# Patient Record
Sex: FEMALE | Race: WHITE | Employment: UNEMPLOYED | URBAN - METROPOLITAN AREA
[De-identification: names, ages, dates, MRNs, and addresses within clinical notes are randomized per-mention and may not be internally consistent; named-entity substitution may affect disease eponyms.]

---

## 2017-01-04 ENCOUNTER — HOSPITAL ENCOUNTER (EMERGENCY)
Age: 52
Discharge: HOME | End: 2017-01-04
Attending: EMERGENCY MEDICINE
Payer: MEDICAID

## 2017-01-04 VITALS
BODY MASS INDEX: 34.15 KG/M2 | OXYGEN SATURATION: 99 % | HEART RATE: 81 BPM | WEIGHT: 200 LBS | DIASTOLIC BLOOD PRESSURE: 63 MMHG | HEIGHT: 64 IN | RESPIRATION RATE: 20 BRPM | TEMPERATURE: 97.4 F | SYSTOLIC BLOOD PRESSURE: 116 MMHG

## 2017-01-04 DIAGNOSIS — F32.A DEPRESSION, UNSPECIFIED DEPRESSION TYPE: Primary | ICD-10-CM

## 2017-01-04 PROCEDURE — 90791 PSYCH DIAGNOSTIC EVALUATION: CPT

## 2017-01-04 PROCEDURE — 99283 EMERGENCY DEPT VISIT LOW MDM: CPT

## 2017-01-04 RX ORDER — GLIPIZIDE 2.5 MG/1
2.5 TABLET, EXTENDED RELEASE ORAL DAILY
Qty: 30 TAB | Refills: 0 | Status: SHIPPED | OUTPATIENT
Start: 2017-01-04 | End: 2017-06-06

## 2017-01-04 NOTE — DISCHARGE INSTRUCTIONS
Depression and Chronic Disease: Care Instructions  Your Care Instructions  A chronic disease is one that you have for a long time. Some chronic diseases can be controlled, but they usually cannot be cured. Depression is common in people with chronic diseases, but it often goes unnoticed. Many people have concerns about seeking treatment for a mental health problem. You may think it's a sign of weakness, or you don't want people to know about it. It's important to overcome these reasons for not seeking treatment. Treating depression or anxiety is good for your health. Follow-up care is a key part of your treatment and safety. Be sure to make and go to all appointments, and call your doctor if you are having problems. It's also a good idea to know your test results and keep a list of the medicines you take. How can you care for yourself at home? Watch for symptoms of depression  The symptoms of depression are often subtle at first. You may think they are caused by your disease rather than depression. Or you may think it is normal to be depressed when you have a chronic disease. If you are depressed you may:  · Feel sad or hopeless. · Feel guilty or worthless. · Not enjoy the things you used to enjoy. · Feel hopeless, as though life is not worth living. · Have trouble thinking or remembering. · Have low energy, and you may not eat or sleep well. · Pull away from others. · Think often about death or killing yourself. (Keep the numbers for these national suicide hotlines: 1-947-159-TALK [1-161.152.6980] and 0-287-QEPCNMP [1-200.115.3797]. )  Get treatment  By treating your depression, you can feel more hopeful and have more energy. If you feel better, you may take better care of yourself, so your health may improve. · Talk to your doctor if you have any changes in mood during treatment for your disease. · Ask your doctor for help.  Counseling, antidepressant medicine, or a combination of the two can help most people with depression. Often a combination works best. Counseling can also help you cope with having a chronic disease. When should you call for help? Call 911 anytime you think you may need emergency care. For example, call if:  · You feel like hurting yourself or someone else. · Someone you know has depression and is about to attempt or is attempting suicide. Call your doctor now or seek immediate medical care if:  · You hear voices. · Someone you know has depression and:  ¨ Starts to give away his or her possessions. ¨ Uses illegal drugs or drinks alcohol heavily. ¨ Talks or writes about death, including writing suicide notes or talking about guns, knives, or pills. ¨ Starts to spend a lot of time alone. ¨ Acts very aggressively or suddenly appears calm. Watch closely for changes in your health, and be sure to contact your doctor if:  · You do not get better as expected. Where can you learn more? Go to http://babatunde-dejah.info/. Enter G560 in the search box to learn more about \"Depression and Chronic Disease: Care Instructions. \"  Current as of: July 26, 2016  Content Version: 11.1  © 9754-4362 [employer]. Care instructions adapted under license by [employer] (which disclaims liability or warranty for this information). If you have questions about a medical condition or this instruction, always ask your healthcare professional. [address] any warranty or liability for your use of this information.

## 2017-01-04 NOTE — PROGRESS NOTES
Care manager interviewed patient at bedside following BSMART eval. RRAT: 12.    The patient is a 46year old female seen in ED for suicidal ideations. Accompanied by / from [facility] (present during assessment). The patient is currently homeless and states that she slept outside last night. She receives 200$ per month in [employer] benefits and has [employer]. PCP is Dr. Gabe Tony, no upcoming appointments. F/U scheduled for 1/18 (next available) with patient's permission. CM discussed housing resources with patient including [facility] (open this evening and tomorrow evening), [facility], and subsidized housing options. Provided patient with detailed instructions for registration for each referral. Also discussed the [facility] as an option for psychiatry, case management follow up as patient states that she is seeking new psychiatry provider. Referred to [facility] for free phone ( states that she will assist with online application after discharge). No other questions or concerns noted by patient at this time. She will register at [facility] this evening (7 PM) for emergency shelter. Provided patient and  with CM contact information in case of future questions. Care Management Interventions  PCP Verified by CM: Yes (Bryanna)  Mode of Transport at Discharge: Self  Transition of Care Consult (CM Consult): Discharge Planning  Current Support Network:  Other, Shelter  Confirm Follow Up Transport: Other (see comment) ()  Plan discussed with Pt/Family/Caregiver: Yes (PCP, housing, psychiatry)  Discharge Location  Discharge Placement: Home with outpatient services    Coreen Duverney, MSW  [phone]
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
## 2017-01-04 NOTE — ED NOTES
Patient (s) was given copy of dc instructions and no paper script(s) and 1 electronic scripts. Patient (s)  verbalized understanding of instructions and script (s). Patient given a current medication reconciliation form and verbalized understanding of their medications. Patient (s) verbalized understanding of the importance of discussing medications with  his or her physician or clinic they will be following up with. Patient alert and oriented and in no acute distress. Patient discharged home ambulatory with .
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
## 2017-01-04 NOTE — ED NOTES
.See Nursing Assessment      Emergency Department Nursing Plan of Care       The Nursing Plan of Care is developed from the Nursing assessment and Emergency Department Attending provider initial evaluation. The plan of care may be reviewed in the ED Provider note.     The Plan of Care was developed with the following considerations:   Patient / Family readiness to learn indicated by:verbalized understanding  Persons(s) to be included in education: patient  Barriers to Learning/Limitations:No    Signed     Lou Browning RN    1/4/2017   1:26 PM

## 2017-03-01 ENCOUNTER — HOSPITAL ENCOUNTER (EMERGENCY)
Age: 52
Discharge: HOME | End: 2017-03-02
Attending: EMERGENCY MEDICINE
Payer: MEDICAID

## 2017-03-01 DIAGNOSIS — F14.90 CRACK COCAINE USE: ICD-10-CM

## 2017-03-01 DIAGNOSIS — F10.20 ALCOHOLISM (HCC): ICD-10-CM

## 2017-03-01 DIAGNOSIS — F25.9 SCHIZOAFFECTIVE DISORDER, UNSPECIFIED TYPE (HCC): Primary | ICD-10-CM

## 2017-03-01 DIAGNOSIS — F19.10 SUBSTANCE ABUSE (HCC): ICD-10-CM

## 2017-03-01 PROCEDURE — 80307 DRUG TEST PRSMV CHEM ANLYZR: CPT | Performed by: EMERGENCY MEDICINE

## 2017-03-01 PROCEDURE — 36415 COLL VENOUS BLD VENIPUNCTURE: CPT | Performed by: EMERGENCY MEDICINE

## 2017-03-01 PROCEDURE — 90791 PSYCH DIAGNOSTIC EVALUATION: CPT

## 2017-03-01 PROCEDURE — 99284 EMERGENCY DEPT VISIT MOD MDM: CPT

## 2017-03-01 PROCEDURE — 80053 COMPREHEN METABOLIC PANEL: CPT | Performed by: EMERGENCY MEDICINE

## 2017-03-01 PROCEDURE — 85025 COMPLETE CBC W/AUTO DIFF WBC: CPT | Performed by: EMERGENCY MEDICINE

## 2017-03-01 RX ORDER — OXYCODONE HYDROCHLORIDE 5 MG/1
5 CAPSULE ORAL
Qty: 5 CAP | Refills: 0 | Status: SHIPPED | OUTPATIENT
Start: 2017-03-01 | End: 2017-06-06

## 2017-03-02 VITALS
WEIGHT: 200 LBS | TEMPERATURE: 98.1 F | HEART RATE: 79 BPM | SYSTOLIC BLOOD PRESSURE: 133 MMHG | BODY MASS INDEX: 34.15 KG/M2 | RESPIRATION RATE: 18 BRPM | HEIGHT: 64 IN | OXYGEN SATURATION: 98 % | DIASTOLIC BLOOD PRESSURE: 74 MMHG

## 2017-03-02 LAB
ALBUMIN SERPL BCP-MCNC: 3.2 G/DL (ref 3.5–5)
ALBUMIN/GLOB SERPL: 0.8 {RATIO} (ref 1.1–2.2)
ALP SERPL-CCNC: 146 U/L (ref 45–117)
ALT SERPL-CCNC: 24 U/L (ref 12–78)
AMPHET UR QL SCN: POSITIVE
ANION GAP BLD CALC-SCNC: 10 MMOL/L (ref 5–15)
AST SERPL W P-5'-P-CCNC: 9 U/L (ref 15–37)
BARBITURATES UR QL SCN: NEGATIVE
BASOPHILS # BLD AUTO: 0 K/UL (ref 0–0.1)
BASOPHILS # BLD: 0 % (ref 0–1)
BENZODIAZ UR QL: NEGATIVE
BILIRUB SERPL-MCNC: 0.2 MG/DL (ref 0.2–1)
BUN SERPL-MCNC: 15 MG/DL (ref 6–20)
BUN/CREAT SERPL: 19 (ref 12–20)
CALCIUM SERPL-MCNC: 8.7 MG/DL (ref 8.5–10.1)
CANNABINOIDS UR QL SCN: POSITIVE
CHLORIDE SERPL-SCNC: 101 MMOL/L (ref 97–108)
CO2 SERPL-SCNC: 28 MMOL/L (ref 21–32)
COCAINE UR QL SCN: POSITIVE
CREAT SERPL-MCNC: 0.81 MG/DL (ref 0.55–1.02)
DRUG SCRN COMMENT,DRGCM: ABNORMAL
EOSINOPHIL # BLD: 0.2 K/UL (ref 0–0.4)
EOSINOPHIL NFR BLD: 2 % (ref 0–7)
ERYTHROCYTE [DISTWIDTH] IN BLOOD BY AUTOMATED COUNT: 14.8 % (ref 11.5–14.5)
ETHANOL SERPL-MCNC: <10 MG/DL
GLOBULIN SER CALC-MCNC: 3.8 G/DL (ref 2–4)
GLUCOSE SERPL-MCNC: 193 MG/DL (ref 65–100)
HCT VFR BLD AUTO: 40.1 % (ref 35–47)
HGB BLD-MCNC: 12.8 G/DL (ref 11.5–16)
LYMPHOCYTES # BLD AUTO: 30 % (ref 12–49)
LYMPHOCYTES # BLD: 2.8 K/UL (ref 0.8–3.5)
MCH RBC QN AUTO: 28 PG (ref 26–34)
MCHC RBC AUTO-ENTMCNC: 31.9 G/DL (ref 30–36.5)
MCV RBC AUTO: 87.7 FL (ref 80–99)
METHADONE UR QL: NEGATIVE
MONOCYTES # BLD: 0.5 K/UL (ref 0–1)
MONOCYTES NFR BLD AUTO: 5 % (ref 5–13)
NEUTS SEG # BLD: 5.6 K/UL (ref 1.8–8)
NEUTS SEG NFR BLD AUTO: 63 % (ref 32–75)
OPIATES UR QL: NEGATIVE
PCP UR QL: NEGATIVE
PLATELET # BLD AUTO: 372 K/UL (ref 150–400)
POTASSIUM SERPL-SCNC: 3.8 MMOL/L (ref 3.5–5.1)
PROT SERPL-MCNC: 7 G/DL (ref 6.4–8.2)
RBC # BLD AUTO: 4.57 M/UL (ref 3.8–5.2)
SODIUM SERPL-SCNC: 139 MMOL/L (ref 136–145)
WBC # BLD AUTO: 9 K/UL (ref 3.6–11)

## 2017-03-02 NOTE — DISCHARGE INSTRUCTIONS
Schizophrenia: Care Instructions  Your Care Instructions  Schizophrenia is a disease that makes it hard to think clearly, manage emotions, and interact with other people. It can cause:  · Delusions. These are beliefs that are not real.  · Hallucinations. These are things that you may see or hear that are not really there. · Paranoia. This is the belief that others are lying, cheating, using you, or trying to harm you. The disease may change your ability to enjoy life, express emotions, or function. At times, you may hear voices, behave strangely, have trouble speaking or understanding speech, or keep to yourself. The goal of treatment is to lower your stress and help your brain function normally. You may need lifelong treatment with medicines and counseling to keep your schizophrenia under control. When schizophrenia is not treated, the risks are higher for suicide, a hospital stay, and other problems. Early treatment called coordinated specialty care [facility]) may help a person who is having his or her first episode of psychotic thoughts. Ask your doctor about [address]. Follow-up care is a key part of your treatment and safety. Be sure to make and go to all appointments, and call your doctor if you are having problems. It's also a good idea to know your test results and keep a list of the medicines you take. How can you care for yourself at home? · Be safe with medicines. Take your medicines exactly as prescribed. Call your doctor if you think you are having a problem with your medicine. When you feel good, you may think that you do not need your medicines. But it is important to keep taking them as scheduled. · Go to your counseling sessions. Call and talk with your counselor if you can't attend or if you don't think the sessions are helping. Do not just stop going. · Eat a healthy diet. Talk with a dietitian about what type of diet may be best for you. · Do not use alcohol or illegal drugs.   What should you do if someone in your family has schizophrenia? · Learn about the disease and how it may get worse over time. · Remind your family member that you love him or her. · Make a plan with all family members about how to take care of your loved one when his or her symptoms are bad. · Talk about your fears and concerns and those of other family members. Seek counseling if needed. · Do not focus attention only on the person who is in treatment. · Remind yourself that it will take time for changes to occur. · Do not blame yourself for the disease. · Know your legal rights and the legal rights of your family member. · Take care of yourself. Stay involved with your own interests, such as your career, hobbies, and friends. Use exercise, positive self-talk, relaxation, and deep breathing to help lower your stress. · Give yourself time to grieve. You may need to deal with emotions such as anger, fear, and frustration. After you work through your feelings, you will be better able to care for yourself and your family. · If you are having a hard time with your feelings and your interactions with your family member, talk with a counselor. When should you call for help? Call 911 anytime you think you may need emergency care. For example, call if:  · You are thinking about suicide or are threatening suicide. · You feel like hurting yourself or someone else. Call your doctor now or seek immediate medical care if:  · You hear voices. · You think someone is trying to harm you. · You cannot concentrate or are easily confused. Watch closely for changes in your health, and be sure to contact your doctor if:  · You are having trouble taking care of yourself. · You cannot attend your counseling sessions. Where can you learn more? Go to http://babatunde-dejah.info/. Enter W417 in the search box to learn more about \"Schizophrenia: Care Instructions. \"  Current as of: July 26, 2016  Content Version: 11.1  © 0174-2730 [employer]. Care instructions adapted under license by [employer] (which disclaims liability or warranty for this information). If you have questions about a medical condition or this instruction, always ask your healthcare professional. [address] any warranty or liability for your use of this information. Alcohol and Drug Problems: Care Instructions  Your Care Instructions  You can improve your life and health by stopping your use of alcohol or drugs. Ending dependency on alcohol or drugs may help you feel and sleep better. You may get along better with your family, friends, and coworkers. There are medicines and programs that can help. Follow-up care is a key part of your treatment and safety. Be sure to make and go to all appointments, and call your doctor if you are having problems. It's also a good idea to know your test results and keep a list of the medicines you take. How can you care for yourself at home? · If you have been given medicine to help keep you sober or reduce your cravings, be sure to take it as prescribed. · Talk to your doctor about programs that can help you stop using drugs or drinking alcohol. · If your doctor prescribes disulfiram (Antabuse), do not drink any alcohol while you are taking this medicine. You may have severe, even life-threatening, side effects from even small amounts of alcohol. · Do not tempt yourself by keeping alcohol or drugs in your home. · Learn how to say no when other people drink or use drugs. Or don't spend time with people who drink or use drugs. · Use the time and money spent on drinking or drugs to do something fun with your family or friends. Preventing a relapse  · Do not drink alcohol or use drugs at all. Using any amount of alcohol or drugs greatly increases your risk for relapse.   · Seek help from organizations such as Alcoholics Anonymous, Narcotics Anonymous, or treatment facilities if you feel the need to drink alcohol or use drugs again. · Remember that recovery is a lifelong process. · Stay away from situations, friends, or places that may lead you to drink or use drugs. · Have a plan to spot and deal with relapse. Learn to recognize changes in your thinking that lead you to drink or use drugs. These are warning signs. Get help before you start to drink or use drugs again. · Get help as soon as you can if you relapse. Some people make a plan with another person that outlines what they want that person to do for them if they relapse. The plan usually includes how to handle the relapse and who to notify in case of relapse. · Don't give up. Remember that a relapse does not mean that you have failed. Use the experience to learn the triggers that lead you to drink or use drugs. Then quit again. Many people have several relapses before they are able to quit for good. When should you call for help? Call 911 anytime you think you may need emergency care. For example, call if:  · You feel you cannot stop from hurting yourself or someone else. Call your doctor now or seek immediate medical care if:  · You have serious withdrawal symptoms such as confusion, hallucinations, or severe trembling. Watch closely for changes in your health, and be sure to contact your doctor if:  · You have a relapse. · You need more help or support to stop. Where can you learn more? Go to http://babatunde-dejah.info/. Enter [id number] in the search box to learn more about \"Alcohol and Drug Problems: Care Instructions. \"  Current as of: February 24, 2016  Content Version: 11.1  © 4452-9493 [employer]. Care instructions adapted under license by [employer] (which disclaims liability or warranty for this information).  If you have questions about a medical condition or this instruction, always ask your healthcare professional. [address] any warranty or liability for your use of this information.

## 2017-03-02 NOTE — ED NOTES
Bedside shift change report given to Sofy Marrufo RN (oncoming nurse) by Curly Mejia RN (offgoing nurse). Report included the following information SBAR, ED Summary, Intake/Output, MAR and Recent Results.

## 2017-03-02 NOTE — ED TRIAGE NOTES
Triage Note: Pt coming to ED via EMS with complaints of SI and ETOH; Drank approx 0.5 gallon of gin today which is baseline for pt; Agrees to safety contact while in ED;  Pt also states she smoked some crack tonight
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
## 2017-03-02 NOTE — ED NOTES
Patient escorted to room, changed into hospital gown, all belongings collected and placed at the nursing station, patient contracts for safety while in the ED.

## 2017-03-02 NOTE — ED NOTES
Discharge instructions given to pt. All questions answered and pt verbalized understanding. V/S stable @ time of discharge. Pt ambulatory out of unit. Juliane contacted for transport home spoke with [facility] confirmation number [id number].
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
## 2017-04-03 ENCOUNTER — HOSPITAL ENCOUNTER (EMERGENCY)
Age: 52
Discharge: HOME | End: 2017-04-03
Attending: EMERGENCY MEDICINE
Payer: MEDICAID

## 2017-04-03 VITALS
DIASTOLIC BLOOD PRESSURE: 81 MMHG | OXYGEN SATURATION: 95 % | SYSTOLIC BLOOD PRESSURE: 160 MMHG | TEMPERATURE: 97.6 F | HEART RATE: 79 BPM | RESPIRATION RATE: 16 BRPM

## 2017-04-03 DIAGNOSIS — F20.9 SCHIZOPHRENIA, UNSPECIFIED TYPE (HCC): Primary | ICD-10-CM

## 2017-04-03 LAB
ALBUMIN SERPL BCP-MCNC: 2.9 G/DL (ref 3.5–5)
ALBUMIN/GLOB SERPL: 0.7 {RATIO} (ref 1.1–2.2)
ALP SERPL-CCNC: 145 U/L (ref 45–117)
ALT SERPL-CCNC: 24 U/L (ref 12–78)
AMPHET UR QL SCN: NEGATIVE
ANION GAP BLD CALC-SCNC: 9 MMOL/L (ref 5–15)
APAP SERPL-MCNC: <2 UG/ML (ref 10–30)
AST SERPL W P-5'-P-CCNC: 12 U/L (ref 15–37)
BARBITURATES UR QL SCN: NEGATIVE
BASOPHILS # BLD AUTO: 0 K/UL (ref 0–0.1)
BASOPHILS # BLD: 0 % (ref 0–1)
BENZODIAZ UR QL: NEGATIVE
BILIRUB SERPL-MCNC: 0.2 MG/DL (ref 0.2–1)
BUN SERPL-MCNC: 13 MG/DL (ref 6–20)
BUN/CREAT SERPL: 17 (ref 12–20)
CALCIUM SERPL-MCNC: 8.8 MG/DL (ref 8.5–10.1)
CANNABINOIDS UR QL SCN: POSITIVE
CHLORIDE SERPL-SCNC: 104 MMOL/L (ref 97–108)
CO2 SERPL-SCNC: 27 MMOL/L (ref 21–32)
COCAINE UR QL SCN: NEGATIVE
CREAT SERPL-MCNC: 0.77 MG/DL (ref 0.55–1.02)
DRUG SCRN COMMENT,DRGCM: ABNORMAL
EOSINOPHIL # BLD: 0.2 K/UL (ref 0–0.4)
EOSINOPHIL NFR BLD: 3 % (ref 0–7)
ERYTHROCYTE [DISTWIDTH] IN BLOOD BY AUTOMATED COUNT: 14.4 % (ref 11.5–14.5)
ETHANOL SERPL-MCNC: <10 MG/DL
GLOBULIN SER CALC-MCNC: 4.2 G/DL (ref 2–4)
GLUCOSE SERPL-MCNC: 225 MG/DL (ref 65–100)
HCT VFR BLD AUTO: 36.6 % (ref 35–47)
HGB BLD-MCNC: 11.6 G/DL (ref 11.5–16)
LYMPHOCYTES # BLD AUTO: 34 % (ref 12–49)
LYMPHOCYTES # BLD: 2.5 K/UL (ref 0.8–3.5)
MCH RBC QN AUTO: 27.8 PG (ref 26–34)
MCHC RBC AUTO-ENTMCNC: 31.7 G/DL (ref 30–36.5)
MCV RBC AUTO: 87.8 FL (ref 80–99)
METHADONE UR QL: NEGATIVE
MONOCYTES # BLD: 0.5 K/UL (ref 0–1)
MONOCYTES NFR BLD AUTO: 7 % (ref 5–13)
NEUTS SEG # BLD: 4.2 K/UL (ref 1.8–8)
NEUTS SEG NFR BLD AUTO: 56 % (ref 32–75)
OPIATES UR QL: NEGATIVE
PCP UR QL: NEGATIVE
PLATELET # BLD AUTO: 336 K/UL (ref 150–400)
POTASSIUM SERPL-SCNC: 3.8 MMOL/L (ref 3.5–5.1)
PROT SERPL-MCNC: 7.1 G/DL (ref 6.4–8.2)
RBC # BLD AUTO: 4.17 M/UL (ref 3.8–5.2)
SODIUM SERPL-SCNC: 140 MMOL/L (ref 136–145)
WBC # BLD AUTO: 7.5 K/UL (ref 3.6–11)

## 2017-04-03 PROCEDURE — 99285 EMERGENCY DEPT VISIT HI MDM: CPT

## 2017-04-03 PROCEDURE — 80307 DRUG TEST PRSMV CHEM ANLYZR: CPT | Performed by: EMERGENCY MEDICINE

## 2017-04-03 PROCEDURE — 36415 COLL VENOUS BLD VENIPUNCTURE: CPT | Performed by: EMERGENCY MEDICINE

## 2017-04-03 PROCEDURE — 80053 COMPREHEN METABOLIC PANEL: CPT | Performed by: EMERGENCY MEDICINE

## 2017-04-03 PROCEDURE — 85025 COMPLETE CBC W/AUTO DIFF WBC: CPT | Performed by: EMERGENCY MEDICINE
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
## 2017-04-03 NOTE — ED NOTES
Pt wheeled to restroom; lethargic but able to follow commands. Upon return to bed pt immediately began snoring again. Remains arousable to voice and stimulation but continues to be lethargic.

## 2017-04-03 NOTE — DISCHARGE INSTRUCTIONS
Schizophrenia: Care Instructions  Your Care Instructions  Schizophrenia is a disease that makes it hard to think clearly, manage emotions, and interact with other people. It can cause:  · Delusions. These are beliefs that are not real.  · Hallucinations. These are things that you may see or hear that are not really there. · Paranoia. This is the belief that others are lying, cheating, using you, or trying to harm you. The disease may change your ability to enjoy life, express emotions, or function. At times, you may hear voices, behave strangely, have trouble speaking or understanding speech, or keep to yourself. The goal of treatment is to lower your stress and help your brain function normally. You may need lifelong treatment with medicines and counseling to keep your schizophrenia under control. When schizophrenia is not treated, the risks are higher for suicide, a hospital stay, and other problems. Early treatment called coordinated specialty care [facility]) may help a person who is having his or her first episode of psychotic thoughts. Ask your doctor about [address]. Follow-up care is a key part of your treatment and safety. Be sure to make and go to all appointments, and call your doctor if you are having problems. It's also a good idea to know your test results and keep a list of the medicines you take. How can you care for yourself at home? · Be safe with medicines. Take your medicines exactly as prescribed. Call your doctor if you think you are having a problem with your medicine. When you feel good, you may think that you do not need your medicines. But it is important to keep taking them as scheduled. · Go to your counseling sessions. Call and talk with your counselor if you can't attend or if you don't think the sessions are helping. Do not just stop going. · Eat a healthy diet. Talk with a dietitian about what type of diet may be best for you. · Do not use alcohol or illegal drugs.   What should you do if someone in your family has schizophrenia? · Learn about the disease and how it may get worse over time. · Remind your family member that you love him or her. · Make a plan with all family members about how to take care of your loved one when his or her symptoms are bad. · Talk about your fears and concerns and those of other family members. Seek counseling if needed. · Do not focus attention only on the person who is in treatment. · Remind yourself that it will take time for changes to occur. · Do not blame yourself for the disease. · Know your legal rights and the legal rights of your family member. · Take care of yourself. Stay involved with your own interests, such as your career, hobbies, and friends. Use exercise, positive self-talk, relaxation, and deep breathing to help lower your stress. · Give yourself time to grieve. You may need to deal with emotions such as anger, fear, and frustration. After you work through your feelings, you will be better able to care for yourself and your family. · If you are having a hard time with your feelings and your interactions with your family member, talk with a counselor. When should you call for help? Call 911 anytime you think you may need emergency care. For example, call if:  · You are thinking about suicide or are threatening suicide. · You feel like hurting yourself or someone else. Call your doctor now or seek immediate medical care if:  · You hear voices. · You think someone is trying to harm you. · You cannot concentrate or are easily confused. Watch closely for changes in your health, and be sure to contact your doctor if:  · You are having trouble taking care of yourself. · You cannot attend your counseling sessions. Where can you learn more? Go to http://babatunde-dejah.info/. Enter O421 in the search box to learn more about \"Schizophrenia: Care Instructions. \"  Current as of: July 26, 2016  Content Version: 11.2  © 6242-1033 [employer]. Care instructions adapted under license by [employer] (which disclaims liability or warranty for this information). If you have questions about a medical condition or this instruction, always ask your healthcare professional. [place] [address] any warranty or liability for your use of this information.

## 2017-04-03 NOTE — ED TRIAGE NOTES
Pt called EMS herself from home stating that she has felt suicidal for 2 weeks and has a plan to cut her wrists to bleed out.

## 2017-04-03 NOTE — ED NOTES
MD reviewed discharge instructions and options with patient; patient verbalized understanding. RN reviewed discharge instructions using teachback method. Pt. Ambulated to exit without difficulty and in no signs of acute distress. Called CHELITA Zhong to  patient to bring her home.

## 2017-06-06 ENCOUNTER — HOSPITAL ENCOUNTER (INPATIENT)
Age: 52
LOS: 2 days | Discharge: HOME | DRG: 750 | End: 2017-06-08
Attending: EMERGENCY MEDICINE | Admitting: PSYCHIATRY & NEUROLOGY
Payer: MEDICAID

## 2017-06-06 DIAGNOSIS — F32.89 OTHER DEPRESSION: Primary | ICD-10-CM

## 2017-06-06 DIAGNOSIS — R45.851 SUICIDAL IDEATION: ICD-10-CM

## 2017-06-06 LAB
ALBUMIN SERPL BCP-MCNC: 3.3 G/DL (ref 3.5–5)
ALBUMIN/GLOB SERPL: 0.8 {RATIO} (ref 1.1–2.2)
ALP SERPL-CCNC: 111 U/L (ref 45–117)
ALT SERPL-CCNC: 23 U/L (ref 12–78)
AMPHET UR QL SCN: POSITIVE
ANION GAP BLD CALC-SCNC: 6 MMOL/L (ref 5–15)
APAP SERPL-MCNC: <2 UG/ML (ref 10–30)
APPEARANCE UR: ABNORMAL
AST SERPL W P-5'-P-CCNC: 10 U/L (ref 15–37)
BACTERIA URNS QL MICRO: ABNORMAL /HPF
BARBITURATES UR QL SCN: NEGATIVE
BASOPHILS # BLD AUTO: 0 K/UL (ref 0–0.1)
BASOPHILS # BLD: 0 % (ref 0–1)
BENZODIAZ UR QL: NEGATIVE
BILIRUB SERPL-MCNC: 0.2 MG/DL (ref 0.2–1)
BILIRUB UR QL: NEGATIVE
BUN SERPL-MCNC: 14 MG/DL (ref 6–20)
BUN/CREAT SERPL: 18 (ref 12–20)
CALCIUM SERPL-MCNC: 8.8 MG/DL (ref 8.5–10.1)
CANNABINOIDS UR QL SCN: NEGATIVE
CHLORIDE SERPL-SCNC: 104 MMOL/L (ref 97–108)
CO2 SERPL-SCNC: 27 MMOL/L (ref 21–32)
COCAINE UR QL SCN: NEGATIVE
COLOR UR: ABNORMAL
CREAT SERPL-MCNC: 0.76 MG/DL (ref 0.55–1.02)
DRUG SCRN COMMENT,DRGCM: ABNORMAL
EOSINOPHIL # BLD: 0.1 K/UL (ref 0–0.4)
EOSINOPHIL NFR BLD: 3 % (ref 0–7)
EPITH CASTS URNS QL MICRO: ABNORMAL /LPF
ERYTHROCYTE [DISTWIDTH] IN BLOOD BY AUTOMATED COUNT: 14.4 % (ref 11.5–14.5)
ETHANOL SERPL-MCNC: <10 MG/DL
GLOBULIN SER CALC-MCNC: 4.2 G/DL (ref 2–4)
GLUCOSE BLD STRIP.AUTO-MCNC: 151 MG/DL (ref 65–100)
GLUCOSE SERPL-MCNC: 137 MG/DL (ref 65–100)
GLUCOSE UR STRIP.AUTO-MCNC: NEGATIVE MG/DL
HCT VFR BLD AUTO: 39.7 % (ref 35–47)
HGB BLD-MCNC: 12.5 G/DL (ref 11.5–16)
HGB UR QL STRIP: NEGATIVE
HYALINE CASTS URNS QL MICRO: ABNORMAL /LPF (ref 0–5)
KETONES UR QL STRIP.AUTO: NEGATIVE MG/DL
LEUKOCYTE ESTERASE UR QL STRIP.AUTO: NEGATIVE
LYMPHOCYTES # BLD AUTO: 30 % (ref 12–49)
LYMPHOCYTES # BLD: 1.6 K/UL (ref 0.8–3.5)
MCH RBC QN AUTO: 27.3 PG (ref 26–34)
MCHC RBC AUTO-ENTMCNC: 31.5 G/DL (ref 30–36.5)
MCV RBC AUTO: 86.7 FL (ref 80–99)
METHADONE UR QL: NEGATIVE
MONOCYTES # BLD: 0.2 K/UL (ref 0–1)
MONOCYTES NFR BLD AUTO: 4 % (ref 5–13)
NEUTS SEG # BLD: 3.4 K/UL (ref 1.8–8)
NEUTS SEG NFR BLD AUTO: 63 % (ref 32–75)
NITRITE UR QL STRIP.AUTO: POSITIVE
OPIATES UR QL: NEGATIVE
PCP UR QL: NEGATIVE
PH UR STRIP: 5.5 [PH] (ref 5–8)
PLATELET # BLD AUTO: 315 K/UL (ref 150–400)
POTASSIUM SERPL-SCNC: 3.7 MMOL/L (ref 3.5–5.1)
PROT SERPL-MCNC: 7.5 G/DL (ref 6.4–8.2)
PROT UR STRIP-MCNC: NEGATIVE MG/DL
RBC # BLD AUTO: 4.58 M/UL (ref 3.8–5.2)
RBC #/AREA URNS HPF: ABNORMAL /HPF (ref 0–5)
SALICYLATES SERPL-MCNC: 3.2 MG/DL (ref 2.8–20)
SERVICE CMNT-IMP: ABNORMAL
SODIUM SERPL-SCNC: 137 MMOL/L (ref 136–145)
SP GR UR REFRACTOMETRY: 1.02 (ref 1–1.03)
UROBILINOGEN UR QL STRIP.AUTO: 0.2 EU/DL (ref 0.2–1)
WBC # BLD AUTO: 5.4 K/UL (ref 3.6–11)
WBC URNS QL MICRO: ABNORMAL /HPF (ref 0–4)

## 2017-06-06 PROCEDURE — 80053 COMPREHEN METABOLIC PANEL: CPT | Performed by: EMERGENCY MEDICINE

## 2017-06-06 PROCEDURE — 80307 DRUG TEST PRSMV CHEM ANLYZR: CPT | Performed by: EMERGENCY MEDICINE

## 2017-06-06 PROCEDURE — 82962 GLUCOSE BLOOD TEST: CPT

## 2017-06-06 PROCEDURE — 87077 CULTURE AEROBIC IDENTIFY: CPT | Performed by: EMERGENCY MEDICINE

## 2017-06-06 PROCEDURE — 81001 URINALYSIS AUTO W/SCOPE: CPT | Performed by: EMERGENCY MEDICINE

## 2017-06-06 PROCEDURE — 90791 PSYCH DIAGNOSTIC EVALUATION: CPT

## 2017-06-06 PROCEDURE — 87086 URINE CULTURE/COLONY COUNT: CPT | Performed by: EMERGENCY MEDICINE

## 2017-06-06 PROCEDURE — 85025 COMPLETE CBC W/AUTO DIFF WBC: CPT | Performed by: EMERGENCY MEDICINE

## 2017-06-06 PROCEDURE — 99284 EMERGENCY DEPT VISIT MOD MDM: CPT

## 2017-06-06 PROCEDURE — 87186 SC STD MICRODIL/AGAR DIL: CPT | Performed by: EMERGENCY MEDICINE

## 2017-06-06 PROCEDURE — 65220000003 HC RM SEMIPRIVATE PSYCH

## 2017-06-06 PROCEDURE — 36415 COLL VENOUS BLD VENIPUNCTURE: CPT | Performed by: EMERGENCY MEDICINE

## 2017-06-06 RX ORDER — LORAZEPAM 2 MG/ML
2 INJECTION INTRAMUSCULAR
Status: DISCONTINUED | OUTPATIENT
Start: 2017-06-06 | End: 2017-06-08 | Stop reason: HOSPADM

## 2017-06-06 RX ORDER — BENZTROPINE MESYLATE 2 MG/1
2 TABLET ORAL
Status: DISCONTINUED | OUTPATIENT
Start: 2017-06-06 | End: 2017-06-08 | Stop reason: HOSPADM

## 2017-06-06 RX ORDER — ACETAMINOPHEN 325 MG/1
650 TABLET ORAL
Status: DISCONTINUED | OUTPATIENT
Start: 2017-06-06 | End: 2017-06-08 | Stop reason: HOSPADM

## 2017-06-06 RX ORDER — GLIPIZIDE 5 MG/1
10 TABLET ORAL
Status: DISCONTINUED | OUTPATIENT
Start: 2017-06-07 | End: 2017-06-08 | Stop reason: HOSPADM

## 2017-06-06 RX ORDER — ZOLPIDEM TARTRATE 10 MG/1
10 TABLET ORAL
Status: DISCONTINUED | OUTPATIENT
Start: 2017-06-06 | End: 2017-06-06

## 2017-06-06 RX ORDER — GLIPIZIDE 10 MG/1
10 TABLET ORAL 2 TIMES DAILY
Status: ON HOLD | COMMUNITY
End: 2021-09-22

## 2017-06-06 RX ORDER — BUPROPION HYDROCHLORIDE 200 MG/1
200 TABLET, EXTENDED RELEASE ORAL 2 TIMES DAILY
COMMUNITY
End: 2017-06-14

## 2017-06-06 RX ORDER — IBUPROFEN 200 MG
1 TABLET ORAL
Status: DISCONTINUED | OUTPATIENT
Start: 2017-06-06 | End: 2017-06-08 | Stop reason: HOSPADM

## 2017-06-06 RX ORDER — BENZTROPINE MESYLATE 1 MG/ML
2 INJECTION INTRAMUSCULAR; INTRAVENOUS
Status: DISCONTINUED | OUTPATIENT
Start: 2017-06-06 | End: 2017-06-08 | Stop reason: HOSPADM

## 2017-06-06 RX ORDER — ADHESIVE BANDAGE
30 BANDAGE TOPICAL DAILY PRN
Status: DISCONTINUED | OUTPATIENT
Start: 2017-06-06 | End: 2017-06-08 | Stop reason: HOSPADM

## 2017-06-06 RX ORDER — ZOLPIDEM TARTRATE 5 MG/1
5 TABLET ORAL
Status: DISCONTINUED | OUTPATIENT
Start: 2017-06-06 | End: 2017-06-08 | Stop reason: HOSPADM

## 2017-06-06 RX ORDER — LORAZEPAM 1 MG/1
1 TABLET ORAL
Status: DISCONTINUED | OUTPATIENT
Start: 2017-06-06 | End: 2017-06-08 | Stop reason: HOSPADM

## 2017-06-06 RX ORDER — OLANZAPINE 5 MG/1
5 TABLET ORAL
Status: DISCONTINUED | OUTPATIENT
Start: 2017-06-06 | End: 2017-06-08 | Stop reason: HOSPADM

## 2017-06-06 RX ORDER — IBUPROFEN 400 MG/1
400 TABLET ORAL
Status: DISCONTINUED | OUTPATIENT
Start: 2017-06-06 | End: 2017-06-08 | Stop reason: HOSPADM

## 2017-06-06 NOTE — PROGRESS NOTES
Admission Medication Reconciliation:    Information obtained from: patient, Rx Query    Significant PMH/Disease States:   Past Medical History:   Diagnosis Date    Arthritis     legs    [facility]     COPD     Depression 1/13/2012    Diabetes ([facility]     Drug abuse     cocaine, stimulants, etoh, mj, tob    H/O suicide attempt     Polydrug dependence excluding opioid type drug, abuse ([facility]     Schizophrenia ([facility]     dx: in late 20's. vs substance induced. h/o visual and aud eunice.        Chief Complaint for this Admission:    Chief Complaint   Patient presents with    Back Pain    Leg Pain    Suicidal       Allergies:  Mushroom flavor and Tomato    Prior to Admission Medications:   Prior to Admission Medications   Prescriptions Last Dose Informant Patient Reported? Taking? QUEtiapine (SEROQUEL) 200 mg tablet 6/3/2017  Yes Yes   Sig: Take 200 mg by mouth nightly. Indications: DEPRESSION TREATMENT ADJUNCT   buPROPion SR (WELLBUTRIN, ZYBAN) 200 mg SR tablet 6/6/2017 at am  Yes Yes   Sig: Take 200 mg by mouth two (2) times a day. glipiZIDE (GLUCOTROL) 10 mg tablet 5/30/2017  Yes Yes   Sig: Take 10 mg by mouth daily. sertraline (ZOLOFT) 50 mg tablet 6/6/2017 at am  Yes Yes   Sig: Take 50 mg by mouth daily. traZODone (DESYREL) 100 mg tablet 6/5/2017 at hs  Yes Yes   Sig: Take 200 mg by mouth nightly. Indications: INSOMNIA      Facility-Administered Medications: None         Comments/Recommendations: Reviewed PTA meds/confirmed allergies. Updated list with following changes:  1) Removed: ferrous sulfate (patient states she needs new Rx), oxycodone  2) Updated dose of bupropion from 100mg BID to 200mg BID  3) Updated dose of glipizide from 2.5 mg CR to 10 mg daily  4) She reports that she ran out of the Seroquel a couple days ago and that it has been about a week since she had her glipizide.

## 2017-06-06 NOTE — BH NOTES
TRANSFER - IN REPORT:    Verbal report received from Rocío Foxjones  on [employer]  being received from [address] for routine progression of care      Report consisted of patients Situation, Background, Assessment and   Recommendations(SBAR). Information from the following report(s) SBAR and ED Summary was reviewed with the receiving nurse. Opportunity for questions and clarification was provided. Assessment completed upon patients arrival to unit and care assumed. Primary Nurse Keyana Cody RN and Javier Collins RN performed a dual skin assessment on this patient Olga Krueger. Pt with healed R leg scar, several small areas of light bruising on left buttocks, excoriated skin under both breasts and bilaterally in groins. Admission note---Pt admitted from the ED w/the diagnosis of schizoaffective d/o. Pt went to the ED c/o bilateral leg aching, back pain, and an UTI, mentioning that she felt suicidal (no plan) and was having auditory hallucinations saying negative things to her and telling her to hurt herself. The pt has an extensive psychiatric hx with approximately 20 inpatient hospitalizations, the last being at [facility] in 2016. The pt admits to previous suicide attempts by overdosing and cutting her wrists. Pt reports SI but is able to verbalize a safety plan. Denies HI. States she had been drinking 3 quarts of beer daily until last month and has been clean for the past 2 months. The pt uses a cane, gait is slightly unsteady and very slow. 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 She is very vague about her med compliance. Will continue to monitor.

## 2017-06-06 NOTE — IP AVS SNAPSHOT
[address] [facility] [address] [place] [address] 
[phone] Patient: Brittnee Ordaz MRN: [id number] :1965 A check vanessa indicates which time of day the medication should be taken. My Medications CONTINUE taking these medications Instructions Each Dose to Equal  
 Morning Noon Evening Bedtime  
 glipiZIDE 10 mg tablet Commonly known as:  Florin Vermilion Take 10 mg by mouth daily. Indications: type 2 diabetes mellitus 10 mg  
    
   
   
   
  
 QUEtiapine 200 mg tablet Commonly known as:  SEROquel Your next dose is: Tonight at 10pm  
   
 Take 200 mg by mouth nightly. Indications: DEPRESSION TREATMENT ADJUNCT  
 200 mg  
    
   
   
   
  
 sertraline 50 mg tablet Commonly known as:  ZOLOFT Take 50 mg by mouth daily. Indications: major depressive disorder 50 mg  
    
   
   
   
  
 traZODone 100 mg tablet Commonly known as:  Jannet Moh Your next dose is: Tonight at 10pm  
   
 Take 200 mg by mouth nightly. Indications: INSOMNIA  
 200 mg  
    
   
   
   
  
  
STOP taking these medications buPROPion  mg SR tablet Commonly known as:  Randi Dozier

## 2017-06-06 NOTE — BSMART NOTE
Comprehensive Assessment Form Part 1        Section I - Disposition    Axis I - Schizoaffective Disorder, hx of Substance Use Disorder   Axis II - Deferred  Axis III -   Past Medical History:   Diagnosis Date    Arthritis     legs    B[facility]     COPD     Depression 2012    Diabetes ([facility]     Drug abuse     cocaine, stimulants, etoh, mj, tob    H/O suicide attempt     Polydrug dependence excluding opioid type drug, abuse ([facility]     [facility]     dx: in late 20's. vs substance induced. h/o visual and aud eunice.        Axis IV - Marital issues  [place] V - 35      The Medical Doctor to Psychiatrist conference was not completed. The Medical Doctor is in agreement with Psychiatrist disposition because of (reason) Patient requesting voluntary admission. The plan is admit to general psychiatry at [facility]. The on-call Psychiatrist consulted was Dr. Vernon Faria. The admitting Psychiatrist will be Dr. Daryle Legions. The admitting Diagnosis is Schizoaffective. The Payor source is  WShahla RShahla Hackberry    Section II - Integrated Summary  Summary:  Patient came in for leg and spine pain as well as headaches. Patient also reported hallucinations and SI. Patient reported she has been feeling this way for a couple of days. Patient reported a history of depression and schizophrenia. Patient sees Elissa Liz, for case management but this clinician was unable to reach her. Patient also reported a mental health , Guerda Merritt, at [phone]. Patient reported she is under stress because she lost her sister last week and her marriage is \"bad\" and he has cancer. Patient reported she has had decreased appetite and sleep. Patient reported hearing and seeing her  grandmother. Patient does not appear to be actively hallucinating currently. Patient reported SI to \"let a car hit me. \"  Patient denied HI. Patient has been admitted here 2x in 2016 and both admissions were only a day long. Patient has also been seen several times in the ER and discharged. She appears to have SI at baseline and history of cocaine and alcohol use as well base on record review. The patienthas demonstrated mental capacity to provide informed consent. The information is given by the patient, past medical records and Mental Health . The Chief Complaint is SI and hallucinations. The Precipitant Factors are marital issues. Previous Hospitalizations: Yes  The patient has not previously been in restraints. Current Psychiatrist and/or  is Eric Mercado and Amy Miranda. Lethality Assessment:    The potential for suicide noted by the following: previous history of attempts which occured on (date)last year in the form(s) of cutting and overdosing  vague plan and ideation . The potential for homicide is not noted. The patient has not been a perpetrator of sexual or physical abuse. There are not pending charges. The patient is felt to be at risk for self harm or harm to others. The attending nurse was advised that security has not been notified. Section III - Psychosocial  The patient's overall mood and attitude is depressed. Feelings of helplessness and hopelessness are not observed. Generalized anxiety is not observed. Panic is not observed. Phobias are not observed. Obsessive compulsive tendencies are not observed. Section IV - Mental Status Exam  The patient's appearance shows no evidence of impairment. The patient's behavior shows no evidence of impairment. The patient is oriented to time, place, person and situation. The patient's speech shows no evidence of impairment. The patient's mood is depressed. The range of affect is flat. The patient's thought content demonstrates no evidence of impairment. The thought process shows no evidence of impairment. The patient's perception shows no evidence of impairment. The patient's memory shows no evidence of impairment. The patient's appetite is decreased and shows signs of weight loss. The patient's sleep has evidence of insomnia. The patient shows no insight. The patient's judgement is psychologically impaired. Section V - Substance Abuse  The patient is not using substances. Section VI - Living Arrangements  The patient is . .  The patient lives with a spouse. The patient has one child . The patient does plan to return home upon discharge. The patient does not have legal issues pending. The patient's source of income comes from disability. Restorationism and cultural practices have not been voiced at this time. The patient's greatest support comes from [facility] and [address] and this person will be involved with the treatment. The patient has not been in an event described as horrible or outside the realm of ordinary life experience either currently or in the past.  The patient has not been a victim of sexual/physical abuse. Section VII - Other Areas of Clinical Concern  The highest grade achieved is 12th with the overall quality of school experience being described as \"Special Ed\". The patient is currently disabled and speaks [place] as a primary language. The patient has no communication impairments affecting communication. The patient's preference for learning can be described as: can read and write adequately.   The patient's hearing is normal.  The patient's vision is normal.      Nam Dutta, KERI

## 2017-06-06 NOTE — ED TRIAGE NOTES
Pt also states she is having hallucinations and would like to check herself in to the psych singer. +SI. Denies HI.

## 2017-06-06 NOTE — IP AVS SNAPSHOT
[address] [facility] [address]ira [address] 
[phone] Patient: Elvis Kenney MRN: [id number] :1965 About your hospitalization You were admitted on:  2017 You last received care in the:  [address] You were discharged on:  2017 Why you were hospitalized Your primary diagnosis was:  Schizoaffective Disorder (Hcc) Follow-up Information Follow up With Details Comments Contact Info Philip Clemons On 2017 Mental Health  to pick Patient up for D/C at 1:00pm and transport Patient home. [address] 
[phone] [address] [place] [address]   - Imelda Busch [address] [place] [address] Dee Lvoe MD On 2017 please call and make an appointment within 14 days from discharge for diabetes management and medication monitoring [facility] 
[phone] Discharge Orders None A check vanessa indicates which time of day the medication should be taken. My Medications CONTINUE taking these medications Instructions Each Dose to Equal  
 Morning Noon Evening Bedtime  
 glipiZIDE 10 mg tablet Commonly known as:  Freeodm Schlein Take 10 mg by mouth daily. Indications: type 2 diabetes mellitus 10 mg  
    
   
   
   
  
 QUEtiapine 200 mg tablet Commonly known as:  SEROquel Your next dose is: Tonight at 10pm  
   
 Take 200 mg by mouth nightly. Indications: DEPRESSION TREATMENT ADJUNCT  
 200 mg  
    
   
   
   
  
 sertraline 50 mg tablet Commonly known as:  ZOLOFT Take 50 mg by mouth daily. Indications: major depressive disorder 50 mg  
    
   
   
   
  
 traZODone 100 mg tablet Commonly known as:  Hermon Valdovinos Your next dose is: Tonight at 10pm  
   
 Take 200 mg by mouth nightly.  Indications: INSOMNIA  
 200 mg  
    
   
 STOP taking these medications buPROPion  mg SR tablet Commonly known as:  Miguelito Long Discharge Instructions DISCHARGE SUMMARY 
 
NAME:Pilar Sandra : 1965 MRN: [id number] The patient Maryjo Escalante exhibits the ability to control behavior in a less restrictive environment. Patient's level of functioning is improving. No assaultive/destructive behavior has been observed for the past 24 hours. No suicidal/homicidal threat or behavior has been observed for the past 24 hours. There is no evidence of serious medication side effects. Patient has not been in physical or protective restraints for at least the past 24 hours. If weapons involved, how are they secured? No weapons involved. Is patient aware of and in agreement with discharge plan? Yes Arrangements for medication:  Prescriptions given to patient. Patient is a smoker and needs referral for smoking cessation? Patient declined. 1.  Patient referred to the following for smoking cessation with an appointment: Patient declined. 2.  Patient was offered medication to assist with smoking cessation at discharge: Patient declined. 3.  Education for smoking cessation added to discharge instructions: Patient declined. Patient has a history of substance/alcohol abuse and requires a referral for treatment? Patient refused recommendations for substance abuse treatment follow-up. 1.  Patient referred to the following for substance/alcohol abuse treatment with an appointment: Patient will [facility] up with [facility] 2. Patient was offered medication to assist with alcohol cessation at discharge: Patient declined. 3.  Education for substance/alcohol abuse added to discharge instructions: Attached to discharge paperwork. Copy of discharge instructions to provider?: Willie Cid ([phone]) Arrangements for transportation home: Mental health  to  at 1pm 
 Keep all follow up appointments as scheduled, continue to take prescribed medications per physician instructions. Mental health crisis number:  653 or your local mental health crisis line number at [phone]. DISCHARGE SUMMARY from Nurse The following personal items are in your possession at time of discharge: 
 
Dental Appliances: None Visual Aid: None Home Medications: Locked (given to nurse) Jewelry: Earrings (in purse in closet) Clothing: Dress, Footwear, With patient (dress and sandals) Other Valuables: Cigarettes, Lighter/matches, Money (comment), Personal toiletries, Purse, Other (comment) (light/cig,$4,5perfumes, lotion,small blk purse,makeup,comb) Personal Items Sent to Safe: None PATIENT INSTRUCTIONS: 
 
After general anesthesia or intravenous sedation, for 24 hours or while taking prescription Narcotics: · Limit your activities · Do not drive and operate hazardous machinery · Do not make important personal or business decisions · Do  not drink alcoholic beverages · If you have not urinated within 8 hours after discharge, please contact your surgeon on call. Report the following to your surgeon: 
· Excessive pain, swelling, redness or odor of or around the surgical area · Temperature over 100.5 · Nausea and vomiting lasting longer than 4 hours or if unable to take medications · Any signs of decreased circulation or nerve impairment to extremity: change in color, persistent  numbness, tingling, coldness or increase pain · Any questions What to do at Home: 
Recommended activity: Activity as tolerated, If you experience any of the following symptoms thoughts of harming self, feeling overwhelmed with anxiety, sadness or hopelessness, please follow up with your  and assigned providers at [facility]. If you can not reach them and symptoms continue immediately call your local crisis number at [phone] *  Please give a list of your current medications to your Primary Care Provider. *  Please update this list whenever your medications are discontinued, doses are 
    changed, or new medications (including over-the-counter products) are added. *  Please carry medication information at all times in case of emergency situations. These are general instructions for a healthy lifestyle: No smoking/ No tobacco products/ Avoid exposure to second hand smoke Surgeon General's Warning:  Quitting smoking now greatly reduces serious risk to your health. Obesity, smoking, and sedentary lifestyle greatly increases your risk for illness A healthy diet, regular physical exercise & weight monitoring are important for maintaining a healthy lifestyle You may be retaining fluid if you have a history of heart failure or if you experience any of the following symptoms:  Weight gain of 3 pounds or more overnight or 5 pounds in a week, increased swelling in our hands or feet or shortness of breath while lying flat in bed. Please call your doctor as soon as you notice any of these symptoms; do not wait until your next office visit. Recognize signs and symptoms of STROKE: 
 
F-face looks uneven A-arms unable to move or move unevenly S-speech slurred or non-existent T-time-call 911 as soon as signs and symptoms begin-DO NOT go Back to bed or wait to see if you get better-TIME IS BRAIN. Warning Signs of HEART ATTACK Call 911 if you have these symptoms:  Chest discomfort. Most heart attacks involve discomfort in the center of the chest that lasts more than a few minutes, or that goes away and comes back. It can feel like uncomfortable pressure, squeezing, fullness, or pain.  Discomfort in other areas of the upper body. Symptoms can include pain or discomfort in one or both arms, the back, neck, jaw, or stomach.  Shortness of breath with or without chest discomfort.  
 Other signs may include breaking out in a cold sweat, nausea, or lightheadedness. Don't wait more than five minutes to call [address]! Fast action can save your life. Calling 911 is almost always the fastest way to get lifesaving treatment. Emergency Medical Services staff can begin treatment when they arrive  up to an hour sooner than if someone gets to the hospital by car. The discharge information has been reviewed with the patient. The patient verbalized understanding. Discharge medications reviewed with the patient and appropriate educational materials and side effects teaching were provided. [employer] Announcement We are excited to announce that we are making your provider's discharge notes available to you in [employer]. You will see these notes when they are completed and signed by the physician that discharged you from your recent hospital stay. If you have any questions or concerns about any information you see in [employer], please call the Health Information Department where you were seen or reach out to your Primary Care Provider for more information about your plan of care. Introducing [facility] SERVICES! Peg Hemp introduces [employer] patient portal. Now you can access parts of your medical record, email your doctor's office, and request medication refills online. 1. In your internet browser, go to [email or web address] 2. Click on the First Time User? Click Here link in the Sign In box. You will see the New Member Sign Up page. 3. Enter your [employer] Access Code exactly as it appears below. You will not need to use this code after youve completed the sign-up process. If you do not sign up before the expiration date, you must request a new code. · [employer] Access Code: [email or web address] Expires: 6/24/2018  8:52 PM 
 
4. Enter the last four digits of your Social Security Number (xxxx) and Date of Birth (mm/dd/yyyy) as indicated and click Submit.  You will be taken to the next sign-up page. 5. Create a Wonderloopt ID. This will be your [employer] login ID and cannot be changed, so think of one that is secure and easy to remember. 6. Create a [employer] password. You can change your password at any time. 7. Enter your Password Reset Question and Answer. This can be used at a later time if you forget your password. 8. Enter your e-mail address. You will receive e-mail notification when new information is available in [address] 9. Click Sign Up. You can now view and download portions of your medical record. 10. Click the Download Summary menu link to download a portable copy of your medical information. If you have questions, please visit the Frequently Asked Questions section of the [employer] website. Remember, [employer] is NOT to be used for urgent needs. For medical emergencies, dial 911. Now available from your iPhone and Android! Introducing Inocencio Cruz As a [facility] patient, I wanted to make you aware of our electronic visit tool called Inocencio [employer] [facility] allows you to connect within minutes with a medical provider 24 hours a day, seven days a week via a mobile device or tablet or logging into a secure website from your computer. You can access Inocencio Cruz from anywhere in the [place]. A virtual visit might be right for you when you have a simple condition and feel like you just dont want to get out of bed, or cant get away from work for an appointment, when your regular [facility] provider is not available (evenings, weekends or holidays), or when youre out of town and need minor care. Electronic visits cost only $49 and if the [facility] 24/7 provider determines a prescription is needed to treat your condition, one can be electronically transmitted to a nearby pharmacy*. Please take a moment to enroll today if you have not already done so. The enrollment process is free and takes just a few minutes.   To enroll, please download the [employer] shawn to your tablet or phone, or visit [email or web address] to enroll on your computer. And, as an [address] [place] [address] patient with a [employer] account, the results of your visits will be scanned into your electronic medical record and your primary care provider will be able to view the scanned results. We urge you to continue to see your regular Jessy Mitali provider for your ongoing medical care. And while your primary care provider may not be the one available when you seek a [employer] virtual visit, the peace of mind you get from getting a real diagnosis real time can be priceless. For more information on [employer], view our Frequently Asked Questions (FAQs) at [email or web address]. Sincerely, 
 
Amrita Kumar MD 
Chief Medical Officer [address] *:  certain medications cannot be prescribed via [employer] Providers Seen During Your Hospitalization Provider Specialty Primary office phone Virginie Jeronimo MD Emergency Medicine [phone] Cruz Heimlich, MD Psychiatry [phone] Your Primary Care Physician (PCP) Primary Care Physician Office Phone Office Fax Nell Shaina [phone] You are allergic to the following Allergen Reactions Amoxicillin Hives Mushroom Flavor Hives Tomato Hives Recent Documentation Height Weight Breastfeeding? BMI OB Status Smoking Status 1.549 m 104 kg No 43.31 kg/m2 Menopause Current Every Day Smoker Emergency Contacts Name Discharge Info Relation Home Work Mobile Rexanne Enter DECLINED CAREGIVER [4] Other Relative [6] [phone] [facility] DISCHARGE CAREGIVER [3] Healthcare Decision Maker [20] [phone] [address] CAREGIVER [3] Agent [29] [phone] Patient Belongings The following personal items are in your possession at time of discharge: Dental Appliances: None  Visual Aid: None      Home Medications: Locked (given to nurse)   Jewelry: Earrings (in purse in closet)  Clothing: Dress, Footwear, With patient (dress and sandals)    Other Valuables: Cigarettes, Lighter/matches, Money (comment), Personal toiletries, Purse, Other (comment) (light/cig,$4,5perfumes, lotion,small blk purse,makeup,comb)  Personal Items Sent to Safe: None Please provide this summary of care documentation to your next provider. Signatures-by signing, you are acknowledging that this After Visit Summary has been reviewed with you and you have received a copy. Patient Signature:  ____________________________________________________________ Date:  ____________________________________________________________  
  
\[facility]\"" Provider Signature:  ____________________________________________________________ Date:  ____________________________________________________________
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
## 2017-06-06 NOTE — ED TRIAGE NOTES
TRANSFER - OUT REPORT:    Verbal report given to Ludmila (name) on Bren العلي  being transferred to 729(unit) for routine progression of care       Report consisted of patients Situation, Background, Assessment and   Recommendations(SBAR). Information from the following report(s) SBAR, ED Summary, [facility] and Recent Results was reviewed with the receiving nurse. Lines:       Opportunity for questions and clarification was provided.       Patient transported with:   Registered Nurse

## 2017-06-07 LAB
GLUCOSE BLD STRIP.AUTO-MCNC: 117 MG/DL (ref 65–100)
GLUCOSE BLD STRIP.AUTO-MCNC: 166 MG/DL (ref 65–100)
SERVICE CMNT-IMP: ABNORMAL
SERVICE CMNT-IMP: ABNORMAL

## 2017-06-07 PROCEDURE — 74011250637 HC RX REV CODE- 250/637: Performed by: PSYCHIATRY & NEUROLOGY

## 2017-06-07 PROCEDURE — 82962 GLUCOSE BLOOD TEST: CPT

## 2017-06-07 PROCEDURE — 65220000003 HC RM SEMIPRIVATE PSYCH

## 2017-06-07 RX ADMIN — LORAZEPAM 1 MG: 1 TABLET ORAL at 17:43

## 2017-06-07 RX ADMIN — GLIPIZIDE 10 MG: 5 TABLET ORAL at 09:36

## 2017-06-07 RX ADMIN — ZOLPIDEM TARTRATE 5 MG: 5 TABLET ORAL at 21:45

## 2017-06-07 NOTE — PROGRESS NOTES
GROUP THERAPY PROGRESS NOTE      Neal Carlos was present for medication education group. GROUP TIME: 30 minutes, Wednesday 13:00-13:30    PERSONAL GOAL FOR PARTICIPATION: To be present for group, participate in discussion and answer patient-directed questions. THERAPEUTIC INTERVENTIONS REVIEWED AND DISCUSSED: The following topics were presented: medication adherence, how to remember to refill medications, importance of follow-up after discharge and keeping up with appointments, relapse prevention, importance of using one pharmacy, differences between brand and generic medications, drug interactions, effects of alcohol on psychiatric medications, keeping a record of all medication including prescription and non-prescription drugs, storage of medications and who to contact with medication related questions. IMPRESSION OF PARTICIPATION:  Ms. Tori Bustamante was present in the medication education group. She actively participated in the discussion.     Nathanael Callejas, PharmD  PGY1 Pharmacy Resident
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
## 2017-06-07 NOTE — BH NOTES
Behavioral Health Interdisciplinary Rounds     Patient Name: Jenni Mckeon  Age: 46 y.o. Room/Bed:  729/  Primary Diagnosis: <principal problem not specified>   Admission Status: Voluntary     Readmission within 30 days: no  Power of  in place:   Patient requires a blocked bed: no          Reason for blocked bed: na    VTE Prophylaxis:       (hx of CVA & HTN)  Mobility needs/Fall risk: yes    Nutritional Plan: yes  Consults: should have a Diabetes Team & P.T. Consult ordered         Labs/Testing due today?: no    Sleep hours:  6.75+ (not counting 2.75 hr nap from 6215-1386)     Participation in Care/Groups:  No--new admit  Medication Compliant?: Yes  PRNS (last 24 hours): None    Restraints (last 24 hours):  no  Substance Abuse:  yes  CIWA (range last 24 hours): 0 COWS (range last 24 hours):   Alcohol screening (AUDIT) completed -  AUDIT Score: 14  If applicable, date SBIRT discussed in treatment team AND documented: 6/7/17  Tobacco - patient is a smoker: yes   Date tobacco education completed by RN:   24 hour chart check complete: yes   AUDIT Screen Score: AUDIT Score: 14      Document Brief Intervention (corresponds directly with the 5 A's, Ask, Advise, Assess, Assist, and Arrange):  Patient does not identify as having a substance abuse problem. At- Risk Patients (Score 7-15 for women; 8-15 for men)  Discuss concern patient is drinking at unhealthy levels known to increase risk of alcohol-related health problems. Is Patient ready to commit to change? No    If No:   Encourage reflection: Encouraged Patient to reflect how ETOH and illicit drug use impacts mental and physical health   Discuss short term and long term health risks of consuming alcohol: Discussed how ETOH abuse has impacted health   Barriers to change: Patient does not identify as substance abuser.  Reaffirm willingness to help / Educational materials provided: To be attached to discharge paperwork.     Patient goal(s) for today: attend all groups  Treatment team focus/goals: psychosocial  LOS:  1  Expected LOS: TBD  Psychiatric Advanced Care Directives - No   Name of Decision maker if patient has Psychiatric Care Directive: None   Patient was offered information, patient declined.    Financial concerns[facility]pres[facility]  Date of last family contact: None     Family requesting physician contact today: No  Discharge plan: Return home      Outpatient provider(s): Nathaly Sinclair    Participating treatment team members: Luis Eduardo Thompson MSW; Dr. Matteo MD; Kailyn Phelan RN; Darvin Elias, SuhaD

## 2017-06-07 NOTE — PROGRESS NOTES
Physical Therapy Screening:  Services are not indicated at this time. An InBasket screening referral was triggered for physical therapy based on results obtained during the nursing admission assessment. The patients chart was reviewed and the patient is not appropriate for a skilled therapy evaluation at this time. Please consult physical therapy if any therapy needs arise. Thank you.     Kip Foster, PT

## 2017-06-07 NOTE — PROGRESS NOTES
Problem: Depressed Mood (Adult/Pediatric)  Goal: *STG: Participates in treatment plan  Outcome: Progressing Towards Goal  Pt is euthymic with blunted affect. States \"much better\", denies SI and hallucinations, and contracts for safety. Pt states she would like help managing 's current illness. Med and meal compliant. Pt attended group today. Pt stated goal to participate in groups and do some art/crafts to \"keep focused\". Q 15 min checks.

## 2017-06-07 NOTE — BH NOTES
1540:  Patient received as she is sitting in the Day Room watching TV - she asks about working with the remote and greets oncoming staff cordially. Later, she approaches staff in the hallway requesting a shower this evening, and her name is added to the list.  She assures staff that she is able to shower herself. Will maintain q 15 minute safety checks.

## 2017-06-07 NOTE — BH NOTES
PRN Medication Documentation    Specific patient behavior that led to need for PRN medication: anxiety c/o  Staff interventions attempted prior to PRN being given: coping skills  PRN medication given:ativanPatient response/effectiveness of PRN medication:good
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
## 2017-06-08 VITALS
BODY MASS INDEX: 43.27 KG/M2 | HEIGHT: 61 IN | OXYGEN SATURATION: 98 % | HEART RATE: 71 BPM | TEMPERATURE: 97.4 F | WEIGHT: 229.2 LBS | RESPIRATION RATE: 18 BRPM | DIASTOLIC BLOOD PRESSURE: 91 MMHG | SYSTOLIC BLOOD PRESSURE: 163 MMHG

## 2017-06-08 LAB
BACTERIA SPEC CULT: ABNORMAL
CC UR VC: ABNORMAL
CHOLEST SERPL-MCNC: 237 MG/DL
EST. AVERAGE GLUCOSE BLD GHB EST-MCNC: 226 MG/DL
HBA1C MFR BLD: 9.5 % (ref 4.2–6.3)
HDLC SERPL-MCNC: 52 MG/DL
HDLC SERPL: 4.6 {RATIO} (ref 0–5)
LDLC SERPL CALC-MCNC: 115 MG/DL (ref 0–100)
LIPID PROFILE,FLP: ABNORMAL
SERVICE CMNT-IMP: ABNORMAL
TRIGL SERPL-MCNC: 350 MG/DL (ref ?–150)
VLDLC SERPL CALC-MCNC: 70 MG/DL

## 2017-06-08 PROCEDURE — 74011250637 HC RX REV CODE- 250/637: Performed by: PSYCHIATRY & NEUROLOGY

## 2017-06-08 PROCEDURE — 83036 HEMOGLOBIN GLYCOSYLATED A1C: CPT | Performed by: PSYCHIATRY & NEUROLOGY

## 2017-06-08 PROCEDURE — 80061 LIPID PANEL: CPT | Performed by: PSYCHIATRY & NEUROLOGY

## 2017-06-08 PROCEDURE — 36415 COLL VENOUS BLD VENIPUNCTURE: CPT | Performed by: PSYCHIATRY & NEUROLOGY

## 2017-06-08 RX ORDER — NYSTATIN 100000 [USP'U]/G
POWDER TOPICAL 2 TIMES DAILY
Status: DISCONTINUED | OUTPATIENT
Start: 2017-06-08 | End: 2017-06-08 | Stop reason: HOSPADM

## 2017-06-08 RX ADMIN — GLIPIZIDE 10 MG: 5 TABLET ORAL at 08:24

## 2017-06-08 NOTE — DISCHARGE INSTRUCTIONS
DISCHARGE SUMMARY    NAME:Pilar Sandra  : 1965  MRN: [id number]    The patient Zacarias Eduardo exhibits the ability to control behavior in a less restrictive environment. Patient's level of functioning is improving. No assaultive/destructive behavior has been observed for the past 24 hours. No suicidal/homicidal threat or behavior has been observed for the past 24 hours. There is no evidence of serious medication side effects. Patient has not been in physical or protective restraints for at least the past 24 hours. If weapons involved, how are they secured? No weapons involved. Is patient aware of and in agreement with discharge plan? Yes    Arrangements for medication:  Prescriptions given to patient. Patient is a smoker and needs referral for smoking cessation? Patient declined. 1.  Patient referred to the following for smoking cessation with an appointment: Patient declined. 2.  Patient was offered medication to assist with smoking cessation at discharge: Patient declined. 3.  Education for smoking cessation added to discharge instructions: Patient declined. Patient has a history of substance/alcohol abuse and requires a referral for treatment? Patient refused recommendations for substance abuse treatment follow-up. 1.  Patient referred to the following for substance/alcohol abuse treatment with an appointment: Patient will follow up with [facility]  2. Patient was offered medication to assist with alcohol cessation at discharge: Patient declined. 3.  Education for substance/alcohol abuse added to discharge instructions: Attached to discharge paperwork. Copy of discharge instructions to provider?: Luis A Leo ([phone])    Arrangements for transportation home: Mental health  to  at [address] [place] [address] Drive all follow up appointments as scheduled, continue to take prescribed medications per physician instructions.   Mental health crisis number:  054 or your local mental health crisis line number at [phone]. DISCHARGE SUMMARY from Nurse    The following personal items are in your possession at time of discharge:    Dental Appliances: None  Visual Aid: None     Home Medications: Locked (given to nurse)  Jewelry: Earrings (in purse in closet)  Clothing: Dress, Footwear, With patient (dress and sandals)  Other Valuables: Cigarettes, Lighter/matches, Money (comment), Personal toiletries, Purse, Other (comment) (light/cig,$4,5perfumes, lotion,small blk purse,makeup,comb)  Personal Items Sent to Safe: None          PATIENT INSTRUCTIONS:    After general anesthesia or intravenous sedation, for 24 hours or while taking prescription Narcotics:  · Limit your activities  · Do not drive and operate hazardous machinery  · Do not make important personal or business decisions  · Do  not drink alcoholic beverages  · If you have not urinated within 8 hours after discharge, please contact your surgeon on call. Report the following to your surgeon:  · Excessive pain, swelling, redness or odor of or around the surgical area  · Temperature over 100.5  · Nausea and vomiting lasting longer than 4 hours or if unable to take medications  · Any signs of decreased circulation or nerve impairment to extremity: change in color, persistent  numbness, tingling, coldness or increase pain  · Any questions        What to do at Home:  Recommended activity: Activity as tolerated,     If you experience any of the following symptoms thoughts of harming self, feeling overwhelmed with anxiety, sadness or hopelessness, please follow up with your  and assigned providers at [facility]. If you can not reach them and symptoms continue immediately call your local crisis number at [phone]    *  Please give a list of your current medications to your Primary Care Provider.     *  Please update this list whenever your medications are discontinued, doses are      changed, or new medications (including over-the-counter products) are added. *  Please carry medication information at all times in case of emergency situations. These are general instructions for a healthy lifestyle:    No smoking/ No tobacco products/ Avoid exposure to second hand smoke    Surgeon General's Warning:  Quitting smoking now greatly reduces serious risk to your health. Obesity, smoking, and sedentary lifestyle greatly increases your risk for illness    A healthy diet, regular physical exercise & weight monitoring are important for maintaining a healthy lifestyle    You may be retaining fluid if you have a history of heart failure or if you experience any of the following symptoms:  Weight gain of 3 pounds or more overnight or 5 pounds in a week, increased swelling in our hands or feet or shortness of breath while lying flat in bed. Please call your doctor as soon as you notice any of these symptoms; do not wait until your next office visit. Recognize signs and symptoms of STROKE:    F-face looks uneven    A-arms unable to move or move unevenly    S-speech slurred or non-existent    T-time-call 911 as soon as signs and symptoms begin-DO NOT go       Back to bed or wait to see if you get better-TIME IS BRAIN. Warning Signs of HEART ATTACK     Call 911 if you have these symptoms:   Chest discomfort. Most heart attacks involve discomfort in the center of the chest that lasts more than a few minutes, or that goes away and comes back. It can feel like uncomfortable pressure, squeezing, fullness, or pain.  Discomfort in other areas of the upper body. Symptoms can include pain or discomfort in one or both arms, the back, neck, jaw, or stomach.  Shortness of breath with or without chest discomfort.  Other signs may include breaking out in a cold sweat, nausea, or lightheadedness. Don't wait more than five minutes to call 911 - MINUTES MATTER! Fast action can save your life.  Calling 911 is almost always the fastest way to get lifesaving treatment. Emergency Medical Services staff can begin treatment when they arrive -- up to an hour sooner than if someone gets to the hospital by car. The discharge information has been reviewed with the patient. The patient verbalized understanding. Discharge medications reviewed with the patient and appropriate educational materials and side effects teaching were provided.
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
## 2017-06-08 NOTE — BH NOTES
GROUP THERAPY PROGRESS NOTE    Neal Breanna is participating in Reflections. .     Group time: 30 minutes    Personal goal for participation: attendance    Goal orientation: personal    Group therapy participation: active    Therapeutic interventions reviewed and discussed: Goals for the day and unit guidelines.      Impression of participation: minimal, and was able to reach her goal today, to not \"fuss at my \"

## 2017-06-08 NOTE — PROGRESS NOTES
Problem: Falls - Risk of  Goal: *Knowledge of fall prevention  Outcome: Progressing Towards Goal  Using cane appropriately    Problem: Diabetes Self-Management  Goal: *Monitoring blood glucose, interpreting and using results  Identify recommended blood glucose targets and personal targets. Outcome: Progressing Towards Goal  compliant    Problem: Depressed Mood (Adult/Pediatric)  Goal: *STG: Participates in treatment plan  Outcome: Progressing Towards Goal  Review meds, out on unit social w peers and staff. Affect brighter and mood hopeful.  Reports goal is d/c today  Goal: *STG: Verbalizes anger, guilt, and other feelings in a constructive manor  Outcome: Progressing Towards Goal  Easily shares she is hopeful and happy  Goal: *STG: Attends activities and groups  Outcome: Progressing Towards Goal  engaged  Goal: *STG: Demonstrates reduction in symptoms and increase in insight into coping skills/future focused  Outcome: Progressing Towards Goal  Denies SI, no self harming behaviors   Goal: Interventions  Outcome: Progressing Towards Goal  Staff focus is on d/c planning

## 2017-06-08 NOTE — PROGRESS NOTES
Problem: Falls - Risk of  Goal: *Absence of falls  Outcome: Progressing Towards Goal  Patient is ambulating steadily on the unit - she has remained free from falls/injury throughout this shift.      Problem: Depressed Mood (Adult/Pediatric)  Goal: *STG: Attends activities and groups  Outcome: Progressing Towards Goal  Patient attended and participated in Reflections Group tonight - she has been med/meal compliant

## 2017-06-08 NOTE — BH NOTES
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 will follow up with [facility]  2. Patient was offered medication to assist with alcohol cessation at discharge: Patient declined. 3.  Education for substance/alcohol abuse added to discharge instructions: Attached to discharge paperwork. Copy of discharge instructions to provider?: Dajuan Kramer ([phone])    Arrangements for transportation home: Mental health  to  at [address] [place] [address] Drive all follow up appointments as scheduled, continue to take prescribed medications per physician instructions. Mental health crisis number:  290 or your local mental health crisis line number at [phone]. Discharge Medication List and Instructions:   Discharge Medication List as of 6/8/2017  1:28 PM      CONTINUE these medications which have NOT CHANGED    Details   buPROPion SR (WELLBUTRIN, ZYBAN) 200 mg SR tablet Take 200 mg by mouth two (2) times a day., Historical Med      glipiZIDE (GLUCOTROL) 10 mg tablet Take 10 mg by mouth daily. , Historical Med      sertraline (ZOLOFT) 50 mg tablet Take 50 mg by mouth daily., Historical Med      QUEtiapine (SEROQUEL) 200 mg tablet Take 200 mg by mouth nightly. Indications: DEPRESSION TREATMENT ADJUNCT, Historical Med      traZODone (DESYREL) 100 mg tablet Take 200 mg by mouth nightly. Indications: INSOMNIA, Historical Med             Unresulted Labs     None        To obtain results of studies pending at discharge, please contact N/A    Follow-up Information     Follow up With Details Comments Zoila Jonesmateorula 17 On 6/8/2017 Mental Health  to pick Patient up for D/C at 1:00pm and transport Patient home. [address] [place] [address], [address] [place] [address]   - Corin Sharif [address] [place] [address]    Osiris Sung MD On 6/8/2017 please call and make an appointment within 14 days from discharge for diabetes management and medication monitoring [address] [place] [address] [phone]            Advanced Directive:   Does the patient have an appointed surrogate decision maker? No  Does the patient have a Medical Advance Directive? No  Does the patient have a Psychiatric Advance Directive? No  If the patient does not have a surrogate or Medical Advance Directive AND Psychiatric Advance Directive, the patient was offered information on these advance directives Yes and Patient declined to complete    Patient Instructions: Please continue all medications until otherwise directed by physician. What to do at Home:  Recommended activity: Activity as tolerated,     If you experience any of the following symptoms thoughts of harming self, feeling overwhelmed with anxiety, sadness or hopelessness, please follow up with your  and assigned providers at [facility]. If you can not reach them and symptoms continue immediately call your local crisis number at [phone]    *  Please give a list of your current medications to your Primary Care Provider.     *  Please update this list whenever your medications are discontinued, doses are      changed, or new medications (including over-the-counter products) are added. *  Please carry medication information at all times in case of emergency situations. Patient discharged to Home; discussed with patient/caregiver, provided to the patient/caregiver either in hard copy or electronically. and patient refused hard copy.

## 2017-06-08 NOTE — BH NOTES
GROUP THERAPY PROGRESS NOTE    David Hampton participated in a Process Group on the General Unit with a focus identifying feelings,   planning for the day, and learning more about DBT concepts of \"Interpersonal Effectiveness and using   the [address] as a long-term personal treatment plan. .   Group time: 70 minutes. Personal goal for participation: To increase the capacity to improve ones mood, set personal goals,   and understand more about basic activities to successfully state and get ones needs met. Goal orientation: The patients will be able to identify their feelings and develop a goal for   themselves for their day. The didactic portion of the session covered two primary topics  one on interpersonal   effectiveness and the second on the [address] as a long-term personal treatment plan. First, they were also asked to review a handout sheet on interpersonal effectiveness and asking   for something necessary. Three main areas of focus were explored in the interactive didactic   session: 1) defining what it is one wants (describe the current situation, express your feelings   and opinions, assert  yourself by asking and being willing to say No. 2) keeping a good   relationship with the person you are asking (be gentle and courteous in ones approach  no   attacks, threats, or judging; listen and be interested in the other person; validate the other   persons feelings and needs; and use an easy manner  be diplomatic and use humor   when you can); and 3) maintaining ones self-respect (be fair to yourself and to the other   person; no apologies for being alive or making a request at all, stick to your values, and be   truthful  dont lie, act helpless, exaggerate, or make excuses).     Second they were asked to consider filling in on their own after group the following elements   of a DBT house drawing with multiple levels:  1) foundation - values that govern your life;   2) first floor with a door  behaviors would like to manage and feel more control over or areas   you want to change; the door represents an opportunity to list or draw things that you keep   hidden from others; 3) second floor  list or draw emotions you want to experience more often,  more fully, or in a more healthy fashion; 4) third floor  a list of things that make you happy or   want to feel happy about; 5) attic  list or draw what  a Life Abhilash Barth would look like. There  is also a roof, where people and things that protect you can be listed. The chimney provides an   opportunity to list ways in which you blow off steam. The billboard allows one to post those things   in one's life they are proud of and the walls of the house provide an opportunity to list those people   and things that provide support. The patients were also provided copies of the DBT summary on  interpersonal effectiveness and the [address] that they could complete on their own. Group therapy participation: With prompting, this patient participated in the group. Therapeutic interventions reviewed and discussed: The group members were asked to   identify an emotion they are having and/or let the group know what they want to focus on for the   day as they continue to make discharge plans. The group members took turns reading and   reviewing the summary sheet on Interpersonal Effectiveness and the elements of the [address] [place] [address] Road. Impression of participation: The patient said she thought she was feeling a lot better than  yesterday and that she was thinking about getting back home with her . The patient   expressed no current SI/HI nor overt psychotic symptoms in this group. She listened and  responded to her peers and occassionally shared about how much she likes using nature  as a way to calm herself down or when she wants to cope with her feelings.  She also shared  How she anticipating speaking up more with her , rather than keeping her feelings  So much to herself - recognizing and challenging her tendency to emotionally isolate. She was  fully alert and her affect was still slightly depressed. She is working on finalizing her aftercare  plans.

## 2017-06-08 NOTE — PROGRESS NOTES
Pharmacist Discharge Medication Reconciliation    Discharging Provider: Dr. Mary Sanchez    Significant PMH:   Past Medical History:   Diagnosis Date    Arthritis     legs    Bipolar 1 disorder ([facility]     COPD     Depression 1/13/2012    [facility]     Drug abuse     cocaine, stimulants, etoh, mj, tob    H/O suicide attempt     Polydrug dependence excluding opioid type drug, abuse ([facility]     Schizophrenia ([facility]     dx: in late 20's. vs substance induced. h/o visual and aud eunice.      Chief Complaint for this Admission:   Chief Complaint   Patient presents with    Back Pain    Leg Pain    Suicidal     Allergies: Mushroom flavor and Tomato    Discharge Medications:   Current Discharge Medication List        CONTINUE these medications which have NOT CHANGED    Details   buPROPion SR (WELLBUTRIN, ZYBAN) 200 mg SR tablet Take 200 mg by mouth two (2) times a day. glipiZIDE (GLUCOTROL) 10 mg tablet Take 10 mg by mouth daily. sertraline (ZOLOFT) 50 mg tablet Take 50 mg by mouth daily. QUEtiapine (SEROQUEL) 200 mg tablet Take 200 mg by mouth nightly. Indications: DEPRESSION TREATMENT ADJUNCT      traZODone (DESYREL) 100 mg tablet Take 200 mg by mouth nightly.  Indications: INSOMNIA             The patient's chart, MAR and AVS were reviewed by STARR RuizD.

## 2017-06-08 NOTE — INTERDISCIPLINARY ROUNDS
Behavioral Health Interdisciplinary Rounds     Patient Name: Marmelita Trivedi  Age: 46 y.o. Room/Bed:  9/  Primary Diagnosis: Schizoaffective disorder (HCC)   Admission Status: Voluntary     Readmission within 30 days: no  Power of  in place: no  Patient requires a blocked bed: no          Reason for blocked bed: n/a    VTE Prophylaxis: No  Mobility needs/Fall risk: yes    Nutritional Plan: yes  Consults: no         Labs/Testing due today?: yes    Sleep hours: 6       Participation in Care/Groups:  yes  Medication Compliant?: Yes  PRNS (last 24 hours):  Antianxiety and Sleep Aid    Restraints (last 24 hours):  no  Substance Abuse:  yes  CIWA (range last 24 hours):  COWS (range last 24 hours):   Alcohol screening (AUDIT) completed -  AUDIT Score: 14  If applicable, date SBIRT discussed in treatment team AND documented: 6/7/17  Tobacco - patient is a smoker: yes   Date tobacco education completed by RN: to be completed  24 hour chart check complete: yes     Patient goal(s) for today:   Treatment team focus/goals:   LOS:  2  Expected LOS:   [address] -     Name of Decision maker if patient has Psychiatric Care Directive   Patient was offered information   Financial concerns/prescription coverage:    Date of last family contact:       Family requesting physician contact today:    Discharge plan:        Outpatient provider(s):     Participating treatment team members: Jannie Trivedi, * (assigned SW),

## 2017-06-13 ENCOUNTER — HOSPITAL ENCOUNTER (INPATIENT)
Age: 52
LOS: 1 days | Discharge: HOME | DRG: 751 | End: 2017-06-14
Attending: INTERNAL MEDICINE
Payer: MEDICAID

## 2017-06-13 DIAGNOSIS — R45.851 SUICIDAL IDEATION: Primary | ICD-10-CM

## 2017-06-13 LAB
AMPHET UR QL SCN: NEGATIVE
ANION GAP BLD CALC-SCNC: 6 MMOL/L (ref 5–15)
APPEARANCE UR: CLEAR
BACTERIA URNS QL MICRO: NEGATIVE /HPF
BARBITURATES UR QL SCN: NEGATIVE
BASOPHILS # BLD AUTO: 0 K/UL (ref 0–0.1)
BASOPHILS # BLD: 0 % (ref 0–1)
BENZODIAZ UR QL: NEGATIVE
BILIRUB UR QL: NEGATIVE
BUN SERPL-MCNC: 13 MG/DL (ref 6–20)
BUN/CREAT SERPL: 16 (ref 12–20)
CALCIUM SERPL-MCNC: 8.1 MG/DL (ref 8.5–10.1)
CANNABINOIDS UR QL SCN: POSITIVE
CHLORIDE SERPL-SCNC: 102 MMOL/L (ref 97–108)
CO2 SERPL-SCNC: 29 MMOL/L (ref 21–32)
COCAINE UR QL SCN: NEGATIVE
COLOR UR: ABNORMAL
CREAT SERPL-MCNC: 0.83 MG/DL (ref 0.55–1.02)
DRUG SCRN COMMENT,DRGCM: ABNORMAL
EOSINOPHIL # BLD: 0.1 K/UL (ref 0–0.4)
EOSINOPHIL NFR BLD: 2 % (ref 0–7)
EPITH CASTS URNS QL MICRO: ABNORMAL /LPF
ERYTHROCYTE [DISTWIDTH] IN BLOOD BY AUTOMATED COUNT: 14.1 % (ref 11.5–14.5)
ETHANOL SERPL-MCNC: <10 MG/DL
GLUCOSE SERPL-MCNC: 221 MG/DL (ref 65–100)
GLUCOSE UR STRIP.AUTO-MCNC: NEGATIVE MG/DL
HCG UR QL: NEGATIVE
HCT VFR BLD AUTO: 36.3 % (ref 35–47)
HGB BLD-MCNC: 11.7 G/DL (ref 11.5–16)
HGB UR QL STRIP: NEGATIVE
KETONES UR QL STRIP.AUTO: NEGATIVE MG/DL
LEUKOCYTE ESTERASE UR QL STRIP.AUTO: NEGATIVE
LYMPHOCYTES # BLD AUTO: 36 % (ref 12–49)
LYMPHOCYTES # BLD: 2.5 K/UL (ref 0.8–3.5)
MCH RBC QN AUTO: 27.5 PG (ref 26–34)
MCHC RBC AUTO-ENTMCNC: 32.2 G/DL (ref 30–36.5)
MCV RBC AUTO: 85.4 FL (ref 80–99)
METHADONE UR QL: NEGATIVE
MONOCYTES # BLD: 0.4 K/UL (ref 0–1)
MONOCYTES NFR BLD AUTO: 6 % (ref 5–13)
MUCOUS THREADS URNS QL MICRO: ABNORMAL /LPF
NEUTS SEG # BLD: 3.8 K/UL (ref 1.8–8)
NEUTS SEG NFR BLD AUTO: 56 % (ref 32–75)
NITRITE UR QL STRIP.AUTO: NEGATIVE
OPIATES UR QL: NEGATIVE
PCP UR QL: NEGATIVE
PH UR STRIP: 5.5 [PH] (ref 5–8)
PLATELET # BLD AUTO: 371 K/UL (ref 150–400)
POTASSIUM SERPL-SCNC: 3.6 MMOL/L (ref 3.5–5.1)
PROT UR STRIP-MCNC: NEGATIVE MG/DL
RBC # BLD AUTO: 4.25 M/UL (ref 3.8–5.2)
RBC #/AREA URNS HPF: ABNORMAL /HPF (ref 0–5)
SODIUM SERPL-SCNC: 137 MMOL/L (ref 136–145)
SP GR UR REFRACTOMETRY: <1.005 (ref 1–1.03)
UA: UC IF INDICATED,UAUC: ABNORMAL
UROBILINOGEN UR QL STRIP.AUTO: 0.2 EU/DL (ref 0.2–1)
WBC # BLD AUTO: 6.8 K/UL (ref 3.6–11)
WBC URNS QL MICRO: ABNORMAL /HPF (ref 0–4)

## 2017-06-13 PROCEDURE — 85025 COMPLETE CBC W/AUTO DIFF WBC: CPT | Performed by: INTERNAL MEDICINE

## 2017-06-13 PROCEDURE — 80307 DRUG TEST PRSMV CHEM ANLYZR: CPT | Performed by: INTERNAL MEDICINE

## 2017-06-13 PROCEDURE — 90791 PSYCH DIAGNOSTIC EVALUATION: CPT

## 2017-06-13 PROCEDURE — 81025 URINE PREGNANCY TEST: CPT

## 2017-06-13 PROCEDURE — 99285 EMERGENCY DEPT VISIT HI MDM: CPT

## 2017-06-13 PROCEDURE — 81001 URINALYSIS AUTO W/SCOPE: CPT | Performed by: INTERNAL MEDICINE

## 2017-06-13 PROCEDURE — 36415 COLL VENOUS BLD VENIPUNCTURE: CPT | Performed by: INTERNAL MEDICINE

## 2017-06-13 PROCEDURE — 80048 BASIC METABOLIC PNL TOTAL CA: CPT | Performed by: INTERNAL MEDICINE

## 2017-06-13 NOTE — IP AVS SNAPSHOT
Current Discharge Medication List  
  
CONTINUE these medications which have NOT CHANGED Dose & Instructions Dispensing Information Comments Morning Noon Evening Bedtime  
 glipiZIDE 10 mg tablet Commonly known as:  Dimlonnie Coral Your last dose was: Your next dose is:    
   
   
 Dose:  10 mg Take 10 mg by mouth daily. Indications: type 2 diabetes mellitus Refills:  0 QUEtiapine 200 mg tablet Commonly known as:  SEROquel Your last dose was: Your next dose is:    
   
   
 Dose:  200 mg Take 200 mg by mouth nightly. Indications: DEPRESSION TREATMENT ADJUNCT Refills:  0  
     
   
   
   
  
 sertraline 50 mg tablet Commonly known as:  ZOLOFT Your last dose was: Your next dose is:    
   
   
 Dose:  50 mg Take 50 mg by mouth daily. Indications: major depressive disorder Refills:  0  
     
   
   
   
  
 traZODone 100 mg tablet Commonly known as:  Domi Morale Your last dose was: Your next dose is:    
   
   
 Dose:  200 mg Take 200 mg by mouth nightly. Indications: INSOMNIA Refills:  0 STOP taking these medications buPROPion  mg SR tablet Commonly known as:  Amee Balderas

## 2017-06-13 NOTE — IP AVS SNAPSHOT
Amina Helton 
 
 
 [address] Patient: Elvis Kenney MRN: [id number] :1965 You are allergic to the following Allergen Reactions Amoxicillin Hives Mushroom Flavor Hives Tomato Hives Recent Documentation Height Weight Breastfeeding? BMI OB Status Smoking Status 1.549 m 104.1 kg No 43.38 kg/m2 Menopause Current Every Day Smoker Emergency Contacts Name Discharge Info Relation Home Work Mobile [address] NO [2] Other Relative [6] [phone] [facility] N/A  AT THIS TIME [6] Healthcare Decision Maker [20] [phone] About your hospitalization You were admitted on:  2017 You last received care in the:  [address] You were discharged on:  2017 Unit phone number:  [phone] Why you were hospitalized Your primary diagnosis was: Adjustment Disorder With Depressed Mood Your diagnoses also included:  Borderline Personality Disorder, Malingering, Diabetes (Hcc), Obesity (Bmi 30.0-34.9), Polypharmacy, Polysubstance Dependence (Hcc) Providers Seen During Your Hospitalizations Provider Role Specialty Primary office phone Roshni Koch MD Attending Provider Emergency Medicine [phone] Angelica Kennedy MD Attending Provider Psychiatry [phone] Your Primary Care Physician (PCP) Primary Care Physician Office Phone Office Fax Edward Cheney [phone] Follow-up Information Follow up With Details Comments Contact Info Dee Love MD  Appointment 17 @ 1:45PM  [facility] 
[phone] [address] [place] [address]  Appointment with Dr. Kim Batch 17@ 9:20AM  [address] [facility] 
[phone] Current Discharge Medication List  
  
CONTINUE these medications which have NOT CHANGED Dose & Instructions Dispensing Information Comments Morning Noon Evening Bedtime  
 glipiZIDE 10 mg tablet Commonly known as:  Lily Koch Your last dose was: Your next dose is:    
   
   
 Dose:  10 mg Take 10 mg by mouth daily. Indications: type 2 diabetes mellitus Refills:  0 QUEtiapine 200 mg tablet Commonly known as:  SEROquel Your last dose was: Your next dose is:    
   
   
 Dose:  200 mg Take 200 mg by mouth nightly. Indications: DEPRESSION TREATMENT ADJUNCT Refills:  0  
     
   
   
   
  
 sertraline 50 mg tablet Commonly known as:  ZOLOFT Your last dose was: Your next dose is:    
   
   
 Dose:  50 mg Take 50 mg by mouth daily. Indications: major depressive disorder Refills:  0  
     
   
   
   
  
 traZODone 100 mg tablet Commonly known as:  Randluba Emir Your last dose was: Your next dose is:    
   
   
 Dose:  200 mg Take 200 mg by mouth nightly. Indications: INSOMNIA Refills:  0 STOP taking these medications buPROPion  mg SR tablet Commonly known as:  Francis Cotto Discharge Instructions DISCHARGE SUMMARY from Nurse The following personal items are in your possession at time of discharge: 
 
Dental Appliances: None Visual Aid: None Home Medications: None Jewelry: None Clothing: Footwear, Pants, Shirt Other Valuables: Kain Fung Personal Items Sent to Safe: none PATIENT INSTRUCTIONS: 
 
What to do at Home: 
 
Recommended activity: Activity as tolerated, *  Please give a list of your current medications to your Primary Care Provider. *  Please update this list whenever your medications are discontinued, doses are 
    changed, or new medications (including over-the-counter products) are added. *  Please carry medication information at all times in case of emergency situations. These are general instructions for a healthy lifestyle: No smoking/ No tobacco products/ Avoid exposure to second hand smoke Surgeon General's Warning:  Quitting smoking now greatly reduces serious risk to your health. Obesity, smoking, and sedentary lifestyle greatly increases your risk for illness A healthy diet, regular physical exercise & weight monitoring are important for maintaining a healthy lifestyle You may be retaining fluid if you have a history of heart failure or if you experience any of the following symptoms:  Weight gain of 3 pounds or more overnight or 5 pounds in a week, increased swelling in our hands or feet or shortness of breath while lying flat in bed. Please call your doctor as soon as you notice any of these symptoms; do not wait until your next office visit. Recognize signs and symptoms of STROKE: 
 
F-face looks uneven A-arms unable to move or move unevenly S-speech slurred or non-existent T-time-call 911 as soon as signs and symptoms begin-DO NOT go Back to bed or wait to see if you get better-TIME IS BRAIN. Warning Signs of HEART ATTACK Call 911 if you have these symptoms:  Chest discomfort. Most heart attacks involve discomfort in the center of the chest that lasts more than a few minutes, or that goes away and comes back. It can feel like uncomfortable pressure, squeezing, fullness, or pain.  Discomfort in other areas of the upper body. Symptoms can include pain or discomfort in one or both arms, the back, neck, jaw, or stomach.  Shortness of breath with or without chest discomfort.  Other signs may include breaking out in a cold sweat, nausea, or lightheadedness. Don't wait more than five minutes to call [address]! Fast action can save your life. Calling 911 is almost always the fastest way to get lifesaving treatment.  Emergency Medical Services staff can begin treatment when they arrive  up to an hour sooner than if someone gets to the hospital by car. The discharge information has been reviewed with the patient. The patient verbalized understanding. Discharge medications reviewed with the patient and appropriate educational materials and side effects teaching were provided. If I feel that I can not keep these promises and I am at risk of hurting myself or others,I will call the crisis office and speak with a crisis worker who will assist me during my crisis. [address] [place] [address] [phone] [address] [place] [address] [phone] [facility] John Ng [facility] [phone] [address] Luis Fernando  [phone] Discharge Orders None [employer] Announcement We are excited to announce that we are making your provider's discharge notes available to you in [employer]. You will see these notes when they are completed and signed by the physician that discharged you from your recent hospital stay. If you have any questions or concerns about any information you see in [employer], please call the Health Information Department where you were seen or reach out to your Primary Care Provider for more information about your plan of care. Introducing [facility]! Nick Chavez introduces [employer] patient portal. Now you can access parts of your medical record, email your doctor's office, and request medication refills online. 1. In your internet browser, go to [email or web address]. [employer] 2. Click on the First Time User? Click Here link in the Sign In box. You will see the New Member Sign Up page. 3. Enter your [employer] Access Code exactly as it appears below. You will not need to use this code after youve completed the sign-up process. If you do not sign up before the expiration date, you must request a new code. · [employer] Access Code: [email or web address] Expires: 9/6/2017  1:27 PM 
 
4.  Enter the last four digits of your Social Security Number (xxxx) and Date of Birth (mm/dd/yyyy) as indicated and click Submit. You will be taken to the next sign-up page. 5. Create a MynewMD ID. This will be your MynewMD login ID and cannot be changed, so think of one that is secure and easy to remember. 6. Create a MynewMD password. You can change your password at any time. 7. Enter your Password Reset Question and Answer. This can be used at a later time if you forget your password. 8. Enter your e-mail address. You will receive e-mail notification when new information is available in [address] 9. Click Sign Up. You can now view and download portions of your medical record. 10. Click the Download Summary menu link to download a portable copy of your medical information. If you have questions, please visit the Frequently Asked Questions section of the MynewMD website. Remember, MynewMD is NOT to be used for urgent needs. For medical emergencies, dial 911. Now available from your iPhone and Android! General Information Please provide this summary of care documentation to your next provider. Patient Signature:  ____________________________________________________________ Date:  ____________________________________________________________  
  
Rain Cons Provider Signature:  ____________________________________________________________ Date:  ____________________________________________________________

## 2017-06-14 VITALS
DIASTOLIC BLOOD PRESSURE: 66 MMHG | TEMPERATURE: 96.8 F | SYSTOLIC BLOOD PRESSURE: 107 MMHG | WEIGHT: 229.6 LBS | BODY MASS INDEX: 43.35 KG/M2 | RESPIRATION RATE: 20 BRPM | HEIGHT: 61 IN | HEART RATE: 83 BPM | OXYGEN SATURATION: 90 %

## 2017-06-14 PROBLEM — F19.20 POLYSUBSTANCE DEPENDENCE (HCC): Status: ACTIVE | Noted: 2017-06-14

## 2017-06-14 PROBLEM — Z79.899 POLYPHARMACY: Status: ACTIVE | Noted: 2017-06-14

## 2017-06-14 PROCEDURE — 65220000003 HC RM SEMIPRIVATE PSYCH

## 2017-06-14 RX ORDER — BENZTROPINE MESYLATE 1 MG/ML
2 INJECTION INTRAMUSCULAR; INTRAVENOUS
Status: DISCONTINUED | OUTPATIENT
Start: 2017-06-14 | End: 2017-06-14 | Stop reason: HOSPADM

## 2017-06-14 RX ORDER — LORAZEPAM 2 MG/ML
2 INJECTION INTRAMUSCULAR
Status: DISCONTINUED | OUTPATIENT
Start: 2017-06-14 | End: 2017-06-14 | Stop reason: HOSPADM

## 2017-06-14 RX ORDER — OLANZAPINE 5 MG/1
5 TABLET ORAL
Status: DISCONTINUED | OUTPATIENT
Start: 2017-06-14 | End: 2017-06-14 | Stop reason: HOSPADM

## 2017-06-14 RX ORDER — IBUPROFEN 200 MG
1 TABLET ORAL
Status: DISCONTINUED | OUTPATIENT
Start: 2017-06-14 | End: 2017-06-14 | Stop reason: HOSPADM

## 2017-06-14 RX ORDER — LORAZEPAM 1 MG/1
1 TABLET ORAL
Status: DISCONTINUED | OUTPATIENT
Start: 2017-06-14 | End: 2017-06-14 | Stop reason: HOSPADM

## 2017-06-14 RX ORDER — IBUPROFEN 400 MG/1
400 TABLET ORAL
Status: DISCONTINUED | OUTPATIENT
Start: 2017-06-14 | End: 2017-06-14 | Stop reason: HOSPADM

## 2017-06-14 RX ORDER — ACETAMINOPHEN 325 MG/1
650 TABLET ORAL
Status: DISCONTINUED | OUTPATIENT
Start: 2017-06-14 | End: 2017-06-14 | Stop reason: HOSPADM

## 2017-06-14 RX ORDER — ZOLPIDEM TARTRATE 10 MG/1
10 TABLET ORAL
Status: DISCONTINUED | OUTPATIENT
Start: 2017-06-14 | End: 2017-06-14 | Stop reason: HOSPADM

## 2017-06-14 RX ORDER — BENZTROPINE MESYLATE 2 MG/1
2 TABLET ORAL
Status: DISCONTINUED | OUTPATIENT
Start: 2017-06-14 | End: 2017-06-14 | Stop reason: HOSPADM

## 2017-06-14 RX ORDER — ADHESIVE BANDAGE
30 BANDAGE TOPICAL DAILY PRN
Status: DISCONTINUED | OUTPATIENT
Start: 2017-06-14 | End: 2017-06-14 | Stop reason: HOSPADM

## 2017-06-14 NOTE — BH NOTES
Pt is alert and oriented x person, place and time. Pt denies any SI/HI or AV hallucinations. Pt denies any depression. Pt plans to return home to continue recommended plan of care . Discharge information reviewed with patient. Pt verbalizes understanding. Pt's belongings/valuables returned. Pt to be transported home by .
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
## 2017-06-14 NOTE — DISCHARGE INSTRUCTIONS
DISCHARGE SUMMARY from Nurse    The following personal items are in your possession at time of discharge:    Dental Appliances: None  Visual Aid: None     Home Medications: None  Jewelry: None  Clothing: Footwear, Pants, Shirt  Other Valuables: Cane  Personal Items Sent to Safe: none          PATIENT INSTRUCTIONS:    What to do at Home:    Recommended activity: Activity as tolerated,       *  Please give a list of your current medications to your Primary Care Provider. *  Please update this list whenever your medications are discontinued, doses are      changed, or new medications (including over-the-counter products) are added. *  Please carry medication information at all times in case of emergency situations. These are general instructions for a healthy lifestyle:    No smoking/ No tobacco products/ Avoid exposure to second hand smoke    Surgeon General's Warning:  Quitting smoking now greatly reduces serious risk to your health. Obesity, smoking, and sedentary lifestyle greatly increases your risk for illness    A healthy diet, regular physical exercise & weight monitoring are important for maintaining a healthy lifestyle    You may be retaining fluid if you have a history of heart failure or if you experience any of the following symptoms:  Weight gain of 3 pounds or more overnight or 5 pounds in a week, increased swelling in our hands or feet or shortness of breath while lying flat in bed. Please call your doctor as soon as you notice any of these symptoms; do not wait until your next office visit. Recognize signs and symptoms of STROKE:    F-face looks uneven    A-arms unable to move or move unevenly    S-speech slurred or non-existent    T-time-call 911 as soon as signs and symptoms begin-DO NOT go       Back to bed or wait to see if you get better-TIME IS BRAIN. Warning Signs of HEART ATTACK     Call 911 if you have these symptoms:   Chest discomfort.  Most heart attacks involve discomfort in the center of the chest that lasts more than a few minutes, or that goes away and comes back. It can feel like uncomfortable pressure, squeezing, fullness, or pain.  Discomfort in other areas of the upper body. Symptoms can include pain or discomfort in one or both arms, the back, neck, jaw, or stomach.  Shortness of breath with or without chest discomfort.  Other signs may include breaking out in a cold sweat, nausea, or lightheadedness. Don't wait more than five minutes to call 911 - MINUTES MATTER! Fast action can save your life. Calling 911 is almost always the fastest way to get lifesaving treatment. Emergency Medical Services staff can begin treatment when they arrive -- up to an hour sooner than if someone gets to the hospital by car. The discharge information has been reviewed with the patient. The patient verbalized understanding. Discharge medications reviewed with the patient and appropriate educational materials and side effects teaching were provided. If I feel that I can not keep these promises and I am at risk of hurting myself or others,I will call the crisis office and speak with a crisis worker who will assist me during my crisis.     [facility] [address] [facility] [phone]

## 2017-06-14 NOTE — ED NOTES
Pt has hx of COPD. Pt placed on continuous pulse ox with NC on standby for safety. PT given blanket for comfort. Pt sleeping at this time.

## 2017-06-14 NOTE — BH NOTES
GROUP THERAPY PROGRESS NOTE    Huan Ribeiro is participating in Process Group. Group time: 30 minutes    Personal goal for participation: Utilize reading to relate personal experiences    Goal orientation: personal    Group therapy participation: active    Therapeutic interventions reviewed and discussed:   Reading: Weakness or Strength? -   Pt reported feeing better today because she spoke with her  and he is willing to Ercikson Moros their marriage work\". In contrast to her report during [facility] interview, pt reported that her  is \"a good Rastafari man\" and that \"he prays for me everyday to get my act together\". Pt reported that her  was codey worried about her when she didn't return home last night that he went to [facility] this morning to search for her. She stated that he struggles with chronic pain and \"gets a check every month\", but does his best to take care of her. Impression of participation:   Pt presented with a full affect and euthymic mood. She was an active participant and contributed to discussion. Pts behavior was appropriate. Her thoughts, when compared to previous report, demonstrated inconsistent and potentially untruthful statements regarding her living situation and relationship with her .     FAMILIA Preciado, Supervisee in Social Work
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
## 2017-06-14 NOTE — BH NOTES
GROUP THERAPY PROGRESS NOTE    The patient Kristel Cooper is participating in Community Group. Group time: 30 minutes    Personal goal for participation: to orient the patient to the unit.     Goal orientation: successful adoption of unit rules    Group therapy participation: minimal    Therapeutic interventions reviewed and discussed: Yes    Impression of participation: minimal    Selvin Higuera  6/14/2017 11:11 AM

## 2017-06-14 NOTE — ED NOTES
TRANSFER - OUT REPORT:    Verbal report given to GERALD De Guzman(name) on [employer]  being transferred to [facility] for routine progression of care       Report consisted of patients Situation, Background, Assessment and   Recommendations(SBAR). Information from the following report(s) SBAR and ED Summary was reviewed with the receiving nurse. Lines:       Opportunity for questions and clarification was provided.       Patient transported with:  Susanne Mauro
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
## 2017-06-14 NOTE — BH NOTES
Pt visible on the unit, attended and participated in schedule groups and activities with no problem. Pt interacted with peers and staff, pleasant upon approach, no redirections or prompting needed at this time. Pt affect bright, no signs of distress or anxiety noted. Pt offered no self disclosures, will continue to monitor and offer feedback and support daily.
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
## 2017-06-14 NOTE — DISCHARGE SUMMARY
Psychiatric evaluation and discharge summary completed \"all in one\" due to the patient being discharged the same day as seen by writer for the initial evaluation. Please see this report for full details. Patient stable for discharge and considered to be at low risk of harm to self or others. Pt denies s/i and h/i. Pt fully contracts for safety. Pt reports many positive predictive factors in terms of not attempting suicide or homicide. Informed consent given to the use of discharge medications. Treatment rounds held in full. All those in treatment rounds concur with plans for discharge today.

## 2017-06-14 NOTE — PROGRESS NOTES
[facility] Admission Pharmacy Medication Reconciliation     Recommendations/Findings:   1) Medication reconciliation completed at [facility] on 6/6/17 states that the patient was out of her quetiapine and glipizide. Per RxQuery, no fills at her outpatient pharmacy between her discharge at [facility] 6/8/17 and 6/14/17. No medication changes made on discharge from [facility] last week. Total Time Spent: 10 minutes    Information obtained from: Discharge medication reconciliation completed 6/8/17 at [facility], [address]    Patient allergies: Allergies as of 06/13/2017 - Review Complete 06/13/2017   Allergen Reaction Noted    Amoxicillin Hives 06/13/2017    Mushroom flavor Hives 01/12/2012    Tomato Hives 01/12/2012       Prior to Admission Medications   Prescriptions Last Dose Informant Patient Reported? Taking? QUEtiapine (SEROQUEL) 200 mg tablet   Yes Yes   Sig: Take 200 mg by mouth nightly. Indications: DEPRESSION TREATMENT ADJUNCT   buPROPion SR (WELLBUTRIN, ZYBAN) 200 mg SR tablet   Yes Yes   Sig: Take 200 mg by mouth two (2) times a day. Indications: major depressive disorder   glipiZIDE (GLUCOTROL) 10 mg tablet   Yes Yes   Sig: Take 10 mg by mouth daily. Indications: type 2 diabetes mellitus   sertraline (ZOLOFT) 50 mg tablet   Yes Yes   Sig: Take 50 mg by mouth daily. Indications: major depressive disorder   traZODone (DESYREL) 100 mg tablet   Yes Yes   Sig: Take 200 mg by mouth nightly.  Indications: INSOMNIA      Facility-Administered Medications: None     Thank you,  Angela Sandoval, PHARMD, BCPS
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
## 2017-06-26 ENCOUNTER — APPOINTMENT (OUTPATIENT)
Dept: CT IMAGING | Age: 52
End: 2017-06-26
Attending: EMERGENCY MEDICINE
Payer: MEDICAID

## 2017-06-26 ENCOUNTER — HOSPITAL ENCOUNTER (EMERGENCY)
Age: 52
Discharge: HOME | End: 2017-06-26
Attending: EMERGENCY MEDICINE
Payer: MEDICAID

## 2017-06-26 ENCOUNTER — APPOINTMENT (OUTPATIENT)
Dept: ULTRASOUND IMAGING | Age: 52
End: 2017-06-26
Attending: EMERGENCY MEDICINE
Payer: MEDICAID

## 2017-06-26 ENCOUNTER — APPOINTMENT (OUTPATIENT)
Dept: GENERAL RADIOLOGY | Age: 52
End: 2017-06-26
Attending: EMERGENCY MEDICINE
Payer: MEDICAID

## 2017-06-26 VITALS
BODY MASS INDEX: 43.43 KG/M2 | HEART RATE: 68 BPM | DIASTOLIC BLOOD PRESSURE: 73 MMHG | OXYGEN SATURATION: 95 % | WEIGHT: 230 LBS | RESPIRATION RATE: 16 BRPM | TEMPERATURE: 97.8 F | SYSTOLIC BLOOD PRESSURE: 138 MMHG | HEIGHT: 61 IN

## 2017-06-26 VITALS
BODY MASS INDEX: 43.43 KG/M2 | SYSTOLIC BLOOD PRESSURE: 138 MMHG | TEMPERATURE: 98.1 F | HEIGHT: 61 IN | WEIGHT: 230 LBS | DIASTOLIC BLOOD PRESSURE: 84 MMHG | OXYGEN SATURATION: 99 % | HEART RATE: 91 BPM | RESPIRATION RATE: 16 BRPM

## 2017-06-26 DIAGNOSIS — R10.84 ABDOMINAL PAIN, GENERALIZED: Primary | ICD-10-CM

## 2017-06-26 DIAGNOSIS — F99 MENTAL HEALTH DISORDER: Primary | ICD-10-CM

## 2017-06-26 LAB
ALBUMIN SERPL BCP-MCNC: 3.3 G/DL (ref 3.5–5)
ALBUMIN/GLOB SERPL: 0.8 {RATIO} (ref 1.1–2.2)
ALP SERPL-CCNC: 117 U/L (ref 45–117)
ALT SERPL-CCNC: 25 U/L (ref 12–78)
AMPHET UR QL SCN: NEGATIVE
ANION GAP BLD CALC-SCNC: 7 MMOL/L (ref 5–15)
APPEARANCE UR: CLEAR
AST SERPL W P-5'-P-CCNC: 15 U/L (ref 15–37)
BACTERIA URNS QL MICRO: NEGATIVE /HPF
BARBITURATES UR QL SCN: NEGATIVE
BASOPHILS # BLD AUTO: 0 K/UL (ref 0–0.1)
BASOPHILS # BLD: 0 % (ref 0–1)
BENZODIAZ UR QL: NEGATIVE
BILIRUB SERPL-MCNC: 0.3 MG/DL (ref 0.2–1)
BILIRUB UR QL: NEGATIVE
BUN SERPL-MCNC: 14 MG/DL (ref 6–20)
BUN/CREAT SERPL: 17 (ref 12–20)
CALCIUM SERPL-MCNC: 8.6 MG/DL (ref 8.5–10.1)
CANNABINOIDS UR QL SCN: NEGATIVE
CHLORIDE SERPL-SCNC: 101 MMOL/L (ref 97–108)
CO2 SERPL-SCNC: 28 MMOL/L (ref 21–32)
COCAINE UR QL SCN: NEGATIVE
COLOR UR: NORMAL
CREAT SERPL-MCNC: 0.82 MG/DL (ref 0.55–1.02)
DRUG SCRN COMMENT,DRGCM: NORMAL
EOSINOPHIL # BLD: 0.1 K/UL (ref 0–0.4)
EOSINOPHIL NFR BLD: 2 % (ref 0–7)
EPITH CASTS URNS QL MICRO: NORMAL /LPF
ERYTHROCYTE [DISTWIDTH] IN BLOOD BY AUTOMATED COUNT: 14.2 % (ref 11.5–14.5)
ETHANOL SERPL-MCNC: <10 MG/DL
GLOBULIN SER CALC-MCNC: 4.1 G/DL (ref 2–4)
GLUCOSE BLD STRIP.AUTO-MCNC: 141 MG/DL (ref 65–100)
GLUCOSE SERPL-MCNC: 138 MG/DL (ref 65–100)
GLUCOSE UR STRIP.AUTO-MCNC: NEGATIVE MG/DL
HCT VFR BLD AUTO: 40.1 % (ref 35–47)
HGB BLD-MCNC: 12.9 G/DL (ref 11.5–16)
HGB UR QL STRIP: NEGATIVE
KETONES UR QL STRIP.AUTO: NEGATIVE MG/DL
LEUKOCYTE ESTERASE UR QL STRIP.AUTO: NEGATIVE
LIPASE SERPL-CCNC: 179 U/L (ref 73–393)
LYMPHOCYTES # BLD AUTO: 29 % (ref 12–49)
LYMPHOCYTES # BLD: 2 K/UL (ref 0.8–3.5)
MCH RBC QN AUTO: 27.3 PG (ref 26–34)
MCHC RBC AUTO-ENTMCNC: 32.2 G/DL (ref 30–36.5)
MCV RBC AUTO: 85 FL (ref 80–99)
METHADONE UR QL: NEGATIVE
MONOCYTES # BLD: 0.3 K/UL (ref 0–1)
MONOCYTES NFR BLD AUTO: 5 % (ref 5–13)
NEUTS SEG # BLD: 4.5 K/UL (ref 1.8–8)
NEUTS SEG NFR BLD AUTO: 64 % (ref 32–75)
NITRITE UR QL STRIP.AUTO: NEGATIVE
OPIATES UR QL: NEGATIVE
PCP UR QL: NEGATIVE
PH UR STRIP: 6 [PH] (ref 5–8)
PLATELET # BLD AUTO: 317 K/UL (ref 150–400)
POTASSIUM SERPL-SCNC: 3.7 MMOL/L (ref 3.5–5.1)
PROT SERPL-MCNC: 7.4 G/DL (ref 6.4–8.2)
PROT UR STRIP-MCNC: NEGATIVE MG/DL
RBC # BLD AUTO: 4.72 M/UL (ref 3.8–5.2)
RBC #/AREA URNS HPF: NORMAL /HPF (ref 0–5)
SERVICE CMNT-IMP: ABNORMAL
SODIUM SERPL-SCNC: 136 MMOL/L (ref 136–145)
SP GR UR REFRACTOMETRY: 1.01 (ref 1–1.03)
UROBILINOGEN UR QL STRIP.AUTO: 0.2 EU/DL (ref 0.2–1)
WBC # BLD AUTO: 7 K/UL (ref 3.6–11)
WBC URNS QL MICRO: NORMAL /HPF (ref 0–4)

## 2017-06-26 PROCEDURE — 85025 COMPLETE CBC W/AUTO DIFF WBC: CPT | Performed by: EMERGENCY MEDICINE

## 2017-06-26 PROCEDURE — 80307 DRUG TEST PRSMV CHEM ANLYZR: CPT | Performed by: EMERGENCY MEDICINE

## 2017-06-26 PROCEDURE — 99285 EMERGENCY DEPT VISIT HI MDM: CPT

## 2017-06-26 PROCEDURE — 73502 X-RAY EXAM HIP UNI 2-3 VIEWS: CPT

## 2017-06-26 PROCEDURE — 81001 URINALYSIS AUTO W/SCOPE: CPT | Performed by: EMERGENCY MEDICINE

## 2017-06-26 PROCEDURE — 90791 PSYCH DIAGNOSTIC EVALUATION: CPT

## 2017-06-26 PROCEDURE — 71020 XR CHEST PA LAT: CPT

## 2017-06-26 PROCEDURE — 82962 GLUCOSE BLOOD TEST: CPT

## 2017-06-26 PROCEDURE — 70450 CT HEAD/BRAIN W/O DYE: CPT

## 2017-06-26 PROCEDURE — 74177 CT ABD & PELVIS W/CONTRAST: CPT

## 2017-06-26 PROCEDURE — 80053 COMPREHEN METABOLIC PANEL: CPT | Performed by: EMERGENCY MEDICINE

## 2017-06-26 PROCEDURE — 73562 X-RAY EXAM OF KNEE 3: CPT

## 2017-06-26 PROCEDURE — 74011636320 HC RX REV CODE- 636/320: Performed by: EMERGENCY MEDICINE

## 2017-06-26 PROCEDURE — 83690 ASSAY OF LIPASE: CPT | Performed by: EMERGENCY MEDICINE

## 2017-06-26 PROCEDURE — 74011000258 HC RX REV CODE- 258: Performed by: EMERGENCY MEDICINE

## 2017-06-26 PROCEDURE — 76705 ECHO EXAM OF ABDOMEN: CPT

## 2017-06-26 PROCEDURE — 36415 COLL VENOUS BLD VENIPUNCTURE: CPT | Performed by: EMERGENCY MEDICINE

## 2017-06-26 RX ORDER — SODIUM CHLORIDE 0.9 % (FLUSH) 0.9 %
10 SYRINGE (ML) INJECTION
Status: COMPLETED | OUTPATIENT
Start: 2017-06-26 | End: 2017-06-26

## 2017-06-26 RX ADMIN — IOPAMIDOL 100 ML: 755 INJECTION, SOLUTION INTRAVENOUS at 08:35

## 2017-06-26 RX ADMIN — Medication 10 ML: at 08:35

## 2017-06-26 RX ADMIN — SODIUM CHLORIDE 100 ML: 900 INJECTION, SOLUTION INTRAVENOUS at 08:35
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
## 2017-06-26 NOTE — ED NOTES
Oma Holliday arranged to transport pt to [facility] per pt and  Mendoza Pulido request.  Pt ambulatory to the waiting room.

## 2017-06-26 NOTE — ED NOTES
Pt resting on stretcher with eyes closed. Call bell within reach. No s/s of acute distress. Will continue to monitor.

## 2017-06-26 NOTE — ED NOTES
Patient (s)   given copy of dc instructions and   script(s). Patient(s)   verbalized understanding of instructions and script (s). Patient given a current medication reconciliation form and verbalized understanding of their medications. Patient (s)  verbalized understanding of the importance of discussing medications with  his or her physician or clinic when they follow up. Patient alert and oriented and in no acute distress. Pt verbalizes pain scale of 0 out of 10. Patient discharged home ambulatory with self.
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
## 2017-06-26 NOTE — ED NOTES
Discharge instructions reviewed with the patient who verbalized understanding. Contacted pt's  Edwina Rodrigues at [phone] per pt's request and left a message with the ED phone number.
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
## 2017-06-26 NOTE — ED NOTES
Verbal shift change report given to Real Dasilva RN (oncoming nurse) by Heather Sharpe RN and Yan Mike RN (offgoing nurse). Report included the following information SBAR, ED Summary, MAR and Recent Results.
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
## 2017-06-26 NOTE — DISCHARGE INSTRUCTIONS
Depression and Chronic Disease: Care Instructions  Your Care Instructions  A chronic disease is one that you have for a long time. Some chronic diseases can be controlled, but they usually cannot be cured. Depression is common in people with chronic diseases, but it often goes unnoticed. Many people have concerns about seeking treatment for a mental health problem. You may think it's a sign of weakness, or you don't want people to know about it. It's important to overcome these reasons for not seeking treatment. Treating depression or anxiety is good for your health. Follow-up care is a key part of your treatment and safety. Be sure to make and go to all appointments, and call your doctor if you are having problems. It's also a good idea to know your test results and keep a list of the medicines you take. How can you care for yourself at home? Watch for symptoms of depression  The symptoms of depression are often subtle at first. You may think they are caused by your disease rather than depression. Or you may think it is normal to be depressed when you have a chronic disease. If you are depressed you may:  · Feel sad or hopeless. · Feel guilty or worthless. · Not enjoy the things you used to enjoy. · Feel hopeless, as though life is not worth living. · Have trouble thinking or remembering. · Have low energy, and you may not eat or sleep well. · Pull away from others. · Think often about death or killing yourself. (Keep the numbers for these national suicide hotlines: 8-989-902-TALK [1-952.779.6941] and 3-899-IHITANI [1-535.724.2582]. )  Get treatment  By treating your depression, you can feel more hopeful and have more energy. If you feel better, you may take better care of yourself, so your health may improve. · Talk to your doctor if you have any changes in mood during treatment for your disease. · Ask your doctor for help.  Counseling, antidepressant medicine, or a combination of the two can help most people with depression. Often a combination works best. Counseling can also help you cope with having a chronic disease. When should you call for help? Call 911 anytime you think you may need emergency care. For example, call if:  · You feel like hurting yourself or someone else. · Someone you know has depression and is about to attempt or is attempting suicide. Call your doctor now or seek immediate medical care if:  · You hear voices. · Someone you know has depression and:  ¨ Starts to give away his or her possessions. ¨ Uses illegal drugs or drinks alcohol heavily. ¨ Talks or writes about death, including writing suicide notes or talking about guns, knives, or pills. ¨ Starts to spend a lot of time alone. ¨ Acts very aggressively or suddenly appears calm. Watch closely for changes in your health, and be sure to contact your doctor if:  · You do not get better as expected. Where can you learn more? Go to http://babatunde-dejah.info/. Enter C266 in the search box to learn more about \"Depression and Chronic Disease: Care Instructions. \"  Current as of: July 26, 2016  Content Version: 11.3  © 3418-0913 [employer]. Care instructions adapted under license by [employer] (which disclaims liability or warranty for this information). If you have questions about a medical condition or this instruction, always ask your healthcare professional. [address] any warranty or liability for your use of this information.

## 2017-06-26 NOTE — ED NOTES
Pt presents to ED with EMS c/o SI with plan to walk into traffic. Pt denies HI. Pt states \"I'm homeless and I'm just sick and tired of it, I want to pull my hair out\". Pt placed in gown and in no acute distress. Belongs secured. Emergency Department Nursing Plan of Care       The Nursing Plan of Care is developed from the Nursing assessment and Emergency Department Attending provider initial evaluation. The plan of care may be reviewed in the ED Provider note.     The Plan of Care was developed with the following considerations:   Patient / Family readiness to learn indicated by:verbalized understanding  Persons(s) to be included in education: patient  Barriers to Learning/Limitations:No    Signed     Karis Beckford RN    6/26/2017   4:47 PM

## 2017-06-26 NOTE — ED NOTES
No apparent change in pt condition. Report given to [facility] RN and [email or web address].

## 2017-06-26 NOTE — ED TRIAGE NOTES
Pt brought by EMS. State pt was found on the street lying down. C/o chronic leg pain worse x2 days, nausea x2 days. Pt arrives alert. Also c/o urinary problems, pt states her name, that it is 2002 and Humza Shafer is president and that she is at [facility]. Pt states she is recently on the streets.

## 2017-06-26 NOTE — ED NOTES
Bedside shift change report given to Vaughn Scott RN and [employer], RN (oncoming nurse) by Lele Rick RN (offgoing nurse). Report included the following information SBAR, ED Summary, MAR and Recent Results. Pt currently resting on stretcher and is drowsy.

## 2017-08-02 ENCOUNTER — APPOINTMENT (OUTPATIENT)
Dept: CT IMAGING | Age: 52
End: 2017-08-02
Attending: EMERGENCY MEDICINE
Payer: MEDICAID

## 2017-08-02 ENCOUNTER — HOSPITAL ENCOUNTER (EMERGENCY)
Age: 52
Discharge: HOME | End: 2017-08-02
Attending: EMERGENCY MEDICINE
Payer: MEDICAID

## 2017-08-02 VITALS
TEMPERATURE: 98 F | HEIGHT: 61 IN | DIASTOLIC BLOOD PRESSURE: 86 MMHG | HEART RATE: 91 BPM | WEIGHT: 230 LBS | OXYGEN SATURATION: 97 % | SYSTOLIC BLOOD PRESSURE: 142 MMHG | BODY MASS INDEX: 43.43 KG/M2 | RESPIRATION RATE: 18 BRPM

## 2017-08-02 DIAGNOSIS — B37.31 VULVOVAGINAL CANDIDIASIS: ICD-10-CM

## 2017-08-02 DIAGNOSIS — N30.00 ACUTE CYSTITIS WITHOUT HEMATURIA: Primary | ICD-10-CM

## 2017-08-02 LAB
ALBUMIN SERPL BCP-MCNC: 3.3 G/DL (ref 3.5–5)
ALBUMIN/GLOB SERPL: 0.7 {RATIO} (ref 1.1–2.2)
ALP SERPL-CCNC: 133 U/L (ref 45–117)
ALT SERPL-CCNC: 27 U/L (ref 12–78)
AMPHET UR QL SCN: NEGATIVE
ANION GAP BLD CALC-SCNC: 6 MMOL/L (ref 5–15)
APPEARANCE UR: ABNORMAL
AST SERPL W P-5'-P-CCNC: 21 U/L (ref 15–37)
BACTERIA URNS QL MICRO: ABNORMAL /HPF
BARBITURATES UR QL SCN: NEGATIVE
BASOPHILS # BLD AUTO: 0 K/UL (ref 0–0.1)
BASOPHILS # BLD: 0 % (ref 0–1)
BENZODIAZ UR QL: NEGATIVE
BILIRUB SERPL-MCNC: 0.2 MG/DL (ref 0.2–1)
BILIRUB UR QL: NEGATIVE
BUN SERPL-MCNC: 11 MG/DL (ref 6–20)
BUN/CREAT SERPL: 15 (ref 12–20)
CALCIUM SERPL-MCNC: 8.9 MG/DL (ref 8.5–10.1)
CANNABINOIDS UR QL SCN: NEGATIVE
CHLORIDE SERPL-SCNC: 98 MMOL/L (ref 97–108)
CO2 SERPL-SCNC: 31 MMOL/L (ref 21–32)
COCAINE UR QL SCN: NEGATIVE
COLOR UR: ABNORMAL
CREAT SERPL-MCNC: 0.75 MG/DL (ref 0.55–1.02)
DRUG SCRN COMMENT,DRGCM: NORMAL
EOSINOPHIL # BLD: 0.2 K/UL (ref 0–0.4)
EOSINOPHIL NFR BLD: 2 % (ref 0–7)
EPITH CASTS URNS QL MICRO: ABNORMAL /LPF
ERYTHROCYTE [DISTWIDTH] IN BLOOD BY AUTOMATED COUNT: 14.6 % (ref 11.5–14.5)
GLOBULIN SER CALC-MCNC: 4.6 G/DL (ref 2–4)
GLUCOSE SERPL-MCNC: 156 MG/DL (ref 65–100)
GLUCOSE UR STRIP.AUTO-MCNC: >1000 MG/DL
HCG UR QL: NEGATIVE
HCT VFR BLD AUTO: 39.2 % (ref 35–47)
HGB BLD-MCNC: 12.5 G/DL (ref 11.5–16)
HGB UR QL STRIP: ABNORMAL
KETONES UR QL STRIP.AUTO: NEGATIVE MG/DL
LEUKOCYTE ESTERASE UR QL STRIP.AUTO: ABNORMAL
LIPASE SERPL-CCNC: 208 U/L (ref 73–393)
LYMPHOCYTES # BLD AUTO: 27 % (ref 12–49)
LYMPHOCYTES # BLD: 2.1 K/UL (ref 0.8–3.5)
MCH RBC QN AUTO: 27.4 PG (ref 26–34)
MCHC RBC AUTO-ENTMCNC: 31.9 G/DL (ref 30–36.5)
MCV RBC AUTO: 85.8 FL (ref 80–99)
METHADONE UR QL: NEGATIVE
MONOCYTES # BLD: 0.4 K/UL (ref 0–1)
MONOCYTES NFR BLD AUTO: 5 % (ref 5–13)
NEUTS SEG # BLD: 5.3 K/UL (ref 1.8–8)
NEUTS SEG NFR BLD AUTO: 66 % (ref 32–75)
NITRITE UR QL STRIP.AUTO: POSITIVE
OPIATES UR QL: NEGATIVE
PCP UR QL: NEGATIVE
PH UR STRIP: 5.5 [PH] (ref 5–8)
PLATELET # BLD AUTO: 420 K/UL (ref 150–400)
POTASSIUM SERPL-SCNC: 3.8 MMOL/L (ref 3.5–5.1)
PROT SERPL-MCNC: 7.9 G/DL (ref 6.4–8.2)
PROT UR STRIP-MCNC: NEGATIVE MG/DL
RBC # BLD AUTO: 4.57 M/UL (ref 3.8–5.2)
RBC #/AREA URNS HPF: ABNORMAL /HPF (ref 0–5)
SODIUM SERPL-SCNC: 135 MMOL/L (ref 136–145)
SP GR UR REFRACTOMETRY: 1.02 (ref 1–1.03)
UROBILINOGEN UR QL STRIP.AUTO: 1 EU/DL (ref 0.2–1)
WBC # BLD AUTO: 8 K/UL (ref 3.6–11)
WBC URNS QL MICRO: ABNORMAL /HPF (ref 0–4)
YEAST BUDDING URNS QL: PRESENT

## 2017-08-02 PROCEDURE — 80307 DRUG TEST PRSMV CHEM ANLYZR: CPT | Performed by: EMERGENCY MEDICINE

## 2017-08-02 PROCEDURE — 87086 URINE CULTURE/COLONY COUNT: CPT | Performed by: EMERGENCY MEDICINE

## 2017-08-02 PROCEDURE — 74176 CT ABD & PELVIS W/O CONTRAST: CPT

## 2017-08-02 PROCEDURE — 87491 CHLMYD TRACH DNA AMP PROBE: CPT | Performed by: EMERGENCY MEDICINE

## 2017-08-02 PROCEDURE — 81025 URINE PREGNANCY TEST: CPT

## 2017-08-02 PROCEDURE — 99285 EMERGENCY DEPT VISIT HI MDM: CPT

## 2017-08-02 PROCEDURE — 85025 COMPLETE CBC W/AUTO DIFF WBC: CPT | Performed by: EMERGENCY MEDICINE

## 2017-08-02 PROCEDURE — 87186 SC STD MICRODIL/AGAR DIL: CPT | Performed by: EMERGENCY MEDICINE

## 2017-08-02 PROCEDURE — 80053 COMPREHEN METABOLIC PANEL: CPT | Performed by: EMERGENCY MEDICINE

## 2017-08-02 PROCEDURE — 81001 URINALYSIS AUTO W/SCOPE: CPT | Performed by: EMERGENCY MEDICINE

## 2017-08-02 PROCEDURE — 83690 ASSAY OF LIPASE: CPT | Performed by: EMERGENCY MEDICINE

## 2017-08-02 PROCEDURE — 87077 CULTURE AEROBIC IDENTIFY: CPT | Performed by: EMERGENCY MEDICINE

## 2017-08-02 PROCEDURE — 36415 COLL VENOUS BLD VENIPUNCTURE: CPT | Performed by: EMERGENCY MEDICINE

## 2017-08-02 PROCEDURE — 74011250637 HC RX REV CODE- 250/637: Performed by: EMERGENCY MEDICINE

## 2017-08-02 RX ORDER — FLUCONAZOLE 100 MG/1
150 TABLET ORAL
Status: COMPLETED | OUTPATIENT
Start: 2017-08-02 | End: 2017-08-02

## 2017-08-02 RX ORDER — CEFDINIR 300 MG/1
300 CAPSULE ORAL
Status: COMPLETED | OUTPATIENT
Start: 2017-08-02 | End: 2017-08-02

## 2017-08-02 RX ORDER — CEPHALEXIN 500 MG/1
500 CAPSULE ORAL 4 TIMES DAILY
Qty: 28 CAP | Refills: 0 | Status: SHIPPED | OUTPATIENT
Start: 2017-08-02 | End: 2017-08-09

## 2017-08-02 RX ORDER — FLUCONAZOLE 150 MG/1
150 TABLET ORAL
Qty: 1 TAB | Refills: 0 | Status: SHIPPED | OUTPATIENT
Start: 2017-08-02 | End: 2017-08-02

## 2017-08-02 RX ADMIN — FLUCONAZOLE 150 MG: 100 TABLET ORAL at 19:42

## 2017-08-02 RX ADMIN — CEFDINIR 300 MG: 300 CAPSULE ORAL at 19:42
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
## 2017-08-02 NOTE — ED NOTES
Bedside shift change report given to [facility] RN (oncoming nurse) by [facility] RN (offgoing nurse). Report included the following information SBAR, ED Summary, MAR and Recent Results.

## 2017-08-02 NOTE — DISCHARGE INSTRUCTIONS
We hope that we have addressed all of your medical concerns. The examination and treatment you received in the Emergency Department were for an emergent problem and were not intended as complete care. It is important that you follow up with your healthcare provider(s) for ongoing care. If your symptoms worsen or do not improve as expected, and you are unable to reach your usual health care provider(s), you should return to the Emergency Department. Today's healthcare is undergoing tremendous change, and patient satisfaction surveys are one of the many tools to assess the quality of medical care. You may receive a survey from the [employer] regarding your experience in the Emergency Department. I hope that your experience has been completely positive, particularly the medical care that I provided. As such, please participate in the survey; anything less than excellent does not meet my expectations or intentions. [facility] and [address] [place] [address] participate in nationally recognized quality of care measures. If your blood pressure is greater than 120/80, as reported below, we urge that you seek medical care to address the potential of high blood pressure, commonly known as hypertension. Hypertension can be hereditary or can be caused by certain medical conditions, pain, stress, or \"white coat syndrome. \"       Please make an appointment with your health care provider(s) for follow up of your Emergency Department visit. VITALS:   Patient Vitals for the past 8 hrs:   Temp Pulse Resp BP SpO2   08/02/17 1734 98.8 °F (37.1 °C) 95 18 143/88 95 %          Thank you for allowing us to provide you with medical care today. We realize that you have many choices for your emergency care needs. Please choose us in the future for any continued health care needs.       Regards,           Dhruv Longoria, DO    [employer]   Office: [phone]            Recent Results (from the past 24 hour(s))   HCG URINE, QL. - POC    Collection Time: 08/02/17  6:09 PM   Result Value Ref Range    Pregnancy test,urine (POC) NEGATIVE  NEG     URINALYSIS W/ RFLX MICROSCOPIC    Collection Time: 08/02/17  6:27 PM   Result Value Ref Range    Color YELLOW/STRAW      Appearance CLOUDY (A) CLEAR      Specific gravity 1.020 1.003 - 1.030      pH (UA) 5.5 5.0 - 8.0      Protein NEGATIVE  NEG mg/dL    Glucose >1000 (A) NEG mg/dL    Ketone NEGATIVE  NEG mg/dL    Bilirubin NEGATIVE  NEG      Blood TRACE (A) NEG      Urobilinogen 1.0 0.2 - 1.0 EU/dL    Nitrites POSITIVE (A) NEG      Leukocyte Esterase MODERATE (A) NEG      WBC 20-50 0 - 4 /hpf    RBC 0-5 0 - 5 /hpf    Epithelial cells MODERATE (A) FEW /lpf    Bacteria 4+ (A) NEG /hpf    Budding Yeast PRESENT (A) NEG     CBC WITH AUTOMATED DIFF    Collection Time: 08/02/17  6:27 PM   Result Value Ref Range    WBC 8.0 3.6 - 11.0 K/uL    RBC 4.57 3.80 - 5.20 M/uL    HGB 12.5 11.5 - 16.0 g/dL    HCT 39.2 35.0 - 47.0 %    MCV 85.8 80.0 - 99.0 FL    MCH 27.4 26.0 - 34.0 PG    MCHC 31.9 30.0 - 36.5 g/dL    RDW 14.6 (H) 11.5 - 14.5 %    PLATELET 089 (H) 049 - 400 K/uL    NEUTROPHILS 66 32 - 75 %    LYMPHOCYTES 27 12 - 49 %    MONOCYTES 5 5 - 13 %    EOSINOPHILS 2 0 - 7 %    BASOPHILS 0 0 - 1 %    ABS. NEUTROPHILS 5.3 1.8 - 8.0 K/UL    ABS. LYMPHOCYTES 2.1 0.8 - 3.5 K/UL    ABS. MONOCYTES 0.4 0.0 - 1.0 K/UL    ABS. EOSINOPHILS 0.2 0.0 - 0.4 K/UL    ABS.  BASOPHILS 0.0 0.0 - 0.1 K/UL   METABOLIC PANEL, COMPREHENSIVE    Collection Time: 08/02/17  6:27 PM   Result Value Ref Range    Sodium 135 (L) 136 - 145 mmol/L    Potassium 3.8 3.5 - 5.1 mmol/L    Chloride 98 97 - 108 mmol/L    CO2 31 21 - 32 mmol/L    Anion gap 6 5 - 15 mmol/L    Glucose 156 (H) 65 - 100 mg/dL    BUN 11 6 - 20 MG/DL    Creatinine 0.75 0.55 - 1.02 MG/DL    BUN/Creatinine ratio 15 12 - 20      GFR est AA >60 >60 ml/min/1.73m2    GFR est non-AA >60 >60 ml/min/1.73m2 Calcium 8.9 8.5 - 10.1 MG/DL    Bilirubin, total 0.2 0.2 - 1.0 MG/DL    ALT (SGPT) 27 12 - 78 U/L    AST (SGOT) 21 15 - 37 U/L    Alk. phosphatase 133 (H) 45 - 117 U/L    Protein, total 7.9 6.4 - 8.2 g/dL    Albumin 3.3 (L) 3.5 - 5.0 g/dL    Globulin 4.6 (H) 2.0 - 4.0 g/dL    A-G Ratio 0.7 (L) 1.1 - 2.2     LIPASE    Collection Time: 08/02/17  6:27 PM   Result Value Ref Range    Lipase 208 73 - 393 U/L   DRUG SCREEN, URINE    Collection Time: 08/02/17  6:27 PM   Result Value Ref Range    AMPHETAMINES NEGATIVE  NEG      BARBITURATES NEGATIVE  NEG      BENZODIAZEPINE NEGATIVE  NEG      COCAINE NEGATIVE  NEG      METHADONE NEGATIVE  NEG      OPIATES NEGATIVE  NEG      PCP(PHENCYCLIDINE) NEGATIVE  NEG      THC (TH-CANNABINOL) NEGATIVE  NEG      Drug screen comment (NOTE)        Ct Abd Pelv Wo Cont    Result Date: 8/2/2017  EXAM:  CT ABD PELV WO CONT Clinical history: Abdominal pain, per electronic medical record 45 y.o. female with past medical history significant for diabetes, arthritis, COPD, depression, schizophrenia, suicide attempt, bipolar 1 disorder, drug abuse, and polydrug dependence who presents from home with chief complaint of abdominal pain. INDICATION: abd pain COMPARISON: 6/26/2017 CONTRAST:  None. TECHNIQUE: Thin axial images were obtained through the abdomen and pelvis. Coronal and sagittal reconstructions were generated. Oral contrast was not administered. CT dose reduction was achieved through use of a standardized protocol tailored for this examination and automatic exposure control for dose modulation. The absence of intravenous contrast material reduces the sensitivity for evaluation of the solid parenchymal organs of the abdomen. FINDINGS: LUNG BASES: Clear. INCIDENTALLY IMAGED HEART AND MEDIASTINUM: Unremarkable. LIVER: Tiny calcific densities most consistent with a granuloma. No intrahepatic duct dilatation GALLBLADDER: Unremarkable. SPLEEN: No mass. PANCREAS: No mass or ductal dilatation. ADRENALS: Unremarkable. KIDNEYS/URETERS: No mass, calculus, or hydronephrosis. STOMACH: Unremarkable. SMALL BOWEL: No dilatation or wall thickening. COLON: No dilatation or wall thickening. APPENDIX: Unremarkable. PERITONEUM: No ascites or pneumoperitoneum. RETROPERITONEUM: No lymphadenopathy or aortic aneurysm. REPRODUCTIVE ORGANS: URINARY BLADDER: No mass or calculus. BONES: No destructive bone lesion. ADDITIONAL COMMENTS: N/A IMPRESSION: No acute intraperitoneal process. Urinary Tract Infection in Women: Care Instructions  Your Care Instructions    A urinary tract infection, or UTI, is a general term for an infection anywhere between the kidneys and the urethra (where urine comes out). Most UTIs are bladder infections. They often cause pain or burning when you urinate. UTIs are caused by bacteria and can be cured with antibiotics. Be sure to complete your treatment so that the infection goes away. Follow-up care is a key part of your treatment and safety. Be sure to make and go to all appointments, and call your doctor if you are having problems. It's also a good idea to know your test results and keep a list of the medicines you take. How can you care for yourself at home? · Take your antibiotics as directed. Do not stop taking them just because you feel better. You need to take the full course of antibiotics. · Drink extra water and other fluids for the next day or two. This may help wash out the bacteria that are causing the infection. (If you have kidney, heart, or liver disease and have to limit fluids, talk with your doctor before you increase your fluid intake.)  · Avoid drinks that are carbonated or have caffeine. They can irritate the bladder. · Urinate often. Try to empty your bladder each time. · To relieve pain, take a hot bath or lay a heating pad set on low over your lower belly or genital area. Never go to sleep with a heating pad in place.   To prevent UTIs  · Drink plenty of water each day. This helps you urinate often, which clears bacteria from your system. (If you have kidney, heart, or liver disease and have to limit fluids, talk with your doctor before you increase your fluid intake.)  · Urinate when you need to. · Urinate right after you have sex. · Change sanitary pads often. · Avoid douches, bubble baths, feminine hygiene sprays, and other feminine hygiene products that have deodorants. · After going to the bathroom, wipe from front to back. When should you call for help? Call your doctor now or seek immediate medical care if:  · Symptoms such as fever, chills, nausea, or vomiting get worse or appear for the first time. · You have new pain in your back just below your rib cage. This is called flank pain. · There is new blood or pus in your urine. · You have any problems with your antibiotic medicine. Watch closely for changes in your health, and be sure to contact your doctor if:  · You are not getting better after taking an antibiotic for 2 days. · Your symptoms go away but then come back. Where can you learn more? Go to http://babatunde-dejah.info/. Enter F739 in the search box to learn more about \"Urinary Tract Infection in Women: Care Instructions. \"  Current as of: November 28, 2016  Content Version: 11.3  © 5974-6497 [employer]. Care instructions adapted under license by [employer] (which disclaims liability or warranty for this information). If you have questions about a medical condition or this instruction, always ask your healthcare professional. [place] [address] any warranty or liability for your use of this information. Vaginal Yeast Infection: Care Instructions  Your Care Instructions  A vaginal yeast infection is caused by too many yeast cells in the vagina. This is common in women of all ages. Itching, vaginal discharge and irritation, and other symptoms can bother you.  But yeast infections don't often cause other health problems. Some medicines can increase your risk of getting a yeast infection. These include antibiotics, birth control pills, hormones, and steroids. You may also be more likely to get a yeast infection if you are pregnant, have diabetes, douche, or wear tight clothes. With treatment, most yeast infections get better in 2 to 3 days. Follow-up care is a key part of your treatment and safety. Be sure to make and go to all appointments, and call your doctor if you are having problems. It's also a good idea to know your test results and keep a list of the medicines you take. How can you care for yourself at home? · Take your medicines exactly as prescribed. Call your doctor if you think you are having a problem with your medicine. · Ask your doctor about over-the-counter (OTC) medicines for yeast infections. They may cost less than prescription medicines. If you use an OTC treatment, read and follow all instructions on the label. · Do not use tampons while using a vaginal cream or suppository. The tampons can absorb the medicine. Use pads instead. · Wear loose cotton clothing. Do not wear nylon or other fabric that holds body heat and moisture close to the skin. · Try sleeping without underwear. · Do not scratch. Relieve itching with a cold pack or a cool bath. · Do not wash your vaginal area more than once a day. Use plain water or a mild, unscented soap. Air-dry the vaginal area. · Change out of wet swimsuits after swimming. · Do not have sex until you have finished your treatment. · Do not douche. When should you call for help? Call your doctor now or seek immediate medical care if:  · You have unexpected vaginal bleeding. · You have new or increased pain in your vagina or pelvis. Watch closely for changes in your health, and be sure to contact your doctor if:  · You have a fever. · You are not getting better after 2 days.   · Your symptoms come back after you finish your medicines. Where can you learn more? Go to http://babatunde-dejah.info/. Enter Z805 in the search box to learn more about \"Vaginal Yeast Infection: Care Instructions. \"  Current as of: October 13, 2016  Content Version: 11.3  © 5716-6368 [employer]. Care instructions adapted under license by [employer] (which disclaims liability or warranty for this information). If you have questions about a medical condition or this instruction, always ask your healthcare professional. [address] any warranty or liability for your use of this information.

## 2017-08-03 NOTE — ED NOTES
MD reviewed discharge instructions and options with patient; patient verbalized understanding. RN reviewed discharge instructions using teachback method. Pt. Ambulated to exit without difficulty and in no signs of acute distress. Pt waiting in waiting room for [facility].

## 2017-08-04 LAB
C TRACH DNA SPEC QL NAA+PROBE: NEGATIVE
N GONORRHOEA DNA SPEC QL NAA+PROBE: NEGATIVE
SAMPLE TYPE: NORMAL
SERVICE CMNT-IMP: NORMAL
SPECIMEN SOURCE: NORMAL

## 2017-08-05 LAB
BACTERIA SPEC CULT: ABNORMAL
BACTERIA SPEC CULT: ABNORMAL
CC UR VC: ABNORMAL
SERVICE CMNT-IMP: ABNORMAL

## 2017-11-16 ENCOUNTER — HOSPITAL ENCOUNTER (EMERGENCY)
Age: 52
Discharge: HOME | End: 2017-11-17
Attending: EMERGENCY MEDICINE
Payer: MEDICAID

## 2017-11-16 DIAGNOSIS — F25.0 SCHIZOAFFECTIVE DISORDER, BIPOLAR TYPE (HCC): Primary | ICD-10-CM

## 2017-11-16 DIAGNOSIS — F19.20 POLYSUBSTANCE (EXCLUDING OPIOIDS) DEPENDENCE (HCC): ICD-10-CM

## 2017-11-16 PROCEDURE — 90791 PSYCH DIAGNOSTIC EVALUATION: CPT

## 2017-11-16 PROCEDURE — 99285 EMERGENCY DEPT VISIT HI MDM: CPT

## 2017-11-17 VITALS
BODY MASS INDEX: 40.58 KG/M2 | HEIGHT: 61 IN | RESPIRATION RATE: 16 BRPM | SYSTOLIC BLOOD PRESSURE: 146 MMHG | TEMPERATURE: 98.5 F | WEIGHT: 214.95 LBS | OXYGEN SATURATION: 97 % | HEART RATE: 89 BPM | DIASTOLIC BLOOD PRESSURE: 82 MMHG

## 2017-11-17 PROCEDURE — 90791 PSYCH DIAGNOSTIC EVALUATION: CPT

## 2017-11-17 NOTE — DISCHARGE INSTRUCTIONS
Bipolar Disorder: Care Instructions  Your Care Instructions    Bipolar disorder is an illness that causes extreme mood changes, from times of very high energy (manic episodes) to times of depression. But many people with bipolar disorder show only the symptoms of depression. These moods may cause problems with your work, school, family life, friendships, and how well you function. This disease is also called manic-depression. There is no cure for bipolar disorder, but it can be helped with medicines. Counseling may also help. It is important to take your medicines exactly as prescribed, even when you feel well. You may need lifelong treatment. Follow-up care is a key part of your treatment and safety. Be sure to make and go to all appointments, and call your doctor if you are having problems. It's also a good idea to know your test results and keep a list of the medicines you take. How can you care for yourself at home? · Be safe with medicines. Take your medicines exactly as prescribed. Do not stop or change a medicine without talking to your doctor first. Johnnie Tipton and your doctor may need to try different combinations of medicines to find what works for you. · Take your medicines on schedule to keep your moods even. When you feel good, you may think that you do not need your medicines. But it is important to keep taking them. · Go to your counseling sessions. Call and talk with your counselor if you can't go to a session or if you don't think the sessions are helping. Do not just stop going. · Get at least 30 minutes of activity on most days of the week. Walking is a good choice. You also may want to do other things, such as running, swimming, or cycling. · Get enough sleep. Keep your room dark and quiet. Try to go to bed at the same time every night. · Eat a healthy diet. This includes whole grains, dairy, fruits, vegetables, and protein. Eat foods from each of these groups. · Try to lower your stress. Manage your time, build a strong support system, and lead a healthy lifestyle. To lower your stress, try physical activity, slow deep breathing, or getting a massage. · Do not use alcohol or illegal drugs. · Learn the early signs of your mood changes. You can then take steps to help yourself feel better. · Ask for help from friends and family when you need it. You may need help with daily chores when you are depressed. When you are manic, you may need support to control your high energy levels. What should you do if someone in your family has bipolar disorder? · Learn about the disease and the signs that it is getting worse. · Remind your family member that you love him or her. · Make a plan with all family members about how to take care of your loved one when his or her symptoms are bad. · Talk about your fears and concerns and those of other family members. Seek counseling if needed. · Do not focus attention only on the person who is in treatment. · Remind yourself that it will take time for changes to occur. · Do not blame yourself for the disease. · Know your legal rights and the legal rights of your family member. Support groups or counselors can help you with this information. · Take care of yourself. Keep up with your own interests, such as your career, hobbies, and friends. Use exercise, positive self-talk, deep breathing, and other relaxing exercises to help lower your stress. · Give yourself time to grieve. You may need to deal with emotions such as anger, fear, and frustration. After you work through your feelings, you will be better able to care for yourself and your family. · If you are having a hard time with your feelings or with your relationship with your family member, talk with a counselor. When should you call for help? Call 911 anytime you think you may need emergency care. For example, call if:  ? · You feel like hurting yourself or someone else.    ? · Someone who has bipolar disorder displays dangerous behavior, and you think the person might hurt himself or herself or someone else. ?Call your doctor now or seek immediate medical care if:  ? · You hear voices. ? · Someone you know has bipolar disorder and talks about suicide. Keep the numbers for these national suicide hotlines: 1-751-347-TALK (9-778-999-325.506.5805) and 6-086-FXLLICO (9-249.845.2169). If a suicide threat seems real, with a specific plan and a way to carry it out, stay with the person, or ask someone you trust to stay with the person, until you can get help. ? · Someone you know has bipolar disorder and:  ¨ Starts to give away possessions. ¨ Is using illegal drugs or drinking alcohol heavily. ¨ Talks or writes about death, including writing suicide notes or talking about guns, knives, or pills. ¨ Talks or writes about hurting someone else. ¨ Starts to spend a lot of time alone. ¨ Acts very aggressively or suddenly appears calm. ¨ Talks about beliefs that are not based in reality (delusions). ? Watch closely for changes in your health, and be sure to contact your doctor if:  ? · You cannot go to your counseling sessions. Where can you learn more? Go to http://babatunde-dejah.info/. Enter K052 in the search box to learn more about \"Bipolar Disorder: Care Instructions. \"  Current as of: May 12, 2017  Content Version: 11.4  © 9681-2859 [employer]. Care instructions adapted under license by [employer] (which disclaims liability or warranty for this information). If you have questions about a medical condition or this instruction, always ask your healthcare professional. [address] any warranty or liability for your use of this information.

## 2017-11-17 NOTE — BSMART NOTE
Comprehensive Assessment Form Part 1      Section I - Disposition    Axis I - Malingering                Substance Induced Mood Disorder                Polysubstance Abuse   Axis II - Borderline Personality Disorder  Axis III -   Past Medical History Date Comments      [facility] [E11.9]       Arthritis [M19.90]  legs     COPD       Depression [F32.9] 1/13/2012      Drug abuse [F19.10]  cocaine, stimulants, etoh, mj, tob     H/O suicide attempt [Z91.5]       Bipolar 1 disorder (HCC) [F31.9]       Polydrug dependence excluding opioid type drug, abuse ([facility] [F19.20]       Obesity [E66.9]       Schizophrenia ([facility] [F20.9]         Axis IV - Problems with housing  [place] V -       The Medical Doctor to Psychiatrist conference was not completed. The Medical Doctor is in agreement with Psychiatrist disposition because of (reason) patient will be discharged. The plan is follow through with appointment at [facility] Friday. The on-call Psychiatrist consulted was Dr. Allen Chau. The admitting Psychiatrist will be Dr. Elaine Caraballo. The admitting Diagnosis is none. The Payor source is [place] [employer] [employer]. The name of the representative was  . This was approved for  days. The authorization number is . Section II - Integrated Summary  Summary:  Patient is 45year old female Pt arrives via EMS from home for SI for a few days. Pt's recently stated her plan would be \"to cut myself up with scissors\". Pt states she is in a \"house full of drugs\". Pt reports ETOH and Marijuana use tonight. Pt is rambling in triage. Pt contracts for safety while in ED, belongings secured at nurses station. At, bedside, patient reported having suicidal plans earlier and denied homicidal thoughts and hallucinations. Patient was discussing her housing stating there are always doing bad things, \"drugs\". Patient stated she did drugs today because they had them in the house and they were going to be in her system anyway's.  Patient discussed housing problems. Patient reported she lives with her boyfriend. Patient  Was cooperative. The patient has demonstrated mental capacity to provide informed consent. The information is given by the patient. The Chief Complaint is suicidal.  The Precipitant Factors are housing problems/ relationships. Previous Hospitalizations: yes  The patient has not previously been in restraints. Current Psychiatrist and/or  is Belén Rivera. Lethality Assessment:    The potential for suicide noted by the following: ideation . The potential for homicide is not noted. The patient has not been a perpetrator of sexual or physical abuse. There are not pending charges. The patient is not felt to be at risk for self harm or harm to others. The attending nurse was advised not noted. Section III - Psychosocial  The patient's overall mood and attitude is cooperative. Feelings of helplessness and hopelessness are not observed. Generalized anxiety is not observed. Panic is not observed. Phobias are not observed. Obsessive compulsive tendencies are not observed. Section IV - Mental Status Exam  The patient's appearance shows no evidence of impairment. The patient's behavior shows no evidence of impairment. The patient is oriented to time, place, person and situation. The patient's speech shows no evidence of impairment. The patient's mood is depressed. The range of affect shows no evidence of impairment. The patient's thought content demonstrates no evidence of impairment. The thought process shows no evidence of impairment. The patient's perception shows no evidence of impairment. The patient's memory shows no evidence of impairment. The patient's appetite shows no evidence of impairment. The patient's sleep shows no evidence of impairment. The patient's insight shows no evidence of impairment. The patient's judgement shows no evidence of impairment.                   Section V - Substance Abuse  The patient is using substances. The patient is using tobacco by inhalation for greater than 10 years with last use on yesterday, alcohol for greater than 10 years with last use on yesterday, cannabis by inhalation for greater than 10 years with last use on unknown and cocaine by inhalation for greater than 10 years with last use on unknown. The patient has experienced the following withdrawal symptoms: N/A. Section VI - Living Arrangements  The patient has a significant other. This person's approximate age is unknown and appears to be in unknown health. The patient lives with a significant other. The patient has one child age adult. The patient does plan to return home upon discharge. The patient does not have legal issues pending. The patient's source of income comes from disability. Yarsanism and cultural practices have not been voiced at this time. The patient's greatest support comes from friends and this person will not be involved with the treatment. The patient has not been in an event described as horrible or outside the realm of ordinary life experience either currently or in the past.  The patient has not been a victim of sexual/physical abuse. Section VII - Other Areas of Clinical Concern  The highest grade achieved is unknown with the overall quality of school experience being described as unknown. The patient is currently unemployed and speaks [employer] as a primary language. The patient has no communication impairments affecting communication. The patient's preference for learning can be described as: can read and write adequately.   The patient's hearing is normal.  The patient's vision is normal.      Angeles Velazquez
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
## 2017-11-17 NOTE — ED TRIAGE NOTES
Triage: Pt arrives via EMS from home for SI for a few days. Pt's recently stated her plan would be \"to cut myself up with scissors\". Pt states she is in a \"house full of drugs\". Pt reports ETOH and Marijuana use tonight. Pt is rambling in triage. Pt contracts for safety while in ED, belongings secured at nurses station.

## 2017-11-17 NOTE — ED NOTES
0020: Pt provided with snack    0035: Called [facility] twice to set up transport for pt back home, DEBORAH Dillon stated pt is not in their system    0100: PA reviewed discharge instructions and options with patient and patient verbalized understanding. RN reviewed discharge instructions using teachback method. Pt wheeled to exit in no signs of acute distress, pt to wait for bus ticket for the morning. No complaints or needs expressed at this time. VSS at time of discharge. Pt to call counselor in the morning for follow up.

## 2018-03-26 ENCOUNTER — HOSPITAL ENCOUNTER (EMERGENCY)
Age: 53
Discharge: HOME | End: 2018-03-26
Attending: EMERGENCY MEDICINE
Payer: MEDICAID

## 2018-03-26 VITALS
HEIGHT: 61 IN | HEART RATE: 96 BPM | TEMPERATURE: 98.2 F | BODY MASS INDEX: 55.32 KG/M2 | DIASTOLIC BLOOD PRESSURE: 84 MMHG | OXYGEN SATURATION: 96 % | WEIGHT: 293 LBS | SYSTOLIC BLOOD PRESSURE: 147 MMHG | RESPIRATION RATE: 18 BRPM

## 2018-03-26 DIAGNOSIS — F25.9 SCHIZOAFFECTIVE DISORDER, UNSPECIFIED TYPE (HCC): Primary | ICD-10-CM

## 2018-03-26 LAB
ALBUMIN SERPL-MCNC: 3.2 G/DL (ref 3.5–5)
ALBUMIN/GLOB SERPL: 0.7 {RATIO} (ref 1.1–2.2)
ALP SERPL-CCNC: 126 U/L (ref 45–117)
ALT SERPL-CCNC: 21 U/L (ref 12–78)
AMPHET UR QL SCN: NEGATIVE
ANION GAP SERPL CALC-SCNC: 12 MMOL/L (ref 5–15)
APPEARANCE UR: CLEAR
AST SERPL-CCNC: 21 U/L (ref 15–37)
BACTERIA URNS QL MICRO: ABNORMAL /HPF
BARBITURATES UR QL SCN: NEGATIVE
BASOPHILS # BLD: 0 K/UL (ref 0–0.1)
BASOPHILS NFR BLD: 0 % (ref 0–1)
BENZODIAZ UR QL: NEGATIVE
BILIRUB SERPL-MCNC: 0.3 MG/DL (ref 0.2–1)
BILIRUB UR QL: NEGATIVE
BUN SERPL-MCNC: 19 MG/DL (ref 6–20)
BUN/CREAT SERPL: 17 (ref 12–20)
CALCIUM SERPL-MCNC: 8.6 MG/DL (ref 8.5–10.1)
CANNABINOIDS UR QL SCN: NEGATIVE
CHLORIDE SERPL-SCNC: 92 MMOL/L (ref 97–108)
CO2 SERPL-SCNC: 24 MMOL/L (ref 21–32)
COCAINE UR QL SCN: NEGATIVE
COLOR UR: ABNORMAL
CREAT SERPL-MCNC: 1.15 MG/DL (ref 0.55–1.02)
DIFFERENTIAL METHOD BLD: ABNORMAL
DRUG SCRN COMMENT,DRGCM: NORMAL
EOSINOPHIL # BLD: 0.1 K/UL (ref 0–0.4)
EOSINOPHIL NFR BLD: 1 % (ref 0–7)
EPITH CASTS URNS QL MICRO: ABNORMAL /LPF
ERYTHROCYTE [DISTWIDTH] IN BLOOD BY AUTOMATED COUNT: 13.6 % (ref 11.5–14.5)
ETHANOL SERPL-MCNC: <10 MG/DL
GLOBULIN SER CALC-MCNC: 4.5 G/DL (ref 2–4)
GLUCOSE BLD STRIP.AUTO-MCNC: 246 MG/DL (ref 65–100)
GLUCOSE SERPL-MCNC: 268 MG/DL (ref 65–100)
GLUCOSE UR STRIP.AUTO-MCNC: >1000 MG/DL
HCT VFR BLD AUTO: 41.7 % (ref 35–47)
HGB BLD-MCNC: 13.8 G/DL (ref 11.5–16)
HGB UR QL STRIP: NEGATIVE
IMM GRANULOCYTES # BLD: 0 K/UL (ref 0–0.04)
IMM GRANULOCYTES NFR BLD AUTO: 0 % (ref 0–0.5)
KETONES UR QL STRIP.AUTO: NEGATIVE MG/DL
LEUKOCYTE ESTERASE UR QL STRIP.AUTO: NEGATIVE
LYMPHOCYTES # BLD: 2.6 K/UL (ref 0.8–3.5)
LYMPHOCYTES NFR BLD: 27 % (ref 12–49)
MCH RBC QN AUTO: 26.7 PG (ref 26–34)
MCHC RBC AUTO-ENTMCNC: 33.1 G/DL (ref 30–36.5)
MCV RBC AUTO: 80.7 FL (ref 80–99)
METHADONE UR QL: NEGATIVE
MONOCYTES # BLD: 0.5 K/UL (ref 0–1)
MONOCYTES NFR BLD: 5 % (ref 5–13)
NEUTS SEG # BLD: 6.5 K/UL (ref 1.8–8)
NEUTS SEG NFR BLD: 67 % (ref 32–75)
NITRITE UR QL STRIP.AUTO: NEGATIVE
NRBC # BLD: 0 K/UL (ref 0–0.01)
NRBC BLD-RTO: 0 PER 100 WBC
OPIATES UR QL: NEGATIVE
PCP UR QL: NEGATIVE
PH UR STRIP: 6 [PH] (ref 5–8)
PLATELET # BLD AUTO: 418 K/UL (ref 150–400)
PMV BLD AUTO: 8.9 FL (ref 8.9–12.9)
POTASSIUM SERPL-SCNC: 3.5 MMOL/L (ref 3.5–5.1)
PROT SERPL-MCNC: 7.7 G/DL (ref 6.4–8.2)
PROT UR STRIP-MCNC: NEGATIVE MG/DL
RBC # BLD AUTO: 5.17 M/UL (ref 3.8–5.2)
RBC #/AREA URNS HPF: ABNORMAL /HPF (ref 0–5)
SERVICE CMNT-IMP: ABNORMAL
SODIUM SERPL-SCNC: 128 MMOL/L (ref 136–145)
SP GR UR REFRACTOMETRY: 1.01 (ref 1–1.03)
UA: UC IF INDICATED,UAUC: ABNORMAL
UROBILINOGEN UR QL STRIP.AUTO: 0.2 EU/DL (ref 0.2–1)
WBC # BLD AUTO: 9.7 K/UL (ref 3.6–11)
WBC URNS QL MICRO: ABNORMAL /HPF (ref 0–4)

## 2018-03-26 PROCEDURE — 74011250636 HC RX REV CODE- 250/636: Performed by: PHYSICIAN ASSISTANT

## 2018-03-26 PROCEDURE — 87086 URINE CULTURE/COLONY COUNT: CPT | Performed by: PHYSICIAN ASSISTANT

## 2018-03-26 PROCEDURE — 80053 COMPREHEN METABOLIC PANEL: CPT | Performed by: PHYSICIAN ASSISTANT

## 2018-03-26 PROCEDURE — 80307 DRUG TEST PRSMV CHEM ANLYZR: CPT | Performed by: PHYSICIAN ASSISTANT

## 2018-03-26 PROCEDURE — 85025 COMPLETE CBC W/AUTO DIFF WBC: CPT | Performed by: PHYSICIAN ASSISTANT

## 2018-03-26 PROCEDURE — 96360 HYDRATION IV INFUSION INIT: CPT

## 2018-03-26 PROCEDURE — 82962 GLUCOSE BLOOD TEST: CPT

## 2018-03-26 PROCEDURE — 87077 CULTURE AEROBIC IDENTIFY: CPT | Performed by: PHYSICIAN ASSISTANT

## 2018-03-26 PROCEDURE — 87186 SC STD MICRODIL/AGAR DIL: CPT | Performed by: PHYSICIAN ASSISTANT

## 2018-03-26 PROCEDURE — 90791 PSYCH DIAGNOSTIC EVALUATION: CPT

## 2018-03-26 PROCEDURE — 96361 HYDRATE IV INFUSION ADD-ON: CPT

## 2018-03-26 PROCEDURE — 36415 COLL VENOUS BLD VENIPUNCTURE: CPT | Performed by: PHYSICIAN ASSISTANT

## 2018-03-26 PROCEDURE — 81001 URINALYSIS AUTO W/SCOPE: CPT | Performed by: PHYSICIAN ASSISTANT

## 2018-03-26 PROCEDURE — 74011250637 HC RX REV CODE- 250/637: Performed by: PHYSICIAN ASSISTANT

## 2018-03-26 PROCEDURE — 99285 EMERGENCY DEPT VISIT HI MDM: CPT

## 2018-03-26 RX ORDER — ACETAMINOPHEN 500 MG
1000 TABLET ORAL
Status: COMPLETED | OUTPATIENT
Start: 2018-03-26 | End: 2018-03-26

## 2018-03-26 RX ADMIN — SODIUM CHLORIDE 1000 ML: 900 INJECTION, SOLUTION INTRAVENOUS at 18:06

## 2018-03-26 RX ADMIN — ACETAMINOPHEN 1000 MG: 500 TABLET, FILM COATED ORAL at 19:38
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
## 2018-03-26 NOTE — ED NOTES
Pt sts\"My  send me here,I don't have any meds to take,sucidal thoughts,denies hi,attempt,plan. pt alert,oriented x 4,answered all questions correctly,no s/s of any distress noticed,pt in gown,all belonging in the personal belonging  Bag labled  and put at the designated room. pt calm,co-operative,none of abnormal behavior noticed,pt put in direct observation room near the nursing station. Emergency Department Nursing Plan of Care       The Nursing Plan of Care is developed from the Nursing assessment and Emergency Department Attending provider initial evaluation. The plan of care may be reviewed in the ED Provider note.     The Plan of Care was developed with the following considerations:   Patient / Family readiness to learn indicated by:verbalized understanding  Persons(s) to be included in education: patient  Barriers to Learning/Limitations:No    Signed     Jovi Hobbs RN    3/26/2018   4:30 PM

## 2018-03-26 NOTE — ED NOTES
Bedside and Verbal shift change report given to MARCIE's (oncoming nurse) by me (offgoing nurse). Report included the following information SBAR, Kardex, Intake/Output, MAR and Recent Results.

## 2018-03-26 NOTE — ED NOTES
Pt sts\"I feeling better,I don't have any suicidal thoughts no more,I need help with medication. \"Pt eating dinner,watching tv,no any abnormal behavior noticed. Provider  made aware.

## 2018-03-27 ENCOUNTER — HOSPITAL ENCOUNTER (EMERGENCY)
Age: 53
Discharge: HOME | End: 2018-03-27
Attending: EMERGENCY MEDICINE
Payer: MEDICAID

## 2018-03-27 VITALS
DIASTOLIC BLOOD PRESSURE: 81 MMHG | RESPIRATION RATE: 18 BRPM | BODY MASS INDEX: 43.12 KG/M2 | TEMPERATURE: 97.9 F | HEART RATE: 105 BPM | WEIGHT: 228.4 LBS | HEIGHT: 61 IN | OXYGEN SATURATION: 98 % | SYSTOLIC BLOOD PRESSURE: 160 MMHG

## 2018-03-27 DIAGNOSIS — F25.9 SCHIZOAFFECTIVE DISORDER, UNSPECIFIED TYPE (HCC): Primary | ICD-10-CM

## 2018-03-27 PROCEDURE — 99283 EMERGENCY DEPT VISIT LOW MDM: CPT

## 2018-03-27 NOTE — BSMART NOTE
Comprehensive Assessment Form Part 1    Section I - Disposition    Axis I - Malingering, Schizoaffective Disorder (by history), Polysubstance Abuse (by history)   Axis II - Borderline Personality Disorder  Axis III - Diabetes, Arthritis, COPD  Axis IV - Problems with housing  [place] V - 46      The Medical Doctor to Psychiatrist conference was not completed. The Medical Doctor is in agreement with Psychiatrist disposition because of (reason) patient does not meet admission criteria and has an appointment with her psychiatrist at [facility] on Wednesday 3/28. The plan is discharge and follow up with [facility] Wednesday. The on-call Psychiatrist consulted was Dr. Erin Mcgarry. The admitting Psychiatrist will be Dr. Galdino Toure. The admitting Diagnosis is NA. The Payor source is Medicaid. Section II - Integrated Summary  Summary:  Patient is a 45year old female seen face to face in the ER. When she arrived she said \"I need to hide away for a while,\" and that she was experiencing suicidal ideation. She reported no plan. After being in the ER for a few hours she then denied suicidal ideation and reported she just needs help with her medications. When seen by this clinician, she reported \"I feel I need my medicine. \"  She complained of urinary frequency and having accidents \"left and right. \"  She denied current suicidal ideation and homicidal ideation. She reported \"I can't go back\" to where she has been living, but was unable to say why. She reported she wanted to be admitted. When she was told she does not meet admission criteria she said, \"then I'm suicidal.\"  Patient has an extensive history of housing issues and malingering to get in the the hospital.  She is an open client of [facility], and when contacted they reported she has an appointment with her psychiatrist Dr. Baldemar Vuong on Wednesday. When patient was informed she needs to go to her appointment on Wednesday to get her medications straight she said, \"but I can't hardly walk. \" Patient ambulated independently from the ER when discharged. The patient has demonstrated mental capacity to provide informed consent. The information is given by the patient, past medical records and Jlu[facility] Starks. The Chief Complaint is mental health problem. The Precipitant Factors are housing problems. Previous Hospitalizations: yes  The patient has not previously been in restraints. Current Psychiatrist is Dr. Jose F Burnette and  is Kaia Stanley, both with [facility]. Patient also has a mental health skills builder Lima Alcantara with [employer]. Lethality Assessment:    The potential for suicide is noted by the following: ideation . The potential for homicide is not noted. The patient has not been a perpetrator of sexual or physical abuse. There are not pending charges. The patient is not felt to be at risk for self harm or harm to others. The attending nurse was advised that security has not been notified. Section III - Psychosocial  The patient's overall mood and attitude is withdrawn. Feelings of helplessness and hopelessness are not observed. Generalized anxiety is not observed. Panic is not observed. Phobias are not observed. Obsessive compulsive tendencies are not observed. Section IV - Mental Status Exam  The patient's appearance is unkempt. The patient's behavior shows no evidence of impairment. The patient is oriented to time, place, person and situation. The patient's speech is soft. The patient's mood is withdrawn. The range of affect is constricted. The patient's thought content demonstrates no evidence of impairment. The thought process shows no evidence of impairment. The patient's perception shows no evidence of impairment. The patient's memory shows no evidence of impairment. The patient's appetite shows no evidence of impairment. The patient's sleep shows no evidence of impairment. The patient's insight is blaming.   The patient's judgement shows no evidence of impairment. Section V - Substance Abuse  The patient is not using substances. The patient does have an extensive history of abusing substances, including alcohol, cocaine, and marijuana. Section VI - Living Arrangements  The patient is single. The patient lives alone. The patient has one child age adult. The patient does plan to return home upon discharge. The patient does not have legal issues pending. The patient's source of income comes from disability. Sikhism and cultural practices have not been voiced at this time. The patient's greatest support comes from her mental health skills builder and this person will be involved with the treatment. The patient has not been in an event described as horrible or outside the realm of ordinary life experience either currently or in the past.  The patient has not been a victim of sexual/physical abuse. Section VII - Other Areas of Clinical Concern  The highest grade achieved is unknown with the overall quality of school experience being described as unknown. The patient is currently disabled and speaks [place] as a primary language. The patient has no communication impairments affecting communication. The patient's preference for learning can be described as: can read and write adequately.   The patient's hearing is normal.  The patient's vision is normal.      Jessica Davis

## 2018-03-27 NOTE — DISCHARGE INSTRUCTIONS
Schizophrenia: Care Instructions  Your Care Instructions    Schizophrenia is a disease that makes it hard to think clearly, manage emotions, and interact with other people. It can cause:  · Delusions. These are beliefs that are not real.  · Hallucinations. These are things that you may see or hear that are not really there. · Paranoia. This is the belief that others are lying, cheating, using you, or trying to harm you. The disease may change your ability to enjoy life, express emotions, or function. At times, you may hear voices, behave strangely, have trouble speaking or understanding speech, or keep to yourself. The goal of treatment is to lower your stress and help your brain function normally. You may need lifelong treatment with medicines and counseling to keep your schizophrenia under control. When schizophrenia is not treated, the risks are higher for suicide, a hospital stay, and other problems. Early treatment called coordinated specialty care [facility]) may help a person who is having his or her first episode of psychotic thoughts. Ask your doctor about [address]. Follow-up care is a key part of your treatment and safety. Be sure to make and go to all appointments, and call your doctor if you are having problems. It's also a good idea to know your test results and keep a list of the medicines you take. How can you care for yourself at home? · Be safe with medicines. Take your medicines exactly as prescribed. Call your doctor if you think you are having a problem with your medicine. When you feel good, you may think that you do not need your medicines. But it is important to keep taking them as scheduled. · Go to your counseling sessions. Call and talk with your counselor if you can't attend or if you don't think the sessions are helping. Do not just stop going. · Eat a healthy diet. Talk with a dietitian about what type of diet may be best for you. · Do not use alcohol or illegal drugs.   · Keep the numbers for these national suicide hotlines: 4-378-529-TALK (2-234.440.1231) and 2-505-TINTCJM (0-717.584.8452). If you or someone you know talks about suicide or feeling hopeless, get help right away. What should you do if someone in your family has schizophrenia? · Learn about the disease and how it may get worse over time. · Remind your family member that you love him or her. · Make a plan with all family members about how to take care of your loved one when his or her symptoms are bad. · Talk about your fears and concerns and those of other family members. Seek counseling if needed. · Do not focus attention only on the person who is in treatment. · Remind yourself that it will take time for changes to occur. · Do not blame yourself for the disease. · Know your legal rights and the legal rights of your family member. · Take care of yourself. Stay involved with your own interests, such as your career, hobbies, and friends. Use exercise, positive self-talk, relaxation, and deep breathing to help lower your stress. · Give yourself time to grieve. You may need to deal with emotions such as anger, fear, and frustration. After you work through your feelings, you will be better able to care for yourself and your family. · If you are having a hard time with your feelings and your interactions with your family member, talk with a counselor. When should you call for help? Call 911 anytime you think you may need emergency care. For example, call if:  ? · You are thinking about suicide or are threatening suicide. ? · You feel like hurting yourself or someone else. ?Call your doctor now or seek immediate medical care if:  ? · You hear voices. ? · You think someone is trying to harm you. ? · You cannot concentrate or are easily confused. ? Watch closely for changes in your health, and be sure to contact your doctor if:  ? · You are having trouble taking care of yourself.    ? · You cannot attend your counseling sessions. Where can you learn more? Go to http://babatunde-dejah.info/. Enter G991 in the search box to learn more about \"Schizophrenia: Care Instructions. \"  Current as of: May 12, 2017  Content Version: 11.4  © 8428-1213 Healthwise, [employer]. Care instructions adapted under license by [employer] (which disclaims liability or warranty for this information). If you have questions about a medical condition or this instruction, always ask your healthcare professional. [place] [address] any warranty or liability for your use of this information.

## 2018-03-27 NOTE — ED TRIAGE NOTES
Pt sent for [facility] eval due to non compliance to medications. Information provided from owner of group home. Unable to return until cleared. Seen yesterday for same. Signed out AMA.
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
## 2018-03-27 NOTE — DISCHARGE INSTRUCTIONS
Schizophrenia: Care Instructions  Your Care Instructions    Schizophrenia is a disease that makes it hard to think clearly, manage emotions, and interact with other people. It can cause:  · Delusions. These are beliefs that are not real.  · Hallucinations. These are things that you may see or hear that are not really there. · Paranoia. This is the belief that others are lying, cheating, using you, or trying to harm you. The disease may change your ability to enjoy life, express emotions, or function. At times, you may hear voices, behave strangely, have trouble speaking or understanding speech, or keep to yourself. The goal of treatment is to lower your stress and help your brain function normally. You may need lifelong treatment with medicines and counseling to keep your schizophrenia under control. When schizophrenia is not treated, the risks are higher for suicide, a hospital stay, and other problems. Early treatment called coordinated specialty care [facility]) may help a person who is having his or her first episode of psychotic thoughts. Ask your doctor about [address]. Follow-up care is a key part of your treatment and safety. Be sure to make and go to all appointments, and call your doctor if you are having problems. It's also a good idea to know your test results and keep a list of the medicines you take. How can you care for yourself at home? · Be safe with medicines. Take your medicines exactly as prescribed. Call your doctor if you think you are having a problem with your medicine. When you feel good, you may think that you do not need your medicines. But it is important to keep taking them as scheduled. · Go to your counseling sessions. Call and talk with your counselor if you can't attend or if you don't think the sessions are helping. Do not just stop going. · Eat a healthy diet. Talk with a dietitian about what type of diet may be best for you. · Do not use alcohol or illegal drugs.   · Keep the numbers for these national suicide hotlines: 3-090-239-TALK (3-208.225.8022) and 1-859-PKEFCOS (7-176.629.5317). If you or someone you know talks about suicide or feeling hopeless, get help right away. What should you do if someone in your family has schizophrenia? · Learn about the disease and how it may get worse over time. · Remind your family member that you love him or her. · Make a plan with all family members about how to take care of your loved one when his or her symptoms are bad. · Talk about your fears and concerns and those of other family members. Seek counseling if needed. · Do not focus attention only on the person who is in treatment. · Remind yourself that it will take time for changes to occur. · Do not blame yourself for the disease. · Know your legal rights and the legal rights of your family member. · Take care of yourself. Stay involved with your own interests, such as your career, hobbies, and friends. Use exercise, positive self-talk, relaxation, and deep breathing to help lower your stress. · Give yourself time to grieve. You may need to deal with emotions such as anger, fear, and frustration. After you work through your feelings, you will be better able to care for yourself and your family. · If you are having a hard time with your feelings and your interactions with your family member, talk with a counselor. When should you call for help? Call 911 anytime you think you may need emergency care. For example, call if:  ? · You are thinking about suicide or are threatening suicide. ? · You feel like hurting yourself or someone else. ?Call your doctor now or seek immediate medical care if:  ? · You hear voices. ? · You think someone is trying to harm you. ? · You cannot concentrate or are easily confused. ? Watch closely for changes in your health, and be sure to contact your doctor if:  ? · You are having trouble taking care of yourself.    ? · You cannot attend your counseling sessions. Where can you learn more? Go to http://babatunde-dejah.info/. Enter O478 in the search box to learn more about \"Schizophrenia: Care Instructions. \"  Current as of: May 12, 2017  Content Version: 11.4  © 9398-7791 Healthwise, Inc[employer]. Care instructions adapted under license by [employer] (which disclaims liability or warranty for this information). If you have questions about a medical condition or this instruction, always ask your healthcare professional. [address] any warranty or liability for your use of this information.

## 2018-03-27 NOTE — ED PROVIDER NOTES
EMERGENCY DEPARTMENT HISTORY AND PHYSICAL EXAM    Date: 3/27/2018  Patient Name: Jose Christopher    History of Presenting Illness     Chief Complaint   Patient presents with   [address] Problem         History Provided By: Patient and group home (via phone-Mr Ramos)    HPI: Jose Christopher is a 45 y.o. female with a PMH of depression, schizophrenia, bipolar, DM who presents with request for medications. Pt states she has been out of her psych meds \"for awhile\"  Pt seen here yesterday for the same and evaluated by [facility]. It was determined pt has appt with psychiatrist tomorrow to get medications. Pt denies SI/HI currently and has no other complaints at this time. Per her group home,  - he was under the impression that pt was admitted yesterday and left AMA. He states pt has been verbally aggressive to other members in the home and having auditory hallucinations. He also reports he just found out pt has not been taking meds as prescribed as pt is allowed to take her meds by herself. PCP: Nela Wong MD    Current Outpatient Prescriptions   Medication Sig Dispense Refill    glipiZIDE (GLUCOTROL) 10 mg tablet Take 10 mg by mouth daily. Indications: type 2 diabetes mellitus      sertraline (ZOLOFT) 50 mg tablet Take 50 mg by mouth daily. Indications: major depressive disorder      QUEtiapine (SEROQUEL) 200 mg tablet Take 200 mg by mouth nightly. Indications: DEPRESSION TREATMENT ADJUNCT      traZODone (DESYREL) 100 mg tablet Take 200 mg by mouth nightly.  Indications: INSOMNIA         Past History     Past Medical History:  Past Medical History:   Diagnosis Date    Arthritis     legs    Bipolar 1 disorder ([facility] [address]     COPD     Depression 1/13/2012    Diabetes ([facility]     Drug abuse     cocaine, stimulants, etoh, mj, tob    H/O suicide attempt     Obesity     Polydrug dependence excluding opioid type drug, abuse ([facility] [address] [facility]     Schizophrenia ([facility] [address] 7/8/2016       Past Surgical History:  Past Surgical History:   Procedure Laterality Date    HX ORTHOPAEDIC      foot sx       Family History:  Family History   Problem Relation Age of Onset    Diabetes Mother     Stroke Mother     Stroke Father        Social History:  Social History   Substance Use Topics    Smoking status: Current Every Day Smoker     Packs/day: 1.50     Years: 10.00     Types: Cigarettes    Smokeless tobacco: Current User    Alcohol use No      Comment: Hx of ETOH abuse since age 13. no DUIs. Allergies: Allergies   Allergen Reactions    Amoxicillin Hives    Mushroom Flavor Hives    Tomato Hives         Review of Systems   Review of Systems   Respiratory: Negative for shortness of breath. Musculoskeletal: Negative for gait problem. Neurological: Negative for speech difficulty and weakness. Psychiatric/Behavioral: Negative for self-injury and suicidal ideas. All other systems reviewed and are negative. Physical Exam     Vitals:    03/27/18 1311 03/27/18 1314   BP:  160/81   Pulse: (!) 105    Resp: 18    Temp: 97.9 °F (36.6 °C)    SpO2: 98%    Weight: 103.6 kg (228 lb 6.3 oz)    Height: 5' 1\" (1.549 m)      Physical Exam   Constitutional: She is oriented to person, place, and time. She appears well-developed and well-nourished. No distress. HENT:   Head: Normocephalic and atraumatic. Eyes: Conjunctivae are normal.   Neck: Normal range of motion. Cardiovascular: Normal rate, regular rhythm and normal heart sounds. Pulmonary/Chest: Effort normal and breath sounds normal. No respiratory distress. She has no wheezes. She has no rales. Neurological: She is alert and oriented to person, place, and time. Gait normal. GCS eye subscore is 4. GCS verbal subscore is 5. GCS motor subscore is 6. Skin: Skin is warm and dry. Psychiatric: She is not actively hallucinating. She expresses no homicidal and no suicidal ideation.    +flat affect, speech is jerri   Nursing note and vitals reviewed. at 5:13 PM      Diagnostic Study Results     Labs -   No results found for this or any previous visit (from the past 12 hour(s)). Radiologic Studies -   No orders to display     CT Results  (Last 48 hours)    None        CXR Results  (Last 48 hours)    None            Medical Decision Making   I am the first provider for this patient. I reviewed the vital signs, available nursing notes, past medical history, past surgical history, family history and social history. Vital Signs-Reviewed the patient's vital signs. ED Course:    2:14 PM    I spoke with Capo Ruiz, Consult for [facility]. Discussed available diagnostic tests and clinical findings. She also spoke with pt's  at the group home and agreement made between all parties that pt to be discharged back to group home and she will keep her appt with psychiatrist tomorrow for medications. Marty Marte PA-C    Disposition:  Discharged    DISCHARGE NOTE:   2:50 PM      Care plan outlined and precautions discussed. Patient has no new complaints, changes, or physical findings. All of pt's questions and concerns were addressed. Patient was instructed and agrees to follow up with psychiatrist tomorrow, as well as to return to the ED upon further deterioration. Patient is ready to go home. Follow-up Information     Follow up With Details Comments [address] [facility], [address] [address] [phone]      Your psychiatrist  On 3/28/2018 as scheduled for your medicines           Discharge Medication List as of 3/27/2018  2:51 PM      CONTINUE these medications which have NOT CHANGED    Details   glipiZIDE (GLUCOTROL) 10 mg tablet Take 10 mg by mouth daily. Indications: type 2 diabetes mellitus, Historical Med      sertraline (ZOLOFT) 50 mg tablet Take 50 mg by mouth daily.  Indications: major depressive disorder, Historical Med      QUEtiapine (SEROQUEL) 200 mg tablet Take 200 mg by mouth nightly. Indications: DEPRESSION TREATMENT ADJUNCT, Historical Med      traZODone (DESYREL) 100 mg tablet Take 200 mg by mouth nightly. Indications: INSOMNIA, Historical Med             Provider Notes (Medical Decision Making):   DDX: schizoaffective, manipulative disorder, medication refill v medication change    Procedures        Diagnosis     Clinical Impression:   1.  Schizoaffective disorder, unspecified type ([facility] [address]

## 2018-03-27 NOTE — ED NOTES
Patient given copy of dc instructions. Patient verbalized understanding of instructions. Patient given a current medication reconciliation form and verbalized understanding of their medications. Patient verbalized understanding of the importance of discussing medications with  his or her physician or clinic when they follow up. Patient alert and oriented and in no acute distress. Pt verbalizes pain scale of 8 out of 10. Patient discharged home without assistance. Wheelchair was declined.

## 2018-03-27 NOTE — ED NOTES
Pt ambulatory in ER ,sts\"I need my psyche med,BP med,I told yesterday the roque about my med but nobody give my meds,pt not giving the name of person who she talk with,pt sitting quietly in chair,sts\"no change from yesterday,I feeling fine. \"Charge nurse made aware. Emergency Department Nursing Plan of Care       The Nursing Plan of Care is developed from the Nursing assessment and Emergency Department Attending provider initial evaluation. The plan of care may be reviewed in the ED Provider note.     The Plan of Care was developed with the following considerations:   Patient / Family readiness to learn indicated by:verbalized understanding  Persons(s) to be included in education: patient  Barriers to Learning/Limitations:No    Signed     Luis Tomlinson, RN    3/27/2018   1:46 PM

## 2018-03-30 ENCOUNTER — HOSPITAL ENCOUNTER (EMERGENCY)
Age: 53
Discharge: HOME | End: 2018-03-30
Attending: EMERGENCY MEDICINE
Payer: MEDICAID

## 2018-03-30 VITALS
HEART RATE: 68 BPM | OXYGEN SATURATION: 99 % | SYSTOLIC BLOOD PRESSURE: 139 MMHG | RESPIRATION RATE: 18 BRPM | TEMPERATURE: 97.7 F | DIASTOLIC BLOOD PRESSURE: 84 MMHG

## 2018-03-30 DIAGNOSIS — F99 MENTAL HEALTH DISORDER: Primary | ICD-10-CM

## 2018-03-30 PROCEDURE — 90791 PSYCH DIAGNOSTIC EVALUATION: CPT

## 2018-03-30 PROCEDURE — 99284 EMERGENCY DEPT VISIT MOD MDM: CPT

## 2018-03-30 NOTE — ED TRIAGE NOTES
Patient brought in by EMS for suicidal thoughts from [facility] where she reported she was suicidal. She says she is staying in a house in [facility] and does not want to be there because there are too many black people.

## 2018-03-30 NOTE — DISCHARGE INSTRUCTIONS
We hope that we have addressed all of your medical concerns. The examination and treatment you received in the Emergency Department were for an emergent problem and were not intended as complete care. It is important that you follow up with your healthcare provider(s) for ongoing care. If your symptoms worsen or do not improve as expected, and you are unable to reach your usual health care provider(s), you should return to the Emergency Department. Today's healthcare is undergoing tremendous change, and patient satisfaction surveys are one of the many tools to assess the quality of medical care. You may receive a survey from the [employer] organization regarding your experience in the Emergency Department. I hope that your experience has been completely positive, particularly the medical care that I provided. As such, please participate in the survey; anything less than excellent does not meet my expectations or intentions. [facility] and [facility] participate in nationally recognized quality of care measures. If your blood pressure is greater than 120/80, as reported below, we urge that you seek medical care to address the potential of high blood pressure, commonly known as hypertension. Hypertension can be hereditary or can be caused by certain medical conditions, pain, stress, or \"white coat syndrome. \"       Please make an appointment with your health care provider(s) for follow up of your Emergency Department visit. VITALS:   No data found. Thank you for allowing us to provide you with medical care today. We realize that you have many choices for your emergency care needs. Please choose us in the future for any continued health care needs. Andry Delacruz, [address]: [phone]            No results found for this or any previous visit (from the past 24 hour(s)). No results found.

## 2018-03-30 NOTE — ED NOTES
Patient transferred to [facility] to wait for ride. Keisha Holcomb RN updated on patients status.

## 2018-03-30 NOTE — PROGRESS NOTES
CM met with patient in the ER to try to get the name of the home, address, and phone number to where she is staying. Patient was unwilling/unknowing of information. I tried to call her Health Decision Maker Mindy Bartht [phone] this is an incorrect number. I also, called patients agent, Leno Caldwell [phone]. She was not available but I did leave a voice message stating that patient had told me I could call her because she and Donna Gillis were working on new placement. Unknown if that is true. Requested call back to [phone]. Call made to MooBella RN in ER to say what I had done and that it was okay to send patient back to where she came from because here payee would have to make those decisions for placement. Maria Luisa Green RN CRM    Return call from Ms. Benji Golden. She states that the patient came here today [facility] and she has been having issues with the patient leaving the places she is staying, to being on the street over night. Patient has just started being incontinent of urine and stool since December. Patient is inconsistent in her stories to her agents and staff at the hospital. Her agent was not even aware that patient had been here 3/26/18. They had reported her missing. She has a  but, will only go home for short periods of time and then return to group homes. It is making it difficult to place  Patient because she skips around so much that the payee cannot get the checks sent to the appropriate home due to paperwork for social security being held up in mail. Ms. Benji Golden is coming to provide transportation back to her facility at this time.
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
## 2018-03-30 NOTE — ED NOTES
Assumed care of patient. Verbal and bedside report received from [address]. Patient resting quietly on stretcher, visitor at bedside. Pt appears in no acute distress, respirations equal and unlabored. VS WNL. Call bell within reach.

## 2018-05-09 ENCOUNTER — HOSPITAL ENCOUNTER (INPATIENT)
Age: 53
LOS: 1 days | Discharge: HOME | DRG: 754 | End: 2018-05-11
Attending: EMERGENCY MEDICINE | Admitting: PSYCHIATRY & NEUROLOGY
Payer: MEDICAID

## 2018-05-09 DIAGNOSIS — R45.851 SUICIDE IDEATION: Primary | ICD-10-CM

## 2018-05-09 DIAGNOSIS — Z91.14 H/O MEDICATION NONCOMPLIANCE: ICD-10-CM

## 2018-05-09 DIAGNOSIS — F20.9 SCHIZOPHRENIA, UNSPECIFIED TYPE (HCC): ICD-10-CM

## 2018-05-09 LAB
BASOPHILS # BLD: 0.1 K/UL (ref 0–0.1)
BASOPHILS NFR BLD: 1 % (ref 0–1)
DIFFERENTIAL METHOD BLD: ABNORMAL
EOSINOPHIL # BLD: 0.3 K/UL (ref 0–0.4)
EOSINOPHIL NFR BLD: 4 % (ref 0–7)
ERYTHROCYTE [DISTWIDTH] IN BLOOD BY AUTOMATED COUNT: 14.5 % (ref 11.5–14.5)
GLUCOSE BLD STRIP.AUTO-MCNC: 205 MG/DL (ref 65–100)
HCT VFR BLD AUTO: 35.6 % (ref 35–47)
HGB BLD-MCNC: 11.4 G/DL (ref 11.5–16)
IMM GRANULOCYTES # BLD: 0 K/UL (ref 0–0.04)
IMM GRANULOCYTES NFR BLD AUTO: 0 % (ref 0–0.5)
LYMPHOCYTES # BLD: 2.7 K/UL (ref 0.8–3.5)
LYMPHOCYTES NFR BLD: 33 % (ref 12–49)
MCH RBC QN AUTO: 27 PG (ref 26–34)
MCHC RBC AUTO-ENTMCNC: 32 G/DL (ref 30–36.5)
MCV RBC AUTO: 84.2 FL (ref 80–99)
MONOCYTES # BLD: 0.4 K/UL (ref 0–1)
MONOCYTES NFR BLD: 5 % (ref 5–13)
NEUTS SEG # BLD: 4.8 K/UL (ref 1.8–8)
NEUTS SEG NFR BLD: 58 % (ref 32–75)
NRBC # BLD: 0 K/UL (ref 0–0.01)
NRBC BLD-RTO: 0 PER 100 WBC
PLATELET # BLD AUTO: 376 K/UL (ref 150–400)
PMV BLD AUTO: 8.8 FL (ref 8.9–12.9)
RBC # BLD AUTO: 4.23 M/UL (ref 3.8–5.2)
SERVICE CMNT-IMP: ABNORMAL
WBC # BLD AUTO: 8.3 K/UL (ref 3.6–11)

## 2018-05-09 PROCEDURE — 80053 COMPREHEN METABOLIC PANEL: CPT | Performed by: EMERGENCY MEDICINE

## 2018-05-09 PROCEDURE — 81001 URINALYSIS AUTO W/SCOPE: CPT | Performed by: EMERGENCY MEDICINE

## 2018-05-09 PROCEDURE — 93005 ELECTROCARDIOGRAM TRACING: CPT

## 2018-05-09 PROCEDURE — 80307 DRUG TEST PRSMV CHEM ANLYZR: CPT | Performed by: EMERGENCY MEDICINE

## 2018-05-09 PROCEDURE — 85025 COMPLETE CBC W/AUTO DIFF WBC: CPT | Performed by: EMERGENCY MEDICINE

## 2018-05-09 PROCEDURE — 87086 URINE CULTURE/COLONY COUNT: CPT | Performed by: EMERGENCY MEDICINE

## 2018-05-09 PROCEDURE — 36415 COLL VENOUS BLD VENIPUNCTURE: CPT | Performed by: EMERGENCY MEDICINE

## 2018-05-09 PROCEDURE — 82962 GLUCOSE BLOOD TEST: CPT

## 2018-05-09 PROCEDURE — 99284 EMERGENCY DEPT VISIT MOD MDM: CPT

## 2018-05-09 NOTE — IP AVS SNAPSHOT
[facility] 
 
 
 [address] Patient: Kathrine Llamas MRN: [id number] :1965 About your hospitalization You were admitted on:  May 10, 2018 You last received care in the:  [facility]. 291 You were discharged on:  May 11, 2018 Why you were hospitalized Your primary diagnosis was:  Not on File Your diagnoses also included: Major Depression Follow-up Information Follow up With Details Comments Contact Info Trent Galeana MD Go on 2018 Medical appointment on Thursday, May 17th at 2:00 PM with Mario Newton NP. Fax: [phone] [address] 
[phone] [facility] on 2018 Case management appointment with Joseph Austin on Monday, May 14th at 10:00 Shell Keny will inform you of your psychiatry appointment details at this meeting. Gurdeep 27 Discharge Orders None A check vanessa indicates which time of day the medication should be taken. My Medications CONTINUE taking these medications Instructions Each Dose to Equal  
 Morning Noon Evening Bedtime  
 aspirin delayed-release 81 mg tablet Your last dose was: Your next dose is: Take 81 mg by mouth daily. 81 mg  
    
   
   
   
  
 cetirizine 10 mg tablet Commonly known as:  ZYRTEC Your last dose was: Your next dose is: Take 10 mg by mouth daily. Indications: Allergic Rhinitis 10 mg  
    
   
   
   
  
 famotidine 20 mg tablet Commonly known as:  PEPCID Your last dose was: Your next dose is: Take 20 mg by mouth daily. Indications: Heartburn  
 20 mg  
    
   
   
   
  
 glipiZIDE 10 mg tablet Commonly known as:  Flavio Rothman Your last dose was: Your next dose is:     
   
   
 Take 10 mg by mouth two (2) times a day. Indications: type 2 diabetes mellitus 10 mg  
    
   
   
   
  
 lisinopril 2.5 mg tablet Commonly known as:  Dorathy Massed Your last dose was: Your next dose is: Take 2.5 mg by mouth daily. Indications: hypertension 2.5 mg  
    
   
   
   
  
 lovastatin 20 mg tablet Commonly known as:  MEVACOR Your last dose was: Your next dose is: Take 20 mg by mouth nightly. Indications: hyperlipidemia 20 mg VENTOLIN HFA 90 mcg/actuation inhaler Generic drug:  albuterol Your last dose was: Your next dose is: Take 2 Puffs by inhalation every six (6) hours as needed for Wheezing. 2 Puff Discharge Instructions DISCHARGE SUMMARY from Nurse PATIENT INSTRUCTIONS: 
 
After general anesthesia or intravenous sedation, for 24 hours or while taking prescription Narcotics: · Limit your activities · Do not drive and operate hazardous machinery · Do not make important personal or business decisions · Do  not drink alcoholic beverages · If you have not urinated within 8 hours after discharge, please contact your surgeon on call. Report the following to your surgeon: 
· Excessive pain, swelling, redness or odor of or around the surgical area · Temperature over 100.5 · Nausea and vomiting lasting longer than 4 hours or if unable to take medications · Any signs of decreased circulation or nerve impairment to extremity: change in color, persistent  numbness, tingling, coldness or increase pain · Any questions *  Please give a list of your current medications to your Primary Care Provider. *  Please update this list whenever your medications are discontinued, doses are 
    changed, or new medications (including over-the-counter products) are added. *  Please carry medication information at all times in case of emergency situations.  
 
These are general instructions for a healthy lifestyle: No smoking/ No tobacco products/ Avoid exposure to second hand smoke Surgeon General's Warning:  Quitting smoking now greatly reduces serious risk to your health. Obesity, smoking, and sedentary lifestyle greatly increases your risk for illness A healthy diet, regular physical exercise & weight monitoring are important for maintaining a healthy lifestyle You may be retaining fluid if you have a history of heart failure or if you experience any of the following symptoms:  Weight gain of 3 pounds or more overnight or 5 pounds in a week, increased swelling in our hands or feet or shortness of breath while lying flat in bed. Please call your doctor as soon as you notice any of these symptoms; do not wait until your next office visit. Recognize signs and symptoms of STROKE: 
 
F-face looks uneven A-arms unable to move or move unevenly S-speech slurred or non-existent T-time-call 911 as soon as signs and symptoms begin-DO NOT go Back to bed or wait to see if you get better-TIME IS BRAIN. Warning Signs of HEART ATTACK Call 911 if you have these symptoms:  Chest discomfort. Most heart attacks involve discomfort in the center of the chest that lasts more than a few minutes, or that goes away and comes back. It can feel like uncomfortable pressure, squeezing, fullness, or pain.  Discomfort in other areas of the upper body. Symptoms can include pain or discomfort in one or both arms, the back, neck, jaw, or stomach.  Shortness of breath with or without chest discomfort.  Other signs may include breaking out in a cold sweat, nausea, or lightheadedness. Don't wait more than five minutes to call [address]! Fast action can save your life. Calling 911 is almost always the fastest way to get lifesaving treatment.  Emergency Medical Services staff can begin treatment when they arrive  up to an hour sooner than if someone gets to the hospital by car. If I feel that I can not keep these promises and I am at risk of hurting myself or others, I will call the crisis office and speak with a crisis worker who will assist me during my crisis. [address] [place] [address]                [phone] [address] [place] [address],[facility] [address] [address]                      [phone] [facility]           [phone] [facility]            [phone] The discharge information has been reviewed with the patient. The patient verbalized understanding. Discharge medications reviewed with the patient and appropriate educational materials and side effects teaching were provided. ___________________________________________________________________________________________________________________________________ DooBophart Announcement We are excited to announce that we are making your provider's discharge notes available to you in Nubank. You will see these notes when they are completed and signed by the physician that discharged you from your recent hospital stay. If you have any questions or concerns about any information you see in Nubank, please call the Health Information Department where you were seen or reach out to your Primary Care Provider for more information about your plan of care. Introducing [facility]! Abelino Julio introduces Nubank patient portal. Now you can access parts of your medical record, email your doctor's office, and request medication refills online. 1. In your internet browser, go to [email or web address] 2. Click on the First Time User? Click Here link in the Sign In box. You will see the New Member Sign Up page. 3. Enter your Nubank Access Code exactly as it appears below. You will not need to use this code after youve completed the sign-up process.  If you do not sign up before the expiration date, you must request a new code. · Troodon Access Code: [email or web address] Expires: 6/24/2018  8:52 PM 
 
4. Enter the last four digits of your Social Security Number (xxxx) and Date of Birth (mm/dd/yyyy) as indicated and click Submit. You will be taken to the next sign-up page. 5. Create a Troodon ID. This will be your Troodon login ID and cannot be changed, so think of one that is secure and easy to remember. 6. Create a Troodon password. You can change your password at any time. 7. Enter your Password Reset Question and Answer. This can be used at a later time if you forget your password. 8. Enter your e-mail address. You will receive e-mail notification when new information is available in [address] 9. Click Sign Up. You can now view and download portions of your medical record. 10. Click the Download Summary menu link to download a portable copy of your medical information. If you have questions, please visit the Frequently Asked Questions section of the Troodon website. Remember, Troodon is NOT to be used for urgent needs. For medical emergencies, dial 911. Now available from your iPhone and Android! Introducing Inocencio Cruz As a Emiliana William patient, I wanted to make you aware of our electronic visit tool called Inocencio Cruz. Emiliana William 24/7 allows you to connect within minutes with a medical provider 24 hours a day, seven days a week via a mobile device or tablet or logging into a secure website from your computer. You can access Inocencio Titusfin from anywhere in the [place]. A virtual visit might be right for you when you have a simple condition and feel like you just dont want to get out of bed, or cant get away from work for an appointment, when your regular Emiliana Moralesist provider is not available (evenings, weekends or holidays), or when youre out of town and need minor care.   Electronic visits cost only $49 and if the Inocencio Cruz provider determines a prescription is needed to treat your condition, one can be electronically transmitted to a nearby pharmacy*. Please take a moment to enroll today if you have not already done so. The enrollment process is free and takes just a few minutes. To enroll, please download the [employer] 24/7 shawn to your tablet or phone, or visit [email or web address] to enroll on your computer. And, as an [address] [place] [address] patient with a [employer] account, the results of your visits will be scanned into your electronic medical record and your primary care provider will be able to view the scanned results. We urge you to continue to see your regular [employer] provider for your ongoing medical care. And while your primary care provider may not be the one available when you seek a [employer] virtual visit, the peace of mind you get from getting a real diagnosis real time can be priceless. For more information on [employer], view our Frequently Asked Questions (FAQs) at [email or web address]. Sincerely, 
 
Ja Vargas MD 
Chief Medical Officer [facility] Rakel Boyle *:  certain medications cannot be prescribed via [employer] Providers Seen During Your Hospitalization Provider Specialty Primary office phone Kaley Eli MD Emergency Medicine [phone] David Jung MD Psychiatry [phone] Bonnie Kim MD Psychiatry [phone] Your Primary Care Physician (PCP) Primary Care Physician Office Phone Office Fax Jose To [phone] You are allergic to the following Allergen Reactions Amoxicillin Hives Mushroom Flavor Hives Tomato Hives Recent Documentation Height Weight Breastfeeding? BMI OB Status Smoking Status 1.549 m 104.3 kg No 43.46 kg/m2 Menopause Current Every Day Smoker Emergency Contacts Name Discharge Info Relation Home Work Mobile  Cait ARCE CAREGIVER [4] Other Relative [6] [phone] [facility] DISCHARGE CAREGIVER [3] Healthcare Decision Maker [20] [phone] [address] CAREGIVER [3] Agent [29] [phone] Patient Belongings The following personal items are in your possession at time of discharge: 
  Dental Appliances: None  Visual Aid: None      Home Medications: None   Jewelry: None  Clothing: Shirt, Footwear, Socks    Other Valuables: Cane (bag, hygiene items, hairbrush) Please provide this summary of care documentation to your next provider. Signatures-by signing, you are acknowledging that this After Visit Summary has been reviewed with you and you have received a copy. Patient Signature:  ____________________________________________________________ Date:  ____________________________________________________________  
  
Kirt Ala Provider Signature:  ____________________________________________________________ Date:  ____________________________________________________________

## 2018-05-09 NOTE — IP AVS SNAPSHOT
[address] [facility] 
 
 
 [address] Patient: Kathrine Llamas MRN: [id number] :1965 A check vanessa indicates which time of day the medication should be taken. My Medications CONTINUE taking these medications Instructions Each Dose to Equal  
 Morning Noon Evening Bedtime  
 aspirin delayed-release 81 mg tablet Your last dose was: Your next dose is: Take 81 mg by mouth daily. 81 mg  
    
   
   
   
  
 cetirizine 10 mg tablet Commonly known as:  ZYRTEC Your last dose was: Your next dose is: Take 10 mg by mouth daily. Indications: Allergic Rhinitis 10 mg  
    
   
   
   
  
 famotidine 20 mg tablet Commonly known as:  PEPCID Your last dose was: Your next dose is: Take 20 mg by mouth daily. Indications: Heartburn  
 20 mg  
    
   
   
   
  
 glipiZIDE 10 mg tablet Commonly known as:  Flavio Rothman Your last dose was: Your next dose is: Take 10 mg by mouth two (2) times a day. Indications: type 2 diabetes mellitus 10 mg  
    
   
   
   
  
 lisinopril 2.5 mg tablet Commonly known as:  Tildon Rigoberto Your last dose was: Your next dose is: Take 2.5 mg by mouth daily. Indications: hypertension 2.5 mg  
    
   
   
   
  
 lovastatin 20 mg tablet Commonly known as:  MEVACOR Your last dose was: Your next dose is: Take 20 mg by mouth nightly. Indications: hyperlipidemia 20 mg VENTOLIN HFA 90 mcg/actuation inhaler Generic drug:  albuterol Your last dose was: Your next dose is: Take 2 Puffs by inhalation every six (6) hours as needed for Wheezing. 2 Puff

## 2018-05-10 PROBLEM — F32.9 MAJOR DEPRESSION: Status: ACTIVE | Noted: 2018-05-10

## 2018-05-10 LAB
ALBUMIN SERPL-MCNC: 2.9 G/DL (ref 3.5–5)
ALBUMIN/GLOB SERPL: 0.7 {RATIO} (ref 1.1–2.2)
ALP SERPL-CCNC: 125 U/L (ref 45–117)
ALT SERPL-CCNC: 18 U/L (ref 12–78)
AMPHET UR QL SCN: NEGATIVE
ANION GAP SERPL CALC-SCNC: 9 MMOL/L (ref 5–15)
APAP SERPL-MCNC: <2 UG/ML (ref 10–30)
APPEARANCE UR: ABNORMAL
AST SERPL-CCNC: 12 U/L (ref 15–37)
BACTERIA URNS QL MICRO: ABNORMAL /HPF
BARBITURATES UR QL SCN: NEGATIVE
BENZODIAZ UR QL: NEGATIVE
BILIRUB SERPL-MCNC: 0.2 MG/DL (ref 0.2–1)
BILIRUB UR QL: NEGATIVE
BUN SERPL-MCNC: 12 MG/DL (ref 6–20)
BUN/CREAT SERPL: 14 (ref 12–20)
CALCIUM SERPL-MCNC: 8.8 MG/DL (ref 8.5–10.1)
CANNABINOIDS UR QL SCN: NEGATIVE
CHLORIDE SERPL-SCNC: 103 MMOL/L (ref 97–108)
CO2 SERPL-SCNC: 27 MMOL/L (ref 21–32)
COCAINE UR QL SCN: NEGATIVE
COLOR UR: ABNORMAL
CREAT SERPL-MCNC: 0.84 MG/DL (ref 0.55–1.02)
DRUG SCRN COMMENT,DRGCM: NORMAL
EPITH CASTS URNS QL MICRO: ABNORMAL /LPF
ETHANOL SERPL-MCNC: <10 MG/DL
GLOBULIN SER CALC-MCNC: 3.9 G/DL (ref 2–4)
GLUCOSE BLD STRIP.AUTO-MCNC: 169 MG/DL (ref 65–100)
GLUCOSE BLD STRIP.AUTO-MCNC: 206 MG/DL (ref 65–100)
GLUCOSE BLD STRIP.AUTO-MCNC: 305 MG/DL (ref 65–100)
GLUCOSE BLD STRIP.AUTO-MCNC: 92 MG/DL (ref 65–100)
GLUCOSE SERPL-MCNC: 229 MG/DL (ref 65–100)
GLUCOSE UR STRIP.AUTO-MCNC: NEGATIVE MG/DL
HGB UR QL STRIP: NEGATIVE
KETONES UR QL STRIP.AUTO: NEGATIVE MG/DL
LEUKOCYTE ESTERASE UR QL STRIP.AUTO: ABNORMAL
METHADONE UR QL: NEGATIVE
NITRITE UR QL STRIP.AUTO: NEGATIVE
OPIATES UR QL: NEGATIVE
PCP UR QL: NEGATIVE
PH UR STRIP: 5.5 [PH] (ref 5–8)
POTASSIUM SERPL-SCNC: 3.6 MMOL/L (ref 3.5–5.1)
PROT SERPL-MCNC: 6.8 G/DL (ref 6.4–8.2)
PROT UR STRIP-MCNC: NEGATIVE MG/DL
RBC #/AREA URNS HPF: ABNORMAL /HPF (ref 0–5)
SALICYLATES SERPL-MCNC: 4.7 MG/DL (ref 2.8–20)
SERVICE CMNT-IMP: ABNORMAL
SERVICE CMNT-IMP: NORMAL
SODIUM SERPL-SCNC: 139 MMOL/L (ref 136–145)
SP GR UR REFRACTOMETRY: 1.01 (ref 1–1.03)
UA: UC IF INDICATED,UAUC: ABNORMAL
UROBILINOGEN UR QL STRIP.AUTO: 1 EU/DL (ref 0.2–1)
WBC URNS QL MICRO: ABNORMAL /HPF (ref 0–4)

## 2018-05-10 PROCEDURE — 74011636637 HC RX REV CODE- 636/637: Performed by: PSYCHIATRY & NEUROLOGY

## 2018-05-10 PROCEDURE — 65220000003 HC RM SEMIPRIVATE PSYCH

## 2018-05-10 PROCEDURE — 74011250637 HC RX REV CODE- 250/637: Performed by: PSYCHIATRY & NEUROLOGY

## 2018-05-10 PROCEDURE — 82962 GLUCOSE BLOOD TEST: CPT

## 2018-05-10 RX ORDER — BENZTROPINE MESYLATE 1 MG/ML
2 INJECTION INTRAMUSCULAR; INTRAVENOUS
Status: DISCONTINUED | OUTPATIENT
Start: 2018-05-10 | End: 2018-05-11 | Stop reason: HOSPADM

## 2018-05-10 RX ORDER — ASPIRIN 81 MG/1
81 TABLET ORAL DAILY
Status: ON HOLD | COMMUNITY
End: 2021-09-22

## 2018-05-10 RX ORDER — LORAZEPAM 2 MG/ML
2 INJECTION INTRAMUSCULAR
Status: DISCONTINUED | OUTPATIENT
Start: 2018-05-10 | End: 2018-05-11 | Stop reason: HOSPADM

## 2018-05-10 RX ORDER — GLIPIZIDE 5 MG/1
10 TABLET ORAL 2 TIMES DAILY
Status: DISCONTINUED | OUTPATIENT
Start: 2018-05-10 | End: 2018-05-11 | Stop reason: HOSPADM

## 2018-05-10 RX ORDER — CETIRIZINE HCL 10 MG
10 TABLET ORAL DAILY
COMMUNITY
End: 2018-10-05

## 2018-05-10 RX ORDER — ALBUTEROL SULFATE 90 UG/1
2 AEROSOL, METERED RESPIRATORY (INHALATION)
Status: ON HOLD | COMMUNITY
End: 2021-09-24 | Stop reason: SDUPTHER

## 2018-05-10 RX ORDER — PRAVASTATIN SODIUM 10 MG/1
20 TABLET ORAL
Status: DISCONTINUED | OUTPATIENT
Start: 2018-05-10 | End: 2018-05-11 | Stop reason: HOSPADM

## 2018-05-10 RX ORDER — ADHESIVE BANDAGE
30 BANDAGE TOPICAL DAILY PRN
Status: DISCONTINUED | OUTPATIENT
Start: 2018-05-10 | End: 2018-05-11 | Stop reason: HOSPADM

## 2018-05-10 RX ORDER — BENZTROPINE MESYLATE 2 MG/1
2 TABLET ORAL
Status: DISCONTINUED | OUTPATIENT
Start: 2018-05-10 | End: 2018-05-11 | Stop reason: HOSPADM

## 2018-05-10 RX ORDER — LORATADINE 10 MG/1
10 TABLET ORAL DAILY
Status: DISCONTINUED | OUTPATIENT
Start: 2018-05-11 | End: 2018-05-11 | Stop reason: HOSPADM

## 2018-05-10 RX ORDER — LISINOPRIL 2.5 MG/1
2.5 TABLET ORAL DAILY
Status: ON HOLD | COMMUNITY
End: 2021-09-22

## 2018-05-10 RX ORDER — LISINOPRIL 5 MG/1
2.5 TABLET ORAL DAILY
Status: DISCONTINUED | OUTPATIENT
Start: 2018-05-10 | End: 2018-05-11 | Stop reason: HOSPADM

## 2018-05-10 RX ORDER — ALBUTEROL SULFATE 90 UG/1
2 AEROSOL, METERED RESPIRATORY (INHALATION)
Status: DISCONTINUED | OUTPATIENT
Start: 2018-05-10 | End: 2018-05-11 | Stop reason: HOSPADM

## 2018-05-10 RX ORDER — ZOLPIDEM TARTRATE 10 MG/1
10 TABLET ORAL
Status: DISCONTINUED | OUTPATIENT
Start: 2018-05-10 | End: 2018-05-11 | Stop reason: HOSPADM

## 2018-05-10 RX ORDER — DEXTROSE 50 % IN WATER (D50W) INTRAVENOUS SYRINGE
12.5-25 AS NEEDED
Status: DISCONTINUED | OUTPATIENT
Start: 2018-05-10 | End: 2018-05-11 | Stop reason: HOSPADM

## 2018-05-10 RX ORDER — ASPIRIN 81 MG/1
81 TABLET ORAL DAILY
Status: DISCONTINUED | OUTPATIENT
Start: 2018-05-10 | End: 2018-05-11 | Stop reason: HOSPADM

## 2018-05-10 RX ORDER — LORAZEPAM 1 MG/1
1 TABLET ORAL
Status: DISCONTINUED | OUTPATIENT
Start: 2018-05-10 | End: 2018-05-11 | Stop reason: HOSPADM

## 2018-05-10 RX ORDER — GABAPENTIN 100 MG/1
100 CAPSULE ORAL 3 TIMES DAILY
Status: DISCONTINUED | OUTPATIENT
Start: 2018-05-11 | End: 2018-05-11 | Stop reason: HOSPADM

## 2018-05-10 RX ORDER — CETIRIZINE HCL 10 MG
10 TABLET ORAL DAILY
Status: DISCONTINUED | OUTPATIENT
Start: 2018-05-11 | End: 2018-05-10 | Stop reason: CLARIF

## 2018-05-10 RX ORDER — FAMOTIDINE 20 MG/1
20 TABLET, FILM COATED ORAL DAILY
Status: DISCONTINUED | OUTPATIENT
Start: 2018-05-10 | End: 2018-05-11 | Stop reason: HOSPADM

## 2018-05-10 RX ORDER — FAMOTIDINE 20 MG/1
20 TABLET, FILM COATED ORAL DAILY
Status: ON HOLD | COMMUNITY
End: 2021-09-22

## 2018-05-10 RX ORDER — ACETAMINOPHEN 325 MG/1
650 TABLET ORAL
Status: DISCONTINUED | OUTPATIENT
Start: 2018-05-10 | End: 2018-05-11 | Stop reason: HOSPADM

## 2018-05-10 RX ORDER — IBUPROFEN 400 MG/1
400 TABLET ORAL
Status: DISCONTINUED | OUTPATIENT
Start: 2018-05-10 | End: 2018-05-11 | Stop reason: HOSPADM

## 2018-05-10 RX ORDER — LOVASTATIN 20 MG/1
20 TABLET ORAL
Status: ON HOLD | COMMUNITY
End: 2021-09-22

## 2018-05-10 RX ORDER — CETIRIZINE HCL 10 MG
5 TABLET ORAL DAILY
Status: DISCONTINUED | OUTPATIENT
Start: 2018-05-11 | End: 2018-05-10

## 2018-05-10 RX ORDER — LORATADINE 10 MG/1
10 TABLET ORAL DAILY
Status: DISCONTINUED | OUTPATIENT
Start: 2018-05-10 | End: 2018-05-11 | Stop reason: HOSPADM

## 2018-05-10 RX ORDER — MAGNESIUM SULFATE 100 %
4 CRYSTALS MISCELLANEOUS AS NEEDED
Status: DISCONTINUED | OUTPATIENT
Start: 2018-05-10 | End: 2018-05-11 | Stop reason: HOSPADM

## 2018-05-10 RX ORDER — OLANZAPINE 5 MG/1
5 TABLET ORAL
Status: DISCONTINUED | OUTPATIENT
Start: 2018-05-10 | End: 2018-05-11 | Stop reason: HOSPADM

## 2018-05-10 RX ORDER — IBUPROFEN 200 MG
1 TABLET ORAL
Status: DISCONTINUED | OUTPATIENT
Start: 2018-05-10 | End: 2018-05-11 | Stop reason: HOSPADM

## 2018-05-10 RX ADMIN — GLIPIZIDE 10 MG: 5 TABLET ORAL at 17:11

## 2018-05-10 RX ADMIN — ZOLPIDEM TARTRATE 10 MG: 10 TABLET ORAL at 21:26

## 2018-05-10 RX ADMIN — FAMOTIDINE 20 MG: 20 TABLET, FILM COATED ORAL at 12:45

## 2018-05-10 RX ADMIN — LISINOPRIL 2.5 MG: 5 TABLET ORAL at 12:44

## 2018-05-10 RX ADMIN — PRAVASTATIN SODIUM 20 MG: 10 TABLET ORAL at 21:24

## 2018-05-10 RX ADMIN — LORATADINE 10 MG: 10 TABLET ORAL at 12:45

## 2018-05-10 RX ADMIN — HUMAN INSULIN 8 UNITS: 100 INJECTION, SOLUTION SUBCUTANEOUS at 10:38

## 2018-05-10 RX ADMIN — HUMAN INSULIN 4 UNITS: 100 INJECTION, SOLUTION SUBCUTANEOUS at 12:53

## 2018-05-10 RX ADMIN — ASPIRIN 81 MG: 81 TABLET, COATED ORAL at 12:45

## 2018-05-10 RX ADMIN — HUMAN INSULIN 2 UNITS: 100 INJECTION, SOLUTION SUBCUTANEOUS at 17:11

## 2018-05-10 NOTE — H&P
History and Physical    Subjective:     Myles Tompkins is a 45 y.o. female with past medical hx significant for DM, HTN, COPD, Arthritis, chronic pain in the lower extremity, polydrug dependence, environmantal allergies. Per documentation found in the databank, Pt admitted under a voluntary basis for depression with suicidal ideations proving to be an imminent danger to self and an inability to care for self. Pt is aWHITE OR  female with a past psychiatric history significant for depression and suicidal ideation but she said that as she does not want to go to her home and does not feel safe there as people are using drugs , who is admitted to [facility] psych singer at this time with complaints of (and/or evidence of) the following emotional symptoms: depression, suicidal thoughts/threats and anxiety. Additional symptomatology include anxiety, feeling depressed and increased irritability. The above symptoms have been present for months . These symptoms are of moderate severity. These symptoms are constant  in nature. The patient's condition has been precipitated by problems with people she lives with and psychosocial stressors (financial issues  ). Patient's condition used to made worse by continued illicit drug use and alcohol use as well as treatment noncompliance. She is not suicidal and no homicidal and no psychotic features. Pt denies any serious symptoms. She is c/o of leg pain likely related to Neuropathy or arthritis. She is taking antihypertensive. She is taking a statin. She is taking a sulfonylurea.     Past Medical History:   Diagnosis Date    Arthritis     legs    [facility] 1 disorder ([facility]     COPD     Depression 1/13/2012    Diabetes ([facility]     Drug abuse     cocaine, stimulants, etoh, mj, tob    H/O suicide attempt     Obesity     Polydrug dependence excluding opioid type drug, abuse ([facility]     Schizophrenia ([facility] 7/8/2016      Past Surgical History:   Procedure Laterality Date    HX ORTHOPAEDIC      foot sx     Family History   Problem Relation Age of Onset    Diabetes Mother     Stroke Mother     Stroke Father       Social History   Substance Use Topics    Smoking status: Current Every Day Smoker     Packs/day: 1.50     Years: 10.00     Types: Cigarettes    Smokeless tobacco: Current User    Alcohol use No      Comment: Hx of ETOH abuse since age 13. no DUIs. Prior to Admission medications    Medication Sig Start Date End Date Taking? Authorizing Provider   aspirin delayed-release 81 mg tablet Take 81 mg by mouth daily. Yes Historical Provider   albuterol (VENTOLIN HFA) 90 mcg/actuation inhaler Take 2 Puffs by inhalation every six (6) hours as needed for Wheezing. Yes Historical Provider   cetirizine (ZYRTEC) 10 mg tablet Take 10 mg by mouth daily. Indications: Allergic Rhinitis   Yes Historical Provider   famotidine (PEPCID) 20 mg tablet Take 20 mg by mouth daily. Indications: Heartburn   Yes Historical Provider   lisinopril (PRINIVIL, ZESTRIL) 2.5 mg tablet Take 2.5 mg by mouth daily. Indications: hypertension   Yes Historical Provider   lovastatin (MEVACOR) 20 mg tablet Take 20 mg by mouth nightly. Indications: hyperlipidemia   Yes Historical Provider   glipiZIDE (GLUCOTROL) 10 mg tablet Take 10 mg by mouth two (2) times a day. Indications: type 2 diabetes mellitus   Yes Historical Provider     Allergies   Allergen Reactions    Amoxicillin Hives    Mushroom Flavor Hives    Tomato Hives        Review of Systems:  Constitutional: negative  Eyes: negative  Ears, Nose, Mouth, Throat, and Face: negative  Respiratory: negative  Cardiovascular: negative  Gastrointestinal: negative  Genitourinary:negative  Integument/Breast: negative  Hematologic/Lymphatic: negative  Musculoskeletal:negative  Neurological: negative  Behavioral/Psychiatric: negative  Endocrine: negative  Allergic/Immunologic: negative     Objective:      Intake and Output:            Physical Exam:   Visit Vitals    /88 (BP 1 Location: Left arm, BP Patient Position: Sitting)    Pulse 79    Temp 97.6 °F (36.4 °C)    Resp 16    Ht 5' 1\" (1.549 m)    Wt 104.3 kg (230 lb)    SpO2 98%    Breastfeeding No    BMI 43.46 kg/m2     General:  Alert, cooperative, no distress, appears stated age. Head:  Normocephalic, without obvious abnormality, atraumatic. Eyes:  Conjunctivae/corneas clear. PERRL, EOMs intact. Ears:  Normal external ear canals both ears. Nose: Nares normal. Septum midline. Mucosa normal. No drainage or sinus tenderness. Throat: Lips, mucosa, and tongue normal. Teeth and gums normal.   Neck: Supple, symmetrical, trachea midline, no adenopathy, thyroid: no enlargement/tenderness/nodules, no carotid bruit and no JVD. Back:   Symmetric, no curvature. ROM normal. No CVA tenderness. Lungs:   Clear to auscultation bilaterally. Chest wall:  No tenderness or deformity. Heart:  Regular rate and rhythm, S1, S2 normal, no murmur, click, rub or gallop. Abdomen:   Soft, non-tender. Bowel sounds normal. No masses,  No organomegaly. Extremities: Extremities normal, atraumatic, no cyanosis or edema. Pulses: Pulses palpable distally   Skin: Skin color, texture, turgor normal. No rashes or lesions   Lymph nodes: Cervical, supraclavicular, and axillary nodes normal.   Neurologic: CNII-XII intact. Normal strength, sensation and reflexes present distally.          Data Review:   Recent Results (from the past 24 hour(s))   GLUCOSE, POC    Collection Time: 05/09/18 10:57 PM   Result Value Ref Range    Glucose (POC) 205 (H) 65 - 100 mg/dL    Performed by Richar Wong    EKG, 12 LEAD, INITIAL    Collection Time: 05/09/18 11:18 PM   Result Value Ref Range    Ventricular Rate 88 BPM    Atrial Rate 88 BPM    P-R Interval 140 ms    QRS Duration 78 ms    Q-T Interval 354 ms    QTC Calculation (Bezet) 428 ms    Calculated P Axis 8 degrees    Calculated R Axis 28 degrees    Calculated T Axis 61 degrees Diagnosis       Normal sinus rhythm  Low voltage QRS  Nonspecific T wave abnormality  Abnormal ECG  When compared with ECG of 23-JUL-2016 12:02,  No significant change was found     METABOLIC PANEL, COMPREHENSIVE    Collection Time: 05/09/18 11:27 PM   Result Value Ref Range    Sodium 139 136 - 145 mmol/L    Potassium 3.6 3.5 - 5.1 mmol/L    Chloride 103 97 - 108 mmol/L    CO2 27 21 - 32 mmol/L    Anion gap 9 5 - 15 mmol/L    Glucose 229 (H) 65 - 100 mg/dL    BUN 12 6 - 20 MG/DL    Creatinine 0.84 0.55 - 1.02 MG/DL    BUN/Creatinine ratio 14 12 - 20      GFR est AA >60 >60 ml/min/1.73m2    GFR est non-AA >60 >60 ml/min/1.73m2    Calcium 8.8 8.5 - 10.1 MG/DL    Bilirubin, total 0.2 0.2 - 1.0 MG/DL    ALT (SGPT) 18 12 - 78 U/L    AST (SGOT) 12 (L) 15 - 37 U/L    Alk. phosphatase 125 (H) 45 - 117 U/L    Protein, total 6.8 6.4 - 8.2 g/dL    Albumin 2.9 (L) 3.5 - 5.0 g/dL    Globulin 3.9 2.0 - 4.0 g/dL    A-G Ratio 0.7 (L) 1.1 - 2.2     ETHYL ALCOHOL    Collection Time: 05/09/18 11:27 PM   Result Value Ref Range    ALCOHOL(ETHYL),SERUM <10 <10 MG/DL   CBC WITH AUTOMATED DIFF    Collection Time: 05/09/18 11:27 PM   Result Value Ref Range    WBC 8.3 3.6 - 11.0 K/uL    RBC 4.23 3.80 - 5.20 M/uL    HGB 11.4 (L) 11.5 - 16.0 g/dL    HCT 35.6 35.0 - 47.0 %    MCV 84.2 80.0 - 99.0 FL    MCH 27.0 26.0 - 34.0 PG    MCHC 32.0 30.0 - 36.5 g/dL    RDW 14.5 11.5 - 14.5 %    PLATELET 744 792 - 460 K/uL    MPV 8.8 (L) 8.9 - 12.9 FL    NRBC 0.0 0  WBC    ABSOLUTE NRBC 0.00 0.00 - 0.01 K/uL    NEUTROPHILS 58 32 - 75 %    LYMPHOCYTES 33 12 - 49 %    MONOCYTES 5 5 - 13 %    EOSINOPHILS 4 0 - 7 %    BASOPHILS 1 0 - 1 %    IMMATURE GRANULOCYTES 0 0.0 - 0.5 %    ABS. NEUTROPHILS 4.8 1.8 - 8.0 K/UL    ABS. LYMPHOCYTES 2.7 0.8 - 3.5 K/UL    ABS. MONOCYTES 0.4 0.0 - 1.0 K/UL    ABS. EOSINOPHILS 0.3 0.0 - 0.4 K/UL    ABS. BASOPHILS 0.1 0.0 - 0.1 K/UL    ABS. IMM.  GRANS. 0.0 0.00 - 0.04 K/UL    DF AUTOMATED     DRUG SCREEN, URINE Collection Time: 05/09/18 11:27 PM   Result Value Ref Range    AMPHETAMINES NEGATIVE  NEG      BARBITURATES NEGATIVE  NEG      BENZODIAZEPINES NEGATIVE  NEG      COCAINE NEGATIVE  NEG      METHADONE NEGATIVE  NEG      OPIATES NEGATIVE  NEG      PCP(PHENCYCLIDINE) NEGATIVE  NEG      THC (TH-CANNABINOL) NEGATIVE  NEG      Drug screen comment (NOTE)    URINALYSIS W/ REFLEX CULTURE    Collection Time: 05/09/18 11:27 PM   Result Value Ref Range    Color YELLOW/STRAW      Appearance CLOUDY (A) CLEAR      Specific gravity 1.010 1.003 - 1.030      pH (UA) 5.5 5.0 - 8.0      Protein NEGATIVE  NEG mg/dL    Glucose NEGATIVE  NEG mg/dL    Ketone NEGATIVE  NEG mg/dL    Bilirubin NEGATIVE  NEG      Blood NEGATIVE  NEG      Urobilinogen 1.0 0.2 - 1.0 EU/dL    Nitrites NEGATIVE  NEG      Leukocyte Esterase SMALL (A) NEG      WBC 0-4 0 - 4 /hpf    RBC 0-5 0 - 5 /hpf    Epithelial cells MODERATE (A) FEW /lpf    Bacteria 1+ (A) NEG /hpf    UA:UC IF INDICATED URINE CULTURE ORDERED (A) CNI     SALICYLATE    Collection Time: 05/09/18 11:27 PM   Result Value Ref Range    Salicylate level 4.7 2.8 - 20.0 MG/DL   ACETAMINOPHEN    Collection Time: 05/09/18 11:27 PM   Result Value Ref Range    Acetaminophen level <2 (L) 10 - 30 ug/mL   GLUCOSE, POC    Collection Time: 05/10/18 10:00 AM   Result Value Ref Range    Glucose (POC) 305 (H) 65 - 100 mg/dL    Performed by Gladys Choi, POC    Collection Time: 05/10/18 11:49 AM   Result Value Ref Range    Glucose (POC) 206 (H) 65 - 100 mg/dL    Performed by Silvia Bernardo    GLUCOSE, POC    Collection Time: 05/10/18  4:32 PM   Result Value Ref Range    Glucose (POC) 169 (H) 65 - 100 mg/dL    Performed by Aysha Flores        Assessment:     Active Problems:    Major depression (5/10/2018)    COPD    HTN    Arthritis    ?neuropathy    Plan:     Restart oral sulfonylurea    Add SSI    Restart ACE I    Add Rescue inhaler    Cont Lovastatin    Cont Zyrtec    No VTE prophylaxis indicated or necessary at this time.    Signed By: Lawson Morales MD     May 10, 2018

## 2018-05-10 NOTE — BH NOTES
Patient visible on the unit, social with peers,med compliant. Is planning to discharge tomorrow. Denies SI/HI. Has a history of coming into hospital but discharging the following day.

## 2018-05-10 NOTE — BH NOTES
GROUP THERAPY PROGRESS NOTE    The patient Steve hameed 45 y.o. female is participating in Coping Skills Group. Group time: 45 minutes    Personal goal for participation:  To participate in mental health journey game    Goal orientation:  personal    Group therapy participation: minimal    Therapeutic interventions reviewed and discussed: choices in recovery    Impression of participation:  The patient was present-arrived late    Marko Balderas  5/10/2018  2:26 PM

## 2018-05-10 NOTE — PROGRESS NOTES
Problem: Suicide/Homicide (Adult/Pediatric)  Goal: *STG: Seeks staff when feelings of self harm or harm towards others arise  Outcome: Progressing Towards Goal  Eve Day has been quiet, cooperative, resting most of the day. Denies suicidal ideas, or intent.

## 2018-05-10 NOTE — PROGRESS NOTES
[facility] Medication Reconciliation     Recommendations/Findings:   1) Patient is a poor medication historian and is non-compliant with medications. Patient states she last took medications 3 weeks ago. Per RxQuery, medications were last filled in mid-March. 2) Added: albuterol inhaler, aspirin, cetirizine, famotidine, lisinopril, lovastatin  3) Modified: glipizide  4) Removed: quetiapine, sertraline, trazodone    Total Time Spent: 10 minutes     Information obtained from: RxQuery, patient    Patient allergies: Allergies as of 05/09/2018 - Review Complete 05/09/2018   Allergen Reaction Noted    Amoxicillin Hives 06/13/2017    Mushroom flavor Hives 01/12/2012    Tomato Hives 01/12/2012       Prior to Admission Medications   Prescriptions Last Dose Informant Patient Reported? Taking? albuterol (VENTOLIN HFA) 90 mcg/actuation inhaler 4/19/2018  Yes Yes   Sig: Take 2 Puffs by inhalation every six (6) hours as needed for Wheezing. aspirin delayed-release 81 mg tablet 4/19/2018  Yes Yes   Sig: Take 81 mg by mouth daily. cetirizine (ZYRTEC) 10 mg tablet 4/19/2018  Yes Yes   Sig: Take 10 mg by mouth daily. Indications: Allergic Rhinitis   famotidine (PEPCID) 20 mg tablet 4/19/2018  Yes Yes   Sig: Take 20 mg by mouth daily. Indications: Heartburn   glipiZIDE (GLUCOTROL) 10 mg tablet 4/19/2018  Yes Yes   Sig: Take 10 mg by mouth two (2) times a day. Indications: type 2 diabetes mellitus   lisinopril (PRINIVIL, ZESTRIL) 2.5 mg tablet 4/19/2018  Yes Yes   Sig: Take 2.5 mg by mouth daily. Indications: hypertension   lovastatin (MEVACOR) 20 mg tablet 4/19/2018  Yes Yes   Sig: Take 20 mg by mouth nightly.  Indications: hyperlipidemia      Facility-Administered Medications: None        Thank you,  Rachel Castro, PHARMD, BCPS

## 2018-05-10 NOTE — BH NOTES
Pt admitted for Suicidal Ideation with plan to walk into traffic, pt states voices told her to get hit by car. Pt has medical history of diabetes, copd, HTN and arthritis. Pt has been off her meds for 3 weeks. Oral hyperglycemic used to controll glucose when she has it. Glucose in , UDS and BAL negative. Possible UTI. Pt states frequent urination, culture sent. Pt ambulates with cane, given walker to use here. Pt states she lives with her niece, previously in [facility] and told could not return. Skin check unremarkable with 2 brown birthmarks on back (Cailin Odor).

## 2018-05-10 NOTE — PROGRESS NOTES
GROUP THERAPY PROGRESS NOTE      Peterson Rice was present for medication group. GROUP TIME: 45 minutes, Thursdays 2pm    PERSONAL GOAL FOR PARTICIPATION: To be present for group, participate in discussion, and answer patient-directed questions. GOAL ORIENTATION: Personal    THERAPEUTIC INTERVENTIONS REVIEWED AND DISCUSSED: The following topics were presented: storage of medications, how to remember to refill medications and keep up with doctor appointments, relapse prevention, keeping a record of all medication including prescription and non-prescription drugs, and who to contact with medication questions. Patients were given time to ask questions regarding their current therapy. IMPRESSION OF PARTICIPATION: Patient was present at group and only participated in Tonya.       Virginia Berrios, PHARMD

## 2018-05-10 NOTE — BSMART NOTE
Comprehensive Assessment Form Part 1      Section I - Disposition    Axis I - Malingering                Depressive Disorder                Nicotine Dependence   Axis II - Borderline Personality Disorder  Axis III -   Past Medical History Date Comments      Diabetes ([address] [E11.9]       Arthritis [M19.90]  legs     COPD       Depression [F32.9] 1/13/2012      Drug abuse [F19.10]  cocaine, stimulants, etoh, mj, tob     H/O suicide attempt [Z91.5]       Bipolar 1 disorder (HCC) [F31.9]       Polydrug dependence excluding opioid type drug, abuse ([facility] [F19.20]       Obesity [E66.9]       Schizophrenia ([facility] [F20.9]         Axis IV - Reported missed daughter (20 years old), possible housing issues, socioeconomic stressors  Axis V -       The Medical Doctor to Psychiatrist conference was not completed. The Medical Doctor is in agreement with Psychiatrist disposition because of (reason) patient is seeking a voluntary admission. The plan is admit to [facility].  The on-call Psychiatrist consulted was Dr. Eliecer Dutta. The admitting Psychiatrist will be Dr. Eliecer Dutta. The admitting Diagnosis is Malingering/ Depressive Disorder. The Payor source is [employer] MEDICAID/[facility]. The name of the representative was . This was approved for  days. The authorization number is . Section II - Integrated Summary  Summary:  Patient is 45year old  female reporting to ED Pt sts has been out of her medications for 2-3 weeks. Pt sts SI with plan of talking out in front of a vehicle. Pt is A&O x 4. Denies hallucinations. Hx DM. POC glucose 205 mg/dl. Sts that she has been urinating more frequently and has not been eating well. Assessment completed via tele psych, patient reported having suicidal thoughts with plan to get hit by a car. Patient denied homicidal thoughts. Patient reported auditory hallucinations telling her to get hit by car.  Patient was not responding to internal stimuli but observed fatigued and needed redirection during assessment.  probed patient for stressors and she reported her daughter has been missing for months she does not know where she is. Patient reported that she has been off her medications for about three weeks as reported she left them somewhere and cannot get them. As reported patient was previously living at a group home she reported she checked herself out. Patient reported they would not give her things to her which included her medications as reported she has been off for about three weeks. Patient reported going to [facility] for medication management when asked if she returned she stated she did not know when and continued to repeated that she was having thoughts of harming herself. Patient currently lives with her niece and reported it okay but not well. Patient was calm and cooperative with . Patient reported [facility] about two months ago, per chart was hospitalized with [facility]   6/14/17 and was admitted and discharged within same day. The patient has demonstrated mental capacity to provide informed consent. The information is given by the patient. The Chief Complaint is suicidal.  The Precipitant Factors are malingering possible housing issues, reported medication problems. Previous Hospitalizations: yes  The patient has not previously been in restraints. Current Psychiatrist and/or  is [facility] [employer]. Lethality Assessment:    The potential for suicide noted by the following: defined plan and ideation . The potential for homicide is not noted. The patient has not been a perpetrator of sexual or physical abuse. There are not pending charges. The patient is not felt to be at risk for self harm or harm to others. The attending nurse was advised not noted. Section III - Psychosocial  The patient's overall mood and attitude is fatigued, cooperative.   Feelings of helplessness and hopelessness are not observed. Generalized anxiety is not observed. Panic is not observed. Phobias are not observed. Obsessive compulsive tendencies are not observed. Section IV - Mental Status Exam  The patient's appearance shows no evidence of impairment. The patient's behavior shows no evidence of impairment. The patient is oriented to time, place, person and situation. The patient's speech shows no evidence of impairment. The patient's mood is depressed. The range of affect shows no evidence of impairment. The patient's thought content demonstrates no evidence of impairment. The thought process shows no evidence of impairment. The patient's perception shows no evidence of impairment. The patient's memory shows no evidence of impairment. The patient's appetite shows no evidence of impairment. The patient's sleep shows no evidence of impairment. The patient's insight shows no evidence of impairment. The patient's judgement shows no evidence of impairment. Section V - Substance Abuse  The patient is using substances. The patient is using tobacco by inhalation for greater than 10 years with last use on yesterday and alcohol for greater than 10 years with last use on yesterday (two beers). The patient has experienced the following withdrawal symptoms: N/A. Section VI - Living Arrangements  The patient is single. The patient lives niece . The patient has one child age 25. The patient does plan to return home upon discharge. The patient does not have legal issues pending. The patient's source of income comes from social security. Mormonism and cultural practices have not been voiced at this time. The patient's greatest support comes from no one reported and this person will not be involved with the treatment.     The patient has not been in an event described as horrible or outside the realm of ordinary life experience either currently or in the past.  The patient has not been a victim of sexual/physical abuse. Section VII - Other Areas of Clinical Concern  The highest grade achieved is unknown with the overall quality of school experience being described as unknown. The patient is currently unemployed and speaks [place] as a primary language. The patient has no communication impairments affecting communication. The patient's preference for learning can be described as: can read and write adequately.   The patient's hearing is normal.  The patient's vision is normal.      Cynthia Goodpasture
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
## 2018-05-10 NOTE — ED PROVIDER NOTES
EMERGENCY DEPARTMENT HISTORY AND PHYSICAL EXAM      Date: 5/9/2018  Patient Name: Myles Tompkins    History of Presenting Illness     Chief Complaint   Patient presents with    Medication Problem     reports out of BP, DM and psych meds x 3 weeks because she does not have a way to pick the meds up   [address] Problem     states \"I don't know what I might do to myself. \"       History Provided By: Patient    HPI: Myles Tompkins, 45 y.o. female with PMHx significant for DM, arthritis, COPD, depression, drug abuse, Suicide attempt, bipolar 1 disorder, polydrug dependence, schizophrenia who presents ambulatory to the ED in need of a medication refill. Pt reports that she has been out of her rx for Seroquel, trazodone, as well as her diabetes medications and anti-hypertensive. She reports having SI secondary to being out of her medications stating \" I want to run in front of a car. \" Pt reports a hx of SI with attempt noting she tried to cut her wrist. She state that she was last admitted for psychiatric evaluation 1 month ago. Pt reports consuming 2 60 oz beers yesterday. She denies daily EtOH consumption. Pt also c/o cough and urinary frequency. She denies any CP, SOB< nausea, vomiting, abdominal pain, HI, confusion, or hallucinations. There are no other complaints, changes, or physical findings at this time. PCP: Crispin Ness MD    Current Outpatient Prescriptions   Medication Sig Dispense Refill    glipiZIDE (GLUCOTROL) 10 mg tablet Take 10 mg by mouth daily. Indications: type 2 diabetes mellitus      sertraline (ZOLOFT) 50 mg tablet Take 50 mg by mouth daily. Indications: major depressive disorder      QUEtiapine (SEROQUEL) 200 mg tablet Take 200 mg by mouth nightly. Indications: DEPRESSION TREATMENT ADJUNCT      traZODone (DESYREL) 100 mg tablet Take 200 mg by mouth nightly.  Indications: INSOMNIA         Past History     Past Medical History:  Past Medical History:   Diagnosis Date    Arthritis     legs    [facility] ([facility]     COPD     Depression 1/13/2012    Diabetes ([facility]     Drug abuse     cocaine, stimulants, etoh, mj, tob    H/O suicide attempt     Obesity     Polydrug dependence excluding opioid type drug, abuse ([facility]     Schizophrenia ([facility] 7/8/2016       Past Surgical History:  Past Surgical History:   Procedure Laterality Date    HX ORTHOPAEDIC      foot sx       Family History:  Family History   Problem Relation Age of Onset    Diabetes Mother     Stroke Mother     Stroke Father        Social History:  Social History   Substance Use Topics    Smoking status: Current Every Day Smoker     Packs/day: 1.50     Years: 10.00     Types: Cigarettes    Smokeless tobacco: Current User    Alcohol use No      Comment: Hx of ETOH abuse since age 13. no DUIs. Allergies: Allergies   Allergen Reactions    Amoxicillin Hives    Mushroom Flavor Hives    Tomato Hives         Review of Systems   Review of Systems   Constitutional: Negative. Negative for chills and fever. HENT: Negative. Negative for congestion, facial swelling, rhinorrhea, sore throat, trouble swallowing and voice change. Eyes: Negative. Respiratory: Positive for cough. Negative for apnea, chest tightness, shortness of breath and wheezing. Cardiovascular: Negative. Negative for chest pain, palpitations and leg swelling. Gastrointestinal: Negative. Negative for abdominal distention, abdominal pain, blood in stool, constipation, diarrhea, nausea and vomiting. Endocrine: Negative. Negative for cold intolerance, heat intolerance and polyuria. Genitourinary: Positive for frequency. Negative for difficulty urinating, dysuria, flank pain, hematuria and urgency. Musculoskeletal: Negative. Negative for arthralgias, back pain, myalgias, neck pain and neck stiffness. Skin: Negative. Negative for color change and rash. Neurological: Negative.   Negative for dizziness, syncope, facial asymmetry, speech difficulty, weakness, light-headedness, numbness and headaches. Hematological: Negative. Does not bruise/bleed easily. Psychiatric/Behavioral: Positive for suicidal ideas. Negative for confusion, hallucinations and self-injury. The patient is not nervous/anxious.         - HI       Physical Exam   Physical Exam   Constitutional: She is oriented to person, place, and time. She appears well-developed and well-nourished. No distress. Appear disheveled   HENT:   Head: Normocephalic and atraumatic. Mouth/Throat: Oropharynx is clear and moist. No oropharyngeal exudate. Eyes: Conjunctivae and EOM are normal. Pupils are equal, round, and reactive to light. Neck: Normal range of motion. Cardiovascular: Normal rate, regular rhythm and normal heart sounds. Exam reveals no gallop and no friction rub. No murmur heard. Pulmonary/Chest: Effort normal and breath sounds normal. No respiratory distress. She has no wheezes. She has no rales. She exhibits no tenderness. Abdominal: Soft. Bowel sounds are normal. She exhibits no distension and no mass. There is no tenderness. There is no rebound and no guarding. Musculoskeletal: Normal range of motion. She exhibits no edema, tenderness or deformity. Neurological: She is alert and oriented to person, place, and time. She displays normal reflexes. No cranial nerve deficit. She exhibits normal muscle tone. Coordination normal.   Skin: Skin is warm. No rash noted. She is not diaphoretic. Psychiatric: Her mood appears anxious. She expresses suicidal ideation. Nursing note and vitals reviewed.         Diagnostic Study Results     Labs -     Recent Results (from the past 12 hour(s))   GLUCOSE, POC    Collection Time: 05/09/18 10:57 PM   Result Value Ref Range    Glucose (POC) 205 (H) 65 - 100 mg/dL    Performed by Yasmin Guillen    EKG, 12 LEAD, INITIAL    Collection Time: 05/09/18 11:18 PM   Result Value Ref Range    Ventricular Rate 88 BPM Atrial Rate 88 BPM    P-R Interval 140 ms    QRS Duration 78 ms    Q-T Interval 354 ms    QTC Calculation (Bezet) 428 ms    Calculated P Axis 8 degrees    Calculated R Axis 28 degrees    Calculated T Axis 61 degrees    Diagnosis       Normal sinus rhythm  Low voltage QRS  Nonspecific T wave abnormality  Abnormal ECG  When compared with ECG of 23-JUL-2016 12:02,  No significant change was found     METABOLIC PANEL, COMPREHENSIVE    Collection Time: 05/09/18 11:27 PM   Result Value Ref Range    Sodium 139 136 - 145 mmol/L    Potassium 3.6 3.5 - 5.1 mmol/L    Chloride 103 97 - 108 mmol/L    CO2 27 21 - 32 mmol/L    Anion gap 9 5 - 15 mmol/L    Glucose 229 (H) 65 - 100 mg/dL    BUN 12 6 - 20 MG/DL    Creatinine 0.84 0.55 - 1.02 MG/DL    BUN/Creatinine ratio 14 12 - 20      GFR est AA >60 >60 ml/min/1.73m2    GFR est non-AA >60 >60 ml/min/1.73m2    Calcium 8.8 8.5 - 10.1 MG/DL    Bilirubin, total 0.2 0.2 - 1.0 MG/DL    ALT (SGPT) 18 12 - 78 U/L    AST (SGOT) 12 (L) 15 - 37 U/L    Alk. phosphatase 125 (H) 45 - 117 U/L    Protein, total 6.8 6.4 - 8.2 g/dL    Albumin 2.9 (L) 3.5 - 5.0 g/dL    Globulin 3.9 2.0 - 4.0 g/dL    A-G Ratio 0.7 (L) 1.1 - 2.2     ETHYL ALCOHOL    Collection Time: 05/09/18 11:27 PM   Result Value Ref Range    ALCOHOL(ETHYL),SERUM <10 <10 MG/DL   CBC WITH AUTOMATED DIFF    Collection Time: 05/09/18 11:27 PM   Result Value Ref Range    WBC 8.3 3.6 - 11.0 K/uL    RBC 4.23 3.80 - 5.20 M/uL    HGB 11.4 (L) 11.5 - 16.0 g/dL    HCT 35.6 35.0 - 47.0 %    MCV 84.2 80.0 - 99.0 FL    MCH 27.0 26.0 - 34.0 PG    MCHC 32.0 30.0 - 36.5 g/dL    RDW 14.5 11.5 - 14.5 %    PLATELET 928 565 - 932 K/uL    MPV 8.8 (L) 8.9 - 12.9 FL    NRBC 0.0 0  WBC    ABSOLUTE NRBC 0.00 0.00 - 0.01 K/uL    NEUTROPHILS 58 32 - 75 %    LYMPHOCYTES 33 12 - 49 %    MONOCYTES 5 5 - 13 %    EOSINOPHILS 4 0 - 7 %    BASOPHILS 1 0 - 1 %    IMMATURE GRANULOCYTES 0 0.0 - 0.5 %    ABS. NEUTROPHILS 4.8 1.8 - 8.0 K/UL    ABS.  LYMPHOCYTES 2.7 0.8 - 3.5 K/UL    ABS. MONOCYTES 0.4 0.0 - 1.0 K/UL    ABS. EOSINOPHILS 0.3 0.0 - 0.4 K/UL    ABS. BASOPHILS 0.1 0.0 - 0.1 K/UL    ABS. IMM. GRANS. 0.0 0.00 - 0.04 K/UL    DF AUTOMATED     DRUG SCREEN, URINE    Collection Time: 05/09/18 11:27 PM   Result Value Ref Range    AMPHETAMINES NEGATIVE  NEG      BARBITURATES NEGATIVE  NEG      BENZODIAZEPINES NEGATIVE  NEG      COCAINE NEGATIVE  NEG      METHADONE NEGATIVE  NEG      OPIATES NEGATIVE  NEG      PCP(PHENCYCLIDINE) NEGATIVE  NEG      THC (TH-CANNABINOL) NEGATIVE  NEG      Drug screen comment (NOTE)    URINALYSIS W/ REFLEX CULTURE    Collection Time: 05/09/18 11:27 PM   Result Value Ref Range    Color YELLOW/STRAW      Appearance CLOUDY (A) CLEAR      Specific gravity 1.010 1.003 - 1.030      pH (UA) 5.5 5.0 - 8.0      Protein NEGATIVE  NEG mg/dL    Glucose NEGATIVE  NEG mg/dL    Ketone NEGATIVE  NEG mg/dL    Bilirubin NEGATIVE  NEG      Blood NEGATIVE  NEG      Urobilinogen 1.0 0.2 - 1.0 EU/dL    Nitrites NEGATIVE  NEG      Leukocyte Esterase SMALL (A) NEG      WBC 0-4 0 - 4 /hpf    RBC 0-5 0 - 5 /hpf    Epithelial cells MODERATE (A) FEW /lpf    Bacteria 1+ (A) NEG /hpf    UA:UC IF INDICATED URINE CULTURE ORDERED (A) CNI     SALICYLATE    Collection Time: 05/09/18 11:27 PM   Result Value Ref Range    Salicylate level 4.7 2.8 - 20.0 MG/DL   ACETAMINOPHEN    Collection Time: 05/09/18 11:27 PM   Result Value Ref Range    Acetaminophen level <2 (L) 10 - 30 ug/mL     Medical Decision Making   I am the first provider for this patient. I reviewed the vital signs, available nursing notes, past medical history, past surgical history, family history and social history. Vital Signs-Reviewed the patient's vital signs. Patient Vitals for the past 12 hrs:   Temp Pulse Resp BP SpO2   05/09/18 2251 97.6 °F (36.4 °C) 94 16 154/89 98 %     EKG interpretation: (Preliminary) 2318  Rhythm: normal sinus rhythm; and regular .  Rate (approx.): 88; Axis: normal; SD interval: normal; QRS interval: low voltage; ST/T wave: Non-specific T wave abnormality; Other findings: abnormal ekg. Written by Kandi Lemus ED Scribe, as dictated by Shonda Alejandra MD.    Records Reviewed: Nursing Notes and Old Medical Records    Provider Notes (Medical Decision Making):       DDx: SI, schizophrenia, bipolar disorder, drug abuse medication non-compliance, hyperglycemia, UTI, malingering. 45 y.o. female hx dm,depression, bipolar presenting with SI and reporting medication non-compliance. Pt afebrile, non-toxic and voluntary, will check screening labs, UA, and consult BSMART    ED Course:   Initial assessment performed. The patients presenting problems have been discussed, and they are in agreement with the care plan formulated and outlined with them. I have encouraged them to ask questions as they arise throughout their visit. TOBACCO COUNSELING:  Upon evaluation, pt expressed that they are a current tobacco user. For approximately 10 minutes, pt has been counseled on the dangers of smoking and was encouraged to quit as soon as possible in order to decrease further risks to their health. Pt has conveyed their understanding of the risks involved should they continue to use tobacco products. PROGRESS NOTE:  12:11 AM  Pt has been evaluated by BSMART. BSMART states that she believes that the pt is malingering and will discuss pt case with the psychiatrist.  Written by Kandi Lemus ED scribe, as dictated by Shonda Alejandra MD.    PROGRESS NOTE:  1:41 AM  BSMART recommends admission to behavioral health by Dr. Yunior Tamez. Written by Kandi Lemus, ED scribe, as dictated by Shonda Alejandra MD    Disposition:  Admit to Behavioral Health    ADMIT NOTE:  1:28 AM  Patient is being admitted to the hospital.  The results of their tests and reasons for their admission have been discussed with them and/or available family.   They convey agreement and understanding for the need to be admitted and for their admission diagnosis. Consultation has been made with the inpatient physician specialist for hospitalization. PLAN:  1. Admit to 809 Dr. Nahid Orona Masters    Diagnosis     Clinical Impression:   1. Suicide ideation    2. H/O medication noncompliance    3. Uncontrolled type 2 diabetes mellitus without complication, without long-term current use of insulin ([facility]    4. Schizophrenia, unspecified type ([facility]        Attestations: This note is prepared by Melida Davis, acting as Scribe for Charlene Onrelas MD.    Charlene Ornelas MD: The scribe's documentation has been prepared under my direction and personally reviewed by me in its entirety. I confirm that the note above accurately reflects all work, treatment, procedures, and medical decision making performed by me. This note will not be viewable in [address]

## 2018-05-10 NOTE — ED NOTES
Report called to [facility]. Dale Johnson RN receiving. SBAR given. Pt to be transported to [facility] by Security via wheelchair.

## 2018-05-10 NOTE — ED NOTES
Pt ambulated to and from bathroom without use of her cane. Pt requested something to eat. Given bag meal and diet soda.
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
## 2018-05-10 NOTE — BH NOTES
GROUP THERAPY PROGRESS NOTE    The patient Yefri hameed 45 y.o. female is participating in [employer]. Group time: 1 hour    Personal goal for participation:  To concentrate on selected task    Goal orientation: social    Group therapy participation: minimal    Therapeutic interventions reviewed and discussed: Crafts, games, music    Impression of participation: The patient was present-arrived late    [facility]  5/10/2018  5:51 PM

## 2018-05-11 VITALS
HEART RATE: 73 BPM | RESPIRATION RATE: 18 BRPM | WEIGHT: 230 LBS | OXYGEN SATURATION: 98 % | BODY MASS INDEX: 43.43 KG/M2 | DIASTOLIC BLOOD PRESSURE: 85 MMHG | HEIGHT: 61 IN | TEMPERATURE: 97.4 F | SYSTOLIC BLOOD PRESSURE: 142 MMHG

## 2018-05-11 LAB
ATRIAL RATE: 88 BPM
BACTERIA SPEC CULT: NORMAL
CALCULATED P AXIS, ECG09: 8 DEGREES
CALCULATED R AXIS, ECG10: 28 DEGREES
CALCULATED T AXIS, ECG11: 61 DEGREES
CC UR VC: NORMAL
DIAGNOSIS, 93000: NORMAL
GLUCOSE BLD STRIP.AUTO-MCNC: 144 MG/DL (ref 65–100)
P-R INTERVAL, ECG05: 140 MS
Q-T INTERVAL, ECG07: 354 MS
QRS DURATION, ECG06: 78 MS
QTC CALCULATION (BEZET), ECG08: 428 MS
SERVICE CMNT-IMP: ABNORMAL
SERVICE CMNT-IMP: NORMAL
VENTRICULAR RATE, ECG03: 88 BPM

## 2018-05-11 PROCEDURE — 74011250637 HC RX REV CODE- 250/637: Performed by: INTERNAL MEDICINE

## 2018-05-11 PROCEDURE — 74011250637 HC RX REV CODE- 250/637: Performed by: PSYCHIATRY & NEUROLOGY

## 2018-05-11 PROCEDURE — 82962 GLUCOSE BLOOD TEST: CPT

## 2018-05-11 RX ADMIN — LORATADINE 10 MG: 10 TABLET ORAL at 09:02

## 2018-05-11 RX ADMIN — GABAPENTIN 100 MG: 100 CAPSULE ORAL at 09:02

## 2018-05-11 RX ADMIN — FAMOTIDINE 20 MG: 20 TABLET, FILM COATED ORAL at 09:02

## 2018-05-11 RX ADMIN — GABAPENTIN 100 MG: 100 CAPSULE ORAL at 13:14

## 2018-05-11 RX ADMIN — ASPIRIN 81 MG: 81 TABLET, COATED ORAL at 09:01

## 2018-05-11 RX ADMIN — LISINOPRIL 2.5 MG: 5 TABLET ORAL at 09:03

## 2018-05-11 RX ADMIN — GLIPIZIDE 10 MG: 5 TABLET ORAL at 09:01

## 2018-05-11 NOTE — BH NOTES
GROUP THERAPY PROGRESS NOTE    Brittnee Marroquin is participating in [place].      Group time: 30 minutes    Personal goal for participation: daily orientation    Goal orientation: personal    Group therapy participation: active    Therapeutic interventions reviewed and discussed: yes    Impression of participation: ok

## 2018-05-11 NOTE — PROGRESS NOTES
Problem: Suicide/Homicide (Adult/Pediatric)  Goal: *STG: Remains safe in hospital  Outcome: Progressing Towards Goal  Pt alert and visible on the unit for meals and medications. Pt seen for H&P today. Pt calm, cooperative, depressed with flat affect. No concerns reported. Staff will continue to monitor.

## 2018-05-11 NOTE — DISCHARGE INSTRUCTIONS
DISCHARGE SUMMARY from Nurse    PATIENT INSTRUCTIONS:    After general anesthesia or intravenous sedation, for 24 hours or while taking prescription Narcotics:  · Limit your activities  · Do not drive and operate hazardous machinery  · Do not make important personal or business decisions  · Do  not drink alcoholic beverages  · If you have not urinated within 8 hours after discharge, please contact your surgeon on call. Report the following to your surgeon:  · Excessive pain, swelling, redness or odor of or around the surgical area  · Temperature over 100.5  · Nausea and vomiting lasting longer than 4 hours or if unable to take medications  · Any signs of decreased circulation or nerve impairment to extremity: change in color, persistent  numbness, tingling, coldness or increase pain  · Any questions    *  Please give a list of your current medications to your Primary Care Provider. *  Please update this list whenever your medications are discontinued, doses are      changed, or new medications (including over-the-counter products) are added. *  Please carry medication information at all times in case of emergency situations. These are general instructions for a healthy lifestyle:    No smoking/ No tobacco products/ Avoid exposure to second hand smoke  Surgeon General's Warning:  Quitting smoking now greatly reduces serious risk to your health. Obesity, smoking, and sedentary lifestyle greatly increases your risk for illness    A healthy diet, regular physical exercise & weight monitoring are important for maintaining a healthy lifestyle    You may be retaining fluid if you have a history of heart failure or if you experience any of the following symptoms:  Weight gain of 3 pounds or more overnight or 5 pounds in a week, increased swelling in our hands or feet or shortness of breath while lying flat in bed.   Please call your doctor as soon as you notice any of these symptoms; do not wait until your next office visit. Recognize signs and symptoms of STROKE:    F-face looks uneven    A-arms unable to move or move unevenly    S-speech slurred or non-existent    T-time-call 911 as soon as signs and symptoms begin-DO NOT go       Back to bed or wait to see if you get better-TIME IS BRAIN. Warning Signs of HEART ATTACK     Call 911 if you have these symptoms:   Chest discomfort. Most heart attacks involve discomfort in the center of the chest that lasts more than a few minutes, or that goes away and comes back. It can feel like uncomfortable pressure, squeezing, fullness, or pain.  Discomfort in other areas of the upper body. Symptoms can include pain or discomfort in one or both arms, the back, neck, jaw, or stomach.  Shortness of breath with or without chest discomfort.  Other signs may include breaking out in a cold sweat, nausea, or lightheadedness. Don't wait more than five minutes to call 911 - MINUTES MATTER! Fast action can save your life. Calling 911 is almost always the fastest way to get lifesaving treatment. Emergency Medical Services staff can begin treatment when they arrive -- up to an hour sooner than if someone gets to the hospital by car. If I feel that I can not keep these promises and I am at risk of hurting myself or others, I will call the crisis office and speak with a crisis worker who will assist me during my crisis. [facility] Crisis                [address] Crisis            [phone]    The discharge information has been reviewed with the patient. The patient verbalized understanding.   Discharge medications reviewed with the patient and appropriate educational materials and side effects teaching were provided.   ___________________________________________________________________________________________________________________________________

## 2018-05-11 NOTE — BH NOTES
Behavioral Health Transition Record to Provider    Patient Name: Norberto Sharif  YOB: 1965  Medical Record Number: [id number]  Date of Admission: 5/9/2018  Date of Discharge: 5/11/2018    Attending Provider: Cirilo Ames MD  Discharging Provider: Ciirlo Ames MD  To contact this individual call [phone] and ask the  to page. If unavailable, ask to be transferred to [facility] Provider on call. [facility] Provider will be available on call 24/7 and during holidays. Primary Care Provider: Tejas Foley MD    Allergies   Allergen Reactions    Amoxicillin Hives    Mushroom Flavor Hives    Tomato Hives       Reason for Admission:    Pt was admitted to the general behavioral health unit on a voluntary basis. Pt has a well documented history of coming to the ED and seeking hospitalization when stressed. Pt identified housing as her main concern in her current life's circumstances. Pt states area is \"drug infested and I don't need to be around there\" as well as relationship issues with . Pt states she does not want to go back there, plans to call payee to assist in payment for a motel. Pt was also given the housing crisis hotline number. Admission Diagnosis: Major depression    * No surgery found *    Results for orders placed or performed during the hospital encounter of 82/88/39   METABOLIC PANEL, COMPREHENSIVE   Result Value Ref Range    Sodium 139 136 - 145 mmol/L    Potassium 3.6 3.5 - 5.1 mmol/L    Chloride 103 97 - 108 mmol/L    CO2 27 21 - 32 mmol/L    Anion gap 9 5 - 15 mmol/L    Glucose 229 (H) 65 - 100 mg/dL    BUN 12 6 - 20 MG/DL    Creatinine 0.84 0.55 - 1.02 MG/DL    BUN/Creatinine ratio 14 12 - 20      GFR est AA >60 >60 ml/min/1.73m2    GFR est non-AA >60 >60 ml/min/1.73m2    Calcium 8.8 8.5 - 10.1 MG/DL    Bilirubin, total 0.2 0.2 - 1.0 MG/DL    ALT (SGPT) 18 12 - 78 U/L    AST (SGOT) 12 (L) 15 - 37 U/L    Alk.  phosphatase 125 (H) 45 - 117 U/L    Protein, total 6.8 6.4 - 8.2 g/dL    Albumin 2.9 (L) 3.5 - 5.0 g/dL    Globulin 3.9 2.0 - 4.0 g/dL    A-G Ratio 0.7 (L) 1.1 - 2.2     ETHYL ALCOHOL   Result Value Ref Range    ALCOHOL(ETHYL),SERUM <10 <10 MG/DL   CBC WITH AUTOMATED DIFF   Result Value Ref Range    WBC 8.3 3.6 - 11.0 K/uL    RBC 4.23 3.80 - 5.20 M/uL    HGB 11.4 (L) 11.5 - 16.0 g/dL    HCT 35.6 35.0 - 47.0 %    MCV 84.2 80.0 - 99.0 FL    MCH 27.0 26.0 - 34.0 PG    MCHC 32.0 30.0 - 36.5 g/dL    RDW 14.5 11.5 - 14.5 %    PLATELET 557 103 - 787 K/uL    MPV 8.8 (L) 8.9 - 12.9 FL    NRBC 0.0 0  WBC    ABSOLUTE NRBC 0.00 0.00 - 0.01 K/uL    NEUTROPHILS 58 32 - 75 %    LYMPHOCYTES 33 12 - 49 %    MONOCYTES 5 5 - 13 %    EOSINOPHILS 4 0 - 7 %    BASOPHILS 1 0 - 1 %    IMMATURE GRANULOCYTES 0 0.0 - 0.5 %    ABS. NEUTROPHILS 4.8 1.8 - 8.0 K/UL    ABS. LYMPHOCYTES 2.7 0.8 - 3.5 K/UL    ABS. MONOCYTES 0.4 0.0 - 1.0 K/UL    ABS. EOSINOPHILS 0.3 0.0 - 0.4 K/UL    ABS. BASOPHILS 0.1 0.0 - 0.1 K/UL    ABS. IMM.  GRANS. 0.0 0.00 - 0.04 K/UL    DF AUTOMATED     DRUG SCREEN, URINE   Result Value Ref Range    AMPHETAMINES NEGATIVE  NEG      BARBITURATES NEGATIVE  NEG      BENZODIAZEPINES NEGATIVE  NEG      COCAINE NEGATIVE  NEG      METHADONE NEGATIVE  NEG      OPIATES NEGATIVE  NEG      PCP(PHENCYCLIDINE) NEGATIVE  NEG      THC (TH-CANNABINOL) NEGATIVE  NEG      Drug screen comment (NOTE)    URINALYSIS W/ REFLEX CULTURE   Result Value Ref Range    Color YELLOW/STRAW      Appearance CLOUDY (A) CLEAR      Specific gravity 1.010 1.003 - 1.030      pH (UA) 5.5 5.0 - 8.0      Protein NEGATIVE  NEG mg/dL    Glucose NEGATIVE  NEG mg/dL    Ketone NEGATIVE  NEG mg/dL    Bilirubin NEGATIVE  NEG      Blood NEGATIVE  NEG      Urobilinogen 1.0 0.2 - 1.0 EU/dL    Nitrites NEGATIVE  NEG      Leukocyte Esterase SMALL (A) NEG      WBC 0-4 0 - 4 /hpf    RBC 0-5 0 - 5 /hpf    Epithelial cells MODERATE (A) FEW /lpf    Bacteria 1+ (A) NEG /hpf    UA:UC IF INDICATED URINE CULTURE ORDERED (A) CNI     SALICYLATE   Result Value Ref Range    Salicylate level 4.7 2.8 - 20.0 MG/DL   ACETAMINOPHEN   Result Value Ref Range    Acetaminophen level <2 (L) 10 - 30 ug/mL   GLUCOSE, POC   Result Value Ref Range    Glucose (POC) 205 (H) 65 - 100 mg/dL    Performed by Tara Garcia    GLUCOSE, POC   Result Value Ref Range    Glucose (POC) 305 (H) 65 - 100 mg/dL    Performed by Beatrice Palma    GLUCOSE, POC   Result Value Ref Range    Glucose (POC) 206 (H) 65 - 100 mg/dL    Performed by [address]tw[address], POC   Result Value Ref Range    Glucose (POC) 169 (H) 65 - 100 mg/dL    Performed by Sancho Royal    GLUCOSE, POC   Result Value Ref Range    Glucose (POC) 92 65 - 100 mg/dL    Performed by Natalie Peralta    GLUCOSE, POC   Result Value Ref Range    Glucose (POC) 144 (H) 65 - 100 mg/dL    Performed by Aryan Ruiz    EKG, 12 LEAD, INITIAL   Result Value Ref Range    Ventricular Rate 88 BPM    Atrial Rate 88 BPM    P-R Interval 140 ms    QRS Duration 78 ms    Q-T Interval 354 ms    QTC Calculation (Bezet) 428 ms    Calculated P Axis 8 degrees    Calculated R Axis 28 degrees    Calculated T Axis 61 degrees    Diagnosis       Normal sinus rhythm  Low voltage QRS  Nonspecific T wave abnormality  Abnormal ECG  When compared with ECG of 23-JUL-2016 12:02,  No significant change was found         Immunizations administered during this encounter:   Immunization History   Administered Date(s) Administered    Tdap 05/30/2015       Screening for Metabolic Disorders for Patients on Antipsychotic Medications  (Data obtained from the EMR)    Estimated Body Mass Index  Estimated body mass index is 43.46 kg/(m^2) as calculated from the following:    Height as of this encounter: 5' 1\" (1.549 m). Weight as of this encounter: 104.3 kg (230 lb).      Vital Signs/Blood Pressure  Visit Vitals    /85 (BP 1 Location: Right arm, BP Patient Position: At rest)    Pulse 73    Temp 97.4 °F (36.3 °C)    Resp 18    Ht 5' 1\" (1.549 m)    Wt 104.3 kg (230 lb)    SpO2 98%    Breastfeeding No    BMI 43.46 kg/m2       Blood Glucose/Hemoglobin A1c  Lab Results   Component Value Date/Time    Glucose 229 (H) 05/09/2018 11:27 PM    Glucose 126 (H) 03/19/2014 06:40 AM    Glucose (POC) 144 (H) 05/11/2018 11:29 AM       Lab Results   Component Value Date/Time    Hemoglobin A1c 9.5 (H) 06/08/2017 05:50 AM        Lipid Panel  Lab Results   Component Value Date/Time    Cholesterol, total 237 (H) 06/08/2017 05:50 AM    HDL Cholesterol 52 06/08/2017 05:50 AM    LDL, calculated 115 (H) 06/08/2017 05:50 AM    Triglyceride 350 (H) 06/08/2017 05:50 AM    CHOL/HDL Ratio 4.6 06/08/2017 05:50 AM        Discharge Diagnosis: Please refer to physician's discharge summary. Discharge Plan: Pt was discharged today and transported by [facility] cab #[id number] to her group home, [facility] which was discovered to be her residence after speaking to [facility] Renae.  spoke with group home to confirm she was allowed to return due to continuously leaving the home. Pt will follow up with  on Monday at [facility] and will be notified of her psychiatric medication management appointment at meeting. Pt is in agreement with plan. Pt is alert and oriented. Pt denies SI/HI/AVH. Pt's mood was euthymic. Pt's thought process was linear. Pt's insight and judgment are fair. Pt is in agreement with the plan. Discharge Medication List and Instructions:   Current Discharge Medication List      CONTINUE these medications which have NOT CHANGED    Details   aspirin delayed-release 81 mg tablet Take 81 mg by mouth daily. albuterol (VENTOLIN HFA) 90 mcg/actuation inhaler Take 2 Puffs by inhalation every six (6) hours as needed for Wheezing. cetirizine (ZYRTEC) 10 mg tablet Take 10 mg by mouth daily. Indications:  Allergic Rhinitis      famotidine (PEPCID) 20 mg tablet Take 20 mg by mouth daily. Indications: Heartburn      lisinopril (PRINIVIL, ZESTRIL) 2.5 mg tablet Take 2.5 mg by mouth daily. Indications: hypertension      lovastatin (MEVACOR) 20 mg tablet Take 20 mg by mouth nightly. Indications: hyperlipidemia      glipiZIDE (GLUCOTROL) 10 mg tablet Take 10 mg by mouth two (2) times a day. Indications: type 2 diabetes mellitus             Unresulted Labs     None        To obtain results of studies pending at discharge, please contact N/A. Follow-up Information     Follow up With Details Comments Contact Info    Vidhya Hsu MD Go on 5/17/2018 Medical appointment on Thursday, May 17th at 2:00 PM with Indiana Moraes NP. Fax: [phone] [facility] Call today Multiple voice messages have been left for follow up case and medication management at [facility]. Please contact your , Eleuterio Camacho [phone] to obtain these appointment times. [id number] [facility]  [phone]          Advanced Directive:   Does the patient have an appointed surrogate decision maker? Unknown   Does the patient have a Medical Advance Directive? Unknown   Does the patient have a Psychiatric Advance Directive? Unknown   If the patient does not have a surrogate or Medical Advance Directive AND Psychiatric Advance Directive, the patient was offered information on these advance directives Unknown      Patient Instructions: Please continue all medications until otherwise directed by physician. Tobacco Cessation Discharge Plan:   Is the patient a smoker and needs referral for smoking cessation? No  Patient referred to the following for smoking cessation with an appointment? No   Patient was offered medication to assist with smoking cessation at discharge? No  Was education for smoking cessation added to the discharge instructions?  No    Alcohol/Substance Abuse Discharge Plan:   Does the patient have a history of substance/alcohol abuse and requires a referral for treatment? Yes  Patient referred to the following for substance/alcohol abuse treatment with an appointment? Yes  Patient was offered medication to assist with alcohol cessation at discharge? No  Was education for substance/alcohol abuse added to discharge instructions? Yes    Patient discharged to Home; provided to the patient/caregiver either in hard copy or electronically. Continuing care paperwork was faxed to community mental health providers.

## 2018-05-11 NOTE — BH NOTES
GROUP THERAPY PROGRESS NOTE    The patient Kathrine hameed 45 y.o. female is participating in Coping Skills Group. Group time: 45 minutes    Personal goal for participation: To identify positive coping strategies a-z    Goal orientation:  personal    Group therapy participation: active    Therapeutic interventions reviewed and discussed: worksheet    Impression of participation:  The patient was attentive.     Enrrique Oswald  5/11/2018  1:47 PM

## 2018-05-11 NOTE — BH NOTES
Pt observed resting comfortably in bed with eyes closed, breathing even and unlabored. No s/s of distress noted, no complaints voiced. Pt slept 6.5 hours, will continue Q 15 minute monitoring for safety.
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
## 2018-10-03 ENCOUNTER — HOSPITAL ENCOUNTER (INPATIENT)
Age: 53
LOS: 1 days | Discharge: HOME | DRG: 750 | End: 2018-10-05
Attending: EMERGENCY MEDICINE | Admitting: PSYCHIATRY & NEUROLOGY
Payer: MEDICAID

## 2018-10-03 DIAGNOSIS — R45.851 SUICIDAL IDEATION: Primary | ICD-10-CM

## 2018-10-03 LAB
AMPHET UR QL SCN: NEGATIVE
ANION GAP SERPL CALC-SCNC: 7 MMOL/L (ref 5–15)
APPEARANCE UR: CLEAR
BACTERIA URNS QL MICRO: NEGATIVE /HPF
BARBITURATES UR QL SCN: NEGATIVE
BASOPHILS # BLD: 0 K/UL (ref 0–0.1)
BASOPHILS NFR BLD: 0 % (ref 0–1)
BENZODIAZ UR QL: NEGATIVE
BILIRUB UR QL: NEGATIVE
BUN SERPL-MCNC: 16 MG/DL (ref 6–20)
BUN/CREAT SERPL: 19 (ref 12–20)
CALCIUM SERPL-MCNC: 8.3 MG/DL (ref 8.5–10.1)
CANNABINOIDS UR QL SCN: NEGATIVE
CHLORIDE SERPL-SCNC: 99 MMOL/L (ref 97–108)
CO2 SERPL-SCNC: 29 MMOL/L (ref 21–32)
COCAINE UR QL SCN: NEGATIVE
COLOR UR: ABNORMAL
CREAT SERPL-MCNC: 0.85 MG/DL (ref 0.55–1.02)
DIFFERENTIAL METHOD BLD: ABNORMAL
DRUG SCRN COMMENT,DRGCM: NORMAL
EOSINOPHIL # BLD: 0.2 K/UL (ref 0–0.4)
EOSINOPHIL NFR BLD: 2 % (ref 0–7)
EPITH CASTS URNS QL MICRO: ABNORMAL /LPF
ERYTHROCYTE [DISTWIDTH] IN BLOOD BY AUTOMATED COUNT: 15 % (ref 11.5–14.5)
ETHANOL SERPL-MCNC: <10 MG/DL
GLUCOSE SERPL-MCNC: 233 MG/DL (ref 65–100)
GLUCOSE UR STRIP.AUTO-MCNC: 500 MG/DL
HCT VFR BLD AUTO: 38.2 % (ref 35–47)
HGB BLD-MCNC: 12.5 G/DL (ref 11.5–16)
HGB UR QL STRIP: NEGATIVE
IMM GRANULOCYTES # BLD: 0 K/UL (ref 0–0.04)
IMM GRANULOCYTES NFR BLD AUTO: 0 % (ref 0–0.5)
KETONES UR QL STRIP.AUTO: NEGATIVE MG/DL
LEUKOCYTE ESTERASE UR QL STRIP.AUTO: ABNORMAL
LYMPHOCYTES # BLD: 2.9 K/UL (ref 0.8–3.5)
LYMPHOCYTES NFR BLD: 32 % (ref 12–49)
MCH RBC QN AUTO: 27.3 PG (ref 26–34)
MCHC RBC AUTO-ENTMCNC: 32.7 G/DL (ref 30–36.5)
MCV RBC AUTO: 83.4 FL (ref 80–99)
METHADONE UR QL: NEGATIVE
MONOCYTES # BLD: 0.6 K/UL (ref 0–1)
MONOCYTES NFR BLD: 6 % (ref 5–13)
NEUTS SEG # BLD: 5.4 K/UL (ref 1.8–8)
NEUTS SEG NFR BLD: 59 % (ref 32–75)
NITRITE UR QL STRIP.AUTO: NEGATIVE
NRBC # BLD: 0 K/UL (ref 0–0.01)
NRBC BLD-RTO: 0 PER 100 WBC
OPIATES UR QL: NEGATIVE
PCP UR QL: NEGATIVE
PH UR STRIP: 6 [PH] (ref 5–8)
PLATELET # BLD AUTO: 316 K/UL (ref 150–400)
PMV BLD AUTO: 9.1 FL (ref 8.9–12.9)
POTASSIUM SERPL-SCNC: 3.6 MMOL/L (ref 3.5–5.1)
PROT UR STRIP-MCNC: NEGATIVE MG/DL
RBC # BLD AUTO: 4.58 M/UL (ref 3.8–5.2)
RBC #/AREA URNS HPF: ABNORMAL /HPF (ref 0–5)
SODIUM SERPL-SCNC: 135 MMOL/L (ref 136–145)
SP GR UR REFRACTOMETRY: 1.01 (ref 1–1.03)
UROBILINOGEN UR QL STRIP.AUTO: 0.2 EU/DL (ref 0.2–1)
WBC # BLD AUTO: 9 K/UL (ref 3.6–11)
WBC URNS QL MICRO: ABNORMAL /HPF (ref 0–4)

## 2018-10-03 PROCEDURE — 81001 URINALYSIS AUTO W/SCOPE: CPT | Performed by: EMERGENCY MEDICINE

## 2018-10-03 PROCEDURE — 74011000250 HC RX REV CODE- 250: Performed by: EMERGENCY MEDICINE

## 2018-10-03 PROCEDURE — 90791 PSYCH DIAGNOSTIC EVALUATION: CPT

## 2018-10-03 PROCEDURE — 85025 COMPLETE CBC W/AUTO DIFF WBC: CPT | Performed by: EMERGENCY MEDICINE

## 2018-10-03 PROCEDURE — 99283 EMERGENCY DEPT VISIT LOW MDM: CPT

## 2018-10-03 PROCEDURE — 80307 DRUG TEST PRSMV CHEM ANLYZR: CPT | Performed by: EMERGENCY MEDICINE

## 2018-10-03 PROCEDURE — 80048 BASIC METABOLIC PNL TOTAL CA: CPT | Performed by: EMERGENCY MEDICINE

## 2018-10-03 PROCEDURE — 36415 COLL VENOUS BLD VENIPUNCTURE: CPT | Performed by: EMERGENCY MEDICINE

## 2018-10-03 RX ORDER — BACITRACIN 500 UNIT/G
1 PACKET (EA) TOPICAL
Status: COMPLETED | OUTPATIENT
Start: 2018-10-03 | End: 2018-10-03

## 2018-10-03 RX ADMIN — BACITRACIN 1 PACKET: 500 OINTMENT TOPICAL at 21:03

## 2018-10-03 NOTE — IP AVS SNAPSHOT
Joselitolatia Tay 
 
 
 [address] [facility] Patient: Dagoberto Washington MRN: [id number] :1965 A check vanessa indicates which time of day the medication should be taken. My Medications START taking these medications Instructions Each Dose to Equal  
 Morning Noon Evening Bedtime  
 sertraline 25 mg tablet Commonly known as:  ZOLOFT Start taking on:  10/6/2018 Your last dose was: Your next dose is: Take 1 Tab by mouth daily. Indications: major depressive disorder 25 mg CONTINUE taking these medications Instructions Each Dose to Equal  
 Morning Noon Evening Bedtime  
 aspirin delayed-release 81 mg tablet Your last dose was: Your next dose is: Take 81 mg by mouth daily. 81 mg  
    
   
   
   
  
 BREO ELLIPTA 200-25 mcg/dose inhaler Generic drug:  fluticasone-vilanterol Your last dose was: Your next dose is: Take 1 Puff by inhalation daily. 1 Puff  
    
   
   
   
  
 famotidine 20 mg tablet Commonly known as:  PEPCID Your last dose was: Your next dose is: Take 20 mg by mouth daily. Indications: Heartburn  
 20 mg  
    
   
   
   
  
 glipiZIDE 10 mg tablet Commonly known as:  Ema Garcia Your last dose was: Your next dose is: Take 10 mg by mouth two (2) times a day. Indications: type 2 diabetes mellitus 10 mg  
    
   
   
   
  
 LASIX 40 mg tablet Generic drug:  furosemide Your last dose was: Your next dose is: Take 40 mg by mouth daily. 40 mg  
    
   
   
   
  
 lisinopril 2.5 mg tablet Commonly known as:  Sasha Vang Your last dose was: Your next dose is: Take 2.5 mg by mouth daily. Indications: hypertension  2.5 mg  
    
   
   
   
  
 lovastatin 20 mg tablet Commonly known as:  MEVACOR Your last dose was: Your next dose is: Take 20 mg by mouth nightly. Indications: hyperlipidemia 20 mg  
    
   
   
   
  
 meloxicam 15 mg tablet Commonly known as:  MOBIC Your last dose was: Your next dose is: Take 15 mg by mouth daily. Indications: Arthritis 15 mg  
    
   
   
   
  
 paliperidone 3 mg SR tablet Commonly known as:  INVEGA Start taking on:  10/6/2018 Your last dose was: Your next dose is: Take 1 Tab by mouth daily. Indications: SCHIZOAFFECTIVE DISORDER  
 3 mg * VENTOLIN HFA 90 mcg/actuation inhaler Generic drug:  albuterol Your last dose was: Your next dose is: Take 2 Puffs by inhalation every six (6) hours as needed for Wheezing. 2 Puff * albuterol 2.5 mg /3 mL (0.083 %) nebulizer solution Commonly known as:  PROVENTIL VENTOLIN Your last dose was: Your next dose is:    
   
   
 2.5 mg by Nebulization route three (3) times daily as needed for Wheezing or Shortness of Breath. 2.5 mg  
    
   
   
   
  
 * Notice: This list has 2 medication(s) that are the same as other medications prescribed for you. Read the directions carefully, and ask your doctor or other care provider to review them with you. STOP taking these medications   
 cetirizine 10 mg tablet Commonly known as:  ZYRTEC  
   
  
 potassium chloride 20 mEq tablet Commonly known as:  K-DUR, KLOR-CON QUEtiapine 50 mg tablet Commonly known as:  SEROquel Where to Get Your Medications These medications were sent to [address] [place] [id number] Phone:  [phone]  
  paliperidone 3 mg SR tablet Information on where to get these meds will be given to you by the nurse or doctor. !  Ask your nurse or doctor about these medications  
  sertraline 25 mg tablet

## 2018-10-03 NOTE — IP AVS SNAPSHOT
[facility] 
 
 
 [address] M[address] Patient: Glenys Dias MRN: [id number] :1965 About your hospitalization You were admitted on:  2018 You last received care in the:  [facility]. 291 You were discharged on:  2018 Why you were hospitalized Your primary diagnosis was:  Schizoaffective Disorder (Hcc) Your diagnoses also included:  Bipolar 1 Disorder (Hcc) Follow-up Information Follow up With Details Comments Contact Info Rodrigo Johnson NP   Appointment 10/9/18 @ 2:15pm  [facility] [place] [facility] Capo Diaz MD   [address] [place] [address] 
[phone] Jewel Toned  Appointment: [address] [place] [address] 
[phone] Discharge Orders None A check vanessa indicates which time of day the medication should be taken. My Medications START taking these medications Instructions Each Dose to Equal  
 Morning Noon Evening Bedtime  
 sertraline 25 mg tablet Commonly known as:  ZOLOFT Start taking on:  10/6/2018 Your last dose was: Your next dose is: Take 1 Tab by mouth daily. Indications: major depressive disorder 25 mg CONTINUE taking these medications Instructions Each Dose to Equal  
 Morning Noon Evening Bedtime  
 aspirin delayed-release 81 mg tablet Your last dose was: Your next dose is: Take 81 mg by mouth daily. 81 mg  
    
   
   
   
  
 BREO ELLIPTA 200-25 mcg/dose inhaler Generic drug:  fluticasone-vilanterol Your last dose was: Your next dose is: Take 1 Puff by inhalation daily. 1 Puff  
    
   
   
   
  
 famotidine 20 mg tablet Commonly known as:  PEPCID Your last dose was:     
   
Your next dose is: Take 20 mg by mouth daily. Indications: Heartburn  
 20 mg  
    
   
   
   
  
 glipiZIDE 10 mg tablet Commonly known as:  Luz Maria Torres Your last dose was: Your next dose is: Take 10 mg by mouth two (2) times a day. Indications: type 2 diabetes mellitus 10 mg  
    
   
   
   
  
 LASIX 40 mg tablet Generic drug:  furosemide Your last dose was: Your next dose is: Take 40 mg by mouth daily. 40 mg  
    
   
   
   
  
 lisinopril 2.5 mg tablet Commonly known as:  Vilinda Madison Your last dose was: Your next dose is: Take 2.5 mg by mouth daily. Indications: hypertension 2.5 mg  
    
   
   
   
  
 lovastatin 20 mg tablet Commonly known as:  MEVACOR Your last dose was: Your next dose is: Take 20 mg by mouth nightly. Indications: hyperlipidemia 20 mg  
    
   
   
   
  
 meloxicam 15 mg tablet Commonly known as:  MOBIC Your last dose was: Your next dose is: Take 15 mg by mouth daily. Indications: Arthritis 15 mg  
    
   
   
   
  
 paliperidone 3 mg SR tablet Commonly known as:  INVEGA Start taking on:  10/6/2018 Your last dose was: Your next dose is: Take 1 Tab by mouth daily. Indications: SCHIZOAFFECTIVE DISORDER  
 3 mg * VENTOLIN HFA 90 mcg/actuation inhaler Generic drug:  albuterol Your last dose was: Your next dose is: Take 2 Puffs by inhalation every six (6) hours as needed for Wheezing. 2 Puff * albuterol 2.5 mg /3 mL (0.083 %) nebulizer solution Commonly known as:  PROVENTIL VENTOLIN Your last dose was: Your next dose is:    
   
   
 2.5 mg by Nebulization route three (3) times daily as needed for Wheezing or Shortness of Breath. 2.5 mg  
    
   
   
   
  
 * Notice:   This list has 2 medication(s) that are the same as other medications prescribed for you. Read the directions carefully, and ask your doctor or other care provider to review them with you. STOP taking these medications   
 cetirizine 10 mg tablet Commonly known as:  ZYRTEC  
   
  
 potassium chloride 20 mEq tablet Commonly known as:  K-DUR KLOR-CON QUEtiapine 50 mg tablet Commonly known as:  SEROquel Where to Get Your Medications These medications were sent to [address] [place] [address], [address] [place] [id number] Phone:  [phone]  
  paliperidone 3 mg SR tablet Information on where to get these meds will be given to you by the nurse or doctor. ! Ask your nurse or doctor about these medications  
  sertraline 25 mg tablet Discharge Instructions DISCHARGE SUMMARY from Nurse PATIENT INSTRUCTIONS: 
 
After general anesthesia or intravenous sedation, for 24 hours or while taking prescription Narcotics: · Limit your activities · Do not drive and operate hazardous machinery · Do not make important personal or business decisions · Do  not drink alcoholic beverages · If you have not urinated within 8 hours after discharge, please contact your surgeon on call. Report the following to your surgeon: 
· Excessive pain, swelling, redness or odor of or around the surgical area · Temperature over 100.5 · Nausea and vomiting lasting longer than 4 hours or if unable to take medications · Any signs of decreased circulation or nerve impairment to extremity: change in color, persistent  numbness, tingling, coldness or increase pain · Any questions *  Please give a list of your current medications to your Primary Care Provider. *  Please update this list whenever your medications are discontinued, doses are 
    changed, or new medications (including over-the-counter products) are added.  
 
*  Please carry medication information at all times in case of emergency situations. These are general instructions for a healthy lifestyle: No smoking/ No tobacco products/ Avoid exposure to second hand smoke Surgeon General's Warning:  Quitting smoking now greatly reduces serious risk to your health. Obesity, smoking, and sedentary lifestyle greatly increases your risk for illness A healthy diet, regular physical exercise & weight monitoring are important for maintaining a healthy lifestyle You may be retaining fluid if you have a history of heart failure or if you experience any of the following symptoms:  Weight gain of 3 pounds or more overnight or 5 pounds in a week, increased swelling in our hands or feet or shortness of breath while lying flat in bed. Please call your doctor as soon as you notice any of these symptoms; do not wait until your next office visit. Recognize signs and symptoms of STROKE: 
 
F-face looks uneven A-arms unable to move or move unevenly S-speech slurred or non-existent T-time-call 911 as soon as signs and symptoms begin-DO NOT go Back to bed or wait to see if you get better-TIME IS BRAIN. Warning Signs of HEART ATTACK Call 911 if you have these symptoms:  Chest discomfort. Most heart attacks involve discomfort in the center of the chest that lasts more than a few minutes, or that goes away and comes back. It can feel like uncomfortable pressure, squeezing, fullness, or pain.  Discomfort in other areas of the upper body. Symptoms can include pain or discomfort in one or both arms, the back, neck, jaw, or stomach.  Shortness of breath with or without chest discomfort.  Other signs may include breaking out in a cold sweat, nausea, or lightheadedness. Don't wait more than five minutes to call [address]! Fast action can save your life. Calling 911 is almost always the fastest way to get lifesaving treatment.  Emergency Medical Services staff can begin treatment when they arrive  up to an hour sooner than if someone gets to the hospital by car. If I feel that I can not keep these promises and I am at risk of hurting myself or others, I will call the crisis office and speak with a crisis worker who will assist me during my crisis. [facility]                [phone] [address] [place] [address],[facility] [address] [place] [address]                      [phone] [facility] Crisis           [phone] [place] [facility]            [phone] The discharge information has been reviewed with the patient. The patient verbalized understanding. Discharge medications reviewed with the patient and appropriate educational materials and side effects teaching were provided. ___________________________________________________________________________________________________________________________________ Introducing [facility]! [employer] introduces Aptela patient portal. Now you can access parts of your medical record, email your doctor's office, and request medication refills online. 1. In your internet browser, go to [email or web address] 2. Click on the First Time User? Click Here link in the Sign In box. You will see the New Member Sign Up page. 3. Enter your Aptela Access Code exactly as it appears below. You will not need to use this code after youve completed the sign-up process. If you do not sign up before the expiration date, you must request a new code. · Aptela Access Code: [email or web address] Expires: 1/1/2019  8:29 PM 
 
4. Enter the last four digits of your Social Security Number (xxxx) and Date of Birth (mm/dd/yyyy) as indicated and click Submit. You will be taken to the next sign-up page. 5. Create a MyChart ID. This will be your MyChart login ID and cannot be changed, so think of one that is secure and easy to remember.  
6. Create a Mooltahart password. You can change your password at any time. 7. Enter your Password Reset Question and Answer. This can be used at a later time if you forget your password. 8. Enter your e-mail address. You will receive e-mail notification when new information is available in [address] 9. Click Sign Up. You can now view and download portions of your medical record. 10. Click the Download Summary menu link to download a portable copy of your medical information. If you have questions, please visit the Frequently Asked Questions section of the Kurtos[employer] website. Remember, [facility] is NOT to be used for urgent needs. For medical emergencies, dial 911. Now available from your iPhone and Android! Introducing Inocencio Cruz As a [facility] patient, I wanted to make you aware of our electronic visit tool called Inocencio Cruz. [employer]Kros[employer] 24/LFS ([employer]) allows you to connect within minutes with a medical provider 24 hours a day, seven days a week via a mobile device or tablet or logging into a secure website from your computer. You can access Inocencio Cruz from anywhere in the [place]. A virtual visit might be right for you when you have a simple condition and feel like you just dont want to get out of bed, or cant get away from work for an appointment, when your regular [facility] provider is not available (evenings, weekends or holidays), or when youre out of town and need minor care. Electronic visits cost only $49 and if the [facility] [facility] 24/LFS ([employer]) provider determines a prescription is needed to treat your condition, one can be electronically transmitted to a nearby pharmacy*. Please take a moment to enroll today if you have not already done so. The enrollment process is free and takes just a few minutes. To enroll, please download the [employer]/LFS ([employer]) shawn to your tablet or phone, or visit [email or web address] to enroll on your computer.    
And, as an [address] [place] [address] patient with a [facility] Mingleplay account, the results of your visits will be scanned into your electronic medical record and your primary care provider will be able to view the scanned results. We urge you to continue to see your regular [facility] provider for your ongoing medical care. And while your primary care provider may not be the one available when you seek a Efieldlucerofin virtual visit, the peace of mind you get from getting a real diagnosis real time can be priceless. For more information on MyMiniLife, view our Frequently Asked Questions (FAQs) at [email or web address]. Sincerely, 
 
Susan Ferguson MD 
Chief Medical Officer Larry Boyle *:  certain medications cannot be prescribed via MyMiniLife Providers Seen During Your Hospitalization Provider Specialty Primary office phone Christina Sher MD Emergency Medicine [phone] Alyssa Wisdom MD Psychiatry [phone] Tete Cowan MD Psychiatry [phone] Royer Sin MD Psychiatry [phone] Your Primary Care Physician (PCP) Primary Care Physician Office Phone Office Fax Kirstie Sugey [phone] You are allergic to the following Allergen Reactions Amoxicillin Hives Mushroom Flavor Hives Tomato Hives Recent Documentation Height Weight Breastfeeding? BMI OB Status Smoking Status 1.549 m 104.3 kg No 43.46 kg/m2 Menopause Current Every Day Smoker Emergency Contacts Name Discharge Info Relation Home Work Mobile [address] CAREGIVER [3] Agent [29] [phone] Patient Belongings The following personal items are in your possession at time of discharge: 
  Dental Appliances: None  Visual Aid: None      Home Medications: None   Jewelry: None  Clothing: Footwear, Dress, Pants, Shorts, Jacket/Coat    Other Valuables: Cigarettes, Lighter/matches, Money (comment) (loose change) Please provide this summary of care documentation to your next provider. Signatures-by signing, you are acknowledging that this After Visit Summary has been reviewed with you and you have received a copy. Patient Signature:  ____________________________________________________________ Date:  ____________________________________________________________  
  
Concha Lightning Provider Signature:  ____________________________________________________________ Date:  ____________________________________________________________

## 2018-10-04 PROBLEM — F31.9 BIPOLAR 1 DISORDER (HCC): Status: RESOLVED | Noted: 2018-10-04 | Resolved: 2018-10-04

## 2018-10-04 PROBLEM — F31.9 BIPOLAR 1 DISORDER (HCC): Status: ACTIVE | Noted: 2018-10-04

## 2018-10-04 LAB
GLUCOSE BLD STRIP.AUTO-MCNC: 146 MG/DL (ref 65–100)
GLUCOSE BLD STRIP.AUTO-MCNC: 222 MG/DL (ref 65–100)
GLUCOSE BLD STRIP.AUTO-MCNC: 263 MG/DL (ref 65–100)
GLUCOSE BLD STRIP.AUTO-MCNC: 335 MG/DL (ref 65–100)
SERVICE CMNT-IMP: ABNORMAL

## 2018-10-04 PROCEDURE — 74011250637 HC RX REV CODE- 250/637: Performed by: PSYCHIATRY & NEUROLOGY

## 2018-10-04 PROCEDURE — 65220000003 HC RM SEMIPRIVATE PSYCH

## 2018-10-04 PROCEDURE — 82962 GLUCOSE BLOOD TEST: CPT

## 2018-10-04 PROCEDURE — 74011250637 HC RX REV CODE- 250/637: Performed by: INTERNAL MEDICINE

## 2018-10-04 PROCEDURE — 74011636637 HC RX REV CODE- 636/637: Performed by: INTERNAL MEDICINE

## 2018-10-04 RX ORDER — ALBUTEROL SULFATE 0.83 MG/ML
2.5 SOLUTION RESPIRATORY (INHALATION)
Status: ON HOLD | COMMUNITY
End: 2021-09-22

## 2018-10-04 RX ORDER — INSULIN LISPRO 100 [IU]/ML
INJECTION, SOLUTION INTRAVENOUS; SUBCUTANEOUS
Status: DISCONTINUED | OUTPATIENT
Start: 2018-10-04 | End: 2018-10-05 | Stop reason: HOSPADM

## 2018-10-04 RX ORDER — SERTRALINE HYDROCHLORIDE 50 MG/1
25 TABLET, FILM COATED ORAL DAILY
Status: DISCONTINUED | OUTPATIENT
Start: 2018-10-04 | End: 2018-10-05 | Stop reason: HOSPADM

## 2018-10-04 RX ORDER — IBUPROFEN 200 MG
1 TABLET ORAL
Status: DISCONTINUED | OUTPATIENT
Start: 2018-10-04 | End: 2018-10-05 | Stop reason: HOSPADM

## 2018-10-04 RX ORDER — LORAZEPAM 2 MG/ML
2 INJECTION INTRAMUSCULAR
Status: DISCONTINUED | OUTPATIENT
Start: 2018-10-04 | End: 2018-10-05 | Stop reason: HOSPADM

## 2018-10-04 RX ORDER — ASPIRIN 81 MG/1
81 TABLET ORAL DAILY
Status: DISCONTINUED | OUTPATIENT
Start: 2018-10-05 | End: 2018-10-05 | Stop reason: HOSPADM

## 2018-10-04 RX ORDER — LORAZEPAM 1 MG/1
1 TABLET ORAL
Status: DISCONTINUED | OUTPATIENT
Start: 2018-10-04 | End: 2018-10-05 | Stop reason: HOSPADM

## 2018-10-04 RX ORDER — POTASSIUM CHLORIDE 20 MEQ/1
20 TABLET, EXTENDED RELEASE ORAL DAILY
COMMUNITY
End: 2018-10-05

## 2018-10-04 RX ORDER — GLIPIZIDE 5 MG/1
10 TABLET ORAL
Status: DISCONTINUED | OUTPATIENT
Start: 2018-10-04 | End: 2018-10-04

## 2018-10-04 RX ORDER — ATORVASTATIN CALCIUM 10 MG/1
10 TABLET, FILM COATED ORAL DAILY
Status: DISCONTINUED | OUTPATIENT
Start: 2018-10-05 | End: 2018-10-05 | Stop reason: HOSPADM

## 2018-10-04 RX ORDER — CETIRIZINE HCL 10 MG
10 TABLET ORAL DAILY
Status: DISCONTINUED | OUTPATIENT
Start: 2018-10-05 | End: 2018-10-05 | Stop reason: CLARIF

## 2018-10-04 RX ORDER — MAGNESIUM SULFATE 100 %
4 CRYSTALS MISCELLANEOUS AS NEEDED
Status: DISCONTINUED | OUTPATIENT
Start: 2018-10-04 | End: 2018-10-05 | Stop reason: HOSPADM

## 2018-10-04 RX ORDER — PALIPERIDONE 3 MG/1
3 TABLET, EXTENDED RELEASE ORAL DAILY
Status: DISCONTINUED | OUTPATIENT
Start: 2018-10-04 | End: 2018-10-05 | Stop reason: HOSPADM

## 2018-10-04 RX ORDER — ADHESIVE BANDAGE
30 BANDAGE TOPICAL DAILY PRN
Status: DISCONTINUED | OUTPATIENT
Start: 2018-10-04 | End: 2018-10-05 | Stop reason: HOSPADM

## 2018-10-04 RX ORDER — LISINOPRIL 5 MG/1
2.5 TABLET ORAL DAILY
Status: DISCONTINUED | OUTPATIENT
Start: 2018-10-05 | End: 2018-10-05 | Stop reason: HOSPADM

## 2018-10-04 RX ORDER — BENZTROPINE MESYLATE 1 MG/ML
2 INJECTION INTRAMUSCULAR; INTRAVENOUS
Status: DISCONTINUED | OUTPATIENT
Start: 2018-10-04 | End: 2018-10-05 | Stop reason: HOSPADM

## 2018-10-04 RX ORDER — BENZTROPINE MESYLATE 2 MG/1
2 TABLET ORAL
Status: DISCONTINUED | OUTPATIENT
Start: 2018-10-04 | End: 2018-10-05 | Stop reason: HOSPADM

## 2018-10-04 RX ORDER — FUROSEMIDE 40 MG/1
40 TABLET ORAL DAILY
Status: ON HOLD | COMMUNITY
End: 2021-09-22

## 2018-10-04 RX ORDER — FUROSEMIDE 40 MG/1
40 TABLET ORAL DAILY
Status: DISCONTINUED | OUTPATIENT
Start: 2018-10-05 | End: 2018-10-05 | Stop reason: HOSPADM

## 2018-10-04 RX ORDER — DEXTROSE 50 % IN WATER (D50W) INTRAVENOUS SYRINGE
12.5-25 AS NEEDED
Status: DISCONTINUED | OUTPATIENT
Start: 2018-10-04 | End: 2018-10-05 | Stop reason: HOSPADM

## 2018-10-04 RX ORDER — IBUPROFEN 400 MG/1
400 TABLET ORAL
Status: DISCONTINUED | OUTPATIENT
Start: 2018-10-04 | End: 2018-10-05 | Stop reason: HOSPADM

## 2018-10-04 RX ORDER — ACETAMINOPHEN 325 MG/1
650 TABLET ORAL
Status: DISCONTINUED | OUTPATIENT
Start: 2018-10-04 | End: 2018-10-05 | Stop reason: HOSPADM

## 2018-10-04 RX ORDER — MELOXICAM 15 MG/1
15 TABLET ORAL DAILY
Status: ON HOLD | COMMUNITY
End: 2021-09-22

## 2018-10-04 RX ORDER — INSULIN LISPRO 100 [IU]/ML
4 INJECTION, SOLUTION INTRAVENOUS; SUBCUTANEOUS ONCE
Status: COMPLETED | OUTPATIENT
Start: 2018-10-04 | End: 2018-10-04

## 2018-10-04 RX ORDER — ZOLPIDEM TARTRATE 10 MG/1
10 TABLET ORAL
Status: DISCONTINUED | OUTPATIENT
Start: 2018-10-04 | End: 2018-10-05 | Stop reason: HOSPADM

## 2018-10-04 RX ORDER — ALBUTEROL SULFATE 90 UG/1
2 AEROSOL, METERED RESPIRATORY (INHALATION)
Status: DISCONTINUED | OUTPATIENT
Start: 2018-10-04 | End: 2018-10-05 | Stop reason: HOSPADM

## 2018-10-04 RX ORDER — FAMOTIDINE 20 MG/1
20 TABLET, FILM COATED ORAL DAILY
Status: DISCONTINUED | OUTPATIENT
Start: 2018-10-05 | End: 2018-10-05 | Stop reason: HOSPADM

## 2018-10-04 RX ORDER — FLUTICASONE FUROATE AND VILANTEROL 200; 25 UG/1; UG/1
1 POWDER RESPIRATORY (INHALATION) DAILY
Status: DISCONTINUED | OUTPATIENT
Start: 2018-10-05 | End: 2018-10-05 | Stop reason: CLARIF

## 2018-10-04 RX ORDER — GLIPIZIDE 5 MG/1
10 TABLET ORAL 2 TIMES DAILY
Status: DISCONTINUED | OUTPATIENT
Start: 2018-10-05 | End: 2018-10-05 | Stop reason: HOSPADM

## 2018-10-04 RX ORDER — QUETIAPINE FUMARATE 50 MG/1
50 TABLET, FILM COATED ORAL
COMMUNITY
End: 2018-10-05

## 2018-10-04 RX ORDER — FLUTICASONE FUROATE AND VILANTEROL 200; 25 UG/1; UG/1
1 POWDER RESPIRATORY (INHALATION) DAILY
Status: ON HOLD | COMMUNITY
End: 2021-09-22

## 2018-10-04 RX ORDER — PALIPERIDONE 3 MG/1
3 TABLET, EXTENDED RELEASE ORAL DAILY
COMMUNITY
End: 2018-10-05

## 2018-10-04 RX ORDER — QUETIAPINE FUMARATE 25 MG/1
50 TABLET, FILM COATED ORAL
Status: DISCONTINUED | OUTPATIENT
Start: 2018-10-04 | End: 2018-10-04

## 2018-10-04 RX ORDER — OLANZAPINE 5 MG/1
5 TABLET ORAL
Status: DISCONTINUED | OUTPATIENT
Start: 2018-10-04 | End: 2018-10-05 | Stop reason: HOSPADM

## 2018-10-04 RX ADMIN — PALIPERIDONE 3 MG: 3 TABLET, EXTENDED RELEASE ORAL at 18:18

## 2018-10-04 RX ADMIN — INSULIN LISPRO 4 UNITS: 100 INJECTION, SOLUTION INTRAVENOUS; SUBCUTANEOUS at 18:18

## 2018-10-04 RX ADMIN — GLIPIZIDE 10 MG: 5 TABLET ORAL at 18:18

## 2018-10-04 RX ADMIN — SERTRALINE HYDROCHLORIDE 25 MG: 50 TABLET ORAL at 14:43

## 2018-10-04 RX ADMIN — ZOLPIDEM TARTRATE 10 MG: 10 TABLET ORAL at 21:05

## 2018-10-04 NOTE — ED NOTES
Bedside and Verbal shift change report given to [facility] RN (oncoming nurse) by Moisés Galindo RN (offgoing nurse). Report included the following information SBAR, Kardex, Procedure Summary, Intake/Output and MAR.

## 2018-10-04 NOTE — ED PROVIDER NOTES
EMERGENCY DEPARTMENT HISTORY AND PHYSICAL EXAM 
 
 
Date: 10/3/2018 Patient Name: Jose Chapman History of Presenting Illness Chief Complaint Patient presents with  Suicidal  
 
 
History Provided By: Patient HPI: Jose Chapman, 52 y.o. female with PMHx significant for DM, Arthritis, COPD, Schizophrenia, depression, polydrug abuse, suicidal attempt, and bipolar disorder presents ambulatory to the ED with cc of suicidal ideation with plan beginning two days ago along with associated auditory hallucinations. Pt reports she is having problems at home with her . Pt states she plans to overdose on drugs or would like to be hit by a car. She states she is hearing her  grandmother's voice. She endorses alcohol and drug use yesterday. Pt states she is regularly seen by [facility], noting she is not taking Zoloft and Seroquel as prescribed. She denies any hx of homicidal ideations. She specifically denies any fever, chills, CP, SOB, abd pain, nausea,or vomiting. Chief Complaint: Suicidal ideation Duration: 2 Days Timing:  N/A Location: None Quality: None Severity: Moderate Modifying Factors: Stress at home Associated Symptoms: auditory hallucinations There are no other complaints, changes, or physical findings at this time. PCP: Roel Sandy MD 
 
Current Outpatient Prescriptions Medication Sig Dispense Refill  furosemide (LASIX PO) Take  by mouth.  aspirin delayed-release 81 mg tablet Take 81 mg by mouth daily.  albuterol (VENTOLIN HFA) 90 mcg/actuation inhaler Take 2 Puffs by inhalation every six (6) hours as needed for Wheezing.  cetirizine (ZYRTEC) 10 mg tablet Take 10 mg by mouth daily. Indications: Allergic Rhinitis  famotidine (PEPCID) 20 mg tablet Take 20 mg by mouth daily. Indications: Heartburn  lisinopril (PRINIVIL, ZESTRIL) 2.5 mg tablet Take 2.5 mg by mouth daily. Indications: hypertension  lovastatin (MEVACOR) 20 mg tablet Take 20 mg by mouth nightly. Indications: hyperlipidemia  glipiZIDE (GLUCOTROL) 10 mg tablet Take 10 mg by mouth two (2) times a day. Indications: type 2 diabetes mellitus Past History Past Medical History: 
Past Medical History:  
Diagnosis Date  Arthritis   
 legs  [facility]  COPD  Depression 1/13/2012  Diabetes ([facility]  Drug abuse ([facility].) cocaine, stimulants, etoh, mj, tob  H/O suicide attempt  Obesity  Polydrug dependence excluding opioid type drug, abuse ([facility].)  Schizophrenia ([facility].) 7/8/2016 Past Surgical History: 
Past Surgical History:  
Procedure Laterality Date  HX ORTHOPAEDIC    
 foot sx Family History: 
Family History Problem Relation Age of Onset  Diabetes Mother  Stroke Mother  Stroke Father Social History: 
Social History Substance Use Topics  Smoking status: Current Every Day Smoker Packs/day: 1.50 Years: 10.00 Types: Cigarettes  Smokeless tobacco: Current User  Alcohol use No  
   Comment: Hx of ETOH abuse since age 13. no DUIs. Allergies: Allergies Allergen Reactions  Amoxicillin Hives  Mushroom Flavor Hives  Tomato Hives Review of Systems Review of Systems Constitutional: Negative for chills and fever. HENT: Negative for congestion, rhinorrhea, sneezing and sore throat. Respiratory: Negative for shortness of breath. Cardiovascular: Negative for chest pain. Gastrointestinal: Negative for abdominal pain, nausea and vomiting. Musculoskeletal: Negative for back pain, myalgias and neck stiffness. Skin: Negative for rash. Neurological: Negative for dizziness, weakness and headaches. Psychiatric/Behavioral: Positive for hallucinations (auditory) and suicidal ideas. All other systems reviewed and are negative. Physical Exam  
Physical Exam  
Constitutional: She is oriented to person, place, and time.  She appears well-developed and well-nourished. HENT:  
Head: Normocephalic and atraumatic. Mouth/Throat: Oropharynx is clear and moist.  
Eyes: Conjunctivae and EOM are normal.  
Neck: Normal range of motion and full passive range of motion without pain. Neck supple. Cardiovascular: Normal rate, regular rhythm, S1 normal, S2 normal, normal heart sounds, intact distal pulses and normal pulses. No murmur heard. Pulmonary/Chest: Effort normal and breath sounds normal. No respiratory distress. She has no wheezes. Abdominal: Soft. Normal appearance and bowel sounds are normal. She exhibits no distension. There is no tenderness. There is no rebound. Musculoskeletal: Normal range of motion. Neurological: She is alert and oriented to person, place, and time. She has normal strength. Skin: Skin is warm, dry and intact. No rash noted. Psychiatric: She has a normal mood and affect. Her speech is normal and behavior is normal. Judgment normal. She expresses suicidal ideation. She expresses suicidal plans. Nursing note and vitals reviewed. Diagnostic Study Results Labs - Recent Results (from the past 12 hour(s)) METABOLIC PANEL, BASIC Collection Time: 10/03/18  8:54 PM  
Result Value Ref Range Sodium 135 (L) 136 - 145 mmol/L Potassium 3.6 3.5 - 5.1 mmol/L Chloride 99 97 - 108 mmol/L  
 CO2 29 21 - 32 mmol/L Anion gap 7 5 - 15 mmol/L Glucose 233 (H) 65 - 100 mg/dL BUN 16 6 - 20 MG/DL Creatinine 0.85 0.55 - 1.02 MG/DL  
 BUN/Creatinine ratio 19 12 - 20 GFR est AA >60 >60 ml/min/1.73m2 GFR est non-AA >60 >60 ml/min/1.73m2 Calcium 8.3 (L) 8.5 - 10.1 MG/DL  
CBC WITH AUTOMATED DIFF Collection Time: 10/03/18  8:54 PM  
Result Value Ref Range WBC 9.0 3.6 - 11.0 K/uL  
 RBC 4.58 3.80 - 5.20 M/uL  
 HGB 12.5 11.5 - 16.0 g/dL HCT 38.2 35.0 - 47.0 % MCV 83.4 80.0 - 99.0 FL  
 MCH 27.3 26.0 - 34.0 PG  
 MCHC 32.7 30.0 - 36.5 g/dL  
 RDW 15.0 (H) 11.5 - 14.5 %  PLATELET 524 712 - 992 K/uL  
 MPV 9.1 8.9 - 12.9 FL  
 NRBC 0.0 0  WBC ABSOLUTE NRBC 0.00 0.00 - 0.01 K/uL NEUTROPHILS 59 32 - 75 % LYMPHOCYTES 32 12 - 49 % MONOCYTES 6 5 - 13 % EOSINOPHILS 2 0 - 7 % BASOPHILS 0 0 - 1 % IMMATURE GRANULOCYTES 0 0.0 - 0.5 % ABS. NEUTROPHILS 5.4 1.8 - 8.0 K/UL  
 ABS. LYMPHOCYTES 2.9 0.8 - 3.5 K/UL  
 ABS. MONOCYTES 0.6 0.0 - 1.0 K/UL  
 ABS. EOSINOPHILS 0.2 0.0 - 0.4 K/UL  
 ABS. BASOPHILS 0.0 0.0 - 0.1 K/UL  
 ABS. IMM. GRANS. 0.0 0.00 - 0.04 K/UL  
 DF AUTOMATED    
ETHYL ALCOHOL Collection Time: 10/03/18  8:54 PM  
Result Value Ref Range ALCOHOL(ETHYL),SERUM <10 <10 MG/DL URINALYSIS W/ RFLX MICROSCOPIC Collection Time: 10/03/18  9:59 PM  
Result Value Ref Range Color YELLOW/STRAW Appearance CLEAR CLEAR Specific gravity 1.010 1.003 - 1.030    
 pH (UA) 6.0 5.0 - 8.0 Protein NEGATIVE  NEG mg/dL Glucose 500 (A) NEG mg/dL Ketone NEGATIVE  NEG mg/dL Bilirubin NEGATIVE  NEG Blood NEGATIVE  NEG Urobilinogen 0.2 0.2 - 1.0 EU/dL Nitrites NEGATIVE  NEG Leukocyte Esterase SMALL (A) NEG    
DRUG SCREEN, URINE Collection Time: 10/03/18  9:59 PM  
Result Value Ref Range AMPHETAMINES NEGATIVE  NEG    
 BARBITURATES NEGATIVE  NEG BENZODIAZEPINES NEGATIVE  NEG    
 COCAINE NEGATIVE  NEG METHADONE NEGATIVE  NEG    
 OPIATES NEGATIVE  NEG    
 PCP(PHENCYCLIDINE) NEGATIVE  NEG    
 THC (TH-CANNABINOL) NEGATIVE  NEG Drug screen comment (NOTE) URINE MICROSCOPIC ONLY Collection Time: 10/03/18  9:59 PM  
Result Value Ref Range WBC 0-4 0 - 4 /hpf  
 RBC 0-5 0 - 5 /hpf Epithelial cells MODERATE (A) FEW /lpf Bacteria NEGATIVE  NEG /hpf Medical Decision Making I am the first provider for this patient. I reviewed the vital signs, available nursing notes, past medical history, past surgical history, family history and social history. Vital Signs-Reviewed the patient's vital signs.  
Patient Vitals for the past 12 hrs: 
 Temp Pulse Resp BP SpO2  
10/03/18 2031 98.5 °F (36.9 °C) 94 18 156/88 97 % Records Reviewed: Nursing Notes and Old Medical Records Provider Notes (Medical Decision Making): DDx: Suicidal ideation, schizoaffective. Polysubstance abuse, medication noncompliant, foot blister ED Course:  
Initial assessment performed. The patients presenting problems have been discussed, and they are in agreement with the care plan formulated and outlined with them. I have encouraged them to ask questions as they arise throughout their visit. CONSULT NOTE:  
10:35 PM 
Ary Whitley MD spoke with Sneha Garcia MD, Specialty: Psychiatry Discussed pt's hx, disposition, and available diagnostic and imaging results. Reviewed care plans. Consultant agrees with plans as outlined. Written by Talita Moreno, ED Scribe, as dictated by Ary Whitley MD. Disposition: 
10:35 PM 
Patient is being admitted to the hospital. The results of their tests and reasons for their admission have been discussed with them and/or available family. They convey agreement and understanding for the need to be admitted and for their admission diagnosis. Consultation has been made with the inpatient physician specialist for hospitalization. PLAN: 
1. Current Discharge Medication List  
  
 
2. Follow-up Information None Return to ED if worse Diagnosis Clinical Impression: No diagnosis found. Attestations: This note is prepared by Talita Moreno, acting as Scribe for Ary Whitley MD. 
 
Ary Whitley MD: The scribe's documentation has been prepared under my direction and personally reviewed by me in its entirety. I confirm that the note above accurately reflects all work, treatment, procedures, and medical decision making performed by me. This note will not be viewable in [address]

## 2018-10-04 NOTE — BH NOTES
PSYCHOSOCIAL ASSESSMENT 
:Patient identifying info: 
Meghan Plata is a 52 y.o., female admitted 10/3/2018  8:32 PM  
 
Presenting problem and precipitating factors: Pt was admitted on a voluntary status due to [address] ideations after having an argument with her . Pt reported that her  has been complaining about paying the rent. Per pt's  when she is arguing with her  she seeks admission in the hospital. Pt has a history of noncompliance with medications and treatment Mental status assessment: Pt was alert and oriented. Pt denies SI/HI. Pt is free of delusions. She reported experiencing auditory hallucinations when she is off her medications. Pt's mood was labile and tearful, affect was congruent with mood. Pt's thought process was logical. Pt's insight and judgment was limited, reliability was fair. Current psychiatric providers and contact info: [facility]  Elizabeth Charles, psychiatrist Dr. Jam Juares . Pt has missed her last two appointments with Dr. Jam Juares. [facility] health worker [phone] Previous psychiatric services/providers and response to treatment: Pt was at [facility] 9/9/18-9/14/18 prior to this was admitted at [facility] Family history of mental illness : None reported Substance abuse history:   
Social History Substance Use Topics  Smoking status: Current Every Day Smoker Packs/day: 1.50 Years: 10.00 Types: Cigarettes  Smokeless tobacco: Current User  Alcohol use No  
   Comment: Hx of ETOH abuse since age 13. no DUIs. Family constellation: Pt is  and has one adult daughter Is significant other involved? Pt's  is not involved in her care Describe support system: Pt described her relationship with her  as conflictual at times. Describe living arrangements and home environment: Pt lives in her apartment with her .  Pt often reports that she wants to find a new housing situation but she continues to return home with her  Health issues:  
Hospital Problems  Date Reviewed: 2015 Codes Class Noted POA Bipolar 1 disorder (HCC) ICD-10-CM: F31.9 ICD-9-CM: 296.7  10/4/2018 Unknown Trauma history: None reported Legal issues: No current legal issues History of  service: N/A Financial status: Pt receives disability as income Scientologist/cultural factors: N/A Education/work history: Pt graduated from high school Have you been licensed as a oumou care professional (current or ): N/A Leisure and recreation preferences: None reported at this time Describe coping skills: poor, pt doesn't utilize her formal support system [address] [place] [address] 
10/4/2018

## 2018-10-04 NOTE — PROGRESS NOTES
Laboratory monitoring for mood stabilizer and antipsychotics: 
 
Recommended baseline monitoring has been completed based on this patient's current medication regimen. The patient is currently taking the following medication(s):  
Current Facility-Administered Medications Medication Dose Route Frequency  loratadine (CLARITIN) tablet 10 mg  10 mg Oral DAILY  fluticasone-salmeterol (ADVAIR) 500mcg-50mcg/puff  1 Puff Inhalation BID  aspirin delayed-release tablet 81 mg  81 mg Oral DAILY  famotidine (PEPCID) tablet 20 mg  20 mg Oral DAILY  sertraline (ZOLOFT) tablet 25 mg  25 mg Oral DAILY  paliperidone (INVEGA) SR tablet 3 mg  3 mg Oral DAILY  insulin lispro (HUMALOG) injection   SubCUTAneous AC&HS  furosemide (LASIX) tablet 40 mg  40 mg Oral DAILY  lisinopril (PRINIVIL, ZESTRIL) tablet 2.5 mg  2.5 mg Oral DAILY  atorvastatin (LIPITOR) tablet 10 mg  10 mg Oral DAILY  glipiZIDE (GLUCOTROL) tablet 10 mg  10 mg Oral BID Height, Weight, BMI Estimation Estimated body mass index is 43.46 kg/(m^2) as calculated from the following: 
  Height as of this encounter: 154.9 cm (61\"). Weight as of this encounter: 104.3 kg (230 lb). Renal Function, Hepatic Function and Chemistry Estimated Creatinine Clearance: 83.1 mL/min (based on Cr of 0.85). Lab Results Component Value Date/Time Sodium 135 (L) 10/03/2018 08:54 PM  
 Potassium 3.6 10/03/2018 08:54 PM  
 Chloride 99 10/03/2018 08:54 PM  
 CO2 29 10/03/2018 08:54 PM  
 Anion gap 7 10/03/2018 08:54 PM  
 Glucose 233 (H) 10/03/2018 08:54 PM  
 Glucose 126 (H) 03/19/2014 06:40 AM  
 BUN 16 10/03/2018 08:54 PM  
 Creatinine 0.85 10/03/2018 08:54 PM  
 BUN/Creatinine ratio 19 10/03/2018 08:54 PM  
 GFR est AA >60 10/03/2018 08:54 PM  
 GFR est non-AA >60 10/03/2018 08:54 PM  
 Calcium 8.3 (L) 10/03/2018 08:54 PM  
 ALT (SGPT) 18 05/09/2018 11:27 PM  
 AST (SGOT) 12 (L) 05/09/2018 11:27 PM  
 Alk.  phosphatase 125 (H) 05/09/2018 11:27 PM  
 Protein, total 6.8 05/09/2018 11:27 PM  
 Albumin 2.9 (L) 05/09/2018 11:27 PM  
 Globulin 3.9 05/09/2018 11:27 PM  
 A-G Ratio 0.7 (L) 05/09/2018 11:27 PM  
 Bilirubin, total 0.2 05/09/2018 11:27 PM  
 
 
Lab Results Component Value Date/Time Glucose 233 (H) 10/03/2018 08:54 PM  
 Glucose 126 (H) 03/19/2014 06:40 AM  
 Glucose (POC) 141 (H) 10/05/2018 08:17 AM  
 
 
Lab Results Component Value Date/Time Hemoglobin A1c 9.3 (H) 10/05/2018 04:42 AM  
 
 
Hematology Lab Results Component Value Date/Time WBC 9.0 10/03/2018 08:54 PM  
 Hemoglobin (POC) 12.2 01/12/2012 10:40 PM  
 HGB 12.5 10/03/2018 08:54 PM  
 Hematocrit (POC) 36 01/12/2012 10:40 PM  
 HCT 38.2 10/03/2018 08:54 PM  
 PLATELET 652 31/81/9434 08:54 PM  
 MCV 83.4 10/03/2018 08:54 PM  
 
 
Lipids Lab Results Component Value Date/Time Cholesterol, total 225 (H) 10/05/2018 04:42 AM  
 HDL Cholesterol 38 10/05/2018 04:42 AM  
 LDL, calculated 143.4 (H) 10/05/2018 04:42 AM  
 Triglyceride 218 (H) 10/05/2018 04:42 AM  
 CHOL/HDL Ratio 5.9 (H) 10/05/2018 04:42 AM  
 
 
Thyroid Function Lab Results Component Value Date/Time TSH 2.18 10/05/2018 04:42 AM  
 
Vitals Visit Vitals  /50  Pulse 95  Temp 98.5 °F (36.9 °C)  Resp 18  Ht 154.9 cm (61\")  Wt 104.3 kg (230 lb)  SpO2 96%  Breastfeeding No  
 BMI 43.46 kg/m2 Pregnancy Test 
Lab Results Component Value Date/Time HCG urine, QL NEGATIVE  07/08/2016 02:10 AM  
 Pregnancy test,urine (POC) NEGATIVE  08/02/2017 06:09 PM  
 HCG, Ql. NEGATIVE  07/08/2016 12:31 AM  
 
 
Cori Cabrera, PharmD, BCPS 
[phone] (pharmacy)

## 2018-10-04 NOTE — BH NOTES
GROUP THERAPY PROGRESS NOTE The patient Albertina hameed 52 y.o. female is participating in Coping Skills Group. Group time: 45 minutes Personal goal for participation: To participate in chair exercise routine Goal orientation:  personal 
 
Group therapy participation: active Therapeutic interventions reviewed and discussed: benefits of exercise Impression of participation:  The patient was attentive. Garfield Draft 10/4/2018  5:29 PM

## 2018-10-04 NOTE — ED NOTES
TRANSFER - OUT REPORT: 
 
Verbal report given to Jeny Fonseca RN (name) on Bertha Galvin  being transferred to [facility] (unit) for routine progression of care Report consisted of patients Situation, Background, Assessment and  
Recommendations(SBAR). Information from the following report(s) SBAR, Kardex, ED Summary and Recent Results was reviewed with the receiving nurse. Lines:    
 
Opportunity for questions and clarification was provided. Patient transported with: 
Pt belongings Security

## 2018-10-04 NOTE — PROGRESS NOTES
Problem: Suicide/Homicide (Adult/Pediatric) Goal: *STG: Remains safe in hospital 
Outcome: Progressing Towards Goal 
Patient alert and oriented. Compliant with meals and medications. Denies suicidal and homicidal ideations. Denies any hallucinations. Patient's blood glucose checks were 222 for breakfast and 263 for lunch. Awaiting call back from Christian Mascorro MD for orders. Patient experiencing no behavioral issues at this time. Will continue to assess patient and complete safety checks.
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
## 2018-10-04 NOTE — ED TRIAGE NOTES
\" I want to kill myself by either pills or getting in front of a car\". States feeling x 2 days. Hx of this

## 2018-10-04 NOTE — BH NOTES
Lisette Cabrera, 52 y.o. female with PMHx significant for DM, Arthritis, COPD, Schizophrenia, depression, polydrug abuse, suicidal attempt, and bipolar disorder. Pt admitted with cc of suicidal ideation with plan beginning two days ago along with associated auditory hallucinations. Pt reports she is having problems at home with her . Pt states she plans to overdose on drugs or would like to be hit by a car. She states she is hearing her  grandmother's voice. She endorses alcohol and drug use about 3 days ago. Pt states she is regularly seen by [facility], noting she is not taking Zoloft and Seroquel as prescribed. She denies any hx of homicidal ideations. Pt is calm and cooperative with admission process, flat affect and thought blocking. Skin assessment revealed old scar on left back and a blister (covered by band-aid) on rt foot ([place] [employer]). Orders received from Dr Doe Crespo.
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
## 2018-10-04 NOTE — BH NOTES
GROUP THERAPY PROGRESS NOTE The patient Azra Caballero a 52 y.o. female is participating in [employer]. Group time: 1 hour Personal goal for participation: To concentrate on selected task Goal orientation: social 
 
Group therapy participation: active Therapeutic interventions reviewed and discussed: Crafts, games, music Impression of participation: The patient was attentive. Marissa Degroot 10/4/2018  6:08 PM

## 2018-10-04 NOTE — PROGRESS NOTES
[facility] Medication Reconciliation Recommendations/Findings:  
1) Patient is a poor medication historian and has documented non-compliance. PTA medication list updated solely based on medication list obtained from [facility]. Patient is or was followed by [facility] ([phone]). Patient was last seen by her psychiatrist at [facility] on 18 and at that time he prescribed Invega 6 mg daily and quetiapine 50 mg at bedtime. The following changes were made to the [facility] medication list:  Additions: paliperidone, potassium chloride, meloxicam, Breo Ellipta, albuterol nebulizer solution, quetiapine  Modifications: furosemide dose added  Deletions: none Total Time Spent: 30 minutes Information obtained from: Medication list from [facility] ([phone]), [address] @ [facility] ([phone]), [facility] ([phone]), [address] ([phone]) Patient allergies: Allergies as of 10/03/2018 - Review Complete 10/03/2018 Allergen Reaction Noted  Amoxicillin Hives 2017  Mushroom flavor Hives 2012  Tomato Hives 2012 Prior to Admission Medications Prescriptions Last Dose Informant Patient Reported? Taking? QUEtiapine (SEROQUEL) 50 mg tablet   Yes Yes Sig: Take 50 mg by mouth nightly. Indications: Mood disorder  
albuterol (PROVENTIL VENTOLIN) 2.5 mg /3 mL (0.083 %) nebulizer solution   Yes Yes Si.5 mg by Nebulization route three (3) times daily as needed for Wheezing or Shortness of Breath. albuterol (VENTOLIN HFA) 90 mcg/actuation inhaler   Yes Yes Sig: Take 2 Puffs by inhalation every six (6) hours as needed for Wheezing. aspirin delayed-release 81 mg tablet   Yes Yes Sig: Take 81 mg by mouth daily. cetirizine (ZYRTEC) 10 mg tablet   Yes Yes Sig: Take 10 mg by mouth daily. Indications: Allergic Rhinitis  
famotidine (PEPCID) 20 mg tablet   Yes Yes Sig: Take 20 mg by mouth daily.  Indications: Heartburn  
fluticasone-vilanterol (BREO ELLIPTA) 200-25 mcg/dose inhaler   Yes Yes Sig: Take 1 Puff by inhalation daily. furosemide (LASIX) 40 mg tablet   Yes Yes Sig: Take 40 mg by mouth daily. glipiZIDE (GLUCOTROL) 10 mg tablet   Yes Yes Sig: Take 10 mg by mouth two (2) times a day. Indications: type 2 diabetes mellitus  
lisinopril (PRINIVIL, ZESTRIL) 2.5 mg tablet   Yes Yes Sig: Take 2.5 mg by mouth daily. Indications: hypertension  
lovastatin (MEVACOR) 20 mg tablet   Yes Yes Sig: Take 20 mg by mouth nightly. Indications: hyperlipidemia  
meloxicam (MOBIC) 15 mg tablet   Yes Yes Sig: Take 15 mg by mouth daily. Indications: Arthritis  
paliperidone (INVEGA) 3 mg SR tablet   Yes Yes Sig: Take 3 mg by mouth daily. Indications: Mood disorder  
potassium chloride (K-DUR, KLOR-CON) 20 mEq tablet   Yes Yes Sig: Take 20 mEq by mouth daily. Indications: hypokalemia prevention Facility-Administered Medications: None Thank you, Jai Helton, PHARMD, BCPS

## 2018-10-04 NOTE — PROGRESS NOTES
Patient's blood glucose 222. No insulin sliding scale ordered; paged Prosper Mcguire MD to inform him. 1200- Patient's blood glucose now 263 for lunch. Pageyen Mcguire MD again. Awaiting return call. [facility] Pageyen Mcguire MD. 
 
1700- Patient's blood glucose 335 for dinner. Pageyen Mcguire MD twice; awaiting return call.

## 2018-10-05 VITALS
HEIGHT: 61 IN | HEART RATE: 95 BPM | SYSTOLIC BLOOD PRESSURE: 100 MMHG | WEIGHT: 230 LBS | RESPIRATION RATE: 18 BRPM | DIASTOLIC BLOOD PRESSURE: 50 MMHG | BODY MASS INDEX: 43.43 KG/M2 | TEMPERATURE: 98.5 F | OXYGEN SATURATION: 96 %

## 2018-10-05 LAB
CHOLEST SERPL-MCNC: 225 MG/DL
EST. AVERAGE GLUCOSE BLD GHB EST-MCNC: 220 MG/DL
GLUCOSE BLD STRIP.AUTO-MCNC: 141 MG/DL (ref 65–100)
GLUCOSE BLD STRIP.AUTO-MCNC: 174 MG/DL (ref 65–100)
HBA1C MFR BLD: 9.3 % (ref 4.2–6.3)
HDLC SERPL-MCNC: 38 MG/DL
HDLC SERPL: 5.9 {RATIO} (ref 0–5)
LDLC SERPL CALC-MCNC: 143.4 MG/DL (ref 0–100)
LIPID PROFILE,FLP: ABNORMAL
SERVICE CMNT-IMP: ABNORMAL
SERVICE CMNT-IMP: ABNORMAL
TRIGL SERPL-MCNC: 218 MG/DL (ref ?–150)
TSH SERPL DL<=0.05 MIU/L-ACNC: 2.18 UIU/ML (ref 0.36–3.74)
VLDLC SERPL CALC-MCNC: 43.6 MG/DL

## 2018-10-05 PROCEDURE — 74011250637 HC RX REV CODE- 250/637: Performed by: PSYCHIATRY & NEUROLOGY

## 2018-10-05 PROCEDURE — 83036 HEMOGLOBIN GLYCOSYLATED A1C: CPT | Performed by: PSYCHIATRY & NEUROLOGY

## 2018-10-05 PROCEDURE — 84443 ASSAY THYROID STIM HORMONE: CPT | Performed by: PSYCHIATRY & NEUROLOGY

## 2018-10-05 PROCEDURE — 74011250637 HC RX REV CODE- 250/637: Performed by: INTERNAL MEDICINE

## 2018-10-05 PROCEDURE — 80061 LIPID PANEL: CPT | Performed by: PSYCHIATRY & NEUROLOGY

## 2018-10-05 PROCEDURE — 36415 COLL VENOUS BLD VENIPUNCTURE: CPT | Performed by: PSYCHIATRY & NEUROLOGY

## 2018-10-05 PROCEDURE — 82962 GLUCOSE BLOOD TEST: CPT

## 2018-10-05 RX ORDER — LORATADINE 10 MG/1
10 TABLET ORAL DAILY
Status: DISCONTINUED | OUTPATIENT
Start: 2018-10-05 | End: 2018-10-05 | Stop reason: HOSPADM

## 2018-10-05 RX ORDER — SERTRALINE HYDROCHLORIDE 25 MG/1
25 TABLET, FILM COATED ORAL DAILY
Qty: 14 TAB | Refills: 0 | Status: ON HOLD | OUTPATIENT
Start: 2018-10-06 | End: 2021-09-22

## 2018-10-05 RX ORDER — PALIPERIDONE 3 MG/1
3 TABLET, EXTENDED RELEASE ORAL DAILY
Qty: 14 TAB | Refills: 0 | Status: ON HOLD | OUTPATIENT
Start: 2018-10-06 | End: 2021-09-22

## 2018-10-05 RX ORDER — FLUTICASONE PROPIONATE AND SALMETEROL 500; 50 UG/1; UG/1
1 POWDER RESPIRATORY (INHALATION) 2 TIMES DAILY
Status: DISCONTINUED | OUTPATIENT
Start: 2018-10-05 | End: 2018-10-05 | Stop reason: HOSPADM

## 2018-10-05 RX ADMIN — ASPIRIN 81 MG: 81 TABLET, COATED ORAL at 08:27

## 2018-10-05 RX ADMIN — LORATADINE 10 MG: 10 TABLET ORAL at 08:27

## 2018-10-05 RX ADMIN — ATORVASTATIN CALCIUM 10 MG: 10 TABLET, FILM COATED ORAL at 08:26

## 2018-10-05 RX ADMIN — PALIPERIDONE 3 MG: 3 TABLET, EXTENDED RELEASE ORAL at 08:27

## 2018-10-05 RX ADMIN — GLIPIZIDE 10 MG: 5 TABLET ORAL at 08:22

## 2018-10-05 RX ADMIN — FAMOTIDINE 20 MG: 20 TABLET ORAL at 08:27

## 2018-10-05 RX ADMIN — SERTRALINE HYDROCHLORIDE 25 MG: 50 TABLET ORAL at 08:22

## 2018-10-05 NOTE — BH NOTES
Pt slept 6 hours overnight. Labs drawn. No concerns. Staff will continue to monitor for health and safety.

## 2018-10-05 NOTE — H&P
History and Physical 
 
Subjective:  
 
Mitra Rubio is a 52 y.o. with past medical hx significant for COPD, Arthritis, DM, drug use, HTN,and obesity. Per psych documentation,Pt admitted under a voluntary basis for suicidal ideations proving to be an imminent danger to self and others and an inability to care for self. WHITE OR  female with a past psychiatric history significant for schizoaffective disorder, who presents at this time with complaints of (and/or evidence of) the following emotional symptoms: suicidal thoughts/threats. Additional symptomatology include relationship difficulties. The above symptoms have been present for 48 hours. These symptoms are of high severity. These symptoms are intermittent/ fleeting in nature. The patient's condition has been precipitated by and psychosocial stressors. Patient's condition made worse by reported illicit substance use and treatment noncompliance. UDS: +negative; BAL=0. Pt is maintained on various medications for her chronic medical issues. She is taking oral sulfonylurea for DM. She is taking ACE I and Lasix  for HTN. She is taking Maloxicam for OA. She is taking statin for HLD. She denies any acute medical symptoms. She is showing no signs of acute distress. She is taking Albuteral for chronic lung disease. Past Medical History:  
Diagnosis Date  Arthritis   
 legs  Bipolar 1 disorder ([address]  COPD  Depression 1/13/2012  Diabetes ([address]  Drug abuse ([address] cocaine, stimulants, etoh, mj, tob  H/O suicide attempt  Obesity  Polydrug dependence excluding opioid type drug, abuse ([address]  Schizophrenia ([facility] [address] 7/8/2016 Past Surgical History:  
Procedure Laterality Date  HX ORTHOPAEDIC    
 foot sx Family History Problem Relation Age of Onset  Diabetes Mother  Stroke Mother  Stroke Father Social History Substance Use Topics  Smoking status: Current Every Day Smoker Packs/day: 1.50 Years: 10.00 Types: Cigarettes  Smokeless tobacco: Current User  Alcohol use No  
   Comment: Hx of ETOH abuse since age 13. no DUIs. Prior to Admission medications Medication Sig Start Date End Date Taking? Authorizing Provider  
paliperidone (INVEGA) 3 mg SR tablet Take 3 mg by mouth daily. Indications: Mood disorder   Yes Historical Provider  
potassium chloride (K-DUR, KLOR-CON) 20 mEq tablet Take 20 mEq by mouth daily. Indications: hypokalemia prevention   Yes Historical Provider  
meloxicam (MOBIC) 15 mg tablet Take 15 mg by mouth daily. Indications: Arthritis   Yes Historical Provider  
fluticasone-vilanterol (BREO ELLIPTA) 200-25 mcg/dose inhaler Take 1 Puff by inhalation daily. Yes Historical Provider  
furosemide (LASIX) 40 mg tablet Take 40 mg by mouth daily. Yes Historical Provider QUEtiapine (SEROQUEL) 50 mg tablet Take 50 mg by mouth nightly. Indications: Mood disorder   Yes Historical Provider  
albuterol (PROVENTIL VENTOLIN) 2.5 mg /3 mL (0.083 %) nebulizer solution 2.5 mg by Nebulization route three (3) times daily as needed for Wheezing or Shortness of Breath. Yes Historical Provider  
aspirin delayed-release 81 mg tablet Take 81 mg by mouth daily. Yes Historical Provider  
albuterol (VENTOLIN HFA) 90 mcg/actuation inhaler Take 2 Puffs by inhalation every six (6) hours as needed for Wheezing. Yes Historical Provider  
cetirizine (ZYRTEC) 10 mg tablet Take 10 mg by mouth daily. Indications: Allergic Rhinitis   Yes Historical Provider  
famotidine (PEPCID) 20 mg tablet Take 20 mg by mouth daily. Indications: Heartburn   Yes Historical Provider  
lisinopril (PRINIVIL, ZESTRIL) 2.5 mg tablet Take 2.5 mg by mouth daily. Indications: hypertension   Yes Historical Provider  
lovastatin (MEVACOR) 20 mg tablet Take 20 mg by mouth nightly.  Indications: hyperlipidemia   Yes Historical Provider  
glipiZIDE (GLUCOTROL) 10 mg tablet Take 10 mg by mouth two (2) times a day. Indications: type 2 diabetes mellitus   Yes Historical Provider Allergies Allergen Reactions  Amoxicillin Hives  Mushroom Flavor Hives  Tomato Hives Review of Systems: 
Constitutional: negative Eyes: negative Ears, Nose, Mouth, Throat, and Face: negative Respiratory: negative Cardiovascular: negative Gastrointestinal: negative Genitourinary:negative Integument/Breast: negative Hematologic/Lymphatic: negative Musculoskeletal:negative Neurological: negative Behavioral/Psychiatric: Bipolar disorder symptoms as per HPI Endocrine: negative Allergic/Immunologic: negative Objective: Intake and Output:   
  
  
 
Physical Exam:  
Visit Vitals  /79  Pulse 79  Temp 98.3 °F (36.8 °C)  Resp 16  
 Ht 5' 1\" (1.549 m)  Wt 104.3 kg (230 lb)  SpO2 100%  Breastfeeding No  
 BMI 43.46 kg/m2 General:  Alert, cooperative, no distress, appears stated age. Head:  Normocephalic, without obvious abnormality, atraumatic. Eyes:  Conjunctivae/corneas clear. PERRL, EOMs intact. Ears:  Normal external ear canals both ears. Nose: Nares normal. Septum midline. Mucosa normal. No drainage or sinus tenderness. Throat: Lips, mucosa, and tongue normal. Teeth and gums normal.  
Neck: Supple, symmetrical, trachea midline, no adenopathy, thyroid: no enlargement/tenderness/nodules. Back:   Symmetric, no curvature. ROM normal. No CVA tenderness. Lungs:   Clear to auscultation bilaterally. Chest wall:  No tenderness or deformity. Heart:  Regular rate and rhythm, S1, S2 normal, no murmur, click, rub or gallop. Abdomen:   Soft, non-tender. Bowel sounds normal. No masses,  No organomegaly. Extremities: Extremities normal, atraumatic, no cyanosis or edema. Pulses: Distal pulses intact. Skin: Skin color, texture, turgor normal. No rashes or lesions Lymph nodes: No supraclavicular or cervical lymphadenopathy Neurologic: CNII-XII intact.  Normal strength, sensation inatct, reflexes 2/4 patellar region. Data Review:  
Recent Results (from the past 24 hour(s)) URINALYSIS W/ RFLX MICROSCOPIC Collection Time: 10/03/18  9:59 PM  
Result Value Ref Range Color YELLOW/STRAW Appearance CLEAR CLEAR Specific gravity 1.010 1.003 - 1.030    
 pH (UA) 6.0 5.0 - 8.0 Protein NEGATIVE  NEG mg/dL Glucose 500 (A) NEG mg/dL Ketone NEGATIVE  NEG mg/dL Bilirubin NEGATIVE  NEG Blood NEGATIVE  NEG Urobilinogen 0.2 0.2 - 1.0 EU/dL Nitrites NEGATIVE  NEG Leukocyte Esterase SMALL (A) NEG    
DRUG SCREEN, URINE Collection Time: 10/03/18  9:59 PM  
Result Value Ref Range AMPHETAMINES NEGATIVE  NEG    
 BARBITURATES NEGATIVE  NEG BENZODIAZEPINES NEGATIVE  NEG    
 COCAINE NEGATIVE  NEG METHADONE NEGATIVE  NEG    
 OPIATES NEGATIVE  NEG    
 PCP(PHENCYCLIDINE) NEGATIVE  NEG    
 THC (TH-CANNABINOL) NEGATIVE  NEG Drug screen comment (NOTE) URINE MICROSCOPIC ONLY Collection Time: 10/03/18  9:59 PM  
Result Value Ref Range WBC 0-4 0 - 4 /hpf  
 RBC 0-5 0 - 5 /hpf Epithelial cells MODERATE (A) FEW /lpf Bacteria NEGATIVE  NEG /hpf  
GLUCOSE, POC Collection Time: 10/04/18  8:18 AM  
Result Value Ref Range Glucose (POC) 222 (H) 65 - 100 mg/dL Performed by [facility] GLUCOSE, POC Collection Time: 10/04/18 11:45 AM  
Result Value Ref Range Glucose (POC) 263 (H) 65 - 100 mg/dL Performed by [facility] GLUCOSE, POC Collection Time: 10/04/18  5:06 PM  
Result Value Ref Range Glucose (POC) 335 (H) 65 - 100 mg/dL Performed by [facility] GLUCOSE, POC Collection Time: 10/04/18  8:37 PM  
Result Value Ref Range Glucose (POC) 146 (H) 65 - 100 mg/dL Performed by Kennis Burkitt Assessment:  
 
Principal Problem: 
  Schizoaffective disorder ([facility] [address] (7/8/2016) DM 
 
HTN 
 
OA 
 
Obesity Polydrug dependence Chronic lung disease Plan:  
 
Cont sulfonylurea/add SSI/ check [id number] at some point. Cont antihypertenisves Cont Maloxicam 
 
Cont short acting beta agonist inhaler/ICS. / ultra L/A acting beta agonist. Appears compenstaed at this time.  for wt loss and exercise. No VTE prophylaxis indicated or necessary at this time. Signed By: Harish Gilbert MD   
 October 4, 2018

## 2018-10-05 NOTE — BH NOTES
Behavioral Health Transition Record to Provider Patient Name: Brook Thornton YOB: 1965 Medical Record Number: [id number] Date of Admission: 10/3/2018 Date of Discharge: 10/5/2018 Attending Provider: Nito Talavera MD 
Discharging Provider: Nito Talavera MD 
To contact this individual call [phone] and ask the  to page. If unavailable, ask to be transferred to [facility] Provider on call. Benji Paige Provider will be available on call 24/7 and during holidays. Primary Care Provider: Mevelyn Gaucher, MD 
 
Allergies Allergen Reactions  Amoxicillin Hives  Mushroom Flavor Hives  Tomato Hives Reason for Admission: Pt was admitted on a voluntary status due to suicidial ideations after having an argument with her . Pt reported that her  has been complaining about paying the rent. Per pt's  when she is arguing with her  she seeks admission in the hospital. Pt has a history of noncompliance with medications and treatment  
  
 
Admission Diagnosis: Bipolar 1 disorder ([facility] [address] * No surgery found * Results for orders placed or performed during the hospital encounter of 10/03/18 METABOLIC PANEL, BASIC Result Value Ref Range Sodium 135 (L) 136 - 145 mmol/L Potassium 3.6 3.5 - 5.1 mmol/L Chloride 99 97 - 108 mmol/L  
 CO2 29 21 - 32 mmol/L Anion gap 7 5 - 15 mmol/L Glucose 233 (H) 65 - 100 mg/dL BUN 16 6 - 20 MG/DL Creatinine 0.85 0.55 - 1.02 MG/DL  
 BUN/Creatinine ratio 19 12 - 20 GFR est AA >60 >60 ml/min/1.73m2 GFR est non-AA >60 >60 ml/min/1.73m2 Calcium 8.3 (L) 8.5 - 10.1 MG/DL  
CBC WITH AUTOMATED DIFF Result Value Ref Range WBC 9.0 3.6 - 11.0 K/uL  
 RBC 4.58 3.80 - 5.20 M/uL  
 HGB 12.5 11.5 - 16.0 g/dL HCT 38.2 35.0 - 47.0 % MCV 83.4 80.0 - 99.0 FL  
 MCH 27.3 26.0 - 34.0 PG  
 MCHC 32.7 30.0 - 36.5 g/dL  
 RDW 15.0 (H) 11.5 - 14.5 %  PLATELET 756 357 - 400 K/uL MPV 9.1 8.9 - 12.9 FL  
 NRBC 0.0 0  WBC ABSOLUTE NRBC 0.00 0.00 - 0.01 K/uL NEUTROPHILS 59 32 - 75 % LYMPHOCYTES 32 12 - 49 % MONOCYTES 6 5 - 13 % EOSINOPHILS 2 0 - 7 % BASOPHILS 0 0 - 1 % IMMATURE GRANULOCYTES 0 0.0 - 0.5 % ABS. NEUTROPHILS 5.4 1.8 - 8.0 K/UL  
 ABS. LYMPHOCYTES 2.9 0.8 - 3.5 K/UL  
 ABS. MONOCYTES 0.6 0.0 - 1.0 K/UL  
 ABS. EOSINOPHILS 0.2 0.0 - 0.4 K/UL  
 ABS. BASOPHILS 0.0 0.0 - 0.1 K/UL  
 ABS. IMM. GRANS. 0.0 0.00 - 0.04 K/UL  
 DF AUTOMATED URINALYSIS W/ RFLX MICROSCOPIC Result Value Ref Range Color YELLOW/STRAW Appearance CLEAR CLEAR Specific gravity 1.010 1.003 - 1.030    
 pH (UA) 6.0 5.0 - 8.0 Protein NEGATIVE  NEG mg/dL Glucose 500 (A) NEG mg/dL Ketone NEGATIVE  NEG mg/dL Bilirubin NEGATIVE  NEG Blood NEGATIVE  NEG Urobilinogen 0.2 0.2 - 1.0 EU/dL Nitrites NEGATIVE  NEG Leukocyte Esterase SMALL (A) NEG    
ETHYL ALCOHOL Result Value Ref Range ALCOHOL(ETHYL),SERUM <10 <10 MG/DL  
DRUG SCREEN, URINE Result Value Ref Range AMPHETAMINES NEGATIVE  NEG    
 BARBITURATES NEGATIVE  NEG BENZODIAZEPINES NEGATIVE  NEG    
 COCAINE NEGATIVE  NEG METHADONE NEGATIVE  NEG    
 OPIATES NEGATIVE  NEG    
 PCP(PHENCYCLIDINE) NEGATIVE  NEG    
 THC (TH-CANNABINOL) NEGATIVE  NEG Drug screen comment (NOTE) URINE MICROSCOPIC ONLY Result Value Ref Range WBC 0-4 0 - 4 /hpf  
 RBC 0-5 0 - 5 /hpf Epithelial cells MODERATE (A) FEW /lpf Bacteria NEGATIVE  NEG /hpf  
TSH 3RD GENERATION Result Value Ref Range TSH 2.18 0.36 - 3.74 uIU/mL HEMOGLOBIN A1C WITH EAG Result Value Ref Range Hemoglobin A1c 9.3 (H) 4.2 - 6.3 % Est. average glucose 220 mg/dL LIPID PANEL Result Value Ref Range LIPID PROFILE Cholesterol, total 225 (H) <200 MG/DL Triglyceride 218 (H) <150 MG/DL  
 HDL Cholesterol 38 MG/DL  
 LDL, calculated 143.4 (H) 0 - 100 MG/DL  VLDL, calculated 43.6 MG/DL  
 CHOL/HDL Ratio 5.9 (H) 0 - 5.0 GLUCOSE, POC Result Value Ref Range Glucose (POC) 222 (H) 65 - 100 mg/dL Performed by Rafael Zeferino GLUCOSE, POC Result Value Ref Range Glucose (POC) 263 (H) 65 - 100 mg/dL Performed by Rafael Zeferino GLUCOSE, POC Result Value Ref Range Glucose (POC) 335 (H) 65 - 100 mg/dL Performed by Rafael Mcgarry GLUCOSE, POC Result Value Ref Range Glucose (POC) 146 (H) 65 - 100 mg/dL Performed by Conchita Moya GLUCOSE, POC Result Value Ref Range Glucose (POC) 141 (H) 65 - 100 mg/dL Performed by Kristy Henry GLUCOSE, POC Result Value Ref Range Glucose (POC) 174 (H) 65 - 100 mg/dL Performed by Kristy Henry Immunizations administered during this encounter:  
Immunization History Administered Date(s) Administered  Tdap 05/30/2015 Screening for Metabolic Disorders for Patients on Antipsychotic Medications 
(Data obtained from the EMR) Estimated Body Mass Index Estimated body mass index is 43.46 kg/(m^2) as calculated from the following: 
  Height as of this encounter: 5' 1\" (1.549 m). Weight as of this encounter: 104.3 kg (230 lb). Vital Signs/Blood Pressure Visit Vitals  /50  Pulse 95  Temp 98.5 °F (36.9 °C)  Resp 18  Ht 5' 1\" (1.549 m)  Wt 104.3 kg (230 lb)  SpO2 96%  Breastfeeding No  
 BMI 43.46 kg/m2 Blood Glucose/Hemoglobin A1c Lab Results Component Value Date/Time Glucose 233 (H) 10/03/2018 08:54 PM  
 Glucose 126 (H) 03/19/2014 06:40 AM  
 Glucose (POC) 174 (H) 10/05/2018 12:09 PM  
 
 
Lab Results Component Value Date/Time Hemoglobin A1c 9.3 (H) 10/05/2018 04:42 AM  
 
  
Lipid Panel Lab Results Component Value Date/Time  Cholesterol, total 225 (H) 10/05/2018 04:42 AM  
 HDL Cholesterol 38 10/05/2018 04:42 AM  
 LDL, calculated 143.4 (H) 10/05/2018 04:42 AM  
 Triglyceride 218 (H) 10/05/2018 04:42 AM  
 CHOL/HDL Ratio 5.9 (H) 10/05/2018 04:42 AM  
 
  
Discharge Diagnosis: Please refer to physician's discharge summary. Discharge Plan: Pt will be discharging today and transported home by family. Pt's mood is irritable and affect is mood congruent. Pt denies SI/HI and states she is ready to go home. Pt appears to be at baseline level of functioning. Pt is in agreement with plan. Discharge Medication List and Instructions:  
Discharge Medication List as of 10/5/2018 12:31 PM  
  
START taking these medications Details  
sertraline (ZOLOFT) 25 mg tablet Take 1 Tab by mouth daily. Indications: major depressive disorder, Print, Disp-14 Tab, R-0  
  
  
CONTINUE these medications which have CHANGED Details  
paliperidone (INVEGA) 3 mg SR tablet Take 1 Tab by mouth daily. Indications: SCHIZOAFFECTIVE DISORDER, Normal, Disp-14 Tab, R-0  
  
  
CONTINUE these medications which have NOT CHANGED Details  
meloxicam (MOBIC) 15 mg tablet Take 15 mg by mouth daily. Indications: Arthritis, Historical Med  
  
fluticasone-vilanterol (BREO ELLIPTA) 200-25 mcg/dose inhaler Take 1 Puff by inhalation daily. , Historical Med  
  
furosemide (LASIX) 40 mg tablet Take 40 mg by mouth daily. , Historical Med  
  
albuterol (PROVENTIL VENTOLIN) 2.5 mg /3 mL (0.083 %) nebulizer solution 2.5 mg by Nebulization route three (3) times daily as needed for Wheezing or Shortness of Breath., Historical Med  
  
aspirin delayed-release 81 mg tablet Take 81 mg by mouth daily. , Historical Med  
  
albuterol (VENTOLIN HFA) 90 mcg/actuation inhaler Take 2 Puffs by inhalation every six (6) hours as needed for Wheezing., Historical Med  
  
famotidine (PEPCID) 20 mg tablet Take 20 mg by mouth daily. Indications: Heartburn, Historical Med  
  
lisinopril (PRINIVIL, ZESTRIL) 2.5 mg tablet Take 2.5 mg by mouth daily. Indications: hypertension, Historical Med  
  
lovastatin (MEVACOR) 20 mg tablet Take 20 mg by mouth nightly.  Indications: hyperlipidemia, Historical Med  
  
glipiZIDE (GLUCOTROL) 10 mg tablet Take 10 mg by mouth two (2) times a day. Indications: type 2 diabetes mellitus, Historical Med  
  
  
STOP taking these medications  
  
 potassium chloride (K-DUR, KLOR-CON) 20 mEq tablet Comments:  
Reason for Stopping: QUEtiapine (SEROQUEL) 50 mg tablet Comments:  
Reason for Stopping:   
   
 cetirizine (ZYRTEC) 10 mg tablet Comments:  
Reason for Stopping:   
   
  
 
 
Unresulted Labs None To obtain results of studies pending at discharge, please contact N/A. Follow-up Information Follow up With Details Comments Contact Info Polo Lundy NP   Appointment 10/9/18 @ 2:15pm  [facility] [address] [place] [address] Juli Serna MD   [address] [place] [phone] [facility]  Appointment: [facility] 
[phone] Advanced Directive:  
Does the patient have an appointed surrogate decision maker? Unknown Does the patient have a Medical Advance Directive? Unknown Does the patient have a Psychiatric Advance Directive? Unknown If the patient does not have a surrogate or Medical Advance Directive AND Psychiatric Advance Directive, the patient was offered information on these advance directives. Unknown Patient Instructions: Please continue all medications until otherwise directed by physician. Tobacco Cessation Discharge Plan:  
Is the patient a smoker and needs referral for smoking cessation? No 
Patient referred to the following for smoking cessation with an appointment? No  
Patient was offered medication to assist with smoking cessation at discharge? No 
Was education for smoking cessation added to the discharge instructions? No 
  
Alcohol/Substance Abuse Discharge Plan:  
Does the patient have a history of substance/alcohol abuse and requires a referral for treatment? Yes Patient referred to the following for substance/alcohol abuse treatment with an appointment? Yes Patient was offered medication to assist with alcohol cessation at discharge? No 
Was education for substance/alcohol abuse added to discharge instructions? Yes Patient discharged to Home; provided to the patient/caregiver either in hard copy or electronically. Continuing care paperwork was faxed to community mental health providers.
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
## 2018-10-05 NOTE — DISCHARGE INSTRUCTIONS
DISCHARGE SUMMARY from Nurse    PATIENT INSTRUCTIONS:    After general anesthesia or intravenous sedation, for 24 hours or while taking prescription Narcotics:  · Limit your activities  · Do not drive and operate hazardous machinery  · Do not make important personal or business decisions  · Do  not drink alcoholic beverages  · If you have not urinated within 8 hours after discharge, please contact your surgeon on call. Report the following to your surgeon:  · Excessive pain, swelling, redness or odor of or around the surgical area  · Temperature over 100.5  · Nausea and vomiting lasting longer than 4 hours or if unable to take medications  · Any signs of decreased circulation or nerve impairment to extremity: change in color, persistent  numbness, tingling, coldness or increase pain  · Any questions    *  Please give a list of your current medications to your Primary Care Provider. *  Please update this list whenever your medications are discontinued, doses are      changed, or new medications (including over-the-counter products) are added. *  Please carry medication information at all times in case of emergency situations. These are general instructions for a healthy lifestyle:    No smoking/ No tobacco products/ Avoid exposure to second hand smoke  Surgeon General's Warning:  Quitting smoking now greatly reduces serious risk to your health. Obesity, smoking, and sedentary lifestyle greatly increases your risk for illness    A healthy diet, regular physical exercise & weight monitoring are important for maintaining a healthy lifestyle    You may be retaining fluid if you have a history of heart failure or if you experience any of the following symptoms:  Weight gain of 3 pounds or more overnight or 5 pounds in a week, increased swelling in our hands or feet or shortness of breath while lying flat in bed.   Please call your doctor as soon as you notice any of these symptoms; do not wait until your next office visit. Recognize signs and symptoms of STROKE:    F-face looks uneven    A-arms unable to move or move unevenly    S-speech slurred or non-existent    T-time-call 911 as soon as signs and symptoms begin-DO NOT go       Back to bed or wait to see if you get better-TIME IS BRAIN. Warning Signs of HEART ATTACK     Call 911 if you have these symptoms:   Chest discomfort. Most heart attacks involve discomfort in the center of the chest that lasts more than a few minutes, or that goes away and comes back. It can feel like uncomfortable pressure, squeezing, fullness, or pain.  Discomfort in other areas of the upper body. Symptoms can include pain or discomfort in one or both arms, the back, neck, jaw, or stomach.  Shortness of breath with or without chest discomfort.  Other signs may include breaking out in a cold sweat, nausea, or lightheadedness. Don't wait more than five minutes to call 911 - MINUTES MATTER! Fast action can save your life. Calling 911 is almost always the fastest way to get lifesaving treatment. Emergency Medical Services staff can begin treatment when they arrive -- up to an hour sooner than if someone gets to the hospital by car. If I feel that I can not keep these promises and I am at risk of hurting myself or others, I will call the crisis office and speak with a crisis worker who will assist me during my crisis. [facility] Crisis                [address] Crisis            [phone]    The discharge information has been reviewed with the patient. The patient verbalized understanding.   Discharge medications reviewed with the patient and appropriate educational materials and side effects teaching were provided.   ___________________________________________________________________________________________________________________________________

## 2018-10-05 NOTE — PROGRESS NOTES
Problem: Depressed Mood (Adult/Pediatric) Goal: *STG: Remains safe in hospital 
Outcome: Progressing Towards Goal 
Patient alert and oriented at baseline. Isolative to room for most of shift but visible during activities/groups, meals, meds and snacks. Calm and cooperative and less delusional. Denies SI/HI. Denies hallucinations. Denies pain/discomfort. Continues to be preoccupied but no major behavioral issues. Continues on q15 min safety checks. Will continue to monitor and continue plan of care.

## 2018-11-28 ENCOUNTER — HOSPITAL ENCOUNTER (EMERGENCY)
Age: 53
Discharge: HOME | End: 2018-11-29
Attending: EMERGENCY MEDICINE | Admitting: EMERGENCY MEDICINE
Payer: MEDICAID

## 2018-11-28 DIAGNOSIS — F32.A DEPRESSION, UNSPECIFIED DEPRESSION TYPE: Primary | ICD-10-CM

## 2018-11-28 LAB
APPEARANCE UR: ABNORMAL
BACTERIA URNS QL MICRO: NEGATIVE /HPF
BASOPHILS # BLD: 0 K/UL (ref 0–0.1)
BASOPHILS NFR BLD: 1 % (ref 0–1)
BILIRUB UR QL: NEGATIVE
COLOR UR: ABNORMAL
COMMENT, HOLDF: NORMAL
DIFFERENTIAL METHOD BLD: NORMAL
EOSINOPHIL # BLD: 0.3 K/UL (ref 0–0.4)
EOSINOPHIL NFR BLD: 4 % (ref 0–7)
EPITH CASTS URNS QL MICRO: ABNORMAL /LPF
ERYTHROCYTE [DISTWIDTH] IN BLOOD BY AUTOMATED COUNT: 14.5 % (ref 11.5–14.5)
GLUCOSE UR STRIP.AUTO-MCNC: >1000 MG/DL
HCT VFR BLD AUTO: 43.8 % (ref 35–47)
HGB BLD-MCNC: 13.5 G/DL (ref 11.5–16)
HGB UR QL STRIP: NEGATIVE
HYALINE CASTS URNS QL MICRO: ABNORMAL /LPF (ref 0–5)
IMM GRANULOCYTES # BLD: 0 K/UL (ref 0–0.04)
IMM GRANULOCYTES NFR BLD AUTO: 0 % (ref 0–0.5)
KETONES UR QL STRIP.AUTO: NEGATIVE MG/DL
LEUKOCYTE ESTERASE UR QL STRIP.AUTO: NEGATIVE
LYMPHOCYTES # BLD: 2.4 K/UL (ref 0.8–3.5)
LYMPHOCYTES NFR BLD: 38 % (ref 12–49)
MCH RBC QN AUTO: 26.7 PG (ref 26–34)
MCHC RBC AUTO-ENTMCNC: 30.8 G/DL (ref 30–36.5)
MCV RBC AUTO: 86.6 FL (ref 80–99)
MONOCYTES # BLD: 0.4 K/UL (ref 0–1)
MONOCYTES NFR BLD: 6 % (ref 5–13)
NEUTS SEG # BLD: 3.3 K/UL (ref 1.8–8)
NEUTS SEG NFR BLD: 52 % (ref 32–75)
NITRITE UR QL STRIP.AUTO: NEGATIVE
NRBC # BLD: 0 K/UL (ref 0–0.01)
NRBC BLD-RTO: 0 PER 100 WBC
PH UR STRIP: 5.5 [PH] (ref 5–8)
PLATELET # BLD AUTO: 327 K/UL (ref 150–400)
PMV BLD AUTO: 9.2 FL (ref 8.9–12.9)
PROT UR STRIP-MCNC: NEGATIVE MG/DL
RBC # BLD AUTO: 5.06 M/UL (ref 3.8–5.2)
RBC #/AREA URNS HPF: ABNORMAL /HPF (ref 0–5)
SAMPLES BEING HELD,HOLD: NORMAL
SP GR UR REFRACTOMETRY: 1.02 (ref 1–1.03)
UR CULT HOLD, URHOLD: NORMAL
UROBILINOGEN UR QL STRIP.AUTO: 0.2 EU/DL (ref 0.2–1)
WBC # BLD AUTO: 6.4 K/UL (ref 3.6–11)
WBC URNS QL MICRO: ABNORMAL /HPF (ref 0–4)

## 2018-11-28 PROCEDURE — 85025 COMPLETE CBC W/AUTO DIFF WBC: CPT

## 2018-11-28 PROCEDURE — 81001 URINALYSIS AUTO W/SCOPE: CPT

## 2018-11-28 PROCEDURE — 36415 COLL VENOUS BLD VENIPUNCTURE: CPT

## 2018-11-28 PROCEDURE — 90791 PSYCH DIAGNOSTIC EVALUATION: CPT

## 2018-11-28 PROCEDURE — 80307 DRUG TEST PRSMV CHEM ANLYZR: CPT

## 2018-11-28 PROCEDURE — 80053 COMPREHEN METABOLIC PANEL: CPT

## 2018-11-28 PROCEDURE — 99284 EMERGENCY DEPT VISIT MOD MDM: CPT

## 2018-11-29 VITALS
DIASTOLIC BLOOD PRESSURE: 86 MMHG | TEMPERATURE: 97.5 F | RESPIRATION RATE: 16 BRPM | HEART RATE: 68 BPM | SYSTOLIC BLOOD PRESSURE: 167 MMHG | OXYGEN SATURATION: 98 %

## 2018-11-29 LAB
ALBUMIN SERPL-MCNC: 3.2 G/DL (ref 3.5–5)
ALBUMIN/GLOB SERPL: 0.9 {RATIO} (ref 1.1–2.2)
ALP SERPL-CCNC: 157 U/L (ref 45–117)
ALT SERPL-CCNC: 23 U/L (ref 12–78)
AMPHET UR QL SCN: NEGATIVE
ANION GAP SERPL CALC-SCNC: 10 MMOL/L (ref 5–15)
APAP SERPL-MCNC: <2 UG/ML (ref 10–30)
AST SERPL-CCNC: 10 U/L (ref 15–37)
BARBITURATES UR QL SCN: NEGATIVE
BENZODIAZ UR QL: NEGATIVE
BILIRUB SERPL-MCNC: 0.2 MG/DL (ref 0.2–1)
BUN SERPL-MCNC: 13 MG/DL (ref 6–20)
BUN/CREAT SERPL: 17 (ref 12–20)
CALCIUM SERPL-MCNC: 8.1 MG/DL (ref 8.5–10.1)
CANNABINOIDS UR QL SCN: NEGATIVE
CHLORIDE SERPL-SCNC: 100 MMOL/L (ref 97–108)
CO2 SERPL-SCNC: 29 MMOL/L (ref 21–32)
COCAINE UR QL SCN: NEGATIVE
CREAT SERPL-MCNC: 0.76 MG/DL (ref 0.55–1.02)
DRUG SCRN COMMENT,DRGCM: NORMAL
ETHANOL SERPL-MCNC: <10 MG/DL
GLOBULIN SER CALC-MCNC: 3.7 G/DL (ref 2–4)
GLUCOSE SERPL-MCNC: 257 MG/DL (ref 65–100)
METHADONE UR QL: NEGATIVE
OPIATES UR QL: NEGATIVE
PCP UR QL: NEGATIVE
POTASSIUM SERPL-SCNC: 3.9 MMOL/L (ref 3.5–5.1)
PROT SERPL-MCNC: 6.9 G/DL (ref 6.4–8.2)
SODIUM SERPL-SCNC: 139 MMOL/L (ref 136–145)
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
## 2019-02-19 NOTE — ED NOTES
Pt is currently in [place]. MD Corey stopped her omeprazole at end of Jan. Pt is now requesting to start back on it. She is taking the 150 mg twice daily of ranitidine but states it is not helping. Pt reports she has heartburn starting in mid afternoon lasting until the late evening with no relief.     Please advise.    Pt sts has been out of her medications for 2-3 weeks. Pt sts SI with plan of talking out in front of a vehicle. Pt is A&O x 4. Denies hallucinations. Hx DM. POC glucose 205 mg/dl. Sts that she has been urinating more frequently and has not been eating well. Emergency Department Nursing Plan of Care       The Nursing Plan of Care is developed from the Nursing assessment and Emergency Department Attending provider initial evaluation. The plan of care may be reviewed in the ED Provider note.     The Plan of Care was developed with the following considerations:   Patient / Family readiness to learn indicated by:verbalized understanding  Persons(s) to be included in education: patient  Barriers to Learning/Limitations:No    Signed     Mary Hernandez RN    5/9/2018   11:04 PM

## 2019-03-24 ENCOUNTER — HOSPITAL ENCOUNTER (EMERGENCY)
Age: 54
Discharge: HOME | End: 2019-03-25
Attending: EMERGENCY MEDICINE
Payer: MEDICAID

## 2019-03-24 DIAGNOSIS — R10.84 ABDOMINAL PAIN, GENERALIZED: Primary | ICD-10-CM

## 2019-03-24 DIAGNOSIS — R11.2 NAUSEA AND VOMITING, INTRACTABILITY OF VOMITING NOT SPECIFIED, UNSPECIFIED VOMITING TYPE: ICD-10-CM

## 2019-03-24 PROCEDURE — 99284 EMERGENCY DEPT VISIT MOD MDM: CPT

## 2019-03-24 RX ORDER — ONDANSETRON 2 MG/ML
4 INJECTION INTRAMUSCULAR; INTRAVENOUS
Status: COMPLETED | OUTPATIENT
Start: 2019-03-24 | End: 2019-03-25

## 2019-03-24 RX ORDER — KETOROLAC TROMETHAMINE 30 MG/ML
30 INJECTION, SOLUTION INTRAMUSCULAR; INTRAVENOUS
Status: COMPLETED | OUTPATIENT
Start: 2019-03-24 | End: 2019-03-25

## 2019-03-25 VITALS
RESPIRATION RATE: 18 BRPM | HEIGHT: 65 IN | HEART RATE: 86 BPM | OXYGEN SATURATION: 93 % | TEMPERATURE: 98 F | DIASTOLIC BLOOD PRESSURE: 67 MMHG | SYSTOLIC BLOOD PRESSURE: 120 MMHG | BODY MASS INDEX: 39.15 KG/M2 | WEIGHT: 235 LBS

## 2019-03-25 LAB
ALBUMIN SERPL-MCNC: 2.9 G/DL (ref 3.5–5)
ALBUMIN/GLOB SERPL: 0.7 {RATIO} (ref 1.1–2.2)
ALP SERPL-CCNC: 124 U/L (ref 45–117)
ALT SERPL-CCNC: 21 U/L (ref 12–78)
ANION GAP SERPL CALC-SCNC: 9 MMOL/L (ref 5–15)
APPEARANCE UR: CLEAR
AST SERPL-CCNC: 14 U/L (ref 15–37)
BASOPHILS # BLD: 0 K/UL (ref 0–0.1)
BASOPHILS NFR BLD: 0 % (ref 0–1)
BILIRUB SERPL-MCNC: 0.2 MG/DL (ref 0.2–1)
BILIRUB UR QL: NEGATIVE
BUN SERPL-MCNC: 13 MG/DL (ref 6–20)
BUN/CREAT SERPL: 16 (ref 12–20)
C TRACH DNA SPEC QL NAA+PROBE: NEGATIVE
CALCIUM SERPL-MCNC: 8.4 MG/DL (ref 8.5–10.1)
CHLORIDE SERPL-SCNC: 98 MMOL/L (ref 97–108)
CLUE CELLS VAG QL WET PREP: NORMAL
CO2 SERPL-SCNC: 29 MMOL/L (ref 21–32)
COLOR UR: NORMAL
CREAT SERPL-MCNC: 0.79 MG/DL (ref 0.55–1.02)
DIFFERENTIAL METHOD BLD: ABNORMAL
EOSINOPHIL # BLD: 0.1 K/UL (ref 0–0.4)
EOSINOPHIL NFR BLD: 2 % (ref 0–7)
ERYTHROCYTE [DISTWIDTH] IN BLOOD BY AUTOMATED COUNT: 14.6 % (ref 11.5–14.5)
GLOBULIN SER CALC-MCNC: 4 G/DL (ref 2–4)
GLUCOSE SERPL-MCNC: 190 MG/DL (ref 65–100)
GLUCOSE UR STRIP.AUTO-MCNC: NEGATIVE MG/DL
HCT VFR BLD AUTO: 41.9 % (ref 35–47)
HGB BLD-MCNC: 13.5 G/DL (ref 11.5–16)
HGB UR QL STRIP: NEGATIVE
IMM GRANULOCYTES # BLD AUTO: 0 K/UL (ref 0–0.04)
IMM GRANULOCYTES NFR BLD AUTO: 0 % (ref 0–0.5)
KETONES UR QL STRIP.AUTO: NEGATIVE MG/DL
KOH PREP SPEC: NORMAL
LEUKOCYTE ESTERASE UR QL STRIP.AUTO: NEGATIVE
LIPASE SERPL-CCNC: 103 U/L (ref 73–393)
LYMPHOCYTES # BLD: 1.2 K/UL (ref 0.8–3.5)
LYMPHOCYTES NFR BLD: 16 % (ref 12–49)
MAGNESIUM SERPL-MCNC: 1.6 MG/DL (ref 1.6–2.4)
MCH RBC QN AUTO: 26.5 PG (ref 26–34)
MCHC RBC AUTO-ENTMCNC: 32.2 G/DL (ref 30–36.5)
MCV RBC AUTO: 82.2 FL (ref 80–99)
MONOCYTES # BLD: 0.2 K/UL (ref 0–1)
MONOCYTES NFR BLD: 3 % (ref 5–13)
N GONORRHOEA DNA SPEC QL NAA+PROBE: NEGATIVE
NEUTS SEG # BLD: 6.2 K/UL (ref 1.8–8)
NEUTS SEG NFR BLD: 79 % (ref 32–75)
NITRITE UR QL STRIP.AUTO: NEGATIVE
NRBC # BLD: 0 K/UL (ref 0–0.01)
NRBC BLD-RTO: 0 PER 100 WBC
PH UR STRIP: 6.5 [PH] (ref 5–8)
PLATELET # BLD AUTO: 329 K/UL (ref 150–400)
PMV BLD AUTO: 8.9 FL (ref 8.9–12.9)
POTASSIUM SERPL-SCNC: 3.8 MMOL/L (ref 3.5–5.1)
PROT SERPL-MCNC: 6.9 G/DL (ref 6.4–8.2)
PROT UR STRIP-MCNC: NEGATIVE MG/DL
RBC # BLD AUTO: 5.1 M/UL (ref 3.8–5.2)
SAMPLE TYPE: NORMAL
SERVICE CMNT-IMP: NORMAL
SERVICE CMNT-IMP: NORMAL
SODIUM SERPL-SCNC: 136 MMOL/L (ref 136–145)
SP GR UR REFRACTOMETRY: <1.005 (ref 1–1.03)
SPECIMEN SOURCE: NORMAL
T VAGINALIS VAG QL WET PREP: NORMAL
UROBILINOGEN UR QL STRIP.AUTO: 0.2 EU/DL (ref 0.2–1)
WBC # BLD AUTO: 7.8 K/UL (ref 3.6–11)

## 2019-03-25 PROCEDURE — 80053 COMPREHEN METABOLIC PANEL: CPT

## 2019-03-25 PROCEDURE — 74011250636 HC RX REV CODE- 250/636: Performed by: EMERGENCY MEDICINE

## 2019-03-25 PROCEDURE — 96374 THER/PROPH/DIAG INJ IV PUSH: CPT

## 2019-03-25 PROCEDURE — 36415 COLL VENOUS BLD VENIPUNCTURE: CPT

## 2019-03-25 PROCEDURE — 81003 URINALYSIS AUTO W/O SCOPE: CPT

## 2019-03-25 PROCEDURE — 83735 ASSAY OF MAGNESIUM: CPT

## 2019-03-25 PROCEDURE — 96361 HYDRATE IV INFUSION ADD-ON: CPT

## 2019-03-25 PROCEDURE — 87491 CHLMYD TRACH DNA AMP PROBE: CPT

## 2019-03-25 PROCEDURE — 85025 COMPLETE CBC W/AUTO DIFF WBC: CPT

## 2019-03-25 PROCEDURE — 96375 TX/PRO/DX INJ NEW DRUG ADDON: CPT

## 2019-03-25 PROCEDURE — 87210 SMEAR WET MOUNT SALINE/INK: CPT

## 2019-03-25 PROCEDURE — 74011250637 HC RX REV CODE- 250/637: Performed by: EMERGENCY MEDICINE

## 2019-03-25 PROCEDURE — 83690 ASSAY OF LIPASE: CPT

## 2019-03-25 PROCEDURE — 74011000250 HC RX REV CODE- 250: Performed by: EMERGENCY MEDICINE

## 2019-03-25 RX ORDER — ONDANSETRON 4 MG/1
4 TABLET, ORALLY DISINTEGRATING ORAL
Qty: 10 TAB | Refills: 0 | Status: ON HOLD | OUTPATIENT
Start: 2019-03-25 | End: 2021-09-22

## 2019-03-25 RX ADMIN — SODIUM CHLORIDE 1000 ML: 900 INJECTION, SOLUTION INTRAVENOUS at 00:23

## 2019-03-25 RX ADMIN — LIDOCAINE HYDROCHLORIDE 40 ML: 20 SOLUTION ORAL; TOPICAL at 00:22

## 2019-03-25 RX ADMIN — KETOROLAC TROMETHAMINE 30 MG: 30 INJECTION, SOLUTION INTRAMUSCULAR at 00:22

## 2019-03-25 RX ADMIN — ONDANSETRON HYDROCHLORIDE 4 MG: 2 SOLUTION INTRAMUSCULAR; INTRAVENOUS at 00:23

## 2019-03-25 NOTE — DISCHARGE INSTRUCTIONS
Patient Education        Abdominal Pain: Care Instructions  Your Care Instructions    Abdominal pain has many possible causes. Some aren't serious and get better on their own in a few days. Others need more testing and treatment. If your pain continues or gets worse, you need to be rechecked and may need more tests to find out what is wrong. You may need surgery to correct the problem. Don't ignore new symptoms, such as fever, nausea and vomiting, urination problems, pain that gets worse, and dizziness. These may be signs of a more serious problem. Your doctor may have recommended a follow-up visit in the next 8 to 12 hours. If you are not getting better, you may need more tests or treatment. The doctor has checked you carefully, but problems can develop later. If you notice any problems or new symptoms, get medical treatment right away. Follow-up care is a key part of your treatment and safety. Be sure to make and go to all appointments, and call your doctor if you are having problems. It's also a good idea to know your test results and keep a list of the medicines you take. How can you care for yourself at home? · Rest until you feel better. · To prevent dehydration, drink plenty of fluids, enough so that your urine is light yellow or clear like water. Choose water and other caffeine-free clear liquids until you feel better. If you have kidney, heart, or liver disease and have to limit fluids, talk with your doctor before you increase the amount of fluids you drink. · If your stomach is upset, eat mild foods, such as rice, dry toast or crackers, bananas, and applesauce. Try eating several small meals instead of two or three large ones. · Wait until 48 hours after all symptoms have gone away before you have spicy foods, alcohol, and drinks that contain caffeine. · Do not eat foods that are high in fat. · Avoid anti-inflammatory medicines such as aspirin, ibuprofen (Advil, Motrin), and naproxen (Aleve). These can cause stomach upset. Talk to your doctor if you take daily aspirin for another health problem. When should you call for help? Call 911 anytime you think you may need emergency care. For example, call if:    · You passed out (lost consciousness).     · You pass maroon or very bloody stools.     · You vomit blood or what looks like coffee grounds.     · You have new, severe belly pain.    Call your doctor now or seek immediate medical care if:    · Your pain gets worse, especially if it becomes focused in one area of your belly.     · You have a new or higher fever.     · Your stools are black and look like tar, or they have streaks of blood.     · You have unexpected vaginal bleeding.     · You have symptoms of a urinary tract infection. These may include:  ? Pain when you urinate. ? Urinating more often than usual.  ? Blood in your urine.     · You are dizzy or lightheaded, or you feel like you may faint.    Watch closely for changes in your health, and be sure to contact your doctor if:    · You are not getting better after 1 day (24 hours). Where can you learn more? Go to http://babatunde-dejah.info/. Enter C673 in the search box to learn more about \"Abdominal Pain: Care Instructions. \"  Current as of: September 23, 2018  Content Version: 11.9  © 2819-7044 [employer]. Care instructions adapted under license by [employer] (which disclaims liability or warranty for this information). If you have questions about a medical condition or this instruction, always ask your healthcare professional. [place] [address] any warranty or liability for your use of this information. Patient Education        Nausea and Vomiting: Care Instructions  Your Care Instructions    When you are nauseated, you may feel weak and sweaty and notice a lot of saliva in your mouth. Nausea often leads to vomiting.  Most of the time you do not need to worry about nausea and vomiting, but they can be signs of other illnesses. Two common causes of nausea and vomiting are stomach flu and food poisoning. Nausea and vomiting from viral stomach flu will usually start to improve within 24 hours. Nausea and vomiting from food poisoning may last from 12 to 48 hours. The doctor has checked you carefully, but problems can develop later. If you notice any problems or new symptoms, get medical treatment right away. Follow-up care is a key part of your treatment and safety. Be sure to make and go to all appointments, and call your doctor if you are having problems. It's also a good idea to know your test results and keep a list of the medicines you take. How can you care for yourself at home? · To prevent dehydration, drink plenty of fluids, enough so that your urine is light yellow or clear like water. Choose water and other caffeine-free clear liquids until you feel better. If you have kidney, heart, or liver disease and have to limit fluids, talk with your doctor before you increase the amount of fluids you drink. · Rest in bed until you feel better. · When you are able to eat, try clear soups, mild foods, and liquids until all symptoms are gone for 12 to 48 hours. Other good choices include dry toast, crackers, cooked cereal, and gelatin dessert, such as Jell-O. When should you call for help? Call 911 anytime you think you may need emergency care. For example, call if:    · You passed out (lost consciousness).    Call your doctor now or seek immediate medical care if:    · You have symptoms of dehydration, such as:  ? Dry eyes and a dry mouth. ? Passing only a little dark urine. ? Feeling thirstier than usual.     · You have new or worsening belly pain.     · You have a new or higher fever.     · You vomit blood or what looks like coffee grounds.    Watch closely for changes in your health, and be sure to contact your doctor if:    · You have ongoing nausea and vomiting.   · Your vomiting is getting worse.     · Your vomiting lasts longer than 2 days.     · You are not getting better as expected. Where can you learn more? Go to http://babatunde-dejah.info/. Enter 25 [id number] in the search box to learn more about \"Nausea and Vomiting: Care Instructions. \"  Current as of: September 23, 2018  Content Version: 11.9  © 0218-4735 [employer]. Care instructions adapted under license by [employer] (which disclaims liability or warranty for this information). If you have questions about a medical condition or this instruction, always ask your healthcare professional. [place] [address] any warranty or liability for your use of this information.

## 2019-03-25 NOTE — ED NOTES
Patient  given copy of dc instructions and 1 script(s) sent electronically Patient  verbalized understanding of instructions and script (s). Patient given a current medication reconciliation form and verbalized understanding of their medications. Patient  verbalized understanding of the importance of discussing medications with  his or her physician or clinic they will be following up with. Patient alert and oriented and in no acute distress. Patient discharged home ambulatory.

## 2019-03-30 NOTE — ED PROVIDER NOTES
EMERGENCY DEPARTMENT HISTORY AND PHYSICAL EXAM 
 
 
Date: 3/24/2019 Patient Name: Walker Fritz History of Presenting Illness Chief Complaint Patient presents with  Flu Like Symptoms  Vaginal Discharge History Provided By: Patient HPI: Walker Fritz, 52 y.o. female with PMHx significant for older disorder and polysubstance abuse presents the emergency department with chief complaint of flulike symptoms and vaginal discharge. Patient presents with 2 days of runny nose, cough, chills as well as mild diffuse abdominal cramping and nausea. Patient's symptoms have been constant since onset without any specific relieving or exacerbating factors. Patient also notes some brownish vaginal discharge but is not concerned for STI since she has not been sexually active in over a year. Abdominal pain currently is absent on my evaluation. PCP: Belen Anderson MD 
 
Current Outpatient Medications Medication Sig Dispense Refill  ondansetron (ZOFRAN ODT) 4 mg disintegrating tablet Take 1 Tab by mouth every eight (8) hours as needed for Nausea. 10 Tab 0  
 paliperidone (INVEGA) 3 mg SR tablet Take 1 Tab by mouth daily. Indications: SCHIZOAFFECTIVE DISORDER 14 Tab 0  
 sertraline (ZOLOFT) 25 mg tablet Take 1 Tab by mouth daily. Indications: major depressive disorder 14 Tab 0  
 meloxicam (MOBIC) 15 mg tablet Take 15 mg by mouth daily. Indications: Arthritis  fluticasone-vilanterol (BREO ELLIPTA) 200-25 mcg/dose inhaler Take 1 Puff by inhalation daily.  furosemide (LASIX) 40 mg tablet Take 40 mg by mouth daily.  albuterol (PROVENTIL VENTOLIN) 2.5 mg /3 mL (0.083 %) nebulizer solution 2.5 mg by Nebulization route three (3) times daily as needed for Wheezing or Shortness of Breath.  aspirin delayed-release 81 mg tablet Take 81 mg by mouth daily.  albuterol (VENTOLIN HFA) 90 mcg/actuation inhaler Take 2 Puffs by inhalation every six (6) hours as needed for Wheezing.  famotidine (PEPCID) 20 mg tablet Take 20 mg by mouth daily. Indications: Heartburn  lisinopril (PRINIVIL, ZESTRIL) 2.5 mg tablet Take 2.5 mg by mouth daily. Indications: hypertension  lovastatin (MEVACOR) 20 mg tablet Take 20 mg by mouth nightly. Indications: hyperlipidemia  glipiZIDE (GLUCOTROL) 10 mg tablet Take 10 mg by mouth two (2) times a day. Indications: type 2 diabetes mellitus Past History Past Medical History: 
Past Medical History:  
Diagnosis Date  Arthritis   
 legs  [facility]  COPD  Depression 1/13/2012  Diabetes ([facility]  Drug abuse ([facility] cocaine, stimulants, etoh, mj, tob  H/O suicide attempt  Obesity  Polydrug dependence excluding opioid type drug, abuse ([facility]  Schizophrenia ([facility] 7/8/2016 Past Surgical History: 
Past Surgical History:  
Procedure Laterality Date  HX ORTHOPAEDIC    
 foot sx Family History: 
Family History Problem Relation Age of Onset  Diabetes Mother  Stroke Mother  Stroke Father Social History: 
Social History Tobacco Use  Smoking status: Current Every Day Smoker Packs/day: 1.50 Years: 10.00 Pack years: 15.00 Types: Cigarettes  Smokeless tobacco: Current User Substance Use Topics  Alcohol use: No  
  Alcohol/week: 0.0 oz  
  Comment: Hx of ETOH abuse since age 13. no DUIs.  Drug use: No  
  Types: Cocaine, Marijuana, Other, Opiates Comment: reports cocaine use recently Allergies: Allergies Allergen Reactions  Amoxicillin Hives  Mushroom Flavor Hives  Tomato Hives Review of Systems Review of Systems Constitutional: Negative for fever, chills, and fatigue. HENT: Positive for congestion, rhinorrhea, negative for sneezing and neck stiffness Eyes: Negative for discharge and redness. Respiratory: Negative for  shortness of breath, wheezing Cardiovascular: Negative for chest pain, palpitations Gastrointestinal: Positive for nausea, abdominal pain. Negative for vomiting, constipation, diarrhea and blood in stool. Genitourinary: Negative for dysuria, hematuria, flank pain, decreased urine volume. Positive for discharge, Musculoskeletal: Negative for myalgias or joint pain . Skin: Negative for rash or lesions . Neurological: Negative weakness, light-headedness, numbness and headaches. Physical Exam  
Physical Exam 
 
GENERAL: alert and oriented, no acute distress EYES: PEERL, No injection, discharge or icterus. ENT: Mucous membranes pink and moist. 
NECK: Supple LUNGS: Airway patent. Non-labored respirations. Breath sounds clear with good air entry bilaterally. HEART: Regular rate and rhythm. No peripheral edema ABDOMEN: Non-distended and non-tender, without guarding or rebound. SKIN:  warm, dry MSK/EXTREMITIES: Without swelling, tenderness or deformity, symmetric with normal ROM 
NEUROLOGICAL: Alert, oriented Diagnostic Study Results Labs - Reviewed Radiologic Studies - No orders to display CT Results  (Last 48 hours) None CXR Results  (Last 48 hours) None Medical Decision Making I am the first provider for this patient. I reviewed the vital signs, available nursing notes, past medical history, past surgical history, family history and social history. Vital Signs-Reviewed the patient's vital signs. Records Reviewed: Nursing Notes and Old Medical Records Provider Notes (Medical Decision Making):  
Patient presents to the emergency room for abd pain, vomiting with URI symptoms. On presentation, the patient is well appearing, in no acute distress with normal vital signs Differential diagnosis considered includes gastroenteritis, gastritis, enteritis, inflammatory bowel disease, bowel obstruction, pancreatitis, cholecystitis, appendicitis, cyclic vomiting syndrome, or a food-borne illness.   Considered pelvic source of her pain as well however patient has not been sexually active in over a year and declines pelvic exam as she would like to self swab. Abdominal examination was soft and benign so no concern for any acute surgical issues at presentation. Clinical history, examination, and work-up are consistent with gastroenteritis and is expected to be self-limiting. Patient was provided IV hydration and anti-emetics with overall improvement in symptoms. The patient was able to tolerate oral intake prior to discharge. Patient is encouraged to maintain hydration. A prescription for anti-emetics was provided. CT scan was not felt to be warranted at this time. Patient should return for severe pain, intractable vomiting, fevers, bloody stools, or dehydration. The patient understood the diagnosis and treatment and had no further questions. Patient should call a primary care physician for follow-up, and was otherwise deemed stable for discharge to home. ED Course:  
Initial assessment performed. The patients presenting problems have been discussed, and they are in agreement with the care plan formulated and outlined with them. I have encouraged them to ask questions as they arise throughout their visit. Medications  
ondansetron (ZOFRAN) injection 4 mg (4 mg IntraVENous Given 3/25/19 0023)  
sodium chloride 0.9 % bolus infusion 1,000 mL (0 mL IntraVENous IV Completed 3/25/19 0143) mylanta/viscous lidocaine (GI COCKTAIL) (40 mL Oral Given 3/25/19 0022)  
ketorolac (TORADOL) injection 30 mg (30 mg IntraVENous Given 3/25/19 0022) PROGRESS NOTE: The patient has been re-evaluated and is ready for discharge. Reviewed available results with patient and have counseled them on diagnosis and care plan. They have expressed understanding, and all their questions have been answered. They agree with plan and agree to have pt F/U as recommended, or return to the ED if their sxs worsen.  Discharge instructions have been provided and explained to them, along with reasons to have pt return to the ED. The patient is amenable to discharge so will discharge pt at this time Emily Wilson MD 
 
 
 
Disposition: 
home PLAN: 
1. Discharge Medication List as of 3/25/2019  1:58 AM  
  
START taking these medications Details  
ondansetron (ZOFRAN ODT) 4 mg disintegrating tablet Take 1 Tab by mouth every eight (8) hours as needed for Nausea., Normal, Disp-10 Tab, R-0  
  
  
CONTINUE these medications which have NOT CHANGED Details  
paliperidone (INVEGA) 3 mg SR tablet Take 1 Tab by mouth daily. Indications: SCHIZOAFFECTIVE DISORDER, Normal, Disp-14 Tab, R-0  
  
sertraline (ZOLOFT) 25 mg tablet Take 1 Tab by mouth daily. Indications: major depressive disorder, Print, Disp-14 Tab, R-0  
  
meloxicam (MOBIC) 15 mg tablet Take 15 mg by mouth daily. Indications: Arthritis, Historical Med  
  
fluticasone-vilanterol (BREO ELLIPTA) 200-25 mcg/dose inhaler Take 1 Puff by inhalation daily. , Historical Med  
  
furosemide (LASIX) 40 mg tablet Take 40 mg by mouth daily. , Historical Med  
  
albuterol (PROVENTIL VENTOLIN) 2.5 mg /3 mL (0.083 %) nebulizer solution 2.5 mg by Nebulization route three (3) times daily as needed for Wheezing or Shortness of Breath., Historical Med  
  
aspirin delayed-release 81 mg tablet Take 81 mg by mouth daily. , Historical Med  
  
albuterol (VENTOLIN HFA) 90 mcg/actuation inhaler Take 2 Puffs by inhalation every six (6) hours as needed for Wheezing., Historical Med  
  
famotidine (PEPCID) 20 mg tablet Take 20 mg by mouth daily. Indications: Heartburn, Historical Med  
  
lisinopril (PRINIVIL, ZESTRIL) 2.5 mg tablet Take 2.5 mg by mouth daily. Indications: hypertension, Historical Med  
  
lovastatin (MEVACOR) 20 mg tablet Take 20 mg by mouth nightly. Indications: hyperlipidemia, Historical Med  
  
glipiZIDE (GLUCOTROL) 10 mg tablet Take 10 mg by mouth two (2) times a day.  Indications: type 2 diabetes mellitus, Historical Med 2. Follow-up Information Follow up With Specialties Details Why Contact Info Darnell Drummond MD Internal Medicine Schedule an appointment as soon as possible for a visit in 1 day  [place] [address] [address] [id number] 
[phone] [facility] EMERGENCY DEPT Emergency Medicine  If symptoms worsen [address] Return to ED if worse Diagnosis Clinical Impression: 1. Abdominal pain, generalized 2. Nausea and vomiting, intractability of vomiting not specified, unspecified vomiting type

## 2019-05-27 NOTE — ED TRIAGE NOTES
Patient in complaining of suicidal ideations without any clear plan, also reports she is diabetic and hasn't been able to eat \"beacause I don't have any money\".
PAST MEDICAL HISTORY:  No pertinent past medical history

## 2019-09-21 ENCOUNTER — APPOINTMENT (OUTPATIENT)
Dept: GENERAL RADIOLOGY | Age: 54
End: 2019-09-21
Attending: NURSE PRACTITIONER
Payer: MEDICAID

## 2019-09-21 ENCOUNTER — HOSPITAL ENCOUNTER (EMERGENCY)
Age: 54
Discharge: HOME | End: 2019-09-21
Attending: STUDENT IN AN ORGANIZED HEALTH CARE EDUCATION/TRAINING PROGRAM | Admitting: STUDENT IN AN ORGANIZED HEALTH CARE EDUCATION/TRAINING PROGRAM
Payer: MEDICAID

## 2019-09-21 VITALS
TEMPERATURE: 97.9 F | DIASTOLIC BLOOD PRESSURE: 60 MMHG | OXYGEN SATURATION: 95 % | RESPIRATION RATE: 16 BRPM | SYSTOLIC BLOOD PRESSURE: 148 MMHG | HEART RATE: 90 BPM

## 2019-09-21 DIAGNOSIS — M79.671 RIGHT FOOT PAIN: ICD-10-CM

## 2019-09-21 DIAGNOSIS — S90.811A ABRASION OF RIGHT FOOT, INITIAL ENCOUNTER: Primary | ICD-10-CM

## 2019-09-21 PROCEDURE — 99284 EMERGENCY DEPT VISIT MOD MDM: CPT

## 2019-09-21 PROCEDURE — 74011250637 HC RX REV CODE- 250/637: Performed by: NURSE PRACTITIONER

## 2019-09-21 PROCEDURE — 73630 X-RAY EXAM OF FOOT: CPT

## 2019-09-21 RX ORDER — IBUPROFEN 600 MG/1
600 TABLET ORAL
Qty: 20 TAB | Refills: 0 | Status: ON HOLD | OUTPATIENT
Start: 2019-09-21 | End: 2021-09-22

## 2019-09-21 RX ORDER — BACITRACIN 500 [USP'U]/G
OINTMENT TOPICAL 3 TIMES DAILY
Qty: 1 TUBE | Refills: 0 | Status: SHIPPED | OUTPATIENT
Start: 2019-09-21 | End: 2019-10-01

## 2019-09-21 RX ORDER — IBUPROFEN 600 MG/1
600 TABLET ORAL
Qty: 20 TAB | Refills: 0 | Status: SHIPPED | OUTPATIENT
Start: 2019-09-21 | End: 2019-09-21

## 2019-09-21 RX ORDER — IBUPROFEN 600 MG/1
600 TABLET ORAL
Status: COMPLETED | OUTPATIENT
Start: 2019-09-21 | End: 2019-09-21

## 2019-09-21 RX ORDER — BACITRACIN 500 [USP'U]/G
OINTMENT TOPICAL 3 TIMES DAILY
Qty: 1 TUBE | Refills: 0 | Status: SHIPPED | OUTPATIENT
Start: 2019-09-21 | End: 2019-09-21

## 2019-09-21 RX ADMIN — IBUPROFEN 600 MG: 600 TABLET, FILM COATED ORAL at 12:47

## 2019-09-21 NOTE — ED TRIAGE NOTES
Pt reports that her shoe rubbed against her right foot while her foot was sweating and now has a bilster to top of foot, bothering her for 2 days. Hx of DM.

## 2019-09-21 NOTE — DISCHARGE INSTRUCTIONS
Patient Education        Scrapes (Abrasions): Care Instructions  Your Care Instructions  Scrapes (abrasions) are wounds where your skin has been rubbed or torn off. Most scrapes do not go deep into the skin, but some may remove several layers of skin. Scrapes usually don't bleed much, but they may ooze pinkish fluid. Scrapes on the head or face may appear worse than they are. They may bleed a lot because of the good blood supply to this area. Most scrapes heal well and may not need a bandage. They usually heal within 3 to 7 days. A large, deep scrape may take 1 to 2 weeks or longer to heal. A scab may form on some scrapes. Follow-up care is a key part of your treatment and safety. Be sure to make and go to all appointments, and call your doctor if you are having problems. It's also a good idea to know your test results and keep a list of the medicines you take. How can you care for yourself at home? · If your doctor told you how to care for your wound, follow your doctor's instructions. If you did not get instructions, follow this general advice:  ? Wash the scrape with clean water 2 times a day. Don't use hydrogen peroxide or alcohol, which can slow healing. ? You may cover the scrape with a thin layer of petroleum jelly, such as Vaseline, and a nonstick bandage. ? Apply more petroleum jelly and replace the bandage as needed. · Prop up the injured area on a pillow anytime you sit or lie down during the next 3 days. Try to keep it above the level of your heart. This will help reduce swelling. · Be safe with medicines. Take pain medicines exactly as directed. ? If the doctor gave you a prescription medicine for pain, take it as prescribed. ? If you are not taking a prescription pain medicine, ask your doctor if you can take an over-the-counter medicine. When should you call for help?   Call your doctor now or seek immediate medical care if:    · You have signs of infection, such as:  ? Increased pain, swelling, warmth, or redness around the scrape. ? Red streaks leading from the scrape. ? Pus draining from the scrape. ? A fever.     · The scrape starts to bleed, and blood soaks through the bandage. Oozing small amounts of blood is normal.    Watch closely for changes in your health, and be sure to contact your doctor if the scrape is not getting better each day. Where can you learn more? Go to http://babatunde-dejah.info/. Enter A374 in the search box to learn more about \"Scrapes (Abrasions): Care Instructions. \"  Current as of: June 26, 2019  Content Version: 12.2  © 1390-0248 [employer]. Care instructions adapted under license by [employer] (which disclaims liability or warranty for this information). If you have questions about a medical condition or this instruction, always ask your healthcare professional. [address] any warranty or liability for your use of this information.
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
## 2019-11-28 ENCOUNTER — IP HISTORICAL/CONVERTED ENCOUNTER (OUTPATIENT)
Dept: OTHER | Age: 54
End: 2019-11-28

## 2020-01-04 ENCOUNTER — IP HISTORICAL/CONVERTED ENCOUNTER (OUTPATIENT)
Dept: OTHER | Age: 55
End: 2020-01-04

## 2020-01-25 ENCOUNTER — IP HISTORICAL/CONVERTED ENCOUNTER (OUTPATIENT)
Dept: OTHER | Age: 55
End: 2020-01-25

## 2020-07-27 ENCOUNTER — IP HISTORICAL/CONVERTED ENCOUNTER (OUTPATIENT)
Dept: OTHER | Age: 55
End: 2020-07-27

## 2021-04-25 ENCOUNTER — HOSPITAL ENCOUNTER (EMERGENCY)
Age: 56
Discharge: HOME | End: 2021-04-26
Attending: EMERGENCY MEDICINE
Payer: MEDICAID

## 2021-04-25 DIAGNOSIS — R73.9 HYPERGLYCEMIA: ICD-10-CM

## 2021-04-25 DIAGNOSIS — F32.A DEPRESSION, UNSPECIFIED DEPRESSION TYPE: Primary | ICD-10-CM

## 2021-04-25 DIAGNOSIS — S32.000A COMPRESSION FRACTURE OF LUMBAR VERTEBRA, INITIAL ENCOUNTER, UNSPECIFIED LUMBAR VERTEBRAL LEVEL: ICD-10-CM

## 2021-04-25 DIAGNOSIS — M54.50 ACUTE RIGHT-SIDED LOW BACK PAIN WITHOUT SCIATICA: ICD-10-CM

## 2021-04-25 PROCEDURE — 99285 EMERGENCY DEPT VISIT HI MDM: CPT

## 2021-04-26 ENCOUNTER — APPOINTMENT (OUTPATIENT)
Dept: GENERAL RADIOLOGY | Age: 56
End: 2021-04-26
Attending: EMERGENCY MEDICINE
Payer: MEDICAID

## 2021-04-26 VITALS
TEMPERATURE: 98.3 F | RESPIRATION RATE: 20 BRPM | OXYGEN SATURATION: 96 % | WEIGHT: 232 LBS | SYSTOLIC BLOOD PRESSURE: 121 MMHG | BODY MASS INDEX: 38.65 KG/M2 | HEIGHT: 65 IN | DIASTOLIC BLOOD PRESSURE: 48 MMHG | HEART RATE: 83 BPM

## 2021-04-26 LAB
ALBUMIN SERPL-MCNC: 3.1 G/DL (ref 3.5–5)
ALBUMIN/GLOB SERPL: 0.7 {RATIO} (ref 1.1–2.2)
ALP SERPL-CCNC: 162 U/L (ref 45–117)
ALT SERPL-CCNC: 40 U/L (ref 12–78)
AMPHET UR QL SCN: NEGATIVE
ANION GAP SERPL CALC-SCNC: 10 MMOL/L (ref 5–15)
APAP SERPL-MCNC: <2 UG/ML (ref 10–30)
APPEARANCE UR: CLEAR
AST SERPL-CCNC: 21 U/L (ref 15–37)
BACTERIA URNS QL MICRO: ABNORMAL /HPF
BARBITURATES UR QL SCN: NEGATIVE
BASOPHILS # BLD: 0.1 K/UL (ref 0–0.1)
BASOPHILS NFR BLD: 1 % (ref 0–1)
BENZODIAZ UR QL: NEGATIVE
BILIRUB SERPL-MCNC: 0.2 MG/DL (ref 0.2–1)
BILIRUB UR QL: NEGATIVE
BUN SERPL-MCNC: 16 MG/DL (ref 6–20)
BUN/CREAT SERPL: 16 (ref 12–20)
CALCIUM SERPL-MCNC: 9.2 MG/DL (ref 8.5–10.1)
CANNABINOIDS UR QL SCN: NEGATIVE
CHLORIDE SERPL-SCNC: 98 MMOL/L (ref 97–108)
CO2 SERPL-SCNC: 28 MMOL/L (ref 21–32)
COCAINE UR QL SCN: NEGATIVE
COLOR UR: ABNORMAL
CREAT SERPL-MCNC: 1 MG/DL (ref 0.55–1.02)
DIFFERENTIAL METHOD BLD: ABNORMAL
DRUG SCRN COMMENT,DRGCM: NORMAL
EOSINOPHIL # BLD: 0.1 K/UL (ref 0–0.4)
EOSINOPHIL NFR BLD: 1 % (ref 0–7)
EPITH CASTS URNS QL MICRO: ABNORMAL /LPF
ERYTHROCYTE [DISTWIDTH] IN BLOOD BY AUTOMATED COUNT: 14.6 % (ref 11.5–14.5)
ETHANOL SERPL-MCNC: <10 MG/DL
FLUAV RNA SPEC QL NAA+PROBE: NOT DETECTED
FLUBV RNA SPEC QL NAA+PROBE: NOT DETECTED
GLOBULIN SER CALC-MCNC: 4.4 G/DL (ref 2–4)
GLUCOSE BLD STRIP.AUTO-MCNC: 241 MG/DL (ref 65–100)
GLUCOSE SERPL-MCNC: 449 MG/DL (ref 65–100)
GLUCOSE UR STRIP.AUTO-MCNC: >1000 MG/DL
HCT VFR BLD AUTO: 41 % (ref 35–47)
HGB BLD-MCNC: 13.3 G/DL (ref 11.5–16)
HGB UR QL STRIP: NEGATIVE
IMM GRANULOCYTES # BLD AUTO: 0.1 K/UL (ref 0–0.04)
IMM GRANULOCYTES NFR BLD AUTO: 1 % (ref 0–0.5)
KETONES UR QL STRIP.AUTO: NEGATIVE MG/DL
LEUKOCYTE ESTERASE UR QL STRIP.AUTO: NEGATIVE
LYMPHOCYTES # BLD: 1.7 K/UL (ref 0.8–3.5)
LYMPHOCYTES NFR BLD: 17 % (ref 12–49)
MCH RBC QN AUTO: 26.9 PG (ref 26–34)
MCHC RBC AUTO-ENTMCNC: 32.4 G/DL (ref 30–36.5)
MCV RBC AUTO: 83 FL (ref 80–99)
METHADONE UR QL: NEGATIVE
MONOCYTES # BLD: 0.4 K/UL (ref 0–1)
MONOCYTES NFR BLD: 4 % (ref 5–13)
NEUTS SEG # BLD: 7.9 K/UL (ref 1.8–8)
NEUTS SEG NFR BLD: 76 % (ref 32–75)
NITRITE UR QL STRIP.AUTO: NEGATIVE
NRBC # BLD: 0 K/UL (ref 0–0.01)
NRBC BLD-RTO: 0 PER 100 WBC
OPIATES UR QL: NEGATIVE
PCP UR QL: NEGATIVE
PH UR STRIP: 5.5 [PH] (ref 5–8)
PLATELET # BLD AUTO: 391 K/UL (ref 150–400)
PMV BLD AUTO: 9.4 FL (ref 8.9–12.9)
POTASSIUM SERPL-SCNC: 3.4 MMOL/L (ref 3.5–5.1)
PROT SERPL-MCNC: 7.5 G/DL (ref 6.4–8.2)
PROT UR STRIP-MCNC: NEGATIVE MG/DL
RBC # BLD AUTO: 4.94 M/UL (ref 3.8–5.2)
RBC #/AREA URNS HPF: ABNORMAL /HPF (ref 0–5)
SALICYLATES SERPL-MCNC: 1.7 MG/DL (ref 2.8–20)
SARS-COV-2, COV2: NOT DETECTED
SERVICE CMNT-IMP: ABNORMAL
SODIUM SERPL-SCNC: 136 MMOL/L (ref 136–145)
SP GR UR REFRACTOMETRY: 1.01 (ref 1–1.03)
UA: UC IF INDICATED,UAUC: ABNORMAL
UROBILINOGEN UR QL STRIP.AUTO: 0.2 EU/DL (ref 0.2–1)
WBC # BLD AUTO: 10.3 K/UL (ref 3.6–11)
WBC URNS QL MICRO: ABNORMAL /HPF (ref 0–4)
YEAST BUDDING URNS QL: PRESENT

## 2021-04-26 PROCEDURE — 82077 ASSAY SPEC XCP UR&BREATH IA: CPT

## 2021-04-26 PROCEDURE — 82962 GLUCOSE BLOOD TEST: CPT

## 2021-04-26 PROCEDURE — 74011636637 HC RX REV CODE- 636/637: Performed by: EMERGENCY MEDICINE

## 2021-04-26 PROCEDURE — 80179 DRUG ASSAY SALICYLATE: CPT

## 2021-04-26 PROCEDURE — 36415 COLL VENOUS BLD VENIPUNCTURE: CPT

## 2021-04-26 PROCEDURE — 87636 SARSCOV2 & INF A&B AMP PRB: CPT

## 2021-04-26 PROCEDURE — 85025 COMPLETE CBC W/AUTO DIFF WBC: CPT

## 2021-04-26 PROCEDURE — 80053 COMPREHEN METABOLIC PANEL: CPT

## 2021-04-26 PROCEDURE — 72100 X-RAY EXAM L-S SPINE 2/3 VWS: CPT

## 2021-04-26 PROCEDURE — 81001 URINALYSIS AUTO W/SCOPE: CPT

## 2021-04-26 PROCEDURE — 80307 DRUG TEST PRSMV CHEM ANLYZR: CPT

## 2021-04-26 PROCEDURE — 96374 THER/PROPH/DIAG INJ IV PUSH: CPT

## 2021-04-26 PROCEDURE — 74011250636 HC RX REV CODE- 250/636: Performed by: EMERGENCY MEDICINE

## 2021-04-26 PROCEDURE — 96361 HYDRATE IV INFUSION ADD-ON: CPT

## 2021-04-26 PROCEDURE — 80143 DRUG ASSAY ACETAMINOPHEN: CPT

## 2021-04-26 RX ADMIN — SODIUM CHLORIDE 1000 ML: 9 INJECTION, SOLUTION INTRAVENOUS at 02:13

## 2021-04-26 RX ADMIN — HUMAN INSULIN 10 UNITS: 100 INJECTION, SOLUTION SUBCUTANEOUS at 02:21

## 2021-04-26 NOTE — BSMART NOTE
Comprehensive Assessment Form Part 1 Section I - Disposition Dx impressions: MDD recurrent by history Past Medical History:  
Diagnosis Date  Arthritis   
 legs  Bipolar 1 disorder ([facility]  COPD  Depression 1/13/2012  Diabetes ([facility]  Drug abuse ([facility] cocaine, stimulants, etoh, mj, tob  H/O suicide attempt  Hypertension  Obesity  Polydrug dependence excluding opioid type drug, abuse ([facility]  Schizophrenia ([facility] 7/8/2016 The Medical Doctor to Psychiatrist conference was not completed. The Medical Doctor is in agreement with PMHNP disposition. The plan is noted below. The on-call PMHNP consulted was SMILEY Calderón. The admitting Psychiatrist will be Dr. Chrissie Garay. The admitting Diagnosis is NA. The Payor source is medicaid. Section II - Integrated Summary Pt is a 59 y/o female known to [facility] Psychiatry and [facility] from numerous assessments and admissions. She has an [facility] for frequent ED visits. In the past she has been hospitalized for psychosis secondary medication noncompliance, but  is known to come to the ED requesting psych admission and endorsing SI secondary to chronic homelessness or marital conflict. Last psych admission was at [facility] a few months ago. Pt is historically diagnosed with Malingering, Substance induced psychosis, Schizoaffective Disorder, PTSD and Depression. Pt was transported to the ED tonight via EMS. Upon arrival she endorsed SI and stated, \"I tried to step in front of traffic but I tripped over the curb. \" 
Writer met with pt via telecom at bedside. Pt is alert and oriented x 4. Mood is sad with congruent affect. Speech is spontaneous with normal rate, rhythm and volume. Thought content is in conjunction with current situation. Thought process is linear and goal-directed. There is no observable evidence of perceptual or formal thought disturbance and none reported. Pt reports she came to the ED because she had suicidal thoughts.  She thought about walking into traffic \"but I tripped off the curb. \" Pt denies current ideation, plan and intent. She states she is sad because she misses her  who  3 years ago. She reports she has been homeless for the past two weeks but when asked where she was staying before that, pt stated \"I was on the street then too. \" Pt reports her last hospitalization was at [facility] a few months ago. Pt asking for case management and resources. She reports she had a [facility] counselor after discharge from [facility] \"but then she disappeared. \" 
Writer consulted with Luther Benavides, crisis clinician at [facility]. She reported pt does not meet criteria for their CSU. Writer then spoke with NP Saul Rey who is not recommending psychiatric hospitalization. Writer faxed homeless and [address] resources to ED nurse for pt. Advised that pt could either go to [facility] now or be sent to [facility] when they open later this morning. Pt reportedly did not choose either option and told RN she felt better and wanted to be discharged. The patient has demonstrated mental capacity to provide informed consent. The information is given by the patient and past medical records. The Chief Complaint is as noted above. The Precipitant Factors are as noted above. Previous Hospitalizations: yes The patient has not previously been in restraints. Current Psychiatrist and/or  is none. Lethality Assessment: 
 
The potential for suicide is not noted. The potential for homicide is not noted. The patient has not been a perpetrator of sexual or physical abuse. There are not pending charges. The patient is not felt to be at risk for self harm or harm to others. The attending nurse was advised pt to be discharged. Section III - Psychosocial 
The patient's overall mood and attitude is calm, cooperative. Feelings of helplessness and hopelessness are not observed. Generalized anxiety is not observed. Panic is not observed. Phobias are not observed. Obsessive compulsive tendencies are not observed. Section IV - Mental Status Exam 
The patient's appearance is WNL. The patient's behavior shows no evidence of impairment. The patient is oriented to time, place, person and situation. The patient's speech shows no evidence of impairment. The patient's mood is sad. The range of affect shows no evidence of impairment. The patient's thought content demonstrates no evidence of impairment. The thought process shows no evidence of impairment. The patient's perception shows no evidence of impairment. The patient's memory shows no evidence of impairment. The patient's appetite shows no evidence of impairment. The patient's sleep shows no evidence of impairment. The patient's insight shows no evidence of impairment. The patient's judgement shows no evidence of impairment. Section V - Substance Abuse The patient is not using substances. The patient has experienced the following withdrawal symptoms: N/A. Section VI - Living Arrangements The patient is . The patient lives homeless. The patient does not have legal issues pending. The patient's source of income comes from . Yarsanism and cultural practices have not been voiced at this time. The patient's greatest support comes from no one identified. The patient has not been in an event described as horrible or outside the realm of ordinary life experience either currently or in the past. 
The patient has not been a victim of sexual/physical abuse. Section VII - Other Areas of Clinical Concern The highest grade achieved is NA with the overall quality of school experience being described as NA. The patient is currently disabled and speaks [place] as a primary language. The patient has no communication impairments affecting communication. The patient's preference for learning can be described as: can read and write adequately.   The patient's hearing is normal.  The patient's vision is normal. 
 
 
Sherie Farnsworth, MS

## 2021-04-26 NOTE — ED TRIAGE NOTES
Patient c/o \" I want to kill myself, I tried to step in front of traffic but I tripped over the curve\". Relays feeling this way for 2 weeks. Upon asking patient if she had a place to stay she relays \" not right now\". Also c/o cough.

## 2021-04-26 NOTE — ED NOTES
Pt presents ambulatory to ED complaining of walking out into traffic due to missing her . Pt reports currently having SI. Pt denies HI. Pt reports having auditory hallucinations \"telling me to kill myself\". Pt is alert and oriented x 4, RR even and unlabored, skin is warm and dry. Assesment completed and pt updated on plan of care. Emergency Department Nursing Plan of Care       The Nursing Plan of Care is developed from the Nursing assessment and Emergency Department Attending provider initial evaluation. The plan of care may be reviewed in the ED Provider note.     The Plan of Care was developed with the following considerations:   Patient / Family readiness to learn indicated by:verbalized understanding  Persons(s) to be included in education: patient  Barriers to Learning/Limitations:No    [address]    4/26/2021   1:45 AM

## 2021-04-26 NOTE — ED PROVIDER NOTES
EMERGENCY DEPARTMENT HISTORY AND PHYSICAL EXAM      Date: 4/25/2021  Patient Name: Avery Kemp    History of Presenting Illness     Chief Complaint   Patient presents with   [address] Problem    Cough       History Provided By: Patient    HPI: Avery Kemp, 60 y.o. female  With past medical history of bipolar and schizophrenia presenting today with suicidal thoughts. The patient says that she was going to try and get hit by a car, she tripped on a curb and fell today and is having some lower back pain associated with this. The patient notes that she has been having increase in depression symptoms for about 2 weeks. She is unsure of the medications that she is on currently. She notes that she is homeless. The patient denies any fever, cough, chills. She does not have any other complaints at this time. She says that she has had a previous hospitalization many years ago for suicidality. There are no other complaints, changes, or physical findings at this time. PCP: Coleen Carpio MD    No current facility-administered medications on file prior to encounter. Current Outpatient Medications on File Prior to Encounter   Medication Sig Dispense Refill    ibuprofen (MOTRIN) 600 mg tablet Take 1 Tab by mouth every six (6) hours as needed for Pain. 20 Tab 0    ondansetron (ZOFRAN ODT) 4 mg disintegrating tablet Take 1 Tab by mouth every eight (8) hours as needed for Nausea. 10 Tab 0    paliperidone (INVEGA) 3 mg SR tablet Take 1 Tab by mouth daily. Indications: SCHIZOAFFECTIVE DISORDER 14 Tab 0    sertraline (ZOLOFT) 25 mg tablet Take 1 Tab by mouth daily. Indications: major depressive disorder 14 Tab 0    meloxicam (MOBIC) 15 mg tablet Take 15 mg by mouth daily. Indications: Arthritis      fluticasone-vilanterol (BREO ELLIPTA) 200-25 mcg/dose inhaler Take 1 Puff by inhalation daily.  furosemide (Lasix) 40 mg tablet Take 40 mg by mouth daily.       albuterol (PROVENTIL VENTOLIN) 2.5 mg /3 mL (0.083 %) nebulizer solution 2.5 mg by Nebulization route three (3) times daily as needed for Wheezing or Shortness of Breath.  aspirin delayed-release 81 mg tablet Take 81 mg by mouth daily.  albuterol (VENTOLIN HFA) 90 mcg/actuation inhaler Take 2 Puffs by inhalation every six (6) hours as needed for Wheezing.  famotidine (PEPCID) 20 mg tablet Take 20 mg by mouth daily. Indications: Heartburn      lisinopril (PRINIVIL, ZESTRIL) 2.5 mg tablet Take 2.5 mg by mouth daily. Indications: hypertension      lovastatin (MEVACOR) 20 mg tablet Take 20 mg by mouth nightly. Indications: hyperlipidemia      glipiZIDE (GLUCOTROL) 10 mg tablet Take 10 mg by mouth two (2) times a day. Indications: type 2 diabetes mellitus         Past History     Past Medical History:  Past Medical History:   Diagnosis Date    Arthritis     legs    Bipolar 1 disorder ([facility]     COPD     Depression 1/13/2012    Diabetes ([facility]     Drug abuse (HCC)     cocaine, stimulants, etoh, mj, tob    H/O suicide attempt     Hypertension     Obesity     Polydrug dependence excluding opioid type drug, abuse ([facility]     Schizophrenia ([facility] 7/8/2016       Past Surgical History:  Past Surgical History:   Procedure Laterality Date    HX ORTHOPAEDIC      foot sx       Family History:  Family History   Problem Relation Age of Onset    Diabetes Mother     Stroke Mother     Stroke Father        Social History:  Social History     Tobacco Use    Smoking status: Current Every Day Smoker     Packs/day: 1.00     Years: 10.00     Pack years: 10.00     Types: Cigarettes    Smokeless tobacco: Current User   Substance Use Topics    Alcohol use: No     Alcohol/week: 0.0 standard drinks     Comment: Hx of ETOH abuse since age 13. no DUIs.  Drug use: Not Currently     Types: Cocaine, Marijuana, Other, Opiates       Allergies:   Allergies   Allergen Reactions    Amoxicillin Hives    Mushroom Flavor Hives    Tomato Hives Review of Systems   Constitutional: No  fever  Skin: No  rash  HEENT: No  nasal congestion  Resp: No cough  CV: No chest pain  GI: No vomiting  : No dysuria  MSK: No joint pain  Neuro: No numbness  Psych: No anxiety      Physical Exam     Patient Vitals for the past 12 hrs:   Temp Pulse Resp BP SpO2   04/26/21 0250     97 %   04/26/21 0215    134/81 96 %   04/26/21 0100    (!) 151/87 94 %   04/26/21 0057 98.3 °F (36.8 °C) 87 20 (!) 152/88 95 %       General: alert, No acute distress  Eyes: EOMI, normal conjunctiva  ENT: moist mucous membranes. Neck: Active, full ROM of neck. Skin: No rashes. no jaundice              Lungs: Equal chest expansion. no respiratory distress. Heart: regular rate     no peripheral edema    Abd:  non distended soft  Back: Mild tenderness to palpation in the lumbar spine area. MSK: Full, active ROM in all 4 extremities.    Neuro: alert  Person, Place, Time and Situation; normal speech;   Psych: Cooperative with exam; + suicidal ideation and flat affect             Diagnostic Study Results     Labs -     Recent Results (from the past 12 hour(s))   URINALYSIS W/ REFLEX CULTURE    Collection Time: 04/26/21 12:55 AM    Specimen: Urine   Result Value Ref Range    Color YELLOW/STRAW      Appearance CLEAR CLEAR      Specific gravity 1.010 1.003 - 1.030      pH (UA) 5.5 5.0 - 8.0      Protein Negative NEG mg/dL    Glucose >1,000 (A) NEG mg/dL    Ketone Negative NEG mg/dL    Bilirubin Negative NEG      Blood Negative NEG      Urobilinogen 0.2 0.2 - 1.0 EU/dL    Nitrites Negative NEG      Leukocyte Esterase Negative NEG      WBC 0-4 0 - 4 /hpf    RBC 0-5 0 - 5 /hpf    Epithelial cells FEW FEW /lpf    Bacteria 1+ (A) NEG /hpf    UA:UC IF INDICATED CULTURE NOT INDICATED BY UA RESULT CNI      Budding yeast PRESENT (A) NEG     DRUG SCREEN, URINE    Collection Time: 04/26/21 12:55 AM   Result Value Ref Range    AMPHETAMINES Negative NEG      BARBITURATES Negative NEG BENZODIAZEPINES Negative NEG      COCAINE Negative NEG      METHADONE Negative NEG      OPIATES Negative NEG      PCP(PHENCYCLIDINE) Negative NEG      THC (TH-CANNABINOL) Negative NEG      Drug screen comment (NOTE)    CBC WITH AUTOMATED DIFF    Collection Time: 04/26/21 12:55 AM   Result Value Ref Range    WBC 10.3 3.6 - 11.0 K/uL    RBC 4.94 3.80 - 5.20 M/uL    HGB 13.3 11.5 - 16.0 g/dL    HCT 41.0 35.0 - 47.0 %    MCV 83.0 80.0 - 99.0 FL    MCH 26.9 26.0 - 34.0 PG    MCHC 32.4 30.0 - 36.5 g/dL    RDW 14.6 (H) 11.5 - 14.5 %    PLATELET 061 526 - 344 K/uL    MPV 9.4 8.9 - 12.9 FL    NRBC 0.0 0  WBC    ABSOLUTE NRBC 0.00 0.00 - 0.01 K/uL    NEUTROPHILS 76 (H) 32 - 75 %    LYMPHOCYTES 17 12 - 49 %    MONOCYTES 4 (L) 5 - 13 %    EOSINOPHILS 1 0 - 7 %    BASOPHILS 1 0 - 1 %    IMMATURE GRANULOCYTES 1 (H) 0.0 - 0.5 %    ABS. NEUTROPHILS 7.9 1.8 - 8.0 K/UL    ABS. LYMPHOCYTES 1.7 0.8 - 3.5 K/UL    ABS. MONOCYTES 0.4 0.0 - 1.0 K/UL    ABS. EOSINOPHILS 0.1 0.0 - 0.4 K/UL    ABS. BASOPHILS 0.1 0.0 - 0.1 K/UL    ABS. IMM. GRANS. 0.1 (H) 0.00 - 0.04 K/UL    DF AUTOMATED     METABOLIC PANEL, COMPREHENSIVE    Collection Time: 04/26/21 12:55 AM   Result Value Ref Range    Sodium 136 136 - 145 mmol/L    Potassium 3.4 (L) 3.5 - 5.1 mmol/L    Chloride 98 97 - 108 mmol/L    CO2 28 21 - 32 mmol/L    Anion gap 10 5 - 15 mmol/L    Glucose 449 (H) 65 - 100 mg/dL    BUN 16 6 - 20 MG/DL    Creatinine 1.00 0.55 - 1.02 MG/DL    BUN/Creatinine ratio 16 12 - 20      GFR est AA >60 >60 ml/min/1.73m2    GFR est non-AA 57 (L) >60 ml/min/1.73m2    Calcium 9.2 8.5 - 10.1 MG/DL    Bilirubin, total 0.2 0.2 - 1.0 MG/DL    ALT (SGPT) 40 12 - 78 U/L    AST (SGOT) 21 15 - 37 U/L    Alk.  phosphatase 162 (H) 45 - 117 U/L    Protein, total 7.5 6.4 - 8.2 g/dL    Albumin 3.1 (L) 3.5 - 5.0 g/dL    Globulin 4.4 (H) 2.0 - 4.0 g/dL    A-G Ratio 0.7 (L) 1.1 - 2.2     ETHYL ALCOHOL    Collection Time: 04/26/21 12:55 AM   Result Value Ref Range ALCOHOL(ETHYL),SERUM <70 <21 MG/DL   SALICYLATE    Collection Time: 04/26/21 12:55 AM   Result Value Ref Range    Salicylate level 1.7 (L) 2.8 - 20.0 MG/DL   ACETAMINOPHEN    Collection Time: 04/26/21 12:55 AM   Result Value Ref Range    Acetaminophen level <2 (L) 10 - 30 ug/mL   COVID-19 WITH INFLUENZA A/B    Collection Time: 04/26/21 12:55 AM   Result Value Ref Range    SARS-CoV-2 Not detected NOTD      Influenza A by PCR Not detected NOTD      Influenza B by PCR Not detected NOTD     GLUCOSE, POC    Collection Time: 04/26/21  3:03 AM   Result Value Ref Range    Glucose (POC) 241 (H) 65 - 100 mg/dL    Performed by [facility]        Radiologic Studies -   XR SPINE LUMB 2 OR 3 V   Final Result   Age-indeterminate compression fractures at L1 and L2.        CT Results  (Last 48 hours)    None        CXR Results  (Last 48 hours)    None          Medical Decision Making   I am the first provider for this patient. I reviewed the vital signs, available nursing notes, past medical history, past surgical history, family history and social history. Vital Signs-Reviewed the patient's vital signs. Patient Vitals for the past 12 hrs:   Temp Pulse Resp BP SpO2   04/26/21 0250     97 %   04/26/21 0215    134/81 96 %   04/26/21 0100    (!) 151/87 94 %   04/26/21 0057 98.3 °F (36.8 °C) 87 20 (!) 152/88 95 %         Provider Notes (Medical Decision Making):     Differential Diagnosis: Suicidal ideation, depression, lumbar strain, fall    Initial Plan: Will obtain plain films of the lumbar spine, treat the patient with oral analgesics, and have [facility] evaluate her. ED Course:   Initial assessment performed. The patients presenting problems have been discussed, and they are in agreement with the care plan formulated and outlined with them. I have encouraged them to ask questions as they arise throughout their visit.     ED Course as of Apr 26 0615   Mon Apr 26, 2021   0218 On my interpretation of the patient's laboratory studies UDS is negative, Covid negative, urinalysis with no evidence of infection, metabolic panel is remarkable for elevated glucose. [NW]   66 [phone] On my interpretation of the patient's x-ray she has age-indeterminate fractures in the lumbar spine. [NW]   66 91 28 Patient presenting today with suicidal thoughts, she did have a fall today and was complaining of some back pain. She has age-indeterminate compression fractures in the lumbar spine. She is neuro intact, able to ambulate, no abnormal sensation in the lower extremities. I treated her with a bolus of IV fluids and insulin for her elevated blood sugar. No acute intervention is necessary for the patient's compression fractures except for pain control. She does not request any pain medication currently. [NW]   0301 [facility] states that the patient does not meet inpatient criteria at this time. They feel that she would be best served to be discharged to go to [facility] in the a.m. I feel that this is reasonable. Will reassess patient's blood sugar, and likely discharge in the a.m. with transport to [facility]. [NW]   5952 Blood sugar has improved to 241. [NW]      ED Course User Index  [NW] Latoya Yousif MD       I, Rollen Mcardle, MD, am the attending of record for this patient encounter. Dispo: Discharged. The patient has been re-evaluated and is ready for discharge. Reviewed available results with patient. Counseled patient on diagnosis and care plan. Patient has expressed understanding, and all questions have been answered. Patient agrees with plan and agrees to follow up as recommended, or to return to the ED if their symptoms worsen. Discharge instructions have been provided and explained to the patient, along with reasons to return to the ED. PLAN:  Current Discharge Medication List        2. Follow-up Information    None       3. Return to ED if worse       Diagnosis     Clinical Impression:   1.  Depression, unspecified depression type    2. Acute right-sided low back pain without sciatica    3. Compression fracture of lumbar vertebra, initial encounter, unspecified lumbar vertebral level ([address]    4. Hyperglycemia        Attestations:    Rollen Mcardle, MD    Please note that this dictation was completed with [employer], the computer voice recognition software. Quite often unanticipated grammatical, syntax, homophones, and other interpretive errors are inadvertently transcribed by the computer software. Please disregard these errors. Please excuse any errors that have escaped final proofreading. Thank you.

## 2021-04-26 NOTE — ED NOTES
Discharge instructions were given to the patient by Salvador Arce.     The patient left the Emergency Department ambulatory, alert and oriented and in no acute distress with 0 prescriptions. The patient was encouraged to call or return to the ED for worsening issues or problems and was encouraged to schedule a follow up appointment for continuing care. The patient verbalized understanding of discharge instructions and prescriptions, all questions were answered. The patient has no further concerns at this time. ED setting up transportation to [facility] for pt. Pt aware of plan and agrees to wait for transportation.

## 2021-04-26 NOTE — ED NOTES
Per Francisca Kidd, NP on B[facility] does not think pt meets criteria for admittance. Duane recommends pt should be referred to [facility] at 0800 for CSU admission. Celena Zepeda MD notified.

## 2021-07-27 ENCOUNTER — HOSPITAL ENCOUNTER (INPATIENT)
Age: 56
LOS: 3 days | Discharge: HOME HEALTH | DRG: 420 | End: 2021-07-30
Attending: EMERGENCY MEDICINE | Admitting: INTERNAL MEDICINE
Payer: MEDICAID

## 2021-07-27 ENCOUNTER — APPOINTMENT (OUTPATIENT)
Dept: GENERAL RADIOLOGY | Age: 56
DRG: 420 | End: 2021-07-27
Attending: EMERGENCY MEDICINE
Payer: MEDICAID

## 2021-07-27 DIAGNOSIS — N17.9 ACUTE RENAL FAILURE, UNSPECIFIED ACUTE RENAL FAILURE TYPE (HCC): ICD-10-CM

## 2021-07-27 DIAGNOSIS — I47.1 SVT (SUPRAVENTRICULAR TACHYCARDIA) (HCC): Primary | ICD-10-CM

## 2021-07-27 DIAGNOSIS — E11.00 HYPEROSMOLAR HYPERGLYCEMIC STATE (HHS) (HCC): ICD-10-CM

## 2021-07-27 DIAGNOSIS — M62.82 NON-TRAUMATIC RHABDOMYOLYSIS: ICD-10-CM

## 2021-07-27 LAB
ADMINISTERED INITIALS, ADMINIT: NORMAL
ALBUMIN SERPL-MCNC: 2.7 G/DL (ref 3.5–5)
ALBUMIN/GLOB SERPL: 0.6 {RATIO} (ref 1.1–2.2)
ALP SERPL-CCNC: 162 U/L (ref 45–117)
ALT SERPL-CCNC: 41 U/L (ref 12–78)
ANION GAP SERPL CALC-SCNC: 10 MMOL/L (ref 5–15)
ANION GAP SERPL CALC-SCNC: 14 MMOL/L (ref 5–15)
APPEARANCE UR: ABNORMAL
AST SERPL-CCNC: 46 U/L (ref 15–37)
BACTERIA URNS QL MICRO: ABNORMAL /HPF
BASOPHILS # BLD: 0 K/UL (ref 0–0.1)
BASOPHILS NFR BLD: 0 % (ref 0–1)
BILIRUB SERPL-MCNC: 0.7 MG/DL (ref 0.2–1)
BILIRUB UR QL: NEGATIVE
BUN SERPL-MCNC: 17 MG/DL (ref 6–20)
BUN SERPL-MCNC: 19 MG/DL (ref 6–20)
BUN/CREAT SERPL: 10 (ref 12–20)
BUN/CREAT SERPL: 9 (ref 12–20)
CALCIUM SERPL-MCNC: 7.9 MG/DL (ref 8.5–10.1)
CALCIUM SERPL-MCNC: 8.3 MG/DL (ref 8.5–10.1)
CHLORIDE SERPL-SCNC: 88 MMOL/L (ref 97–108)
CHLORIDE SERPL-SCNC: 94 MMOL/L (ref 97–108)
CK MB CFR SERPL CALC: 0.4 % (ref 0–2.5)
CK MB SERPL-MCNC: 4.6 NG/ML (ref 5–25)
CK SERPL-CCNC: 1277 U/L (ref 26–192)
CO2 SERPL-SCNC: 21 MMOL/L (ref 21–32)
CO2 SERPL-SCNC: 24 MMOL/L (ref 21–32)
COLOR UR: ABNORMAL
CREAT SERPL-MCNC: 1.71 MG/DL (ref 0.55–1.02)
CREAT SERPL-MCNC: 2.07 MG/DL (ref 0.55–1.02)
D50 ADMINISTERED, D50ADM: 0 ML
D50 ORDER, D50ORD: 0 ML
DIFFERENTIAL METHOD BLD: ABNORMAL
EOSINOPHIL # BLD: 0 K/UL (ref 0–0.4)
EOSINOPHIL NFR BLD: 0 % (ref 0–7)
EPITH CASTS URNS QL MICRO: ABNORMAL /LPF
ERYTHROCYTE [DISTWIDTH] IN BLOOD BY AUTOMATED COUNT: 14.6 % (ref 11.5–14.5)
EST. AVERAGE GLUCOSE BLD GHB EST-MCNC: 292 MG/DL
GLOBULIN SER CALC-MCNC: 4.8 G/DL (ref 2–4)
GLSCOM COMMENTS: NORMAL
GLUCOSE BLD STRIP.AUTO-MCNC: 184 MG/DL (ref 65–117)
GLUCOSE BLD STRIP.AUTO-MCNC: 271 MG/DL (ref 65–117)
GLUCOSE BLD STRIP.AUTO-MCNC: 515 MG/DL (ref 65–117)
GLUCOSE BLD STRIP.AUTO-MCNC: >600 MG/DL (ref 65–117)
GLUCOSE SERPL-MCNC: 646 MG/DL (ref 65–100)
GLUCOSE SERPL-MCNC: 850 MG/DL (ref 65–100)
GLUCOSE UR STRIP.AUTO-MCNC: >1000 MG/DL
GLUCOSE, GLC: 184 MG/DL
GLUCOSE, GLC: 271 MG/DL
GLUCOSE, GLC: 515 MG/DL
GLUCOSE, GLC: 601 MG/DL
HBA1C MFR BLD: 11.8 % (ref 4–5.6)
HCT VFR BLD AUTO: 35.8 % (ref 35–47)
HGB BLD-MCNC: 11.2 G/DL (ref 11.5–16)
HGB UR QL STRIP: ABNORMAL
HIGH TARGET, HITG: 180 MG/DL
IMM GRANULOCYTES # BLD AUTO: 0 K/UL (ref 0–0.04)
IMM GRANULOCYTES NFR BLD AUTO: 0 % (ref 0–0.5)
INSULIN ADMINSTERED, INSADM: 10.8 UNITS/HOUR
INSULIN ADMINSTERED, INSADM: 3.7 UNITS/HOUR
INSULIN ADMINSTERED, INSADM: 4.2 UNITS/HOUR
INSULIN ADMINSTERED, INSADM: 9.1 UNITS/HOUR
INSULIN ORDER, INSORD: 10.8 UNITS/HOUR
INSULIN ORDER, INSORD: 3.7 UNITS/HOUR
INSULIN ORDER, INSORD: 4.2 UNITS/HOUR
INSULIN ORDER, INSORD: 9.1 UNITS/HOUR
KETONES UR QL STRIP.AUTO: NEGATIVE MG/DL
LEUKOCYTE ESTERASE UR QL STRIP.AUTO: NEGATIVE
LOW TARGET, LOT: 140 MG/DL
LYMPHOCYTES # BLD: 0.1 K/UL (ref 0.8–3.5)
LYMPHOCYTES NFR BLD: 1 % (ref 12–49)
MAGNESIUM SERPL-MCNC: 1.7 MG/DL (ref 1.6–2.4)
MAGNESIUM SERPL-MCNC: 1.8 MG/DL (ref 1.6–2.4)
MCH RBC QN AUTO: 26.2 PG (ref 26–34)
MCHC RBC AUTO-ENTMCNC: 31.3 G/DL (ref 30–36.5)
MCV RBC AUTO: 83.6 FL (ref 80–99)
MINUTES UNTIL NEXT BG, NBG: 60 MIN
MONOCYTES # BLD: 0.6 K/UL (ref 0–1)
MONOCYTES NFR BLD: 6 % (ref 5–13)
MULTIPLIER, MUL: 0.02
MULTIPLIER, MUL: 0.03
NEUTS BAND NFR BLD MANUAL: 1 %
NEUTS SEG # BLD: 8.7 K/UL (ref 1.8–8)
NEUTS SEG NFR BLD: 92 % (ref 32–75)
NITRITE UR QL STRIP.AUTO: POSITIVE
NRBC # BLD: 0 K/UL (ref 0–0.01)
NRBC BLD-RTO: 0 PER 100 WBC
ORDER INITIALS, ORDINIT: NORMAL
PH UR STRIP: 5 [PH] (ref 5–8)
PHOSPHATE SERPL-MCNC: 3.3 MG/DL (ref 2.6–4.7)
PLATELET # BLD AUTO: 325 K/UL (ref 150–400)
PMV BLD AUTO: 10 FL (ref 8.9–12.9)
POTASSIUM SERPL-SCNC: 4 MMOL/L (ref 3.5–5.1)
POTASSIUM SERPL-SCNC: 4 MMOL/L (ref 3.5–5.1)
PROT SERPL-MCNC: 7.5 G/DL (ref 6.4–8.2)
PROT UR STRIP-MCNC: ABNORMAL MG/DL
RBC # BLD AUTO: 4.28 M/UL (ref 3.8–5.2)
RBC #/AREA URNS HPF: ABNORMAL /HPF (ref 0–5)
RBC MORPH BLD: ABNORMAL
SERVICE CMNT-IMP: ABNORMAL
SODIUM SERPL-SCNC: 123 MMOL/L (ref 136–145)
SODIUM SERPL-SCNC: 128 MMOL/L (ref 136–145)
SP GR UR REFRACTOMETRY: 1.03 (ref 1–1.03)
TROPONIN I SERPL-MCNC: <0.05 NG/ML
TSH SERPL DL<=0.05 MIU/L-ACNC: 1.09 UIU/ML (ref 0.36–3.74)
UA: UC IF INDICATED,UAUC: ABNORMAL
UROBILINOGEN UR QL STRIP.AUTO: 0.2 EU/DL (ref 0.2–1)
WBC # BLD AUTO: 9.4 K/UL (ref 3.6–11)
WBC URNS QL MICRO: ABNORMAL /HPF (ref 0–4)

## 2021-07-27 PROCEDURE — 36415 COLL VENOUS BLD VENIPUNCTURE: CPT

## 2021-07-27 PROCEDURE — 74011000258 HC RX REV CODE- 258: Performed by: EMERGENCY MEDICINE

## 2021-07-27 PROCEDURE — 82550 ASSAY OF CK (CPK): CPT

## 2021-07-27 PROCEDURE — 74011636637 HC RX REV CODE- 636/637: Performed by: EMERGENCY MEDICINE

## 2021-07-27 PROCEDURE — 82962 GLUCOSE BLOOD TEST: CPT

## 2021-07-27 PROCEDURE — 71045 X-RAY EXAM CHEST 1 VIEW: CPT

## 2021-07-27 PROCEDURE — 85025 COMPLETE CBC W/AUTO DIFF WBC: CPT

## 2021-07-27 PROCEDURE — 74011250636 HC RX REV CODE- 250/636: Performed by: EMERGENCY MEDICINE

## 2021-07-27 PROCEDURE — 80053 COMPREHEN METABOLIC PANEL: CPT

## 2021-07-27 PROCEDURE — 84484 ASSAY OF TROPONIN QUANT: CPT

## 2021-07-27 PROCEDURE — 74011250636 HC RX REV CODE- 250/636: Performed by: INTERNAL MEDICINE

## 2021-07-27 PROCEDURE — 99285 EMERGENCY DEPT VISIT HI MDM: CPT

## 2021-07-27 PROCEDURE — 83036 HEMOGLOBIN GLYCOSYLATED A1C: CPT

## 2021-07-27 PROCEDURE — 74011250637 HC RX REV CODE- 250/637: Performed by: INTERNAL MEDICINE

## 2021-07-27 PROCEDURE — 83735 ASSAY OF MAGNESIUM: CPT

## 2021-07-27 PROCEDURE — 81001 URINALYSIS AUTO W/SCOPE: CPT

## 2021-07-27 PROCEDURE — 74011250637 HC RX REV CODE- 250/637: Performed by: EMERGENCY MEDICINE

## 2021-07-27 PROCEDURE — 93005 ELECTROCARDIOGRAM TRACING: CPT

## 2021-07-27 PROCEDURE — 65660000000 HC RM CCU STEPDOWN

## 2021-07-27 PROCEDURE — 84443 ASSAY THYROID STIM HORMONE: CPT

## 2021-07-27 PROCEDURE — 84100 ASSAY OF PHOSPHORUS: CPT

## 2021-07-27 RX ORDER — SODIUM CHLORIDE 0.9 % (FLUSH) 0.9 %
5-40 SYRINGE (ML) INJECTION EVERY 8 HOURS
Status: DISCONTINUED | OUTPATIENT
Start: 2021-07-27 | End: 2021-07-30 | Stop reason: HOSPADM

## 2021-07-27 RX ORDER — ATORVASTATIN CALCIUM 40 MG/1
40 TABLET, FILM COATED ORAL EVERY EVENING
Status: ON HOLD | COMMUNITY
End: 2021-09-24 | Stop reason: SDUPTHER

## 2021-07-27 RX ORDER — DEXTROSE 50 % IN WATER (D50W) INTRAVENOUS SYRINGE
25-50 AS NEEDED
Status: DISCONTINUED | OUTPATIENT
Start: 2021-07-27 | End: 2021-07-28

## 2021-07-27 RX ORDER — LORATADINE 10 MG/1
10 TABLET ORAL DAILY
Status: ON HOLD | COMMUNITY
End: 2021-09-24 | Stop reason: SDUPTHER

## 2021-07-27 RX ORDER — GABAPENTIN 300 MG/1
300 CAPSULE ORAL
Status: ON HOLD | COMMUNITY
End: 2021-09-24 | Stop reason: SDUPTHER

## 2021-07-27 RX ORDER — ENOXAPARIN SODIUM 100 MG/ML
40 INJECTION SUBCUTANEOUS DAILY
Status: DISCONTINUED | OUTPATIENT
Start: 2021-07-28 | End: 2021-07-27

## 2021-07-27 RX ORDER — MAGNESIUM SULFATE 100 %
4 CRYSTALS MISCELLANEOUS AS NEEDED
Status: DISCONTINUED | OUTPATIENT
Start: 2021-07-27 | End: 2021-07-28

## 2021-07-27 RX ORDER — ONDANSETRON 2 MG/ML
4 INJECTION INTRAMUSCULAR; INTRAVENOUS
Status: DISCONTINUED | OUTPATIENT
Start: 2021-07-27 | End: 2021-07-30 | Stop reason: HOSPADM

## 2021-07-27 RX ORDER — METFORMIN HYDROCHLORIDE 1000 MG/1
1000 TABLET ORAL 2 TIMES DAILY WITH MEALS
COMMUNITY
End: 2021-09-24

## 2021-07-27 RX ORDER — DEXTROSE MONOHYDRATE AND SODIUM CHLORIDE 5; .45 G/100ML; G/100ML
125 INJECTION, SOLUTION INTRAVENOUS CONTINUOUS
Status: DISCONTINUED | OUTPATIENT
Start: 2021-07-28 | End: 2021-07-28

## 2021-07-27 RX ORDER — ONDANSETRON 4 MG/1
4 TABLET, ORALLY DISINTEGRATING ORAL
Status: DISCONTINUED | OUTPATIENT
Start: 2021-07-27 | End: 2021-07-30 | Stop reason: HOSPADM

## 2021-07-27 RX ORDER — ACETAMINOPHEN 500 MG
1000 TABLET ORAL ONCE
Status: COMPLETED | OUTPATIENT
Start: 2021-07-27 | End: 2021-07-27

## 2021-07-27 RX ORDER — IPRATROPIUM BROMIDE AND ALBUTEROL SULFATE 2.5; .5 MG/3ML; MG/3ML
3 SOLUTION RESPIRATORY (INHALATION)
Status: DISCONTINUED | OUTPATIENT
Start: 2021-07-27 | End: 2021-07-27

## 2021-07-27 RX ORDER — ACETAMINOPHEN 325 MG/1
650 TABLET ORAL
Status: DISCONTINUED | OUTPATIENT
Start: 2021-07-27 | End: 2021-07-30 | Stop reason: HOSPADM

## 2021-07-27 RX ORDER — SODIUM CHLORIDE 0.9 % (FLUSH) 0.9 %
5-40 SYRINGE (ML) INJECTION AS NEEDED
Status: DISCONTINUED | OUTPATIENT
Start: 2021-07-27 | End: 2021-07-30 | Stop reason: HOSPADM

## 2021-07-27 RX ORDER — ACETAMINOPHEN 650 MG/1
650 SUPPOSITORY RECTAL
Status: DISCONTINUED | OUTPATIENT
Start: 2021-07-27 | End: 2021-07-30 | Stop reason: HOSPADM

## 2021-07-27 RX ORDER — POLYETHYLENE GLYCOL 3350 17 G/17G
17 POWDER, FOR SOLUTION ORAL DAILY PRN
Status: DISCONTINUED | OUTPATIENT
Start: 2021-07-27 | End: 2021-07-30 | Stop reason: HOSPADM

## 2021-07-27 RX ORDER — ALBUTEROL SULFATE 0.83 MG/ML
2.5 SOLUTION RESPIRATORY (INHALATION)
Status: DISCONTINUED | OUTPATIENT
Start: 2021-07-27 | End: 2021-07-27 | Stop reason: SDUPTHER

## 2021-07-27 RX ORDER — ATORVASTATIN CALCIUM 40 MG/1
40 TABLET, FILM COATED ORAL DAILY
Status: DISCONTINUED | OUTPATIENT
Start: 2021-07-28 | End: 2021-07-28

## 2021-07-27 RX ORDER — IPRATROPIUM BROMIDE AND ALBUTEROL SULFATE 2.5; .5 MG/3ML; MG/3ML
3 SOLUTION RESPIRATORY (INHALATION)
Status: DISCONTINUED | OUTPATIENT
Start: 2021-07-27 | End: 2021-07-27 | Stop reason: SDUPTHER

## 2021-07-27 RX ORDER — ACETAMINOPHEN 325 MG/1
650 TABLET ORAL
Status: DISCONTINUED | OUTPATIENT
Start: 2021-07-27 | End: 2021-07-27

## 2021-07-27 RX ORDER — IPRATROPIUM BROMIDE AND ALBUTEROL SULFATE 2.5; .5 MG/3ML; MG/3ML
3 SOLUTION RESPIRATORY (INHALATION)
Status: DISCONTINUED | OUTPATIENT
Start: 2021-07-27 | End: 2021-07-30 | Stop reason: HOSPADM

## 2021-07-27 RX ORDER — RISPERIDONE 1 MG/1
1 TABLET, FILM COATED ORAL 2 TIMES DAILY
Status: ON HOLD | COMMUNITY
End: 2021-09-24 | Stop reason: SDUPTHER

## 2021-07-27 RX ORDER — LINAGLIPTIN 5 MG/1
5 TABLET, FILM COATED ORAL DAILY
COMMUNITY
End: 2021-09-24

## 2021-07-27 RX ORDER — RISPERIDONE 1 MG/1
1 TABLET, FILM COATED ORAL 2 TIMES DAILY
Status: DISCONTINUED | OUTPATIENT
Start: 2021-07-27 | End: 2021-07-30 | Stop reason: HOSPADM

## 2021-07-27 RX ORDER — FAMOTIDINE 20 MG/1
20 TABLET, FILM COATED ORAL DAILY
Status: DISCONTINUED | OUTPATIENT
Start: 2021-07-28 | End: 2021-07-30 | Stop reason: HOSPADM

## 2021-07-27 RX ORDER — OXYBUTYNIN CHLORIDE 5 MG/1
5 TABLET, EXTENDED RELEASE ORAL DAILY
Status: ON HOLD | COMMUNITY
End: 2021-09-24 | Stop reason: SDUPTHER

## 2021-07-27 RX ORDER — ASPIRIN 81 MG/1
81 TABLET ORAL DAILY
Status: DISCONTINUED | OUTPATIENT
Start: 2021-07-28 | End: 2021-07-30 | Stop reason: HOSPADM

## 2021-07-27 RX ORDER — SODIUM CHLORIDE 9 MG/ML
125 INJECTION, SOLUTION INTRAVENOUS CONTINUOUS
Status: DISCONTINUED | OUTPATIENT
Start: 2021-07-27 | End: 2021-07-27

## 2021-07-27 RX ORDER — INSULIN LISPRO 100 [IU]/ML
INJECTION, SOLUTION INTRAVENOUS; SUBCUTANEOUS
Status: DISCONTINUED | OUTPATIENT
Start: 2021-07-28 | End: 2021-07-28

## 2021-07-27 RX ORDER — HEPARIN SODIUM 5000 [USP'U]/ML
7500 INJECTION, SOLUTION INTRAVENOUS; SUBCUTANEOUS EVERY 8 HOURS
Status: DISCONTINUED | OUTPATIENT
Start: 2021-07-27 | End: 2021-07-30 | Stop reason: HOSPADM

## 2021-07-27 RX ORDER — GABAPENTIN 300 MG/1
300 CAPSULE ORAL DAILY
Status: DISCONTINUED | OUTPATIENT
Start: 2021-07-28 | End: 2021-07-30 | Stop reason: HOSPADM

## 2021-07-27 RX ADMIN — SODIUM CHLORIDE 10.8 UNITS/HR: 9 INJECTION, SOLUTION INTRAVENOUS at 20:12

## 2021-07-27 RX ADMIN — SODIUM CHLORIDE 125 ML/HR: 9 INJECTION, SOLUTION INTRAVENOUS at 22:23

## 2021-07-27 RX ADMIN — HEPARIN SODIUM 7500 UNITS: 5000 INJECTION INTRAVENOUS; SUBCUTANEOUS at 21:20

## 2021-07-27 RX ADMIN — SODIUM CHLORIDE 1000 ML: 9 INJECTION, SOLUTION INTRAVENOUS at 17:16

## 2021-07-27 RX ADMIN — RISPERIDONE 1 MG: 1 TABLET ORAL at 21:28

## 2021-07-27 RX ADMIN — SODIUM CHLORIDE 1000 ML: 9 INJECTION, SOLUTION INTRAVENOUS at 20:25

## 2021-07-27 RX ADMIN — ACETAMINOPHEN 1000 MG: 500 TABLET ORAL at 20:11

## 2021-07-27 RX ADMIN — Medication 10 ML: at 21:22

## 2021-07-27 NOTE — ED NOTES
TRANSFER - IN REPORT:    Verbal report received from Sujit (name) on [place]. Report consisted of patients Situation, Background, Assessment and   Recommendations(SBAR). Information from the following report(s) SBAR and ED Summary was reviewed with the receiving nurse. Opportunity for questions and clarification was provided. Pt resting in stretcher in POC, remains on the monitor x 3.     1930 MD notified pt temp, flushed and diaphoretic, orders received     2000 Pt medicated per orders     2030 Insulin gtt initiated     2130 Patient is being transferred to [address], Room # [id number]. Report given to Jose Eduardo Blackburn RN on [place] for routine progression of care. Report consisted of the following information SBAR, ED Summary, MAR and Recent Results. Patient transferred to receiving unit by: RN  (RN or tech name). Outstanding consults needed: Yes psych     Next labs due: Yes 2400 and 0400am     The following personal items will be sent with the patient during transfer to the floor:     All valuables:    Cardiac monitoring ordered: Yes    The following CURRENT information was reported to the receiving RN:    Code status: Full Code at time of transfer    Last set of vital signs:  Vital Signs  Level of Consciousness: Alert (0) (07/27/21 1900)  Temp: 99.4 °F (37.4 °C) (07/27/21 2119)  Temp Source: Oral (07/27/21 2119)  Pulse (Heart Rate): 96 (07/27/21 2132)  Resp Rate: 22 (07/27/21 2132)  BP: 122/89 (07/27/21 2132)  MAP (Monitor): 99 (07/27/21 2132)  MAP (Calculated): 100 (07/27/21 2132)  BP 1 Location: Left upper arm (07/27/21 1640)  BP 1 Method: Automatic (07/27/21 1640)  BP Patient Position: At rest (07/27/21 1640)  MEWS Score: 1 (07/27/21 1900)         Oxygen Therapy  O2 Sat (%): 95 % (07/27/21 2132)  Pulse via Oximetry: 95 beats per minute (07/27/21 2132)  O2 Device: None (Room air) (07/27/21 1640)      Last pain assessment:  Pain 1  Pain Scale 1: Numeric (0 - 10)  Pain Intensity 1: 10  Patient Stated Pain Goal: 3  Pain Onset 1: 2 days  Pain Location 1: Leg  Pain Description 1: Aching      Wounds: Yes    Urinary catheter: voiding and external catheter  Is there a chinchilla order: No     LDAs:       Peripheral IV 07/27/21 Right Antecubital (Active)       Peripheral IV 07/27/21 Left Antecubital (Active)         Opportunity for questions and clarification was provided.     Gisela Mejia RN

## 2021-07-27 NOTE — ED NOTES
Pt arrived via EMS with a cc of shortness of breath. Upon arrival EMS states that pt was diaphoretic with a heart rate between 190-210 bpm. EMS attempted vagal maneuvers with no success. 6 mg of Adenosine was given en route and heart rate came down to 120 bpm.  Pt has an extensive pysch history and is presenting with manipulative behavior. Patient is resting in a position of comfort with side rails up and call bell within reach. Will continue to monitor patient closely.      Care giver's name and number:  Claribel Deloris  [phone]

## 2021-07-28 LAB
ADMINISTERED INITIALS, ADMINIT: NORMAL
ANION GAP SERPL CALC-SCNC: 7 MMOL/L (ref 5–15)
ANION GAP SERPL CALC-SCNC: 9 MMOL/L (ref 5–15)
ATRIAL RATE: 126 BPM
BASOPHILS # BLD: 0 K/UL (ref 0–0.1)
BASOPHILS NFR BLD: 0 % (ref 0–1)
BUN SERPL-MCNC: 14 MG/DL (ref 6–20)
BUN SERPL-MCNC: 16 MG/DL (ref 6–20)
BUN/CREAT SERPL: 13 (ref 12–20)
BUN/CREAT SERPL: 15 (ref 12–20)
CALCIUM SERPL-MCNC: 8.1 MG/DL (ref 8.5–10.1)
CALCIUM SERPL-MCNC: 8.5 MG/DL (ref 8.5–10.1)
CALCULATED P AXIS, ECG09: 49 DEGREES
CALCULATED R AXIS, ECG10: 75 DEGREES
CALCULATED T AXIS, ECG11: 77 DEGREES
CHLORIDE SERPL-SCNC: 102 MMOL/L (ref 97–108)
CHLORIDE SERPL-SCNC: 99 MMOL/L (ref 97–108)
CK SERPL-CCNC: 2405 U/L (ref 26–192)
CO2 SERPL-SCNC: 25 MMOL/L (ref 21–32)
CO2 SERPL-SCNC: 25 MMOL/L (ref 21–32)
COMMENT, HOLDF: NORMAL
CREAT SERPL-MCNC: 0.96 MG/DL (ref 0.55–1.02)
CREAT SERPL-MCNC: 1.21 MG/DL (ref 0.55–1.02)
D50 ADMINISTERED, D50ADM: 0 ML
D50 ORDER, D50ORD: 0 ML
DIAGNOSIS, 93000: NORMAL
DIFFERENTIAL METHOD BLD: ABNORMAL
EOSINOPHIL # BLD: 0.1 K/UL (ref 0–0.4)
EOSINOPHIL NFR BLD: 1 % (ref 0–7)
ERYTHROCYTE [DISTWIDTH] IN BLOOD BY AUTOMATED COUNT: 14.6 % (ref 11.5–14.5)
GLSCOM COMMENTS: NORMAL
GLUCOSE BLD STRIP.AUTO-MCNC: 120 MG/DL (ref 65–117)
GLUCOSE BLD STRIP.AUTO-MCNC: 120 MG/DL (ref 65–117)
GLUCOSE BLD STRIP.AUTO-MCNC: 144 MG/DL (ref 65–117)
GLUCOSE BLD STRIP.AUTO-MCNC: 147 MG/DL (ref 65–117)
GLUCOSE BLD STRIP.AUTO-MCNC: 150 MG/DL (ref 65–117)
GLUCOSE BLD STRIP.AUTO-MCNC: 155 MG/DL (ref 65–117)
GLUCOSE BLD STRIP.AUTO-MCNC: 157 MG/DL (ref 65–117)
GLUCOSE BLD STRIP.AUTO-MCNC: 157 MG/DL (ref 65–117)
GLUCOSE BLD STRIP.AUTO-MCNC: 166 MG/DL (ref 65–117)
GLUCOSE BLD STRIP.AUTO-MCNC: 195 MG/DL (ref 65–117)
GLUCOSE BLD STRIP.AUTO-MCNC: 227 MG/DL (ref 65–117)
GLUCOSE BLD STRIP.AUTO-MCNC: 229 MG/DL (ref 65–117)
GLUCOSE BLD STRIP.AUTO-MCNC: 273 MG/DL (ref 65–117)
GLUCOSE BLD STRIP.AUTO-MCNC: 310 MG/DL (ref 65–117)
GLUCOSE SERPL-MCNC: 115 MG/DL (ref 65–100)
GLUCOSE SERPL-MCNC: 175 MG/DL (ref 65–100)
GLUCOSE, GLC: 120 MG/DL
GLUCOSE, GLC: 144 MG/DL
GLUCOSE, GLC: 147 MG/DL
GLUCOSE, GLC: 150 MG/DL
GLUCOSE, GLC: 155 MG/DL
GLUCOSE, GLC: 157 MG/DL
GLUCOSE, GLC: 157 MG/DL
GLUCOSE, GLC: 166 MG/DL
GLUCOSE, GLC: 195 MG/DL
GLUCOSE, GLC: 273 MG/DL
HCT VFR BLD AUTO: 36.6 % (ref 35–47)
HGB BLD-MCNC: 11.5 G/DL (ref 11.5–16)
HIGH TARGET, HITG: 180 MG/DL
IMM GRANULOCYTES # BLD AUTO: 0.1 K/UL (ref 0–0.04)
IMM GRANULOCYTES NFR BLD AUTO: 1 % (ref 0–0.5)
INSULIN ADMINSTERED, INSADM: 10.7 UNITS/HOUR
INSULIN ADMINSTERED, INSADM: 2.4 UNITS/HOUR
INSULIN ADMINSTERED, INSADM: 3.2 UNITS/HOUR
INSULIN ADMINSTERED, INSADM: 3.4 UNITS/HOUR
INSULIN ADMINSTERED, INSADM: 3.5 UNITS/HOUR
INSULIN ADMINSTERED, INSADM: 3.6 UNITS/HOUR
INSULIN ADMINSTERED, INSADM: 3.9 UNITS/HOUR
INSULIN ADMINSTERED, INSADM: 3.9 UNITS/HOUR
INSULIN ADMINSTERED, INSADM: 4.8 UNITS/HOUR
INSULIN ADMINSTERED, INSADM: 5.4 UNITS/HOUR
INSULIN ORDER, INSORD: 10.7 UNITS/HOUR
INSULIN ORDER, INSORD: 2.4 UNITS/HOUR
INSULIN ORDER, INSORD: 3.2 UNITS/HOUR
INSULIN ORDER, INSORD: 3.4 UNITS/HOUR
INSULIN ORDER, INSORD: 3.5 UNITS/HOUR
INSULIN ORDER, INSORD: 3.6 UNITS/HOUR
INSULIN ORDER, INSORD: 3.9 UNITS/HOUR
INSULIN ORDER, INSORD: 3.9 UNITS/HOUR
INSULIN ORDER, INSORD: 4.8 UNITS/HOUR
INSULIN ORDER, INSORD: 5.4 UNITS/HOUR
LOW TARGET, LOT: 140 MG/DL
LYMPHOCYTES # BLD: 1.6 K/UL (ref 0.8–3.5)
LYMPHOCYTES NFR BLD: 16 % (ref 12–49)
MAGNESIUM SERPL-MCNC: 2 MG/DL (ref 1.6–2.4)
MAGNESIUM SERPL-MCNC: 2 MG/DL (ref 1.6–2.4)
MCH RBC QN AUTO: 26 PG (ref 26–34)
MCHC RBC AUTO-ENTMCNC: 31.4 G/DL (ref 30–36.5)
MCV RBC AUTO: 82.8 FL (ref 80–99)
MINUTES UNTIL NEXT BG, NBG: 60 MIN
MONOCYTES # BLD: 0.8 K/UL (ref 0–1)
MONOCYTES NFR BLD: 7 % (ref 5–13)
MULTIPLIER, MUL: 0.03
MULTIPLIER, MUL: 0.04
MULTIPLIER, MUL: 0.05
MULTIPLIER, MUL: 0.05
NEUTS SEG # BLD: 8 K/UL (ref 1.8–8)
NEUTS SEG NFR BLD: 75 % (ref 32–75)
NRBC # BLD: 0 K/UL (ref 0–0.01)
NRBC BLD-RTO: 0 PER 100 WBC
ORDER INITIALS, ORDINIT: NORMAL
P-R INTERVAL, ECG05: 132 MS
PLATELET # BLD AUTO: 322 K/UL (ref 150–400)
PMV BLD AUTO: 9.9 FL (ref 8.9–12.9)
POTASSIUM SERPL-SCNC: 3.2 MMOL/L (ref 3.5–5.1)
POTASSIUM SERPL-SCNC: 3.3 MMOL/L (ref 3.5–5.1)
Q-T INTERVAL, ECG07: 290 MS
QRS DURATION, ECG06: 66 MS
QTC CALCULATION (BEZET), ECG08: 420 MS
RBC # BLD AUTO: 4.42 M/UL (ref 3.8–5.2)
SAMPLES BEING HELD,HOLD: NORMAL
SERVICE CMNT-IMP: ABNORMAL
SODIUM SERPL-SCNC: 133 MMOL/L (ref 136–145)
SODIUM SERPL-SCNC: 134 MMOL/L (ref 136–145)
VENTRICULAR RATE, ECG03: 126 BPM
WBC # BLD AUTO: 10.5 K/UL (ref 3.6–11)

## 2021-07-28 PROCEDURE — 85025 COMPLETE CBC W/AUTO DIFF WBC: CPT

## 2021-07-28 PROCEDURE — 65660000000 HC RM CCU STEPDOWN

## 2021-07-28 PROCEDURE — 74011250637 HC RX REV CODE- 250/637: Performed by: INTERNAL MEDICINE

## 2021-07-28 PROCEDURE — 80048 BASIC METABOLIC PNL TOTAL CA: CPT

## 2021-07-28 PROCEDURE — 82550 ASSAY OF CK (CPK): CPT

## 2021-07-28 PROCEDURE — 36415 COLL VENOUS BLD VENIPUNCTURE: CPT

## 2021-07-28 PROCEDURE — 74011250636 HC RX REV CODE- 250/636: Performed by: INTERNAL MEDICINE

## 2021-07-28 PROCEDURE — 74011636637 HC RX REV CODE- 636/637: Performed by: INTERNAL MEDICINE

## 2021-07-28 PROCEDURE — 97161 PT EVAL LOW COMPLEX 20 MIN: CPT

## 2021-07-28 PROCEDURE — 82962 GLUCOSE BLOOD TEST: CPT

## 2021-07-28 PROCEDURE — 74011000250 HC RX REV CODE- 250: Performed by: NURSE PRACTITIONER

## 2021-07-28 PROCEDURE — 97116 GAIT TRAINING THERAPY: CPT

## 2021-07-28 PROCEDURE — 97535 SELF CARE MNGMENT TRAINING: CPT | Performed by: OCCUPATIONAL THERAPIST

## 2021-07-28 PROCEDURE — 74011250637 HC RX REV CODE- 250/637: Performed by: NURSE PRACTITIONER

## 2021-07-28 PROCEDURE — 83735 ASSAY OF MAGNESIUM: CPT

## 2021-07-28 PROCEDURE — 97165 OT EVAL LOW COMPLEX 30 MIN: CPT | Performed by: OCCUPATIONAL THERAPIST

## 2021-07-28 RX ORDER — LEVOFLOXACIN 5 MG/ML
750 INJECTION, SOLUTION INTRAVENOUS
Status: DISCONTINUED | OUTPATIENT
Start: 2021-07-28 | End: 2021-07-28

## 2021-07-28 RX ORDER — MAGNESIUM SULFATE 100 %
4 CRYSTALS MISCELLANEOUS AS NEEDED
Status: DISCONTINUED | OUTPATIENT
Start: 2021-07-28 | End: 2021-07-30 | Stop reason: HOSPADM

## 2021-07-28 RX ORDER — INSULIN LISPRO 100 [IU]/ML
6 INJECTION, SOLUTION INTRAVENOUS; SUBCUTANEOUS
Status: DISCONTINUED | OUTPATIENT
Start: 2021-07-28 | End: 2021-07-29

## 2021-07-28 RX ORDER — LEVOFLOXACIN 5 MG/ML
750 INJECTION, SOLUTION INTRAVENOUS EVERY 24 HOURS
Status: DISCONTINUED | OUTPATIENT
Start: 2021-07-29 | End: 2021-07-30 | Stop reason: HOSPADM

## 2021-07-28 RX ORDER — DEXTROSE 50 % IN WATER (D50W) INTRAVENOUS SYRINGE
25-50 AS NEEDED
Status: DISCONTINUED | OUTPATIENT
Start: 2021-07-28 | End: 2021-07-30 | Stop reason: HOSPADM

## 2021-07-28 RX ORDER — POTASSIUM CHLORIDE 20 MEQ/1
40 TABLET, EXTENDED RELEASE ORAL EVERY 6 HOURS
Status: COMPLETED | OUTPATIENT
Start: 2021-07-28 | End: 2021-07-28

## 2021-07-28 RX ORDER — SERTRALINE HYDROCHLORIDE 25 MG/1
25 TABLET, FILM COATED ORAL DAILY
Status: DISCONTINUED | OUTPATIENT
Start: 2021-07-29 | End: 2021-07-28

## 2021-07-28 RX ORDER — INSULIN LISPRO 100 [IU]/ML
INJECTION, SOLUTION INTRAVENOUS; SUBCUTANEOUS
Status: DISCONTINUED | OUTPATIENT
Start: 2021-07-28 | End: 2021-07-30 | Stop reason: HOSPADM

## 2021-07-28 RX ORDER — INSULIN GLARGINE 100 [IU]/ML
30 INJECTION, SOLUTION SUBCUTANEOUS DAILY
Status: DISCONTINUED | OUTPATIENT
Start: 2021-07-28 | End: 2021-07-29

## 2021-07-28 RX ORDER — SERTRALINE HYDROCHLORIDE 50 MG/1
25 TABLET, FILM COATED ORAL DAILY
Status: DISCONTINUED | OUTPATIENT
Start: 2021-07-28 | End: 2021-07-30 | Stop reason: HOSPADM

## 2021-07-28 RX ADMIN — INSULIN LISPRO 4 UNITS: 100 INJECTION, SOLUTION INTRAVENOUS; SUBCUTANEOUS at 23:22

## 2021-07-28 RX ADMIN — Medication 10 ML: at 17:50

## 2021-07-28 RX ADMIN — Medication 10 ML: at 06:35

## 2021-07-28 RX ADMIN — SERTRALINE HYDROCHLORIDE 25 MG: 25 TABLET ORAL at 17:50

## 2021-07-28 RX ADMIN — HEPARIN SODIUM 7500 UNITS: 5000 INJECTION INTRAVENOUS; SUBCUTANEOUS at 23:22

## 2021-07-28 RX ADMIN — ATORVASTATIN CALCIUM 40 MG: 40 TABLET, FILM COATED ORAL at 09:11

## 2021-07-28 RX ADMIN — LEVOFLOXACIN 750 MG: 5 INJECTION, SOLUTION INTRAVENOUS at 02:46

## 2021-07-28 RX ADMIN — HEPARIN SODIUM 7500 UNITS: 5000 INJECTION INTRAVENOUS; SUBCUTANEOUS at 06:34

## 2021-07-28 RX ADMIN — ASPIRIN 81 MG: 81 TABLET, COATED ORAL at 09:11

## 2021-07-28 RX ADMIN — POTASSIUM CHLORIDE 40 MEQ: 20 TABLET, EXTENDED RELEASE ORAL at 12:09

## 2021-07-28 RX ADMIN — HEPARIN SODIUM 7500 UNITS: 5000 INJECTION INTRAVENOUS; SUBCUTANEOUS at 15:15

## 2021-07-28 RX ADMIN — GABAPENTIN 300 MG: 300 CAPSULE ORAL at 09:11

## 2021-07-28 RX ADMIN — INSULIN LISPRO 6 UNITS: 100 INJECTION, SOLUTION INTRAVENOUS; SUBCUTANEOUS at 16:30

## 2021-07-28 RX ADMIN — INSULIN GLARGINE 30 UNITS: 100 INJECTION, SOLUTION SUBCUTANEOUS at 12:09

## 2021-07-28 RX ADMIN — Medication 10 ML: at 23:23

## 2021-07-28 RX ADMIN — RISPERIDONE 1 MG: 1 TABLET ORAL at 17:50

## 2021-07-28 RX ADMIN — INSULIN LISPRO 3 UNITS: 100 INJECTION, SOLUTION INTRAVENOUS; SUBCUTANEOUS at 16:30

## 2021-07-28 RX ADMIN — RISPERIDONE 1 MG: 1 TABLET ORAL at 09:11

## 2021-07-28 RX ADMIN — DEXTROSE AND SODIUM CHLORIDE 125 ML/HR: 5; 450 INJECTION, SOLUTION INTRAVENOUS at 09:52

## 2021-07-28 RX ADMIN — DEXTROSE AND SODIUM CHLORIDE 125 ML/HR: 5; 450 INJECTION, SOLUTION INTRAVENOUS at 00:33

## 2021-07-28 RX ADMIN — FAMOTIDINE 20 MG: 20 TABLET ORAL at 09:11

## 2021-07-28 RX ADMIN — POTASSIUM CHLORIDE 40 MEQ: 20 TABLET, EXTENDED RELEASE ORAL at 17:50

## 2021-07-28 NOTE — PROGRESS NOTES
OCCUPATIONAL THERAPY EVALUATION/DISCHARGE  Patient: Emily Hale (11 y.o. female)  Date: 7/28/2021  Primary Diagnosis: Hyperosmolar hyperglycemic state (HHS) ([facility] [address] [E11.00, E11.65]       Precautions:   (Suicide)    ASSESSMENT  Based on the objective data described below, the patient presents at or very near her functional baseline, presently preforming ADLs and functional mobility at an independent to supervision level. Good overall safety awareness and balance noted during the performance of transfers, ambulation and standing ADLs. At this time she is without any deficits noted that would impact her functional performance and she is without further OT needs. Functional Outcome Measure: The patient scored 75/100 on the Barthel Index outcome measure which is indicative of a 25% decline in function. PLAN :  Recommendation for discharge: (in order for the patient to meet his/her long term goals)  No skilled occupational therapy/ follow up rehabilitation needs identified at this time. Equipment recommendations for successful discharge: Return to group home with supervision.         OBJECTIVE DATA SUMMARY:   HISTORY:   Past Medical History:   Diagnosis Date    Arthritis     legs    Bipolar 1 disorder ([facility]     COPD     Depression 1/13/2012    Diabetes ([facility]     Drug abuse (HCC)     cocaine, stimulants, etoh, mj, tob    H/O suicide attempt     Hypertension     Obesity     Polydrug dependence excluding opioid type drug, abuse ([facility]     Schizophrenia ([facility] 7/8/2016     Past Surgical History:   Procedure Laterality Date    HX ORTHOPAEDIC      foot sx       Prior Level of Function/Environment/Context: Resides in a group, ambulates without an AD and is able to perform her ADLs  Expanded or extensive additional review of patient history:   Home Situation  Home Environment:  (Group Home)  # Steps to Enter: 3  One/Two Story Residence: One story  Living Alone: No  Support Systems:  (group home staff)  Patient Expects to be Discharged to[de-identified] Group home  Current DME Used/Available at Home: None    Hand dominance: Right    EXAMINATION OF PERFORMANCE DEFICITS:  Cognitive/Behavioral Status:  Neurologic State: Alert  Orientation Level: Oriented X4  Cognition: Appropriate for age attention/concentration; Follows commands        Safety/Judgement: Awareness of environment    Hearing: Auditory  Auditory Impairment: None    Vision/Perceptual:    Acuity: Within Defined Limits         Range of Motion:  AROM: Within functional limits    Strength:  Strength: Within functional limits  Coordination: Tone & Sensation:     Sensation: Intact    Balance:  Sitting: Intact  Standing: Intact    Functional Mobility and Transfers for ADLs:  Bed Mobility:  Rolling: Independent  Supine to Sit: Independent  Sit to Supine: Independent  Scooting: Independent    Transfers:  Sit to Stand: Supervision  Stand to Sit: Supervision  Bathroom Mobility: Supervision/set up  Toilet Transfer : Supervision    ADL Assessment:  Feeding: Independent    Oral Facial Hygiene/Grooming: Independent    Bathing: Supervision;Setup    Upper Body Dressing: Supervision;Setup    Lower Body Dressing: Supervision;Setup    Toileting: Independent                Functional Measure:  Barthel Index:    Bathin  Bladder: 10  Bowels: 10  Groomin  Dressin  Feeding: 10  Mobility: 10  Stairs: 5  Toilet Use: 10  Transfer (Bed to Chair and Back): 10  Total: 75/100        The Barthel ADL Index: Guidelines  1. The index should be used as a record of what a patient does, not as a record of what a patient could do. 2. The main aim is to establish degree of independence from any help, physical or verbal, however minor and for whatever reason. 3. The need for supervision renders the patient not independent. 4. A patient's performance should be established using the best available evidence.  Asking the patient, friends/relatives and nurses are the usual sources, but direct observation and common sense are also important. However direct testing is not needed. 5. Usually the patient's performance over the preceding 24-48 hours is important, but occasionally longer periods will be relevant. 6. Middle categories imply that the patient supplies over 50 per cent of the effort. 7. Use of aids to be independent is allowed. Margueritte Hone., Barthel, D.W. (9337). Functional evaluation: the Barthel Index. [address] [facility] (14)2. JEAN Emerson, Tanvi Del Cid., [facility] [place] [place], [facility] (1999). Measuring the change indisability after inpatient rehabilitation; comparison of the responsiveness of the Barthel Index and Functional [place] Measure. Journal of Neurology, Neurosurgery, and Psychiatry, 66(4), 212-687. STEFANIA Mcclain, GREY Tripp, & Ted Amaya, MJOSÉ. (2004.) Assessment of post-stroke quality of life in cost-effectiveness studies: The usefulness of the Barthel Index and the EuroQoL-5D. Quality of Life Research, 13, 981-62          Activity Tolerance:   Good  Please refer to the flowsheet for vital signs taken during this treatment.     After treatment patient left in no apparent distress:    Supine in bed, Call bell within reach, Bed / chair alarm activated and Caregiver / family present    COMMUNICATION/EDUCATION:   The patients plan of care was discussed with: Physical Therapist.    Thank you for this referral.  Christ Davis, OTR/L  Time Calculation: 14 mins

## 2021-07-28 NOTE — CONSULTS
PSYCHIATRY CONSULT NOTE:    REASON FOR CONSULT:      HISTORY OF PRESENTING COMPLAINT:  Anahi Goel is a 60 y.o. WHITE/NON- female who [facility] She states that she was brought in to the ED because she was running a fever. She endorses making SI statements upon arrival, but denies current SI/HI/AVH. She states that she was upset because she had a bad birthday in the beginning of July. She denies having a current therapist or psychiatrist, but states that she has been taking her medication regularly. She is unable to state what medication she is taking. She states that she is eating and sleeping well. She denies any drug or alcohol use. PAST PSYCHIATRIC HISTORY and SUBSTANCE ABUSE HISTORY:  She endorses previous inpatient admissions. She denies having a therapist or psychiatrist currently. She denies any drug or alcohol use. PAST MEDICAL HISTORY:    Please see H&P for details. Past Medical History:   Diagnosis Date    Arthritis     legs    Bipolar 1 disorder ([facility]     COPD     Depression 1/13/2012    Diabetes ([facility] [address]     Drug abuse ([facility])     cocaine, stimulants, etoh, mj, tob    H/O suicide attempt     Hypertension     Obesity     Polydrug dependence excluding opioid type drug, abuse ([facility] [facility]     Schizophrenia ([facility] [address] 7/8/2016     Prior to Admission medications    Medication Sig Start Date End Date Taking? Authorizing Provider   oxybutynin (DITROPAN) 5 mg tablet Take 5 mg by mouth once. Yes Renetta, MD Antonio   metFORMIN (GLUCOPHAGE) 1,000 mg tablet Take 1,000 mg by mouth two (2) times daily (with meals). Yes Renetta, MD Antonio   risperiDONE (RisperDAL) 1 mg tablet Take  by mouth two (2) times a day. Yes Renetta, MD Antonio   loratadine (CLARITIN) 10 mg tablet Take 10 mg by mouth daily. Yes Antonio Jackson MD   linaGLIPtin (Tradjenta) 5 mg tablet Take 5 mg by mouth daily.    Yes Antonio Jackson MD   gabapentin (NEURONTIN) 300 mg capsule Take 300 mg by mouth daily. Yes Other, MD Antonio   atorvastatin (LIPITOR) 40 mg tablet Take  by mouth daily. At 5pm   Yes Other, MD Antonio   aspirin delayed-release 81 mg tablet Take 81 mg by mouth daily. Yes Provider, Historical   ibuprofen (MOTRIN) 600 mg tablet Take 1 Tab by mouth every six (6) hours as needed for Pain. 9/21/19   George Delacruz NP   ondansetron (ZOFRAN ODT) 4 mg disintegrating tablet Take 1 Tab by mouth every eight (8) hours as needed for Nausea. Patient not taking: Reported on 7/27/2021 3/25/19   Jesse Gary MD   paliperidone (INVEGA) 3 mg SR tablet Take 1 Tab by mouth daily. Indications: SCHIZOAFFECTIVE DISORDER  Patient not taking: Reported on 7/27/2021 10/6/18   Becky Matthew MD   sertraline (ZOLOFT) 25 mg tablet Take 1 Tab by mouth daily. Indications: major depressive disorder  Patient not taking: Reported on 7/27/2021 10/6/18   Becky Matthew MD   meloxicam (MOBIC) 15 mg tablet Take 15 mg by mouth daily. Indications: Arthritis  Patient not taking: Reported on 7/27/2021    Provider, Historical   fluticasone-vilanterol (BREO ELLIPTA) 200-25 mcg/dose inhaler Take 1 Puff by inhalation daily. Provider, Historical   furosemide (Lasix) 40 mg tablet Take 40 mg by mouth daily. Patient not taking: Reported on 7/27/2021    Provider, Historical   albuterol (PROVENTIL VENTOLIN) 2.5 mg /3 mL (0.083 %) nebulizer solution 2.5 mg by Nebulization route three (3) times daily as needed for Wheezing or Shortness of Breath. Provider, Historical   albuterol (VENTOLIN HFA) 90 mcg/actuation inhaler Take 2 Puffs by inhalation every six (6) hours as needed for Wheezing. Provider, Historical   famotidine (PEPCID) 20 mg tablet Take 20 mg by mouth daily. Indications: Heartburn  Patient not taking: Reported on 7/27/2021    Provider, Historical   lisinopril (PRINIVIL, ZESTRIL) 2.5 mg tablet Take 2.5 mg by mouth daily.  Indications: hypertension  Patient not taking: Reported on 7/27/2021    Provider, Historical   lovastatin (MEVACOR) 20 mg tablet Take 20 mg by mouth nightly. Indications: hyperlipidemia  Patient not taking: Reported on 7/27/2021    Provider, Historical   glipiZIDE (GLUCOTROL) 10 mg tablet Take 10 mg by mouth two (2) times a day. Indications: type 2 diabetes mellitus  Patient not taking: Reported on 7/27/2021    Provider, Historical     Vitals:    07/28/21 0350 07/28/21 0718 07/28/21 1003 07/28/21 1523   BP: (!) 134/54 (!) 128/56 114/65    Pulse: 96  88 93   Resp: 20 24 18 15   Temp: 98 °F (36.7 °C) 99.1 °F (37.3 °C) 98.4 °F (36.9 °C) 100 °F (37.8 °C)   SpO2: 94% 91% 92% 95%   Weight:       Height:         Lab Results   Component Value Date/Time    WBC 10.5 07/28/2021 12:39 AM    Hemoglobin (POC) 12.2 01/12/2012 10:40 PM    HGB 11.5 07/28/2021 12:39 AM    Hematocrit (POC) 36 01/12/2012 10:40 PM    HCT 36.6 07/28/2021 12:39 AM    PLATELET 151 56/43/4181 12:39 AM    MCV 82.8 07/28/2021 12:39 AM     Lab Results   Component Value Date/Time    Sodium 134 (L) 07/28/2021 06:30 AM    Potassium 3.2 (L) 07/28/2021 06:30 AM    Chloride 102 07/28/2021 06:30 AM    CO2 25 07/28/2021 06:30 AM    Anion gap 7 07/28/2021 06:30 AM    Glucose 115 (H) 07/28/2021 06:30 AM    Glucose 126 (H) 03/19/2014 06:40 AM    BUN 14 07/28/2021 06:30 AM    Creatinine 0.96 07/28/2021 06:30 AM    BUN/Creatinine ratio 15 07/28/2021 06:30 AM    GFR est AA >60 07/28/2021 06:30 AM    GFR est non-AA 60 (L) 07/28/2021 06:30 AM    Calcium 8.1 (L) 07/28/2021 06:30 AM    Bilirubin, total 0.7 07/27/2021 04:45 PM    Alk.  phosphatase 162 (H) 07/27/2021 04:45 PM    Protein, total 7.5 07/27/2021 04:45 PM    Albumin 2.7 (L) 07/27/2021 04:45 PM    Globulin 4.8 (H) 07/27/2021 04:45 PM    A-G Ratio 0.6 (L) 07/27/2021 04:45 PM    ALT (SGPT) 41 07/27/2021 04:45 PM    AST (SGOT) 46 (H) 07/27/2021 04:45 PM     No results found for: VALF2, VALAC, VALP, VALPR, DS6, CRBAM, CRBAMP, CARB2, XCRBAM  No results found for: LITHM  RADIOLOGY REPORTS:(reviewed/updated 7/28/2021)  XR CHEST PORT    Result Date: 7/27/2021  EXAM: XR CHEST PORT HISTORY: admission. COMPARISON: None FINDINGS: Single view(s) of the chest. The film is underpenetrated secondary to patient body habitus. The lungs are well inflated. No focal consolidation, pleural effusion, or pneumothorax. The cardiomediastinal silhouette is unremarkable. The visualized osseous structures are unremarkable. No acute cardiopulmonary process. Lab Results   Component Value Date/Time    HCG urine, QL NEGATIVE  07/08/2016 02:10 AM    Pregnancy test,urine (POC) NEGATIVE  08/02/2017 06:09 PM    HCG, Ql. NEGATIVE  07/08/2016 12:31 AM       PSYCHOSOCIAL HISTORY:  She is single and lives in a group home. She is not currently working and gets a disability check. MENTAL STATUS EXAM:    General appearance: Disheveled, psychomotor activity is WNL  Eye contact: Avoids eye contact  Speech: Spontaneous, soft, decreased output. Affect : Depressed, decreased range  Mood: \"Good\"  Thought Process: Logical, goal directed  Perception: Denies AH or VH. Thought Content: Denies SI or Plan  Insight: Poor  Judgement: Poor  Cognition: Intact grossly. ASSESSMENT AND PLAN:  Nolan Galarza meets criteria for a diagnosis of Schizophrenia and Major depressive disorder. She does not currently meet criteria for inpatient admission. Plan:  Continue Risperdal  Restart Zoloft from home medications. Pt to follow up with outpatient psychiatrist upon discharge. D/C 1:1 sitter        Thank your your consult. Please feel free to consult us again as needed.

## 2021-07-28 NOTE — PROGRESS NOTES
Problem: Suicide  Goal: *STG: Remains safe in hospital  Outcome: Progressing Towards Goal  Goal: *STG/LTG: Complies with medication therapy  Outcome: Progressing Towards Goal     Problem: Patient Education: Go to Patient Education Activity  Goal: Patient/Family Education  Outcome: Progressing Towards Goal

## 2021-07-28 NOTE — PROGRESS NOTES
Hospitalist Progress Note    NAME: Nadeem Enriquez   :  1965   MRN:  [id number]     Admit date: 2021    Today's date: 21    PCP: Malka Pavlov, MD Timmy Essex, M.D. Cell [phone]    Anticipated discharge date: 2021    Barriers:      Assessment / Plan:    DM type 2 uncontrolled with hyperosmolar syndrome POA HgBa1c 11.8  Hypertonic hyponatremia Na 123 corrects to 138 for glucose POA  Acute kidney injury POA  Hypokalemia K 3.2  presenting with , cr 2.07, Na 123  Home regimen metformin 1000 mg BID, tradjenta, glucotrol  Suspected non compiance as cause  Aggressive IV fluids with NS, needs KCL  IV insulin, wean to lantus, humalog and SSI  PO KCL  ? lantus and metformin at discharge  DTC consulted  Likely discharge in AM     SVT POA resolved  Currently HR ~99. EKG showed sinus tach, no acute ischemia. Trop neg. Pt denies CP, SOB currently   Continue telemetry monitoring     Rhabdomyolysis POA peak CPK 2405  Cont' IVF, monitor I&O  Repeat CK in the AM  Etiology unclear, stop the lipitor     Bipolar disorder POA  Hx of malingering   COPD, stable, not in exacerbation, nebs prn  Depression  schizophrenia  ? Compliance with meds  Pt with multiple admission to inpt psych   Resume home meds, will ask pharm to help with med rec  Psych evaluation appreciated, resume home meds, d/c   Outpatient follow up    Morbid obesity POA Body mass index is 40.5 kg/m². Code status: Full  Prophylaxis: Hep SQ  Recommended Disposition: Home w/Family    Subjective:     Chief Complaint / Reason for Physician Visit f/u uncontrolled diabetes  \"I am okay\". Discussed with RN events overnight.    Denies complaints, now off insulin gtt  Seen by psychiatry, stopping   Reported she is complaint with meds  Denies having a PCP  Says she has no family    Review of Systems:  Symptom Y/N Comments  Symptom Y/N Comments   Fever/Chills n   Chest Pain n    Poor Appetite    Edema     Cough n   Abdominal Pain n    Sputum    Joint Pain     SOB/MILLER n   Headache     Nausea/vomit n   Tolerating PT/OT     Diarrhea n   Tolerating Diet y    Constipation    Other       Could NOT obtain due to:      Objective:     VITALS:   Last 24hrs VS reviewed since prior progress note. Most recent are:  Patient Vitals for the past 24 hrs:   Temp Pulse Resp BP SpO2   07/28/21 1003 98.4 °F (36.9 °C) 88 18 114/65 92 %   07/28/21 0718 99.1 °F (37.3 °C)  24 (!) 128/56 91 %   07/28/21 0350 98 °F (36.7 °C) 96 20 (!) 134/54 94 %   07/27/21 2214 97.8 °F (36.6 °C) 94 22 133/68 94 %   07/27/21 2132  96 22 122/89 95 %   07/27/21 2119 99.4 °F (37.4 °C)       07/27/21 1900 100.1 °F (37.8 °C) 99 19 116/68 91 %   07/27/21 1640  (!) 121 25 (!) 100/55 93 %       Intake/Output Summary (Last 24 hours) at 7/28/2021 1027  Last data filed at 7/28/2021 0726  Gross per 24 hour   Intake 1000 ml   Output 500 ml   Net 500 ml        Wt Readings from Last 12 Encounters:   07/27/21 110.4 kg (243 lb 6.2 oz)   04/26/21 105.2 kg (232 lb)   03/24/19 106.6 kg (235 lb)   10/04/18 104.3 kg (230 lb)   05/09/18 104.3 kg (230 lb)   03/27/18 103.6 kg (228 lb 6.3 oz)   03/26/18 133.8 kg (295 lb)   11/16/17 97.5 kg (214 lb 15.2 oz)   08/02/17 104.3 kg (230 lb)   06/26/17 104.3 kg (230 lb)   06/26/17 104.3 kg (230 lb)   06/14/17 104.1 kg (229 lb 9.6 oz)       PHYSICAL EXAM:    I had a face to face encounter and independently examined this patient on 07/28/21 as outlined below:    General: WD, WN. Alert, cooperative, no acute distress    EENT:  PERRL. Anicteric sclerae. MMM  Resp:  CTA bilaterally, no wheezing or rales. No accessory muscle use  CV:  Regular  rhythm,  No edema  GI:  Soft, Non distended, Non tender. +Bowel sounds, no rebound  LN:  No cervical or inguinal CHINO  Neurologic:  Alert, normal speech, non focal motor exam  Psych:   Not anxious nor agitated  Skin:  No rashes.   No jaundice    Reviewed most current lab test results and cultures  YES  Reviewed most current radiology test results   YES  Review and summation of old records today    NO  Reviewed patient's current orders and MAR    YES  PMH/SH reviewed - no change compared to H&P  ________________________________________________________________________  Care Plan discussed with:    Comments   Patient x    Family      RN x    Care Manager     Consultant                        Multidiciplinary team rounds were held today with , nursing, pharmacist and clinical coordinator. Patient's plan of care was discussed; medications were reviewed and discharge planning was addressed. ________________________________________________________________________      Comments   >50% of visit spent in counseling and coordination of care     ________________________________________________________________________  Andrew Nance MD     Procedures: see electronic medical records for all procedures/Xrays and details which were not copied into this note but were reviewed prior to creation of Plan. LABS:  I reviewed today's most current labs and imaging studies. Pertinent labs include:  Recent Labs     07/28/21  0039 07/27/21  1645   WBC 10.5 9.4   HGB 11.5 11.2*   HCT 36.6 35.8    325     Recent Labs     07/28/21  0630 07/28/21  0237 07/27/21  2006 07/27/21  1645 07/27/21  1645   * 133* 128*   < > 123*   K 3.2* 3.3* 4.0   < > 4.0    99 94*   < > 88*   CO2 25 25 24   < > 21   * 175* 646*   < > 850*   BUN 14 16 17   < > 19   CREA 0.96 1.21* 1.71*   < > 2.07*   CA 8.1* 8.5 7.9*   < > 8.3*   MG 2.0 2.0 1.8   < > 1.7   PHOS  --   --  3.3  --   --    ALB  --   --   --   --  2.7*   TBILI  --   --   --   --  0.7   ALT  --   --   --   --  41    < > = values in this interval not displayed.

## 2021-07-28 NOTE — PROGRESS NOTES
Transition of Care Plan:    RUR:19%  Disposition: Residential home at [facility]  Follow up appointments: PCP and specialist  DME needed: TBD  Transportation at Discharge: Pt will need transportation  [facility] or means to access home:   Marco Villegas    IM Medicare Letter: N/A due to pt having Medicaid  BCPI-A Bundle Letter: N/A  Caregiver Contact:Amanda [id number]-[phone]  Discharge Caregiver contacted prior to discharge? Reason for Admission:   SOB                    RUR Score:     19%             PCP: First and Last name:   Pt stated that she does not have a PCP. CM offer pt to provide her a list of [facility] PCP and pt informed CM that the residential home is helping her fine a PCP     Name of Practice:    Are you a current patient: Yes/No:    Approximate date of last visit:    Can you participate in a virtual visit if needed:     Do you (patient/family) have any concerns for transition/discharge?      Pt has no concerns for transition/discharge              Plan for utilizing home health:   TBD    Current Advanced Directive/Advance Care Plan:  Full Code      Healthcare Decision Maker:   Click here to complete [address] including selection of the Healthcare Decision Maker Relationship (ie \"Primary\")     Advance Care Planning     General Advance Care Planning (ACP) Conversation      Date of Conversation: 7/28/2021  Conducted with: Patient with Decision Making Capacity    Healthcare Decision Maker:     Click here to complete [address] including selection of the Healthcare Decision Maker Relationship (ie \"Primary\")    Pt stated to CM that Michelle Robledo is her POA for financial    Content/Action Overview:   DECLINED ACP conversation - will revisit periodically   Reviewed DNR/DNI and patient elects Full Code (Attempt Resuscitation)         Length of Voluntary ACP Conversation in minutes:  <16 minutes (Non-Billable)    David Ham         Transition of Care Plan:            Pt is a 61 yo female who was admitted to [facility] with a dx of SOB. PMHx includes arthritis, bipolar, depression, drug abuse and etc. Pt lives in a residential home. The home is an one story home with 6 steps to enter. She only has a cane and a shower chair. No other DME reported. No hx of home health, or outpatient rehab or being admitted to a SNF or inpatient rehab. Pt is independent in her ADLs and pt needs assistance with her IADLs. The residential home transport pt to her medical appointment or medical transport. At the time of d/c pt will need medical transport. CM will continue to follow and assist with d/c planning. Care Management Interventions  PCP Verified by CM: Yes (Pt stated that the residential home is working with her to get a PCP)  Mode of Transport at Discharge: Other (see comment) (Pt will need transportation)  Transition of Care Consult (CM Consult): Discharge Planning (D/C to a residental home)  Discharge Durable Medical Equipment: No (Cane and shower chair)  Physical Therapy Consult: Yes  Occupational Therapy Consult: Yes  Speech Therapy Consult: No  Current Support Network: Other (Residental home)  Confirm Follow Up Transport:  (Medical transportation)  Discharge Location  Discharge Placement: Other: (Residential home)    Kameron Sroto.   Care Manager [facility]  [phone]

## 2021-07-28 NOTE — H&P
Hospitalist Admission Note    NAME: Nallely Whipple   :  1965   MRN:  [id number]     Date/Time:  2021 8:04 PM    Patient PCP: Ismael Sosa MD  ______________________________________________________________________  Given the patient's current clinical presentation, I have a high level of concern for decompensation if discharged from the emergency department. Complex decision making was performed, which includes reviewing the patient's available past medical records, laboratory results, and x-ray films. My assessment of this patient's clinical condition and my plan of care is as follows. Assessment / Plan:  HHS, likely due to noncompliance with meds  Pseudohyponatremia  SILVANA   presenting with , cr 2.0, Na 123  Obtain UA, chest x-ray to rule out infection  Start insulin drip with frequent BG monitoring  Aggressive IV fluids with NS  Check A1c, frequent bmp, mag  DTC consulted    SVT, POA, resolved  Currently HR ~99. EKG showed sinus tach, no acute ischemia. Trop neg. Pt denies CP, SOB currently   continue telemetry monitoring    Rhabdomyolysis  CK 1277  Cont' IVF, monitor I&O  Repeat CK in the AM    Hx of bipolar  Hx of malingering  Hx COPD, stable, not in exacerbation, nebs prn  Depression  schizophrenia  Per chart reviewed, pt voiced SI to RN. She denies this during my encounter  ? Compliance with meds  Pt with multiple admission to inpt psych   Resume home meds, will ask pharm to help with med rec   ordered  Psych to eval      Code Status: Full   Surrogate Decision Maker:  DVT Prophylaxis: heparin  GI Prophylaxis: not indicated  Baseline: independent, from group home      Subjective:   CHIEF COMPLAINT: sob    HISTORY OF PRESENT ILLNESS:     Justus Turner is a 60 y.o.   female with PMHx significant for schizophrenia, depression, type 2 diabetes, hypertension, presents to the ER for evaluation of shortness of breath.   Pt is a poor historian, not sure whey she is in the ER. Per chart reviewed, EMS responded for shortness of breath. On arrival EMS found patient diaphoretic with heart rate around 200. Vagal maneuver performed without success. Patient was given 6 mg IV adenosine which brought heart rate down to 120s. She was subsequently taken to the ER for further evaluation. In the ER patient was no longer short of breath or diaphoretic. Heart rate currently around 99. Labs were significant for , creatinine of 2, CK 1, 277, troponin 0 0.05. EKG shows sinus tach. We were asked to admit for work up and evaluation of the above problems. Past Medical History:   Diagnosis Date    Arthritis     legs    [facility] disorder ([facility]     COPD     Depression 1/13/2012    Diabetes ([facility]     Drug abuse ([facility]     cocaine, stimulants, etoh, mj, tob    H/O suicide attempt     Hypertension     Obesity     [facility]ydrug dependence excluding opioid type drug[facility]     Schizophrenia ([facility] 7/8/2016        Past Surgical History:   Procedure Laterality Date    HX ORTHOPAEDIC      foot sx       Social History     Tobacco Use    Smoking status: Current Every Day Smoker     Packs/day: 1.00     Years: 10.00     Pack years: 10.00     Types: Cigarettes    Smokeless tobacco: Current User   Substance Use Topics    Alcohol use: No     Alcohol/week: 0.0 standard drinks     Comment: Hx of ETOH abuse since age 13. no DUIs. Family History   Problem Relation Age of Onset    Diabetes Mother     Stroke Mother     Stroke Father      Allergies   Allergen Reactions    Amoxicillin Hives    Mushroom Flavor Hives    Tomato Hives        Prior to Admission medications    Medication Sig Start Date End Date Taking? Authorizing Provider   oxybutynin (DITROPAN) 5 mg tablet Take 5 mg by mouth once. Yes Other, MD Antonio   metFORMIN (GLUCOPHAGE) 1,000 mg tablet Take 1,000 mg by mouth two (2) times daily (with meals).    Yes Other, MD Antonio   risperiDONE (RisperDAL) 1 mg tablet Take  by mouth two (2) times a day. Yes Other, MD Antonio   loratadine (CLARITIN) 10 mg tablet Take 10 mg by mouth daily. Yes Other, MD Antonio   linaGLIPtin (Tradjenta) 5 mg tablet Take 5 mg by mouth daily. Yes Other, MD Antonio   gabapentin (NEURONTIN) 300 mg capsule Take 300 mg by mouth daily. Yes Other, MD Antonio   atorvastatin (LIPITOR) 40 mg tablet Take  by mouth daily. At 5pm   Yes Other, MD Antonio   aspirin delayed-release 81 mg tablet Take 81 mg by mouth daily. Yes Provider, Historical   ibuprofen (MOTRIN) 600 mg tablet Take 1 Tab by mouth every six (6) hours as needed for Pain. 9/21/19   Kamlesh Cummings NP   ondansetron (ZOFRAN ODT) 4 mg disintegrating tablet Take 1 Tab by mouth every eight (8) hours as needed for Nausea. Patient not taking: Reported on 7/27/2021 3/25/19   Hakeem Prescott MD   paliperidone (INVEGA) 3 mg SR tablet Take 1 Tab by mouth daily. Indications: SCHIZOAFFECTIVE DISORDER  Patient not taking: Reported on 7/27/2021 10/6/18   Dedrick Hoyt MD   sertraline (ZOLOFT) 25 mg tablet Take 1 Tab by mouth daily. Indications: major depressive disorder  Patient not taking: Reported on 7/27/2021 10/6/18   Dedrick Hoyt MD   meloxicam (MOBIC) 15 mg tablet Take 15 mg by mouth daily. Indications: Arthritis  Patient not taking: Reported on 7/27/2021    Provider, Historical   fluticasone-vilanterol (BREO ELLIPTA) 200-25 mcg/dose inhaler Take 1 Puff by inhalation daily. Provider, Historical   furosemide (Lasix) 40 mg tablet Take 40 mg by mouth daily. Patient not taking: Reported on 7/27/2021    Provider, Historical   albuterol (PROVENTIL VENTOLIN) 2.5 mg /3 mL (0.083 %) nebulizer solution 2.5 mg by Nebulization route three (3) times daily as needed for Wheezing or Shortness of Breath. Provider, Historical   albuterol (VENTOLIN HFA) 90 mcg/actuation inhaler Take 2 Puffs by inhalation every six (6) hours as needed for Wheezing.     Provider, Historical famotidine (PEPCID) 20 mg tablet Take 20 mg by mouth daily. Indications: Heartburn  Patient not taking: Reported on 7/27/2021    Provider, Historical   lisinopril (PRINIVIL, ZESTRIL) 2.5 mg tablet Take 2.5 mg by mouth daily. Indications: hypertension  Patient not taking: Reported on 7/27/2021    Provider, Historical   lovastatin (MEVACOR) 20 mg tablet Take 20 mg by mouth nightly. Indications: hyperlipidemia  Patient not taking: Reported on 7/27/2021    Provider, Historical   glipiZIDE (GLUCOTROL) 10 mg tablet Take 10 mg by mouth two (2) times a day. Indications: type 2 diabetes mellitus  Patient not taking: Reported on 7/27/2021    Provider, Historical       REVIEW OF SYSTEMS:     I am not able to complete the review of systems because:    The patient is intubated and sedated    The patient has altered mental status due to his acute medical problems    The patient has baseline aphasia from prior stroke(s)    The patient has baseline dementia and is not reliable historian    The patient is in acute medical distress and unable to provide information   x Pt is a poor historian       Total of 12 systems reviewed as follows:       POSITIVE= BOLD text  Negative = text not BOLD  General:  fever, chills, sweats, generalized weakness, weight loss/gain,      loss of appetite   Eyes:    blurred vision, eye pain, loss of vision, double vision  ENT:    rhinorrhea, pharyngitis   Respiratory:   cough, sputum production, SOB, MILLER, wheezing, pleuritic pain   Cardiology:   chest pain, palpitations, orthopnea, PND, edema, syncope   Gastrointestinal:  abdominal pain , N/V, diarrhea, dysphagia, constipation, bleeding   Genitourinary:  frequency, urgency, dysuria, hematuria, incontinence   Muskuloskeletal :  arthralgia, myalgia, back pain  Hematology:  easy bruising, nose or gum bleeding, lymphadenopathy   Dermatological: rash, ulceration, pruritis, color change / jaundice  Endocrine:   hot flashes or polydipsia   Neurological: headache, dizziness, confusion, focal weakness, paresthesia,     Speech difficulties, memory loss, gait difficulty  Psychological: Feelings of anxiety, depression, agitation    Objective:   VITALS:    Visit Vitals  /68   Pulse 99   Temp 100.1 °F (37.8 °C)   Resp 19   Ht 5' 5\" (1.651 m)   Wt 110.4 kg (243 lb 6.2 oz)   SpO2 91%   BMI 40.50 kg/m²       PHYSICAL EXAM:    General:    Alert, cooperative, no distress, appears stated age. HEENT: Atraumatic, anicteric sclerae, pink conjunctivae     No oral ulcers, mucosa moist, throat clear  Neck:  Supple, symmetrical,  thyroid: non tender  Lungs:   CTA b/l. No wheezing or Rhonchi. No rales. Chest wall:  No tenderness. No accessory muscle use. Heart:   Regular  rhythm,  No  Murmur. No edema  Abdomen:   Soft, NT. ND  BS+  Extremities: No cyanosis. No clubbing,      Skin turgor normal, Radial dial pulse 2+. Capillary refill normal  Skin:     Not pale. Not Jaundiced  No rashes   Psych:  Not anxious or agitated. Neurologic: Awake, moving all exts.   Conversant.       _______________________________________________________________________  Care Plan discussed with:    Comments   Patient x    Family      RN x    Care Manager                    Consultant:  brielle ED physician   _______________________________________________________________________  Expected  Disposition:   Home with Family    HH/PT/OT/RN x   SNF/LTC    TIFFANIE    ________________________________________________________________________  TOTAL TIME:   Minutes    Critical Care Provided   72  Minutes non procedure based      Comments    x Reviewed previous records   >50% of visit spent in counseling and coordination of care x Discussion with patient and/or family and questions answered       ________________________________________________________________________  Signed: Scott Jovel MD    Procedures: see electronic medical records for all procedures/Xrays and details which were not copied into this note but were reviewed prior to creation of Plan. LAB DATA REVIEWED:    Recent Results (from the past 24 hour(s))   EKG, 12 LEAD, INITIAL    Collection Time: 07/27/21  4:26 PM   Result Value Ref Range    Ventricular Rate 126 BPM    Atrial Rate 126 BPM    P-R Interval 132 ms    QRS Duration 66 ms    Q-T Interval 290 ms    QTC Calculation (Bezet) 420 ms    Calculated P Axis 49 degrees    Calculated R Axis 75 degrees    Calculated T Axis 77 degrees    Diagnosis       Sinus tachycardia  Low voltage QRS  Septal infarct , age undetermined  When compared with ECG of 09-MAY-2018 23:18,  No significant change was found     CBC WITH AUTOMATED DIFF    Collection Time: 07/27/21  4:45 PM   Result Value Ref Range    WBC 9.4 3.6 - 11.0 K/uL    RBC 4.28 3.80 - 5.20 M/uL    HGB 11.2 (L) 11.5 - 16.0 g/dL    HCT 35.8 35.0 - 47.0 %    MCV 83.6 80.0 - 99.0 FL    MCH 26.2 26.0 - 34.0 PG    MCHC 31.3 30.0 - 36.5 g/dL    RDW 14.6 (H) 11.5 - 14.5 %    PLATELET 003 711 - 271 K/uL    MPV 10.0 8.9 - 12.9 FL    NRBC 0.0 0  WBC    ABSOLUTE NRBC 0.00 0.00 - 0.01 K/uL    NEUTROPHILS 92 (H) 32 - 75 %    BAND NEUTROPHILS 1 %    LYMPHOCYTES 1 (L) 12 - 49 %    MONOCYTES 6 5 - 13 %    EOSINOPHILS 0 0 - 7 %    BASOPHILS 0 0 - 1 %    IMMATURE GRANULOCYTES 0 0.0 - 0.5 %    ABS. NEUTROPHILS 8.7 (H) 1.8 - 8.0 K/UL    ABS. LYMPHOCYTES 0.1 (L) 0.8 - 3.5 K/UL    ABS. MONOCYTES 0.6 0.0 - 1.0 K/UL    ABS. EOSINOPHILS 0.0 0.0 - 0.4 K/UL    ABS. BASOPHILS 0.0 0.0 - 0.1 K/UL    ABS. IMM.  GRANS. 0.0 0.00 - 0.04 K/UL    DF MANUAL      RBC COMMENTS NORMOCYTIC, NORMOCHROMIC     METABOLIC PANEL, COMPREHENSIVE    Collection Time: 07/27/21  4:45 PM   Result Value Ref Range    Sodium 123 (L) 136 - 145 mmol/L    Potassium 4.0 3.5 - 5.1 mmol/L    Chloride 88 (L) 97 - 108 mmol/L    CO2 21 21 - 32 mmol/L    Anion gap 14 5 - 15 mmol/L    Glucose 850 (HH) 65 - 100 mg/dL    BUN 19 6 - 20 MG/DL    Creatinine 2.07 (H) 0.55 - 1.02 MG/DL    BUN/Creatinine ratio 9 (L) 12 - 20 GFR est AA 30 (L) >60 ml/min/1.73m2    GFR est non-AA 25 (L) >60 ml/min/1.73m2    Calcium 8.3 (L) 8.5 - 10.1 MG/DL    Bilirubin, total 0.7 0.2 - 1.0 MG/DL    ALT (SGPT) 41 12 - 78 U/L    AST (SGOT) 46 (H) 15 - 37 U/L    Alk.  phosphatase 162 (H) 45 - 117 U/L    Protein, total 7.5 6.4 - 8.2 g/dL    Albumin 2.7 (L) 3.5 - 5.0 g/dL    Globulin 4.8 (H) 2.0 - 4.0 g/dL    A-G Ratio 0.6 (L) 1.1 - 2.2     TROPONIN I    Collection Time: 07/27/21  4:45 PM   Result Value Ref Range    Troponin-I, Qt. <0.05 <0.05 ng/mL   MAGNESIUM    Collection Time: 07/27/21  4:45 PM   Result Value Ref Range    Magnesium 1.7 1.6 - 2.4 mg/dL   TSH 3RD GENERATION    Collection Time: 07/27/21  4:45 PM   Result Value Ref Range    TSH 1.09 0.36 - 3.74 uIU/mL   CK W/ CKMB & INDEX    Collection Time: 07/27/21  4:45 PM   Result Value Ref Range    CK - MB 4.6 (H) <3.6 NG/ML    CK-MB Index 0.4 0.0 - 2.5      CK 1,277 (H) 26 - 192 U/L   GLUCOSE, POC    Collection Time: 07/27/21  8:01 PM   Result Value Ref Range    Glucose (POC) >600 (HH) 65 - 117 mg/dL    Performed by Jose Navy (EDT)

## 2021-07-28 NOTE — PROGRESS NOTES
PHYSICAL THERAPY EVALUATION/DISCHARGE  Patient: Nallely Whipple (59 y.o. female)  Date: 7/28/2021  Primary Diagnosis: Hyperosmolar hyperglycemic state ([facility]HS) ([facility] [E11.00, E11.65]       Precautions:    (Suicide)      ASSESSMENT  Based on the objective data described below, the patient presented to the hospital on 7/27 with suicide ideation, SOB and increased HR. Pt received supine in bed and agreeable to therapy. Pt tolerated evaluation well. Pt completed bed mobility and transfers independently without AD, and tolerated gait training x 25 ft within the room with no LOB or apparent gait abnormalities. Pt educated on importance of remaining OOB and walking to and from the bathroom with RN staff. Patient appears to be at baseline mobility status and does not require any further acute PT needs. .    Functional Outcome Measure: The patient scored 27/28 on the tinetti outcome measure which is indicative of low risk for falls. Other factors to consider for discharge: none     Further skilled acute physical therapy is not indicated at this time. PLAN :  Recommendation for discharge: (in order for the patient to meet his/her long term goals)  No skilled physical therapy/ follow up rehabilitation needs identified at this time. This discharge recommendation:  Has been made in collaboration with the attending provider and/or case management    IF patient discharges home will need the following DME: none       SUBJECTIVE:   Patient stated My legs are stiff.     OBJECTIVE DATA SUMMARY:   HISTORY:    Past Medical History:   Diagnosis Date    Arthritis     legs    [facility] ([facility]     COPD     Depression 1/13/2012    Diabetes ([facility]     Drug abuse ([facility])     cocaine, stimulants, etoh, mj, tob    H/O suicide attempt     Hypertension     Obesity     Polydrug dependence excluding opioid type drug, abuse ([facility]     Schizophrenia ([facility] 7/8/2016     Past Surgical History:   Procedure Laterality Date    HX ORTHOPAEDIC      foot sx       Prior level of function: independent, ambulatory without AD  Personal factors and/or comorbidities impacting plan of care:     Home Situation  Home Environment:  (Group Home)  # Steps to Enter: 3  One/Two Story Residence: One story  Living Alone: No  Support Systems:  (group home staff)  Patient Expects to be Discharged to[de-identified] Group home  Current DME Used/Available at Home: None    EXAMINATION/PRESENTATION/DECISION MAKING:   Critical Behavior:  Neurologic State: Alert  Orientation Level: Oriented X4  Cognition: Impaired decision making, Poor safety awareness     Hearing: Auditory  Auditory Impairment: None  Range Of Motion:  AROM: Within functional limits           PROM: Within functional limits           Strength:    Strength:  Within functional limits                    Tone & Sensation:                  Sensation: Intact               Coordination:     Vision:      Functional Mobility:  Bed Mobility:  Rolling: Independent  Supine to Sit: Independent  Sit to Supine: Independent  Scooting: Independent  Transfers:  Sit to Stand: Supervision  Stand to Sit: Supervision                       Balance:   Sitting: Intact  Standing: Intact  Ambulation/Gait Training:  Distance (ft): 30 Feet (ft)  Assistive Device: Gait belt  Ambulation - Level of Assistance: Supervision        Gait Abnormalities: Decreased step clearance;Trunk sway increased        Base of Support: Widened     Speed/Chelita: Pace decreased (<100 feet/min)              Functional Measure:  Tinetti test:    Sitting Balance: 1  Arises: 1  Attempts to Rise: 2  Immediate Standing Balance: 2  Standing Balance: 2  Nudged: 2  Eyes Closed: 1  Turn 360 Degrees - Continuous/Discontinuous: 1  Turn 360 Degrees - Steady/Unsteady: 1  Sitting Down: 2  Balance Score: 15 Balance total score  Indication of Gait: 1  R Step Length/Height: 1  L Step Length/Height: 1  R Foot Clearance: 1  L Foot Clearance: 1  Step Symmetry: 1  Step Continuity: 1  Path: 2  Trunk: 2  Walking Time: 1  Gait Score: 12 Gait total score  Total Score: 27/28 Overall total score         Tinetti Tool Score Risk of Falls  <19 = High Fall Risk  19-24 = Moderate Fall Risk  25-28 = Low Fall Risk  Robertoetti ME. Performance-Oriented Assessment of Mobility Problems in Elderly Patients. [facility]; [id number]. (Scoring Description: PT Bulletin Feb. 10, 1993)    Older adults: Carlito Casey et al, 2009; n = 1000 [facility] elderly evaluated with ABC, CHARLIE, ADL, and IADL)  · Mean CHARLIE score for males aged 69-68 years = 26.21(3.40)  · Mean CHARLIE score for females age 69-68 years = 25.16(4.30)  · Mean CHARLIE score for males over 80 years = 23.29(6.02)  · Mean CHARLIE score for females over 80 years = 17.20(8.32)            Based on the above components, the patient evaluation is determined to be of the following complexity level: LOW     Pain Rating:  Pt reported LE pain initially, but improved with activity    Activity Tolerance:   Good      After treatment patient left in no apparent distress:   Supine in bed, Call bell within reach, Bed / chair alarm activated and Sitter at bedside    COMMUNICATION/EDUCATION:   The patients plan of care was discussed with: Occupational therapist and Registered nurse. Fall prevention education was provided and the patient/caregiver indicated understanding., Patient/family have participated as able in goal setting and plan of care. and Patient/family agree to work toward stated goals and plan of care.     Thank you for this referral.  Anca Hendricks, PT, DPT   Time Calculation: 14 mins

## 2021-07-28 NOTE — PROGRESS NOTES
.End of Shift Note    Bedside shift change report given to [address] [place] [address] (oncoming nurse) by Ryan Tyler (offgoing nurse). Report included the following information SBAR, Kardex, Intake/Output and MAR    Shift worked:  0700 - 1900     Shift summary and any significant changes:      Psych consulted and seen patient see Lucho Meeks note for more details    Insulin dtt D/C and dextrose 5%- 0.45% D/C  ACHS orders started      1:1 Sitter discontinued     Concerns for physician to address:  . ... Zone phone for oncoming shift:   [phone]       Activity:  Activity Level: Up with Assistance  Number times ambulated in hallways past shift: 0  Number of times OOB to chair past shift: 0    Cardiac:   Cardiac Monitoring: Yes      Cardiac Rhythm: Sinus Rhythm    Access:   Current line(s): PIV     Genitourinary:   Urinary status: voiding    Respiratory:   O2 Device: None (Room air)  Chronic home O2 use?: YES  Incentive spirometer at bedside: NO     GI:  Last Bowel Movement Date:  (Pt states a week ago)  Current diet:  ADULT DIET Regular; 3 carb choices (45 gm/meal)  Passing flatus: YES  Tolerating current diet: YES       Pain Management:   Patient states pain is manageable on current regimen: YES    Skin:  Luis Score: 19  Interventions: increase time out of bed and PT/OT consult    Patient Safety:  Fall Score:  Total Score: 3  Interventions: bed/chair alarm, assistive device (walker, cane, etc), gripper socks and pt to call before getting OOB  High Fall Risk: Yes    Length of Stay:  Expected LOS: 2d 21h  Actual LOS: 1      Buster Leash

## 2021-07-28 NOTE — PROGRESS NOTES
Received message from patient's nurse stating:    pt is on an insulin gtt and her BG has dropped under 250 to 181 and she has no order for a D5 gtt         Discussion / orders:    Change current IV fluids  from sodium chloride 525 mL an hour to D5 and half normal saline @ 125 mL/h         Please note that this note was dictated using Dragon computer voice recognition software. Quite often unanticipated grammatical, syntax, homophones, and other interpretive errors are inadvertently transcribed by the computer software. Please disregard these errors. Please excuse any errors that have escaped final proofreading.

## 2021-07-29 LAB
ALBUMIN SERPL-MCNC: 2.1 G/DL (ref 3.5–5)
ALBUMIN/GLOB SERPL: 0.4 {RATIO} (ref 1.1–2.2)
ALP SERPL-CCNC: 125 U/L (ref 45–117)
ALT SERPL-CCNC: 45 U/L (ref 12–78)
ANION GAP SERPL CALC-SCNC: 6 MMOL/L (ref 5–15)
AST SERPL-CCNC: 64 U/L (ref 15–37)
BASOPHILS # BLD: 0 K/UL (ref 0–0.1)
BASOPHILS NFR BLD: 0 % (ref 0–1)
BILIRUB SERPL-MCNC: 0.4 MG/DL (ref 0.2–1)
BUN SERPL-MCNC: 13 MG/DL (ref 6–20)
BUN/CREAT SERPL: 14 (ref 12–20)
CALCIUM SERPL-MCNC: 8.1 MG/DL (ref 8.5–10.1)
CHLORIDE SERPL-SCNC: 104 MMOL/L (ref 97–108)
CK MB CFR SERPL CALC: 0.1 % (ref 0–2.5)
CK MB SERPL-MCNC: 2.5 NG/ML (ref 5–25)
CK SERPL-CCNC: 1750 U/L (ref 26–192)
CO2 SERPL-SCNC: 26 MMOL/L (ref 21–32)
CREAT SERPL-MCNC: 0.91 MG/DL (ref 0.55–1.02)
DIFFERENTIAL METHOD BLD: ABNORMAL
EOSINOPHIL # BLD: 0.1 K/UL (ref 0–0.4)
EOSINOPHIL NFR BLD: 1 % (ref 0–7)
ERYTHROCYTE [DISTWIDTH] IN BLOOD BY AUTOMATED COUNT: 14.7 % (ref 11.5–14.5)
GLOBULIN SER CALC-MCNC: 4.7 G/DL (ref 2–4)
GLUCOSE BLD STRIP.AUTO-MCNC: 148 MG/DL (ref 65–117)
GLUCOSE BLD STRIP.AUTO-MCNC: 187 MG/DL (ref 65–117)
GLUCOSE BLD STRIP.AUTO-MCNC: 239 MG/DL (ref 65–117)
GLUCOSE BLD STRIP.AUTO-MCNC: 322 MG/DL (ref 65–117)
GLUCOSE SERPL-MCNC: 216 MG/DL (ref 65–100)
HCT VFR BLD AUTO: 32.5 % (ref 35–47)
HGB BLD-MCNC: 10.1 G/DL (ref 11.5–16)
IMM GRANULOCYTES # BLD AUTO: 0 K/UL (ref 0–0.04)
IMM GRANULOCYTES NFR BLD AUTO: 1 % (ref 0–0.5)
LYMPHOCYTES # BLD: 1.1 K/UL (ref 0.8–3.5)
LYMPHOCYTES NFR BLD: 15 % (ref 12–49)
MCH RBC QN AUTO: 26.1 PG (ref 26–34)
MCHC RBC AUTO-ENTMCNC: 31.1 G/DL (ref 30–36.5)
MCV RBC AUTO: 84 FL (ref 80–99)
MONOCYTES # BLD: 0.5 K/UL (ref 0–1)
MONOCYTES NFR BLD: 6 % (ref 5–13)
NEUTS SEG # BLD: 5.7 K/UL (ref 1.8–8)
NEUTS SEG NFR BLD: 77 % (ref 32–75)
NRBC # BLD: 0 K/UL (ref 0–0.01)
NRBC BLD-RTO: 0 PER 100 WBC
PLATELET # BLD AUTO: 326 K/UL (ref 150–400)
PMV BLD AUTO: 9.5 FL (ref 8.9–12.9)
POTASSIUM SERPL-SCNC: 4 MMOL/L (ref 3.5–5.1)
PROCALCITONIN SERPL-MCNC: 2.83 NG/ML
PROT SERPL-MCNC: 6.8 G/DL (ref 6.4–8.2)
RBC # BLD AUTO: 3.87 M/UL (ref 3.8–5.2)
SERVICE CMNT-IMP: ABNORMAL
SODIUM SERPL-SCNC: 136 MMOL/L (ref 136–145)
TROPONIN I SERPL-MCNC: 0.09 NG/ML
WBC # BLD AUTO: 7.4 K/UL (ref 3.6–11)

## 2021-07-29 PROCEDURE — 65660000000 HC RM CCU STEPDOWN

## 2021-07-29 PROCEDURE — 74011250636 HC RX REV CODE- 250/636: Performed by: INTERNAL MEDICINE

## 2021-07-29 PROCEDURE — 74011250637 HC RX REV CODE- 250/637: Performed by: NURSE PRACTITIONER

## 2021-07-29 PROCEDURE — 74011250637 HC RX REV CODE- 250/637: Performed by: INTERNAL MEDICINE

## 2021-07-29 PROCEDURE — 77010033678 HC OXYGEN DAILY

## 2021-07-29 PROCEDURE — 84484 ASSAY OF TROPONIN QUANT: CPT

## 2021-07-29 PROCEDURE — 84145 PROCALCITONIN (PCT): CPT

## 2021-07-29 PROCEDURE — 85025 COMPLETE CBC W/AUTO DIFF WBC: CPT

## 2021-07-29 PROCEDURE — 82550 ASSAY OF CK (CPK): CPT

## 2021-07-29 PROCEDURE — 74011636637 HC RX REV CODE- 636/637: Performed by: INTERNAL MEDICINE

## 2021-07-29 PROCEDURE — 94760 N-INVAS EAR/PLS OXIMETRY 1: CPT

## 2021-07-29 PROCEDURE — 36415 COLL VENOUS BLD VENIPUNCTURE: CPT

## 2021-07-29 PROCEDURE — 80053 COMPREHEN METABOLIC PANEL: CPT

## 2021-07-29 PROCEDURE — 82962 GLUCOSE BLOOD TEST: CPT

## 2021-07-29 RX ORDER — INSULIN GLARGINE 100 [IU]/ML
40 INJECTION, SOLUTION SUBCUTANEOUS DAILY
Status: DISCONTINUED | OUTPATIENT
Start: 2021-07-30 | End: 2021-07-30 | Stop reason: HOSPADM

## 2021-07-29 RX ORDER — INSULIN GLARGINE 100 [IU]/ML
36 INJECTION, SOLUTION SUBCUTANEOUS DAILY
Status: DISCONTINUED | OUTPATIENT
Start: 2021-07-29 | End: 2021-07-29

## 2021-07-29 RX ORDER — METFORMIN HYDROCHLORIDE 500 MG/1
1000 TABLET ORAL 2 TIMES DAILY WITH MEALS
Status: DISCONTINUED | OUTPATIENT
Start: 2021-07-29 | End: 2021-07-30 | Stop reason: HOSPADM

## 2021-07-29 RX ADMIN — INSULIN LISPRO 6 UNITS: 100 INJECTION, SOLUTION INTRAVENOUS; SUBCUTANEOUS at 16:25

## 2021-07-29 RX ADMIN — INSULIN GLARGINE 36 UNITS: 100 INJECTION, SOLUTION SUBCUTANEOUS at 08:17

## 2021-07-29 RX ADMIN — HEPARIN SODIUM 7500 UNITS: 5000 INJECTION INTRAVENOUS; SUBCUTANEOUS at 14:45

## 2021-07-29 RX ADMIN — LEVOFLOXACIN 750 MG: 5 INJECTION, SOLUTION INTRAVENOUS at 08:19

## 2021-07-29 RX ADMIN — ASPIRIN 81 MG: 81 TABLET, COATED ORAL at 08:17

## 2021-07-29 RX ADMIN — INSULIN LISPRO 3 UNITS: 100 INJECTION, SOLUTION INTRAVENOUS; SUBCUTANEOUS at 08:17

## 2021-07-29 RX ADMIN — Medication 10 ML: at 06:45

## 2021-07-29 RX ADMIN — RISPERIDONE 1 MG: 1 TABLET ORAL at 18:00

## 2021-07-29 RX ADMIN — METFORMIN HYDROCHLORIDE 1000 MG: 500 TABLET ORAL at 16:25

## 2021-07-29 RX ADMIN — Medication 10 ML: at 23:05

## 2021-07-29 RX ADMIN — RISPERIDONE 1 MG: 1 TABLET ORAL at 08:17

## 2021-07-29 RX ADMIN — GABAPENTIN 300 MG: 300 CAPSULE ORAL at 08:17

## 2021-07-29 RX ADMIN — INSULIN LISPRO 2 UNITS: 100 INJECTION, SOLUTION INTRAVENOUS; SUBCUTANEOUS at 16:25

## 2021-07-29 RX ADMIN — FAMOTIDINE 20 MG: 20 TABLET ORAL at 08:17

## 2021-07-29 RX ADMIN — INSULIN LISPRO 6 UNITS: 100 INJECTION, SOLUTION INTRAVENOUS; SUBCUTANEOUS at 08:18

## 2021-07-29 RX ADMIN — HEPARIN SODIUM 7500 UNITS: 5000 INJECTION INTRAVENOUS; SUBCUTANEOUS at 06:45

## 2021-07-29 RX ADMIN — INSULIN LISPRO 7 UNITS: 100 INJECTION, SOLUTION INTRAVENOUS; SUBCUTANEOUS at 12:08

## 2021-07-29 RX ADMIN — SERTRALINE HYDROCHLORIDE 25 MG: 25 TABLET ORAL at 08:18

## 2021-07-29 RX ADMIN — Medication 10 ML: at 14:00

## 2021-07-29 RX ADMIN — HEPARIN SODIUM 7500 UNITS: 5000 INJECTION INTRAVENOUS; SUBCUTANEOUS at 23:05

## 2021-07-29 RX ADMIN — INSULIN LISPRO 6 UNITS: 100 INJECTION, SOLUTION INTRAVENOUS; SUBCUTANEOUS at 12:08

## 2021-07-29 NOTE — INTERDISCIPLINARY ROUNDS
Interdisciplinary Rounds were completed on 07/29/21 for this patient. Rounds included nursing, clinical care leader, pharmacy, and case management. Plan of care discussed. See clinical pathway and/or care plan for interventions and desired outcomes.

## 2021-07-29 NOTE — PROGRESS NOTES
Problem: Diabetes Self-Management  Goal: *Disease process and treatment process  Description: Define diabetes and identify own type of diabetes; list 3 options for treating diabetes. Outcome: Progressing Towards Goal  Goal: *Incorporating nutritional management into lifestyle  Description: Describe effect of type, amount and timing of food on blood glucose; list 3 methods for planning meals. Outcome: Progressing Towards Goal  Goal: *Incorporating physical activity into lifestyle  Description: State effect of exercise on blood glucose levels. Outcome: Progressing Towards Goal  Goal: *Developing strategies to promote health/change behavior  Description: Define the ABC's of diabetes; identify appropriate screenings, schedule and personal plan for screenings. Outcome: Progressing Towards Goal  Goal: *Using medications safely  Description: State effect of diabetes medications on diabetes; name diabetes medication taking, action and side effects. Outcome: Progressing Towards Goal  Goal: *Monitoring blood glucose, interpreting and using results  Description: Identify recommended blood glucose targets  and personal targets. Outcome: Progressing Towards Goal  Goal: *Prevention, detection, treatment of acute complications  Description: List symptoms of hyper- and hypoglycemia; describe how to treat low blood sugar and actions for lowering  high blood glucose level. Outcome: Progressing Towards Goal  Goal: *Prevention, detection and treatment of chronic complications  Description: Define the natural course of diabetes and describe the relationship of blood glucose levels to long term complications of diabetes.   Outcome: Progressing Towards Goal  Goal: *Developing strategies to address psychosocial issues  Description: Describe feelings about living with diabetes; identify support needed and support network  Outcome: Progressing Towards Goal  Goal: *Insulin pump training  Outcome: Progressing Towards Goal  Goal: *Sick day guidelines  Outcome: Progressing Towards Goal  Goal: *Patient Specific Goal (EDIT GOAL, INSERT TEXT)  Outcome: Progressing Towards Goal     Problem: Patient Education: Go to Patient Education Activity  Goal: Patient/Family Education  Outcome: Progressing Towards Goal     Problem: Suicide  Goal: *STG: Remains safe in hospital  Outcome: Progressing Towards Goal  Goal: *STG: Seeks staff when feelings of self harm or harm towards others arise  Outcome: Progressing Towards Goal  Goal: *STG:  Verbalizes alternative ways of dealing with maladaptive feelings/behaviors  Outcome: Progressing Towards Goal  Goal: *STG/LTG: Complies with medication therapy  Outcome: Progressing Towards Goal  Goal: *STG/LTG: No longer expresses self destructive or suicidal thoughts  Outcome: Progressing Towards Goal  Goal: *LTG:  Identifies available community resources  Outcome: Progressing Towards Goal  Goal: *LTG:  Develops proactive suicide prevention plan  Outcome: Progressing Towards Goal  Goal: Interventions  Outcome: Progressing Towards Goal     Problem: Patient Education: Go to Patient Education Activity  Goal: Patient/Family Education  Outcome: Progressing Towards Goal     Problem: Falls - Risk of  Goal: *Absence of Falls  Description: Document Martin Fall Risk and appropriate interventions in the flowsheet.   Outcome: Progressing Towards Goal  Note: Fall Risk Interventions:  Mobility Interventions: Bed/chair exit alarm         Medication Interventions: Bed/chair exit alarm, Patient to call before getting OOB, Teach patient to arise slowly    Elimination Interventions: Call light in reach    History of Falls Interventions: Bed/chair exit alarm, Door open when patient unattended, Vital signs minimum Q4HRs X 24 hrs (comment for end date)         Problem: Patient Education: Go to Patient Education Activity  Goal: Patient/Family Education  Outcome: Progressing Towards Goal

## 2021-07-29 NOTE — PROGRESS NOTES
Hospitalist Progress Note    NAME: Alberto Laureano   :  1965   MRN:  [id number]     Admit date: 2021    Today's date: 21    PCP: MD Gabe Singleton M.D. Cell [phone]    Anticipated discharge date: 2021    Barriers:      Assessment / Plan:    DM type 2 uncontrolled with hyperosmolar syndrome POA HgBa1c 11.8  Hypertonic hyponatremia Na 123 corrects to 138 for glucose POA  Acute kidney injury POA  Hypokalemia K 3.2  presenting with , cr 2.07, Na 123  Home regimen metformin 1000 mg BID, tradjenta, glucotrol  Suspected non compiance as cause, she reports taking her pills given by her friend  HgBa1c of 11.8 argues than pills alone may not be enough  S/p aggressive IV fluids with NS, KCL  IV insulin, wean to lantus, humalog and SSI  PO KCL  Discharge on lantus and metformin if safe living situation        Otherwise resume pills  DTC consulted  Living situation is tenuous, prior homeless, living in current situation for 1 month       May not be able to stay there much longer  Appreciate case management input  Needs PCP set up     SVT POA resolved  Currently HR ~99. EKG showed sinus tach, no acute ischemia. Trop neg. Pt denies CP, SOB currently   Continue telemetry monitoring     Rhabdomyolysis POA peak CPK 2405  Cont' IVF, monitor I&O  Trending down, repeat in Am  Etiology unclear, stop the lipitor     Bipolar disorder POA  Hx of malingering   COPD, stable, not in exacerbation, nebs prn  Depression  schizophrenia  ? Compliance with meds  Pt with multiple admission to inpt psych   Psych evaluation appreciated, resume home meds, d/c   Outpatient follow up    Morbid obesity POA Body mass index is 40.5 kg/m². Code status: Full  Prophylaxis: Hep SQ  Recommended Disposition: Home w/Family    Subjective:     Chief Complaint / Reason for Physician Visit f/u uncontrolled diabetes  \"I am okay\". Discussed with RN events overnight. Denies complaints  BS improved off insulin gtt  Living situation is very poor, not sure she has anyway to stay long term    Review of Systems:  Symptom Y/N Comments  Symptom Y/N Comments   Fever/Chills n   Chest Pain n    Poor Appetite    Edema     Cough n   Abdominal Pain n    Sputum    Joint Pain     SOB/MILLER n   Headache     Nausea/vomit n   Tolerating PT/OT     Diarrhea n   Tolerating Diet y    Constipation    Other       Could NOT obtain due to:      Objective:     VITALS:   Last 24hrs VS reviewed since prior progress note. Most recent are:  Patient Vitals for the past 24 hrs:   Temp Pulse Resp BP SpO2   07/29/21 1537 98.2 °F (36.8 °C) 79 18 123/83 91 %   07/29/21 1111 99 °F (37.2 °C) 95 18 127/81 92 %   07/29/21 0808 98.3 °F (36.8 °C) 91 18 125/69 90 %   07/29/21 0400  90      07/29/21 0355 99.7 °F (37.6 °C) 90 20 128/77 97 %   07/29/21 0020 99.9 °F (37.7 °C) 94 20 129/71 95 %   07/28/21 2358  93      07/28/21 1946  93      07/28/21 1931 98.1 °F (36.7 °C) 91 20 126/61 90 %       Intake/Output Summary (Last 24 hours) at 7/29/2021 1926  Last data filed at 7/29/2021 0819  Gross per 24 hour   Intake 350 ml   Output 0 ml   Net 350 ml        Wt Readings from Last 12 Encounters:   07/29/21 114.1 kg (251 lb 8 oz)   04/26/21 105.2 kg (232 lb)   03/24/19 106.6 kg (235 lb)   10/04/18 104.3 kg (230 lb)   05/09/18 104.3 kg (230 lb)   03/27/18 103.6 kg (228 lb 6.3 oz)   03/26/18 133.8 kg (295 lb)   11/16/17 97.5 kg (214 lb 15.2 oz)   08/02/17 104.3 kg (230 lb)   06/26/17 104.3 kg (230 lb)   06/26/17 104.3 kg (230 lb)   06/14/17 104.1 kg (229 lb 9.6 oz)       PHYSICAL EXAM:    I had a face to face encounter and independently examined this patient on 07/29/21 as outlined below:    General: WD, WN. Alert, cooperative, no acute distress    EENT:  PERRL. Anicteric sclerae. MMM  Resp:  CTA bilaterally, no wheezing or rales.   No accessory muscle use  CV:  Regular  rhythm,  No edema  GI:  Soft, Non distended, Non tender. +Bowel sounds, no rebound  Neurologic:  Alert, normal speech, non focal motor exam  Psych:   Not anxious nor agitated  Skin:  No rashes. No jaundice    Reviewed most current lab test results and cultures  YES  Reviewed most current radiology test results   YES  Review and summation of old records today    NO  Reviewed patient's current orders and MAR    YES  PMH/SH reviewed - no change compared to H&P  ________________________________________________________________________  Care Plan discussed with:    Comments   Patient x    Family      RN x    Care Manager x    Consultant                        Multidiciplinary team rounds were held today with , nursing, pharmacist and clinical coordinator. Patient's plan of care was discussed; medications were reviewed and discharge planning was addressed. ________________________________________________________________________      Comments   >50% of visit spent in counseling and coordination of care     ________________________________________________________________________  Allie Dobbins MD     Procedures: see electronic medical records for all procedures/Xrays and details which were not copied into this note but were reviewed prior to creation of Plan. LABS:  I reviewed today's most current labs and imaging studies.   Pertinent labs include:  Recent Labs     07/29/21 0413 07/28/21  0039 07/27/21  1645   WBC 7.4 10.5 9.4   HGB 10.1* 11.5 11.2*   HCT 32.5* 36.6 35.8    322 325     Recent Labs     07/29/21 0413 07/28/21  0630 07/28/21  0237 07/27/21 2006 07/27/21 2006 07/27/21  1645 07/27/21  1645    134* 133*   < > 128*   < > 123*   K 4.0 3.2* 3.3*   < > 4.0   < > 4.0    102 99   < > 94*   < > 88*   CO2 26 25 25   < > 24   < > 21   * 115* 175*   < > 646*   < > 850*   BUN 13 14 16   < > 17   < > 19   CREA 0.91 0.96 1.21*   < > 1.71*   < > 2.07*   CA 8.1* 8.1* 8.5   < > 7.9*   < > 8.3*   MG  --  2.0 2.0  -- 1.8   < > 1.7   PHOS  --   --   --   --  3.3  --   --    ALB 2.1*  --   --   --   --   --  2.7*   TBILI 0.4  --   --   --   --   --  0.7   ALT 45  --   --   --   --   --  41    < > = values in this interval not displayed.

## 2021-07-29 NOTE — PROGRESS NOTES
2100--bedside report received from zehra Roca, ed, waiting on pt arrival    2345--pt arrived to room 2105, alert oriented x 4, drowsy, on 4L by ED staff as pt destats to 80's while sleeping, assessment as noted    2300--pt assisted to bathroom to void back to bed, bed alarm on, call bell in reach    2330--bedside report given to [id number], rn who is assuming care of pt

## 2021-07-29 NOTE — PROGRESS NOTES
End of Shift Note    Bedside shift change report given to GERALD zelaya (oncoming nurse) by Katherine Robertson RN (offgoing nurse). Report included the following information SBAR, Kardex, Intake/Output, MAR and Recent Results    Shift worked:  1219-4274     Shift summary and any significant changes:     Patient rested in bed this shift with no complaints. No acute changes.  Blood glucose still elevated     Concerns for physician to address:  hyperglycemia     Zone phone for oncoming shift:            Katherine Robertson RN

## 2021-07-29 NOTE — PROGRESS NOTES
Transition of Care Plan:     RUR: 21% MOD  Disposition: TBD- needs housing resources  Follow up appointments: PCP and specialist  DME needed: TBD- pt possibly may need LTC  Transportation at Discharge: Medicaid transport  Nikko Mock or means to access home: N/A  IM Medicare Letter: N/A  BCPI-A Bundle Letter: N/A  Caregiver Contact: Payee- Dawit Bullard [facility], [phone]  Discharge Caregiver contacted prior to discharge? CM reviewed pt's chart. CM received update from attending MD that pt will require insulin injections & HH to assist at d/c. CM f/u w/ pt's caregiver per prior CM note; Alvin Marrufo ([phone]) reported pt cannot return to the residential home d/t pt's increased medical needs. Lalito Raines reported that she took pt in bc she was homeless & is now unable to care for the pt d/t her own medical needs. CM met w/ pt at bedside to gather addtl information on supports available to her. When asked where she will go at d/c pt reported \"to a motel. \" CM asked if pt had money for a motel & pt stated, \"no. \" CM asked pt about friends or family that can support her; pt identified Dawit Bullard ([phone]). SHANNON contacted Yokasta Srivastava & spoke about pt's housing options. Yokasta Srivastava reported she works w/ [employer] and could possibly assist w/ placing pt in a motel for 30 days if d/c on or after 8/1. Manoj contacted [employer] for vacancy & reported back to CM that pt may not be appropriate for a motel d/t her medical needs. CM to f/u w/ pt & provide housing resources re: [facility]. CM to assist w/ placement at the [facility] if pt agreeable. Will continue to follow.     Bill Damon, MSW  Care Management

## 2021-07-30 VITALS
SYSTOLIC BLOOD PRESSURE: 148 MMHG | HEIGHT: 65 IN | DIASTOLIC BLOOD PRESSURE: 78 MMHG | RESPIRATION RATE: 17 BRPM | HEART RATE: 81 BPM | OXYGEN SATURATION: 98 % | BODY MASS INDEX: 41.45 KG/M2 | WEIGHT: 248.8 LBS | TEMPERATURE: 97.4 F

## 2021-07-30 LAB
ANION GAP SERPL CALC-SCNC: 6 MMOL/L (ref 5–15)
BASOPHILS # BLD: 0 K/UL (ref 0–0.1)
BASOPHILS NFR BLD: 1 % (ref 0–1)
BUN SERPL-MCNC: 16 MG/DL (ref 6–20)
BUN/CREAT SERPL: 21 (ref 12–20)
CALCIUM SERPL-MCNC: 8.3 MG/DL (ref 8.5–10.1)
CHLORIDE SERPL-SCNC: 105 MMOL/L (ref 97–108)
CK MB CFR SERPL CALC: 0.1 % (ref 0–2.5)
CK MB SERPL-MCNC: 1.1 NG/ML (ref 5–25)
CK SERPL-CCNC: 775 U/L (ref 26–192)
CO2 SERPL-SCNC: 27 MMOL/L (ref 21–32)
CREAT SERPL-MCNC: 0.77 MG/DL (ref 0.55–1.02)
DIFFERENTIAL METHOD BLD: ABNORMAL
EOSINOPHIL # BLD: 0.2 K/UL (ref 0–0.4)
EOSINOPHIL NFR BLD: 2 % (ref 0–7)
ERYTHROCYTE [DISTWIDTH] IN BLOOD BY AUTOMATED COUNT: 15 % (ref 11.5–14.5)
GLUCOSE BLD STRIP.AUTO-MCNC: 158 MG/DL (ref 65–117)
GLUCOSE BLD STRIP.AUTO-MCNC: 218 MG/DL (ref 65–117)
GLUCOSE SERPL-MCNC: 159 MG/DL (ref 65–100)
HCT VFR BLD AUTO: 33.6 % (ref 35–47)
HGB BLD-MCNC: 10.5 G/DL (ref 11.5–16)
IMM GRANULOCYTES # BLD AUTO: 0.1 K/UL (ref 0–0.04)
IMM GRANULOCYTES NFR BLD AUTO: 1 % (ref 0–0.5)
LYMPHOCYTES # BLD: 1.6 K/UL (ref 0.8–3.5)
LYMPHOCYTES NFR BLD: 20 % (ref 12–49)
MCH RBC QN AUTO: 26 PG (ref 26–34)
MCHC RBC AUTO-ENTMCNC: 31.3 G/DL (ref 30–36.5)
MCV RBC AUTO: 83.2 FL (ref 80–99)
MONOCYTES # BLD: 0.6 K/UL (ref 0–1)
MONOCYTES NFR BLD: 8 % (ref 5–13)
NEUTS SEG # BLD: 5.6 K/UL (ref 1.8–8)
NEUTS SEG NFR BLD: 68 % (ref 32–75)
NRBC # BLD: 0 K/UL (ref 0–0.01)
NRBC BLD-RTO: 0 PER 100 WBC
PLATELET # BLD AUTO: 372 K/UL (ref 150–400)
PMV BLD AUTO: 9.4 FL (ref 8.9–12.9)
POTASSIUM SERPL-SCNC: 4.2 MMOL/L (ref 3.5–5.1)
RBC # BLD AUTO: 4.04 M/UL (ref 3.8–5.2)
SERVICE CMNT-IMP: ABNORMAL
SERVICE CMNT-IMP: ABNORMAL
SODIUM SERPL-SCNC: 138 MMOL/L (ref 136–145)
TROPONIN I SERPL-MCNC: <0.05 NG/ML
WBC # BLD AUTO: 8.1 K/UL (ref 3.6–11)

## 2021-07-30 PROCEDURE — 74011250637 HC RX REV CODE- 250/637: Performed by: NURSE PRACTITIONER

## 2021-07-30 PROCEDURE — 94760 N-INVAS EAR/PLS OXIMETRY 1: CPT

## 2021-07-30 PROCEDURE — 36415 COLL VENOUS BLD VENIPUNCTURE: CPT

## 2021-07-30 PROCEDURE — 82962 GLUCOSE BLOOD TEST: CPT

## 2021-07-30 PROCEDURE — 82550 ASSAY OF CK (CPK): CPT

## 2021-07-30 PROCEDURE — 84484 ASSAY OF TROPONIN QUANT: CPT

## 2021-07-30 PROCEDURE — 85025 COMPLETE CBC W/AUTO DIFF WBC: CPT

## 2021-07-30 PROCEDURE — 74011250637 HC RX REV CODE- 250/637: Performed by: INTERNAL MEDICINE

## 2021-07-30 PROCEDURE — 74011250636 HC RX REV CODE- 250/636: Performed by: INTERNAL MEDICINE

## 2021-07-30 PROCEDURE — 74011636637 HC RX REV CODE- 636/637: Performed by: INTERNAL MEDICINE

## 2021-07-30 PROCEDURE — 80048 BASIC METABOLIC PNL TOTAL CA: CPT

## 2021-07-30 PROCEDURE — 77010033678 HC OXYGEN DAILY

## 2021-07-30 RX ADMIN — INSULIN GLARGINE 40 UNITS: 100 INJECTION, SOLUTION SUBCUTANEOUS at 09:00

## 2021-07-30 RX ADMIN — LEVOFLOXACIN 750 MG: 5 INJECTION, SOLUTION INTRAVENOUS at 12:16

## 2021-07-30 RX ADMIN — SERTRALINE HYDROCHLORIDE 25 MG: 25 TABLET ORAL at 10:24

## 2021-07-30 RX ADMIN — METFORMIN HYDROCHLORIDE 1000 MG: 500 TABLET ORAL at 10:24

## 2021-07-30 RX ADMIN — Medication 10 ML: at 05:23

## 2021-07-30 RX ADMIN — INSULIN LISPRO 3 UNITS: 100 INJECTION, SOLUTION INTRAVENOUS; SUBCUTANEOUS at 12:16

## 2021-07-30 RX ADMIN — INSULIN LISPRO 2 UNITS: 100 INJECTION, SOLUTION INTRAVENOUS; SUBCUTANEOUS at 10:24

## 2021-07-30 RX ADMIN — GABAPENTIN 300 MG: 300 CAPSULE ORAL at 10:24

## 2021-07-30 RX ADMIN — Medication 10 ML: at 14:00

## 2021-07-30 RX ADMIN — FAMOTIDINE 20 MG: 20 TABLET ORAL at 10:24

## 2021-07-30 RX ADMIN — ASPIRIN 81 MG: 81 TABLET, COATED ORAL at 10:24

## 2021-07-30 RX ADMIN — RISPERIDONE 1 MG: 1 TABLET ORAL at 10:24

## 2021-07-30 RX ADMIN — HEPARIN SODIUM 7500 UNITS: 5000 INJECTION INTRAVENOUS; SUBCUTANEOUS at 05:23

## 2021-07-30 NOTE — PROGRESS NOTES
Problem: Diabetes Self-Management  Goal: *Disease process and treatment process  Description: Define diabetes and identify own type of diabetes; list 3 options for treating diabetes. 7/30/2021 1528 by Lisa Ann RN  Outcome: Resolved/Met  7/30/2021 1349 by Lisa Ann RN  Outcome: Progressing Towards Goal  Goal: *Incorporating nutritional management into lifestyle  Description: Describe effect of type, amount and timing of food on blood glucose; list 3 methods for planning meals. 7/30/2021 1528 by Lisa Ann RN  Outcome: Resolved/Met  7/30/2021 1349 by Lisa Ann RN  Outcome: Progressing Towards Goal  Goal: *Incorporating physical activity into lifestyle  Description: State effect of exercise on blood glucose levels. 7/30/2021 1528 by Lisa Ann RN  Outcome: Resolved/Met  7/30/2021 1349 by Lisa Ann RN  Outcome: Progressing Towards Goal  Goal: *Developing strategies to promote health/change behavior  Description: Define the ABC's of diabetes; identify appropriate screenings, schedule and personal plan for screenings. 7/30/2021 1528 by Lisa Ann RN  Outcome: Resolved/Met  7/30/2021 1349 by Lisa Ann RN  Outcome: Progressing Towards Goal  Goal: *Using medications safely  Description: State effect of diabetes medications on diabetes; name diabetes medication taking, action and side effects. 7/30/2021 1528 by Lisa Ann RN  Outcome: Resolved/Met  7/30/2021 1349 by Lisa Ann RN  Outcome: Progressing Towards Goal  Goal: *Monitoring blood glucose, interpreting and using results  Description: Identify recommended blood glucose targets  and personal targets.   7/30/2021 1528 by Lisa Ann RN  Outcome: Resolved/Met  7/30/2021 1349 by Lisa Ann RN  Outcome: Progressing Towards Goal  Goal: *Prevention, detection, treatment of acute complications  Description: List symptoms of hyper- and hypoglycemia; describe how to treat low blood sugar and actions for lowering  high blood glucose level. 7/30/2021 1528 by Frieda Ferrari RN  Outcome: Resolved/Met  7/30/2021 1349 by Frieda Ferrari RN  Outcome: Progressing Towards Goal  Goal: *Prevention, detection and treatment of chronic complications  Description: Define the natural course of diabetes and describe the relationship of blood glucose levels to long term complications of diabetes.   7/30/2021 1528 by Frieda Ferrari RN  Outcome: Resolved/Met  7/30/2021 1349 by Frieda Ferrari RN  Outcome: Progressing Towards Goal  Goal: *Developing strategies to address psychosocial issues  Description: Describe feelings about living with diabetes; identify support needed and support network  7/30/2021 1528 by Frieda Ferrari RN  Outcome: Resolved/Met  7/30/2021 1349 by Frieda Ferrari RN  Outcome: Progressing Towards Goal  Goal: *Insulin pump training  7/30/2021 1528 by Frieda Ferrari RN  Outcome: Resolved/Met  7/30/2021 1349 by Frieda Ferrari RN  Outcome: Progressing Towards Goal  Goal: *Sick day guidelines  7/30/2021 1528 by Frieda Ferrari RN  Outcome: Resolved/Met  7/30/2021 1349 by Frieda Ferrari RN  Outcome: Progressing Towards Goal  Goal: *Patient Specific Goal (EDIT GOAL, INSERT TEXT)  7/30/2021 1528 by Frieda Ferrari RN  Outcome: Resolved/Met  7/30/2021 1349 by Frieda Ferrari RN  Outcome: Progressing Towards Goal     Problem: Patient Education: Go to Patient Education Activity  Goal: Patient/Family Education  7/30/2021 1528 by Frieda Ferrari RN  Outcome: Resolved/Met  7/30/2021 1349 by Frieda Ferrari RN  Outcome: Progressing Towards Goal     Problem: Suicide  Goal: *STG: Remains safe in hospital  7/30/2021 1528 by Frieda Ferrari RN  Outcome: Resolved/Met  7/30/2021 [address] by Frieda Ferrari RN  Outcome: Progressing Towards Goal  Goal: *STG: Seeks staff when feelings of self harm or harm towards others arise  7/30/2021 1528 by Frieda Ferrari RN  Outcome: Resolved/Met  7/30/2021 1349 by Katelyn Gracia RN  Outcome: Progressing Towards Goal  Goal: *STG:  Verbalizes alternative ways of dealing with maladaptive feelings/behaviors  7/30/2021 1528 by Katelyn Gracia RN  Outcome: Resolved/Met  7/30/2021 1349 by Katelyn Gracia RN  Outcome: Progressing Towards Goal  Goal: *STG/LTG: Complies with medication therapy  7/30/2021 1528 by Katelyn Gracia RN  Outcome: Resolved/Met  7/30/2021 1349 by Katelyn Gracia RN  Outcome: Progressing Towards Goal  Goal: *STG/LTG: No longer expresses self destructive or suicidal thoughts  7/30/2021 1528 by Katelyn Gracia RN  Outcome: Resolved/Met  7/30/2021 1349 by Katelyn Gracia RN  Outcome: Progressing Towards Goal  Goal: *LTG:  Identifies available community resources  7/30/2021 1528 by Katelyn Gracia RN  Outcome: Resolved/Met  7/30/2021 1349 by Katelyn Gracia RN  Outcome: Progressing Towards Goal  Goal: *LTG:  Develops proactive suicide prevention plan  7/30/2021 1528 by Katelyn Gracia RN  Outcome: Resolved/Met  7/30/2021 1349 by Katelyn Gracia RN  Outcome: Progressing Towards Goal  Goal: Interventions  7/30/2021 1528 by Katelyn Gracia RN  Outcome: Resolved/Met  7/30/2021 1349 by Katelyn Gracia RN  Outcome: Progressing Towards Goal     Problem: Patient Education: Go to Patient Education Activity  Goal: Patient/Family Education  7/30/2021 1528 by Katelyn Gracia RN  Outcome: Resolved/Met  7/30/2021 1349 by Katelyn Gracia RN  Outcome: Progressing Towards Goal     Problem: Falls - Risk of  Goal: *Absence of Falls  Description: Document Rebeka Spare Fall Risk and appropriate interventions in the flowsheet.   7/30/2021 1528 by Katelyn Gracia RN  Outcome: Resolved/Met  Note: Fall Risk Interventions:  Mobility Interventions: Assess mobility with egress test         Medication Interventions: Patient to call before getting OOB    Elimination Interventions: Call light in reach    History of Falls Interventions: Investigate reason for fall      7/30/2021 1349 by Carla Flores RN  Outcome: Progressing Towards Goal  Note: Fall Risk Interventions:  Mobility Interventions: Assess mobility with egress test         Medication Interventions: Patient to call before getting OOB, Teach patient to arise slowly    Elimination Interventions: Call light in reach    History of Falls Interventions: Door open when patient unattended, Vital signs minimum Q4HRs X 24 hrs (comment for end date)         Problem: Patient Education: Go to Patient Education Activity  Goal: Patient/Family Education  7/30/2021 1528 by Carla Flores RN  Outcome: Resolved/Met  7/30/2021 1349 by Carla Flores RN  Outcome: Progressing Towards Goal     Problem: Patient Education: Go to Patient Education Activity  Goal: Patient/Family Education  7/30/2021 1528 by Carla Flores RN  Outcome: Resolved/Met  7/30/2021 1349 by Carla Flores RN  Outcome: Progressing Towards Goal

## 2021-07-30 NOTE — ED PROVIDER NOTES
EMERGENCY DEPARTMENT HISTORY AND PHYSICAL EXAM      Date: 7/27/2021  Patient Name: Ace Damon    History of Presenting Illness     Chief Complaint   Patient presents with    Irregular Heart Beat       History Provided By: EMS    HPI: Ace Damon, 60 y.o. female presents to the ED with cc of SVT. Pt brought to the ED by EMS. EMS had been called for SOA. Pt has hx of DM, Bipolar, schizophrenia and is a poor historian and unable contribute to HPI. EMS states upon their arrival pt appeared to be in distress, tachypneic. EMS states normal O2 kali but they noted -200. They attempted vagal manuevers without success. They then administered Adenosine 6mg with conversion to sinus tach. There were no reports of fever, vomiting/diarrhea. There are no other complaints, changes, or physical findings at this time. PCP: Ian Rodas MD    No current facility-administered medications on file prior to encounter. Current Outpatient Medications on File Prior to Encounter   Medication Sig Dispense Refill    oxybutynin (DITROPAN) 5 mg tablet Take 5 mg by mouth once.  metFORMIN (GLUCOPHAGE) 1,000 mg tablet Take 1,000 mg by mouth two (2) times daily (with meals).  risperiDONE (RisperDAL) 1 mg tablet Take  by mouth two (2) times a day.  loratadine (CLARITIN) 10 mg tablet Take 10 mg by mouth daily.  linaGLIPtin (Tradjenta) 5 mg tablet Take 5 mg by mouth daily.  gabapentin (NEURONTIN) 300 mg capsule Take 300 mg by mouth daily.  atorvastatin (LIPITOR) 40 mg tablet Take  by mouth daily. At 5pm      aspirin delayed-release 81 mg tablet Take 81 mg by mouth daily.  ibuprofen (MOTRIN) 600 mg tablet Take 1 Tab by mouth every six (6) hours as needed for Pain. 20 Tab 0    ondansetron (ZOFRAN ODT) 4 mg disintegrating tablet Take 1 Tab by mouth every eight (8) hours as needed for Nausea.  (Patient not taking: Reported on 7/27/2021) 10 Tab 0    paliperidone (INVEGA) 3 mg SR tablet Take 1 Tab by mouth daily. Indications: SCHIZOAFFECTIVE DISORDER (Patient not taking: Reported on 7/27/2021) 14 Tab 0    sertraline (ZOLOFT) 25 mg tablet Take 1 Tab by mouth daily. Indications: major depressive disorder (Patient not taking: Reported on 7/27/2021) 14 Tab 0    meloxicam (MOBIC) 15 mg tablet Take 15 mg by mouth daily. Indications: Arthritis (Patient not taking: Reported on 7/27/2021)      fluticasone-vilanterol (BREO ELLIPTA) 200-25 mcg/dose inhaler Take 1 Puff by inhalation daily.  furosemide (Lasix) 40 mg tablet Take 40 mg by mouth daily. (Patient not taking: Reported on 7/27/2021)      albuterol (PROVENTIL VENTOLIN) 2.5 mg /3 mL (0.083 %) nebulizer solution 2.5 mg by Nebulization route three (3) times daily as needed for Wheezing or Shortness of Breath.  albuterol (VENTOLIN HFA) 90 mcg/actuation inhaler Take 2 Puffs by inhalation every six (6) hours as needed for Wheezing.  famotidine (PEPCID) 20 mg tablet Take 20 mg by mouth daily. Indications: Heartburn (Patient not taking: Reported on 7/27/2021)      lisinopril (PRINIVIL, ZESTRIL) 2.5 mg tablet Take 2.5 mg by mouth daily. Indications: hypertension (Patient not taking: Reported on 7/27/2021)      lovastatin (MEVACOR) 20 mg tablet Take 20 mg by mouth nightly. Indications: hyperlipidemia (Patient not taking: Reported on 7/27/2021)      glipiZIDE (GLUCOTROL) 10 mg tablet Take 10 mg by mouth two (2) times a day.  Indications: type 2 diabetes mellitus (Patient not taking: Reported on 7/27/2021)         Past History     Past Medical History:  Past Medical History:   Diagnosis Date    Arthritis     legs    [facility]     COPD     Depression 1/13/2012    Diabetes ([facility]     Drug abuse (HCC)     cocaine, stimulants, etoh, mj, tob    H/O suicide attempt     Hypertension     Obesity     Polydrug dependence excluding opioid type drug, abuse ([facility]     [facility] 7/8/2016       Past Surgical History:  Past Surgical History:   Procedure Laterality Date    HX ORTHOPAEDIC      foot sx       Family History:  Family History   Problem Relation Age of Onset    Diabetes Mother     Stroke Mother     Stroke Father        Social History:  Social History     Tobacco Use    Smoking status: Current Every Day Smoker     Packs/day: 1.00     Years: 10.00     Pack years: 10.00     Types: Cigarettes    Smokeless tobacco: Current User   Substance Use Topics    Alcohol use: No     Alcohol/week: 0.0 standard drinks     Comment: Hx of ETOH abuse since age 13. no DUIs.  Drug use: Not Currently     Types: Cocaine, Marijuana, Other, Opiates       Allergies: Allergies   Allergen Reactions    Amoxicillin Hives    Mushroom Flavor Hives    Tomato Hives         Review of Systems   Review of Systems   Unable to perform ROS: Psychiatric disorder       Physical Exam   Physical Exam  Vitals and nursing note reviewed. Constitutional:       General: She is not in acute distress. Appearance: She is well-developed. She is obese. She is ill-appearing. She is not diaphoretic. HENT:      Head: Normocephalic and atraumatic. Mouth/Throat:      Mouth: Mucous membranes are dry. Pharynx: No oropharyngeal exudate. Eyes:      Extraocular Movements: Extraocular movements intact. Conjunctiva/sclera: Conjunctivae normal.      Pupils: Pupils are equal, round, and reactive to light. Neck:      Vascular: No JVD. Trachea: No tracheal deviation. Cardiovascular:      Rate and Rhythm: Regular rhythm. Tachycardia present. Heart sounds: Normal heart sounds. No murmur heard. Pulmonary:      Effort: Pulmonary effort is normal. No respiratory distress. Breath sounds: Normal breath sounds. No stridor. No wheezing or rales. Abdominal:      General: There is no distension. Palpations: Abdomen is soft. Tenderness: There is no abdominal tenderness. There is no guarding or rebound.    Musculoskeletal: General: No tenderness. Normal range of motion. Cervical back: Normal range of motion and neck supple. Skin:     General: Skin is warm and dry. Capillary Refill: Capillary refill takes 2 to 3 seconds. Comments: Poor skin turgor    Neurological:      Mental Status: She is alert. She is disoriented. Cranial Nerves: No cranial nerve deficit. Comments: No gross motor or sensory deficits, pt at baseline is disoriented unclear if she worse now than her baseline   Psychiatric:      Comments: Unable to assess         Diagnostic Study Results     Labs -        Contains abnormal data GLUCOSE, POC (Final result)   Component (Lab Inquiry)  Collection Time Result Time [place] TECHID   07/27/21 21:12:00 07/27/21 21:13:36 515   (NOTE)   The FDA has in. Danis HOWARD RN       Final result                        Contains abnormal data URINALYSIS W/ REFLEX CULTURE (Final result)   Component (Lab Inquiry)  Collection Time Result Time COLOR APPRN REFSG [place] PROTU   07/27/21 20:28:00 07/27/21 20:56:54 YELLOW/STRAW   Color Reference Range:. .. CLOUDYAbnormal  1.027 5.0 TRACEAbnormal        Collection Time Result Time GLUCU KETU BILU BLDU UROU   07/27/21 20:28:00 07/27/21 20:56:54 >1,000Abnormal  Negative Negative SMALLAbnormal  0.2       Collection Time Result Time TERRA LEUKU WBCU RBCU EPSU   07/27/21 20:28:00 07/27/21 20:56:54 PositiveAbnormal  Negative 0-4 5-10 FEW   Epithelial cell catego. ..       Collection Time Result Time Dartha Pointer   07/27/21 20:28:00 07/27/21 20:56:54 3+Abnormal  CULTURE NOT INDICATED BY UA RESULT       Final result                        Contains abnormal data HEMOGLOBIN A1C WITH EAG (Final result)   Component (Lab Inquiry)  Collection Time Result Time HBA1C EAGT   07/27/21 20:09:00 07/27/21 22:19:43 11.8   NEW METHOD   PLEASE NOT. Danis William High  292       Final result                        Contains critical data METABOLIC PANEL, BASIC (Final result)   Component (Lab Inquiry)  Collection Time Result Time NA K CL CO2 AGAP   07/27/21 20:06:00 07/27/21 21:04:54 128Low  4.0 94Low  24 10       Collection Time Result Time GLU BUN CREA BUCR GFRAA   07/27/21 20:06:00 07/27/21 21:04:54 646   RESULTS VERIFIED, PHON. Marlene Rosario Alexander High Panic   17 1. 71High  10Low  37Low        Collection Time Result Time GFRNA CA   07/27/21 20:06:00 07/27/21 21:04:54 31Low  7.9Low        Final result                        MAGNESIUM (Final result)   Component (Lab Inquiry)  Collection Time Result Time MG   07/27/21 20:06:00 07/27/21 21:04:54 1.8         Final result                          PHOSPHORUS (Final result)   Component (Lab Inquiry)  Collection Time Result Time PHOS   07/27/21 20:06:00 07/27/21 21:04:54 3.3         Final result                          Contains critical data GLUCOSE, POC (Final result)   Component (Lab Inquiry)  Collection Time Result Time [place] TECHID   07/27/21 20:01:00 07/27/21 20:02:38 >600   Notified RN or MD imme. Marlene Gonzalezer Marlene Alexander High Panic   Ceola Minors (EDT)       Final result                        Contains abnormal data CBC WITH AUTOMATED DIFF (Final result)   Component (Lab Inquiry)  Collection Time Result Time WBC RBC HGB HCT MCV   07/27/21 16:45:00 07/27/21 17:45:09 9.4 4.28 11.2Low  35.8 83.6       Collection Time Result Time MCH MCHC RDW PLT MPLV   07/27/21 16:45:00 07/27/21 17:45:09 26.2 31.3 14.6High  325 10.0       Collection Time Result Time NRBC ANRBC GRANS BANDS LYMPH   07/27/21 16:45:00 07/27/21 17:45:09 0.0 0.00 92High  1 1Low        Collection Time Result Time MONOS EOS BASOS IG ABG   07/27/21 16:45:00 07/27/21 17:45:09 6 0 0 0 8. 7High        Collection Time Result Time ABL ABM YU ABB AIG   07/27/21 16:45:00 07/27/21 17:45:09 0.1Low  0.6 0.0 0.0 0.0       Collection Time Result Time DF RCOM   07/27/21 16:45:00 07/27/21 17:45:09 MANUAL NORMOCYTIC, NORMOCHROMIC       Final result                        Contains critical data METABOLIC PANEL, COMPREHENSIVE (Final result)   Component (Lab Inquiry)  Collection Time Result Time NA K CL CO2 AGAP   07/27/21 16:45:00 07/27/21 17:57:06 123Low  4.0 88Low  21 14       Collection Time Result Time GLU BUN CREA BUCR GFRAA   07/27/21 16:45:00 07/27/21 17:57:06 850   RESULTS VERIFIED, PHON. Michelle Grumbles Michelle Grumbles High Panic   19 2. 07High  9Low  30Low        Collection Time Result Time GFRNA CA TBIL GPT SGOT   07/27/21 16:45:00 07/27/21 17:57:06 25   Estimated GFR is calcu. Michelle Grumbles Michelle Grumbles Low  8.3Low  0.7 41 46High        Collection Time Result Time AP TP ALB GLOB AGRAT   07/27/21 16:45:00 07/27/21 17:57:06 162High  7.5 2.7Low  4.8High  0.6Low        Final result                        TROPONIN I (Final result)   Component (Lab Inquiry)  Collection Time Result Time TROIQ   07/27/21 16:45:00 07/27/21 17:57:06 <0.05   The presence of detect. ..         Final result                          MAGNESIUM (Final result)   Component (Lab Inquiry)  Collection Time Result Time MG   07/27/21 16:45:00 07/27/21 17:57:06 1.7         Final result                          TSH 3RD GENERATION (Final result)   Component (Lab Inquiry)  Collection Time Result Time TSH   07/27/21 16:45:00 07/27/21 17:28:25 1.09     Due to TSH hetero. ..         Final result                          Contains abnormal data CK W/ CKMB & INDEX (Final result)   Component (Lab Inquiry)  Collection Time Result Time CPKMB CKNDX CPK   07/27/21 16:45:00 07/27/21 17:57:06 4.6   CK MB increases in 3-4. Michelle Grumbles Michelle Grumbles High  0.4   Elevation of both CK M... 1,277High        Final result                          Radiologic Studies -   XR CHEST PORT   Final Result   No acute cardiopulmonary process. CT Results  (Last 48 hours)    None        CXR Results  (Last 48 hours)    None          Medical Decision Making   I am the first provider for this patient. I reviewed the vital signs, available nursing notes, past medical history, past surgical history, family history and social history. Vital Signs-Reviewed the patient's vital signs.   Patient Vitals for the past 12 hrs:   Temp Pulse Resp BP SpO2   07/30/21 1545 97.4 °F (36.3 °C) 81 17 (!) 148/78 98 %   07/30/21 1149 98.1 °F (36.7 °C) 74 17 (!) 154/90 96 %   07/30/21 0800 98.3 °F (36.8 °C) 81 17 (!) 140/63 92 %       EKG interpretation: (Preliminary)  Sinus tach, rate 123, normal axis/pr/qrs, no acute ST changes, Rip Mussel, DO      Records Reviewed: Nursing Notes, Old Medical Records, Previous electrocardiograms, Ambulance Run Sheet, Previous Radiology Studies and Previous Laboratory Studies    Provider Notes (Medical Decision Making):   DDx- Acute metabolic encephalopathy, dehydration, UTI, arrhythmia, ACS    ED Course:   Initial assessment performed. The patients presenting problems have been discussed, and they are in agreement with the care plan formulated and outlined with them. I have encouraged them to ask questions as they arise throughout their visit. Pt had received adenosine prior to arrival by EMS. Pt no longer in SVT. She is still tachycardic and clinically appears dehydrated     Labs show elevated CK, ARF, and very high glucose, non-acidotic. Pt had received IV fluids initially will order additional IV fluids. Given how high the glucose is will order glucose stabilizer pathway. Will consult hospitalist for admission. Consult: Case discussed with hospitalist. Pt will be evaluated and admitted.      Critical Care Time:   CRITICAL CARE NOTE :    4:46 PM    IMPENDING DETERIORATION -Cardiovascular, CNS, Metabolic and Renal  ASSOCIATED RISK FACTORS - Dysrhythmia, Metabolic changes, Dehydration and CNS Decompensation  MANAGEMENT- Bedside Assessment and Supervision of Care  INTERPRETATION -  Xrays, ECG, Blood Pressure, Cardiac Output Measures  and labs  INTERVENTIONS - hemodynamic mngmt and Metobolic interventions  CASE REVIEW - Hospitalist/Intensivist and Nursing  TREATMENT RESPONSE -Improved  PERFORMED BY - Self    NOTES   :  I have spent 40 minutes of critical care time involved in lab review, consultations with specialist, family decision- making, bedside attention and documentation. This time excludes time spent in any separate billed procedures. During this entire length of time I was immediately available to the patient . Olga Weiner DO      Disposition:  Admit     Diagnosis     Clinical Impression:   1. SVT (supraventricular tachycardia) ([address]    2. Hyperosmolar hyperglycemic state (HHS) ([address]    3. Acute renal failure, unspecified acute renal failure type ([address]    4. Non-traumatic rhabdomyolysis        Attestations:    Olga Weiner DO    Please note that this dictation was completed with Ksplice, the computer voice recognition software. Quite often unanticipated grammatical, syntax, homophones, and other interpretive errors are inadvertently transcribed by the computer software. Please disregard these errors. Please excuse any errors that have escaped final proofreading. Thank you.

## 2021-07-30 NOTE — PROGRESS NOTES
End of Shift Note    Bedside shift change report given to Dawit Ramos RN (oncoming nurse) by Christina Crigler, RN (offgoing nurse). Report included the following information SBAR, Kardex, Intake/Output, MAR and Recent Results    Shift worked:  [id number]     Shift summary and any significant changes:     Patient bathed and tolerating ambulation. No complaints this shift.  Blood sugar has been controlled and not required any coverage this shift; uneventful     Concerns for physician to address:  none     Zone phone for oncoming shift:   [phone], RN

## 2021-07-30 NOTE — PROGRESS NOTES
Transition of Care Plan:     RUR: 21% MOD  Disposition: Home w/ [facility] (SN)  Follow up appointments: PCP- details in AVS (new pt appt)   DME needed: None  Transportation at [address] transport; ETA 3:45 PM  (pt needs to be downstairs at [facility] for Roundtrip ride)  Leala Friar or means to access home: Caregiver will be home  IM Medicare Letter: N/A  BCPI-A Bundle Letter: N/A  Caregiver Contact: Anthony- Leann Palma [facility]), [address] [place] [address], [phone]  Discharge Caregiver contacted prior to discharge?  CM spoke w/ Remus Dears    CM acknowledged d/c. Initial note-  CM reviewed pt's chart. CM sent referral to [facility] for SN to assist w/ diabetes management & addtl needs; pt accepted & details added to AVS. CM secured Medicaid transport for this PM as pt's caregiver (?) will not be home until 4 PM. CM contacted pt's roommate/ caregiver Remus Dears ([phone]) to review pt's d/c plan & confirm transportation at d/c. dAri Bolivar verbalized understanding that pt will d/c today via Medicaid transport, ETA 4 PM. Adri Bolivar shared concerns about her medical needs in addt to pt's medical/psych/ social needs. Adri Bolivar provided CM w/ contact info for pt's current services:  >Dr. Jesse Dunn (psychiatrist)- [phone]  >[facility] Support- [phone]  >Kadeem Chapa ([facility] Worker)- [phone]    CM met w/ pt at bedside to review [facility] plan for d/c re: home w/ [facility] & f/u appts. CM informed pt she will not have to administer insulin but will have [facility] to assist w/ her medical needs. Pt verbalized understanding & is agreeable. CM provided pt w/ housing resources & list of [facility] providers. CM added local [facility] to pt's AVS. Pt reported no addtl questions or concerns for d/c. Pt ready to d/c from CM standpoint. RN notified. Care Management Interventions  PCP Verified by CM:  Yes (Pt stated that the residential home is working with her to get a PCP)  Mode of Transport at Discharge: Other (see comment) [facility]ARE [facility]  Hospital Transport Time of Discharge: 8937  Transition of Care Consult (CM Consult): [address]: No  Reason Outside [place]: Out of service area  Discharge Durable Medical Equipment: No (Cane and shower chair)  Physical Therapy Consult: Yes  Occupational Therapy Consult: Yes  Speech Therapy Consult: No  Current Support Network:  Other ([facility])  Confirm Follow Up Transport:  (Medical transportation)  The Plan for Transition of Care is Related to the Following Treatment Goals : Home kathy/ [facility]  The Patient and/or Patient Representative was Provided with a Choice of Provider and Agrees with the Discharge Plan?: Yes  Discharge Location  Discharge Placement: Home with home health (& f/u appts)    Minus FAMILIA Keyes  Care Management

## 2021-07-30 NOTE — DISCHARGE SUMMARY
Hospitalist Discharge Summary     Patient ID:  Cornell Henderson  [id number]  58 y.o.  1965    PCP on record: Steve Nicole MD    Admit date: 7/27/2021  Discharge date and time: 7/30/2021      DISCHARGE DIAGNOSIS:    Uncontrolled diabetes. CONSULTATIONS:  IP CONSULT TO HOSPITALIST  IP CONSULT TO PSYCHIATRY    Excerpted HPI from H&P of Scott Jovel MD:  Brenda Rosario is a 60 y.o.   female with PMHx significant for schizophrenia, depression, type 2 diabetes, hypertension, presents to the ER for evaluation of shortness of breath. Pt is a poor historian, not sure whey she is in the ER. Per chart reviewed, EMS responded for shortness of breath. On arrival EMS found patient diaphoretic with heart rate around 200. Vagal maneuver performed without success. Patient was given 6 mg IV adenosine which brought heart rate down to 120s. She was subsequently taken to the ER for further evaluation. In the ER patient was no longer short of breath or diaphoretic. Heart rate currently around 99. Labs were significant for , creatinine of 2, CK 1, 277, troponin 0 0.05. EKG shows sinus tach. We were asked to admit for work up and evaluation of the above problems.      ______________________________________________________________________  DISCHARGE SUMMARY/HOSPITAL COURSE:  for full details see H&P, daily progress notes, labs, consult notes. DM type 2 uncontrolled with hyperosmolar syndrome POA HgBa1c 11.8  Hypertonic hyponatremia Na 123 corrects to 138 for glucose POA  Acute kidney injury POA  Hypokalemia K 3.2  presenting with , cr 2.07, Na 123  Home regimen metformin 1000 mg BID, tradjenta, glucotrol and patient discharged today to continue on this medications. Case management setting up home health helping on medications.     SVT POA resolved  Currently HR ~99.    EKG showed sinus tach, no acute ischemia.    Trop neg.  Pt denies CP, SOB currently        Rhabdomyolysis POA peak CPK 2405  CK on discharge is in 700s     Bipolar disorder POA  Hx of malingering   COPD, stable, not in exacerbation, nebs prn  Depression  schizophrenia  ? Compliance with meds  Pt with multiple admission to inpt psych   Psych evaluation appreciated, resume home meds, d/c   Outpatient follow up          _______________________________________________________________________  Patient seen and examined by me on discharge day. Pertinent Findings:  Gen:    Not in distress  Chest: Clear lungs  CVS:   Regular rhythm. No edema  Abd:  Soft, not distended, not tender  Neuro:  Alert,oriented times 4   _______________________________________________________________________  DISCHARGE MEDICATIONS:   Current Discharge Medication List      CONTINUE these medications which have NOT CHANGED    Details   oxybutynin (DITROPAN) 5 mg tablet Take 5 mg by mouth once. metFORMIN (GLUCOPHAGE) 1,000 mg tablet Take 1,000 mg by mouth two (2) times daily (with meals). risperiDONE (RisperDAL) 1 mg tablet Take  by mouth two (2) times a day. loratadine (CLARITIN) 10 mg tablet Take 10 mg by mouth daily. linaGLIPtin (Tradjenta) 5 mg tablet Take 5 mg by mouth daily. gabapentin (NEURONTIN) 300 mg capsule Take 300 mg by mouth daily. atorvastatin (LIPITOR) 40 mg tablet Take  by mouth daily. At 5pm      aspirin delayed-release 81 mg tablet Take 81 mg by mouth daily. ibuprofen (MOTRIN) 600 mg tablet Take 1 Tab by mouth every six (6) hours as needed for Pain. Qty: 20 Tab, Refills: 0      ondansetron (ZOFRAN ODT) 4 mg disintegrating tablet Take 1 Tab by mouth every eight (8) hours as needed for Nausea. Qty: 10 Tab, Refills: 0      paliperidone (INVEGA) 3 mg SR tablet Take 1 Tab by mouth daily. Indications: SCHIZOAFFECTIVE DISORDER  Qty: 14 Tab, Refills: 0      sertraline (ZOLOFT) 25 mg tablet Take 1 Tab by mouth daily.  Indications: major depressive disorder  Qty: 14 Tab, Refills: 0      meloxicam (MOBIC) 15 mg tablet Take 15 mg by mouth daily. Indications: Arthritis      fluticasone-vilanterol (BREO ELLIPTA) 200-25 mcg/dose inhaler Take 1 Puff by inhalation daily. furosemide (Lasix) 40 mg tablet Take 40 mg by mouth daily. albuterol (PROVENTIL VENTOLIN) 2.5 mg /3 mL (0.083 %) nebulizer solution 2.5 mg by Nebulization route three (3) times daily as needed for Wheezing or Shortness of Breath. albuterol (VENTOLIN HFA) 90 mcg/actuation inhaler Take 2 Puffs by inhalation every six (6) hours as needed for Wheezing. famotidine (PEPCID) 20 mg tablet Take 20 mg by mouth daily. Indications: Heartburn      lisinopril (PRINIVIL, ZESTRIL) 2.5 mg tablet Take 2.5 mg by mouth daily. Indications: hypertension      lovastatin (MEVACOR) 20 mg tablet Take 20 mg by mouth nightly. Indications: hyperlipidemia      glipiZIDE (GLUCOTROL) 10 mg tablet Take 10 mg by mouth two (2) times a day. Indications: type 2 diabetes mellitus             My Recommended Diet, Activity, Wound Care, and follow-up labs are listed in the patient's Discharge Insturctions which I have personally completed and reviewed.     ______________________________________________________________________    Risk of deterioration: Moderate    Condition at Discharge:  Stable  ______________________________________________________________________    Disposition  Home with family and home health services    ______________________________________________________________________    Care Plan discussed with:   Patient, Family, RN, Care Manager, Consultant    Comment:   ______________________________________________________________________  Total NON critical care TIME:  35 Minutes    Code Status: Full Code  ___

## 2021-07-30 NOTE — PROGRESS NOTES
Problem: Diabetes Self-Management  Goal: *Disease process and treatment process  Description: Define diabetes and identify own type of diabetes; list 3 options for treating diabetes. Outcome: Progressing Towards Goal  Goal: *Incorporating nutritional management into lifestyle  Description: Describe effect of type, amount and timing of food on blood glucose; list 3 methods for planning meals. Outcome: Progressing Towards Goal  Goal: *Incorporating physical activity into lifestyle  Description: State effect of exercise on blood glucose levels. Outcome: Progressing Towards Goal  Goal: *Developing strategies to promote health/change behavior  Description: Define the ABC's of diabetes; identify appropriate screenings, schedule and personal plan for screenings. Outcome: Progressing Towards Goal  Goal: *Using medications safely  Description: State effect of diabetes medications on diabetes; name diabetes medication taking, action and side effects. Outcome: Progressing Towards Goal  Goal: *Monitoring blood glucose, interpreting and using results  Description: Identify recommended blood glucose targets  and personal targets. Outcome: Progressing Towards Goal  Goal: *Prevention, detection, treatment of acute complications  Description: List symptoms of hyper- and hypoglycemia; describe how to treat low blood sugar and actions for lowering  high blood glucose level. Outcome: Progressing Towards Goal  Goal: *Prevention, detection and treatment of chronic complications  Description: Define the natural course of diabetes and describe the relationship of blood glucose levels to long term complications of diabetes.   Outcome: Progressing Towards Goal  Goal: *Developing strategies to address psychosocial issues  Description: Describe feelings about living with diabetes; identify support needed and support network  Outcome: Progressing Towards Goal  Goal: *Insulin pump training  Outcome: Progressing Towards Goal  Goal: *Sick day guidelines  Outcome: Progressing Towards Goal  Goal: *Patient Specific Goal (EDIT GOAL, INSERT TEXT)  Outcome: Progressing Towards Goal     Problem: Patient Education: Go to Patient Education Activity  Goal: Patient/Family Education  Outcome: Progressing Towards Goal     Problem: Suicide  Goal: *STG: Remains safe in hospital  Outcome: Progressing Towards Goal  Goal: *STG: Seeks staff when feelings of self harm or harm towards others arise  Outcome: Progressing Towards Goal  Goal: *STG:  Verbalizes alternative ways of dealing with maladaptive feelings/behaviors  Outcome: Progressing Towards Goal  Goal: *STG/LTG: Complies with medication therapy  Outcome: Progressing Towards Goal  Goal: *STG/LTG: No longer expresses self destructive or suicidal thoughts  Outcome: Progressing Towards Goal  Goal: *LTG:  Identifies available community resources  Outcome: Progressing Towards Goal  Goal: *LTG:  Develops proactive suicide prevention plan  Outcome: Progressing Towards Goal  Goal: Interventions  Outcome: Progressing Towards Goal     Problem: Patient Education: Go to Patient Education Activity  Goal: Patient/Family Education  Outcome: Progressing Towards Goal     Problem: Falls - Risk of  Goal: *Absence of Falls  Description: Document Martin Fall Risk and appropriate interventions in the flowsheet.   Outcome: Progressing Towards Goal  Note: Fall Risk Interventions:  Mobility Interventions: Assess mobility with egress test         Medication Interventions: Teach patient to arise slowly, Patient to call before getting OOB    Elimination Interventions: Call light in reach    History of Falls Interventions: Vital signs minimum Q4HRs X 24 hrs (comment for end date), Door open when patient unattended         Problem: Patient Education: Go to Patient Education Activity  Goal: Patient/Family Education  Outcome: Progressing Towards Goal

## 2021-07-30 NOTE — PROGRESS NOTES
I went over the discharge paperwork with the patient. Patient signed the paperwork. I removed the patient's IV and tele leads. Patient was wheeled downstairs and left with her ride.

## 2021-07-30 NOTE — PROGRESS NOTES
Problem: Diabetes Self-Management  Goal: *Disease process and treatment process  Description: Define diabetes and identify own type of diabetes; list 3 options for treating diabetes. Outcome: Progressing Towards Goal  Goal: *Incorporating nutritional management into lifestyle  Description: Describe effect of type, amount and timing of food on blood glucose; list 3 methods for planning meals. Outcome: Progressing Towards Goal  Goal: *Incorporating physical activity into lifestyle  Description: State effect of exercise on blood glucose levels. Outcome: Progressing Towards Goal  Goal: *Developing strategies to promote health/change behavior  Description: Define the ABC's of diabetes; identify appropriate screenings, schedule and personal plan for screenings. Outcome: Progressing Towards Goal  Goal: *Using medications safely  Description: State effect of diabetes medications on diabetes; name diabetes medication taking, action and side effects. Outcome: Progressing Towards Goal  Goal: *Monitoring blood glucose, interpreting and using results  Description: Identify recommended blood glucose targets  and personal targets. Outcome: Progressing Towards Goal  Goal: *Prevention, detection, treatment of acute complications  Description: List symptoms of hyper- and hypoglycemia; describe how to treat low blood sugar and actions for lowering  high blood glucose level. Outcome: Progressing Towards Goal  Goal: *Prevention, detection and treatment of chronic complications  Description: Define the natural course of diabetes and describe the relationship of blood glucose levels to long term complications of diabetes.   Outcome: Progressing Towards Goal  Goal: *Developing strategies to address psychosocial issues  Description: Describe feelings about living with diabetes; identify support needed and support network  Outcome: Progressing Towards Goal  Goal: *Insulin pump training  Outcome: Progressing Towards Goal  Goal: *Sick day guidelines  Outcome: Progressing Towards Goal  Goal: *Patient Specific Goal (EDIT GOAL, INSERT TEXT)  Outcome: Progressing Towards Goal     Problem: Patient Education: Go to Patient Education Activity  Goal: Patient/Family Education  Outcome: Progressing Towards Goal     Problem: Suicide  Goal: *STG: Remains safe in hospital  Outcome: Progressing Towards Goal  Goal: *STG: Seeks staff when feelings of self harm or harm towards others arise  Outcome: Progressing Towards Goal  Goal: *STG:  Verbalizes alternative ways of dealing with maladaptive feelings/behaviors  Outcome: Progressing Towards Goal  Goal: *STG/LTG: Complies with medication therapy  Outcome: Progressing Towards Goal  Goal: *STG/LTG: No longer expresses self destructive or suicidal thoughts  Outcome: Progressing Towards Goal  Goal: *LTG:  Identifies available community resources  Outcome: Progressing Towards Goal  Goal: *LTG:  Develops proactive suicide prevention plan  Outcome: Progressing Towards Goal  Goal: Interventions  Outcome: Progressing Towards Goal     Problem: Patient Education: Go to Patient Education Activity  Goal: Patient/Family Education  Outcome: Progressing Towards Goal     Problem: Falls - Risk of  Goal: *Absence of Falls  Description: Document Martin Fall Risk and appropriate interventions in the flowsheet.   Outcome: Progressing Towards Goal  Note: Fall Risk Interventions:  Mobility Interventions: Assess mobility with egress test         Medication Interventions: Patient to call before getting OOB, Teach patient to arise slowly    Elimination Interventions: Call light in reach    History of Falls Interventions: Door open when patient unattended, Vital signs minimum Q4HRs X 24 hrs (comment for end date)         Problem: Patient Education: Go to Patient Education Activity  Goal: Patient/Family Education  Outcome: Progressing Towards Goal

## 2021-08-18 NOTE — ADT AUTH CERT NOTES
URGENT REQUEST:     REF # [id number]  PLEASE SEND FAX OR CALL WITH UPDATE OF DAYS APPROVED AND STATUS OF P2P DC SUMMARY HAS BEEN SUBMITTED ON 8/4      [facility]     [phone] [address] [place] [address]  [phone] PHONE

## 2021-09-21 ENCOUNTER — HOSPITAL ENCOUNTER (INPATIENT)
Age: 56
LOS: 3 days | Discharge: HOME | DRG: 750 | End: 2021-09-24
Attending: EMERGENCY MEDICINE | Admitting: PSYCHIATRY & NEUROLOGY
Payer: MEDICAID

## 2021-09-21 DIAGNOSIS — E11.65 TYPE 2 DIABETES MELLITUS WITH HYPERGLYCEMIA, WITH LONG-TERM CURRENT USE OF INSULIN (HCC): ICD-10-CM

## 2021-09-21 DIAGNOSIS — R45.851 SUICIDAL IDEATION: ICD-10-CM

## 2021-09-21 DIAGNOSIS — F20.9 SCHIZOPHRENIA, UNSPECIFIED TYPE (HCC): ICD-10-CM

## 2021-09-21 DIAGNOSIS — G89.4 CHRONIC PAIN SYNDROME: ICD-10-CM

## 2021-09-21 DIAGNOSIS — B37.9 YEAST INFECTION: Primary | ICD-10-CM

## 2021-09-21 DIAGNOSIS — F25.1 SCHIZOAFFECTIVE DISORDER, DEPRESSIVE TYPE (HCC): ICD-10-CM

## 2021-09-21 DIAGNOSIS — Z79.4 TYPE 2 DIABETES MELLITUS WITH HYPERGLYCEMIA, WITH LONG-TERM CURRENT USE OF INSULIN (HCC): ICD-10-CM

## 2021-09-21 DIAGNOSIS — E66.9 OBESITY (BMI 30.0-34.9): ICD-10-CM

## 2021-09-21 PROBLEM — F31.9 BIPOLAR DISORDER (HCC): Status: ACTIVE | Noted: 2021-09-21

## 2021-09-21 LAB
ALBUMIN SERPL-MCNC: 2.9 G/DL (ref 3.5–5)
ALBUMIN/GLOB SERPL: 0.7 {RATIO} (ref 1.1–2.2)
ALP SERPL-CCNC: 120 U/L (ref 45–117)
ALT SERPL-CCNC: 39 U/L (ref 12–78)
AMPHET UR QL SCN: NEGATIVE
ANION GAP SERPL CALC-SCNC: 8 MMOL/L (ref 5–15)
APPEARANCE UR: ABNORMAL
AST SERPL-CCNC: 21 U/L (ref 15–37)
BACTERIA URNS QL MICRO: NEGATIVE /HPF
BARBITURATES UR QL SCN: NEGATIVE
BASOPHILS # BLD: 0 K/UL (ref 0–0.1)
BASOPHILS NFR BLD: 1 % (ref 0–1)
BENZODIAZ UR QL: NEGATIVE
BILIRUB SERPL-MCNC: 0.2 MG/DL (ref 0.2–1)
BILIRUB UR QL: NEGATIVE
BUN SERPL-MCNC: 7 MG/DL (ref 6–20)
BUN/CREAT SERPL: 8 (ref 12–20)
CALCIUM SERPL-MCNC: 8.5 MG/DL (ref 8.5–10.1)
CANNABINOIDS UR QL SCN: NEGATIVE
CHLORIDE SERPL-SCNC: 104 MMOL/L (ref 97–108)
CO2 SERPL-SCNC: 29 MMOL/L (ref 21–32)
COCAINE UR QL SCN: NEGATIVE
COLOR UR: ABNORMAL
CREAT SERPL-MCNC: 0.88 MG/DL (ref 0.55–1.02)
DIFFERENTIAL METHOD BLD: ABNORMAL
DRUG SCRN COMMENT,DRGCM: NORMAL
EOSINOPHIL # BLD: 0.1 K/UL (ref 0–0.4)
EOSINOPHIL NFR BLD: 2 % (ref 0–7)
EPITH CASTS URNS QL MICRO: ABNORMAL /LPF
ERYTHROCYTE [DISTWIDTH] IN BLOOD BY AUTOMATED COUNT: 15.4 % (ref 11.5–14.5)
ETHANOL SERPL-MCNC: <10 MG/DL
FLUAV RNA SPEC QL NAA+PROBE: NOT DETECTED
FLUBV RNA SPEC QL NAA+PROBE: NOT DETECTED
GLOBULIN SER CALC-MCNC: 3.9 G/DL (ref 2–4)
GLUCOSE BLD STRIP.AUTO-MCNC: 360 MG/DL (ref 65–117)
GLUCOSE BLD STRIP.AUTO-MCNC: 373 MG/DL (ref 65–117)
GLUCOSE SERPL-MCNC: 283 MG/DL (ref 65–100)
GLUCOSE UR STRIP.AUTO-MCNC: 500 MG/DL
HCT VFR BLD AUTO: 39.6 % (ref 35–47)
HGB BLD-MCNC: 12.4 G/DL (ref 11.5–16)
HGB UR QL STRIP: NEGATIVE
IMM GRANULOCYTES # BLD AUTO: 0 K/UL (ref 0–0.04)
IMM GRANULOCYTES NFR BLD AUTO: 1 % (ref 0–0.5)
KETONES UR QL STRIP.AUTO: NEGATIVE MG/DL
LEUKOCYTE ESTERASE UR QL STRIP.AUTO: ABNORMAL
LYMPHOCYTES # BLD: 1.2 K/UL (ref 0.8–3.5)
LYMPHOCYTES NFR BLD: 18 % (ref 12–49)
MCH RBC QN AUTO: 26.4 PG (ref 26–34)
MCHC RBC AUTO-ENTMCNC: 31.3 G/DL (ref 30–36.5)
MCV RBC AUTO: 84.4 FL (ref 80–99)
METHADONE UR QL: NEGATIVE
MONOCYTES # BLD: 0.3 K/UL (ref 0–1)
MONOCYTES NFR BLD: 5 % (ref 5–13)
NEUTS SEG # BLD: 4.9 K/UL (ref 1.8–8)
NEUTS SEG NFR BLD: 73 % (ref 32–75)
NITRITE UR QL STRIP.AUTO: NEGATIVE
NRBC # BLD: 0 K/UL (ref 0–0.01)
NRBC BLD-RTO: 0 PER 100 WBC
OPIATES UR QL: NEGATIVE
PCP UR QL: NEGATIVE
PH UR STRIP: 6.5 [PH] (ref 5–8)
PLATELET # BLD AUTO: 388 K/UL (ref 150–400)
PMV BLD AUTO: 9.1 FL (ref 8.9–12.9)
POTASSIUM SERPL-SCNC: 3.9 MMOL/L (ref 3.5–5.1)
PROT SERPL-MCNC: 6.8 G/DL (ref 6.4–8.2)
PROT UR STRIP-MCNC: ABNORMAL MG/DL
RBC # BLD AUTO: 4.69 M/UL (ref 3.8–5.2)
RBC #/AREA URNS HPF: ABNORMAL /HPF (ref 0–5)
SARS-COV-2, COV2: NOT DETECTED
SERVICE CMNT-IMP: ABNORMAL
SERVICE CMNT-IMP: ABNORMAL
SODIUM SERPL-SCNC: 141 MMOL/L (ref 136–145)
SP GR UR REFRACTOMETRY: 1.01 (ref 1–1.03)
UA: UC IF INDICATED,UAUC: ABNORMAL
UROBILINOGEN UR QL STRIP.AUTO: 0.2 EU/DL (ref 0.2–1)
WBC # BLD AUTO: 6.6 K/UL (ref 3.6–11)
WBC URNS QL MICRO: ABNORMAL /HPF (ref 0–4)
YEAST BUDDING URNS QL: PRESENT

## 2021-09-21 PROCEDURE — 80053 COMPREHEN METABOLIC PANEL: CPT

## 2021-09-21 PROCEDURE — 87636 SARSCOV2 & INF A&B AMP PRB: CPT

## 2021-09-21 PROCEDURE — 80307 DRUG TEST PRSMV CHEM ANLYZR: CPT

## 2021-09-21 PROCEDURE — 85025 COMPLETE CBC W/AUTO DIFF WBC: CPT

## 2021-09-21 PROCEDURE — 82077 ASSAY SPEC XCP UR&BREATH IA: CPT

## 2021-09-21 PROCEDURE — 36415 COLL VENOUS BLD VENIPUNCTURE: CPT

## 2021-09-21 PROCEDURE — 99285 EMERGENCY DEPT VISIT HI MDM: CPT

## 2021-09-21 PROCEDURE — 74011250637 HC RX REV CODE- 250/637: Performed by: NURSE PRACTITIONER

## 2021-09-21 PROCEDURE — 65220000003 HC RM SEMIPRIVATE PSYCH

## 2021-09-21 PROCEDURE — 74011636637 HC RX REV CODE- 636/637: Performed by: PSYCHIATRY & NEUROLOGY

## 2021-09-21 PROCEDURE — 90791 PSYCH DIAGNOSTIC EVALUATION: CPT

## 2021-09-21 PROCEDURE — 74011636637 HC RX REV CODE- 636/637: Performed by: NURSE PRACTITIONER

## 2021-09-21 PROCEDURE — 81001 URINALYSIS AUTO W/SCOPE: CPT

## 2021-09-21 PROCEDURE — 82962 GLUCOSE BLOOD TEST: CPT

## 2021-09-21 PROCEDURE — 74011250637 HC RX REV CODE- 250/637: Performed by: PSYCHIATRY & NEUROLOGY

## 2021-09-21 RX ORDER — DIPHENHYDRAMINE HYDROCHLORIDE 50 MG/ML
50 INJECTION, SOLUTION INTRAMUSCULAR; INTRAVENOUS
Status: DISCONTINUED | OUTPATIENT
Start: 2021-09-21 | End: 2021-09-24 | Stop reason: HOSPADM

## 2021-09-21 RX ORDER — OLANZAPINE 5 MG/1
5 TABLET ORAL
Status: DISCONTINUED | OUTPATIENT
Start: 2021-09-21 | End: 2021-09-24 | Stop reason: HOSPADM

## 2021-09-21 RX ORDER — LORAZEPAM 2 MG/ML
1 INJECTION INTRAMUSCULAR
Status: DISCONTINUED | OUTPATIENT
Start: 2021-09-21 | End: 2021-09-24 | Stop reason: HOSPADM

## 2021-09-21 RX ORDER — MAGNESIUM SULFATE 100 %
4 CRYSTALS MISCELLANEOUS AS NEEDED
Status: DISCONTINUED | OUTPATIENT
Start: 2021-09-21 | End: 2021-09-22 | Stop reason: SDUPTHER

## 2021-09-21 RX ORDER — HALOPERIDOL 5 MG/ML
5 INJECTION INTRAMUSCULAR
Status: DISCONTINUED | OUTPATIENT
Start: 2021-09-21 | End: 2021-09-24 | Stop reason: HOSPADM

## 2021-09-21 RX ORDER — HYDROXYZINE 25 MG/1
50 TABLET, FILM COATED ORAL
Status: DISCONTINUED | OUTPATIENT
Start: 2021-09-21 | End: 2021-09-24 | Stop reason: HOSPADM

## 2021-09-21 RX ORDER — TRAZODONE HYDROCHLORIDE 50 MG/1
50 TABLET ORAL
Status: DISCONTINUED | OUTPATIENT
Start: 2021-09-21 | End: 2021-09-24 | Stop reason: HOSPADM

## 2021-09-21 RX ORDER — MICONAZOLE NITRATE 2 %
1 CREAM WITH APPLICATOR VAGINAL ONCE
Status: COMPLETED | OUTPATIENT
Start: 2021-09-21 | End: 2021-09-21

## 2021-09-21 RX ORDER — BENZTROPINE MESYLATE 1 MG/1
1 TABLET ORAL
Status: DISCONTINUED | OUTPATIENT
Start: 2021-09-21 | End: 2021-09-24 | Stop reason: HOSPADM

## 2021-09-21 RX ORDER — DEXTROSE 50 % IN WATER (D50W) INTRAVENOUS SYRINGE
12.5-25 AS NEEDED
Status: DISCONTINUED | OUTPATIENT
Start: 2021-09-21 | End: 2021-09-22 | Stop reason: SDUPTHER

## 2021-09-21 RX ORDER — ADHESIVE BANDAGE
30 BANDAGE TOPICAL DAILY PRN
Status: DISCONTINUED | OUTPATIENT
Start: 2021-09-21 | End: 2021-09-24 | Stop reason: HOSPADM

## 2021-09-21 RX ORDER — INSULIN LISPRO 100 [IU]/ML
INJECTION, SOLUTION INTRAVENOUS; SUBCUTANEOUS
Status: DISCONTINUED | OUTPATIENT
Start: 2021-09-21 | End: 2021-09-22 | Stop reason: SDUPTHER

## 2021-09-21 RX ORDER — ACETAMINOPHEN 325 MG/1
650 TABLET ORAL
Status: DISCONTINUED | OUTPATIENT
Start: 2021-09-21 | End: 2021-09-24 | Stop reason: HOSPADM

## 2021-09-21 RX ORDER — INSULIN LISPRO 100 [IU]/ML
12 INJECTION, SOLUTION INTRAVENOUS; SUBCUTANEOUS ONCE
Status: COMPLETED | OUTPATIENT
Start: 2021-09-21 | End: 2021-09-21

## 2021-09-21 RX ORDER — INSULIN LISPRO 100 [IU]/ML
5 INJECTION, SOLUTION INTRAVENOUS; SUBCUTANEOUS ONCE
Status: COMPLETED | OUTPATIENT
Start: 2021-09-21 | End: 2021-09-21

## 2021-09-21 RX ADMIN — MICONAZOLE NITRATE 1 APPLICATOR: 20 CREAM VAGINAL at 13:55

## 2021-09-21 RX ADMIN — HYDROXYZINE HYDROCHLORIDE 50 MG: 25 TABLET, FILM COATED ORAL at 21:12

## 2021-09-21 RX ADMIN — INSULIN LISPRO 5 UNITS: 100 INJECTION, SOLUTION INTRAVENOUS; SUBCUTANEOUS at 20:33

## 2021-09-21 RX ADMIN — INSULIN LISPRO 12 UNITS: 100 INJECTION, SOLUTION INTRAVENOUS; SUBCUTANEOUS at 17:35

## 2021-09-21 RX ADMIN — TRAZODONE HYDROCHLORIDE 50 MG: 50 TABLET ORAL at 21:12

## 2021-09-21 NOTE — BSMART NOTE
Patient has been accepted to [facility] by Dr Lili Villanueva. Report can be called at [phone].
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
## 2021-09-21 NOTE — ED NOTES
TRANSFER - OUT REPORT:    Verbal report given to Ronny Chang on Graylin Cons  being transferred [facility] for routine progression of care       Report consisted of patients Situation, Background, Assessment and   Recommendations(SBAR). Information from the following report(s) SBAR was reviewed with the receiving nurse. Lines:       Opportunity for questions and clarification was provided.       Patient transported with:   Registered Nurse

## 2021-09-21 NOTE — GROUP NOTE
EMILY  GROUP DOCUMENTATION INDIVIDUAL                                                                          Group Therapy Note    Date: 9/21/2021    Group Start Time: 1500  Group End Time: 1600  Group Topic: Recreational/Music Therapy    [facility] [place] [address] ACUTE BEHAV [facility]    [facility] GROUP    Group Therapy Note    Attendees: 6         Attendance: Did not attend    Patient's Goal:      Interventions/techniques  Otelia Pac

## 2021-09-21 NOTE — ED TRIAGE NOTES
Patient brought to ED by EMS for SI. Patients states she wants to die. She states she wants to be hit by a car. Ems reports sp02 was 92 on arrival. patient was placed on 2 liters o2.
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
## 2021-09-22 PROBLEM — F19.20 POLYSUBSTANCE DEPENDENCE (HCC): Status: RESOLVED | Noted: 2017-06-14 | Resolved: 2021-09-22

## 2021-09-22 PROBLEM — Z79.899 POLYPHARMACY: Status: RESOLVED | Noted: 2017-06-14 | Resolved: 2021-09-22

## 2021-09-22 LAB
CHOLEST SERPL-MCNC: 130 MG/DL
GLUCOSE BLD STRIP.AUTO-MCNC: 199 MG/DL (ref 65–117)
GLUCOSE BLD STRIP.AUTO-MCNC: 273 MG/DL (ref 65–117)
GLUCOSE BLD STRIP.AUTO-MCNC: 314 MG/DL (ref 65–117)
GLUCOSE BLD STRIP.AUTO-MCNC: 358 MG/DL (ref 65–117)
GLUCOSE P FAST SERPL-MCNC: 218 MG/DL (ref 65–100)
HDLC SERPL-MCNC: 32 MG/DL
HDLC SERPL: 4.1 {RATIO} (ref 0–5)
LDLC SERPL CALC-MCNC: 49.8 MG/DL (ref 0–100)
SERVICE CMNT-IMP: ABNORMAL
TRIGL SERPL-MCNC: 241 MG/DL (ref ?–150)
TSH SERPL DL<=0.05 MIU/L-ACNC: 2.19 UIU/ML (ref 0.36–3.74)
VLDLC SERPL CALC-MCNC: 48.2 MG/DL

## 2021-09-22 PROCEDURE — 74011636637 HC RX REV CODE- 636/637: Performed by: PSYCHIATRY & NEUROLOGY

## 2021-09-22 PROCEDURE — 65220000003 HC RM SEMIPRIVATE PSYCH

## 2021-09-22 PROCEDURE — 82962 GLUCOSE BLOOD TEST: CPT

## 2021-09-22 PROCEDURE — 74011250637 HC RX REV CODE- 250/637: Performed by: PSYCHIATRY & NEUROLOGY

## 2021-09-22 PROCEDURE — 36415 COLL VENOUS BLD VENIPUNCTURE: CPT

## 2021-09-22 PROCEDURE — 99223 1ST HOSP IP/OBS HIGH 75: CPT | Performed by: PSYCHIATRY & NEUROLOGY

## 2021-09-22 PROCEDURE — 82947 ASSAY GLUCOSE BLOOD QUANT: CPT

## 2021-09-22 PROCEDURE — 80061 LIPID PANEL: CPT

## 2021-09-22 PROCEDURE — 74011636637 HC RX REV CODE- 636/637: Performed by: STUDENT IN AN ORGANIZED HEALTH CARE EDUCATION/TRAINING PROGRAM

## 2021-09-22 PROCEDURE — 84443 ASSAY THYROID STIM HORMONE: CPT

## 2021-09-22 RX ORDER — TRAZODONE HYDROCHLORIDE 50 MG/1
50 TABLET ORAL
Status: ON HOLD | COMMUNITY
End: 2021-09-24 | Stop reason: SDUPTHER

## 2021-09-22 RX ORDER — MAGNESIUM SULFATE 100 %
4 CRYSTALS MISCELLANEOUS AS NEEDED
Status: DISCONTINUED | OUTPATIENT
Start: 2021-09-22 | End: 2021-09-24 | Stop reason: HOSPADM

## 2021-09-22 RX ORDER — INSULIN LISPRO 100 [IU]/ML
INJECTION, SOLUTION INTRAVENOUS; SUBCUTANEOUS
Status: DISCONTINUED | OUTPATIENT
Start: 2021-09-22 | End: 2021-09-24 | Stop reason: HOSPADM

## 2021-09-22 RX ORDER — INSULIN LISPRO 100 [IU]/ML
0.05 INJECTION, SOLUTION INTRAVENOUS; SUBCUTANEOUS
Status: DISCONTINUED | OUTPATIENT
Start: 2021-09-22 | End: 2021-09-24 | Stop reason: HOSPADM

## 2021-09-22 RX ORDER — ALOGLIPTIN 12.5 MG/1
25 TABLET, FILM COATED ORAL
Status: DISCONTINUED | OUTPATIENT
Start: 2021-09-23 | End: 2021-09-23

## 2021-09-22 RX ORDER — INSULIN GLARGINE 100 [IU]/ML
0.3 INJECTION, SOLUTION SUBCUTANEOUS DAILY
Status: DISCONTINUED | OUTPATIENT
Start: 2021-09-22 | End: 2021-09-24 | Stop reason: HOSPADM

## 2021-09-22 RX ORDER — MOMETASONE FUROATE AND FORMOTEROL FUMARATE DIHYDRATE 100; 5 UG/1; UG/1
2 AEROSOL RESPIRATORY (INHALATION) 2 TIMES DAILY
Status: ON HOLD | COMMUNITY
End: 2021-09-24 | Stop reason: SDUPTHER

## 2021-09-22 RX ORDER — LORATADINE 10 MG/1
10 TABLET ORAL DAILY
Status: DISCONTINUED | OUTPATIENT
Start: 2021-09-23 | End: 2021-09-24 | Stop reason: HOSPADM

## 2021-09-22 RX ORDER — SERTRALINE HYDROCHLORIDE 25 MG/1
25 TABLET, FILM COATED ORAL DAILY
Status: ON HOLD | COMMUNITY
End: 2021-09-24 | Stop reason: SDUPTHER

## 2021-09-22 RX ORDER — METFORMIN HYDROCHLORIDE 500 MG/1
1000 TABLET ORAL 2 TIMES DAILY WITH MEALS
Status: DISCONTINUED | OUTPATIENT
Start: 2021-09-22 | End: 2021-09-23

## 2021-09-22 RX ORDER — GABAPENTIN 300 MG/1
300 CAPSULE ORAL
Status: DISCONTINUED | OUTPATIENT
Start: 2021-09-22 | End: 2021-09-24 | Stop reason: HOSPADM

## 2021-09-22 RX ORDER — PANTOPRAZOLE SODIUM 40 MG/1
40 TABLET, DELAYED RELEASE ORAL EVERY EVENING
Status: DISCONTINUED | OUTPATIENT
Start: 2021-09-22 | End: 2021-09-24 | Stop reason: HOSPADM

## 2021-09-22 RX ORDER — RISPERIDONE 1 MG/1
1 TABLET, FILM COATED ORAL 2 TIMES DAILY
Status: DISCONTINUED | OUTPATIENT
Start: 2021-09-22 | End: 2021-09-24 | Stop reason: HOSPADM

## 2021-09-22 RX ORDER — ALBUTEROL SULFATE 90 UG/1
2 AEROSOL, METERED RESPIRATORY (INHALATION)
Status: DISCONTINUED | OUTPATIENT
Start: 2021-09-22 | End: 2021-09-24 | Stop reason: HOSPADM

## 2021-09-22 RX ORDER — OXYBUTYNIN CHLORIDE 5 MG/1
5 TABLET, EXTENDED RELEASE ORAL DAILY
Status: DISCONTINUED | OUTPATIENT
Start: 2021-09-23 | End: 2021-09-24 | Stop reason: HOSPADM

## 2021-09-22 RX ORDER — DEXTROSE 50 % IN WATER (D50W) INTRAVENOUS SYRINGE
25-50 AS NEEDED
Status: DISCONTINUED | OUTPATIENT
Start: 2021-09-22 | End: 2021-09-24 | Stop reason: HOSPADM

## 2021-09-22 RX ORDER — PANTOPRAZOLE SODIUM 40 MG/1
40 TABLET, DELAYED RELEASE ORAL EVERY EVENING
Status: ON HOLD | COMMUNITY
End: 2021-09-24 | Stop reason: SDUPTHER

## 2021-09-22 RX ORDER — ATORVASTATIN CALCIUM 40 MG/1
40 TABLET, FILM COATED ORAL EVERY EVENING
Status: DISCONTINUED | OUTPATIENT
Start: 2021-09-22 | End: 2021-09-24 | Stop reason: HOSPADM

## 2021-09-22 RX ORDER — SERTRALINE HYDROCHLORIDE 50 MG/1
25 TABLET, FILM COATED ORAL DAILY
Status: DISCONTINUED | OUTPATIENT
Start: 2021-09-23 | End: 2021-09-24 | Stop reason: HOSPADM

## 2021-09-22 RX ADMIN — METFORMIN HYDROCHLORIDE 1000 MG: 500 TABLET ORAL at 17:07

## 2021-09-22 RX ADMIN — GABAPENTIN 300 MG: 300 CAPSULE ORAL at 21:24

## 2021-09-22 RX ADMIN — INSULIN LISPRO 3 UNITS: 100 INJECTION, SOLUTION INTRAVENOUS; SUBCUTANEOUS at 08:34

## 2021-09-22 RX ADMIN — INSULIN LISPRO 6 UNITS: 100 INJECTION, SOLUTION INTRAVENOUS; SUBCUTANEOUS at 17:02

## 2021-09-22 RX ADMIN — PANTOPRAZOLE SODIUM 40 MG: 40 TABLET, DELAYED RELEASE ORAL at 17:09

## 2021-09-22 RX ADMIN — INSULIN GLARGINE 34 UNITS: 100 INJECTION, SOLUTION SUBCUTANEOUS at 17:05

## 2021-09-22 RX ADMIN — INSULIN LISPRO 5 UNITS: 100 INJECTION, SOLUTION INTRAVENOUS; SUBCUTANEOUS at 21:24

## 2021-09-22 RX ADMIN — ACETAMINOPHEN 650 MG: 325 TABLET ORAL at 21:26

## 2021-09-22 RX ADMIN — HYDROXYZINE HYDROCHLORIDE 50 MG: 25 TABLET, FILM COATED ORAL at 21:26

## 2021-09-22 RX ADMIN — INSULIN LISPRO 10 UNITS: 100 INJECTION, SOLUTION INTRAVENOUS; SUBCUTANEOUS at 17:01

## 2021-09-22 RX ADMIN — ATORVASTATIN CALCIUM 40 MG: 40 TABLET, FILM COATED ORAL at 17:09

## 2021-09-22 RX ADMIN — INSULIN LISPRO 7 UNITS: 100 INJECTION, SOLUTION INTRAVENOUS; SUBCUTANEOUS at 11:44

## 2021-09-22 RX ADMIN — TRAZODONE HYDROCHLORIDE 50 MG: 50 TABLET ORAL at 21:26

## 2021-09-22 RX ADMIN — RISPERIDONE 1 MG: 1 TABLET ORAL at 17:07

## 2021-09-22 NOTE — GROUP NOTE
[facility] GROUP DOCUMENTATION INDIVIDUAL                                                                          Group Therapy Note    Date: 9/22/2021    Group Start Time: 1000  Group End Time: 1100  Group Topic: Topic Group    [facility] [place] [address] ACUTE BEHAV [facility]    [facility] GROUP    Group Therapy Note    Attendees: 9         Attendance: Did not attend    Patient's Goal:      Interventions/techniques:  Eve Gamez

## 2021-09-22 NOTE — PROGRESS NOTES
Behavioral Services  Medicare Certification Upon Admission    I certify that this patient's inpatient psychiatric hospital admission is medically necessary for:    [x] (1) Treatment which could reasonably be expected to improve this patient's condition,       [x] (2) Or for diagnostic study;     AND     [x](2) The inpatient psychiatric services are provided while the individual is under the care of a physician and are included in the individualized plan of care.     Estimated length of stay/service 5-7 days    Plan for post-hospital care home    Electronically signed by Allie Zamora MD on 9/22/2021 at 3:22 PM

## 2021-09-22 NOTE — PROGRESS NOTES
[facility] Medication Reconciliation     Information obtained from: [facility] ([phone])  RxQuery data Deisy Bowlesveronica: Yes    Comments/recommendations: The patient's PTA medication list was updated via a faxed medication list from the [facility] ([facility]). Per the [facility] representative: The patient stated that she was moving out of the home on Sunday. The patient was not receiving any Michele to her knowledge. Medication changes (since last review): Added:   Dulera  Pantoprazole  Trazodone  Removed: Albuterol nebs  ASA  Famotidine  Breo-Ellipta  Furosemide  Glipizide  Ibuprofen  Lisinopril  Lovastatin  Meloxicam  Ondansetron  Paliperidone  Adjusted: Oxybutynin - updated the formulation to XL 5 mg po daily   1RxQuery pharmacy benefit data reflects medications filled and processed through the patient's insurance, however, this data does NOT capture whether the medication was picked up or is currently being taken by the patient. Total Time Spent: 30 minutes    Past Medical History/Disease States:  Past Medical History:   Diagnosis Date    Arthritis     legs    Bipolar 1 disorder ([facility]     COPD     Depression 1/13/2012    Diabetes ([facility]     Drug abuse ([facility]     cocaine, stimulants, etoh, mj, tob    H/O suicide attempt     Hypertension     Obesity     Polydrug dependence excluding opioid type drug, abuse ([facility]     Schizophrenia ([facility] 7/8/2016         Patient allergies: Allergies as of 09/21/2021 - Fully Reviewed 09/21/2021   Allergen Reaction Noted    Amoxicillin Hives 06/13/2017    Mushroom flavor Hives 01/12/2012    Tomato Hives 01/12/2012         Prior to Admission Medications   Prescriptions Last Dose Informant Patient Reported? Taking? albuterol (VENTOLIN HFA) 90 mcg/actuation inhaler  Other Yes Yes   Sig: Take 2 Puffs by inhalation every four (4) hours as needed for Wheezing or Shortness of Breath.    atorvastatin (LIPITOR) 40 mg tablet  Other Yes Yes   Sig: Take 40 mg by mouth every evening. Takes at 5:00 pm  Indications: Cholesterol   gabapentin (NEURONTIN) 300 mg capsule  Other Yes Yes   Sig: Take 300 mg by mouth nightly. Indications: neuropathic pain   linaGLIPtin (Tradjenta) 5 mg tablet  Other Yes Yes   Sig: Take 5 mg by mouth daily. Indications: type 2 diabetes mellitus   loratadine (CLARITIN) 10 mg tablet  Other Yes Yes   Sig: Take 10 mg by mouth daily. Indications: Allergies   metFORMIN (GLUCOPHAGE) 1,000 mg tablet  Other Yes Yes   Sig: Take 1,000 mg by mouth two (2) times daily (with meals). Takes at 8:00 am and at 5:00 pm  Indications: type 2 diabetes mellitus   mometasone-formoterol (Dulera) 100-5 mcg/actuation HFA inhaler  Other Yes Yes   Sig: Take 2 Puffs by inhalation two (2) times a day. Takes at 8:00 am and at 8:00 pm   oxybutynin chloride XL (DITROPAN XL) 5 mg CR tablet  Other Yes Yes   Sig: Take 5 mg by mouth daily. Indications: overactive bladder   pantoprazole (PROTONIX) 40 mg tablet  Other Yes Yes   Sig: Take 40 mg by mouth every evening. Takes at 6:30 pm   Indications: gastroesophageal reflux disease   risperiDONE (RisperDAL) 1 mg tablet  Other Yes Yes   Sig: Take 1 mg by mouth two (2) times a day. Indications: Thoughts   sertraline (Zoloft) 25 mg tablet  Other Yes Yes   Sig: Take 25 mg by mouth daily. Indications: Mood   traZODone (DESYREL) 50 mg tablet  Other Yes Yes   Sig: Take 50 mg by mouth nightly as needed (Insomnia). Facility-Administered Medications: None        Thank you,  Estrella Cooper, Pharm. D.  [phone]

## 2021-09-22 NOTE — H&P
INITIAL PSYCHIATRIC EVALUATION            IDENTIFICATION:    Patient Name  Ace Damon   Date of Birth 1965   [facility] [phone]   Medical Record Number  [id number]      Age  60 y.o. PCP Ian Rodas MD   Admit date:  9/21/2021    Room Number  939/69  @ [facility]   Date of Service  9/22/2021            HISTORY         REASON FOR HOSPITALIZATION:  CC: \"suicidal ideation\". Pt admitted under a voluntary basis for suicidal ideations proving to be an imminent danger to self and an inability to care for self. HISTORY OF PRESENT ILLNESS:    The patient, Ace Damon, is a 60 y.o. WHITE/NON- female with a past psychiatric history significant for schizoaffective disorder, who presents at this time with complaints of (and/or evidence of) the following emotional symptoms: depression and suicidal thoughts/threats. Additional symptomatology include poor self care. The above symptoms have been present for 2+ weeks. These symptoms are of moderate to high severity. These symptoms are constant in nature. The patient's condition has been precipitated by psychosocial stressors. Patient's condition made worse by treatment noncompliance. UDS: negative; BAL=0. Per admission documentation, the patient was feeling depressed and suicidal as it is almost the 3 year anniversary of her  dying. She reported in the ED that she was contemplating walking into traffic but decided to seek care instead. The patient is a fair historian. The patient corroborates the above narrative. The patient contracts for safety on the unit and gives consent for the team to contact collateral. The patient is amenable to initiating treatment while on the unit.      ALLERGIES:   Allergies   Allergen Reactions    Amoxicillin Hives    Mushroom Flavor Hives    Tomato Hives      MEDICATIONS PRIOR TO ADMISSION:   Medications Prior to Admission   Medication Sig    sertraline (Zoloft) 25 mg tablet Take 25 mg by mouth daily. Indications: Mood    mometasone-formoterol (Dulera) 100-5 mcg/actuation HFA inhaler Take 2 Puffs by inhalation two (2) times a day. Takes at 8:00 am and at 8:00 pm    pantoprazole (PROTONIX) 40 mg tablet Take 40 mg by mouth every evening. Takes at 6:30 pm   Indications: gastroesophageal reflux disease    traZODone (DESYREL) 50 mg tablet Take 50 mg by mouth nightly as needed (Insomnia).  oxybutynin chloride XL (DITROPAN XL) 5 mg CR tablet Take 5 mg by mouth daily. Indications: overactive bladder    metFORMIN (GLUCOPHAGE) 1,000 mg tablet Take 1,000 mg by mouth two (2) times daily (with meals). Takes at 8:00 am and at 5:00 pm  Indications: type 2 diabetes mellitus    risperiDONE (RisperDAL) 1 mg tablet Take 1 mg by mouth two (2) times a day. Indications: Thoughts    loratadine (CLARITIN) 10 mg tablet Take 10 mg by mouth daily. Indications: Allergies    linaGLIPtin (Tradjenta) 5 mg tablet Take 5 mg by mouth daily. Indications: type 2 diabetes mellitus    gabapentin (NEURONTIN) 300 mg capsule Take 300 mg by mouth nightly. Indications: neuropathic pain    atorvastatin (LIPITOR) 40 mg tablet Take 40 mg by mouth every evening. Takes at 5:00 pm  Indications: Cholesterol    albuterol (VENTOLIN HFA) 90 mcg/actuation inhaler Take 2 Puffs by inhalation every four (4) hours as needed for Wheezing or Shortness of Breath.       PAST MEDICAL HISTORY:   Past Medical History:   Diagnosis Date    Arthritis     legs    Bipolar 1 disorder ([facility]     COPD     Depression 1/13/2012    Diabetes ([facility]     Drug abuse (HCC)     cocaine, stimulants, etoh, mj, tob    H/O suicide attempt     Hypertension     Obesity     Polydrug dependence excluding opioid type drug, abuse ([facility]     Schizophrenia ([facility] 7/8/2016     Past Surgical History:   Procedure Laterality Date    HX ORTHOPAEDIC      foot sx      SOCIAL HISTORY:  The patient is currently on disability; the patient is not a smoker; the patient's marital status is ; the patient does not have children; the patient reports the highest level of education achieved is  12th grade (special education). FAMILY HISTORY: History reviewed, pertinent family history as below:   Family History   Problem Relation Age of Onset    Diabetes Mother     Stroke Mother     Stroke Father        REVIEW OF SYSTEMS:   Pertinent items are noted in the History of Present Illness. All other Systems reviewed and are considered negative. MENTAL STATUS EXAM & VITALS     MENTAL STATUS EXAM (MSE):    MSE FINDINGS ARE WITHIN NORMAL LIMITS (WNL) UNLESS OTHERWISE STATED BELOW. ( ALL OF THE BELOW CATEGORIES OF THE MSE HAVE BEEN REVIEWED (reviewed 9/22/2021) AND UPDATED AS DEEMED APPROPRIATE )  General Presentation age appropriate and overweight, cooperative and guarded   Orientation oriented to time, place and person   Vital Signs  See below (reviewed 9/22/2021); Vital Signs (BP, Pulse, & Temp) are within normal limits if not listed below.    Gait and Station Stable/steady, no ataxia   Musculoskeletal System No extrapyramidal symptoms (EPS); no abnormal muscular movements or Tardive Dyskinesia (TD); muscle strength and tone are within normal limits   Language No aphasia or dysarthria   Speech:  normal volume and non-pressured   Thought Processes Coherent; normal rate of thoughts; fair abstract reasoning/computation   Thought Associations blocked    Thought Content internally preoccupied   Suicidal Ideations none   Homicidal Ideations none   Mood:  sad   Affect:  blunted   Memory recent  intact   Memory remote:  intact   Concentration/Attention:  distractable   Fund of Knowledge average   Insight:  limited   Reliability fair   Judgment:  poor          VITALS:     Patient Vitals for the past 24 hrs:   Temp Pulse Resp BP SpO2   09/22/21 0757 98.6 °F (37 °C) 66 16 (!) 150/80 95 %   09/21/21 2056 97.7 °F (36.5 °C) 94 18 133/71 94 %   09/21/21 1742  75  128/71      Wt Readings from Last 3 Encounters:   09/21/21 113.4 kg (250 lb)   07/30/21 112.9 kg (248 lb 12.8 oz)   04/26/21 105.2 kg (232 lb)     Temp Readings from Last 3 Encounters:   09/22/21 98.6 °F (37 °C)   07/30/21 97.4 °F (36.3 °C)   04/26/21 98.3 °F (36.8 °C)     BP Readings from Last 3 Encounters:   09/22/21 (!) 150/80   07/30/21 (!) 148/78   04/26/21 (!) 121/48     Pulse Readings from Last 3 Encounters:   09/22/21 66   07/30/21 81   04/26/21 83            DATA     LABORATORY DATA:  Labs Reviewed   URINALYSIS W/ REFLEX CULTURE - Abnormal; Notable for the following components:       Result Value    Appearance CLOUDY (*)     Protein TRACE (*)     Glucose 500 (*)     Leukocyte Esterase TRACE (*)     Epithelial cells MODERATE (*)     Budding yeast PRESENT (*)     All other components within normal limits   CBC WITH AUTOMATED DIFF - Abnormal; Notable for the following components:    RDW 15.4 (*)     IMMATURE GRANULOCYTES 1 (*)     All other components within normal limits   METABOLIC PANEL, COMPREHENSIVE - Abnormal; Notable for the following components:    Glucose 283 (*)     BUN/Creatinine ratio 8 (*)     Alk.  phosphatase 120 (*)     Albumin 2.9 (*)     A-G Ratio 0.7 (*)     All other components within normal limits   GLUCOSE, FASTING - Abnormal; Notable for the following components:    Glucose 218 (*)     All other components within normal limits   LIPID PANEL - Abnormal; Notable for the following components:    Triglyceride 241 (*)     All other components within normal limits   GLUCOSE, POC - Abnormal; Notable for the following components:    Glucose (POC) 360 (*)     All other components within normal limits   GLUCOSE, POC - Abnormal; Notable for the following components:    Glucose (POC) 373 (*)     All other components within normal limits   GLUCOSE, POC - Abnormal; Notable for the following components:    Glucose (POC) 199 (*)     All other components within normal limits   GLUCOSE, POC - Abnormal; Notable for the following components:    Glucose (POC) 273 (*)     All other components within normal limits   DRUG SCREEN, URINE   ETHYL ALCOHOL   COVID-19 WITH INFLUENZA A/B   TSH 3RD GENERATION     Admission on 09/21/2021   Component Date Value Ref Range Status    AMPHETAMINES 09/21/2021 Negative  NEG   Final    BARBITURATES 09/21/2021 Negative  NEG   Final    BENZODIAZEPINES 09/21/2021 Negative  NEG   Final    COCAINE 09/21/2021 Negative  NEG   Final    METHADONE 09/21/2021 Negative  NEG   Final    OPIATES 09/21/2021 Negative  NEG   Final    PCP(PHENCYCLIDINE) 09/21/2021 Negative  NEG   Final    THC (TH-CANNABINOL) 09/21/2021 Negative  NEG   Final    Drug screen comment 09/21/2021 (NOTE)   Final    Color 09/21/2021 YELLOW/STRAW    Final    Appearance 09/21/2021 CLOUDY* CLEAR   Final    Specific gravity 09/21/2021 1.015  1.003 - 1.030   Final    pH (UA) 09/21/2021 6.5  5.0 - 8.0   Final    Protein 09/21/2021 TRACE* NEG mg/dL Final    Glucose 09/21/2021 500* NEG mg/dL Final    Ketone 09/21/2021 Negative  NEG mg/dL Final    Bilirubin 09/21/2021 Negative  NEG   Final    Blood 09/21/2021 Negative  NEG   Final    Urobilinogen 09/21/2021 0.2  0.2 - 1.0 EU/dL Final    Nitrites 09/21/2021 Negative  NEG   Final    Leukocyte Esterase 09/21/2021 TRACE* NEG   Final    WBC 09/21/2021 0-4  0 - 4 /hpf Final    RBC 09/21/2021 0-5  0 - 5 /hpf Final    Epithelial cells 09/21/2021 MODERATE* FEW /lpf Final    Bacteria 09/21/2021 Negative  NEG /hpf Final    UA:UC IF INDICATED 09/21/2021 CULTURE NOT INDICATED BY UA RESULT  CNI   Final    Budding yeast 09/21/2021 PRESENT* NEG   Final    WBC 09/21/2021 6.6  3.6 - 11.0 K/uL Final    RBC 09/21/2021 4.69  3.80 - 5.20 M/uL Final    HGB 09/21/2021 12.4  11.5 - 16.0 g/dL Final    HCT 09/21/2021 39.6  35.0 - 47.0 % Final    MCV 09/21/2021 84.4  80.0 - 99.0 FL Final    MCH 09/21/2021 26.4  26.0 - 34.0 PG Final    MCHC 09/21/2021 31.3  30.0 - 36.5 g/dL Final    RDW 09/21/2021 15.4* 11.5 - 14.5 % Final    PLATELET 62/41/8739 018  150 - 400 K/uL Final    MPV 09/21/2021 9.1  8.9 - 12.9 FL Final    NRBC 09/21/2021 0.0  0  WBC Final    ABSOLUTE NRBC 09/21/2021 0.00  0.00 - 0.01 K/uL Final    NEUTROPHILS 09/21/2021 73  32 - 75 % Final    LYMPHOCYTES 09/21/2021 18  12 - 49 % Final    MONOCYTES 09/21/2021 5  5 - 13 % Final    EOSINOPHILS 09/21/2021 2  0 - 7 % Final    BASOPHILS 09/21/2021 1  0 - 1 % Final    IMMATURE GRANULOCYTES 09/21/2021 1* 0.0 - 0.5 % Final    ABS. NEUTROPHILS 09/21/2021 4.9  1.8 - 8.0 K/UL Final    ABS. LYMPHOCYTES 09/21/2021 1.2  0.8 - 3.5 K/UL Final    ABS. MONOCYTES 09/21/2021 0.3  0.0 - 1.0 K/UL Final    ABS. EOSINOPHILS 09/21/2021 0.1  0.0 - 0.4 K/UL Final    ABS. BASOPHILS 09/21/2021 0.0  0.0 - 0.1 K/UL Final    ABS. IMM. GRANS. 09/21/2021 0.0  0.00 - 0.04 K/UL Final    DF 09/21/2021 AUTOMATED    Final    Sodium 09/21/2021 141  136 - 145 mmol/L Final    Potassium 09/21/2021 3.9  3.5 - 5.1 mmol/L Final    Chloride 09/21/2021 104  97 - 108 mmol/L Final    CO2 09/21/2021 29  21 - 32 mmol/L Final    Anion gap 09/21/2021 8  5 - 15 mmol/L Final    Glucose 09/21/2021 283* 65 - 100 mg/dL Final    BUN 09/21/2021 7  6 - 20 MG/DL Final    Creatinine 09/21/2021 0.88  0.55 - 1.02 MG/DL Final    BUN/Creatinine ratio 09/21/2021 8* 12 - 20   Final    GFR est AA 09/21/2021 >60  >60 ml/min/1.73m2 Final    GFR est non-AA 09/21/2021 >60  >60 ml/min/1.73m2 Final    Calcium 09/21/2021 8.5  8.5 - 10.1 MG/DL Final    Bilirubin, total 09/21/2021 0.2  0.2 - 1.0 MG/DL Final    ALT (SGPT) 09/21/2021 39  12 - 78 U/L Final    AST (SGOT) 09/21/2021 21  15 - 37 U/L Final    Alk.  phosphatase 09/21/2021 120* 45 - 117 U/L Final    Protein, total 09/21/2021 6.8  6.4 - 8.2 g/dL Final    Albumin 09/21/2021 2.9* 3.5 - 5.0 g/dL Final    Globulin 09/21/2021 3.9  2.0 - 4.0 g/dL Final    A-G Ratio 09/21/2021 0.7* 1.1 - 2.2   Final    ALCOHOL(ETHYL),SERUM 09/21/2021 <10  <10 MG/DL Final    SARS-CoV-2 09/21/2021 Not detected  NOTD   Final    Influenza A by PCR 09/21/2021 Not detected    Final    Influenza B by PCR 09/21/2021 Not detected    Final    Glucose (POC) 09/21/2021 360* 65 - 117 mg/dL Final    Performed by 09/21/2021 Johnathon Saha   Final    Glucose (POC) 09/21/2021 373* 65 - 117 mg/dL Final    Performed by 09/21/2021 Skylar Baum RN   Final    Glucose 09/22/2021 218* 65 - 100 MG/DL Final    TSH 09/22/2021 2.19  0.36 - 3.74 uIU/mL Final    Cholesterol, total 09/22/2021 130  <200 MG/DL Final    Triglyceride 09/22/2021 241* <150 MG/DL Final    HDL Cholesterol 09/22/2021 32  MG/DL Final    LDL, calculated 09/22/2021 49.8  0 - 100 MG/DL Final    VLDL, calculated 09/22/2021 48.2  MG/DL Final    CHOL/HDL Ratio 09/22/2021 4.1  0.0 - 5.0   Final    Glucose (POC) 09/22/2021 199* 65 - 117 mg/dL Final    Performed by 09/22/2021 Jessi Hinton RN   Final    Glucose (POC) 09/22/2021 273* 65 - 117 mg/dL Final    Performed by 09/22/2021 Jessi Hinton RN   Final        RADIOLOGY REPORTS:  Results from Hospital Encounter encounter on 07/27/21    XR CHEST PORT    Narrative  EXAM: XR CHEST PORT    HISTORY: admission. COMPARISON: None    FINDINGS: Single view(s) of the chest. The film is underpenetrated secondary to  patient body habitus. The lungs are well inflated. No focal consolidation,  pleural effusion, or pneumothorax. The cardiomediastinal silhouette is  unremarkable. The visualized osseous structures are unremarkable. Impression  No acute cardiopulmonary process. XR CHEST PORT    Result Date: 7/27/2021  EXAM: XR CHEST PORT HISTORY: admission. COMPARISON: None FINDINGS: Single view(s) of the chest. The film is underpenetrated secondary to patient body habitus. The lungs are well inflated. No focal consolidation, pleural effusion, or pneumothorax. The cardiomediastinal silhouette is unremarkable. The visualized osseous structures are unremarkable.      No acute cardiopulmonary process. MEDICATIONS       ALL MEDICATIONS  Current Facility-Administered Medications   Medication Dose Route Frequency    albuterol (PROVENTIL HFA, VENTOLIN HFA, PROAIR HFA) inhaler 2 Puff  2 Puff Inhalation Q4H PRN    atorvastatin (LIPITOR) tablet 40 mg  40 mg Oral QPM    gabapentin (NEURONTIN) capsule 300 mg  300 mg Oral QHS    [START ON 9/23/2021] alogliptin (NESINA) tablet 25 mg  25 mg Oral DAILY    . PHARMACY TO SUBSTITUTE PER PROTOCOL (Reordered from: loratadine (CLARITIN) 10 mg tablet)    Per Protocol    metFORMIN (GLUCOPHAGE) tablet 1,000 mg  1,000 mg Oral BID WITH MEALS    [START ON 9/23/2021] oxybutynin chloride XL (DITROPAN XL) tablet 5 mg  5 mg Oral DAILY    pantoprazole (PROTONIX) tablet 40 mg  40 mg Oral QPM    risperiDONE (RisperDAL) tablet 1 mg  1 mg Oral BID    [START ON 9/23/2021] sertraline (ZOLOFT) tablet 25 mg  25 mg Oral DAILY    insulin lispro (HUMALOG) injection   SubCUTAneous AC&HS    glucose chewable tablet 16 g  4 Tablet Oral PRN    dextrose (D50W) injection syrg 12.5-25 g  12.5-25 g IntraVENous PRN    glucagon (GLUCAGEN) injection 1 mg  1 mg IntraMUSCular PRN    OLANZapine (ZyPREXA) tablet 5 mg  5 mg Oral Q6H PRN    haloperidol lactate (HALDOL) injection 5 mg  5 mg IntraMUSCular Q6H PRN    benztropine (COGENTIN) tablet 1 mg  1 mg Oral BID PRN    diphenhydrAMINE (BENADRYL) injection 50 mg  50 mg IntraMUSCular BID PRN    hydrOXYzine HCL (ATARAX) tablet 50 mg  50 mg Oral TID PRN    LORazepam (ATIVAN) injection 1 mg  1 mg IntraMUSCular Q4H PRN    traZODone (DESYREL) tablet 50 mg  50 mg Oral QHS PRN    acetaminophen (TYLENOL) tablet 650 mg  650 mg Oral Q4H PRN    magnesium hydroxide (MILK OF MAGNESIA) 400 mg/5 mL oral suspension 30 mL  30 mL Oral DAILY PRN      SCHEDULED MEDICATIONS  Current Facility-Administered Medications   Medication Dose Route Frequency    atorvastatin (LIPITOR) tablet 40 mg  40 mg Oral QPM    gabapentin (NEURONTIN) capsule 300 mg  300 mg Oral QHS    [START ON 9/23/2021] alogliptin (NESINA) tablet 25 mg  25 mg Oral DAILY    metFORMIN (GLUCOPHAGE) tablet 1,000 mg  1,000 mg Oral BID WITH MEALS    [START ON 9/23/2021] oxybutynin chloride XL (DITROPAN XL) tablet 5 mg  5 mg Oral DAILY    pantoprazole (PROTONIX) tablet 40 mg  40 mg Oral QPM    risperiDONE (RisperDAL) tablet 1 mg  1 mg Oral BID    [START ON 9/23/2021] sertraline (ZOLOFT) tablet 25 mg  25 mg Oral DAILY    insulin lispro (HUMALOG) injection   SubCUTAneous AC&HS                ASSESSMENT & PLAN        The patient, Marvel Claros, is a 60 y.o.  female who presents at this time for treatment of the following diagnoses:  Patient Active Hospital Problem List:   Schizoaffective disorder ([facility] [address] (7/8/2016)    Assessment: patient with episode of SI in the setting of the anniversary of her 's death. She is otherwise in fair behavioral control, exhibits sad affect and would benefit from review of her treatment plan (good candidate for therapy intervention). Patient on sliding scale, medical intervention needed to optimize her diabetes regimen. Plan:  - RESTART home regimen once verified  - IM consult re: LA insulin recommendation  - IGM therapy as tolerated  - Expand database / obtain collateral  - Dispo planning (home when stable)           A coordinated, multidisplinary treatment team (includes the nurse, unit pharmacist,  and writer) round was conducted for this initial evaluation with the patient present. The following regarding medications was addressed during rounds with patient: the risks and benefits of the proposed medication. The patient was given the opportunity to ask questions. Informed consent given to the use of the above medications.     I will continue to adjust psychiatric and non-psychiatric medications (see above \"medication\" section and orders section for details) as deemed appropriate & based upon diagnoses and response to treatment. I have reviewed admission (and previous/old) labs and medical tests in the EHR and or transferring hospital documents. I will continue to order blood tests/labs and diagnostic tests as deemed appropriate and review results as they become available (see orders for details). I have reviewed old psychiatric and medical records available in the EHR. I Will order additional psychiatric records from other institutions to further elucidate the nature of patient's psychopathology and review once available. I will gather additional collateral information from friends, family and o/p treatment team to further elucidate the nature of patient's psychopathology and baselline level of psychiatric functioning. I certify that this patient's inpatient psychiatric hospital services are required for treatment that could reasonably be expected to improve the patient's condition, or for diagnostic study, and that the patient continues to need, on a daily basis, active treatment furnished directly by or requiring the supervision of inpatient psychiatric facility personnel. In addition, the hospital records show that services furnished were intensive treatment services, admission or related services, or equivalent services.       ESTIMATED LENGTH OF STAY:  3-5 days       STRENGTHS:  Exercising self-direction/Resourceful, Access to housing/residential stability and Interpersonal/supportive relationships (family, friends, peers)                                        SIGNED:    Natividad Joshi MD  9/22/2021 Fluconazole Counseling:  Patient counseled regarding adverse effects of fluconazole including but not limited to headache, diarrhea, nausea, upset stomach, liver function test abnormalities, taste disturbance, and stomach pain.  There is a rare possibility of liver failure that can occur when taking fluconazole.  The patient understands that monitoring of LFTs and kidney function test may be required, especially at baseline. The patient verbalized understanding of the proper use and possible adverse effects of fluconazole.  All of the patient's questions and concerns were addressed.
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
## 2021-09-22 NOTE — BH NOTES
GROUP THERAPY PROGRESS NOTE    Patient did not participate in Discharge Planning Group.      Kieran Gary MSW Intern    [facility]

## 2021-09-22 NOTE — CONSULTS
Hospitalist Consultation Note    NAME: Gabriel Roque   :  1965   MRN:  [id number]   Room Number: 187/37  @ [facility]     Date/Time:  2021 4:03 PM    Patient PCP: Gaurav Spencer MD  ______________________________________________________________________  Given the patient's current clinical presentation, I have a high level of concern for decompensation if discharged from the emergency department. Complex decision making was performed, which includes reviewing the patient's available past medical records, laboratory results, and x-ray films. My assessment of this patient's clinical condition and my plan of care is as follows. Assessment / Plan:       Principal Problem:    Schizoaffective disorder ([address] (2016)    Active Problems:    Obesity (BMI 30.0-34.9) (2015)    Type 2 diabetes with hyperglycemia POA  Lab Results   Component Value Date/Time    Hemoglobin A1c 11.8 (H) 2021 08:09 PM     - Lantus 34 units daily, lispro 6 units TID AC   - Lispro correctional scale, FSG AC HS  - Consistent carb diet, hypoglycemia protocol. Medical Clearance for psychiatric admission      -Psychiatric treatment and management of health issues,Defer to psychiatrist for further management. -Medically stable at this time. We will follow up on a p.r.n. basis.  -No VTE prophylaxis indicated or warranted at this time. Subjective:   REQUESTING PHYSICIAN : Dr Ziyad Manzanares : diabetes management     HISTORY OF PRESENT ILLNESS:     Seamus Rodas is a 60 y.o.  female with PMH of Diabetes, GERD. Obesity. Schizophrenia who presents to ED with c/o depression and suicidal ideation for the past 2 to 3 weeks due to recent anniversary of her  dying. She had a plan of walking into traffic. Currently denies any SI/HI. [place] [employer] Patient denies any past medical history except as listed above.  Denies fever,chills,chest pain, sob,abd pan,diarrhea,constipation,lightheadedness. History of diabetes. Reports medication noncompliance. No current medical concerns at this time. Past Medical History:   Diagnosis Date    Arthritis     legs    Bipolar 1 disorder ([facility]     COPD     Depression 1/13/2012    Diabetes ([facility]     Drug abuse ([facility]     cocaine, stimulants, etoh, mj, tob    H/O suicide attempt     Hypertension     Obesity     Polydrug dependence excluding opioid type drug, abuse ([facility]     Schizophrenia ([facility] 7/8/2016        Past Surgical History:   Procedure Laterality Date    HX ORTHOPAEDIC      foot sx       Social History     Tobacco Use    Smoking status: Current Every Day Smoker     Packs/day: 1.00     Years: 10.00     Pack years: 10.00     Types: Cigarettes    Smokeless tobacco: Current User   Substance Use Topics    Alcohol use: No     Alcohol/week: 0.0 standard drinks     Comment: Hx of ETOH abuse since age 13. no DUIs. Family History   Problem Relation Age of Onset    Diabetes Mother     Stroke Mother     Stroke Father      Allergies   Allergen Reactions    Amoxicillin Hives    Mushroom Flavor Hives    Tomato Hives        Prior to Admission medications    Medication Sig Start Date End Date Taking? Authorizing Provider   sertraline (Zoloft) 25 mg tablet Take 25 mg by mouth daily. Indications: Mood   Yes Provider, Historical   mometasone-formoterol (Dulera) 100-5 mcg/actuation HFA inhaler Take 2 Puffs by inhalation two (2) times a day. Takes at 8:00 am and at 8:00 pm   Yes Provider, Historical   pantoprazole (PROTONIX) 40 mg tablet Take 40 mg by mouth every evening. Takes at 6:30 pm   Indications: gastroesophageal reflux disease   Yes Provider, Historical   traZODone (DESYREL) 50 mg tablet Take 50 mg by mouth nightly as needed (Insomnia). Yes Provider, Historical   oxybutynin chloride XL (DITROPAN XL) 5 mg CR tablet Take 5 mg by mouth daily.  Indications: overactive bladder   Yes Other, MD Antonio metFORMIN (GLUCOPHAGE) 1,000 mg tablet Take 1,000 mg by mouth two (2) times daily (with meals). Takes at 8:00 am and at 5:00 pm  Indications: type 2 diabetes mellitus   Yes Other, MD Antonio   risperiDONE (RisperDAL) 1 mg tablet Take 1 mg by mouth two (2) times a day. Indications: Thoughts   Yes Other, MD Antonio   loratadine (CLARITIN) 10 mg tablet Take 10 mg by mouth daily. Indications: Allergies   Yes Other, MD Antonio   linaGLIPtin (Tradjenta) 5 mg tablet Take 5 mg by mouth daily. Indications: type 2 diabetes mellitus   Yes Other, MD Antonio   gabapentin (NEURONTIN) 300 mg capsule Take 300 mg by mouth nightly. Indications: neuropathic pain   Yes Other, MD Antonio   atorvastatin (LIPITOR) 40 mg tablet Take 40 mg by mouth every evening. Takes at 5:00 pm  Indications: Cholesterol   Yes Other, MD Antonio   albuterol (VENTOLIN HFA) 90 mcg/actuation inhaler Take 2 Puffs by inhalation every four (4) hours as needed for Wheezing or Shortness of Breath. Yes Provider, Historical       REVIEW OF SYSTEMS:        I am not able to complete the review of systems because:    The patient is intubated and sedated    The patient has altered mental status due to his acute medical problems    The patient has baseline aphasia from prior stroke(s)    The patient has baseline dementia and is not reliable historian    The patient is in acute medical distress and unable to provide information           Total of 12 systems reviewed as follows:       POSITIVE= underlined text  Negative = text not underlined  General:  fever, chills, sweats, generalized weakness, weight loss/gain,      loss of appetite   Eyes:    blurred vision, eye pain, loss of vision, double vision  ENT:    rhinorrhea, pharyngitis   Respiratory:   cough, sputum production, SOB, MILLER, wheezing, pleuritic pain   Cardiology:   chest pain, palpitations, orthopnea, PND, edema, syncope   Gastrointestinal:  abdominal pain , N/V, diarrhea, dysphagia, constipation, bleeding Genitourinary:  frequency, urgency, dysuria, hematuria, incontinence   Muskuloskeletal :  arthralgia, myalgia, back pain  Hematology:  easy bruising, nose or gum bleeding, lymphadenopathy   Dermatological: rash, ulceration, pruritis, color change / jaundice  Endocrine:   hot flashes or polydipsia   Neurological:  headache, dizziness, confusion, focal weakness, paresthesia,     Speech difficulties, memory loss, gait difficulty  Psychological: Feelings of anxiety, depression, agitation    Objective:   VITALS:    Visit Vitals  BP (!) 150/80   Pulse 66   Temp 98.6 °F (37 °C)   Resp 16   Ht 5' 5\" (1.651 m)   Wt 113.4 kg (250 lb)   SpO2 95%   Breastfeeding No   BMI 41.60 kg/m²       PHYSICAL EXAM:    General:    Alert, cooperative, no distress, appears stated age. HEENT: Atraumatic, anicteric sclerae, pink conjunctivae     No oral ulcers, mucosa moist, throat clear, dentition fair  Neck:  Supple, symmetrical,  no JVD, thyroid: non tender  Lungs:   Clear to auscultation bilaterally. No Wheezing or Rhonchi. No rales. Chest wall:  No tenderness  No Accessory muscle use. Heart:   Regular  rhythm,  No  murmur   No edema  Abdomen:   Soft, non-tender. Not distended. Bowel sounds normal  Extremities: No cyanosis. No clubbing,      Skin turgor normal, Capillary refill normal, Radial dial pulse 2+  Skin:     Not pale. Not Jaundiced  No rashes   Psychiatric: Depressed, flattened, poor judgment, limited insight, impaired concentration, no SI HI VH AH,  Neurologic: EOMs intact. No facial asymmetry. No aphasia or slurred speech. Symmetrical strength, Sensation grossly intact.  Alert and oriented X 4.     ______________________________________________________________________    Care Plan discussed with:  Patient/Family    Expected  Disposition: per primary attending   ________________________________________________________________________  TOTAL TIME:  25 Minutes    Critical Care Provided     Minutes non procedure based Comments     Reviewed previous records   >50% of visit spent in counseling and coordination of care  Discussion with patient and/or family and questions answered       ________________________________________________________________________  Signed: [place] MD    Procedures: see electronic medical records for all procedures/Xrays and details which were not copied into this note but were reviewed prior to creation of Plan.     LAB DATA REVIEWED:    Recent Results (from the past 24 hour(s))   GLUCOSE, POC    Collection Time: 09/21/21  4:31 PM   Result Value Ref Range    Glucose (POC) 360 (H) 65 - 117 mg/dL    Performed by [address] [place] [address] Se, POC    Collection Time: 09/21/21  8:07 PM   Result Value Ref Range    Glucose (POC) 373 (H) 65 - 117 mg/dL    Performed by Michelet Portillo RN    GLUCOSE, FASTING    Collection Time: 09/22/21  5:32 AM   Result Value Ref Range    Glucose 218 (H) 65 - 100 MG/DL   TSH 3RD GENERATION    Collection Time: 09/22/21  5:32 AM   Result Value Ref Range    TSH 2.19 0.36 - 3.74 uIU/mL   LIPID PANEL    Collection Time: 09/22/21  5:32 AM   Result Value Ref Range    Cholesterol, total 130 <200 MG/DL    Triglyceride 241 (H) <150 MG/DL    HDL Cholesterol 32 MG/DL    LDL, calculated 49.8 0 - 100 MG/DL    VLDL, calculated 48.2 MG/DL    CHOL/HDL Ratio 4.1 0.0 - 5.0     GLUCOSE, POC    Collection Time: 09/22/21  8:00 AM   Result Value Ref Range    Glucose (POC) 199 (H) 65 - 117 mg/dL    Performed by Kailyn Barron RN    GLUCOSE, POC    Collection Time: 09/22/21 11:31 AM   Result Value Ref Range    Glucose (POC) 273 (H) 65 - 117 mg/dL    Performed by Kailyn Barron RN

## 2021-09-22 NOTE — PROGRESS NOTES
Problem: Suicide  Goal: *STG: Remains safe in hospital  Outcome: Progressing Towards Goal  Goal: *STG/LTG: Complies with medication therapy  Outcome: Progressing Towards Goal  Goal: *STG/LTG: No longer expresses self destructive or suicidal thoughts  Outcome: Progressing Towards Goal     9113-7050: Assumed care of patient after receiving shift report from outgoing nurse. Patient was resting in room upon assessment. Affect is flat, mood is depressed. Pt cooperative with vitals and assessment. A&O x 4. Independent in ADLs. Insight and concentration present. Gait is unsteady. Appetite patterns WNL. Denies AVH/SI/HI. Patient endorses anxiety and depression. Pt denies pain. Patient medication and diet compliant. Pt encouraged to continue to participate in care. Patient blood sugar 373. Provider contacted. Orders obtained for 5 units of humalog and hospitalist consult for diabetes management, related to previous noncompliance. Patient also complained of insomnia and wanting medication for sleep. Patient given PRN atarax and trazodone at 2112. No further issues noted at this time. 4405-1708: Patient resting in bed.    2802-2610: Pt has been observed throughout the night. Total hours slept approximately 9.5. No s/s of distress noted. Patient has remained safe. Labs obtained.

## 2021-09-22 NOTE — GROUP NOTE
EMILY  GROUP DOCUMENTATION INDIVIDUAL                                                                          Group Therapy Note    Date: 9/22/2021    Group Start Time: 1400  Group End Time: 1500  Group Topic: Recreational/Music Therapy    [facility] [place] [address] ACUTE BEHAV [facility]    [place], [address] [facility] GROUP DOCUMENTATION GROUP    Group Therapy Note    Attendees: 8         Attendance: Attended    Patient's Goal:  To concentrate on selected task    Interventions/techniques: Supported-crafts,games,music    Interactions: Interacted appropriately    Mental Status: Calm and Flat    Behavior/appearance: Cooperative and needed prompting    Goals Achieved: Able to engage in interactions, Able to listen to others and Able to self-disclose      Additional Notes:  Pleasant,listened to music-socialized with peers and writer,shared concerns    Eulogio Carmona

## 2021-09-22 NOTE — PROGRESS NOTES
Problem: Suicide  Goal: *STG: Remains safe in hospital  Outcome: Progressing Towards Goal  Goal: *STG/LTG: Complies with medication therapy  Outcome: Progressing Towards Goal       0800 Pt received in room. Pt denies si/hi/ah/vh. Anxiety=0/10 depression= 0/10. Pt is Aox4. Pt is calm and cooperative. Med and meal compliant. Pain level of 0/10. Pt mostly isolative to her room. She has been eating her meals in the dayroom and compliant with blood sugar checks and scheduled medications. Will continue to monitor for any changes. Glucose checks:    0800- 199  1200- 273  1630- 0    1700 MD contacted via telephone regarding elevated blood sugar. MD ordered RN to administer 10 units Lispro Insulin sliding scale.

## 2021-09-23 LAB
GLUCOSE BLD STRIP.AUTO-MCNC: 168 MG/DL (ref 65–117)
GLUCOSE BLD STRIP.AUTO-MCNC: 220 MG/DL (ref 65–117)
GLUCOSE BLD STRIP.AUTO-MCNC: 249 MG/DL (ref 65–117)
GLUCOSE BLD STRIP.AUTO-MCNC: 380 MG/DL (ref 65–117)
SERVICE CMNT-IMP: ABNORMAL

## 2021-09-23 PROCEDURE — 65220000003 HC RM SEMIPRIVATE PSYCH

## 2021-09-23 PROCEDURE — 74011636637 HC RX REV CODE- 636/637: Performed by: NURSE PRACTITIONER

## 2021-09-23 PROCEDURE — 82962 GLUCOSE BLOOD TEST: CPT

## 2021-09-23 PROCEDURE — 74011250637 HC RX REV CODE- 250/637: Performed by: PSYCHIATRY & NEUROLOGY

## 2021-09-23 PROCEDURE — 74011636637 HC RX REV CODE- 636/637: Performed by: STUDENT IN AN ORGANIZED HEALTH CARE EDUCATION/TRAINING PROGRAM

## 2021-09-23 PROCEDURE — 99232 SBSQ HOSP IP/OBS MODERATE 35: CPT | Performed by: PSYCHIATRY & NEUROLOGY

## 2021-09-23 RX ORDER — INSULIN LISPRO 100 [IU]/ML
8 INJECTION, SOLUTION INTRAVENOUS; SUBCUTANEOUS ONCE
Status: COMPLETED | OUTPATIENT
Start: 2021-09-23 | End: 2021-09-23

## 2021-09-23 RX ADMIN — INSULIN LISPRO 6 UNITS: 100 INJECTION, SOLUTION INTRAVENOUS; SUBCUTANEOUS at 07:54

## 2021-09-23 RX ADMIN — ATORVASTATIN CALCIUM 40 MG: 40 TABLET, FILM COATED ORAL at 17:27

## 2021-09-23 RX ADMIN — INSULIN LISPRO 8 UNITS: 100 INJECTION, SOLUTION INTRAVENOUS; SUBCUTANEOUS at 20:54

## 2021-09-23 RX ADMIN — SERTRALINE 25 MG: 50 TABLET, FILM COATED ORAL at 07:52

## 2021-09-23 RX ADMIN — TRAZODONE HYDROCHLORIDE 50 MG: 50 TABLET ORAL at 21:43

## 2021-09-23 RX ADMIN — PANTOPRAZOLE SODIUM 40 MG: 40 TABLET, DELAYED RELEASE ORAL at 17:26

## 2021-09-23 RX ADMIN — INSULIN LISPRO 4 UNITS: 100 INJECTION, SOLUTION INTRAVENOUS; SUBCUTANEOUS at 16:39

## 2021-09-23 RX ADMIN — INSULIN LISPRO 6 UNITS: 100 INJECTION, SOLUTION INTRAVENOUS; SUBCUTANEOUS at 16:38

## 2021-09-23 RX ADMIN — RISPERIDONE 1 MG: 1 TABLET ORAL at 16:39

## 2021-09-23 RX ADMIN — ALOGLIPTIN 25 MG: 12.5 TABLET, FILM COATED ORAL at 07:52

## 2021-09-23 RX ADMIN — INSULIN GLARGINE 34 UNITS: 100 INJECTION, SOLUTION SUBCUTANEOUS at 08:00

## 2021-09-23 RX ADMIN — INSULIN LISPRO 4 UNITS: 100 INJECTION, SOLUTION INTRAVENOUS; SUBCUTANEOUS at 11:44

## 2021-09-23 RX ADMIN — GABAPENTIN 300 MG: 300 CAPSULE ORAL at 21:43

## 2021-09-23 RX ADMIN — INSULIN LISPRO 3 UNITS: 100 INJECTION, SOLUTION INTRAVENOUS; SUBCUTANEOUS at 07:53

## 2021-09-23 RX ADMIN — OXYBUTYNIN CHLORIDE 5 MG: 5 TABLET, EXTENDED RELEASE ORAL at 07:52

## 2021-09-23 RX ADMIN — RISPERIDONE 1 MG: 1 TABLET ORAL at 07:52

## 2021-09-23 RX ADMIN — INSULIN LISPRO 6 UNITS: 100 INJECTION, SOLUTION INTRAVENOUS; SUBCUTANEOUS at 11:45

## 2021-09-23 RX ADMIN — METFORMIN HYDROCHLORIDE 1000 MG: 500 TABLET ORAL at 07:52

## 2021-09-23 RX ADMIN — HYDROXYZINE HYDROCHLORIDE 50 MG: 25 TABLET, FILM COATED ORAL at 21:43

## 2021-09-23 RX ADMIN — LORATADINE 10 MG: 10 TABLET ORAL at 07:52

## 2021-09-23 NOTE — BH NOTES
Behavioral Health Treatment Team Note         Patient goal(s) for today: continue taking medication as prescribed, engage in unit activities, participate in hygiene/ADLs, follow directions from staff, contact support team, prepare for discharge  Treatment team focus/goals: medication adjustments, dispo planning, update support system  Progress note: Pt was seen in treatment team this morning. Pt is alert and oriented. Pt denies SI/HI. Pt's mood is euthymic, affect is brighter. Pt's thought process is coherent and she is in agreement with discharge plan for tomorrow. Pt's insight and judgment is impaired, reliability is fair. Social work department will continue to coordinate discharge plans. LOS:  2  Expected LOS:     Financial concerns/prescription coverage: medicaid   Date of last family contact: None    Family requesting physician contact today:  N/A  Discharge plan: home to [facility]  Guns in the home: none       Outpatient provider(s):  Pt has a [facility] worker, Rambo Dalton [phone].      Participating treatment team members: FAMILIA Currie and Dr. Geno Delgado

## 2021-09-23 NOTE — GROUP NOTE
EMILY  GROUP DOCUMENTATION INDIVIDUAL                                                                          Group Therapy Note    Date: 9/23/2021    Group Start Time: 1000  Group End Time: 1100  Group Topic: Topic Group    [address] [facility] [address] ACUTE BEHAV [facility], [address] [facility] GROUP DOCUMENTATION GROUP    Group Therapy Note    Attendees: 9         Attendance: Attended    Patient's Goal:  To participate in relaxation activity    Interventions/techniques: Supported-game    Interactions: Minimal    Mental Status: Flat    Behavior/appearance: Needed prompting    Goals Achieved:  Attended group session      Additional Notes:  Pt arrived late-declined active participation    Ru Griffin

## 2021-09-23 NOTE — PROGRESS NOTES
Laboratory monitoring for mood stabilizer and antipsychotics:    Recommended baseline monitoring has been completed based on this patient's current medication regimen. The patient is currently taking the following medication(s):   Current Facility-Administered Medications   Medication Dose Route Frequency    atorvastatin (LIPITOR) tablet 40 mg  40 mg Oral QPM    gabapentin (NEURONTIN) capsule 300 mg  300 mg Oral QHS    alogliptin (NESINA) tablet 25 mg  25 mg Oral DAILY WITH BREAKFAST    loratadine (CLARITIN) tablet 10 mg  10 mg Oral DAILY    metFORMIN (GLUCOPHAGE) tablet 1,000 mg  1,000 mg Oral BID WITH MEALS    oxybutynin chloride XL (DITROPAN XL) tablet 5 mg  5 mg Oral DAILY    pantoprazole (PROTONIX) tablet 40 mg  40 mg Oral QPM    risperiDONE (RisperDAL) tablet 1 mg  1 mg Oral BID    sertraline (ZOLOFT) tablet 25 mg  25 mg Oral DAILY    insulin glargine (LANTUS) injection 34 Units  0.3 Units/kg SubCUTAneous DAILY    insulin lispro (HUMALOG) injection   SubCUTAneous AC&HS    insulin lispro (HUMALOG) injection 6 Units  0.05 Units/kg SubCUTAneous TID WITH MEALS       Height, Weight, BMI Estimation  Estimated body mass index is 41.6 kg/m² as calculated from the following:    Height as of this encounter: 165.1 cm (65\"). Weight as of this encounter: 113.4 kg (250 lb). Renal Function, Hepatic Function and Chemistry  Estimated Creatinine Clearance: 87.6 mL/min (based on SCr of 0.88 mg/dL).     Lab Results   Component Value Date/Time    Sodium 141 09/21/2021 11:24 AM    Potassium 3.9 09/21/2021 11:24 AM    Chloride 104 09/21/2021 11:24 AM    CO2 29 09/21/2021 11:24 AM    Anion gap 8 09/21/2021 11:24 AM    Glucose 218 (H) 09/22/2021 05:32 AM    BUN 7 09/21/2021 11:24 AM    Creatinine 0.88 09/21/2021 11:24 AM    BUN/Creatinine ratio 8 (L) 09/21/2021 11:24 AM    GFR est AA >60 09/21/2021 11:24 AM    GFR est non-AA >60 09/21/2021 11:24 AM    Calcium 8.5 09/21/2021 11:24 AM    ALT (SGPT) 39 09/21/2021 11:24 AM Alk. phosphatase 120 (H) 09/21/2021 11:24 AM    Protein, total 6.8 09/21/2021 11:24 AM    Albumin 2.9 (L) 09/21/2021 11:24 AM    Globulin 3.9 09/21/2021 11:24 AM    A-G Ratio 0.7 (L) 09/21/2021 11:24 AM    Bilirubin, total 0.2 09/21/2021 11:24 AM       Lab Results   Component Value Date/Time    Glucose 218 (H) 09/22/2021 05:32 AM    Glucose (POC) 168 (H) 09/23/2021 07:45 AM       Lab Results   Component Value Date/Time    Hemoglobin A1c 11.8 (H) 07/27/2021 08:09 PM       Hematology  Lab Results   Component Value Date/Time    WBC 6.6 09/21/2021 11:24 AM    Hemoglobin (POC) 12.2 01/12/2012 10:40 PM    HGB 12.4 09/21/2021 11:24 AM    Hematocrit (POC) 36 01/12/2012 10:40 PM    HCT 39.6 09/21/2021 11:24 AM    PLATELET 698 79/45/8680 11:24 AM    MCV 84.4 09/21/2021 11:24 AM       Lipids  Lab Results   Component Value Date/Time    Cholesterol, total 130 09/22/2021 05:32 AM    HDL Cholesterol 32 09/22/2021 05:32 AM    LDL, calculated 49.8 09/22/2021 05:32 AM    Triglyceride 241 (H) 09/22/2021 05:32 AM    CHOL/HDL Ratio 4.1 09/22/2021 05:32 AM       Thyroid Function    Lab Results   Component Value Date/Time    TSH 2.19 09/22/2021 05:32 AM       Visit Vitals  /65   Pulse 88   Temp 97.6 °F (36.4 °C)   Resp 16   Ht 165.1 cm (65\")   Wt 113.4 kg (250 lb)   SpO2 97%   Breastfeeding No   BMI 41.60 kg/m²       Pregnancy Test  Lab Results   Component Value Date/Time    HCG urine, QL NEGATIVE  07/08/2016 02:10 AM    Pregnancy test,urine (POC) NEGATIVE  08/02/2017 06:09 PM    HCG, Ql. NEGATIVE  07/08/2016 12:31 AM       Thank you,  Yelena Silverman, Pharm. D.  [phone]

## 2021-09-23 NOTE — BH NOTES
Patient 1130 FSBS was 220 and patient received coverage , see mar. Patient has been meals and medication compliant, no safety or behavior issues note, she has been out of room in dayroom interacting with staff and peers in a positive manner.
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
## 2021-09-23 NOTE — BH NOTES
GROUP THERAPY PROGRESS NOTE    Patient did not participate in Process group.      Ger Salas LPC [place]

## 2021-09-23 NOTE — GROUP NOTE
EMILY  GROUP DOCUMENTATION INDIVIDUAL                                                                          Group Therapy Note    Date: 9/23/2021    Group Start Time: 1400  Group End Time: 1500  Group Topic: Recreational/Music Therapy    [facility] ACUTE BEHAV [facility]    [place], [address] [facility] GROUP DOCUMENTATION GROUP    Group Therapy Note    Attendees: 9         Attendance: Attended    Patient's Goal:  To concentrate on selected task    Interventions/techniques: Supported-crafts,games,music    Follows Directions: Followed directions    Interactions: Interacted appropriately    Mental Status: Calm    Behavior/appearance: Attentive, Cooperative and Needed prompting    Goals Achieved: Able to engage in interactions and Able to listen to others      Additional Notes:  Pt listened to music.     Jimena Roman

## 2021-09-23 NOTE — PROGRESS NOTES
Problem: Suicide  Goal: *STG: Remains safe in hospital  Outcome: Progressing Towards Goal  Goal: *STG: Seeks staff when feelings of self harm or harm towards others arise  Outcome: Progressing Towards Goal  Goal: *STG: Attends activities and groups  Outcome: Progressing Towards Goal  Goal: *STG:  Verbalizes alternative ways of dealing with maladaptive feelings/behaviors  Outcome: Progressing Towards Goal  Goal: *STG/LTG: Complies with medication therapy  Outcome: Progressing Towards Goal  Goal: *STG/LTG: No longer expresses self destructive or suicidal thoughts  Outcome: Progressing Towards Goal  Goal: *LTG:  Identifies available community resources  Outcome: Progressing Towards Goal  Goal: *LTG:  Develops proactive suicide prevention plan  Outcome: Progressing Towards Goal  Goal: Interventions  Outcome: Progressing Towards Goal    Patient alert and oriented X4. She has been withdrawn to herself. She denied depression, anxiety, SI/HI, AVH, and pain at this time. Compliant with PM medications. PRN Atarax 50mg for anxiety, PRN Trazodone 50mg for sleep, and PRN Tylenol 650mg for 10/10 armani. leg pain given at 2126. Vital signs stable and Q15 minute checks continue to maintain safety. Will continue to monitor.

## 2021-09-23 NOTE — PROGRESS NOTES
Report received from [facility]. Pt received up in dining room eating breakfast. Pt cooperative. Denies any anxiety, depression, SI/HI or AVH. : 9 units humalog, 34 lantus given. Pt inquiring about discharge. Voices no other concerns at this time. Will continue to monitor.     Problem: Suicide  Goal: *STG: Remains safe in hospital  Outcome: Progressing Towards Goal  Goal: *STG/LTG: Complies with medication therapy  Outcome: Progressing Towards Goal  Goal: *STG/LTG: No longer expresses self destructive or suicidal thoughts  Outcome: Progressing Towards Goal     Problem: Anxiety-Behavioral Health (Adult/Pediatric)  Goal: *STG: Remains safe in hospital  Outcome: Progressing Towards Goal

## 2021-09-24 ENCOUNTER — APPOINTMENT (OUTPATIENT)
Dept: CT IMAGING | Age: 56
End: 2021-09-24
Attending: EMERGENCY MEDICINE
Payer: MEDICAID

## 2021-09-24 ENCOUNTER — HOSPITAL ENCOUNTER (EMERGENCY)
Age: 56
Discharge: HOME | End: 2021-09-24
Attending: EMERGENCY MEDICINE
Payer: MEDICAID

## 2021-09-24 VITALS
WEIGHT: 250 LBS | RESPIRATION RATE: 16 BRPM | HEART RATE: 74 BPM | TEMPERATURE: 98.3 F | DIASTOLIC BLOOD PRESSURE: 85 MMHG | HEIGHT: 65 IN | BODY MASS INDEX: 41.65 KG/M2 | OXYGEN SATURATION: 100 % | SYSTOLIC BLOOD PRESSURE: 135 MMHG

## 2021-09-24 VITALS
WEIGHT: 250.66 LBS | BODY MASS INDEX: 41.71 KG/M2 | TEMPERATURE: 98.8 F | OXYGEN SATURATION: 95 % | HEART RATE: 76 BPM | DIASTOLIC BLOOD PRESSURE: 83 MMHG | SYSTOLIC BLOOD PRESSURE: 138 MMHG | RESPIRATION RATE: 16 BRPM
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
## 2021-09-24 DIAGNOSIS — R51.9 ACUTE NONINTRACTABLE HEADACHE, UNSPECIFIED HEADACHE TYPE: ICD-10-CM

## 2021-09-24 LAB
GLUCOSE BLD STRIP.AUTO-MCNC: 188 MG/DL (ref 65–117)
GLUCOSE BLD STRIP.AUTO-MCNC: 239 MG/DL (ref 65–117)
SERVICE CMNT-IMP: ABNORMAL
SERVICE CMNT-IMP: ABNORMAL

## 2021-09-24 PROCEDURE — 74011636637 HC RX REV CODE- 636/637: Performed by: STUDENT IN AN ORGANIZED HEALTH CARE EDUCATION/TRAINING PROGRAM

## 2021-09-24 PROCEDURE — 74011250637 HC RX REV CODE- 250/637: Performed by: EMERGENCY MEDICINE

## 2021-09-24 PROCEDURE — 99284 EMERGENCY DEPT VISIT MOD MDM: CPT

## 2021-09-24 PROCEDURE — 74011250637 HC RX REV CODE- 250/637: Performed by: PSYCHIATRY & NEUROLOGY

## 2021-09-24 PROCEDURE — 70450 CT HEAD/BRAIN W/O DYE: CPT

## 2021-09-24 PROCEDURE — 99239 HOSP IP/OBS DSCHRG MGMT >30: CPT | Performed by: PSYCHIATRY & NEUROLOGY

## 2021-09-24 PROCEDURE — 82962 GLUCOSE BLOOD TEST: CPT

## 2021-09-24 RX ORDER — ATORVASTATIN CALCIUM 40 MG/1
40 TABLET, FILM COATED ORAL EVERY EVENING
Qty: 30 TABLET | Refills: 1 | Status: SHIPPED | OUTPATIENT
Start: 2021-09-24 | End: 2022-05-19

## 2021-09-24 RX ORDER — SERTRALINE HYDROCHLORIDE 25 MG/1
25 TABLET, FILM COATED ORAL DAILY
Qty: 30 TABLET | Refills: 1 | Status: ON HOLD | OUTPATIENT
Start: 2021-09-24 | End: 2021-12-31

## 2021-09-24 RX ORDER — PANTOPRAZOLE SODIUM 40 MG/1
40 TABLET, DELAYED RELEASE ORAL EVERY EVENING
Qty: 30 TABLET | Refills: 1 | Status: ON HOLD | OUTPATIENT
Start: 2021-09-24 | End: 2021-12-31

## 2021-09-24 RX ORDER — ALBUTEROL SULFATE 90 UG/1
2 AEROSOL, METERED RESPIRATORY (INHALATION)
Qty: 1 EACH | Refills: 1 | Status: SHIPPED | OUTPATIENT
Start: 2021-09-24 | End: 2022-05-19

## 2021-09-24 RX ORDER — OXYBUTYNIN CHLORIDE 5 MG/1
5 TABLET, EXTENDED RELEASE ORAL DAILY
Qty: 30 TABLET | Refills: 1 | Status: SHIPPED | OUTPATIENT
Start: 2021-09-24 | End: 2022-05-19

## 2021-09-24 RX ORDER — INSULIN LISPRO 100 [IU]/ML
10 INJECTION, SOLUTION INTRAVENOUS; SUBCUTANEOUS
Qty: 10 ML | Refills: 1 | Status: ON HOLD
Start: 2021-09-24 | End: 2021-12-31

## 2021-09-24 RX ORDER — LORATADINE 10 MG/1
10 TABLET ORAL DAILY
Qty: 30 TABLET | Refills: 1 | Status: ON HOLD | OUTPATIENT
Start: 2021-09-24 | End: 2021-12-31

## 2021-09-24 RX ORDER — GABAPENTIN 300 MG/1
300 CAPSULE ORAL
Qty: 30 CAPSULE | Refills: 1 | Status: ON HOLD | OUTPATIENT
Start: 2021-09-24 | End: 2021-12-31

## 2021-09-24 RX ORDER — TRAZODONE HYDROCHLORIDE 50 MG/1
50 TABLET ORAL
Qty: 30 TABLET | Refills: 1 | Status: ON HOLD | OUTPATIENT
Start: 2021-09-24 | End: 2022-05-19 | Stop reason: SDUPTHER

## 2021-09-24 RX ORDER — ACETAMINOPHEN 500 MG
1000 TABLET ORAL ONCE
Status: COMPLETED | OUTPATIENT
Start: 2021-09-24 | End: 2021-09-24

## 2021-09-24 RX ORDER — INSULIN GLARGINE 100 [IU]/ML
34 INJECTION, SOLUTION SUBCUTANEOUS DAILY
Qty: 10 ML | Refills: 1 | Status: ON HOLD | OUTPATIENT
Start: 2021-09-24 | End: 2021-12-31

## 2021-09-24 RX ORDER — MOMETASONE FUROATE AND FORMOTEROL FUMARATE DIHYDRATE 100; 5 UG/1; UG/1
2 AEROSOL RESPIRATORY (INHALATION) 2 TIMES DAILY
Qty: 1 EACH | Refills: 1 | Status: ON HOLD | OUTPATIENT
Start: 2021-09-24 | End: 2021-12-31

## 2021-09-24 RX ORDER — RISPERIDONE 1 MG/1
1 TABLET, FILM COATED ORAL 2 TIMES DAILY
Qty: 60 TABLET | Refills: 1 | Status: ON HOLD | OUTPATIENT
Start: 2021-09-24 | End: 2021-12-31

## 2021-09-24 RX ADMIN — INSULIN LISPRO 6 UNITS: 100 INJECTION, SOLUTION INTRAVENOUS; SUBCUTANEOUS at 07:50

## 2021-09-24 RX ADMIN — INSULIN LISPRO 3 UNITS: 100 INJECTION, SOLUTION INTRAVENOUS; SUBCUTANEOUS at 07:50

## 2021-09-24 RX ADMIN — RISPERIDONE 1 MG: 1 TABLET ORAL at 07:53

## 2021-09-24 RX ADMIN — INSULIN LISPRO 6 UNITS: 100 INJECTION, SOLUTION INTRAVENOUS; SUBCUTANEOUS at 11:45

## 2021-09-24 RX ADMIN — LORATADINE 10 MG: 10 TABLET ORAL at 07:53

## 2021-09-24 RX ADMIN — OXYBUTYNIN CHLORIDE 5 MG: 5 TABLET, EXTENDED RELEASE ORAL at 07:53

## 2021-09-24 RX ADMIN — SERTRALINE 25 MG: 50 TABLET, FILM COATED ORAL at 07:52

## 2021-09-24 RX ADMIN — INSULIN GLARGINE 34 UNITS: 100 INJECTION, SOLUTION SUBCUTANEOUS at 08:00

## 2021-09-24 RX ADMIN — ACETAMINOPHEN 1000 MG: 500 TABLET ORAL at 16:02

## 2021-09-24 NOTE — DISCHARGE INSTRUCTIONS
Thank you for allowing us to provide you with medical care today. We realize that you have many choices for your emergency care needs. We thank you for choosing [facility]. Please choose us in the future for any continued health care needs. We hope we addressed all of your medical concerns. We strive to provide excellent quality care in the Emergency Department. Anything less than excellent does not meet our expectations. The exam and treatment you received in the Emergency Department were for an emergent problem and are not intended as complete care. It is important that you follow up with a doctor, nurse practitioner, or physician's assistant for ongoing care. If your symptoms worsen or you do not improve as expected and you are unable to reach your usual health care provider, you should return to the Emergency Department. We are available 24 hours a day. Take this sheet with you when you go to your follow-up visit. If you have any problem arranging the follow-up visit, contact the Emergency Department immediately. Make an appointment your family doctor for follow up of this visit. Return to the ER if you are unable to be seen in a timely manner.

## 2021-09-24 NOTE — BH NOTES
Behavioral Health Transition Record to Provider    Patient Name: Marvel Claros  YOB: 1965  Medical Record Number: [id number]  Date of Admission: 9/21/2021  Date of Discharge: 9/24/2021    Attending Provider: Fide Durbin, *  Discharging Provider: Allie Zamora MD  To contact this individual call [phone] and ask the  to page. If unavailable, ask to be transferred to [facility] Provider on call. [facility] Provider will be available on call 24/7 and during holidays.     Primary Care Provider: Corona Dominguez MD    Allergies   Allergen Reactions    Amoxicillin Hives    Mushroom Flavor Hives    Tomato Hives       Reason for Admission: Psychosis     Admission Diagnosis: Bipolar disorder ([facility] [F31.9]  Schizoaffective disorder ([facility] [F25.9]    * No surgery found *    Results for orders placed or performed during the hospital encounter of 09/21/21   DRUG SCREEN, URINE   Result Value Ref Range    AMPHETAMINES Negative NEG      BARBITURATES Negative NEG      BENZODIAZEPINES Negative NEG      COCAINE Negative NEG      METHADONE Negative NEG      OPIATES Negative NEG      PCP(PHENCYCLIDINE) Negative NEG      THC (TH-CANNABINOL) Negative NEG      Drug screen comment (NOTE)    URINALYSIS W/ REFLEX CULTURE    Specimen: Urine   Result Value Ref Range    Color YELLOW/STRAW      Appearance CLOUDY (A) CLEAR      Specific gravity 1.015 1.003 - 1.030      pH (UA) 6.5 5.0 - 8.0      Protein TRACE (A) NEG mg/dL    Glucose 500 (A) NEG mg/dL    Ketone Negative NEG mg/dL    Bilirubin Negative NEG      Blood Negative NEG      Urobilinogen 0.2 0.2 - 1.0 EU/dL    Nitrites Negative NEG      Leukocyte Esterase TRACE (A) NEG      WBC 0-4 0 - 4 /hpf    RBC 0-5 0 - 5 /hpf    Epithelial cells MODERATE (A) FEW /lpf    Bacteria Negative NEG /hpf    UA:UC IF INDICATED CULTURE NOT INDICATED BY UA RESULT CNI      Budding yeast PRESENT (A) NEG     CBC WITH AUTOMATED DIFF   Result Value Ref Range    WBC 6.6 3.6 - 11.0 K/uL    RBC 4.69 3.80 - 5.20 M/uL    HGB 12.4 11.5 - 16.0 g/dL    HCT 39.6 35.0 - 47.0 %    MCV 84.4 80.0 - 99.0 FL    MCH 26.4 26.0 - 34.0 PG    MCHC 31.3 30.0 - 36.5 g/dL    RDW 15.4 (H) 11.5 - 14.5 %    PLATELET 176 986 - 715 K/uL    MPV 9.1 8.9 - 12.9 FL    NRBC 0.0 0  WBC    ABSOLUTE NRBC 0.00 0.00 - 0.01 K/uL    NEUTROPHILS 73 32 - 75 %    LYMPHOCYTES 18 12 - 49 %    MONOCYTES 5 5 - 13 %    EOSINOPHILS 2 0 - 7 %    BASOPHILS 1 0 - 1 %    IMMATURE GRANULOCYTES 1 (H) 0.0 - 0.5 %    ABS. NEUTROPHILS 4.9 1.8 - 8.0 K/UL    ABS. LYMPHOCYTES 1.2 0.8 - 3.5 K/UL    ABS. MONOCYTES 0.3 0.0 - 1.0 K/UL    ABS. EOSINOPHILS 0.1 0.0 - 0.4 K/UL    ABS. BASOPHILS 0.0 0.0 - 0.1 K/UL    ABS. IMM. GRANS. 0.0 0.00 - 0.04 K/UL    DF AUTOMATED     METABOLIC PANEL, COMPREHENSIVE   Result Value Ref Range    Sodium 141 136 - 145 mmol/L    Potassium 3.9 3.5 - 5.1 mmol/L    Chloride 104 97 - 108 mmol/L    CO2 29 21 - 32 mmol/L    Anion gap 8 5 - 15 mmol/L    Glucose 283 (H) 65 - 100 mg/dL    BUN 7 6 - 20 MG/DL    Creatinine 0.88 0.55 - 1.02 MG/DL    BUN/Creatinine ratio 8 (L) 12 - 20      GFR est AA >60 >60 ml/min/1.73m2    GFR est non-AA >60 >60 ml/min/1.73m2    Calcium 8.5 8.5 - 10.1 MG/DL    Bilirubin, total 0.2 0.2 - 1.0 MG/DL    ALT (SGPT) 39 12 - 78 U/L    AST (SGOT) 21 15 - 37 U/L    Alk.  phosphatase 120 (H) 45 - 117 U/L    Protein, total 6.8 6.4 - 8.2 g/dL    Albumin 2.9 (L) 3.5 - 5.0 g/dL    Globulin 3.9 2.0 - 4.0 g/dL    A-G Ratio 0.7 (L) 1.1 - 2.2     ETHYL ALCOHOL   Result Value Ref Range    ALCOHOL(ETHYL),SERUM <10 <10 MG/DL   COVID-19 WITH INFLUENZA A/B   Result Value Ref Range    SARS-CoV-2 Not detected NOTD      Influenza A by PCR Not detected      Influenza B by PCR Not detected     GLUCOSE, FASTING   Result Value Ref Range    Glucose 218 (H) 65 - 100 MG/DL   TSH 3RD GENERATION   Result Value Ref Range    TSH 2.19 0.36 - 3.74 uIU/mL   LIPID PANEL   Result Value Ref Range    Cholesterol, total 130 <200 MG/DL    Triglyceride 241 (H) <150 MG/DL    HDL Cholesterol 32 MG/DL    LDL, calculated 49.8 0 - 100 MG/DL    VLDL, calculated 48.2 MG/DL    CHOL/HDL Ratio 4.1 0.0 - 5.0     GLUCOSE, POC   Result Value Ref Range    Glucose (POC) 360 (H) 65 - 117 mg/dL    Performed by [address] [place] [address] Se, POC   Result Value Ref Range    Glucose (POC) 373 (H) 65 - 117 mg/dL    Performed by Pa Noonan RN    GLUCOSE, POC   Result Value Ref Range    Glucose (POC) 199 (H) 65 - 117 mg/dL    Performed by Hetal Guallpa \"Aristeo\" RN    GLUCOSE, POC   Result Value Ref Range    Glucose (POC) 273 (H) 65 - 117 mg/dL    Performed by Hetal Guallpa \"Aristeo\" RN    GLUCOSE, POC   Result Value Ref Range    Glucose (POC) 358 (H) 65 - 117 mg/dL    Performed by Hetal Guallpa \"Aristeo\" RN    GLUCOSE, POC   Result Value Ref Range    Glucose (POC) 314 (H) 65 - 117 mg/dL    Performed by Sarahy Sierra (LILIA RN)    GLUCOSE, POC   Result Value Ref Range    Glucose (POC) 168 (H) 65 - 117 mg/dL    Performed by [address] [place] [address], POC   Result Value Ref Range    Glucose (POC) 220 (H) 65 - 117 mg/dL    Performed by [address] [place] [address], POC   Result Value Ref Range    Glucose (POC) 249 (H) 65 - 117 mg/dL    Performed by [address] [place] [address], POC   Result Value Ref Range    Glucose (POC) 380 (H) 65 - 117 mg/dL    Performed by Sarahy Sierra (LILIA RN)    GLUCOSE, POC   Result Value Ref Range    Glucose (POC) 188 (H) 65 - 117 mg/dL    Performed by HERB NGUỄYN    GLUCOSE, POC   Result Value Ref Range    Glucose (POC) 239 (H) 65 - 117 mg/dL    Performed by Ellie Alvarez        Immunizations administered during this encounter:   Immunization History   Administered Date(s) Administered    Tdap 05/30/2015       Screening for Metabolic Disorders for Patients on Antipsychotic Medications  (Data obtained from the EMR)    Estimated Body Mass Index  Estimated body mass index is 41.6 kg/m² as calculated from the following:    Height as of this encounter: 5' 5\" (1.651 m). Weight as of this encounter: 113.4 kg (250 lb). Vital Signs/Blood Pressure  Visit Vitals  /85   Pulse 74   Temp 98.3 °F (36.8 °C)   Resp 16   Ht 5' 5\" (1.651 m)   Wt 113.4 kg (250 lb)   SpO2 100%   Breastfeeding No   BMI 41.60 kg/m²       Blood Glucose/Hemoglobin A1c  Lab Results   Component Value Date/Time    Glucose 218 (H) 09/22/2021 05:32 AM    Glucose (POC) 239 (H) 09/24/2021 11:43 AM       Lab Results   Component Value Date/Time    Hemoglobin A1c 11.8 (H) 07/27/2021 08:09 PM        Lipid Panel  Lab Results   Component Value Date/Time    Cholesterol, total 130 09/22/2021 05:32 AM    HDL Cholesterol 32 09/22/2021 05:32 AM    LDL, calculated 49.8 09/22/2021 05:32 AM    Triglyceride 241 (H) 09/22/2021 05:32 AM    CHOL/HDL Ratio 4.1 09/22/2021 05:32 AM        Discharge Diagnosis: Please refer to physician's discharge summary. Discharge Plan:   The patient Cornell Henderson exhibits the ability to control behavior in a less restrictive environment. Patient's level of functioning is improving. No assaultive/destructive behavior has been observed for the past 24 hours. No suicidal/homicidal threat or behavior has been observed for the past 24 hours. There is no evidence of serious medication side effects. Patient has not been in physical or protective restraints for at least the past 24 hours. Discharge Medication List and Instructions:   Current Discharge Medication List      START taking these medications    Details   insulin glargine (LANTUS) 100 unit/mL injection 34 Units by SubCUTAneous route daily. Indications: type 2 diabetes mellitus  Qty: 10 mL, Refills: 1  Start date: 9/24/2021      insulin lispro (HUMALOG) 100 unit/mL injection 10 Units by SubCUTAneous route Before breakfast, lunch, and dinner.  Indications: type 2 diabetes mellitus  Qty: 10 mL, Refills: 1  Start date: 9/24/2021         CONTINUE these medications which have CHANGED    Details mometasone-formoterol (Dulera) 100-5 mcg/actuation HFA inhaler Take 2 Puffs by inhalation two (2) times a day. Takes at 8:00 am and at 8:00 pm  Indications: controller medication for asthma  Qty: 1 Each, Refills: 1  Start date: 9/24/2021      loratadine (CLARITIN) 10 mg tablet Take 1 Tablet by mouth daily. Indications: Allergies  Qty: 30 Tablet, Refills: 1  Start date: 9/24/2021      risperiDONE (RisperDAL) 1 mg tablet Take 1 Tablet by mouth two (2) times a day. Indications: Thoughts  Qty: 60 Tablet, Refills: 1  Start date: 9/24/2021      sertraline (Zoloft) 25 mg tablet Take 1 Tablet by mouth daily. Indications: Mood  Qty: 30 Tablet, Refills: 1  Start date: 9/24/2021      traZODone (DESYREL) 50 mg tablet Take 1 Tablet by mouth nightly as needed (Insomnia). Indications: insomnia associated with depression  Qty: 30 Tablet, Refills: 1  Start date: 9/24/2021      albuterol (Ventolin HFA) 90 mcg/actuation inhaler Take 2 Puffs by inhalation every four (4) hours as needed for Wheezing or Shortness of Breath. Indications: asthma attack  Qty: 1 Each, Refills: 1  Start date: 9/24/2021      atorvastatin (LIPITOR) 40 mg tablet Take 1 Tablet by mouth every evening. Takes at 5:00 pm  Indications: Cholesterol  Qty: 30 Tablet, Refills: 1  Start date: 9/24/2021      gabapentin (NEURONTIN) 300 mg capsule Take 1 Capsule by mouth nightly. Max Daily Amount: 300 mg. Indications: neuropathic pain  Qty: 30 Capsule, Refills: 1  Start date: 9/24/2021    Associated Diagnoses: Chronic pain syndrome      oxybutynin chloride XL (DITROPAN XL) 5 mg CR tablet Take 1 Tablet by mouth daily. Indications: overactive bladder  Qty: 30 Tablet, Refills: 1  Start date: 9/24/2021      pantoprazole (PROTONIX) 40 mg tablet Take 1 Tablet by mouth every evening.  Takes at 6:30 pm   Indications: gastroesophageal reflux disease  Qty: 30 Tablet, Refills: 1  Start date: 9/24/2021         STOP taking these medications       metFORMIN (GLUCOPHAGE) 1,000 mg tablet Comments:   Reason for Stopping:         linaGLIPtin (Tradjenta) 5 mg tablet Comments:   Reason for Stopping:               Unresulted Labs (24h ago, onward)    None        To obtain results of studies pending at discharge, please contact N/A. Follow-up Information     Follow up With Specialties Details Why [address]  Call on 9/27/2021 Please call Rapid Access phone line [phone] between 8:00AM -2:00PM to complete intake assessment for ongoing mental health case management, therapy and medication management. [address] [place] [address]   Address: [address] [place] [address], [address] [place] [address]  Phone: [phone]  Fax: [phone]  Crisis number:  [phone]       Antoniochristiane Amaya   Your primary care provider will come to your group home monthly. [address] [facility]    Taz Newsome MD Internal Medicine   [address] [place] [address]  [phone]            Advanced Directive:   Does the patient have an appointed surrogate decision maker? Unknown   Does the patient have a Medical Advance Directive? Unknown   Does the patient have a Psychiatric Advance Directive? Unknown   If the patient does not have a surrogate or Medical Advance Directive AND Psychiatric Advance Directive, the patient was offered information on these advance directives. Unknown     Patient Instructions: Please continue all medications until otherwise directed by physician. Tobacco Cessation Discharge Plan:   Is the patient a smoker and needs referral for smoking cessation? No  Patient referred to the following for smoking cessation with an appointment? No   Patient was offered medication to assist with smoking cessation at discharge? No  Was education for smoking cessation added to the discharge instructions? No     Alcohol/Substance Abuse Discharge Plan:   Does the patient have a history of substance/alcohol abuse and requires a referral for treatment?  Yes  Patient referred to the following for substance/alcohol abuse treatment with an appointment? Yes  Patient was offered medication to assist with alcohol cessation at discharge? No  Was education for substance/alcohol abuse added to discharge instructions? Yes     Patient discharged to Home; provided to the patient/caregiver either in hard copy or electronically. Continuing care paperwork was faxed to community mental health providers.

## 2021-09-24 NOTE — ED NOTES
Pt wheelchair out of ED with discharge instructions in hand given by Dr. Asim Kennedy; pt verbalized understanding of discharge paperwork and time allotted for questions. VSS. Pt alert and oriented. Pt waiting in ER waiting room for Round Trip to pick her up.

## 2021-09-24 NOTE — PROGRESS NOTES
Report received from Juan Manuel Corey RN. Pt received up in dining room, good spirits excited about discharge today. , med/meal compliant. Denies any anxious, depression, SI/HI and AVH.  Voices no other concerns at this time, will continue to monitor    Problem: Suicide  Goal: *STG: Remains safe in hospital  Outcome: Progressing Towards Goal  Goal: *STG/LTG: Complies with medication therapy  Outcome: Progressing Towards Goal     Problem: Anxiety-Behavioral Health (Adult/Pediatric)  Goal: *STG: Remains safe in hospital  Outcome: Progressing Towards Goal  Goal: *STG: Demonstrates decrease in ritualistic behavior  Outcome: Progressing Towards Goal  Goal: *STG/LTG: Complies with medication therapy  Outcome: Progressing Towards Goal

## 2021-09-24 NOTE — PROGRESS NOTES
Comprehensive Nutrition Assessment    Type and Reason for Visit: Initial, Consult    Nutrition Recommendations/Plan:   · Continue CCD/45 g CHO/meal as appropriate for management of DM. · RD attempted to complete basic meal planning education for management of DM. Overall, pt not interested. · Please document % meals and supplements consumed in flowsheet I/O's under intake. Nutrition Assessment:     9/24: Chart reviewed; RD received consult for DM diet education. RD visited with the pt at the bedside yesterday 9/23 to discuss meal planning and strategies to control BG. Overall, pt did not appear to be interested in diet education, mostly laying in her bed. Did sit up and state \"I really would like diet gingerale\". BG levels ranging 168 - 380 per POC BG. Pt has a hx of schizoaffective disorder, morbid obesity, and uncontrolled DM. Pt receiving a CCD/45 g CHO/meal which is our most restrictive orderable CHO allowance. Pt has been seen by the Diabetes Treatment Team in the back, back in 2016. No data found. Last Weight Metric  Weight Loss Metrics 9/21/2021 7/30/2021 4/26/2021 3/24/2019 10/4/2018 5/9/2018 3/27/2018   Today's Wt 250 lb 248 lb 12.8 oz 232 lb 235 lb 230 lb 230 lb 228 lb 6.3 oz   BMI 41.6 kg/m2 41.4 kg/m2 38.61 kg/m2 39.11 kg/m2 43.46 kg/m2 43.46 kg/m2 43.16 kg/m2     Estimated Daily Nutrient Needs:  Energy (kcal): 1728 (BMR 1714 x 1. 3AF) - 500 kcals; Weight Used for Energy Requirements: Current  Protein (g): 113 (1.0 g/lg bw); Weight Used for Protein Requirements: Current  Fluid (ml/day): 1700 ml/day; Method Used for Fluid Requirements: 1 ml/kcal    Nutrition Related Findings:  BM: 9/20; Labs: ; Meds: reviewed      Wounds:    None       Current Nutrition Therapies:  ADULT DIET Regular; 3 carb choices (45 gm/meal)    Anthropometric Measures:  · Height:  5' 5\" (165.1 cm)  · Current Body Wt:  113.4 kg (250 lb)   · Ideal Body Wt:  125 lbs:  200 %   · BMI Category:  Obese class 3 (BMI 40.0 or greater)       Nutrition Diagnosis:   · Overweight/obesity related to  (consumption of high energy foods combined with limited physical activity) as evidenced by  (BMI 41.6, elevated BG levels >200 mg/dl)    Nutrition Interventions:   Food and/or Nutrient Delivery: Continue current diet  Nutrition Education and Counseling: Education needed  Coordination of Nutrition Care: No recommendation at this time    Goals:  Pt's BG levels will trend <200 mg/dl next 5-7 days       Nutrition Monitoring and Evaluation:   Behavioral-Environmental Outcomes: None identified  Food/Nutrient Intake Outcomes: Food and nutrient intake  Physical Signs/Symptoms Outcomes: Biochemical data, Weight, Skin    Discharge Planning:    Continue current diet     Electronically signed by Lianna Askew RD on 9/24/2021 at 8:56 AM
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
## 2021-09-24 NOTE — SUICIDE SAFETY PLAN
SAFETY PLAN    A suicide Safety Plan is a document that supports someone when they are having thoughts of suicide. Warning Signs that indicate a suicidal crisis may be developing: What (situations, thoughts, feelings, body sensations, behaviors, etc.) do you experience that lets you know you are beginning to think about suicide? 1. Crying  2. Mood changes  3. Increased appetite. Internal Coping Strategies:  What things can I do (relaxation techniques, hobbies, physical activities, etc.) to take my mind off my problems without contacting another person? 1. Music  2. Arts and crafts  3. Walk    People and social settings that provide distraction: Who can I call or where can I go to distract me? 3. Place: Room           4. Place:[facility]     People whom I can ask for help: Who can I call when I need help - for example, friends, family, clergy, someone else? 1. Name: Qamar Avila - skill builder     Professionals or [address] [place] [address] Blvd I can contact during a crisis: Who can I call for help - for example, my doctor, my psychiatrist, my psychologist, a mental health provider, a suicide hotline? 1. Clinician Name: [address] [place] [address]   Address: [address] [place] [address]  Phone: [phone]  Fax: [phone]  Crisis number:  [phone]     0. Suicide Prevention Lifeline: 4-206-234-TALK (3397)    4. [address] [place] [address] Emergency Services -  for example, 174 [facility] suicide hotline, [facility] Hotline: [address] [place] [address]   Address: [address] [place] [address], [address] [place] [address]  Phone: [phone]  Fax: [phone]  Crisis number:  [phone]     Making the environment safe: How can I make my environment (house/apartment/living space) safer? For example, can I remove guns, medications, and other items? 1. Take medication as prescribed.

## 2021-09-24 NOTE — GROUP NOTE
EMILY  GROUP DOCUMENTATION INDIVIDUAL                                                                          Group Therapy Note    Date: 9/24/2021    Group Start Time: 0900  Group End Time: 1000  Group Topic: Topic Group    137 [facility] 3 ACUTE BEHAV [facility]    [place], [address] GROUP DOCUMENTATION GROUP    Group Therapy Note    Attendees: 8         Attendance: Did not attend    Patient's Goal:      Interventions/techniquesLuciana Agarwal
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
## 2021-09-24 NOTE — PROGRESS NOTES
Problem: Suicide  Goal: *STG: Remains safe in hospital  Outcome: Progressing Towards Goal  Goal: *STG: Attends activities and groups  Outcome: Progressing Towards Goal  Goal: *STG/LTG: Complies with medication therapy  Outcome: Progressing Towards Goal     [id number]: Assumed care of patient after receiving shift report from outgoing nurse. Patient was out and visible on unit, interacting appropriately with peers and staff. Patient in day room on telephone upon first interaction. Affect is smiling, mood is euthymic. Pt cooperative with vitals and assessment. A&O x 4. Independent in ADLs. Insight and concentration present. Gait is unsteady. Appetite patterns WNL. Denies AVH/SI/HI/Depression. Pt denies pain. Patient medication and diet compliant. Pt encouraged to continue to participate in care. Patient blood sugar 380. Provider contacted per protocol. Orders obtained for 8 units of total coverage. Scheduled sliding scale held. 8 units of humalog administered without issue. Patient pleasant and smiling. Patient verbalized, \"feeling happy\" related to discharge following day. Patient requested/received PRN trazodone and atarax at 2143 for insmonia and mild anxiety related to feeling \"excited\" about discharge. No further issues noted at this time. [id number]: Patient resting in bed at this time. 5735-4962: Pt has been observed throughout the night. Total hours slept approximately 8. No s/s of distress noted. Patient has remained safe.

## 2021-09-26 ENCOUNTER — HOSPITAL ENCOUNTER (EMERGENCY)
Age: 56
Discharge: PSYCHIATRIC OR SUBSTANCE USE FACILITY | End: 2021-09-27
Attending: STUDENT IN AN ORGANIZED HEALTH CARE EDUCATION/TRAINING PROGRAM | Admitting: STUDENT IN AN ORGANIZED HEALTH CARE EDUCATION/TRAINING PROGRAM
Payer: MEDICAID

## 2021-09-26 ENCOUNTER — APPOINTMENT (OUTPATIENT)
Dept: CT IMAGING | Age: 56
End: 2021-09-26
Attending: NURSE PRACTITIONER
Payer: MEDICAID

## 2021-09-26 DIAGNOSIS — T14.91XA SUICIDAL BEHAVIOR WITH ATTEMPTED SELF-INJURY (HCC): Primary | ICD-10-CM

## 2021-09-26 DIAGNOSIS — F19.10 POLYSUBSTANCE ABUSE (HCC): ICD-10-CM

## 2021-09-26 LAB
ALBUMIN SERPL-MCNC: 3.5 G/DL (ref 3.5–5)
ALBUMIN/GLOB SERPL: 0.8 {RATIO} (ref 1.1–2.2)
ALP SERPL-CCNC: 132 U/L (ref 45–117)
ALT SERPL-CCNC: 46 U/L (ref 12–78)
AMPHET UR QL SCN: NEGATIVE
ANION GAP SERPL CALC-SCNC: 8 MMOL/L (ref 5–15)
APAP SERPL-MCNC: <2 UG/ML (ref 10–30)
AST SERPL-CCNC: 33 U/L (ref 15–37)
BARBITURATES UR QL SCN: NEGATIVE
BASOPHILS # BLD: 0 K/UL (ref 0–0.1)
BASOPHILS NFR BLD: 0 % (ref 0–1)
BENZODIAZ UR QL: NEGATIVE
BILIRUB SERPL-MCNC: 0.3 MG/DL (ref 0.2–1)
BUN SERPL-MCNC: 20 MG/DL (ref 6–20)
BUN/CREAT SERPL: 21 (ref 12–20)
CALCIUM SERPL-MCNC: 9.5 MG/DL (ref 8.5–10.1)
CANNABINOIDS UR QL SCN: NEGATIVE
CHLORIDE SERPL-SCNC: 100 MMOL/L (ref 97–108)
CO2 SERPL-SCNC: 24 MMOL/L (ref 21–32)
COCAINE UR QL SCN: NEGATIVE
CREAT SERPL-MCNC: 0.97 MG/DL (ref 0.55–1.02)
DIFFERENTIAL METHOD BLD: ABNORMAL
DRUG SCRN COMMENT,DRGCM: NORMAL
EOSINOPHIL # BLD: 0.1 K/UL (ref 0–0.4)
EOSINOPHIL NFR BLD: 1 % (ref 0–7)
ERYTHROCYTE [DISTWIDTH] IN BLOOD BY AUTOMATED COUNT: 15.5 % (ref 11.5–14.5)
ETHANOL SERPL-MCNC: <10 MG/DL
FLUAV RNA SPEC QL NAA+PROBE: NOT DETECTED
FLUBV RNA SPEC QL NAA+PROBE: NOT DETECTED
GLOBULIN SER CALC-MCNC: 4.6 G/DL (ref 2–4)
GLUCOSE SERPL-MCNC: 291 MG/DL (ref 65–100)
HCT VFR BLD AUTO: 42.6 % (ref 35–47)
HGB BLD-MCNC: 13.6 G/DL (ref 11.5–16)
IMM GRANULOCYTES # BLD AUTO: 0.1 K/UL (ref 0–0.04)
IMM GRANULOCYTES NFR BLD AUTO: 1 % (ref 0–0.5)
LYMPHOCYTES # BLD: 2 K/UL (ref 0.8–3.5)
LYMPHOCYTES NFR BLD: 19 % (ref 12–49)
MCH RBC QN AUTO: 26.5 PG (ref 26–34)
MCHC RBC AUTO-ENTMCNC: 31.9 G/DL (ref 30–36.5)
MCV RBC AUTO: 83 FL (ref 80–99)
METHADONE UR QL: NEGATIVE
MONOCYTES # BLD: 0.6 K/UL (ref 0–1)
MONOCYTES NFR BLD: 6 % (ref 5–13)
NEUTS SEG # BLD: 7.4 K/UL (ref 1.8–8)
NEUTS SEG NFR BLD: 73 % (ref 32–75)
NRBC # BLD: 0 K/UL (ref 0–0.01)
NRBC BLD-RTO: 0 PER 100 WBC
OPIATES UR QL: NEGATIVE
PCP UR QL: NEGATIVE
PLATELET # BLD AUTO: 447 K/UL (ref 150–400)
PMV BLD AUTO: 9.2 FL (ref 8.9–12.9)
POTASSIUM SERPL-SCNC: 4.2 MMOL/L (ref 3.5–5.1)
PROT SERPL-MCNC: 8.1 G/DL (ref 6.4–8.2)
RBC # BLD AUTO: 5.13 M/UL (ref 3.8–5.2)
SALICYLATES SERPL-MCNC: 4 MG/DL (ref 2.8–20)
SARS-COV-2, COV2: NOT DETECTED
SODIUM SERPL-SCNC: 132 MMOL/L (ref 136–145)
UR CULT HOLD, URHOLD: NORMAL
WBC # BLD AUTO: 10.1 K/UL (ref 3.6–11)

## 2021-09-26 PROCEDURE — 85025 COMPLETE CBC W/AUTO DIFF WBC: CPT

## 2021-09-26 PROCEDURE — 36415 COLL VENOUS BLD VENIPUNCTURE: CPT

## 2021-09-26 PROCEDURE — 87636 SARSCOV2 & INF A&B AMP PRB: CPT

## 2021-09-26 PROCEDURE — 80053 COMPREHEN METABOLIC PANEL: CPT

## 2021-09-26 PROCEDURE — 70450 CT HEAD/BRAIN W/O DYE: CPT

## 2021-09-26 PROCEDURE — 82077 ASSAY SPEC XCP UR&BREATH IA: CPT

## 2021-09-26 PROCEDURE — 80143 DRUG ASSAY ACETAMINOPHEN: CPT

## 2021-09-26 PROCEDURE — 99284 EMERGENCY DEPT VISIT MOD MDM: CPT

## 2021-09-26 PROCEDURE — 80179 DRUG ASSAY SALICYLATE: CPT

## 2021-09-26 PROCEDURE — 80307 DRUG TEST PRSMV CHEM ANLYZR: CPT
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
## 2021-09-26 NOTE — ED TRIAGE NOTES
Pt arrives to triage via wheelchair by JAZZY AT [facility]. Reports running out into traffic in an attempt to harm herself. She reports falling and urinating on herself. \"It's my 's 3rd year anniversary since he  and I don't want to live anymore\". Pt soiled in urine upon arrival to triage.  Changed into dry clothes upon arrival.
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
## 2021-09-26 NOTE — ED NOTES
Pt taken to the restroom to given urine sample foe psych admission. Pt missed the hat and unable to collect the sample. Mid-level was made aware.

## 2021-09-26 NOTE — BSMART NOTE
Next Bsmart shift made aware pt awaiting for medical clearance for presentation to psychiatry for admission.

## 2021-09-27 ENCOUNTER — HOSPITAL ENCOUNTER (INPATIENT)
Age: 56
LOS: 2 days | Discharge: HOME | DRG: 754 | End: 2021-09-29
Attending: PSYCHIATRY & NEUROLOGY | Admitting: PSYCHIATRY & NEUROLOGY
Payer: MEDICAID

## 2021-09-27 VITALS
HEART RATE: 82 BPM | OXYGEN SATURATION: 95 % | HEIGHT: 65 IN | RESPIRATION RATE: 12 BRPM | BODY MASS INDEX: 41.76 KG/M2 | SYSTOLIC BLOOD PRESSURE: 149 MMHG | DIASTOLIC BLOOD PRESSURE: 84 MMHG | WEIGHT: 250.66 LBS | TEMPERATURE: 97.7 F

## 2021-09-27 DIAGNOSIS — F60.3 BORDERLINE PERSONALITY DISORDER (HCC): ICD-10-CM

## 2021-09-27 DIAGNOSIS — F31.9 BIPOLAR 1 DISORDER (HCC): ICD-10-CM

## 2021-09-27 DIAGNOSIS — F43.21 ADJUSTMENT DISORDER WITH DEPRESSED MOOD: ICD-10-CM

## 2021-09-27 DIAGNOSIS — F25.1 SCHIZOAFFECTIVE DISORDER, DEPRESSIVE TYPE (HCC): ICD-10-CM

## 2021-09-27 LAB
GLUCOSE BLD STRIP.AUTO-MCNC: 251 MG/DL (ref 65–117)
GLUCOSE BLD STRIP.AUTO-MCNC: 273 MG/DL (ref 65–117)
GLUCOSE BLD STRIP.AUTO-MCNC: 312 MG/DL (ref 65–117)
SERVICE CMNT-IMP: ABNORMAL

## 2021-09-27 PROCEDURE — 82962 GLUCOSE BLOOD TEST: CPT

## 2021-09-27 PROCEDURE — 74011636637 HC RX REV CODE- 636/637: Performed by: PSYCHIATRY & NEUROLOGY

## 2021-09-27 PROCEDURE — 74011250637 HC RX REV CODE- 250/637: Performed by: PSYCHIATRY & NEUROLOGY

## 2021-09-27 PROCEDURE — 65220000003 HC RM SEMIPRIVATE PSYCH

## 2021-09-27 RX ORDER — DEXTROSE 50 % IN WATER (D50W) INTRAVENOUS SYRINGE
12.5-25 AS NEEDED
Status: DISCONTINUED | OUTPATIENT
Start: 2021-09-27 | End: 2021-09-29 | Stop reason: HOSPADM

## 2021-09-27 RX ORDER — BUDESONIDE AND FORMOTEROL FUMARATE DIHYDRATE 80; 4.5 UG/1; UG/1
2 AEROSOL RESPIRATORY (INHALATION) 2 TIMES DAILY
Status: DISCONTINUED | OUTPATIENT
Start: 2021-09-27 | End: 2021-09-29 | Stop reason: HOSPADM

## 2021-09-27 RX ORDER — RISPERIDONE 1 MG/1
1 TABLET, FILM COATED ORAL 2 TIMES DAILY
Status: DISCONTINUED | OUTPATIENT
Start: 2021-09-27 | End: 2021-09-29 | Stop reason: HOSPADM

## 2021-09-27 RX ORDER — ACETAMINOPHEN 325 MG/1
650 TABLET ORAL
Status: DISCONTINUED | OUTPATIENT
Start: 2021-09-27 | End: 2021-09-29 | Stop reason: HOSPADM

## 2021-09-27 RX ORDER — MAGNESIUM SULFATE 100 %
4 CRYSTALS MISCELLANEOUS AS NEEDED
Status: DISCONTINUED | OUTPATIENT
Start: 2021-09-27 | End: 2021-09-29 | Stop reason: HOSPADM

## 2021-09-27 RX ORDER — LORATADINE 10 MG/1
10 TABLET ORAL DAILY
Status: DISCONTINUED | OUTPATIENT
Start: 2021-09-28 | End: 2021-09-29 | Stop reason: HOSPADM

## 2021-09-27 RX ORDER — ATORVASTATIN CALCIUM 40 MG/1
40 TABLET, FILM COATED ORAL EVERY EVENING
Status: DISCONTINUED | OUTPATIENT
Start: 2021-09-27 | End: 2021-09-29 | Stop reason: HOSPADM

## 2021-09-27 RX ORDER — INSULIN LISPRO 100 [IU]/ML
INJECTION, SOLUTION INTRAVENOUS; SUBCUTANEOUS
Status: DISCONTINUED | OUTPATIENT
Start: 2021-09-27 | End: 2021-09-27

## 2021-09-27 RX ORDER — TRAZODONE HYDROCHLORIDE 50 MG/1
50 TABLET ORAL
Status: DISCONTINUED | OUTPATIENT
Start: 2021-09-27 | End: 2021-09-29 | Stop reason: HOSPADM

## 2021-09-27 RX ORDER — LORAZEPAM 2 MG/ML
1 INJECTION INTRAMUSCULAR
Status: DISCONTINUED | OUTPATIENT
Start: 2021-09-27 | End: 2021-09-29 | Stop reason: HOSPADM

## 2021-09-27 RX ORDER — OLANZAPINE 5 MG/1
5 TABLET ORAL
Status: DISCONTINUED | OUTPATIENT
Start: 2021-09-27 | End: 2021-09-29 | Stop reason: HOSPADM

## 2021-09-27 RX ORDER — GABAPENTIN 300 MG/1
300 CAPSULE ORAL
Status: DISCONTINUED | OUTPATIENT
Start: 2021-09-27 | End: 2021-09-29 | Stop reason: HOSPADM

## 2021-09-27 RX ORDER — ALBUTEROL SULFATE 90 UG/1
2 AEROSOL, METERED RESPIRATORY (INHALATION)
Status: DISCONTINUED | OUTPATIENT
Start: 2021-09-27 | End: 2021-09-29 | Stop reason: HOSPADM

## 2021-09-27 RX ORDER — PANTOPRAZOLE SODIUM 40 MG/1
40 TABLET, DELAYED RELEASE ORAL EVERY EVENING
Status: DISCONTINUED | OUTPATIENT
Start: 2021-09-27 | End: 2021-09-29 | Stop reason: HOSPADM

## 2021-09-27 RX ORDER — INSULIN LISPRO 100 [IU]/ML
10 INJECTION, SOLUTION INTRAVENOUS; SUBCUTANEOUS
Status: DISCONTINUED | OUTPATIENT
Start: 2021-09-27 | End: 2021-09-29 | Stop reason: HOSPADM

## 2021-09-27 RX ORDER — OXYBUTYNIN CHLORIDE 5 MG/1
5 TABLET, EXTENDED RELEASE ORAL DAILY
Status: DISCONTINUED | OUTPATIENT
Start: 2021-09-28 | End: 2021-09-29 | Stop reason: HOSPADM

## 2021-09-27 RX ORDER — ADHESIVE BANDAGE
30 BANDAGE TOPICAL DAILY PRN
Status: DISCONTINUED | OUTPATIENT
Start: 2021-09-27 | End: 2021-09-29 | Stop reason: HOSPADM

## 2021-09-27 RX ORDER — INSULIN GLARGINE 100 [IU]/ML
34 INJECTION, SOLUTION SUBCUTANEOUS DAILY
Status: DISCONTINUED | OUTPATIENT
Start: 2021-09-28 | End: 2021-09-29 | Stop reason: HOSPADM

## 2021-09-27 RX ORDER — HYDROXYZINE 25 MG/1
50 TABLET, FILM COATED ORAL
Status: DISCONTINUED | OUTPATIENT
Start: 2021-09-27 | End: 2021-09-29 | Stop reason: HOSPADM

## 2021-09-27 RX ORDER — DIPHENHYDRAMINE HYDROCHLORIDE 50 MG/ML
50 INJECTION, SOLUTION INTRAMUSCULAR; INTRAVENOUS
Status: DISCONTINUED | OUTPATIENT
Start: 2021-09-27 | End: 2021-09-29 | Stop reason: HOSPADM

## 2021-09-27 RX ORDER — HALOPERIDOL 5 MG/ML
5 INJECTION INTRAMUSCULAR
Status: DISCONTINUED | OUTPATIENT
Start: 2021-09-27 | End: 2021-09-29 | Stop reason: HOSPADM

## 2021-09-27 RX ORDER — SERTRALINE HYDROCHLORIDE 50 MG/1
25 TABLET, FILM COATED ORAL DAILY
Status: DISCONTINUED | OUTPATIENT
Start: 2021-09-28 | End: 2021-09-29 | Stop reason: HOSPADM

## 2021-09-27 RX ORDER — BENZTROPINE MESYLATE 1 MG/1
1 TABLET ORAL
Status: DISCONTINUED | OUTPATIENT
Start: 2021-09-27 | End: 2021-09-29 | Stop reason: HOSPADM

## 2021-09-27 RX ADMIN — RISPERIDONE 1 MG: 1 TABLET ORAL at 18:03

## 2021-09-27 RX ADMIN — GABAPENTIN 300 MG: 300 CAPSULE ORAL at 21:40

## 2021-09-27 RX ADMIN — BUDESONIDE AND FORMOTEROL FUMARATE DIHYDRATE 2 PUFF: 80; 4.5 AEROSOL RESPIRATORY (INHALATION) at 17:00

## 2021-09-27 RX ADMIN — ATORVASTATIN CALCIUM 40 MG: 40 TABLET, FILM COATED ORAL at 18:03

## 2021-09-27 RX ADMIN — TRAZODONE HYDROCHLORIDE 50 MG: 50 TABLET ORAL at 21:40

## 2021-09-27 RX ADMIN — INSULIN LISPRO 10 UNITS: 100 INJECTION, SOLUTION INTRAVENOUS; SUBCUTANEOUS at 18:03

## 2021-09-27 RX ADMIN — PANTOPRAZOLE SODIUM 40 MG: 40 TABLET, DELAYED RELEASE ORAL at 18:03

## 2021-09-27 NOTE — GROUP NOTE
EMILY  GROUP DOCUMENTATION INDIVIDUAL                                                                          Group Therapy Note    Date: 9/27/2021    Group Start Time: 1100  Group End Time: 1200  Group Topic: Topic Group    [facility] [place] [address] ACUTE BEHAV TH    [facility] GROUP    Group Therapy Note    Attendees: 8         Attendance: Did not attend    Patient's Goal:      Interventions/techniques  Matt Ray

## 2021-09-27 NOTE — PROGRESS NOTES
[facility] Admission Pharmacy Medication Reconciliation     Information obtained from: Discharge summary from [facility] 9/24/21  RxQuery data available1: Yes    Comments/recommendations:  Patient discharged from [facility] on 9/24/21. List below reflects discharge medications from that hospitalization. It is likely she hasn't taken medications in a couple days. Medication changes (since last review): None       1RxQuery pharmacy benefit data reflects medications filled and processed through the patient's insurance, however                this data does NOT capture whether the medication was picked up or is currently being taken by the patient. Total Time Spent: 10 minutes    Past Medical History/Disease States:  Past Medical History:   Diagnosis Date    Arthritis     legs    Bipolar 1 disorder ([facility]     COPD     Depression 1/13/2012    Diabetes ([facility]     D[facility]     cocaine, stimulants, etoh, mj, tob    H/O suicide attempt     Hypertension     Obesity     Polydrug dependence excluding opioid type drug, abuse ([facility]     Schizophrenia ([facility] 7/8/2016         Patient allergies: Allergies as of 09/26/2021 - Fully Reviewed 09/26/2021   Allergen Reaction Noted    Amoxicillin Hives 06/13/2017    Mushroom flavor Hives 01/12/2012    Tomato Hives 01/12/2012         Prior to Admission Medications   Prescriptions Last Dose Informant Patient Reported? Taking? albuterol (Ventolin HFA) 90 mcg/actuation inhaler 9/20/2021 at Unknown time Other No Yes   Sig: Take 2 Puffs by inhalation every four (4) hours as needed for Wheezing or Shortness of Breath. Indications: asthma attack   atorvastatin (LIPITOR) 40 mg tablet 9/23/2021 at Unknown time Other No No   Sig: Take 1 Tablet by mouth every evening. Takes at 5:00 pm  Indications: Cholesterol   Patient not taking: Reported on 9/27/2021   gabapentin (NEURONTIN) 300 mg capsule 9/23/2021 at Unknown time Other No No   Sig: Take 1 Capsule by mouth nightly. Max Daily Amount: 300 mg. Indications: neuropathic pain   Patient not taking: Reported on 2021   insulin glargine (LANTUS) 100 unit/mL injection 2021 at Unknown time Other No No   Si Units by SubCUTAneous route daily. Indications: type 2 diabetes mellitus   Patient not taking: Reported on 2021   insulin lispro (HUMALOG) 100 unit/mL injection 2021 at Unknown time Other No No   Sig: 10 Units by SubCUTAneous route Before breakfast, lunch, and dinner. Indications: type 2 diabetes mellitus   Patient not taking: Reported on 2021   loratadine (CLARITIN) 10 mg tablet 2021 at Unknown time Other No No   Sig: Take 1 Tablet by mouth daily. Indications: Allergies   Patient not taking: Reported on 2021   mometasone-formoterol [facility]) 100-5 mcg/actuation HFA inhaler 2021 at Unknown time Other No No   Sig: Take 2 Puffs by inhalation two (2) times a day. Takes at 8:00 am and at 8:00 pm  Indications: controller medication for asthma   Patient not taking: Reported on 2021   oxybutynin chloride XL (DITROPAN XL) 5 mg CR tablet 2021 at Unknown time Other No No   Sig: Take 1 Tablet by mouth daily. Indications: overactive bladder   Patient not taking: Reported on 2021   pantoprazole (PROTONIX) 40 mg tablet 2021 at Unknown time Other No No   Sig: Take 1 Tablet by mouth every evening. Takes at 6:30 pm   Indications: gastroesophageal reflux disease   Patient not taking: Reported on 2021   risperiDONE (RisperDAL) 1 mg tablet 2021 at Unknown time Other No No   Sig: Take 1 Tablet by mouth two (2) times a day. Indications: Thoughts   Patient not taking: Reported on 2021   sertraline (Zoloft) 25 mg tablet 2021 at Unknown time Other No No   Sig: Take 1 Tablet by mouth daily. Indications: Mood   Patient not taking: Reported on 2021   traZODone (DESYREL) 50 mg tablet 2021 at Unknown time Other No Yes   Sig: Take 1 Tablet by mouth nightly as needed (Insomnia).  Indications: insomnia associated with depression      Facility-Administered Medications: None          Thank you,  SIVAKUMAR Magana [facility], [address]: [phone] ([id number]  Pharmacy: [phone] ([id number])

## 2021-09-27 NOTE — BH NOTES
Pt admitted from [facility] ED. Pt is voluntary. Pt was brought in by [employer] because she was running into traffic in a attempt to harm self. She reported she fell after urinating on self before admission. Pt is walking slowly in ER. She is depressed due to this being her husbands 3rd year anniversary since he . Pt states she does not want to live anymore. Pt has a hx of poly substance abuse but denies using drugs since last admission. Medical HX of HTN, diabetes, obesity, COPD, arthritis. Pt is calm and cooperative on admission. Pt has a sad. Flat. Affect. Pt states she is depressed. Pt is suicidal but states she will not harm self in hospital. Pt states she will seek staff assistance if she wants to act on thoughts. MD notified about no 1:1 needed at this time. Pt denies hi/a/v hwang. Pt states that she has not taken any medications since discharge. Pt had wet clothing that she urinated in. Clothing washed. Pt skin has excoriation under belly and creases of inner legs. Pt has redness to feet and long toe nails. Pt is resting in room at this time. Will continue to monitor pt.

## 2021-09-27 NOTE — BSMART NOTE
Pt to be admitted to [facility] by Molly Velazquez, PMHNP for Dr. Gadiel Mims to room #522-5. Nursing report to [phone]. Charge/Mona notified of admission.

## 2021-09-27 NOTE — GROUP NOTE
EMILY  GROUP DOCUMENTATION INDIVIDUAL                                                                          Group Therapy Note    Date: 9/27/2021    Group Start Time: 0900  Group End Time: 1000  Group Topic: Topic Group    [facility] [place] [address] ACUTE BE[address] [facility]    [place] [address] GROUP    Group Therapy Note    Attendees: 8         Attendance: Did not attend    Patient's Goal:      Interventions/techniques:  Thai Watters

## 2021-09-27 NOTE — PROGRESS NOTES
Laboratory monitoring for mood stabilizer and antipsychotics:    Recommended baseline monitoring has been completed based on this patient's current medication regimen. The patient is currently taking the following medication(s):   Current Facility-Administered Medications   Medication Dose Route Frequency    atorvastatin (LIPITOR) tablet 40 mg  40 mg Oral QPM    gabapentin (NEURONTIN) capsule 300 mg  300 mg Oral QHS    [START ON 9/28/2021] insulin glargine (LANTUS) injection 34 Units  34 Units SubCUTAneous DAILY    insulin lispro (HUMALOG) injection 10 Units  10 Units SubCUTAneous TIDAC    [START ON 9/28/2021] oxybutynin chloride XL (DITROPAN XL) tablet 5 mg  5 mg Oral DAILY    [START ON 9/28/2021] loratadine (CLARITIN) tablet 10 mg  10 mg Oral DAILY    pantoprazole (PROTONIX) tablet 40 mg  40 mg Oral QPM    risperiDONE (RisperDAL) tablet 1 mg  1 mg Oral BID    [START ON 9/28/2021] sertraline (ZOLOFT) tablet 25 mg  25 mg Oral DAILY       Height, Weight, BMI Estimation  Estimated body mass index is 41.6 kg/m² as calculated from the following:    Height as of this encounter: 165.1 cm (65\"). Weight as of this encounter: 113.4 kg (250 lb). Renal Function, Hepatic Function and Chemistry  Estimated Creatinine Clearance: 79.4 mL/min (based on SCr of 0.97 mg/dL). Lab Results   Component Value Date/Time    Sodium 132 (L) 09/26/2021 07:12 PM    Potassium 4.2 09/26/2021 07:12 PM    Chloride 100 09/26/2021 07:12 PM    CO2 24 09/26/2021 07:12 PM    Anion gap 8 09/26/2021 07:12 PM    Glucose 291 (H) 09/26/2021 07:12 PM    Glucose 218 (H) 09/22/2021 05:32 AM    BUN 20 09/26/2021 07:12 PM    Creatinine 0.97 09/26/2021 07:12 PM    BUN/Creatinine ratio 21 (H) 09/26/2021 07:12 PM    GFR est AA >60 09/26/2021 07:12 PM    GFR est non-AA 59 (L) 09/26/2021 07:12 PM    Calcium 9.5 09/26/2021 07:12 PM    ALT (SGPT) 46 09/26/2021 07:12 PM    Alk.  phosphatase 132 (H) 09/26/2021 07:12 PM    Protein, total 8.1 09/26/2021 07:12 PM Albumin 3.5 09/26/2021 07:12 PM    Globulin 4.6 (H) 09/26/2021 07:12 PM    A-G Ratio 0.8 (L) 09/26/2021 07:12 PM    Bilirubin, total 0.3 09/26/2021 07:12 PM       Lab Results   Component Value Date/Time    Glucose 291 (H) 09/26/2021 07:12 PM    Glucose 218 (H) 09/22/2021 05:32 AM    Glucose (POC) 273 (H) 09/27/2021 11:54 AM       Lab Results   Component Value Date/Time    Hemoglobin A1c 11.8 (H) 07/27/2021 08:09 PM       Hematology  Lab Results   Component Value Date/Time    WBC 10.1 09/26/2021 07:12 PM    Hemoglobin (POC) 12.2 01/12/2012 10:40 PM    HGB 13.6 09/26/2021 07:12 PM    Hematocrit (POC) 36 01/12/2012 10:40 PM    HCT 42.6 09/26/2021 07:12 PM    PLATELET 584 (H) 95/90/4622 07:12 PM    MCV 83.0 09/26/2021 07:12 PM       Lipids  Lab Results   Component Value Date/Time    Cholesterol, total 130 09/22/2021 05:32 AM    HDL Cholesterol 32 09/22/2021 05:32 AM    LDL, calculated 49.8 09/22/2021 05:32 AM    Triglyceride 241 (H) 09/22/2021 05:32 AM    CHOL/HDL Ratio 4.1 09/22/2021 05:32 AM       Thyroid Function    Lab Results   Component Value Date/Time    TSH 2.19 09/22/2021 05:32 AM     Vitals  Visit Vitals  BP (!) 150/94 (BP 1 Location: Left upper arm, BP Patient Position: At rest)   Pulse 85   Temp 97.4 °F (36.3 °C)   Resp 18   Ht 165.1 cm (65\")   Wt 113.4 kg (250 lb)   SpO2 97%   Breastfeeding No   BMI 41.60 kg/m²       Pregnancy Test  Lab Results   Component Value Date/Time    HCG urine, QL NEGATIVE  07/08/2016 02:10 AM    Pregnancy test,urine (POC) NEGATIVE  08/02/2017 06:09 PM    HCG, Ql. NEGATIVE  07/08/2016 12:31 AM       SIVAKUMAR Elliott, BCPS  [phone] (pharmacy)

## 2021-09-27 NOTE — GROUP NOTE
EMILY  GROUP DOCUMENTATION INDIVIDUAL                                                                          Group Therapy Note    Date: 9/27/2021    Group Start Time: 1500  Group End Time: 1600  Group Topic: Recreational/Music Therapy    [facility] [place] [address] ACUTE BEHAV TH    [address], [facility] within [facility] GROUP DOCUMENTATION GROUP    Group Therapy Note    Attendees: 8         Attendance: Did not attend    Patient's Goal:      Interventions/techniques:Luciana Agarwal

## 2021-09-27 NOTE — BH NOTES
Pt is medication/diet compliant. Pt has slept since admission, will get up for meals and medication. Pt snores, calm, cooperative. Pt is AC and HS, and on a fixed scale. BS high=273, BS low 251. Will continue to monitor.

## 2021-09-27 NOTE — ED NOTES
Shift change report received from lCotilde Li [facility]. Pt sleeping comfortably in focus care area.

## 2021-09-28 LAB
GLUCOSE BLD STRIP.AUTO-MCNC: 215 MG/DL (ref 65–117)
GLUCOSE BLD STRIP.AUTO-MCNC: 233 MG/DL (ref 65–117)
GLUCOSE BLD STRIP.AUTO-MCNC: 260 MG/DL (ref 65–117)
SERVICE CMNT-IMP: ABNORMAL

## 2021-09-28 PROCEDURE — 74011250637 HC RX REV CODE- 250/637: Performed by: PSYCHIATRY & NEUROLOGY

## 2021-09-28 PROCEDURE — 82962 GLUCOSE BLOOD TEST: CPT

## 2021-09-28 PROCEDURE — 65220000003 HC RM SEMIPRIVATE PSYCH

## 2021-09-28 PROCEDURE — 99222 1ST HOSP IP/OBS MODERATE 55: CPT | Performed by: PSYCHIATRY & NEUROLOGY

## 2021-09-28 PROCEDURE — 74011636637 HC RX REV CODE- 636/637: Performed by: PSYCHIATRY & NEUROLOGY

## 2021-09-28 RX ADMIN — BUDESONIDE AND FORMOTEROL FUMARATE DIHYDRATE 2 PUFF: 80; 4.5 AEROSOL RESPIRATORY (INHALATION) at 16:55

## 2021-09-28 RX ADMIN — BUDESONIDE AND FORMOTEROL FUMARATE DIHYDRATE 2 PUFF: 80; 4.5 AEROSOL RESPIRATORY (INHALATION) at 08:15

## 2021-09-28 RX ADMIN — INSULIN LISPRO 10 UNITS: 100 INJECTION, SOLUTION INTRAVENOUS; SUBCUTANEOUS at 12:00

## 2021-09-28 RX ADMIN — TRAZODONE HYDROCHLORIDE 50 MG: 50 TABLET ORAL at 21:15

## 2021-09-28 RX ADMIN — INSULIN LISPRO 10 UNITS: 100 INJECTION, SOLUTION INTRAVENOUS; SUBCUTANEOUS at 16:53

## 2021-09-28 RX ADMIN — INSULIN GLARGINE 34 UNITS: 100 INJECTION, SOLUTION SUBCUTANEOUS at 08:13

## 2021-09-28 RX ADMIN — LORATADINE 10 MG: 10 TABLET ORAL at 08:14

## 2021-09-28 RX ADMIN — OXYBUTYNIN CHLORIDE 5 MG: 5 TABLET, EXTENDED RELEASE ORAL at 08:14

## 2021-09-28 RX ADMIN — GABAPENTIN 300 MG: 300 CAPSULE ORAL at 21:14

## 2021-09-28 RX ADMIN — PANTOPRAZOLE SODIUM 40 MG: 40 TABLET, DELAYED RELEASE ORAL at 17:35

## 2021-09-28 RX ADMIN — SERTRALINE 25 MG: 50 TABLET, FILM COATED ORAL at 08:14

## 2021-09-28 RX ADMIN — ACETAMINOPHEN 650 MG: 325 TABLET ORAL at 21:15

## 2021-09-28 RX ADMIN — INSULIN LISPRO 10 UNITS: 100 INJECTION, SOLUTION INTRAVENOUS; SUBCUTANEOUS at 08:13

## 2021-09-28 RX ADMIN — ATORVASTATIN CALCIUM 40 MG: 40 TABLET, FILM COATED ORAL at 17:02

## 2021-09-28 RX ADMIN — RISPERIDONE 1 MG: 1 TABLET ORAL at 16:54

## 2021-09-28 RX ADMIN — RISPERIDONE 1 MG: 1 TABLET ORAL at 08:14

## 2021-09-28 NOTE — BH NOTES
GROUP THERAPY PROGRESS NOTE    Patient did not participate in Coping Skills group.      FAMILIA Ambriz

## 2021-09-28 NOTE — BH NOTES
GROUP THERAPY PROGRESS NOTE    Patient did not participate in Substance Abuse/Coping Skills group.      FAMILIA Glass

## 2021-09-28 NOTE — PROGRESS NOTES
Problem: Anxiety  Goal: *Alleviation of anxiety  Outcome: Progressing Towards Goal     Problem: Depressed Mood (Adult/Pediatric)  Goal: *STG: Participates in treatment plan  Outcome: Not Progressing Towards Goal  Goal: *STG: Attends activities and groups  Outcome: Not Progressing Towards Goal

## 2021-09-28 NOTE — H&P
INITIAL PSYCHIATRIC EVALUATION            IDENTIFICATION:    Patient Name  Coretta Cervantes   Date of Birth 1965   [facility] [id number]   Medical Record Number  [id number]      Age  60 y.o. PCP Mya Alejandra MD   Admit date:  9/27/2021    Room Number  917/27  @ [facility]   Date of Service  9/28/2021            HISTORY         REASON FOR HOSPITALIZATION:  CC: \"suicidal ideation\". Pt admitted under a voluntary basis for suicidal ideations proving to be an imminent danger to self and an inability to care for self. HISTORY OF PRESENT ILLNESS:    The patient, Coretta Cervantes, is a 60 y.o. WHITE/NON- female with a past psychiatric history significant for schizoaffective disorder, who presents at this time with complaints of (and/or evidence of) the following emotional symptoms: suicidal thoughts/threats. Additional symptomatology include poor self care. The above symptoms have been present for 24+ hours. These symptoms are of moderate to high severity. These symptoms are intermittent/ fleeting in nature. The patient's condition has been precipitated by psychosocial stressors. UDS: negative; BAL=0. Patient well known to the unit as she was just discharged 3 days prior to admission, and was sent to her group home. Hours later, she presented to the ED with a cc of hitting her head. It is unclear if she ever made it to the group home. She voiced SI again stating in the ED she wanted to die as it is the 3 year anniversary of her 's death. The patient is a poor historian. The patient corroborates the above narrative. The patient contracts for safety on the unit and gives consent for the team to contact collateral. The patient is amenable to initiating treatment while on the unit. The patient reports that she went to the ED after the discharge after hitting her head. She denies active thoughts of self harm.      ALLERGIES:   Allergies   Allergen Reactions    Amoxicillin Hives    Mushroom Flavor Hives    Tomato Hives      MEDICATIONS PRIOR TO ADMISSION:   Medications Prior to Admission   Medication Sig    traZODone (DESYREL) 50 mg tablet Take 1 Tablet by mouth nightly as needed (Insomnia). Indications: insomnia associated with depression    albuterol (Ventolin HFA) 90 mcg/actuation inhaler Take 2 Puffs by inhalation every four (4) hours as needed for Wheezing or Shortness of Breath. Indications: asthma attack    mometasone-formoterol (Dulera) 100-5 mcg/actuation HFA inhaler Take 2 Puffs by inhalation two (2) times a day. Takes at 8:00 am and at 8:00 pm  Indications: controller medication for asthma (Patient not taking: Reported on 9/27/2021)    loratadine (CLARITIN) 10 mg tablet Take 1 Tablet by mouth daily. Indications: Allergies (Patient not taking: Reported on 9/27/2021)    risperiDONE (RisperDAL) 1 mg tablet Take 1 Tablet by mouth two (2) times a day. Indications: Thoughts (Patient not taking: Reported on 9/27/2021)    sertraline (Zoloft) 25 mg tablet Take 1 Tablet by mouth daily. Indications: Mood (Patient not taking: Reported on 9/27/2021)    atorvastatin (LIPITOR) 40 mg tablet Take 1 Tablet by mouth every evening. Takes at 5:00 pm  Indications: Cholesterol (Patient not taking: Reported on 9/27/2021)    gabapentin (NEURONTIN) 300 mg capsule Take 1 Capsule by mouth nightly. Max Daily Amount: 300 mg. Indications: neuropathic pain (Patient not taking: Reported on 9/27/2021)    oxybutynin chloride XL (DITROPAN XL) 5 mg CR tablet Take 1 Tablet by mouth daily. Indications: overactive bladder (Patient not taking: Reported on 9/27/2021)    pantoprazole (PROTONIX) 40 mg tablet Take 1 Tablet by mouth every evening. Takes at 6:30 pm   Indications: gastroesophageal reflux disease (Patient not taking: Reported on 9/27/2021)    insulin glargine (LANTUS) 100 unit/mL injection 34 Units by SubCUTAneous route daily.  Indications: type 2 diabetes mellitus (Patient not taking: Reported on 9/27/2021)    insulin lispro (HUMALOG) 100 unit/mL injection 10 Units by SubCUTAneous route Before breakfast, lunch, and dinner. Indications: type 2 diabetes mellitus (Patient not taking: Reported on 9/27/2021)      PAST MEDICAL HISTORY:   Past Medical History:   Diagnosis Date    Arthritis     legs    Bipolar 1 disorder ([facility] [address] [facility]     COPD     Depression 1/13/2012    [facility]     Drug abuse (HCC)     cocaine, stimulants, etoh, mj, tob    H/O suicide attempt     Hypertension     Obesity     Polydrug dependence excluding opioid type drug, abuse ([facility]     Schizophrenia ([facility] 7/8/2016     Past Surgical History:   Procedure Laterality Date    HX ORTHOPAEDIC      foot sx      SOCIAL HISTORY:  The patient is currently on disability; the patient is not a smoker; the patient's marital status is ; the patient does not have children; the patient reports the highest level of education achieved is  12th grade (special education). FAMILY HISTORY: History reviewed, pertinent family history as below:   Family History   Problem Relation Age of Onset    Diabetes Mother     Stroke Mother     Stroke Father        REVIEW OF SYSTEMS:   Pertinent items are noted in the History of Present Illness. All other Systems reviewed and are considered negative. MENTAL STATUS EXAM & VITALS     MENTAL STATUS EXAM (MSE):    MSE FINDINGS ARE WITHIN NORMAL LIMITS (WNL) UNLESS OTHERWISE STATED BELOW. ( ALL OF THE BELOW CATEGORIES OF THE MSE HAVE BEEN REVIEWED (reviewed 9/28/2021) AND UPDATED AS DEEMED APPROPRIATE )  General Presentation age appropriate and disheveled, guarded   Orientation disorganized   Vital Signs  See below (reviewed 9/28/2021); Vital Signs (BP, Pulse, & Temp) are within normal limits if not listed below.    Gait and Station Stable/steady, no ataxia   Musculoskeletal System No extrapyramidal symptoms (EPS); no abnormal muscular movements or Tardive Dyskinesia (TD); muscle strength and tone are within normal limits   Language No aphasia or dysarthria   Speech:  normal volume and non-pressured   Thought Processes coherent; normal rate of thoughts; fair abstract reasoning/computation   Thought Associations goal directed   Thought Content preoccupations and poverty of content   Suicidal Ideations none   Homicidal Ideations none   Mood:  depressed and anhedonia   Affect:  constricted and mood-congruent   Memory recent  intact   Memory remote:  intact   Concentration/Attention:  intact   Fund of Knowledge average   Insight:  limited   Reliability fair   Judgment:  poor          VITALS:     Patient Vitals for the past 24 hrs:   Temp Pulse Resp BP SpO2   09/27/21 1930 97.9 °F (36.6 °C) 99 16 (!) 143/83 100 %     Wt Readings from Last 3 Encounters:   09/27/21 113.4 kg (250 lb)   09/26/21 113.7 kg (250 lb 10.6 oz)   09/24/21 113.7 kg (250 lb 10.6 oz)     Temp Readings from Last 3 Encounters:   09/27/21 97.9 °F (36.6 °C)   09/27/21 97.7 °F (36.5 °C)   09/24/21 98.8 °F (37.1 °C)     BP Readings from Last 3 Encounters:   09/27/21 (!) 143/83   09/27/21 (!) 149/84   09/24/21 138/83     Pulse Readings from Last 3 Encounters:   09/27/21 99   09/27/21 82   09/24/21 76            DATA     LABORATORY DATA:  Labs Reviewed   GLUCOSE, POC - Abnormal; Notable for the following components:       Result Value    Glucose (POC) 273 (*)     All other components within normal limits   GLUCOSE, POC - Abnormal; Notable for the following components:    Glucose (POC) 251 (*)     All other components within normal limits   GLUCOSE, POC - Abnormal; Notable for the following components:    Glucose (POC) 312 (*)     All other components within normal limits   GLUCOSE, POC - Abnormal; Notable for the following components:    Glucose (POC) 215 (*)     All other components within normal limits     Admission on 09/27/2021   Component Date Value Ref Range Status    Glucose (POC) 09/27/2021 273* 65 - 117 mg/dL Final    Performed by 09/27/2021 Johnathon Saha   Final    Glucose (POC) 09/27/2021 251* 65 - 117 mg/dL Final    Performed by 09/27/2021 Trinidad Horvath (TRV RN)   Final    Glucose (POC) 09/27/2021 312* 65 - 117 mg/dL Final    Performed by 09/27/2021 Juan J Nunez (TRV RN)   Final    Glucose (POC) 09/28/2021 215* 65 - 117 mg/dL Final    Performed by 09/28/2021 Marlen Winchester (TRV RN)   Final   Admission on 09/26/2021, Discharged on 09/27/2021   Component Date Value Ref Range Status    WBC 09/26/2021 10.1  3.6 - 11.0 K/uL Final    RBC 09/26/2021 5.13  3.80 - 5.20 M/uL Final    HGB 09/26/2021 13.6  11.5 - 16.0 g/dL Final    HCT 09/26/2021 42.6  35.0 - 47.0 % Final    MCV 09/26/2021 83.0  80.0 - 99.0 FL Final    MCH 09/26/2021 26.5  26.0 - 34.0 PG Final    MCHC 09/26/2021 31.9  30.0 - 36.5 g/dL Final    RDW 09/26/2021 15.5* 11.5 - 14.5 % Final    PLATELET 77/08/1397 843* 150 - 400 K/uL Final    MPV 09/26/2021 9.2  8.9 - 12.9 FL Final    NRBC 09/26/2021 0.0  0  WBC Final    ABSOLUTE NRBC 09/26/2021 0.00  0.00 - 0.01 K/uL Final    NEUTROPHILS 09/26/2021 73  32 - 75 % Final    LYMPHOCYTES 09/26/2021 19  12 - 49 % Final    MONOCYTES 09/26/2021 6  5 - 13 % Final    EOSINOPHILS 09/26/2021 1  0 - 7 % Final    BASOPHILS 09/26/2021 0  0 - 1 % Final    IMMATURE GRANULOCYTES 09/26/2021 1* 0.0 - 0.5 % Final    ABS. NEUTROPHILS 09/26/2021 7.4  1.8 - 8.0 K/UL Final    ABS. LYMPHOCYTES 09/26/2021 2.0  0.8 - 3.5 K/UL Final    ABS. MONOCYTES 09/26/2021 0.6  0.0 - 1.0 K/UL Final    ABS. EOSINOPHILS 09/26/2021 0.1  0.0 - 0.4 K/UL Final    ABS. BASOPHILS 09/26/2021 0.0  0.0 - 0.1 K/UL Final    ABS. IMM.  GRANS. 09/26/2021 0.1* 0.00 - 0.04 K/UL Final    DF 09/26/2021 AUTOMATED    Final    Sodium 09/26/2021 132* 136 - 145 mmol/L Final    Potassium 09/26/2021 4.2  3.5 - 5.1 mmol/L Final    Chloride 09/26/2021 100  97 - 108 mmol/L Final    CO2 09/26/2021 24  21 - 32 mmol/L Final    Anion gap 09/26/2021 8  5 - 15 mmol/L Final    Glucose 09/26/2021 291* 65 - 100 mg/dL Final    BUN 09/26/2021 20  6 - 20 MG/DL Final    Creatinine 09/26/2021 0.97  0.55 - 1.02 MG/DL Final    BUN/Creatinine ratio 09/26/2021 21* 12 - 20   Final    GFR est AA 09/26/2021 >60  >60 ml/min/1.73m2 Final    GFR est non-AA 09/26/2021 59* >60 ml/min/1.73m2 Final    Calcium 09/26/2021 9.5  8.5 - 10.1 MG/DL Final    Bilirubin, total 09/26/2021 0.3  0.2 - 1.0 MG/DL Final    ALT (SGPT) 09/26/2021 46  12 - 78 U/L Final    AST (SGOT) 09/26/2021 33  15 - 37 U/L Final    Alk. phosphatase 09/26/2021 132* 45 - 117 U/L Final    Protein, total 09/26/2021 8.1  6.4 - 8.2 g/dL Final    Albumin 09/26/2021 3.5  3.5 - 5.0 g/dL Final    Globulin 09/26/2021 4.6* 2.0 - 4.0 g/dL Final    A-G Ratio 09/26/2021 0.8* 1.1 - 2.2   Final    Acetaminophen level 09/26/2021 <2* 10 - 30 ug/mL Final    Salicylate level 02/43/8289 4.0  2.8 - 20.0 MG/DL Final    ALCOHOL(ETHYL),SERUM 09/26/2021 <10  <10 MG/DL Final    AMPHETAMINES 09/26/2021 Negative  NEG   Final    BARBITURATES 09/26/2021 Negative  NEG   Final    BENZODIAZEPINES 09/26/2021 Negative  NEG   Final    COCAINE 09/26/2021 Negative  NEG   Final    METHADONE 09/26/2021 Negative  NEG   Final    OPIATES 09/26/2021 Negative  NEG   Final    PCP(PHENCYCLIDINE) 09/26/2021 Negative  NEG   Final    THC (TH-CANNABINOL) 09/26/2021 Negative  NEG   Final    Drug screen comment 09/26/2021 (NOTE)   Final    SARS-CoV-2 09/26/2021 Not detected  NOTD   Final    Influenza A by PCR 09/26/2021 Not detected  NOTD   Final    Influenza B by PCR 09/26/2021 Not detected  NOTD   Final    Urine culture hold 09/26/2021 Urine on hold in Microbiology dept for 2 days. If unpreserved urine is submitted, it cannot be used for addtional testing after 24 hours, recollection will be required.     Final        RADIOLOGY REPORTS:  Results from Hospital Encounter encounter on 07/27/21    XR CHEST PORT    Narrative  EXAM: XR CHEST PORT    HISTORY: admission. COMPARISON: None    FINDINGS: Single view(s) of the chest. The film is underpenetrated secondary to  patient body habitus. The lungs are well inflated. No focal consolidation,  pleural effusion, or pneumothorax. The cardiomediastinal silhouette is  unremarkable. The visualized osseous structures are unremarkable. Impression  No acute cardiopulmonary process. CT HEAD WO CONT    Result Date: 9/26/2021  EXAM: CT HEAD WO CONT INDICATION: Head injury today. COMPARISON: CT head on 9/24/2021 TECHNIQUE: Noncontrast head CT. Coronal and sagittal reformats. CT dose reduction was achieved through the use of a standardized protocol tailored for this examination and automatic exposure control for dose modulation. FINDINGS: The ventricles and sulci are age-appropriate without hydrocephalus. There is no mass effect or midline shift. There is no intracranial hemorrhage or extra-axial fluid collection. There is no abnormal area of decreased density to suggest infarct. The calvarium is intact. The visualized paranasal sinuses and mastoid air cells are clear. No acute intracranial abnormality on this noncontrast head CT. No change. CT HEAD WO CONT    Result Date: 9/24/2021  CLINICAL HISTORY: fall INDICATION: fall COMPARISON: None. CT dose reduction was achieved through use of a standardized protocol tailored for this examination and automatic exposure control for dose modulation. TECHNIQUE: Serial axial images with a collimation of 5 mm were obtained from the skull base through the vertex  FINDINGS: The sulci and ventricles are within normal limits for patient age. There is no evidence of an acute infarction, hemorrhage, or mass-effect. There is no evidence of midline shift or hydrocephalus. Posterior fossa structures are unremarkable. No extra-axial collections are seen. Mastoid air cells are well pneumatized and clear. There is no evidence of depressed skull fractures of soft tissue swelling.      No acute intracranial process. XR CHEST PORT    Result Date: 7/27/2021  EXAM: XR CHEST PORT HISTORY: admission. COMPARISON: None FINDINGS: Single view(s) of the chest. The film is underpenetrated secondary to patient body habitus. The lungs are well inflated. No focal consolidation, pleural effusion, or pneumothorax. The cardiomediastinal silhouette is unremarkable. The visualized osseous structures are unremarkable. No acute cardiopulmonary process.               MEDICATIONS       ALL MEDICATIONS  Current Facility-Administered Medications   Medication Dose Route Frequency    OLANZapine (ZyPREXA) tablet 5 mg  5 mg Oral Q6H PRN    haloperidol lactate (HALDOL) injection 5 mg  5 mg IntraMUSCular Q6H PRN    benztropine (COGENTIN) tablet 1 mg  1 mg Oral BID PRN    diphenhydrAMINE (BENADRYL) injection 50 mg  50 mg IntraMUSCular BID PRN    hydrOXYzine HCL (ATARAX) tablet 50 mg  50 mg Oral TID PRN    LORazepam (ATIVAN) injection 1 mg  1 mg IntraMUSCular Q4H PRN    traZODone (DESYREL) tablet 50 mg  50 mg Oral QHS PRN    acetaminophen (TYLENOL) tablet 650 mg  650 mg Oral Q4H PRN    magnesium hydroxide (MILK OF MAGNESIA) 400 mg/5 mL oral suspension 30 mL  30 mL Oral DAILY PRN    glucose chewable tablet 16 g  4 Tablet Oral PRN    dextrose (D50W) injection syrg 12.5-25 g  12.5-25 g IntraVENous PRN    glucagon (GLUCAGEN) injection 1 mg  1 mg IntraMUSCular PRN    albuterol (PROVENTIL HFA, VENTOLIN HFA, PROAIR HFA) inhaler 2 Puff  2 Puff Inhalation Q4H PRN    atorvastatin (LIPITOR) tablet 40 mg  40 mg Oral QPM    gabapentin (NEURONTIN) capsule 300 mg  300 mg Oral QHS    insulin glargine (LANTUS) injection 34 Units  34 Units SubCUTAneous DAILY    insulin lispro (HUMALOG) injection 10 Units  10 Units SubCUTAneous TIDAC    oxybutynin chloride XL (DITROPAN XL) tablet 5 mg  5 mg Oral DAILY    loratadine (CLARITIN) tablet 10 mg  10 mg Oral DAILY    pantoprazole (PROTONIX) tablet 40 mg  40 mg Oral QPM    risperiDONE (RisperDAL) tablet 1 mg  1 mg Oral BID    sertraline (ZOLOFT) tablet 25 mg  25 mg Oral DAILY    budesonide-formoterol (SYMBICORT) 80-4.5 mcg inhaler  2 Puff Inhalation BID      SCHEDULED MEDICATIONS  Current Facility-Administered Medications   Medication Dose Route Frequency    atorvastatin (LIPITOR) tablet 40 mg  40 mg Oral QPM    gabapentin (NEURONTIN) capsule 300 mg  300 mg Oral QHS    insulin glargine (LANTUS) injection 34 Units  34 Units SubCUTAneous DAILY    insulin lispro (HUMALOG) injection 10 Units  10 Units SubCUTAneous TIDAC    oxybutynin chloride XL (DITROPAN XL) tablet 5 mg  5 mg Oral DAILY    loratadine (CLARITIN) tablet 10 mg  10 mg Oral DAILY    pantoprazole (PROTONIX) tablet 40 mg  40 mg Oral QPM    risperiDONE (RisperDAL) tablet 1 mg  1 mg Oral BID    sertraline (ZOLOFT) tablet 25 mg  25 mg Oral DAILY    budesonide-formoterol (SYMBICORT) 80-4.5 mcg inhaler  2 Puff Inhalation BID                ASSESSMENT & PLAN        The patient, Fozia Duran, is a 60 y.o.  female who presents at this time for treatment of the following diagnoses:  Patient Active Hospital Problem List:   Bipolar 1 disorder ([facility] [address] (9/27/2021)    Assessment: patient with episode of depressed mood and suicidal ideation that appeared to be have been situational (anniversary of 's death). She is able to make her needs known and appears at her psychiatric baseline. Will work with  to discharge patient back to the community. Plan:   - RESTART home regimen  - IGM therapy as tolerated  - Expand database / obtain collateral  - Dispo planning (group home)           A coordinated, multidisplinary treatment team (includes the nurse, unit pharmacist,  and writer) round was conducted for this initial evaluation with the patient present. The following regarding medications was addressed during rounds with patient: the risks and benefits of the proposed medication.  The patient was given the opportunity to ask questions. Informed consent given to the use of the above medications. I will continue to adjust psychiatric and non-psychiatric medications (see above \"medication\" section and orders section for details) as deemed appropriate & based upon diagnoses and response to treatment. I have reviewed admission (and previous/old) labs and medical tests in the EHR and or transferring hospital documents. I will continue to order blood tests/labs and diagnostic tests as deemed appropriate and review results as they become available (see orders for details). I have reviewed old psychiatric and medical records available in the EHR. I Will order additional psychiatric records from other institutions to further elucidate the nature of patient's psychopathology and review once available. I will gather additional collateral information from friends, family and o/p treatment team to further elucidate the nature of patient's psychopathology and baselline level of psychiatric functioning. I certify that this patient's inpatient psychiatric hospital services are required for treatment that could reasonably be expected to improve the patient's condition, or for diagnostic study, and that the patient continues to need, on a daily basis, active treatment furnished directly by or requiring the supervision of inpatient psychiatric facility personnel. In addition, the hospital records show that services furnished were intensive treatment services, admission or related services, or equivalent services.       ESTIMATED LENGTH OF STAY:  2-3 days       STRENGTHS:  Exercising self-direction/Resourceful, Access to housing/residential stability and Interpersonal/supportive relationships (family, friends, peers)                                        SIGNED:    Chilango Gastelum MD  9/28/2021

## 2021-09-28 NOTE — GROUP NOTE
EMILY  GROUP DOCUMENTATION INDIVIDUAL                                                                          Group Therapy Note    Date: 9/28/2021    Group Start Time: 1500  Group End Time: 1600  Group Topic: Recreational/Music Therapy    [facility] [place] [address] ACUTE BEHAV [facility]    [place], [address] [facility] GROUP DOCUMENTATION GROUP    Group Therapy Note    Attendees: 7         Attendance: Attended    Patient's Goal:  To concentrate on selected task    Interventions/techniques: Supported-crafts,games,music    Follows Directions: Followed directions    Interactions:Minimal    Mental Status: Calm    Behavior/appearance: Attentive, Cooperative and Needed prompting    Goals Achieved:  Attended group session      Additional Notes:  Pleasant upon approach-good concentration on selected task    Nereida Burns

## 2021-09-28 NOTE — PROGRESS NOTES
Behavioral Services  Medicare Certification Upon Admission    I certify that this patient's inpatient psychiatric hospital admission is medically necessary for:    [x] (1) Treatment which could reasonably be expected to improve this patient's condition,       [x] (2) Or for diagnostic study;     AND     [x](2) The inpatient psychiatric services are provided while the individual is under the care of a physician and are included in the individualized plan of care.     Estimated length of stay/service 5-7 days    Plan for post-hospital care home    Electronically signed by Lauren Almanza MD on 9/28/2021 at 11:01 AM

## 2021-09-28 NOTE — PROGRESS NOTES
Problem: Depressed Mood (Adult/Pediatric)  Goal: *STG: Remains safe in hospital  Outcome: [address] Puri presented alert and oriented, flat affect/mood. She was noted resting quietly in bed. She was compliant with scheduled meds. She voiced no concerns. She slept approx. 10 hours during the night. Monitoring for safety continues.

## 2021-09-28 NOTE — GROUP NOTE
[facility] GROUP DOCUMENTATION INDIVIDUAL                                                                          Group Therapy Note    Date: 9/28/2021    Group Start Time: 0900  Group End Time: 1000  Group Topic: Topic Group    [facility] [place] [address] ACUTE BEHAV [facility]    [place] [facility] GROUP DOCUMENTATION GROUP    Group Therapy Note    Attendees: 6         Attendance: Did not attend    Patient's Goal:      Interventions/techniques  Jose D Toth
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
## 2021-09-28 NOTE — PROGRESS NOTES
Pt has remained compliant with scheduled medications throughout the shift. Pt will wake up for meals and then returns directly to bed to keep sleeping. Pt offers little but denies SI, HI, Ah and VH.

## 2021-09-28 NOTE — GROUP NOTE
EMILY  GROUP DOCUMENTATION INDIVIDUAL                                                                          Group Therapy Note    Date: 9/28/2021    Group Start Time: 1100  Group End Time: 1200  Group Topic: Topic Group    [facility] 3 ACUTE BEHAV [facility]    [place], [id number]    Group Therapy Note    Attendees: 7         Attendance: Did not attend    Patient's Goal:      Interventions/techniques:Luciana Agarwal

## 2021-09-29 VITALS
OXYGEN SATURATION: 98 % | BODY MASS INDEX: 41.65 KG/M2 | DIASTOLIC BLOOD PRESSURE: 92 MMHG | HEART RATE: 89 BPM | TEMPERATURE: 98.7 F | HEIGHT: 65 IN | SYSTOLIC BLOOD PRESSURE: 144 MMHG | WEIGHT: 250 LBS | RESPIRATION RATE: 16 BRPM

## 2021-09-29 LAB
GLUCOSE BLD STRIP.AUTO-MCNC: 239 MG/DL (ref 65–117)
GLUCOSE BLD STRIP.AUTO-MCNC: 314 MG/DL (ref 65–117)
SERVICE CMNT-IMP: ABNORMAL
SERVICE CMNT-IMP: ABNORMAL

## 2021-09-29 PROCEDURE — 99238 HOSP IP/OBS DSCHRG MGMT 30/<: CPT | Performed by: PSYCHIATRY & NEUROLOGY

## 2021-09-29 PROCEDURE — 82962 GLUCOSE BLOOD TEST: CPT

## 2021-09-29 PROCEDURE — 74011636637 HC RX REV CODE- 636/637: Performed by: PSYCHIATRY & NEUROLOGY

## 2021-09-29 PROCEDURE — 74011250637 HC RX REV CODE- 250/637: Performed by: PSYCHIATRY & NEUROLOGY

## 2021-09-29 RX ADMIN — BUDESONIDE AND FORMOTEROL FUMARATE DIHYDRATE 2 PUFF: 80; 4.5 AEROSOL RESPIRATORY (INHALATION) at 07:54

## 2021-09-29 RX ADMIN — INSULIN GLARGINE 34 UNITS: 100 INJECTION, SOLUTION SUBCUTANEOUS at 07:53

## 2021-09-29 RX ADMIN — OXYBUTYNIN CHLORIDE 5 MG: 5 TABLET, EXTENDED RELEASE ORAL at 07:55

## 2021-09-29 RX ADMIN — LORATADINE 10 MG: 10 TABLET ORAL at 07:55

## 2021-09-29 RX ADMIN — SERTRALINE 25 MG: 50 TABLET, FILM COATED ORAL at 07:55

## 2021-09-29 RX ADMIN — RISPERIDONE 1 MG: 1 TABLET ORAL at 07:55

## 2021-09-29 RX ADMIN — INSULIN LISPRO 10 UNITS: 100 INJECTION, SOLUTION INTRAVENOUS; SUBCUTANEOUS at 11:31

## 2021-09-29 RX ADMIN — INSULIN LISPRO 10 UNITS: 100 INJECTION, SOLUTION INTRAVENOUS; SUBCUTANEOUS at 07:54
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
## 2021-09-29 NOTE — PROGRESS NOTES
Problem: Suicide  Goal: *STG: Remains safe in hospital  Outcome: Progressing Towards Goal  Goal: *STG/LTG: No longer expresses self destructive or suicidal thoughts  Outcome: Progressing Towards Goal

## 2021-09-29 NOTE — BH NOTES
GROUP THERAPY PROGRESS NOTE    Patient did not participate in Discharge Planning Group.      FAMILIA Leonardo

## 2021-09-29 NOTE — BH NOTES
GROUP THERAPY PROGRESS NOTE    Patient is participating in Process group. Group time: 45 minutes    Personal goal for participation: Discuss and process the difference between the way life is now vs the way patients desire life to be like. Discuss coping skills and ways to reach goals and desired future. Goal orientation: Personal     Group therapy participation: passive    Therapeutic interventions reviewed and discussed: Completion of group activity worksheet prompting patients to draw or write the way life currently is vs the way they desire life to be. Patients participated in discussion on emotions and triggers associated with completing the exercise, coping skills, ways to obtain desired future, behavior and lifestyle changes to be made, and how to access support in the change process. Impression of participation: Pt attended group late, stated she was doing \"good\". Pt declined to verbally participate in group session. Pt sat in corner of dayroom near the phone.      FAMILIA Sarabia

## 2021-09-29 NOTE — SUICIDE SAFETY PLAN
SAFETY PLAN     A suicide Safety Plan is a document that supports someone when they are having thoughts of suicide.     Warning Signs that indicate a suicidal crisis may be developing: What (situations, thoughts, feelings, body sensations, behaviors, etc.) do you experience that lets you know you are beginning to think about suicide? 1. Crying  2. Mood changes  3. Increased appetite.      Internal Coping Strategies:  What things can I do (relaxation techniques, hobbies, physical activities, etc.) to take my mind off my problems without contacting another person? 1. Music  2. Arts and crafts  3. Walk     People and social settings that provide distraction: Who can I call or where can I go to distract me? 3. Place: Room           4. Place:[facility]      People whom I can ask for help: Who can I call when I need help - for example, friends, family, clergy, someone else? 1. Name: Gilberto Martin - skill builder      Professionals or [employer] agencies I can contact during a crisis: Who can I call for help - for example, my doctor, my psychiatrist, my psychologist, a mental health provider, a suicide hotline? 1. Clinician Name: [facility]   [address]  PQZCU: [phone]  Fax: [phone]  Crisis number:  [phone]      3. Suicide Prevention Lifeline: 2-193-222-TALK (6838)     4. Local Behavioral Health Emergency Services -  for example, [facility] Hotline: [facility]   [address]  BWXOM: [phone]  Fax: [phone]  Crisis number:  [phone]      Making the environment safe: How can I make my environment (house/apartment/living space) safer? For example, can I remove guns, medications, and other items? 1.  Take medication as prescribed.

## 2021-09-29 NOTE — PROGRESS NOTES
Problem: Depressed Mood (Adult/Pediatric)  Goal: *STG: Remains safe in hospital  Outcome: Progressing Towards Goal     Problem: Depressed Mood (Adult/Pediatric)  Goal: *STG: Attends activities and groups  Outcome: Not Progressing Towards Goal

## 2021-09-29 NOTE — BH NOTES
Assumed care of the patient. Patient was isolative to her room and appeared withdrawn. She was cooperative with the assessments but answered all the questions while she was lying in her bed with eyes closed. Patient denied S.I/H. I/A/V/H, confirmed anxiety and depression. She was medication and meal compliant. Patient could not provide the reason to why she was admitted to the hospital.     Patient was medication and meal compliant. SBP was elevated. She requested PRN medication for pain and sleep. Administered Tylenol and Trazodone at 2115. Will continue to monitor and provide support as needed. Patient appeared to be sleeping for about 8 hours.

## 2021-09-29 NOTE — PROGRESS NOTES
1230 Pt was pleasant and cooperative while on the unit. Pt ate at breakfast and lunch and remains isolative to their room after eating. Pt has commented they were looking forward to being discharged so they would not have to see their current roommate any longer. Pt denies SI, HI, AH and VH. Pt was escorted down to their transportation via wheelchair without complications. Pt had all paperwork reviewed, understood and signed. Pt was assisted into the vehicle and observed until vehicle had driven away.

## 2021-09-29 NOTE — BH NOTES
Behavioral Health Transition Record to Provider    Patient Name: Brady Castillo  YOB: 1965  Medical Record Number: [id number]  Date of Admission: 9/27/2021  Date of Discharge: 9/29/2021    Attending Provider: Sophie Urban, *  Discharging Provider: Rafiq Orozco MD  To contact this individual call [phone] and ask the  to page. If unavailable, ask to be transferred to [facility] Provider on call. [facility] Provider will be available on call 24/7 and during holidays.     Primary Care Provider: George Mishra MD    Allergies   Allergen Reactions    Amoxicillin Hives    Mushroom Flavor Hives    Tomato Hives       Reason for Admission: Psychosis     Admission Diagnosis: Bipolar 1 disorder ([facility] [F31.9]  Bipolar disorder ([facility] [facility] [F31.9]    * No surgery found *    Results for orders placed or performed during the hospital encounter of 09/27/21   GLUCOSE, POC   Result Value Ref Range    Glucose (POC) 273 (H) 65 - 117 mg/dL    Performed by [address] [place] [address], POC   Result Value Ref Range    Glucose (POC) 251 (H) 65 - 117 mg/dL    Performed by Kylah Sage (TRV RN)    GLUCOSE, POC   Result Value Ref Range    Glucose (POC) 312 (H) 65 - 117 mg/dL    Performed by Daphne Hatch (TRV RN)    GLUCOSE, POC   Result Value Ref Range    Glucose (POC) 215 (H) 65 - 117 mg/dL    Performed by Ermelinda Angulo (TRV RN)    GLUCOSE, POC   Result Value Ref Range    Glucose (POC) 260 (H) 65 - 117 mg/dL    Performed by Ermelinda Angulo (TRV RN)    GLUCOSE, POC   Result Value Ref Range    Glucose (POC) 233 (H) 65 - 117 mg/dL    Performed by Ermelinda Angulo (TRV RN)    GLUCOSE, POC   Result Value Ref Range    Glucose (POC) 239 (H) 65 - 117 mg/dL    Performed by Ermelinda Angulo (TRV RN)        Immunizations administered during this encounter:   Immunization History   Administered Date(s) Administered    Tdap 05/30/2015       Screening for Metabolic Disorders for Patients on Antipsychotic Medications  (Data obtained from the EMR)    Estimated Body Mass Index  Estimated body mass index is 41.6 kg/m² as calculated from the following:    Height as of this encounter: 5' 5\" (1.651 m). Weight as of this encounter: 113.4 kg (250 lb). Vital Signs/Blood Pressure  Visit Vitals  BP (!) 144/92 (BP Patient Position: Sitting)   Pulse 89   Temp 98.7 °F (37.1 °C)   Resp 16   Ht 5' 5\" (1.651 m)   Wt 113.4 kg (250 lb)   SpO2 98%   Breastfeeding No   BMI 41.60 kg/m²       Blood Glucose/Hemoglobin A1c  Lab Results   Component Value Date/Time    Glucose 291 (H) 09/26/2021 07:12 PM    Glucose 218 (H) 09/22/2021 05:32 AM    Glucose (POC) 239 (H) 09/29/2021 07:40 AM       Lab Results   Component Value Date/Time    Hemoglobin A1c 11.8 (H) 07/27/2021 08:09 PM        Lipid Panel  Lab Results   Component Value Date/Time    Cholesterol, total 130 09/22/2021 05:32 AM    HDL Cholesterol 32 09/22/2021 05:32 AM    LDL, calculated 49.8 09/22/2021 05:32 AM    Triglyceride 241 (H) 09/22/2021 05:32 AM    CHOL/HDL Ratio 4.1 09/22/2021 05:32 AM        Discharge Diagnosis: Please refer to physician's discharge summary. Discharge Plan:  The patient Trinidad Velez exhibits the ability to control behavior in a less restrictive environment. Patient's level of functioning is improving. No assaultive/destructive behavior has been observed for the past 24 hours. No suicidal/homicidal threat or behavior has been observed for the past 24 hours. There is no evidence of serious medication side effects. Patient has not been in physical or protective restraints for at least the past 24 hours. Discharge Medication List and Instructions:   Current Discharge Medication List      CONTINUE these medications which have NOT CHANGED    Details   traZODone (DESYREL) 50 mg tablet Take 1 Tablet by mouth nightly as needed (Insomnia).  Indications: insomnia associated with depression  Qty: 30 Tablet, Refills: 1      albuterol (Ventolin HFA) 90 mcg/actuation inhaler Take 2 Puffs by inhalation every four (4) hours as needed for Wheezing or Shortness of Breath. Indications: asthma attack  Qty: 1 Each, Refills: 1      mometasone-formoterol (Dulera) 100-5 mcg/actuation HFA inhaler Take 2 Puffs by inhalation two (2) times a day. Takes at 8:00 am and at 8:00 pm  Indications: controller medication for asthma  Qty: 1 Each, Refills: 1      loratadine (CLARITIN) 10 mg tablet Take 1 Tablet by mouth daily. Indications: Allergies  Qty: 30 Tablet, Refills: 1      risperiDONE (RisperDAL) 1 mg tablet Take 1 Tablet by mouth two (2) times a day. Indications: Thoughts  Qty: 60 Tablet, Refills: 1      sertraline (Zoloft) 25 mg tablet Take 1 Tablet by mouth daily. Indications: Mood  Qty: 30 Tablet, Refills: 1      atorvastatin (LIPITOR) 40 mg tablet Take 1 Tablet by mouth every evening. Takes at 5:00 pm  Indications: Cholesterol  Qty: 30 Tablet, Refills: 1      gabapentin (NEURONTIN) 300 mg capsule Take 1 Capsule by mouth nightly. Max Daily Amount: 300 mg. Indications: neuropathic pain  Qty: 30 Capsule, Refills: 1    Associated Diagnoses: Chronic pain syndrome      oxybutynin chloride XL (DITROPAN XL) 5 mg CR tablet Take 1 Tablet by mouth daily. Indications: overactive bladder  Qty: 30 Tablet, Refills: 1      pantoprazole (PROTONIX) 40 mg tablet Take 1 Tablet by mouth every evening. Takes at 6:30 pm   Indications: gastroesophageal reflux disease  Qty: 30 Tablet, Refills: 1      insulin glargine (LANTUS) 100 unit/mL injection 34 Units by SubCUTAneous route daily. Indications: type 2 diabetes mellitus  Qty: 10 mL, Refills: 1      insulin lispro (HUMALOG) 100 unit/mL injection 10 Units by SubCUTAneous route Before breakfast, lunch, and dinner. Indications: type 2 diabetes mellitus  Qty: 10 mL, Refills: 1             Unresulted Labs (24h ago, onward)    None        To obtain results of studies pending at discharge, please contact N/A.     Follow-up Information Follow up With Specialties Details Why [address]  Call on 9/30/2021 Please call Rapid Access phone line [phone] between 8:00AM -2:00PM to complete intake assessment for ongoing mental health case management, therapy and medication management. [address] [place] [address]   Address: [address] [facility], [address] [place] [address] Road  Phone: [phone]  Fax: [phone]  Crisis number:  [phone]       Veverly Lars   Your primary care provider will come to your group home monthly. Ximena 9    Nora Lora MD Internal Medicine   [address]  [phone]            Advanced Directive:   Does the patient have an appointed surrogate decision maker? Unknown   Does the patient have a Medical Advance Directive? Unknown   Does the patient have a Psychiatric Advance Directive? Unknown   If the patient does not have a surrogate or Medical Advance Directive AND Psychiatric Advance Directive, the patient was offered information on these advance directives. Unknown     Patient Instructions: Please continue all medications until otherwise directed by physician. Tobacco Cessation Discharge Plan:   Is the patient a smoker and needs referral for smoking cessation? No  Patient referred to the following for smoking cessation with an appointment? No   Patient was offered medication to assist with smoking cessation at discharge? No  Was education for smoking cessation added to the discharge instructions? No     Alcohol/Substance Abuse Discharge Plan:   Does the patient have a history of substance/alcohol abuse and requires a referral for treatment? Yes  Patient referred to the following for substance/alcohol abuse treatment with an appointment? Yes  Patient was offered medication to assist with alcohol cessation at discharge? No  Was education for substance/alcohol abuse added to discharge instructions?  Yes     Patient discharged to Home; provided to the patient/caregiver either in hard copy or electronically. Continuing care paperwork was faxed to community mental health providers.

## 2021-12-30 ENCOUNTER — HOSPITAL ENCOUNTER (INPATIENT)
Age: 56
LOS: 5 days | Discharge: HOME | DRG: 750 | End: 2022-01-04
Attending: EMERGENCY MEDICINE | Admitting: PSYCHIATRY & NEUROLOGY
Payer: MEDICAID

## 2021-12-30 DIAGNOSIS — F60.3 BORDERLINE PERSONALITY DISORDER (HCC): ICD-10-CM

## 2021-12-30 DIAGNOSIS — F43.21 ADJUSTMENT DISORDER WITH DEPRESSED MOOD: ICD-10-CM

## 2021-12-30 DIAGNOSIS — R45.851 SUICIDAL IDEATION: Primary | ICD-10-CM

## 2021-12-30 DIAGNOSIS — F25.1 SCHIZOAFFECTIVE DISORDER, DEPRESSIVE TYPE (HCC): ICD-10-CM

## 2021-12-30 DIAGNOSIS — N39.0 URINARY TRACT INFECTION WITHOUT HEMATURIA, SITE UNSPECIFIED: ICD-10-CM

## 2021-12-30 PROBLEM — F43.10 PTSD (POST-TRAUMATIC STRESS DISORDER): Status: ACTIVE | Noted: 2021-12-30

## 2021-12-30 PROBLEM — F32.9 MDD (MAJOR DEPRESSIVE DISORDER): Status: ACTIVE | Noted: 2021-12-30

## 2021-12-30 LAB
AMPHET UR QL SCN: NEGATIVE
ANION GAP SERPL CALC-SCNC: 7 MMOL/L (ref 5–15)
APPEARANCE UR: CLEAR
BACTERIA URNS QL MICRO: ABNORMAL /HPF
BARBITURATES UR QL SCN: NEGATIVE
BASOPHILS # BLD: 0 K/UL (ref 0–0.1)
BASOPHILS NFR BLD: 0 % (ref 0–1)
BENZODIAZ UR QL: NEGATIVE
BILIRUB UR QL: NEGATIVE
BUN SERPL-MCNC: 10 MG/DL (ref 6–20)
BUN/CREAT SERPL: 11 (ref 12–20)
CALCIUM SERPL-MCNC: 8.7 MG/DL (ref 8.5–10.1)
CANNABINOIDS UR QL SCN: NEGATIVE
CHLORIDE SERPL-SCNC: 102 MMOL/L (ref 97–108)
CO2 SERPL-SCNC: 28 MMOL/L (ref 21–32)
COCAINE UR QL SCN: NEGATIVE
COLOR UR: ABNORMAL
CREAT SERPL-MCNC: 0.93 MG/DL (ref 0.55–1.02)
DIFFERENTIAL METHOD BLD: ABNORMAL
DRUG SCRN COMMENT,DRGCM: NORMAL
EOSINOPHIL # BLD: 0.3 K/UL (ref 0–0.4)
EOSINOPHIL NFR BLD: 3 % (ref 0–7)
EPITH CASTS URNS QL MICRO: ABNORMAL /LPF
ERYTHROCYTE [DISTWIDTH] IN BLOOD BY AUTOMATED COUNT: 15.5 % (ref 11.5–14.5)
ETHANOL SERPL-MCNC: <10 MG/DL
FLUAV RNA SPEC QL NAA+PROBE: NOT DETECTED
FLUBV RNA SPEC QL NAA+PROBE: NOT DETECTED
GLUCOSE SERPL-MCNC: 244 MG/DL (ref 65–100)
GLUCOSE UR STRIP.AUTO-MCNC: 100 MG/DL
HCT VFR BLD AUTO: 34.4 % (ref 35–47)
HGB BLD-MCNC: 10.4 G/DL (ref 11.5–16)
HGB UR QL STRIP: NEGATIVE
IMM GRANULOCYTES # BLD AUTO: 0.1 K/UL (ref 0–0.04)
IMM GRANULOCYTES NFR BLD AUTO: 1 % (ref 0–0.5)
KETONES UR QL STRIP.AUTO: NEGATIVE MG/DL
LEUKOCYTE ESTERASE UR QL STRIP.AUTO: ABNORMAL
LYMPHOCYTES # BLD: 1.8 K/UL (ref 0.8–3.5)
LYMPHOCYTES NFR BLD: 20 % (ref 12–49)
MCH RBC QN AUTO: 25.5 PG (ref 26–34)
MCHC RBC AUTO-ENTMCNC: 30.2 G/DL (ref 30–36.5)
MCV RBC AUTO: 84.3 FL (ref 80–99)
METHADONE UR QL: NEGATIVE
MONOCYTES # BLD: 0.5 K/UL (ref 0–1)
MONOCYTES NFR BLD: 5 % (ref 5–13)
NEUTS SEG # BLD: 6.4 K/UL (ref 1.8–8)
NEUTS SEG NFR BLD: 71 % (ref 32–75)
NITRITE UR QL STRIP.AUTO: POSITIVE
NRBC # BLD: 0 K/UL (ref 0–0.01)
NRBC BLD-RTO: 0 PER 100 WBC
OPIATES UR QL: NEGATIVE
PCP UR QL: NEGATIVE
PH UR STRIP: 5.5 [PH] (ref 5–8)
PLATELET # BLD AUTO: 404 K/UL (ref 150–400)
PMV BLD AUTO: 9.2 FL (ref 8.9–12.9)
POTASSIUM SERPL-SCNC: 3.9 MMOL/L (ref 3.5–5.1)
PROT UR STRIP-MCNC: NEGATIVE MG/DL
RBC # BLD AUTO: 4.08 M/UL (ref 3.8–5.2)
RBC #/AREA URNS HPF: ABNORMAL /HPF (ref 0–5)
SARS-COV-2, COV2: NOT DETECTED
SODIUM SERPL-SCNC: 137 MMOL/L (ref 136–145)
SP GR UR REFRACTOMETRY: <1.005 (ref 1–1.03)
UROBILINOGEN UR QL STRIP.AUTO: 0.2 EU/DL (ref 0.2–1)
WBC # BLD AUTO: 9 K/UL (ref 3.6–11)
WBC URNS QL MICRO: ABNORMAL /HPF (ref 0–4)

## 2021-12-30 PROCEDURE — 82077 ASSAY SPEC XCP UR&BREATH IA: CPT

## 2021-12-30 PROCEDURE — 90791 PSYCH DIAGNOSTIC EVALUATION: CPT

## 2021-12-30 PROCEDURE — 99284 EMERGENCY DEPT VISIT MOD MDM: CPT

## 2021-12-30 PROCEDURE — 85025 COMPLETE CBC W/AUTO DIFF WBC: CPT

## 2021-12-30 PROCEDURE — 65270000029 HC RM PRIVATE

## 2021-12-30 PROCEDURE — 36415 COLL VENOUS BLD VENIPUNCTURE: CPT

## 2021-12-30 PROCEDURE — 80048 BASIC METABOLIC PNL TOTAL CA: CPT

## 2021-12-30 PROCEDURE — 80307 DRUG TEST PRSMV CHEM ANLYZR: CPT

## 2021-12-30 PROCEDURE — 81001 URINALYSIS AUTO W/SCOPE: CPT

## 2021-12-30 PROCEDURE — 87636 SARSCOV2 & INF A&B AMP PRB: CPT

## 2021-12-31 LAB
GLUCOSE BLD STRIP.AUTO-MCNC: 183 MG/DL (ref 65–117)
SERVICE CMNT-IMP: ABNORMAL

## 2021-12-31 PROCEDURE — 74011250637 HC RX REV CODE- 250/637: Performed by: PSYCHIATRY & NEUROLOGY

## 2021-12-31 PROCEDURE — 87077 CULTURE AEROBIC IDENTIFY: CPT

## 2021-12-31 PROCEDURE — 82962 GLUCOSE BLOOD TEST: CPT

## 2021-12-31 PROCEDURE — 74011250637 HC RX REV CODE- 250/637: Performed by: EMERGENCY MEDICINE

## 2021-12-31 PROCEDURE — 87086 URINE CULTURE/COLONY COUNT: CPT

## 2021-12-31 PROCEDURE — 65220000003 HC RM SEMIPRIVATE PSYCH

## 2021-12-31 PROCEDURE — 74011636637 HC RX REV CODE- 636/637: Performed by: PSYCHIATRY & NEUROLOGY

## 2021-12-31 PROCEDURE — 87186 SC STD MICRODIL/AGAR DIL: CPT

## 2021-12-31 PROCEDURE — 74011250637 HC RX REV CODE- 250/637

## 2021-12-31 PROCEDURE — 99223 1ST HOSP IP/OBS HIGH 75: CPT | Performed by: PSYCHIATRY & NEUROLOGY

## 2021-12-31 RX ORDER — GLIPIZIDE 5 MG/1
10 TABLET ORAL DAILY
Status: DISCONTINUED | OUTPATIENT
Start: 2022-01-01 | End: 2022-01-04 | Stop reason: HOSPADM

## 2021-12-31 RX ORDER — HYDROXYZINE 25 MG/1
50 TABLET, FILM COATED ORAL
Status: DISCONTINUED | OUTPATIENT
Start: 2021-12-31 | End: 2022-01-04 | Stop reason: HOSPADM

## 2021-12-31 RX ORDER — RISPERIDONE 2 MG/1
2 TABLET, FILM COATED ORAL 2 TIMES DAILY
Status: ON HOLD | COMMUNITY
End: 2022-05-19 | Stop reason: SDUPTHER

## 2021-12-31 RX ORDER — POLYETHYLENE GLYCOL 3350 17 G/17G
17 POWDER, FOR SOLUTION ORAL
COMMUNITY

## 2021-12-31 RX ORDER — RISPERIDONE 1 MG/1
2 TABLET, FILM COATED ORAL 2 TIMES DAILY
Status: DISCONTINUED | OUTPATIENT
Start: 2021-12-31 | End: 2022-01-04 | Stop reason: HOSPADM

## 2021-12-31 RX ORDER — HYDROXYZINE PAMOATE 50 MG/1
50 CAPSULE ORAL
COMMUNITY
End: 2022-05-19

## 2021-12-31 RX ORDER — METFORMIN HYDROCHLORIDE 1000 MG/1
1000 TABLET ORAL 2 TIMES DAILY WITH MEALS
COMMUNITY
End: 2022-05-19

## 2021-12-31 RX ORDER — LISINOPRIL 2.5 MG/1
2.5 TABLET ORAL DAILY
COMMUNITY

## 2021-12-31 RX ORDER — LINAGLIPTIN 5 MG/1
5 TABLET, FILM COATED ORAL DAILY
COMMUNITY

## 2021-12-31 RX ORDER — CETIRIZINE HCL 10 MG
10 TABLET ORAL DAILY
Status: DISCONTINUED | OUTPATIENT
Start: 2022-01-01 | End: 2022-01-04 | Stop reason: HOSPADM

## 2021-12-31 RX ORDER — SERTRALINE HYDROCHLORIDE 50 MG/1
50 TABLET, FILM COATED ORAL 2 TIMES DAILY
Status: ON HOLD | COMMUNITY
End: 2022-05-19 | Stop reason: SDUPTHER

## 2021-12-31 RX ORDER — ALOGLIPTIN 12.5 MG/1
25 TABLET, FILM COATED ORAL DAILY
Status: DISCONTINUED | OUTPATIENT
Start: 2022-01-01 | End: 2022-01-04 | Stop reason: HOSPADM

## 2021-12-31 RX ORDER — SERTRALINE HYDROCHLORIDE 50 MG/1
50 TABLET, FILM COATED ORAL 2 TIMES DAILY
Status: DISCONTINUED | OUTPATIENT
Start: 2021-12-31 | End: 2022-01-04 | Stop reason: HOSPADM

## 2021-12-31 RX ORDER — ADHESIVE BANDAGE
30 BANDAGE TOPICAL DAILY PRN
Status: DISCONTINUED | OUTPATIENT
Start: 2021-12-31 | End: 2022-01-04 | Stop reason: HOSPADM

## 2021-12-31 RX ORDER — CEPHALEXIN 500 MG/1
500 CAPSULE ORAL
Status: COMPLETED | OUTPATIENT
Start: 2021-12-31 | End: 2021-12-31

## 2021-12-31 RX ORDER — BUPROPION HYDROCHLORIDE 150 MG/1
150 TABLET ORAL DAILY
Status: DISCONTINUED | OUTPATIENT
Start: 2022-01-01 | End: 2022-01-04 | Stop reason: HOSPADM

## 2021-12-31 RX ORDER — BUPROPION HYDROCHLORIDE 150 MG/1
150 TABLET ORAL DAILY
COMMUNITY
End: 2022-05-19

## 2021-12-31 RX ORDER — ALBUTEROL SULFATE 90 UG/1
2 AEROSOL, METERED RESPIRATORY (INHALATION)
Status: DISCONTINUED | OUTPATIENT
Start: 2021-12-31 | End: 2022-01-04 | Stop reason: HOSPADM

## 2021-12-31 RX ORDER — MAGNESIUM SULFATE 100 %
4 CRYSTALS MISCELLANEOUS AS NEEDED
Status: DISCONTINUED | OUTPATIENT
Start: 2021-12-31 | End: 2022-01-04 | Stop reason: HOSPADM

## 2021-12-31 RX ORDER — GLIPIZIDE 10 MG/1
10 TABLET ORAL DAILY
COMMUNITY
End: 2022-05-19

## 2021-12-31 RX ORDER — BENZTROPINE MESYLATE 1 MG/1
1 TABLET ORAL
Status: DISCONTINUED | OUTPATIENT
Start: 2021-12-31 | End: 2022-01-04 | Stop reason: HOSPADM

## 2021-12-31 RX ORDER — OLANZAPINE 5 MG/1
5 TABLET ORAL
Status: DISCONTINUED | OUTPATIENT
Start: 2021-12-31 | End: 2022-01-04 | Stop reason: HOSPADM

## 2021-12-31 RX ORDER — LOVASTATIN 20 MG/1
20 TABLET ORAL
COMMUNITY
End: 2022-01-04

## 2021-12-31 RX ORDER — CEPHALEXIN 500 MG/1
500 CAPSULE ORAL EVERY 6 HOURS
Status: DISCONTINUED | OUTPATIENT
Start: 2021-12-31 | End: 2022-01-04 | Stop reason: HOSPADM

## 2021-12-31 RX ORDER — LORAZEPAM 2 MG/ML
1 INJECTION INTRAMUSCULAR
Status: DISCONTINUED | OUTPATIENT
Start: 2021-12-31 | End: 2022-01-04 | Stop reason: HOSPADM

## 2021-12-31 RX ORDER — IBUPROFEN 400 MG/1
400 TABLET ORAL
Status: DISCONTINUED | OUTPATIENT
Start: 2021-12-31 | End: 2022-01-04 | Stop reason: HOSPADM

## 2021-12-31 RX ORDER — ATORVASTATIN CALCIUM 40 MG/1
40 TABLET, FILM COATED ORAL EVERY EVENING
Status: DISCONTINUED | OUTPATIENT
Start: 2021-12-31 | End: 2022-01-04 | Stop reason: HOSPADM

## 2021-12-31 RX ORDER — METFORMIN HYDROCHLORIDE 500 MG/1
1000 TABLET ORAL 2 TIMES DAILY WITH MEALS
Status: DISCONTINUED | OUTPATIENT
Start: 2021-12-31 | End: 2022-01-04 | Stop reason: HOSPADM

## 2021-12-31 RX ORDER — OXYBUTYNIN CHLORIDE 5 MG/1
5 TABLET, EXTENDED RELEASE ORAL DAILY
Status: DISCONTINUED | OUTPATIENT
Start: 2022-01-01 | End: 2022-01-04 | Stop reason: HOSPADM

## 2021-12-31 RX ORDER — ACETAMINOPHEN 325 MG/1
650 TABLET ORAL
Status: DISCONTINUED | OUTPATIENT
Start: 2021-12-31 | End: 2022-01-04 | Stop reason: HOSPADM

## 2021-12-31 RX ORDER — DIPHENHYDRAMINE HYDROCHLORIDE 50 MG/ML
50 INJECTION, SOLUTION INTRAMUSCULAR; INTRAVENOUS
Status: DISCONTINUED | OUTPATIENT
Start: 2021-12-31 | End: 2022-01-04 | Stop reason: HOSPADM

## 2021-12-31 RX ORDER — CETIRIZINE HCL 10 MG
10 TABLET ORAL DAILY
COMMUNITY

## 2021-12-31 RX ORDER — HALOPERIDOL 5 MG/ML
5 INJECTION INTRAMUSCULAR
Status: DISCONTINUED | OUTPATIENT
Start: 2021-12-31 | End: 2022-01-04 | Stop reason: HOSPADM

## 2021-12-31 RX ORDER — LANOLIN ALCOHOL/MO/W.PET/CERES
325 CREAM (GRAM) TOPICAL
Status: DISCONTINUED | OUTPATIENT
Start: 2022-01-01 | End: 2022-01-04 | Stop reason: HOSPADM

## 2021-12-31 RX ORDER — IBUPROFEN 200 MG
1 TABLET ORAL EVERY 24 HOURS
COMMUNITY

## 2021-12-31 RX ORDER — DEXTROSE 50 % IN WATER (D50W) INTRAVENOUS SYRINGE
12.5-25 AS NEEDED
Status: DISCONTINUED | OUTPATIENT
Start: 2021-12-31 | End: 2022-01-04 | Stop reason: HOSPADM

## 2021-12-31 RX ORDER — TRAZODONE HYDROCHLORIDE 50 MG/1
50 TABLET ORAL
Status: DISCONTINUED | OUTPATIENT
Start: 2021-12-31 | End: 2022-01-04 | Stop reason: HOSPADM

## 2021-12-31 RX ORDER — LISINOPRIL 5 MG/1
2.5 TABLET ORAL DAILY
Status: DISCONTINUED | OUTPATIENT
Start: 2022-01-01 | End: 2022-01-04 | Stop reason: HOSPADM

## 2021-12-31 RX ORDER — LANOLIN ALCOHOL/MO/W.PET/CERES
325 CREAM (GRAM) TOPICAL
COMMUNITY

## 2021-12-31 RX ADMIN — Medication 6 UNITS: at 17:00

## 2021-12-31 RX ADMIN — SERTRALINE HYDROCHLORIDE 50 MG: 50 TABLET ORAL at 16:58

## 2021-12-31 RX ADMIN — ATORVASTATIN CALCIUM 40 MG: 40 TABLET, FILM COATED ORAL at 16:58

## 2021-12-31 RX ADMIN — METFORMIN HYDROCHLORIDE 1000 MG: 500 TABLET ORAL at 16:58

## 2021-12-31 RX ADMIN — CEPHALEXIN 500 MG: 500 CAPSULE ORAL at 12:52

## 2021-12-31 RX ADMIN — CEPHALEXIN 500 MG: 500 CAPSULE ORAL at 16:58

## 2021-12-31 RX ADMIN — HYDROXYZINE HYDROCHLORIDE 50 MG: 25 TABLET, FILM COATED ORAL at 02:39

## 2021-12-31 RX ADMIN — ACETAMINOPHEN 650 MG: 325 TABLET ORAL at 02:38

## 2021-12-31 RX ADMIN — CEPHALEXIN 500 MG: 500 CAPSULE ORAL at 00:38

## 2021-12-31 RX ADMIN — RISPERIDONE 2 MG: 1 TABLET ORAL at 16:58

## 2021-12-31 NOTE — BSMART NOTE
Patient has been accepted to [address] [place] [address] by Gilbert Zamarripa NP for Janis Ng MD. NP is requesting patient be treated for what looks like a UTI prior to transferring to [facility]. Spoke with Army Lees and she will talk to ER provider.     Andi Balderas LPC LSWest [facility]

## 2021-12-31 NOTE — ED NOTES
Pt arrived via EMS for SI, \"I want to get hit by a car,\" x 2-3 days, PMHx of bipolar and schizophrenia, denies HI. Pt reports last use of cocaine and marijuana x 6 months ago. Visual hallucinations, \"telling me to do bad things, to hang myself,\" unable to sleep due to auditory hallucinations. Pt also reports etoh use. Charge nurse made aware that patient is high risk and constant observer is needed at this time. Emergency Department Nursing Plan of Care       The Nursing Plan of Care is developed from the Nursing assessment and Emergency Department Attending provider initial evaluation. The plan of care may be reviewed in the ED Provider note.     The Plan of Care was developed with the following considerations:   Patient / Family readiness to learn indicated by:verbalized understanding  Persons(s) to be included in education: patient  Barriers to Learning/Limitations:No    [id number] [facility]    12/30/2021   9:53 PM

## 2021-12-31 NOTE — BH NOTES
Behavioral Health Treatment Team Note      Patient goal(s) for today: Complete ADL's, communicate needs to staff, take medications as scheduled, rest  Treatment team focus/goals: Discuss reason for admission and discuss medication management  Progress note: Pt met with treatment team for first time since admission. Pt mood and affect appear flat. Pt reports having a hard time keeping her   out of her head, stating she misses him and has been struggling with his passing that took place 3 years ago. Pt states she hears voices in the walls. Pt reports financial concern because she had to pay an outstanding bill with a nursing home which took up money that would be used to pay rent with [facility], she does not fear being evicted at this time.  Pt is requesting medication adjustment and hopes to be discharged early next week.     LOS: 1            Expected LOS: TBD     Financial concerns/prescription coverage: [facility]  Date of last family contact:  None                               Family requesting physician contact today: No   Discharge plan: TBD  Guns in the home: No                                                        Outpatient provider(s): To be linked     Participating treatment team members: Mildred Fransisco Nissen, MD Carrington Essex, Social Work Case Management

## 2021-12-31 NOTE — PROGRESS NOTES
[facility] Admission Pharmacy Medication Reconciliation     Information obtained from: Medication list from Cezar Estrada ([phone]), Maya Greene with [facility] ([phone])  RxQuery data available1: Yes (limited)    Comments/recommendations:    Patient was recently admitted at [facility]. At that time, patient was getting insulin glargine during the hospitalization but it not listed on current medication list from outpatient pharmacy. The [place] Prescription Monitoring Program () was accessed to determine fill history of any controlled medications. She was previously on gabapentin, last filled gabapentin 300 mg #30 for 30 DS on 10/8/21. Medication changes (since last review): Added  Bupropion XL  Cetirizine  Ferrous sulfate  Glipizide  Regular insulin   Lisinopril  Lovastatin  Metformin  Nicotine patch  Linagliptin  Removed  Gabapentin  Insulin glargine  Insulin lispro  Loratadine  Mometasone-formoterol  Pantoprazole  Adjusted  Sertraline dose  Risperidone dose       1RxQuery pharmacy benefit data reflects medications filled and processed through the patient's insurance, however                this data does NOT capture whether the medication was picked up or is currently being taken by the patient. Total Time Spent: 30 minutes    Patient allergies: Allergies as of 12/30/2021 - Fully Reviewed 12/30/2021   Allergen Reaction Noted    Amoxicillin Hives 06/13/2017    Mushroom flavor Hives 01/12/2012    Tomato Hives 01/12/2012       Prior to Admission Medications   Prescriptions Last Dose Informant Patient Reported? Taking? albuterol (Ventolin HFA) 90 mcg/actuation inhaler  Other No Yes   Sig: Take 2 Puffs by inhalation every four (4) hours as needed for Wheezing or Shortness of Breath. Indications: asthma attack   atorvastatin (LIPITOR) 40 mg tablet  Other No Yes   Sig: Take 1 Tablet by mouth every evening.  Takes at 5:00 pm Indications: Cholesterol   buPROPion XL (WELLBUTRIN XL) 150 mg tablet  Other Yes Yes   Sig: Take 150 mg by mouth daily. Indications: major depressive disorder   cetirizine (ZyrTEC) 10 mg tablet  Other Yes Yes   Sig: Take 10 mg by mouth daily. ferrous sulfate 325 mg (65 mg iron) tablet  Other Yes Yes   Sig: Take 325 mg by mouth Daily (before breakfast). glipiZIDE (GlucotroL) 10 mg tablet  Other Yes Yes   Sig: Take 10 mg by mouth daily. Indications: type 2 diabetes mellitus   hydrOXYzine pamoate (VistariL) 50 mg capsule  Other Yes Yes   Sig: Take 50 mg by mouth every four (4) hours as needed for Anxiety. insulin regular (HumuLIN R Regular U-100 Insuln) 100 unit/mL injection  Other Yes Yes   Si Units by SubCUTAneous route Before breakfast, lunch, dinner and at bedtime. linaGLIPtin (Tradjenta) 5 mg tablet  Other Yes Yes   Sig: Take 5 mg by mouth daily. Indications: type 2 diabetes mellitus   lisinopriL (PRINIVIL, ZESTRIL) 2.5 mg tablet  Other Yes Yes   Sig: Take 2.5 mg by mouth daily. Indications: kidney disease from diabetes, high blood pressure   lovastatin (MEVACOR) 20 mg tablet  Other Yes Yes   Sig: Take 20 mg by mouth nightly. Indications: high cholesterol   metFORMIN (GLUCOPHAGE) 1,000 mg tablet  Other Yes Yes   Sig: Take 1,000 mg by mouth two (2) times daily (with meals). Indications: type 2 diabetes mellitus   nicotine (NICODERM CQ) 21 mg/24 hr  Other Yes Yes   Si Patch by TransDERmal route every twenty-four (24) hours. oxybutynin chloride XL (DITROPAN XL) 5 mg CR tablet  Other No Yes   Sig: Take 1 Tablet by mouth daily. Indications: overactive bladder   polyethylene glycol (Miralax) 17 gram packet  Other Yes Yes   Sig: Take 17 g by mouth daily as needed for Constipation. Indications: constipation   risperiDONE (RisperDAL) 2 mg tablet  Other Yes Yes   Sig: Take 2 mg by mouth two (2) times a day. sertraline (ZOLOFT) 50 mg tablet  Other Yes Yes   Sig: Take 50 mg by mouth two (2) times a day. traZODone (DESYREL) 50 mg tablet  Other No Yes   Sig: Take 1 Tablet by mouth nightly as needed (Insomnia).  Indications: insomnia associated with depression      Facility-Administered Medications: None          Thank you,  SIVAKUMAR [facility], [address] [place] [address] Nw: [phone] ([id number])  Pharmacy: [phone] ([id number])
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
## 2021-12-31 NOTE — PROGRESS NOTES
Problem: Discharge Planning  Goal: *Discharge to safe environment  Outcome: Progressing Towards Goal     Problem: Discharge Planning  Goal: *Knowledge of medication management  Outcome: Not Progressing Towards Goal  Goal: *Knowledge of discharge instructions  Outcome: Not Progressing Towards Goal

## 2021-12-31 NOTE — ED NOTES
TRANSFER - OUT REPORT:    Verbal report given to Devyn Burks RN(name) on [employer]  being transferred to 311(unit) for routine progression of care       Report consisted of patients Situation, Background, Assessment and   Recommendations(SBAR). Information from the following report(s) SBAR, Kardex, ED Summary, [facility] and Recent Results was reviewed with the receiving nurse. Lines:       Opportunity for questions and clarification was provided.       Patient transported with:   Corona Galvin

## 2021-12-31 NOTE — PROGRESS NOTES
Assumed care of pt lying in bed, easy to wake. NAD. Pt denies si/hi/vh. Pt endorses depression and AH, states she hears her  and voices in the walls. AOx4. Cooperative, pleasant. Med and meal compliant. Mood is depressed with flat affect. Pt isolative to room. No behavioral issues noted. Problem: Falls - Risk of  Goal: *Absence of Falls  Description: Document Alexx Aragon Fall Risk and appropriate interventions in the flowsheet.   Outcome: Progressing Towards Goal  Note: Fall Risk Interventions:     Medication Interventions: Teach patient to arise slowly       Problem: Depressed Mood (Adult/Pediatric)  Goal: *STG: Complies with medication therapy  Outcome: Progressing Towards Goal
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
## 2021-12-31 NOTE — ED NOTES
Plan to admit pt per German Mendez w/ [facility]

## 2021-12-31 NOTE — PROGRESS NOTES
Problem: Falls - Risk of  Goal: *Absence of Falls  Description: Document Pedro Luis French Fall Risk and appropriate interventions in the flowsheet.   Outcome: Progressing Towards Goal  Note: Fall Risk Interventions:            Medication Interventions: Patient to call before getting OOB

## 2021-12-31 NOTE — ED NOTES
Supervisor aware that pt is high alert; no sitters available; pt in visible room w/ items collected.

## 2021-12-31 NOTE — BH NOTES
Patient is a 60year old  woman with the diagnosis of Major depressive disorders admitted into the unit at 12. Patient was reported to have reported to the ER with the the complaint of suicidal ideation of about 3 days during and the plan is to jump in front of a moving car. Patient admission status is voluntary. On assessment, patient is alert and oriented x 4, calm & cooperative, denies suicidal and homicidal ideations, visual hallucinations, reports anxiety and depression at the level of 8/10, endorse auditory hallucination \" I hear voices to hurt myself\", bilateral leg pain with the pain score of 8/10. Patient reports she feels depressed because 12/12/2008 marks the 3rd year anniversary 's death. .   Dual skin assessment performed as per protocol, redness on bilateral under breasts and groin possible yeast infection. 0300: As needed Atarax 50 mg and Tylenol 650 mg administered. Monitoring continues. 0600: Patient observe to appear asleep for 3.25 hours.

## 2022-01-01 LAB
GLUCOSE BLD STRIP.AUTO-MCNC: 133 MG/DL (ref 65–117)
GLUCOSE BLD STRIP.AUTO-MCNC: 169 MG/DL (ref 65–117)
GLUCOSE BLD STRIP.AUTO-MCNC: 240 MG/DL (ref 65–117)
SERVICE CMNT-IMP: ABNORMAL

## 2022-01-01 PROCEDURE — 74011636637 HC RX REV CODE- 636/637: Performed by: PSYCHIATRY & NEUROLOGY

## 2022-01-01 PROCEDURE — 82962 GLUCOSE BLOOD TEST: CPT

## 2022-01-01 PROCEDURE — 65220000003 HC RM SEMIPRIVATE PSYCH

## 2022-01-01 PROCEDURE — 99232 SBSQ HOSP IP/OBS MODERATE 35: CPT | Performed by: PSYCHIATRY & NEUROLOGY

## 2022-01-01 PROCEDURE — 74011250637 HC RX REV CODE- 250/637: Performed by: STUDENT IN AN ORGANIZED HEALTH CARE EDUCATION/TRAINING PROGRAM

## 2022-01-01 PROCEDURE — 74011250637 HC RX REV CODE- 250/637: Performed by: PSYCHIATRY & NEUROLOGY

## 2022-01-01 RX ORDER — NYSTATIN 100000 [USP'U]/G
POWDER TOPICAL 2 TIMES DAILY
Status: DISCONTINUED | OUTPATIENT
Start: 2022-01-01 | End: 2022-01-04 | Stop reason: HOSPADM

## 2022-01-01 RX ADMIN — CEPHALEXIN 500 MG: 500 CAPSULE ORAL at 00:19

## 2022-01-01 RX ADMIN — OXYBUTYNIN CHLORIDE 5 MG: 5 TABLET, EXTENDED RELEASE ORAL at 09:08

## 2022-01-01 RX ADMIN — Medication 6 UNITS: at 17:22

## 2022-01-01 RX ADMIN — ATORVASTATIN CALCIUM 40 MG: 40 TABLET, FILM COATED ORAL at 17:15

## 2022-01-01 RX ADMIN — Medication 6 UNITS: at 09:15

## 2022-01-01 RX ADMIN — CETIRIZINE HYDROCHLORIDE 10 MG: 10 TABLET, FILM COATED ORAL at 09:15

## 2022-01-01 RX ADMIN — Medication 6 UNITS: at 11:41

## 2022-01-01 RX ADMIN — ALOGLIPTIN 25 MG: 12.5 TABLET, FILM COATED ORAL at 09:15

## 2022-01-01 RX ADMIN — CEPHALEXIN 500 MG: 500 CAPSULE ORAL at 11:41

## 2022-01-01 RX ADMIN — METFORMIN HYDROCHLORIDE 1000 MG: 500 TABLET ORAL at 09:07

## 2022-01-01 RX ADMIN — BUPROPION HYDROCHLORIDE 150 MG: 150 TABLET, EXTENDED RELEASE ORAL at 09:37

## 2022-01-01 RX ADMIN — SERTRALINE HYDROCHLORIDE 50 MG: 50 TABLET ORAL at 17:15

## 2022-01-01 RX ADMIN — SERTRALINE HYDROCHLORIDE 50 MG: 50 TABLET ORAL at 09:07

## 2022-01-01 RX ADMIN — METFORMIN HYDROCHLORIDE 1000 MG: 500 TABLET ORAL at 17:15

## 2022-01-01 RX ADMIN — CEPHALEXIN 500 MG: 500 CAPSULE ORAL at 06:17

## 2022-01-01 RX ADMIN — FERROUS SULFATE TAB 325 MG (65 MG ELEMENTAL FE) 325 MG: 325 (65 FE) TAB at 06:17

## 2022-01-01 RX ADMIN — GLIPIZIDE 10 MG: 5 TABLET ORAL at 09:07

## 2022-01-01 RX ADMIN — RISPERIDONE 2 MG: 1 TABLET ORAL at 17:15

## 2022-01-01 RX ADMIN — LISINOPRIL 2.5 MG: 5 TABLET ORAL at 09:08

## 2022-01-01 RX ADMIN — RISPERIDONE 2 MG: 1 TABLET ORAL at 09:07

## 2022-01-01 RX ADMIN — NYSTATIN: 100000 POWDER TOPICAL at 17:22

## 2022-01-01 RX ADMIN — NYSTATIN: 100000 POWDER TOPICAL at 11:41

## 2022-01-01 RX ADMIN — CEPHALEXIN 500 MG: 500 CAPSULE ORAL at 17:15

## 2022-01-01 NOTE — PROGRESS NOTES
Received pt lying in bed, awake with eyes closed. NAD. Pt denies si/hi/vh. Pt endorses depression and AH, states she hears her  and voices in the walls. AOx4. Cooperative, pleasant. Med and meal compliant. Mood is depressed with dull affect. Pt isolative to room. No behavioral issues noted. Problem: Falls - Risk of  Goal: *Absence of Falls  Description: Document Gail Marking Fall Risk and appropriate interventions in the flowsheet.   Outcome: Progressing Towards Goal  Note: Fall Risk Interventions:     Medication Interventions: Teach patient to arise slowly       History of Falls Interventions: Door open when patient unattended       Problem: Depressed Mood (Adult/Pediatric)  Goal: *STG: Complies with medication therapy  Outcome: Progressing Towards Goal

## 2022-01-01 NOTE — H&P
INITIAL PSYCHIATRIC EVALUATION            IDENTIFICATION:    Patient Name  Angi Allen   Date of Birth 1965   [facility] [id number]   Medical Record Number  [id number]      Age  60 y.o. PCP Brice Melvin MD   Admit date:  2021    Room Number    @ [facility]   Date of Service  2021            HISTORY         REASON FOR HOSPITALIZATION:  CC: \"suicidal ideation\". Pt admitted under a voluntary basis for suicidal ideations proving to be an imminent danger to self and an inability to care for self. HISTORY OF PRESENT ILLNESS:    The patient, Angi Allen, is a 60 y.o. WHITE/NON- female with a past psychiatric history significant for schizoaffective disorder and borderline personality disorder, who presents at this time with complaints of (and/or evidence of) the following emotional symptoms: depression and suicidal thoughts/threats. Additional symptomatology include poor self care. The above symptoms have been present for 2+ weeks. These symptoms are of moderate to high severity. These symptoms are intermittent/ fleeting in nature. The patient's condition has been precipitated by psychosocial stressors. UDS: negative; BAL=0. The patient is well known to the unit as she presents with similar complaints frequently. She reports on assessment that she is suicidal thinking of her   who  several years ago. She typically stays on the unit while in crisis and is able to discharge when these subside with supportive care and group therapy. The patient is a fair historian. The patient corroborates the above narrative. The patient contracts for safety on the unit and gives consent for the team to contact collateral. The patient is amenable to initiating treatment while on the unit. She is eager to restart her home regimen and c/o bilateral breast and groin area rash.       ALLERGIES:   Allergies   Allergen Reactions    Amoxicillin Hives    Mushroom Flavor Hives    Tomato Hives      MEDICATIONS PRIOR TO ADMISSION:   Medications Prior to Admission   Medication Sig    buPROPion XL (WELLBUTRIN XL) 150 mg tablet Take 150 mg by mouth daily. Indications: major depressive disorder    cetirizine (ZyrTEC) 10 mg tablet Take 10 mg by mouth daily.  ferrous sulfate 325 mg (65 mg iron) tablet Take 325 mg by mouth Daily (before breakfast).  glipiZIDE (GlucotroL) 10 mg tablet Take 10 mg by mouth daily. Indications: type 2 diabetes mellitus    insulin regular (HumuLIN R Regular U-100 Insuln) 100 unit/mL injection 6 Units by SubCUTAneous route Before breakfast, lunch, dinner and at bedtime.  lisinopriL (PRINIVIL, ZESTRIL) 2.5 mg tablet Take 2.5 mg by mouth daily. Indications: kidney disease from diabetes, high blood pressure    lovastatin (MEVACOR) 20 mg tablet Take 20 mg by mouth nightly. Indications: high cholesterol    nicotine (NICODERM CQ) 21 mg/24 hr 1 Patch by TransDERmal route every twenty-four (24) hours.  metFORMIN (GLUCOPHAGE) 1,000 mg tablet Take 1,000 mg by mouth two (2) times daily (with meals). Indications: type 2 diabetes mellitus    risperiDONE (RisperDAL) 2 mg tablet Take 2 mg by mouth two (2) times a day.  sertraline (ZOLOFT) 50 mg tablet Take 50 mg by mouth two (2) times a day.  linaGLIPtin (Tradjenta) 5 mg tablet Take 5 mg by mouth daily. Indications: type 2 diabetes mellitus    hydrOXYzine pamoate (VistariL) 50 mg capsule Take 50 mg by mouth every four (4) hours as needed for Anxiety.  polyethylene glycol (Miralax) 17 gram packet Take 17 g by mouth daily as needed for Constipation. Indications: constipation    traZODone (DESYREL) 50 mg tablet Take 1 Tablet by mouth nightly as needed (Insomnia). Indications: insomnia associated with depression    albuterol (Ventolin HFA) 90 mcg/actuation inhaler Take 2 Puffs by inhalation every four (4) hours as needed for Wheezing or Shortness of Breath.  Indications: asthma attack    atorvastatin (LIPITOR) 40 mg tablet Take 1 Tablet by mouth every evening. Takes at 5:00 pm  Indications: Cholesterol    oxybutynin chloride XL (DITROPAN XL) 5 mg CR tablet Take 1 Tablet by mouth daily. Indications: overactive bladder      PAST MEDICAL HISTORY:   Past Medical History:   Diagnosis Date    Arthritis     legs    [facility]     COPD     Depression 1/13/2012    Diabetes ([facility]     Drug abuse (HCC)     cocaine, stimulants, etoh, mj, tob    H/O suicide attempt     Hypertension     Obesity     Polydrug dependence excluding opioid type drug, abuse ([facility]     PTSD (post-traumatic stress disorder) 12/30/2021    Schizophrenia ([facility] 7/8/2016     Past Surgical History:   Procedure Laterality Date    HX ORTHOPAEDIC      foot sx      SOCIAL HISTORY:  The patient is currently on disability; the patient is not a smoker; the patient's marital status is ; the patient does not have children; the patient reports the highest level of education achieved is 12th grade (special education). FAMILY HISTORY: History reviewed, pertinent family history as below:   Family History   Problem Relation Age of Onset    Diabetes Mother     Stroke Mother     Stroke Father        REVIEW OF SYSTEMS:   Pertinent items are noted in the History of Present Illness. All other Systems reviewed and are considered negative. MENTAL STATUS EXAM & VITALS     MENTAL STATUS EXAM (MSE):    MSE FINDINGS ARE WITHIN NORMAL LIMITS (WNL) UNLESS OTHERWISE STATED BELOW. ( ALL OF THE BELOW CATEGORIES OF THE MSE HAVE BEEN REVIEWED (reviewed 12/31/2021) AND UPDATED AS DEEMED APPROPRIATE )  General Presentation age appropriate and overweight, cooperative   Orientation Alert and Oriented x 2   Vital Signs  See below (reviewed 12/31/2021); Vital Signs (BP, Pulse, & Temp) are within normal limits if not listed below.    Gait and Station Stable/steady, no ataxia   Musculoskeletal System No extrapyramidal symptoms (EPS); no abnormal muscular movements or Tardive Dyskinesia (TD); muscle strength and tone are within normal limits   Language No aphasia or dysarthria   Speech:  normal volume and non-pressured   Thought Processes simple; normal rate of thoughts; fair abstract reasoning/computation   Thought Associations circumstantial   Thought Content preoccupations   Suicidal Ideations contracts for safety   Homicidal Ideations none   Mood:  anhedonia   Affect:  constricted and mood-congruent   Memory recent  fair   Memory remote:  intact   Concentration/Attention:  intact   Fund of Knowledge below average   Insight:  limited   Reliability fair   Judgment:  poor          VITALS:     Patient Vitals for the past 24 hrs:   Temp Pulse Resp BP SpO2   12/31/21 0931 98.1 °F (36.7 °C) 100 16 (!) 159/81 95 %   12/31/21 0140 98.2 °F (36.8 °C) 90 16 (!) 149/83 97 %   12/31/21 0104 98 °F (36.7 °C) 88 16 109/61 100 %   12/30/21 2130 97.5 °F (36.4 °C) 86 19 (!) 147/76 96 %     Wt Readings from Last 3 Encounters:   12/30/21 104.3 kg (230 lb)   09/27/21 113.4 kg (250 lb)   09/26/21 113.7 kg (250 lb 10.6 oz)     Temp Readings from Last 3 Encounters:   12/31/21 98.1 °F (36.7 °C)   09/29/21 98.7 °F (37.1 °C)   09/27/21 97.7 °F (36.5 °C)     BP Readings from Last 3 Encounters:   12/31/21 (!) 159/81   09/29/21 (!) 144/92   09/27/21 (!) 149/84     Pulse Readings from Last 3 Encounters:   12/31/21 100   09/29/21 89   09/27/21 82            DATA     LABORATORY DATA:  Labs Reviewed   CBC WITH AUTOMATED DIFF - Abnormal; Notable for the following components:       Result Value    HGB 10.4 (*)     HCT 34.4 (*)     MCH 25.5 (*)     RDW 15.5 (*)     PLATELET 595 (*)     IMMATURE GRANULOCYTES 1 (*)     ABS. IMM.  GRANS. 0.1 (*)     All other components within normal limits   METABOLIC PANEL, BASIC - Abnormal; Notable for the following components:    Glucose 244 (*)     BUN/Creatinine ratio 11 (*)     All other components within normal limits   URINALYSIS W/ RFLX MICROSCOPIC - Abnormal; Notable for the following components:    Glucose 100 (*)     Nitrites Positive (*)     Leukocyte Esterase TRACE (*)     All other components within normal limits   URINE MICROSCOPIC ONLY - Abnormal; Notable for the following components:    Bacteria 4+ (*)     All other components within normal limits   GLUCOSE, POC - Abnormal; Notable for the following components:    Glucose (POC) 183 (*)     All other components within normal limits   COVID-19 WITH INFLUENZA A/B   CULTURE, URINE   DRUG SCREEN, URINE   ETHYL ALCOHOL     Admission on 12/30/2021   Component Date Value Ref Range Status    WBC 12/30/2021 9.0  3.6 - 11.0 K/uL Final    RBC 12/30/2021 4.08  3.80 - 5.20 M/uL Final    HGB 12/30/2021 10.4* 11.5 - 16.0 g/dL Final    HCT 12/30/2021 34.4* 35.0 - 47.0 % Final    MCV 12/30/2021 84.3  80.0 - 99.0 FL Final    MCH 12/30/2021 25.5* 26.0 - 34.0 PG Final    MCHC 12/30/2021 30.2  30.0 - 36.5 g/dL Final    RDW 12/30/2021 15.5* 11.5 - 14.5 % Final    PLATELET 61/53/4275 152* 150 - 400 K/uL Final    MPV 12/30/2021 9.2  8.9 - 12.9 FL Final    NRBC 12/30/2021 0.0  0  WBC Final    ABSOLUTE NRBC 12/30/2021 0.00  0.00 - 0.01 K/uL Final    NEUTROPHILS 12/30/2021 71  32 - 75 % Final    LYMPHOCYTES 12/30/2021 20  12 - 49 % Final    MONOCYTES 12/30/2021 5  5 - 13 % Final    EOSINOPHILS 12/30/2021 3  0 - 7 % Final    BASOPHILS 12/30/2021 0  0 - 1 % Final    IMMATURE GRANULOCYTES 12/30/2021 1* 0.0 - 0.5 % Final    ABS. NEUTROPHILS 12/30/2021 6.4  1.8 - 8.0 K/UL Final    ABS. LYMPHOCYTES 12/30/2021 1.8  0.8 - 3.5 K/UL Final    ABS. MONOCYTES 12/30/2021 0.5  0.0 - 1.0 K/UL Final    ABS. EOSINOPHILS 12/30/2021 0.3  0.0 - 0.4 K/UL Final    ABS. BASOPHILS 12/30/2021 0.0  0.0 - 0.1 K/UL Final    ABS. IMM. GRANS.  12/30/2021 0.1* 0.00 - 0.04 K/UL Final    DF 12/30/2021 AUTOMATED    Final    Sodium 12/30/2021 137  136 - 145 mmol/L Final    Potassium 12/30/2021 3.9  3.5 - 5.1 mmol/L Final    Chloride 12/30/2021 102  97 - 108 mmol/L Final    CO2 12/30/2021 28  21 - 32 mmol/L Final    Anion gap 12/30/2021 7  5 - 15 mmol/L Final    Glucose 12/30/2021 244* 65 - 100 mg/dL Final    BUN 12/30/2021 10  6 - 20 MG/DL Final    Creatinine 12/30/2021 0.93  0.55 - 1.02 MG/DL Final    BUN/Creatinine ratio 12/30/2021 11* 12 - 20   Final    GFR est AA 12/30/2021 >60  >60 ml/min/1.73m2 Final    GFR est non-AA 12/30/2021 >60  >60 ml/min/1.73m2 Final    Calcium 12/30/2021 8.7  8.5 - 10.1 MG/DL Final    Color 12/30/2021 YELLOW/STRAW    Final    Appearance 12/30/2021 CLEAR  CLEAR   Final    Specific gravity 12/30/2021 <1.005  1.003 - 1.030 Final    pH (UA) 12/30/2021 5.5  5.0 - 8.0   Final    Protein 12/30/2021 Negative  NEG mg/dL Final    Glucose 12/30/2021 100* NEG mg/dL Final    Ketone 12/30/2021 Negative  NEG mg/dL Final    Bilirubin 12/30/2021 Negative  NEG   Final    Blood 12/30/2021 Negative  NEG   Final    Urobilinogen 12/30/2021 0.2  0.2 - 1.0 EU/dL Final    Nitrites 12/30/2021 Positive* NEG   Final    Leukocyte Esterase 12/30/2021 TRACE* NEG   Final    AMPHETAMINES 12/30/2021 Negative  NEG   Final    BARBITURATES 12/30/2021 Negative  NEG   Final    BENZODIAZEPINES 12/30/2021 Negative  NEG   Final    COCAINE 12/30/2021 Negative  NEG   Final    METHADONE 12/30/2021 Negative  NEG   Final    OPIATES 12/30/2021 Negative  NEG   Final    PCP(PHENCYCLIDINE) 12/30/2021 Negative  NEG   Final    THC (TH-CANNABINOL) 12/30/2021 Negative  NEG   Final    Drug screen comment 12/30/2021 (NOTE)   Final    ALCOHOL(ETHYL),SERUM 12/30/2021 <10  <10 MG/DL Final    SARS-CoV-2 12/30/2021 Not detected  NOTD   Final    Influenza A by PCR 12/30/2021 Not detected    Final    Influenza B by PCR 12/30/2021 Not detected    Final    WBC 12/30/2021 5-10  0 - 4 /hpf Final    RBC 12/30/2021 0-5  0 - 5 /hpf Final    Epithelial cells 12/30/2021 FEW  FEW /lpf Final    Bacteria 12/30/2021 4+* NEG /hpf Final    Glucose (POC) 12/31/2021 183* 65 - 117 mg/dL Final    Performed by 12/31/2021 Cyrus Juares   Final        RADIOLOGY REPORTS:  Results from Hospital Encounter encounter on 07/27/21    XR CHEST PORT    Narrative  EXAM: XR CHEST PORT    HISTORY: admission. COMPARISON: None    FINDINGS: Single view(s) of the chest. The film is underpenetrated secondary to  patient body habitus. The lungs are well inflated. No focal consolidation,  pleural effusion, or pneumothorax. The cardiomediastinal silhouette is  unremarkable. The visualized osseous structures are unremarkable. Impression  No acute cardiopulmonary process. No results found.            MEDICATIONS       ALL MEDICATIONS  Current Facility-Administered Medications   Medication Dose Route Frequency    OLANZapine (ZyPREXA) tablet 5 mg  5 mg Oral Q6H PRN    haloperidol lactate (HALDOL) injection 5 mg  5 mg IntraMUSCular Q6H PRN    benztropine (COGENTIN) tablet 1 mg  1 mg Oral BID PRN    diphenhydrAMINE (BENADRYL) injection 50 mg  50 mg IntraMUSCular BID PRN    hydrOXYzine HCL (ATARAX) tablet 50 mg  50 mg Oral TID PRN    LORazepam (ATIVAN) injection 1 mg  1 mg IntraMUSCular Q4H PRN    traZODone (DESYREL) tablet 50 mg  50 mg Oral QHS PRN    acetaminophen (TYLENOL) tablet 650 mg  650 mg Oral Q4H PRN    magnesium hydroxide (MILK OF MAGNESIA) 400 mg/5 mL oral suspension 30 mL  30 mL Oral DAILY PRN    cephALEXin (KEFLEX) capsule 500 mg  500 mg Oral Q6H    ibuprofen (MOTRIN) tablet 400 mg  400 mg Oral Q6H PRN    albuterol (PROVENTIL HFA, VENTOLIN HFA, PROAIR HFA) inhaler 2 Puff  2 Puff Inhalation Q4H PRN    atorvastatin (LIPITOR) tablet 40 mg  40 mg Oral QPM    [START ON 1/1/2022] buPROPion XL (WELLBUTRIN XL) tablet 150 mg  150 mg Oral DAILY    [START ON 1/1/2022] cetirizine (ZYRTEC) tablet 10 mg  10 mg Oral DAILY    [START ON 1/1/2022] ferrous sulfate tablet 325 mg  325 mg Oral ACB    [START ON 1/1/2022] glipiZIDE (GLUCOTROL) tablet 10 mg  10 mg Oral DAILY    sertraline (ZOLOFT) tablet 50 mg  50 mg Oral BID    risperiDONE (RisperDAL) tablet 2 mg  2 mg Oral BID    [START ON 1/1/2022] oxybutynin chloride XL (DITROPAN XL) tablet 5 mg  5 mg Oral DAILY    metFORMIN (GLUCOPHAGE) tablet 1,000 mg  1,000 mg Oral BID WITH MEALS    [START ON 1/1/2022] lisinopriL (PRINIVIL, ZESTRIL) tablet 2.5 mg  2.5 mg Oral DAILY    [START ON 1/1/2022] alogliptin (NESINA) tablet 25 mg  25 mg Oral DAILY    insulin regular (NOVOLIN R, HUMULIN R) injection 6 Units  6 Units SubCUTAneous TIDAC    glucose chewable tablet 16 g  4 Tablet Oral PRN    dextrose (D50W) injection syrg 12.5-25 g  12.5-25 g IntraVENous PRN    glucagon (GLUCAGEN) injection 1 mg  1 mg IntraMUSCular PRN      SCHEDULED MEDICATIONS  Current Facility-Administered Medications   Medication Dose Route Frequency    cephALEXin (KEFLEX) capsule 500 mg  500 mg Oral Q6H    atorvastatin (LIPITOR) tablet 40 mg  40 mg Oral QPM    [START ON 1/1/2022] buPROPion XL (WELLBUTRIN XL) tablet 150 mg  150 mg Oral DAILY    [START ON 1/1/2022] cetirizine (ZYRTEC) tablet 10 mg  10 mg Oral DAILY    [START ON 1/1/2022] ferrous sulfate tablet 325 mg  325 mg Oral ACB    [START ON 1/1/2022] glipiZIDE (GLUCOTROL) tablet 10 mg  10 mg Oral DAILY    sertraline (ZOLOFT) tablet 50 mg  50 mg Oral BID    risperiDONE (RisperDAL) tablet 2 mg  2 mg Oral BID    [START ON 1/1/2022] oxybutynin chloride XL (DITROPAN XL) tablet 5 mg  5 mg Oral DAILY    metFORMIN (GLUCOPHAGE) tablet 1,000 mg  1,000 mg Oral BID WITH MEALS    [START ON 1/1/2022] lisinopriL (PRINIVIL, ZESTRIL) tablet 2.5 mg  2.5 mg Oral DAILY    [START ON 1/1/2022] alogliptin (NESINA) tablet 25 mg  25 mg Oral DAILY    insulin regular (NOVOLIN R, HUMULIN R) injection 6 Units  6 Units SubCUTAneous TIDAC                ASSESSMENT & PLAN        The patient, Shaina Mclean, is a 60 y.o.  female who presents at this time for treatment of the following diagnoses:  Patient Active Hospital Problem List:   Borderline personality disorder ([facility] (5/5/2016)   Assessment: patient with episode of depressed mood and suicidal ideation that appeared to be have been situational. She is able to make her needs known and appears at her psychiatric baseline. Will work with  to discharge patient back to the community. Plan:   - RESTART home regimen  - IGM therapy as tolerated  - Expand database / obtain collateral  - Dispo planning (group home)           A coordinated, multidisplinary treatment team (includes the nurse, unit pharmacist,  and writer) round was conducted for this initial evaluation with the patient present. The following regarding medications was addressed during rounds with patient: the risks and benefits of the proposed medication. The patient was given the opportunity to ask questions. Informed consent given to the use of the above medications. I will continue to adjust psychiatric and non-psychiatric medications (see above \"medication\" section and orders section for details) as deemed appropriate & based upon diagnoses and response to treatment. I have reviewed admission (and previous/old) labs and medical tests in the EHR and or transferring hospital documents. I will continue to order blood tests/labs and diagnostic tests as deemed appropriate and review results as they become available (see orders for details). I have reviewed old psychiatric and medical records available in the EHR. I Will order additional psychiatric records from other institutions to further elucidate the nature of patient's psychopathology and review once available. I will gather additional collateral information from friends, family and o/p treatment team to further elucidate the nature of patient's psychopathology and baselline level of psychiatric functioning.     I certify that this patient's inpatient psychiatric hospital services are required for treatment that could reasonably be expected to improve the patient's condition, or for diagnostic study, and that the patient continues to need, on a daily basis, active treatment furnished directly by or requiring the supervision of inpatient psychiatric facility personnel. In addition, the hospital records show that services furnished were intensive treatment services, admission or related services, or equivalent services.       ESTIMATED LENGTH OF STAY:  2-3 days       STRENGTHS:  Exercising self-direction/Resourceful, Access to housing/residential stability and Interpersonal/supportive relationships (family, friends, peers)                                        SIGNED:    Jennifer Mortensen MD  12/31/2021

## 2022-01-01 NOTE — PROGRESS NOTES
Behavioral Services  Medicare Certification Upon Admission    I certify that this patient's inpatient psychiatric hospital admission is medically necessary for:    [x] (1) Treatment which could reasonably be expected to improve this patient's condition,       [x] (2) Or for diagnostic study;     AND     [x](2) The inpatient psychiatric services are provided while the individual is under the care of a physician and are included in the individualized plan of care.     Estimated length of stay/service 5-7 days    Plan for post-hospital care home    Electronically signed by Lu Pham MD on 12/31/2021 at 8:24 PM

## 2022-01-01 NOTE — CONSULTS
Hospitalist Consultation Note    NAME:  Khushbu Lloyd   :   1965   MRN:   [id number]   Room Number: 373/92  @ [facility]     ATTENDING: Justyna Fatima MD  PCP:  Mia Kothari MD    Date/Time:  2022 9:46 AM      Recommendations/Plan:       Principal Problem:    Schizoaffective disorder ([facility] [address] (2016)    Active Problems:    Borderline personality disorder ([address] (2016)      PTSD (post-traumatic stress disorder) (2021)       #Intertrigo candidiasis  -Noted along breast fold and groin  -Recommend starting nystatin powder for 7 days    #Type 2 diabetes mellitus  -Currently on home regimen of Metformin 1000 mg twice daily, glipizide 10 mg, alogliptin 25 mg and insulin R 6 units 3 times with meal  -Above regimen has been continued and psychiatry    #Hypertension  #Overactive bladder  #PTSD and borderline personal disorder  #Schizoaffective disorder  -Home medication has been resumed  -Management per psychiatry                Subjective:   REQUESTING PHYSICIAN:  REASON FOR CONSULT:      Taylor Mccauley is a 60 y.o.  female who I was asked to see for rash along breast fold and groin. Patient states that she been having this rash in her folds along breast and inguinal area for past couple of weeks and endorsed some itchiness and foul smelling odor. Patient says she had that infection in the past and was improved from topical nystatin powder.   Denied any other active medical problems at this time  Past medical history significant for hypertension diabetes mellitus            Past Medical History:   Diagnosis Date    Arthritis     legs    Bipolar 1 disorder ([facility] [address]     COPD     Depression 2012    Diabetes ([address] [address]     Drug abuse ([address] [address]     cocaine, stimulants, etoh, mj, tob    H/O suicide attempt     Hypertension     Obesity     [facility]ydrug [facility] excluding opioid type drug, [facility]     PTSD (post-traumatic stress disorder) 2021    Schizophrenia ([facility] 7/8/2016      Past Surgical History:   Procedure Laterality Date    HX ORTHOPAEDIC      foot sx     Social History     Tobacco Use    Smoking status: Current Every Day Smoker     Packs/day: 1.00     Years: 10.00     Pack years: 10.00     Types: Cigarettes    Smokeless tobacco: Current User   Substance Use Topics    Alcohol use: No     Alcohol/week: 0.0 standard drinks     Comment: Hx of ETOH abuse since age 13. no DUIs. Family History   Problem Relation Age of Onset    Diabetes Mother     Stroke Mother     Stroke Father        Allergies   Allergen Reactions    Amoxicillin Hives    Mushroom Flavor Hives    Tomato Hives      Prior to Admission medications    Medication Sig Start Date End Date Taking? Authorizing Provider   buPROPion XL (WELLBUTRIN XL) 150 mg tablet Take 150 mg by mouth daily. Indications: major depressive disorder   Yes Provider, Historical   cetirizine (ZyrTEC) 10 mg tablet Take 10 mg by mouth daily. Yes Provider, Historical   ferrous sulfate 325 mg (65 mg iron) tablet Take 325 mg by mouth Daily (before breakfast). Yes Provider, Historical   glipiZIDE (GlucotroL) 10 mg tablet Take 10 mg by mouth daily. Indications: type 2 diabetes mellitus   Yes Provider, Historical   insulin regular (HumuLIN R Regular U-100 Insuln) 100 unit/mL injection 6 Units by SubCUTAneous route Before breakfast, lunch, dinner and at bedtime. Yes Provider, Historical   lisinopriL (PRINIVIL, ZESTRIL) 2.5 mg tablet Take 2.5 mg by mouth daily. Indications: kidney disease from diabetes, high blood pressure   Yes Provider, Historical   lovastatin (MEVACOR) 20 mg tablet Take 20 mg by mouth nightly. Indications: high cholesterol   Yes Provider, Historical   nicotine (NICODERM CQ) 21 mg/24 hr 1 Patch by TransDERmal route every twenty-four (24) hours. Yes Provider, Historical   metFORMIN (GLUCOPHAGE) 1,000 mg tablet Take 1,000 mg by mouth two (2) times daily (with meals).  Indications: type 2 diabetes mellitus Yes Provider, Historical   risperiDONE (RisperDAL) 2 mg tablet Take 2 mg by mouth two (2) times a day. Yes Provider, Historical   sertraline (ZOLOFT) 50 mg tablet Take 50 mg by mouth two (2) times a day. Yes Provider, Historical   linaGLIPtin (Tradjenta) 5 mg tablet Take 5 mg by mouth daily. Indications: type 2 diabetes mellitus   Yes Provider, Historical   hydrOXYzine pamoate (VistariL) 50 mg capsule Take 50 mg by mouth every four (4) hours as needed for Anxiety. Yes Provider, Historical   polyethylene glycol (Miralax) 17 gram packet Take 17 g by mouth daily as needed for Constipation. Indications: constipation   Yes Provider, Historical   traZODone (DESYREL) 50 mg tablet Take 1 Tablet by mouth nightly as needed (Insomnia). Indications: insomnia associated with depression 9/24/21  Yes Blayne Oropeza MD   albuterol (Ventolin HFA) 90 mcg/actuation inhaler Take 2 Puffs by inhalation every four (4) hours as needed for Wheezing or Shortness of Breath. Indications: asthma attack 9/24/21  Yes Blayne Oropeza MD   atorvastatin (LIPITOR) 40 mg tablet Take 1 Tablet by mouth every evening. Takes at 5:00 pm  Indications: Cholesterol 9/24/21  Yes Blayne Oorpeza MD   oxybutynin chloride XL (DITROPAN XL) 5 mg CR tablet Take 1 Tablet by mouth daily. Indications: overactive bladder 9/24/21  Yes Blayne Oropeza MD       REVIEW OF SYSTEMS:     Total of 12 systems reviewed as follows:   I am not able to complete the review of systems because:    The patient is intubated and sedated    The patient has altered mental status due to his acute medical problems    The patient has baseline aphasia from prior stroke(s)    The patient has baseline dementia and is not reliable historian                 POSITIVE= underlined text  Negative = text not underlined  General:  fever, chills, sweats, generalized weakness, weight loss/gain,      loss of appetite   Eyes:    blurred vision, eye pain, loss of vision, double vision  ENT:    rhinorrhea, pharyngitis   Respiratory:   cough, sputum production, SOB, wheezing, MILLER, pleuritic pain   Cardiology:   chest pain, palpitations, orthopnea, PND, edema, syncope   Gastrointestinal:  abdominal pain , N/V, dysphagia, diarrhea, constipation, bleeding   Genitourinary:  frequency, urgency, dysuria, hematuria, incontinence   Muskuloskeletal :  arthralgia, myalgia   Hematology:  easy bruising, nose or gum bleeding, lymphadenopathy   Dermatological: rash, ulceration, pruritis   Endocrine:   hot flashes or polydipsia   Neurological:  headache, dizziness, confusion, focal weakness, paresthesia,     Speech difficulties, memory loss, gait disturbance  Psychological: Feelings of anxiety, depression, agitation    Objective:   VITALS:    Visit Vitals  BP (!) 146/82   Pulse 80   Temp 98.2 °F (36.8 °C)   Resp 17   Ht 5' 5\" (1.651 m)   Wt 104.3 kg (230 lb)   SpO2 99%   BMI 38.27 kg/m²     Temp (24hrs), Av °F (36.7 °C), Min:97.7 °F (36.5 °C), Max:98.2 °F (36.8 °C)      PHYSICAL EXAM: Patient was examined with RN  General:    Alert, cooperative, no distress, appears stated age. HEENT: Atraumatic, anicteric sclerae, pink conjunctivae     No oral ulcers, mucosa moist, throat clear  Neck:  Supple, symmetrical,  thyroid: non tender  Lungs:   Clear to auscultation bilaterally. No Wheezing or Rhonchi. No rales. Chest wall:  No tenderness  No Accessory muscle use. Heart:   Regular  rhythm,  No  murmur   No edema  Abdomen:   Soft, non-tender. Not distended. Bowel sounds normal  Extremities: No cyanosis. No clubbing  Skin:     Pink rash on intertrigo area of bilateral breast and groin area  Psych:   Not anxious or agitated. Neurologic: EOMs intact. No facial asymmetry. No aphasia or slurred speech.  Symmetrical strength, Alert and oriented X 4.     _______________________________________________________________________  Care Plan discussed with:    Comments   Patient x    Family      RN x    Care Manager                    Consultant:      ____________________________________________________________________  TOTAL TIME:     25 mins    Comments    x Reviewed previous records   >50% of visit spent in counseling and coordination of care x Discussion with patient and/or family and questions answered       Critical Care Provided     Minutes non procedure based  ________________________________________________________________________  Signed: Ernesto Burgess MD      Procedures: see electronic medical records for all procedures/Xrays and details which were not copied into this note but were reviewed prior to creation of Plan.     LAB DATA REVIEWED:    Recent Results (from the past 24 hour(s))   GLUCOSE, POC    Collection Time: 12/31/21  4:09 PM   Result Value Ref Range    Glucose (POC) 183 (H) 65 - 117 mg/dL    Performed by [facility], POC    Collection Time: 01/01/22  9:06 AM   Result Value Ref Range    Glucose (POC) 240 (H) 65 - 117 mg/dL    Performed by Celestino Urban        _____________________________  Hospitalist: Ernesto Burgess MD

## 2022-01-01 NOTE — PROGRESS NOTES
Problem: Falls - Risk of  Goal: *Absence of Falls  Description: Document Elvira Alfaro Fall Risk and appropriate interventions in the flowsheet. Outcome: Progressing Towards Goal  Note: Fall Risk Interventions:    Medication Interventions: (P) Teach patient to arise slowly    Problem: Anxiety-Behavioral Health (Adult/Pediatric)  Goal: *STG: Participates in treatment plan  Outcome: Progressing Towards Goal  Goal: *STG: Remains safe in hospital  Outcome: Progressing Towards Goal  Goal: *STG: Seeks staff when feelings of anxiety and fear arise  Outcome: Progressing Towards Goal         [id number] Shift report received from Kristel Ruiz RN   1930- 0030 Patient met sleeping in her room, she was calm and cooperative. She denies SI/HI/VAH, denies anxiety and depression. Patient is medication and meal compliant, No prn given. No violence or aggression recorded.      4191- 4998 Patient is sleeping in bed. No violence recorded. Quick 15 minutes checks ongoing for safety. 0400- 0600 Patient is sleeping. Q15 minutes checks       At 0600, she slept a total of 10.75 hours.

## 2022-01-02 LAB
BACTERIA SPEC CULT: ABNORMAL
BACTERIA SPEC CULT: ABNORMAL
CC UR VC: ABNORMAL
GLUCOSE BLD STRIP.AUTO-MCNC: 113 MG/DL (ref 65–117)
GLUCOSE BLD STRIP.AUTO-MCNC: 168 MG/DL (ref 65–117)
GLUCOSE BLD STRIP.AUTO-MCNC: 87 MG/DL (ref 65–117)
SERVICE CMNT-IMP: ABNORMAL
SERVICE CMNT-IMP: ABNORMAL
SERVICE CMNT-IMP: NORMAL
SERVICE CMNT-IMP: NORMAL

## 2022-01-02 PROCEDURE — 74011250637 HC RX REV CODE- 250/637: Performed by: PSYCHIATRY & NEUROLOGY

## 2022-01-02 PROCEDURE — 65220000003 HC RM SEMIPRIVATE PSYCH

## 2022-01-02 PROCEDURE — 99231 SBSQ HOSP IP/OBS SF/LOW 25: CPT | Performed by: PSYCHIATRY & NEUROLOGY

## 2022-01-02 PROCEDURE — 74011250637 HC RX REV CODE- 250/637

## 2022-01-02 PROCEDURE — 74011636637 HC RX REV CODE- 636/637: Performed by: PSYCHIATRY & NEUROLOGY

## 2022-01-02 PROCEDURE — 82962 GLUCOSE BLOOD TEST: CPT

## 2022-01-02 RX ADMIN — OXYBUTYNIN CHLORIDE 5 MG: 5 TABLET, EXTENDED RELEASE ORAL at 08:52

## 2022-01-02 RX ADMIN — RISPERIDONE 2 MG: 1 TABLET ORAL at 17:11

## 2022-01-02 RX ADMIN — SERTRALINE HYDROCHLORIDE 50 MG: 50 TABLET ORAL at 08:52

## 2022-01-02 RX ADMIN — ACETAMINOPHEN 650 MG: 325 TABLET ORAL at 08:57

## 2022-01-02 RX ADMIN — CEPHALEXIN 500 MG: 500 CAPSULE ORAL at 11:45

## 2022-01-02 RX ADMIN — Medication 6 UNITS: at 17:10

## 2022-01-02 RX ADMIN — Medication 6 UNITS: at 08:57

## 2022-01-02 RX ADMIN — BUPROPION HYDROCHLORIDE 150 MG: 150 TABLET, EXTENDED RELEASE ORAL at 08:53

## 2022-01-02 RX ADMIN — FERROUS SULFATE TAB 325 MG (65 MG ELEMENTAL FE) 325 MG: 325 (65 FE) TAB at 05:55

## 2022-01-02 RX ADMIN — SERTRALINE HYDROCHLORIDE 50 MG: 50 TABLET ORAL at 17:11

## 2022-01-02 RX ADMIN — CEPHALEXIN 500 MG: 500 CAPSULE ORAL at 17:10

## 2022-01-02 RX ADMIN — ALOGLIPTIN 25 MG: 12.5 TABLET, FILM COATED ORAL at 08:52

## 2022-01-02 RX ADMIN — ATORVASTATIN CALCIUM 40 MG: 40 TABLET, FILM COATED ORAL at 17:11

## 2022-01-02 RX ADMIN — LISINOPRIL 2.5 MG: 5 TABLET ORAL at 08:53

## 2022-01-02 RX ADMIN — NYSTATIN: 100000 POWDER TOPICAL at 08:54

## 2022-01-02 RX ADMIN — CETIRIZINE HYDROCHLORIDE 10 MG: 10 TABLET, FILM COATED ORAL at 08:53

## 2022-01-02 RX ADMIN — METFORMIN HYDROCHLORIDE 1000 MG: 500 TABLET ORAL at 17:11

## 2022-01-02 RX ADMIN — METFORMIN HYDROCHLORIDE 1000 MG: 500 TABLET ORAL at 08:53

## 2022-01-02 RX ADMIN — NYSTATIN: 100000 POWDER TOPICAL at 17:10

## 2022-01-02 RX ADMIN — GLIPIZIDE 10 MG: 5 TABLET ORAL at 08:53

## 2022-01-02 RX ADMIN — CEPHALEXIN 500 MG: 500 CAPSULE ORAL at 05:56

## 2022-01-02 RX ADMIN — CEPHALEXIN 500 MG: 500 CAPSULE ORAL at 00:37

## 2022-01-02 RX ADMIN — RISPERIDONE 2 MG: 1 TABLET ORAL at 08:52

## 2022-01-02 NOTE — PROGRESS NOTES
Problem: Falls - Risk of  Goal: *Absence of Falls  Description: Document Eunice Hollingsworth Fall Risk and appropriate interventions in the flowsheet. Outcome: Progressing Towards Goal  Note: Fall Risk Interventions:     Medication Interventions: Teach patient to arise slowly    History of Falls Interventions: Door open when patient unattended    Problem: Anxiety-Behavioral Health (Adult/Pediatric)  Goal: *STG: Participates in treatment plan  Outcome: Progressing Towards Goal  Goal: *STG: Remains safe in hospital  Outcome: Progressing Towards Goal  Goal: *STG: Seeks staff when feelings of anxiety and fear arise  Outcome: Progressing Towards Goal         [id number] Shift report received from Sekou Ruiz, RN   1930- 0030 Patient met sleeping in her room, she was calm and cooperative. She denies SI/HI/VAH, denies anxiety and depression. Patient is medication and meal compliant, No prn given. No violence or aggression recorded.      8792- 5742 Patient is sleeping in bed. No violence recorded. Quick 15 minutes checks ongoing for safety. 0400- 0600 Patient is sleeping. Q15 minutes checks       At 0600, she slept a total of 6.5 hours.
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
## 2022-01-02 NOTE — PROGRESS NOTES
Pt remains isolative and withdrawn, only coming out of room for meals. No distress noted. Pt denies si/hi/avh. Pt endorses pain BLE. PRN Tylenol provided. Pt denies depression and anxiety. AOx4. Med and meal compliant. Euthymic mood with dull affect. Pt continues to have rash/redness under bilateral breasts and groin. Antifungal medication provided, pt encouraged to shower and keep area clean. Pt gives verbal understanding. No behavioral issues noted. 1130: Insulin held d/t POC glucose 87                  Problem: Falls - Risk of  Goal: *Absence of Falls  Description: Document Martin Fall Risk and appropriate interventions in the flowsheet.   Outcome: Progressing Towards Goal  Note: Fall Risk Interventions:   Medication Interventions: Teach patient to arise slowly     History of Falls Interventions: Door open when patient unattended    Problem: Depressed Mood (Adult/Pediatric)  Goal: *STG: Complies with medication therapy  Outcome: Progressing Towards Goal

## 2022-01-03 LAB
GLUCOSE BLD STRIP.AUTO-MCNC: 126 MG/DL (ref 65–117)
GLUCOSE BLD STRIP.AUTO-MCNC: 150 MG/DL (ref 65–117)
GLUCOSE BLD STRIP.AUTO-MCNC: 151 MG/DL (ref 65–117)
SERVICE CMNT-IMP: ABNORMAL

## 2022-01-03 PROCEDURE — 99231 SBSQ HOSP IP/OBS SF/LOW 25: CPT | Performed by: PSYCHIATRY & NEUROLOGY

## 2022-01-03 PROCEDURE — 82962 GLUCOSE BLOOD TEST: CPT

## 2022-01-03 PROCEDURE — 74011250637 HC RX REV CODE- 250/637: Performed by: PSYCHIATRY & NEUROLOGY

## 2022-01-03 PROCEDURE — 74011636637 HC RX REV CODE- 636/637: Performed by: PSYCHIATRY & NEUROLOGY

## 2022-01-03 PROCEDURE — 65220000003 HC RM SEMIPRIVATE PSYCH

## 2022-01-03 RX ADMIN — SERTRALINE HYDROCHLORIDE 50 MG: 50 TABLET ORAL at 16:53

## 2022-01-03 RX ADMIN — FERROUS SULFATE TAB 325 MG (65 MG ELEMENTAL FE) 325 MG: 325 (65 FE) TAB at 06:00

## 2022-01-03 RX ADMIN — NYSTATIN: 100000 POWDER TOPICAL at 17:12

## 2022-01-03 RX ADMIN — LISINOPRIL 2.5 MG: 5 TABLET ORAL at 08:29

## 2022-01-03 RX ADMIN — Medication 6 UNITS: at 16:51

## 2022-01-03 RX ADMIN — Medication 6 UNITS: at 08:29

## 2022-01-03 RX ADMIN — METFORMIN HYDROCHLORIDE 1000 MG: 500 TABLET ORAL at 08:31

## 2022-01-03 RX ADMIN — CEPHALEXIN 500 MG: 500 CAPSULE ORAL at 06:00

## 2022-01-03 RX ADMIN — CETIRIZINE HYDROCHLORIDE 10 MG: 10 TABLET, FILM COATED ORAL at 08:31

## 2022-01-03 RX ADMIN — CEPHALEXIN 500 MG: 500 CAPSULE ORAL at 11:48

## 2022-01-03 RX ADMIN — ATORVASTATIN CALCIUM 40 MG: 40 TABLET, FILM COATED ORAL at 16:53

## 2022-01-03 RX ADMIN — NYSTATIN: 100000 POWDER TOPICAL at 09:19

## 2022-01-03 RX ADMIN — OXYBUTYNIN CHLORIDE 5 MG: 5 TABLET, EXTENDED RELEASE ORAL at 08:31

## 2022-01-03 RX ADMIN — ALOGLIPTIN 25 MG: 12.5 TABLET, FILM COATED ORAL at 08:31

## 2022-01-03 RX ADMIN — RISPERIDONE 2 MG: 1 TABLET ORAL at 08:30

## 2022-01-03 RX ADMIN — CEPHALEXIN 500 MG: 500 CAPSULE ORAL at 00:08

## 2022-01-03 RX ADMIN — CEPHALEXIN 500 MG: 500 CAPSULE ORAL at 17:12

## 2022-01-03 RX ADMIN — BUPROPION HYDROCHLORIDE 150 MG: 150 TABLET, EXTENDED RELEASE ORAL at 08:30

## 2022-01-03 RX ADMIN — RISPERIDONE 2 MG: 1 TABLET ORAL at 16:53

## 2022-01-03 RX ADMIN — Medication 6 UNITS: at 11:48

## 2022-01-03 RX ADMIN — GLIPIZIDE 10 MG: 5 TABLET ORAL at 08:31

## 2022-01-03 RX ADMIN — METFORMIN HYDROCHLORIDE 1000 MG: 500 TABLET ORAL at 16:53

## 2022-01-03 RX ADMIN — SERTRALINE HYDROCHLORIDE 50 MG: 50 TABLET ORAL at 08:30
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
## 2022-01-03 NOTE — BH NOTES
Behavioral Health Treatment Team Note            Patient goal(s) for today: continue taking medication as prescribed, engage in unit activities, participate in hygiene/ADLs, follow directions from staff, contact support team, prepare for discharge  Treatment team focus/goals: medication adjustments, dispo planning, update support system  Progress note: Pt was seen in treatment team this morning. Pt is alert and oriented. Pt denies SI/HI. Pt's mood is euthymic, affect is brighter. Pt's thought process is coherent and she is in agreement with discharge plan for tomorrow. SW updated pt's [facility] Bhaskar rory jack who will transport pt home tomorrow. Pt's insight and judgment is impaired, reliability is fair.  Social work department will continue to coordinate discharge plans.      LOS:  4                        Expected LOS: 1/4     Financial concerns/prescription coverage: Medicaid   Date of last family contact: None                      Family requesting physician contact today:  N/A  Discharge plan: home to [facility]  Guns in the home: None                                               Outpatient provider(s):  Pt has a [facility] worker, Kadeem Carl Clink [phone].      Participating treatment team members: Mary Elise MSW and Dr. Leonardo Mail

## 2022-01-03 NOTE — PROGRESS NOTES
Problem: Falls - Risk of  Goal: *Absence of Falls  Description: Document Cherylle Cockayne Fall Risk and appropriate interventions in the flowsheet. Outcome: Progressing Towards Goal  Note: Fall Risk Interventions:     Medication Interventions: Teach patient to arise slowly    History of Falls Interventions: Door open when patient unattended    Problem: Anxiety-Behavioral Health (Adult/Pediatric)  Goal: *STG: Participates in treatment plan  Outcome: Progressing Towards Goal  Goal: *STG: Remains safe in hospital  Outcome: Progressing Towards Goal  Goal: *STG: Seeks staff when feelings of anxiety and fear arise  Outcome: Progressing Towards Goal         [id number] Shift report received from Kate Ruiz RN   1930- 0030 Patient met sleeping in her room, she was calm and cooperative. She denies SI/HI/VAH, denies anxiety and depression. Patient is medication and meal compliant, No prn given. No violence or aggression recorded.      0030- 0400 Patient is sleeping in bed. No violence recorded. Quick 15 minutes checks ongoing for safety. 0400- 0600 Patient is sleeping. Q15 minutes checks       At 0600, she slept a total of 9.75 hours.

## 2022-01-03 NOTE — BH NOTES
GROUP THERAPY PROGRESS NOTE    Patient did not participate in Discharge Planning Group.      Beamilcar Ureña, MSW
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
## 2022-01-03 NOTE — BH NOTES
GROUP THERAPY PROGRESS NOTE    Patient is participating in Recreational Therapy/Coping Skills Group. Group time: 60 minutes      Personal goal for participation: To concentrate on selected task; positively engage in interactions with others; actively listen to others; self-disclose thoughts, emotions and behaviors; and discuss coping skills. Goal orientation: Personal    Group therapy participation: minimal    Therapeutic interventions reviewed and discussed: Group members participated in an art and music recreational therapy activity. Patients were able to actively participate in activity while engaging in conversation with staff and other patients. Patients appropriately socialized and processed presenting problems and coping skills while engaging in activity. Impression of participation: Pt with disheveled appearance, depressed mood, flat affect, poor hygiene. Pt ate snacks and listened to music. Pt declined to engage in art or game activities. Pt engaged minimally with others. Pt walked in and out of dayroom throughout group session.     FAMILIA Box

## 2022-01-03 NOTE — PROGRESS NOTES
Problem: Falls - Risk of  Goal: *Absence of Falls  Description: Document Shakila Nap Fall Risk and appropriate interventions in the flowsheet.   Outcome: Progressing Towards Goal  Note: Fall Risk Interventions:            Medication Interventions: Teach patient to arise slowly         History of Falls Interventions: Door open when patient unattended         Problem: Patient Education: Go to Patient Education Activity  Goal: Patient/Family Education  Outcome: Progressing Towards Goal

## 2022-01-04 VITALS
TEMPERATURE: 98.3 F | DIASTOLIC BLOOD PRESSURE: 51 MMHG | RESPIRATION RATE: 16 BRPM | SYSTOLIC BLOOD PRESSURE: 108 MMHG | BODY MASS INDEX: 38.32 KG/M2 | WEIGHT: 230 LBS | HEIGHT: 65 IN | OXYGEN SATURATION: 96 % | HEART RATE: 89 BPM

## 2022-01-04 LAB
GLUCOSE BLD STRIP.AUTO-MCNC: 138 MG/DL (ref 65–117)
GLUCOSE BLD STRIP.AUTO-MCNC: 147 MG/DL (ref 65–117)
SERVICE CMNT-IMP: ABNORMAL
SERVICE CMNT-IMP: ABNORMAL

## 2022-01-04 PROCEDURE — 74011250637 HC RX REV CODE- 250/637: Performed by: PSYCHIATRY & NEUROLOGY

## 2022-01-04 PROCEDURE — 82962 GLUCOSE BLOOD TEST: CPT

## 2022-01-04 PROCEDURE — 74011636637 HC RX REV CODE- 636/637: Performed by: PSYCHIATRY & NEUROLOGY

## 2022-01-04 PROCEDURE — 99239 HOSP IP/OBS DSCHRG MGMT >30: CPT | Performed by: PSYCHIATRY & NEUROLOGY

## 2022-01-04 RX ORDER — CEPHALEXIN 500 MG/1
500 CAPSULE ORAL EVERY 6 HOURS
Qty: 3 CAPSULE | Refills: 0 | Status: SHIPPED | OUTPATIENT
Start: 2022-01-04 | End: 2022-01-05

## 2022-01-04 RX ADMIN — FERROUS SULFATE TAB 325 MG (65 MG ELEMENTAL FE) 325 MG: 325 (65 FE) TAB at 06:17

## 2022-01-04 RX ADMIN — CEPHALEXIN 500 MG: 500 CAPSULE ORAL at 11:21

## 2022-01-04 RX ADMIN — BUPROPION HYDROCHLORIDE 150 MG: 150 TABLET, EXTENDED RELEASE ORAL at 08:55

## 2022-01-04 RX ADMIN — Medication 6 UNITS: at 11:20

## 2022-01-04 RX ADMIN — GLIPIZIDE 10 MG: 5 TABLET ORAL at 08:55

## 2022-01-04 RX ADMIN — CETIRIZINE HYDROCHLORIDE 10 MG: 10 TABLET, FILM COATED ORAL at 08:55

## 2022-01-04 RX ADMIN — Medication 6 UNITS: at 08:53

## 2022-01-04 RX ADMIN — ALOGLIPTIN 25 MG: 12.5 TABLET, FILM COATED ORAL at 08:55

## 2022-01-04 RX ADMIN — RISPERIDONE 2 MG: 1 TABLET ORAL at 08:55

## 2022-01-04 RX ADMIN — SERTRALINE HYDROCHLORIDE 50 MG: 50 TABLET ORAL at 08:55

## 2022-01-04 RX ADMIN — CEPHALEXIN 500 MG: 500 CAPSULE ORAL at 00:14

## 2022-01-04 RX ADMIN — CEPHALEXIN 500 MG: 500 CAPSULE ORAL at 06:17

## 2022-01-04 RX ADMIN — METFORMIN HYDROCHLORIDE 1000 MG: 500 TABLET ORAL at 08:55

## 2022-01-04 RX ADMIN — NYSTATIN: 100000 POWDER TOPICAL at 08:56

## 2022-01-04 RX ADMIN — OXYBUTYNIN CHLORIDE 5 MG: 5 TABLET, EXTENDED RELEASE ORAL at 08:55

## 2022-01-04 RX ADMIN — LISINOPRIL 2.5 MG: 5 TABLET ORAL at 08:55

## 2022-01-04 NOTE — PROGRESS NOTES
Problem: Falls - Risk of  Goal: *Absence of Falls  Description: Document Lettie Duverney Fall Risk and appropriate interventions in the flowsheet.   Outcome: Progressing Towards Goal  Note: Fall Risk Interventions:            Medication Interventions: Teach patient to arise slowly         History of Falls Interventions: Door open when patient unattended

## 2022-01-04 NOTE — PROGRESS NOTES
0730 Pt received in room. Pt denies si/hi/ah/vh. Anxiety=0/10 depression= 0/10. Pt is Aox4. Pt is calm and cooperative. Discharge focused Med and meal compliant. Pain level of 0/10 . Will continue to monitor for any changes.

## 2022-01-04 NOTE — BH NOTES
GROUP THERAPY PROGRESS NOTE    Patient did not participate in Substance Abuse/Coping Skills group.     FAMILIA Lyn

## 2022-01-04 NOTE — BH NOTES
GROUP THERAPY PROGRESS NOTE    Patient did not participate in Coping Skills Group.      FAMILIA Pond

## 2022-01-04 NOTE — PROGRESS NOTES
1600 Patient alert and verbal. Discharged home to continue recommended plan of care. Discharge instructions reviewed with the pt. Patient verbalized understanding. Patients belongings were returned. Patient ambulated with [address] staff member to the ED entrance to her ride home. Pt denies si/hi/ah/vh and is Aox4.

## 2022-01-04 NOTE — PROGRESS NOTES
Problem: Falls - Risk of  Goal: *Absence of Falls  Description: Document Zoraida Don Fall Risk and appropriate interventions in the flowsheet.   1/4/2022 1053 by Naheed Milligan RN  Outcome: Resolved/Met  Note: Fall Risk Interventions:            Medication Interventions: Teach patient to arise slowly         History of Falls Interventions: Door open when patient unattended      1/4/2022 1050 by Naheed Milligan RN  Outcome: Progressing Towards Goal  Note: Fall Risk Interventions:            Medication Interventions: Teach patient to arise slowly         History of Falls Interventions: Door open when patient unattended

## 2022-01-04 NOTE — GROUP NOTE
EMILY  GROUP DOCUMENTATION INDIVIDUAL                                                                          Group Therapy Note    Date: 1/4/2022    Group Start Time: 1100  Group End Time: 1200  Group Topic: Topic Group    [address] [facility] [facility] [address] ACUTE BEHAV [place] [id number] GROUP    Group Therapy Note    Attendees: 8         Attendance: Did not attend    Patient's Goal:      Interventions/techniques:  Katelyn Degroot

## 2022-01-04 NOTE — PROGRESS NOTES
Problem: Anxiety-Behavioral Health (Adult/Pediatric)  Goal: *STG: Participates in treatment plan  Outcome: Progressing Towards Goal  Goal: *STG: Remains safe in hospital  Outcome: Progressing Towards Goal  Goal: *STG/LTG: Complies with medication therapy  Outcome: Progressing Towards Goal       [id number]: Assumed care of patient after receiving shift report from outgoing nurse. Patient was out and visible on unit, interacting appropriately with peers and staff. Affect is dull, mood is depressed. Pt cooperative with vitals and assessment. A&O x 4. Independent in ADLs. Insight and concentration present. Gait is steady. Appetite patterns WNL. Denies AVH/SI/HI/Anxiety/Depression. Pt denies pain. Patient medication and diet compliant. Pt encouraged to continue to participate in care. [id number]: Patient resting in bed. Medication compliant. [id number]: Pt has been observed throughout the night. Total hours slept approximately 8.5. No s/s of distress noted. Patient has remained safe.

## 2022-01-04 NOTE — DISCHARGE INSTRUCTIONS
DISCHARGE SUMMARY    NAME:Pilar Sandra  : 1965  MRN: [id number]    The patient Dian Ganser exhibits the ability to control behavior in a less restrictive environment. Patient's level of functioning is improving. No assaultive/destructive behavior has been observed for the past 24 hours. No suicidal/homicidal threat or behavior has been observed for the past 24 hours. There is no evidence of serious medication side effects. Patient has not been in physical or protective restraints for at least the past 24 hours. If weapons involved, how are they secured? ***    Is patient aware of and in agreement with discharge plan? ***    Arrangements for medication:  Prescriptions given to patient, given a weeks supply or 30 day supply. Copy of discharge instructions to provider?:  ***    Arrangements for transportation home:  ***    Keep all follow up appointments as scheduled, continue to take prescribed medications per physician instructions.   Mental health crisis number:  813 or your local mental health crisis line number at [address] [place] [address] at [address] [facility] [address] [facility] WARM LINE      [phone] ([phone])      M-F: 9am to 9pm      Sat & Sun: 5pm - 9pm  National suicide prevention lines:                             4-995-VAXEAZJ ([phone])       8-908-758-TALK ([phone])    Crisis Text Line:  Text HOME to 546990
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
## 2022-01-05 NOTE — BH NOTES
Behavioral Health Transition Record to Provider    Patient Name: Christy Reynoso  YOB: 1965  Medical Record Number: [id number]  Date of Admission: 12/30/2021  Date of Discharge: 1/4/2022    Attending Provider: Srinivasan Haq  Discharging Provider: Srinivasan Haq  To contact this individual call [phone] and ask the  to page. If unavailable, ask to be transferred to [facility] Provider on call. [facility] Provider will be available on call 24/7 and during holidays.     Primary Care Provider: Corazon Leiva MD    Allergies   Allergen Reactions    Amoxicillin Hives    Mushroom Flavor Hives    Tomato Hives       Reason for Admission: MDD    Admission Diagnosis: MDD (major depressive disorder) [F32.9]  PTSD (post-traumatic stress disorder) [F43.10]  Borderline personality disorder ([address] [F60.3]    * No surgery found *    Results for orders placed or performed during the hospital encounter of 12/30/21   COVID-19 WITH INFLUENZA A/B   Result Value Ref Range    SARS-CoV-2 Not detected NOTD      Influenza A by PCR Not detected      Influenza B by PCR Not detected     CULTURE, URINE    Specimen: Clean catch; Urine   Result Value Ref Range    Special Requests: NO SPECIAL REQUESTS      [place] Count >100,000  COLONIES/mL        Culture result: ESCHERICHIA COLI (A)      Culture result: MIXED UROGENITAL RICHARD ISOLATED         Susceptibility    Escherichia coli - TARIQ     Amikacin ($) <=2 Susceptible ug/mL     Ampicillin ($) >=32 Resistant ug/mL     Ampicillin/sulbactam ($) >=32 Resistant ug/mL     Cefazolin ($) 8 Susceptible ug/mL     Cefepime ($$) <=1 Susceptible ug/mL     Cefoxitin <=4 Susceptible ug/mL     Ceftazidime ($) <=1 Susceptible ug/mL     Ceftriaxone ($) <=1 Susceptible ug/mL     Ciprofloxacin ($) <=0.25 Susceptible ug/mL     Gentamicin ($) >=16 Resistant ug/mL     Levofloxacin ($) <=0.12 Susceptible ug/mL     Meropenem ($$) <=0.25 Susceptible ug/mL Nitrofurantoin <=16 Susceptible ug/mL     Piperacillin/Tazobac ($) <=4 Susceptible ug/mL     Tobramycin ($) 8 Intermediate ug/mL     Trimeth/Sulfa >=320 Resistant ug/mL   CBC WITH AUTOMATED DIFF   Result Value Ref Range    WBC 9.0 3.6 - 11.0 K/uL    RBC 4.08 3.80 - 5.20 M/uL    HGB 10.4 (L) 11.5 - 16.0 g/dL    HCT 34.4 (L) 35.0 - 47.0 %    MCV 84.3 80.0 - 99.0 FL    MCH 25.5 (L) 26.0 - 34.0 PG    MCHC 30.2 30.0 - 36.5 g/dL    RDW 15.5 (H) 11.5 - 14.5 %    PLATELET 366 (H) 671 - 400 K/uL    MPV 9.2 8.9 - 12.9 FL    NRBC 0.0 0  WBC    ABSOLUTE NRBC 0.00 0.00 - 0.01 K/uL    NEUTROPHILS 71 32 - 75 %    LYMPHOCYTES 20 12 - 49 %    MONOCYTES 5 5 - 13 %    EOSINOPHILS 3 0 - 7 %    BASOPHILS 0 0 - 1 %    IMMATURE GRANULOCYTES 1 (H) 0.0 - 0.5 %    ABS. NEUTROPHILS 6.4 1.8 - 8.0 K/UL    ABS. LYMPHOCYTES 1.8 0.8 - 3.5 K/UL    ABS. MONOCYTES 0.5 0.0 - 1.0 K/UL    ABS. EOSINOPHILS 0.3 0.0 - 0.4 K/UL    ABS. BASOPHILS 0.0 0.0 - 0.1 K/UL    ABS. IMM.  GRANS. 0.1 (H) 0.00 - 0.04 K/UL    DF AUTOMATED     METABOLIC PANEL, BASIC   Result Value Ref Range    Sodium 137 136 - 145 mmol/L    Potassium 3.9 3.5 - 5.1 mmol/L    Chloride 102 97 - 108 mmol/L    CO2 28 21 - 32 mmol/L    Anion gap 7 5 - 15 mmol/L    Glucose 244 (H) 65 - 100 mg/dL    BUN 10 6 - 20 MG/DL    Creatinine 0.93 0.55 - 1.02 MG/DL    BUN/Creatinine ratio 11 (L) 12 - 20      GFR est AA >60 >60 ml/min/1.73m2    GFR est non-AA >60 >60 ml/min/1.73m2    Calcium 8.7 8.5 - 10.1 MG/DL   URINALYSIS W/ RFLX MICROSCOPIC   Result Value Ref Range    Color YELLOW/STRAW      Appearance CLEAR CLEAR      Specific gravity <1.005 1.003 - 1.030    pH (UA) 5.5 5.0 - 8.0      Protein Negative NEG mg/dL    Glucose 100 (A) NEG mg/dL    Ketone Negative NEG mg/dL    Bilirubin Negative NEG      Blood Negative NEG      Urobilinogen 0.2 0.2 - 1.0 EU/dL    Nitrites Positive (A) NEG      Leukocyte Esterase TRACE (A) NEG     DRUG SCREEN, URINE   Result Value Ref Range    AMPHETAMINES Negative NEG BARBITURATES Negative NEG      BENZODIAZEPINES Negative NEG      COCAINE Negative NEG      METHADONE Negative NEG      OPIATES Negative NEG      PCP(PHENCYCLIDINE) Negative NEG      THC (TH-CANNABINOL) Negative NEG      Drug screen comment (NOTE)    ETHYL ALCOHOL   Result Value Ref Range    ALCOHOL(ETHYL),SERUM <10 <10 MG/DL   URINE MICROSCOPIC ONLY   Result Value Ref Range    WBC 5-10 0 - 4 /hpf    RBC 0-5 0 - 5 /hpf    Epithelial cells FEW FEW /lpf    Bacteria 4+ (A) NEG /hpf   GLUCOSE, POC   Result Value Ref Range    Glucose (POC) 183 (H) 65 - 117 mg/dL    Performed by [employer]    GLUCOSE, POC   Result Value Ref Range    Glucose (POC) 240 (H) 65 - 117 mg/dL    Performed by [employer]    GLUCOSE, POC   Result Value Ref Range    Glucose (POC) 133 (H) 65 - 117 mg/dL    Performed by [employer]    GLUCOSE, POC   Result Value Ref Range    Glucose (POC) 169 (H) 65 - 117 mg/dL    Performed by [employer]    GLUCOSE, POC   Result Value Ref Range    Glucose (POC) 113 65 - 117 mg/dL    Performed by [employer]    GLUCOSE, POC   Result Value Ref Range    Glucose (POC) 87 65 - 117 mg/dL    Performed by [employer]    GLUCOSE, POC   Result Value Ref Range    Glucose (POC) 168 (H) 65 - 117 mg/dL    Performed by [employer]    GLUCOSE, POC   Result Value Ref Range    Glucose (POC) 150 (H) 65 - 117 mg/dL    Performed by [employer]    GLUCOSE, POC   Result Value Ref Range    Glucose (POC) 126 (H) 65 - 117 mg/dL    Performed by [employer]    GLUCOSE, POC   Result Value Ref Range    Glucose (POC) 151 (H) 65 - 117 mg/dL    Performed by Tiffani Everett    GLUCOSE, POC   Result Value Ref Range    Glucose (POC) 138 (H) 65 - 117 mg/dL    Performed by Tiffani Everett    GLUCOSE, POC   Result Value Ref Range    Glucose (POC) 147 (H) 65 - 117 mg/dL    Performed by Tiffani Everett        Immunizations administered during this encounter: Immunization History   Administered Date(s) Administered    Tdap 05/30/2015       Screening for Metabolic Disorders for Patients on Antipsychotic Medications  (Data obtained from the EMR)    Estimated Body Mass Index  Estimated body mass index is 38.27 kg/m² as calculated from the following:    Height as of this encounter: 5' 5\" (1.651 m). Weight as of this encounter: 104.3 kg (230 lb). Vital Signs/Blood Pressure  Visit Vitals  BP (!) 108/51   Pulse 89   Temp 98.3 °F (36.8 °C)   Resp 16   Ht 5' 5\" (1.651 m)   Wt 104.3 kg (230 lb)   SpO2 96%   BMI 38.27 kg/m²       Blood Glucose/Hemoglobin A1c  Lab Results   Component Value Date/Time    Glucose 244 (H) 12/30/2021 10:41 PM    Glucose 218 (H) 09/22/2021 05:32 AM    Glucose (POC) 147 (H) 01/04/2022 11:17 AM       Lab Results   Component Value Date/Time    Hemoglobin A1c 11.8 (H) 07/27/2021 08:09 PM        Lipid Panel  Lab Results   Component Value Date/Time    Cholesterol, total 130 09/22/2021 05:32 AM    HDL Cholesterol 32 09/22/2021 05:32 AM    LDL, calculated 49.8 09/22/2021 05:32 AM    Triglyceride 241 (H) 09/22/2021 05:32 AM    CHOL/HDL Ratio 4.1 09/22/2021 05:32 AM        Discharge Diagnosis: Please refer to physician's discharge summary    Discharge Plan:   The patient Christiano Boyle exhibits the ability to control behavior in a less restrictive environment. Patient's level of functioning is improving. No assaultive/destructive behavior has been observed for the past 24 hours. No suicidal/homicidal threat or behavior has been observed for the past 24 hours. There is no evidence of serious medication side effects. Patient has not been in physical or protective restraints for at least the past 24 hours. Discharge Medication List and Instructions:   Discharge Medication List as of 1/4/2022  2:13 PM      START taking these medications    Details   cephALEXin (KEFLEX) 500 mg capsule Take 1 Capsule by mouth every six (6) hours for 3 doses. Indications: UTI, Normal, Disp-3 Capsule, R-0         CONTINUE these medications which have NOT CHANGED    Details   buPROPion XL (WELLBUTRIN XL) 150 mg tablet Take 150 mg by mouth daily. Indications: major depressive disorder, Historical Med      cetirizine (ZyrTEC) 10 mg tablet Take 10 mg by mouth daily. , Historical Med      ferrous sulfate 325 mg (65 mg iron) tablet Take 325 mg by mouth Daily (before breakfast). , Historical Med      glipiZIDE (GlucotroL) 10 mg tablet Take 10 mg by mouth daily. Indications: type 2 diabetes mellitus, Historical Med      insulin regular (HumuLIN R Regular U-100 Insuln) 100 unit/mL injection 6 Units by SubCUTAneous route Before breakfast, lunch, dinner and at bedtime. , Historical Med      lisinopriL (PRINIVIL, ZESTRIL) 2.5 mg tablet Take 2.5 mg by mouth daily. Indications: kidney disease from diabetes, high blood pressure, Historical Med      nicotine (NICODERM CQ) 21 mg/24 hr 1 Patch by TransDERmal route every twenty-four (24) hours. , Historical Med      metFORMIN (GLUCOPHAGE) 1,000 mg tablet Take 1,000 mg by mouth two (2) times daily (with meals). Indications: type 2 diabetes mellitus, Historical Med      risperiDONE (RisperDAL) 2 mg tablet Take 2 mg by mouth two (2) times a day., Historical Med      sertraline (ZOLOFT) 50 mg tablet Take 50 mg by mouth two (2) times a day., Historical Med      linaGLIPtin (Tradjenta) 5 mg tablet Take 5 mg by mouth daily. Indications: type 2 diabetes mellitus, Historical Med      hydrOXYzine pamoate (VistariL) 50 mg capsule Take 50 mg by mouth every four (4) hours as needed for Anxiety. , Historical Med      polyethylene glycol (Miralax) 17 gram packet Take 17 g by mouth daily as needed for Constipation. Indications: constipation, Historical Med      traZODone (DESYREL) 50 mg tablet Take 1 Tablet by mouth nightly as needed (Insomnia).  Indications: insomnia associated with depression, Normal, Disp-30 Tablet, R-1      albuterol (Ventolin HFA) 90 mcg/actuation inhaler Take 2 Puffs by inhalation every four (4) hours as needed for Wheezing or Shortness of Breath. Indications: asthma attack, Normal, Disp-1 Each, R-1      atorvastatin (LIPITOR) 40 mg tablet Take 1 Tablet by mouth every evening. Takes at 5:00 pm  Indications: Cholesterol, Normal, Disp-30 Tablet, R-1      oxybutynin chloride XL (DITROPAN XL) 5 mg CR tablet Take 1 Tablet by mouth daily. Indications: overactive bladder, Normal, Disp-30 Tablet, R-1         STOP taking these medications       lovastatin (MEVACOR) 20 mg tablet Comments:   Reason for Stopping:               Unresulted Labs (24h ago, onward)            None        To obtain results of studies pending at discharge, please contact N/A    Follow-up Information     Follow up With Specialties Details Why Contact Info    Sonal Walsh MD Internal Medicine   [address] [id number]  [facility] PM[facility]   Your provider will follow up with you at your group home within the next 2 weeks. Ximena 9        Advanced Directive:   Does the patient have an appointed surrogate decision maker? Unknown   Does the patient have a Medical Advance Directive? Unknown   Does the patient have a Psychiatric Advance Directive? Unknown   If the patient does not have a surrogate or Medical Advance Directive AND Psychiatric Advance Directive, the patient was offered information on these advance directives. Unknown     Patient Instructions: Please continue all medications until otherwise directed by physician. Tobacco Cessation Discharge Plan:   Is the patient a smoker and needs referral for smoking cessation? No  Patient referred to the following for smoking cessation with an appointment? No   Patient was offered medication to assist with smoking cessation at discharge? No  Was education for smoking cessation added to the discharge instructions?  No     Alcohol/Substance Abuse Discharge Plan: Does the patient have a history of substance/alcohol abuse and requires a referral for treatment? Yes  Patient referred to the following for substance/alcohol abuse treatment with an appointment? Yes  Patient was offered medication to assist with alcohol cessation at discharge? No  Was education for substance/alcohol abuse added to discharge instructions? Yes     Patient discharged to Home; provided to the patient/caregiver either in hard copy or electronically. Continuing care paperwork was faxed to community mental health providers.

## 2022-03-18 PROBLEM — F43.10 PTSD (POST-TRAUMATIC STRESS DISORDER): Status: ACTIVE | Noted: 2021-12-30

## 2022-03-19 PROBLEM — F31.9 BIPOLAR 1 DISORDER (HCC): Status: ACTIVE | Noted: 2021-09-27

## 2022-03-19 PROBLEM — E11.00 HYPEROSMOLAR HYPERGLYCEMIC STATE (HHS) (HCC): Status: ACTIVE | Noted: 2021-07-27

## 2022-03-19 PROBLEM — F32.9 MDD (MAJOR DEPRESSIVE DISORDER): Status: ACTIVE | Noted: 2021-12-30

## 2022-03-20 PROBLEM — F32.9 MAJOR DEPRESSION: Status: ACTIVE | Noted: 2018-05-10

## 2022-03-20 PROBLEM — F31.9 BIPOLAR DISORDER (HCC): Status: ACTIVE | Noted: 2021-09-21

## 2022-05-14 ENCOUNTER — HOSPITAL ENCOUNTER (EMERGENCY)
Age: 57
Discharge: ANOTHER HOSPITAL | End: 2022-05-15
Attending: EMERGENCY MEDICINE
Payer: MEDICAID

## 2022-05-14 DIAGNOSIS — M79.605 BILATERAL LEG PAIN: ICD-10-CM

## 2022-05-14 DIAGNOSIS — M79.604 BILATERAL LEG PAIN: ICD-10-CM
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
## 2022-05-14 DIAGNOSIS — F32.A DEPRESSION WITH SUICIDAL IDEATION: Primary | ICD-10-CM

## 2022-05-14 DIAGNOSIS — R45.851 DEPRESSION WITH SUICIDAL IDEATION: Primary | ICD-10-CM

## 2022-05-14 LAB
ALBUMIN SERPL-MCNC: 3.1 G/DL (ref 3.5–5)
ALBUMIN/GLOB SERPL: 0.8 {RATIO} (ref 1.1–2.2)
ALP SERPL-CCNC: 134 U/L (ref 45–117)
ALT SERPL-CCNC: 21 U/L (ref 12–78)
AMPHET UR QL SCN: NEGATIVE
ANION GAP SERPL CALC-SCNC: 10 MMOL/L (ref 5–15)
APPEARANCE UR: CLEAR
AST SERPL-CCNC: 11 U/L (ref 15–37)
BACTERIA URNS QL MICRO: NEGATIVE /HPF
BARBITURATES UR QL SCN: NEGATIVE
BENZODIAZ UR QL: NEGATIVE
BILIRUB SERPL-MCNC: 0.1 MG/DL (ref 0.2–1)
BILIRUB UR QL: NEGATIVE
BUN SERPL-MCNC: 14 MG/DL (ref 6–20)
BUN/CREAT SERPL: 15 (ref 12–20)
CALCIUM SERPL-MCNC: 9 MG/DL (ref 8.5–10.1)
CANNABINOIDS UR QL SCN: NEGATIVE
CHLORIDE SERPL-SCNC: 101 MMOL/L (ref 97–108)
CO2 SERPL-SCNC: 27 MMOL/L (ref 21–32)
COCAINE UR QL SCN: NEGATIVE
COLOR UR: ABNORMAL
CREAT SERPL-MCNC: 0.95 MG/DL (ref 0.55–1.02)
DRUG SCRN COMMENT,DRGCM: NORMAL
EPITH CASTS URNS QL MICRO: ABNORMAL /LPF
ERYTHROCYTE [DISTWIDTH] IN BLOOD BY AUTOMATED COUNT: 15.3 % (ref 11.5–14.5)
ETHANOL SERPL-MCNC: <10 MG/DL
FLUAV RNA SPEC QL NAA+PROBE: NOT DETECTED
FLUBV RNA SPEC QL NAA+PROBE: NOT DETECTED
GLOBULIN SER CALC-MCNC: 4 G/DL (ref 2–4)
GLUCOSE SERPL-MCNC: 187 MG/DL (ref 65–100)
GLUCOSE UR STRIP.AUTO-MCNC: 250 MG/DL
HCT VFR BLD AUTO: 36.4 % (ref 35–47)
HGB BLD-MCNC: 11.1 G/DL (ref 11.5–16)
HGB UR QL STRIP: NEGATIVE
KETONES UR QL STRIP.AUTO: NEGATIVE MG/DL
LEUKOCYTE ESTERASE UR QL STRIP.AUTO: NEGATIVE
MCH RBC QN AUTO: 25.3 PG (ref 26–34)
MCHC RBC AUTO-ENTMCNC: 30.5 G/DL (ref 30–36.5)
MCV RBC AUTO: 83.1 FL (ref 80–99)
METHADONE UR QL: NEGATIVE
NITRITE UR QL STRIP.AUTO: NEGATIVE
NRBC # BLD: 0 K/UL (ref 0–0.01)
NRBC BLD-RTO: 0 PER 100 WBC
OPIATES UR QL: NEGATIVE
PCP UR QL: NEGATIVE
PH UR STRIP: 5 [PH] (ref 5–8)
PLATELET # BLD AUTO: 425 K/UL (ref 150–400)
PMV BLD AUTO: 8.8 FL (ref 8.9–12.9)
POTASSIUM SERPL-SCNC: 4.5 MMOL/L (ref 3.5–5.1)
PROT SERPL-MCNC: 7.1 G/DL (ref 6.4–8.2)
PROT UR STRIP-MCNC: NEGATIVE MG/DL
RBC # BLD AUTO: 4.38 M/UL (ref 3.8–5.2)
RBC #/AREA URNS HPF: ABNORMAL /HPF (ref 0–5)
SARS-COV-2, COV2: NOT DETECTED
SODIUM SERPL-SCNC: 138 MMOL/L (ref 136–145)
SP GR UR REFRACTOMETRY: 1.01
UA: UC IF INDICATED,UAUC: ABNORMAL
UROBILINOGEN UR QL STRIP.AUTO: 0.2 EU/DL (ref 0.2–1)
WBC # BLD AUTO: 10.6 K/UL (ref 3.6–11)
WBC URNS QL MICRO: ABNORMAL /HPF (ref 0–4)

## 2022-05-14 PROCEDURE — 36415 COLL VENOUS BLD VENIPUNCTURE: CPT

## 2022-05-14 PROCEDURE — 74011250637 HC RX REV CODE- 250/637: Performed by: EMERGENCY MEDICINE

## 2022-05-14 PROCEDURE — 87636 SARSCOV2 & INF A&B AMP PRB: CPT

## 2022-05-14 PROCEDURE — 80307 DRUG TEST PRSMV CHEM ANLYZR: CPT

## 2022-05-14 PROCEDURE — 85027 COMPLETE CBC AUTOMATED: CPT

## 2022-05-14 PROCEDURE — 99285 EMERGENCY DEPT VISIT HI MDM: CPT

## 2022-05-14 PROCEDURE — 82077 ASSAY SPEC XCP UR&BREATH IA: CPT

## 2022-05-14 PROCEDURE — 80053 COMPREHEN METABOLIC PANEL: CPT

## 2022-05-14 PROCEDURE — 90791 PSYCH DIAGNOSTIC EVALUATION: CPT

## 2022-05-14 PROCEDURE — 81001 URINALYSIS AUTO W/SCOPE: CPT

## 2022-05-14 RX ORDER — ACETAMINOPHEN 500 MG
1000 TABLET ORAL ONCE
Status: COMPLETED | OUTPATIENT
Start: 2022-05-14 | End: 2022-05-14

## 2022-05-14 RX ADMIN — ACETAMINOPHEN 1000 MG: 500 TABLET ORAL at 18:47

## 2022-05-14 NOTE — ED PROVIDER NOTES
EMERGENCY DEPARTMENT HISTORY AND PHYSICAL EXAM      Date: 2022  Patient Name: Juan Pablo Cha    History of Presenting Illness     Chief Complaint   Patient presents with   [address] Problem     suicidal, wanted to walk in front of car but tripped and fell     History Provided By: Patient and EMS    HPI: Juan Pablo Cha, 60 y.o. female with past medical history significant for bipolar, schizophrenia, PTSD, polysubstance abuse, hypertension, COPD, depression, and diabetes who presents via EMS to the ED with cc of suicidal ideation and a ground-level fall. Patient states she has been feeling suicidal ever since Mother's Day. Her mother is  and her daughter has not been to visit her in a while which is her trigger. She tells me she has not taken her psychiatric medication in a few days. Her plan is to walk in front of traffic but she states she slipped and fell while attempting to do this. Bystanders called EMS. Patient complains of 10 out of 10 bilateral leg pain. She denies taking any medications for the pain. PMHx: Bipolar, schizophrenia, PTSD, polysubstance abuse, hypertension, COPD, depression, and diabetes  Social Hx: Smokes 3 packs/day, endorses ETOH use,      PCP: Kory Wang MD    There are no other complaints, changes, or physical findings at this time. No current facility-administered medications on file prior to encounter. Current Outpatient Medications on File Prior to Encounter   Medication Sig Dispense Refill    buPROPion XL (WELLBUTRIN XL) 150 mg tablet Take 150 mg by mouth daily. Indications: major depressive disorder      cetirizine (ZyrTEC) 10 mg tablet Take 10 mg by mouth daily.  ferrous sulfate 325 mg (65 mg iron) tablet Take 325 mg by mouth Daily (before breakfast).  glipiZIDE (GlucotroL) 10 mg tablet Take 10 mg by mouth daily.  Indications: type 2 diabetes mellitus      insulin regular (HumuLIN R Regular U-100 Insuln) 100 unit/mL injection 6 Units by SubCUTAneous route Before breakfast, lunch, dinner and at bedtime.  metFORMIN (GLUCOPHAGE) 1,000 mg tablet Take 1,000 mg by mouth two (2) times daily (with meals). Indications: type 2 diabetes mellitus      risperiDONE (RisperDAL) 2 mg tablet Take 2 mg by mouth two (2) times a day.  sertraline (ZOLOFT) 50 mg tablet Take 50 mg by mouth two (2) times a day.  traZODone (DESYREL) 50 mg tablet Take 1 Tablet by mouth nightly as needed (Insomnia). Indications: insomnia associated with depression 30 Tablet 1    atorvastatin (LIPITOR) 40 mg tablet Take 1 Tablet by mouth every evening. Takes at 5:00 pm  Indications: Cholesterol 30 Tablet 1    oxybutynin chloride XL (DITROPAN XL) 5 mg CR tablet Take 1 Tablet by mouth daily. Indications: overactive bladder 30 Tablet 1    lisinopriL (PRINIVIL, ZESTRIL) 2.5 mg tablet Take 2.5 mg by mouth daily. Indications: kidney disease from diabetes, high blood pressure      nicotine (NICODERM CQ) 21 mg/24 hr 1 Patch by TransDERmal route every twenty-four (24) hours.  linaGLIPtin (Tradjenta) 5 mg tablet Take 5 mg by mouth daily. Indications: type 2 diabetes mellitus      hydrOXYzine pamoate (VistariL) 50 mg capsule Take 50 mg by mouth every four (4) hours as needed for Anxiety.  polyethylene glycol (Miralax) 17 gram packet Take 17 g by mouth daily as needed for Constipation. Indications: constipation      albuterol (Ventolin HFA) 90 mcg/actuation inhaler Take 2 Puffs by inhalation every four (4) hours as needed for Wheezing or Shortness of Breath.  Indications: asthma attack 1 Each 1     Past History     Past Medical History:  Past Medical History:   Diagnosis Date    Arthritis     legs    Bipolar 1 disorder ([facility]     COPD     Depression 1/13/2012    Diabetes ([facility] [facility]     Drug abuse (HCC)     cocaine, stimulants, etoh, mj, tob    H/O suicide attempt     Hypertension     Obesity     Polydrug dependence excluding opioid type drug, [facility]     PTSD (post-traumatic stress disorder) 12/30/2021    Schizophrenia ([facility] [address] 7/8/2016     Past Surgical History:  Past Surgical History:   Procedure Laterality Date    HX ORTHOPAEDIC      foot sx     Family History:  Family History   Problem Relation Age of Onset    Diabetes Mother     Stroke Mother     Stroke Father      Social History:  Social History     Tobacco Use    Smoking status: Current Every Day Smoker     Packs/day: 1.00     Years: 10.00     Pack years: 10.00     Types: Cigarettes    Smokeless tobacco: Current User   Substance Use Topics    Alcohol use: No     Alcohol/week: 0.0 standard drinks     Comment: Hx of ETOH abuse since age 13. no DUIs.  Drug use: Not Currently     Types: Cocaine, Marijuana, Other, Opiates     Allergies: Allergies   Allergen Reactions    Amoxicillin Hives    Mushroom Flavor Hives    Tomato Hives     Review of Systems   Review of Systems   Constitutional: Negative for chills and fever. HENT: Negative for congestion, rhinorrhea, sneezing and sore throat. Eyes: Negative for redness and visual disturbance. Respiratory: Negative for shortness of breath. Cardiovascular: Negative for leg swelling. Gastrointestinal: Negative for abdominal pain, nausea and vomiting. Genitourinary: Negative for difficulty urinating and frequency. Musculoskeletal: Positive for arthralgias. Negative for back pain, myalgias and neck stiffness. Skin: Negative for rash. Neurological: Negative for dizziness, syncope, weakness and headaches. Hematological: Negative for adenopathy. Psychiatric/Behavioral: Positive for dysphoric mood and suicidal ideas. All other systems reviewed and are negative. Physical Exam   Physical Exam  Vitals and nursing note reviewed. Constitutional:       Appearance: Normal appearance. She is well-developed. She is obese. HENT:      Head: Normocephalic and atraumatic.    Eyes:      Conjunctiva/sclera: Conjunctivae normal.   Cardiovascular: Rate and Rhythm: Normal rate and regular rhythm. Pulses: Normal pulses. Heart sounds: Normal heart sounds, S1 normal and S2 normal.   Pulmonary:      Effort: Pulmonary effort is normal. No respiratory distress. Breath sounds: Normal breath sounds. No wheezing. Abdominal:      General: Bowel sounds are normal. There is no distension. Palpations: Abdomen is soft. Tenderness: There is no abdominal tenderness. There is no rebound. Musculoskeletal:         General: Normal range of motion. Cervical back: Full passive range of motion without pain, normal range of motion and neck supple. Comments: Diffuse tenderness to her bilateral lower extremities without deformity   Skin:     General: Skin is warm and dry. Findings: No rash. Neurological:      Mental Status: She is alert and oriented to person, place, and time. Psychiatric:         Mood and Affect: Mood is depressed. Speech: Speech normal.         Behavior: Behavior is slowed. Thought Content: Thought content includes suicidal ideation. Thought content does not include homicidal ideation. Thought content does not include homicidal plan.          Judgment: Judgment normal.       Diagnostic Study Results   Labs -     Recent Results (from the past 12 hour(s))   CBC W/O DIFF    Collection Time: 05/14/22  6:38 PM   Result Value Ref Range    WBC 10.6 3.6 - 11.0 K/uL    RBC 4.38 3.80 - 5.20 M/uL    HGB 11.1 (L) 11.5 - 16.0 g/dL    HCT 36.4 35.0 - 47.0 %    MCV 83.1 80.0 - 99.0 FL    MCH 25.3 (L) 26.0 - 34.0 PG    MCHC 30.5 30.0 - 36.5 g/dL    RDW 15.3 (H) 11.5 - 14.5 %    PLATELET 196 (H) 853 - 400 K/uL    MPV 8.8 (L) 8.9 - 12.9 FL    NRBC 0.0 0  WBC    ABSOLUTE NRBC 0.00 0.00 - 0.01 K/uL   COVID-19 WITH INFLUENZA A/B    Collection Time: 05/14/22  6:38 PM   Result Value Ref Range    SARS-CoV-2 by PCR Not detected NOTD      Influenza A by PCR Not detected      Influenza B by PCR Not detected Radiologic Studies -   No orders to display     No results found. Medical Decision Making   I am the first provider for this patient. I reviewed the vital signs, available nursing notes, past medical history, past surgical history, family history and social history. Vital Signs-Reviewed the patient's vital signs. Patient Vitals for the past 24 hrs:   Temp Pulse Resp BP SpO2   05/14/22 1715 98.4 °F (36.9 °C) 95 16 (!) 111/48 94 %     Pulse Oximetry Analysis - 94% on RA (borderline)    Records Reviewed: Nursing Notes and Old Medical Records    Provider Notes (Medical Decision Making):   58-year-old female presents via EMS with suicidal ideations. She also is complaining of bilateral leg pain after a ground-level fall. Will treat her pain with Tylenol, check basic labs for medical clearance, and have [facility] see and evaluate the patient. ED Course:   Initial assessment performed. The patients presenting problems have been discussed, and they are in agreement with the care plan formulated and outlined with them. I have encouraged them to ask questions as they arise throughout their visit. BEDSIDE SIGN OUT:  7:09 PM  Discussed pt's hx, disposition, and available diagnostic and imaging results with Dr. Galina Orona. Reviewed care plans. Both providers and patient are in agreement with care plan. Giuliana Hill MD is transferring care at this time. 7:18 PM  Giuliana Hill MD spoke with Samy Alvarado for [facility]. Discussed HPI and PE, available diagnostic tests and clinical findings. He is in agreement with care plans as outlined. He will see and evaluate the patient. ED Course as of 05/19/22 1030   Sat May 14, 2022   2227 Patient will be admitted to [facility] [AR]      ED Course User Index  [AR] Joy Mortensen DO       Progress Note:   Updated pt on all returned results and findings. Discussed the importance of proper follow up as referred below along with return precautions.  Pt in agreement with the care plan and expresses agreement with and understanding of all items discussed. Disposition:  Transfer to inpatient psychiatry under involuntary admission at [address] [place] [address]:  1. Transfer to inpatient psychiatry at [facility]    Diagnosis     Clinical Impression:   1. Depression with suicidal ideation    2. Bilateral leg pain    3. Fall from ground level            Please note that this dictation was completed with Dragon, computer voice recognition software. Quite often unanticipated grammatical, syntax, homophones, and other interpretive errors are inadvertently transcribed by the computer software. Please disregard these errors. Additionally, please excuse any errors that have escaped final proofreading.
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
## 2022-05-14 NOTE — PROGRESS NOTES
BSMART assessment completed, and suicide risk level noted to be high based on answers but low based on presentation. ED provider/Dr Mohit Lucero and Charge Nurse/Audrey notified. Concerns not observed. Security/Off- has not been notified.

## 2022-05-15 ENCOUNTER — HOSPITAL ENCOUNTER (INPATIENT)
Age: 57
LOS: 4 days | Discharge: HOME | DRG: 754 | End: 2022-05-19
Attending: PSYCHIATRY & NEUROLOGY | Admitting: PSYCHIATRY & NEUROLOGY
Payer: MEDICAID

## 2022-05-15 VITALS
RESPIRATION RATE: 16 BRPM | HEART RATE: 68 BPM | HEIGHT: 65 IN | WEIGHT: 195 LBS | OXYGEN SATURATION: 94 % | BODY MASS INDEX: 32.49 KG/M2 | DIASTOLIC BLOOD PRESSURE: 54 MMHG | SYSTOLIC BLOOD PRESSURE: 117 MMHG | TEMPERATURE: 97.4 F
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
## 2022-05-15 LAB
GLUCOSE BLD STRIP.AUTO-MCNC: 218 MG/DL (ref 65–117)
PERFORMED BY, TECHID: ABNORMAL

## 2022-05-15 PROCEDURE — 74011250637 HC RX REV CODE- 250/637: Performed by: INTERNAL MEDICINE

## 2022-05-15 PROCEDURE — 74011250637 HC RX REV CODE- 250/637: Performed by: PSYCHIATRY & NEUROLOGY

## 2022-05-15 PROCEDURE — 82962 GLUCOSE BLOOD TEST: CPT

## 2022-05-15 PROCEDURE — 65220000003 HC RM SEMIPRIVATE PSYCH

## 2022-05-15 RX ORDER — HYDROXYZINE 50 MG/1
50 TABLET, FILM COATED ORAL
Status: DISCONTINUED | OUTPATIENT
Start: 2022-05-15 | End: 2022-05-19 | Stop reason: HOSPADM

## 2022-05-15 RX ORDER — METFORMIN HYDROCHLORIDE 500 MG/1
1000 TABLET, EXTENDED RELEASE ORAL 2 TIMES DAILY
Status: DISCONTINUED | OUTPATIENT
Start: 2022-05-15 | End: 2022-05-17

## 2022-05-15 RX ORDER — ACETAMINOPHEN 325 MG/1
650 TABLET ORAL
Status: DISCONTINUED | OUTPATIENT
Start: 2022-05-15 | End: 2022-05-19 | Stop reason: HOSPADM

## 2022-05-15 RX ORDER — TRAZODONE HYDROCHLORIDE 50 MG/1
50 TABLET ORAL
Status: DISCONTINUED | OUTPATIENT
Start: 2022-05-15 | End: 2022-05-19 | Stop reason: HOSPADM

## 2022-05-15 RX ORDER — ALBUTEROL SULFATE 90 UG/1
2 AEROSOL, METERED RESPIRATORY (INHALATION)
Status: DISCONTINUED | OUTPATIENT
Start: 2022-05-15 | End: 2022-05-19 | Stop reason: HOSPADM

## 2022-05-15 RX ADMIN — METFORMIN HYDROCHLORIDE 1000 MG: 500 TABLET, EXTENDED RELEASE ORAL at 21:15

## 2022-05-15 RX ADMIN — TRAZODONE HYDROCHLORIDE 50 MG: 50 TABLET ORAL at 21:17

## 2022-05-15 NOTE — BSMART NOTE
Pt has been accepted to [facility] per Rosana in Access. Information on bed is pending. 23:15 Pt accepted to General Unit: 259/17 by Dr. Katelin Landaverde @ [facility].  Report [phone].
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
## 2022-05-15 NOTE — GROUP NOTE
EMILY  GROUP DOCUMENTATION INDIVIDUAL                                                                          Group Therapy Note    Date: 5/15/2022    Group Start Time: 0830  Group End Time: 0845  Group Topic: Community Meeting    [facility] [address] [facility] GROUP DOCUMENTATION GROUP    Group Therapy Note    Attendees:         Attendance: Did not attend    Patient's Goal:    Interventions/techniques: Follows Directions:     Interactions:     Mental Status:     Behavior/appearance:     Goals Achieved: Additional Notes:  Patient did not attend.      Nancy Doll

## 2022-05-15 NOTE — ED NOTES
Patient here via EMS with c/o suicidal ideation. Patient reports that she has had chronic depression, worsening after mother day. Patient states recent death of her . Patient states that today she thought about walking out into traffic to commit suicide. Patient states that instead she was walking and she tripped and fell and had pain in her bilateral legs so she called to come to the hospital.  Patient calm and cooperative on arrival, asking for food and drink. Patient denies any hallucinations or delusions. Patient c/o chronic numbness in legs, patient asking if diabetes related, brief patient education provided. Emergency Department Nursing Plan of Care       The Nursing Plan of Care is developed from the Nursing assessment and Emergency Department Attending provider initial evaluation. The plan of care may be reviewed in the ED Provider note.     The Plan of Care was developed with the following considerations:   Patient / Family readiness to learn indicated by:verbalized understanding  Persons(s) to be included in education: patient  Barriers to Learning/Limitations:none    Signed     Jean-Claude Babb RN    5/14/2022   5:10 PM

## 2022-05-15 NOTE — ED NOTES
BRIAN here to transport [facility] to [facility], patient care ended at this time. [facility]

## 2022-05-15 NOTE — ED NOTES
Attempted to call report to [facility], RN unavailable at this time asked to call back in 10 minutes.

## 2022-05-15 NOTE — BH NOTES
Client is pleasant and cooperative. Alert and oriented x 3. Client complained of low energy and just wanted to be left alone so she can take a nap and relax. Denies feeling suicidal or homicidal.She has a good appetite and has been isolating in bed. She does still admit to feeling depressed . Remains on close observation for safety.
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
## 2022-05-15 NOTE — BH NOTES
Risa Mckeon is a 60years old white female, whom a voluntary admission. The accepting psychiatrist PEDRO Nunes for Dr. David Mckeon, with a diagnosis of MDD, PTSD, Borderline PD with SI. She is allergic: to Amoxicillin, mushroom and tomato. Patient was at [address] [place] [address] ER. She came in by EMS for endorsing SI. Stating \" I tried to step in front of traffic but I tripped over the curb\" Patient stated that she missed her , whom past away 3 years ago, and that she been suicidal since \"\"mother day\",  because her and her daughter are not close. Patient has a history of malingering, polysubstance abuse, major depression, PTSD, Bipolar DO, borderline DO, Schizoaffective DO, COPD, arthritis, obesity, HTN and diabetes. She presently live at a group home. Her UDS was negative. She denies using any illegal drugs. She smoke three packs of cigarettes per day, according to her. Patient denies SI at this time and she contracted for safety. She cooperative. Affect is flat. She was oriented to the unit. Not acute distress noted and no complaint. She on close observation for safety.

## 2022-05-15 NOTE — ED NOTES
TRANSFER - OUT REPORT:    Verbal report given to Toy Felipe RN (name) on Juwan White  being transferred to [facility] (unit) for routine progression of care       Report consisted of patients Situation, Background, Assessment and   Recommendations(SBAR). Information from the following report(s) SBAR, ED Summary, [facility] and Recent Results was reviewed with the receiving nurse. Lines:       Opportunity for questions and clarification was provided.       Patient transported with:  S transport
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
## 2022-05-15 NOTE — BH NOTES
PSA PART II ADDITIONAL INFORMATION        Access To [facility]: No    Substance Use: NO    Last Use: n/a    Type of Substance: no substance use    Frequency of Use: n/a    Request to See : NO    If yes, notified : NO    Guardian:NO    Guardian Contact:n/a    Release of Information Signed: NO    Release of Information Signed For: Will follow up.

## 2022-05-16 LAB
ALBUMIN SERPL-MCNC: 2.8 G/DL (ref 3.5–5)
ALBUMIN/GLOB SERPL: 0.8 {RATIO} (ref 1.1–2.2)
ALP SERPL-CCNC: 106 U/L (ref 45–117)
ALT SERPL-CCNC: 17 U/L (ref 12–78)
ANION GAP SERPL CALC-SCNC: 4 MMOL/L (ref 5–15)
AST SERPL W P-5'-P-CCNC: 9 U/L (ref 15–37)
BILIRUB SERPL-MCNC: 0.2 MG/DL (ref 0.2–1)
BUN SERPL-MCNC: 16 MG/DL (ref 6–20)
BUN/CREAT SERPL: 24 (ref 12–20)
CA-I BLD-MCNC: 8.9 MG/DL (ref 8.5–10.1)
CHLORIDE SERPL-SCNC: 105 MMOL/L (ref 97–108)
CO2 SERPL-SCNC: 30 MMOL/L (ref 21–32)
CREAT SERPL-MCNC: 0.68 MG/DL (ref 0.55–1.02)
EST. AVERAGE GLUCOSE BLD GHB EST-MCNC: 186 MG/DL
GLOBULIN SER CALC-MCNC: 3.6 G/DL (ref 2–4)
GLUCOSE BLD STRIP.AUTO-MCNC: 148 MG/DL (ref 65–117)
GLUCOSE BLD STRIP.AUTO-MCNC: 171 MG/DL (ref 65–117)
GLUCOSE BLD STRIP.AUTO-MCNC: 334 MG/DL (ref 65–117)
GLUCOSE SERPL-MCNC: 154 MG/DL (ref 65–100)
HBA1C MFR BLD: 8.1 % (ref 4–5.6)
PERFORMED BY, TECHID: ABNORMAL
POTASSIUM SERPL-SCNC: 4.9 MMOL/L (ref 3.5–5.1)
PROT SERPL-MCNC: 6.4 G/DL (ref 6.4–8.2)
SODIUM SERPL-SCNC: 139 MMOL/L (ref 136–145)

## 2022-05-16 PROCEDURE — 65220000003 HC RM SEMIPRIVATE PSYCH

## 2022-05-16 PROCEDURE — 74011636637 HC RX REV CODE- 636/637: Performed by: PSYCHIATRY & NEUROLOGY

## 2022-05-16 PROCEDURE — 74011250637 HC RX REV CODE- 250/637: Performed by: INTERNAL MEDICINE

## 2022-05-16 PROCEDURE — 80053 COMPREHEN METABOLIC PANEL: CPT

## 2022-05-16 PROCEDURE — 82962 GLUCOSE BLOOD TEST: CPT

## 2022-05-16 PROCEDURE — 83036 HEMOGLOBIN GLYCOSYLATED A1C: CPT

## 2022-05-16 PROCEDURE — 36415 COLL VENOUS BLD VENIPUNCTURE: CPT

## 2022-05-16 PROCEDURE — 74011250637 HC RX REV CODE- 250/637: Performed by: PSYCHIATRY & NEUROLOGY

## 2022-05-16 RX ORDER — LANOLIN ALCOHOL/MO/W.PET/CERES
325 CREAM (GRAM) TOPICAL
Status: DISCONTINUED | OUTPATIENT
Start: 2022-05-17 | End: 2022-05-19 | Stop reason: HOSPADM

## 2022-05-16 RX ORDER — BUPROPION HYDROCHLORIDE 150 MG/1
150 TABLET ORAL DAILY
Status: DISCONTINUED | OUTPATIENT
Start: 2022-05-17 | End: 2022-05-19 | Stop reason: HOSPADM

## 2022-05-16 RX ORDER — ATORVASTATIN CALCIUM 40 MG/1
40 TABLET, FILM COATED ORAL EVERY EVENING
Status: DISCONTINUED | OUTPATIENT
Start: 2022-05-16 | End: 2022-05-19 | Stop reason: HOSPADM

## 2022-05-16 RX ORDER — ALOGLIPTIN 25 MG/1
25 TABLET, FILM COATED ORAL DAILY
Status: DISCONTINUED | OUTPATIENT
Start: 2022-05-17 | End: 2022-05-19 | Stop reason: HOSPADM

## 2022-05-16 RX ORDER — ALBUTEROL SULFATE 90 UG/1
2 AEROSOL, METERED RESPIRATORY (INHALATION)
Status: DISCONTINUED | OUTPATIENT
Start: 2022-05-16 | End: 2022-05-16 | Stop reason: SDUPTHER

## 2022-05-16 RX ORDER — POLYETHYLENE GLYCOL 3350 17 G/17G
17 POWDER, FOR SOLUTION ORAL
Status: DISCONTINUED | OUTPATIENT
Start: 2022-05-16 | End: 2022-05-19 | Stop reason: HOSPADM

## 2022-05-16 RX ORDER — GLIPIZIDE 5 MG/1
10 TABLET ORAL
Status: DISCONTINUED | OUTPATIENT
Start: 2022-05-17 | End: 2022-05-19 | Stop reason: HOSPADM

## 2022-05-16 RX ORDER — RISPERIDONE 2 MG/1
2 TABLET, FILM COATED ORAL 2 TIMES DAILY
Status: DISCONTINUED | OUTPATIENT
Start: 2022-05-16 | End: 2022-05-19 | Stop reason: HOSPADM

## 2022-05-16 RX ORDER — SERTRALINE HYDROCHLORIDE 50 MG/1
50 TABLET, FILM COATED ORAL 2 TIMES DAILY
Status: DISCONTINUED | OUTPATIENT
Start: 2022-05-16 | End: 2022-05-19 | Stop reason: HOSPADM

## 2022-05-16 RX ORDER — OXYBUTYNIN CHLORIDE 5 MG/1
5 TABLET, EXTENDED RELEASE ORAL DAILY
Status: DISCONTINUED | OUTPATIENT
Start: 2022-05-17 | End: 2022-05-19 | Stop reason: HOSPADM

## 2022-05-16 RX ADMIN — METFORMIN HYDROCHLORIDE 1000 MG: 500 TABLET, EXTENDED RELEASE ORAL at 21:25

## 2022-05-16 RX ADMIN — ATORVASTATIN CALCIUM 40 MG: 40 TABLET, FILM COATED ORAL at 17:03

## 2022-05-16 RX ADMIN — INSULIN HUMAN 6 UNITS: 100 INJECTION, SOLUTION PARENTERAL at 21:26

## 2022-05-16 RX ADMIN — TRAZODONE HYDROCHLORIDE 50 MG: 50 TABLET ORAL at 21:25

## 2022-05-16 RX ADMIN — INSULIN HUMAN 6 UNITS: 100 INJECTION, SOLUTION PARENTERAL at 17:02

## 2022-05-16 RX ADMIN — ACETAMINOPHEN 650 MG: 325 TABLET ORAL at 12:25

## 2022-05-16 RX ADMIN — RISPERIDONE 2 MG: 2 TABLET ORAL at 21:25

## 2022-05-16 RX ADMIN — SERTRALINE HYDROCHLORIDE 50 MG: 50 TABLET ORAL at 21:25

## 2022-05-16 NOTE — PROGRESS NOTES
Patient invited to Spirituality Group on 5/16/2022 but chose not to attend. Rev.  Rambo Louis MDiv, [facility], [facility]   paging service: [phone])

## 2022-05-16 NOTE — BH NOTES
Pt. Encouraged to attend group but did not attend.   Psycho-ED/Different types of cognitive distortions

## 2022-05-16 NOTE — PROGRESS NOTES
Progress Note  Date:2022       Room:[facility]  Patient Name:Pilar Sandra     YOB: 1965     Age:56 y.o. Subjective    Subjective  Patient 64-year-old admitted to the behavioral health patient with reported depression and suicidal ideation. Patient had expressed that \"I tried to step in front of traffic but I tripped over the curb\". Patient expresses that she misses her  who passed away 3 years ago and has been suicidal over Mother's Day. She states that also triggers depression. Patient is a poor historian today is difficult to arouse. She states she would like to continue her medication but goes back to sleeping. Reported history of malingering, polysubstance abuse PTSD, bipolar disorder major depression and borderline personality, arthritis, obesity hypertension, diabetes and a history of schizoaffective disorder. Mental status examination-patient is alert and responds briefly when calling her name. Very limited historian. She difficult to arouse her and goes back to sleep on conversation. No acute distress noted. Review of Systems  Objective         Vitals Last 24 Hours:  TEMPERATURE:  Temp  Av.6 °F (36.4 °C)  Min: 97.5 °F (36.4 °C)  Max: 97.7 °F (36.5 °C)  RESPIRATIONS RANGE: Resp  Av  Min: 18  Max: 18  PULSE OXIMETRY RANGE: No data recorded  PULSE RANGE: Pulse  Av  Min: 63  Max: 71  BLOOD PRESSURE RANGE: Systolic (96TGH), OOD:524 , Min:114 , GAE:097   ; Diastolic (92HHH), HOT:01, Min:58, Max:74    I/O (24Hr):   No intake or output data in the 24 hours ending 22 1737  Objective  Labs/Imaging/Diagnostics    Labs:  CBC:Recent Labs     22  1838   WBC 10.6   RBC 4.38   HGB 11.1*   HCT 36.4   MCV 83.1   RDW 15.3*   *     CHEMISTRIES:  Recent Labs     22  0612 22  1838    138   K 4.9 4.5    101   CO2 30 27   BUN 16 14   CA 8.9 9.0   PT/INR:No results for input(s): INR, INREXT in the last 72 hours. No lab exists for component: PROTIME  APTT:No results for input(s): APTT in the last 72 hours. LIVER PROFILE:  Recent Labs     05/16/22  0612 05/14/22  1838   AST 9* 11*   ALT 17 21     Lab Results   Component Value Date/Time    ALT (SGPT) 17 05/16/2022 06:12 AM    AST (SGOT) 9 (L) 05/16/2022 06:12 AM    Alk. phosphatase 106 05/16/2022 06:12 AM    Bilirubin, total 0.2 05/16/2022 06:12 AM       Imaging Last 24 Hours:  No results found. Assessment//Plan   Active Problems:    Major depression (5/10/2018)      Suicide ([address] (5/15/2022)      Assessment & Plan  Patient's home medications reviewed and restarted  Medical consult in place  Encourage participation in therapy  Patient discussed in the treatment team meeting. Encourage group therapy  We will contact family or outpatient providers for further collateral and treatment planning.       Current Facility-Administered Medications:     atorvastatin (LIPITOR) tablet 40 mg, 40 mg, Oral, QPM, Analy Sellers MD, 40 mg at 05/16/22 1703    [START ON 5/17/2022] glipiZIDE (GLUCOTROL) tablet 10 mg, 10 mg, Oral, Marlo ARCINIEGA Devi, MD    risperiDONE (RisperDAL) tablet 2 mg, 2 mg, Oral, BID, Analy Sellers MD    sertraline (ZOLOFT) tablet 50 mg, 50 mg, Oral, BID, Analy Sellers MD    polyethylene glycol (MIRALAX) packet 17 g, 17 g, Oral, DAILY PRN, Danyell Almodovar MD    [START ON 5/17/2022] oxybutynin chloride XL (DITROPAN XL) tablet 5 mg, 5 mg, Oral, DAILY, Analy Sellers MD    [START ON 5/17/2022] buPROPion XL (WELLBUTRIN XL) tablet 150 mg, 150 mg, Oral, DAILY, Analy Sellers MD    [START ON 5/17/2022] ferrous sulfate tablet 325 mg, 325 mg, Oral, ACBMarlo Devi, MD    insulin regular (NOVOLIN R, HUMULIN R) injection 6 Units, 6 Units, SubCUTAneous, AC&HS, Analy Sellers MD, 6 Units at 05/16/22 1702    [START ON 5/17/2022] alogliptin (NESINA) tablet 25 mg, 25 mg, Oral, DAILY, Analy Sellers MD    hydrOXYzine HCL (ATARAX) tablet 50 mg, 50 mg, Oral, TID PRN, MD Khalida Boyd Pro traZODone (DESYREL) tablet 50 mg, 50 mg, Oral, QHS PRN, Analy Sellers MD, 50 mg at 05/15/22 2117    acetaminophen (TYLENOL) tablet 650 mg, 650 mg, Oral, Q4H PRN, Analy Sellers MD, 650 mg at 05/16/22 1225    metFORMIN ER (GLUCOPHAGE XR) tablet 1,000 mg, 1,000 mg, Oral, BID, Valery Rosa MD, 1,000 mg at 05/15/22 2115    albuterol (PROVENTIL HFA, VENTOLIN HFA, PROAIR HFA) inhaler 2 Puff, 2 Puff, Inhalation, Q6H PRN, Valery Rosa MD  Electronically signed by Beena Odom MD on 5/16/2022 at 5:37 PM

## 2022-05-16 NOTE — BH NOTES
Pt. Is quiet and withdrawn in her room. Pt. Omar Ren in bed and only came out to get medications and snacks. Pt. Is medication compliant, blood sugar 218 no sliding scale ordered. Requested medication for sleep. Pt. Denied depression, anxiety, SI/HI, hallucinations and pain. Pt. In no distress respirations regular and unlabored. Will continue to round closely Q15 mins per unit protocol.

## 2022-05-16 NOTE — GROUP NOTE
EMILY  GROUP DOCUMENTATION INDIVIDUAL                                                                          Group Therapy Note    Date: 5/16/2022    Group Start Time: 0920  Group End Time: 0945  Group Topic: Community Meeting    [facility]    Evans Becerril    IP [address] [facility] GROUP DOCUMENTATION GROUP    Group Therapy Note    Attendees: 3       Attendance: Other      Patient's Goal:      Interventions/techniques: Follows Directions:     Interactions:     Mental Status:     Behavior/appearance:   Goals Achieved:        Additional Notes:      Lou Hughes

## 2022-05-16 NOTE — BH NOTES
Patient accepted having her blood sugar checked this morning prior to breakfast and resulted 148. Patient presents as discheveled and with poor hygiene. Patient declined to get up for breakfast and was encouraged a couple of different times and she stated that she did not want it. Patient also refused her metformin and stated she did not want that.

## 2022-05-16 NOTE — GROUP NOTE
EMILY  GROUP DOCUMENTATION INDIVIDUAL                                                                          Group Therapy Note    Date: 5/16/2022    Group Start Time: 1115  Group End Time: 1150  Group Topic: Process Group - Inpatient    [facility]    Gracia Rios    IP [address] [facility] GROUP DOCUMENTATION GROUP    Group Therapy Note    Attendees: 9/14    Process: pts were asked to choose a day and writer read according quote. Pts were then encouraged to share significance of the date chose and provide positive feedback to one another.  Pts were educated on trauma and the brain and discussed different therapeutic interventions that could be beneficial. Pts were then encouraged to reflect on group         Attendance: Did not attend  Additional Notes:  Pt was encouraged to attend but chose not to do so     [id number]

## 2022-05-16 NOTE — BH NOTES
DAYSHIFT NOTE:     Patient laying in the bed for the majority of the day. Patient did not get up for breakfast but did get up for lunch and dinner. Patient presents as discheveled with poor hygiene and has been encouraged a couple of times to take a shower but patient has declined and remained in the bed most the day. Patient denies SI/HI. Patient is very short with her answers with assessment questions. Patient has not attended any groups. Medication changes were made today and patient started on insulin and received 6 units of scheduled insulin before dinner. Patient did get up and ate dinner in the dayroom. Close observations continued to ensure patient safety.

## 2022-05-16 NOTE — BH NOTES
Behavioral Health Treatment Team Note     Patient goal(s) for today: \"to get motrin for my leg\"   Treatment team focus/goals: Continue group therapy, medication management, and discharge planning     Progress note: The pt was laying down in bed while speaking with this writer. She was presenting with a flat affect and congruent mood. She endorsed current anxiety, rating it as a 10/10. She denied depression, SI, although reported having AH/VH's. She said that she hears voices that say \"you can't do anything\", and is able to see through walls. She also reported having crying spells and nightmares as well, and stated that she needed to be on medications for all of the symptoms that she listed. This writer asked her if she was connected to [facility]. The pt said that she was not anymore because she discontinued services with them. She said that she prefers Antonio Blight which is the psychiatrist through her group home. An inpatient level of care is necessary in order to stabilize the pt further on medications. LOS:  1  Expected LOS: 5-7 days     Insurance info/prescription coverage:  [facility]   Date of last family contact:  The pt declined to sign an JANET  Family requesting physician contact today:  no  Discharge plan:   To stabilize the pt further on medications   Guns in the home:  no   Outpatient provider(s):  [facility]     Participating treatment team members: Tiffany Ramos, Rebecca (assigned SW), Celia Ness LMSW

## 2022-05-17 LAB
GLUCOSE BLD STRIP.AUTO-MCNC: 127 MG/DL (ref 65–117)
GLUCOSE BLD STRIP.AUTO-MCNC: 133 MG/DL (ref 65–117)
GLUCOSE BLD STRIP.AUTO-MCNC: 140 MG/DL (ref 65–117)
GLUCOSE BLD STRIP.AUTO-MCNC: 166 MG/DL (ref 65–117)
PERFORMED BY, TECHID: ABNORMAL

## 2022-05-17 PROCEDURE — 65220000003 HC RM SEMIPRIVATE PSYCH

## 2022-05-17 PROCEDURE — 74011636637 HC RX REV CODE- 636/637: Performed by: PSYCHIATRY & NEUROLOGY

## 2022-05-17 PROCEDURE — 74011250637 HC RX REV CODE- 250/637: Performed by: INTERNAL MEDICINE

## 2022-05-17 PROCEDURE — 82962 GLUCOSE BLOOD TEST: CPT

## 2022-05-17 PROCEDURE — 74011250637 HC RX REV CODE- 250/637: Performed by: PSYCHIATRY & NEUROLOGY

## 2022-05-17 RX ORDER — METFORMIN HYDROCHLORIDE 500 MG/1
1000 TABLET, EXTENDED RELEASE ORAL 2 TIMES DAILY WITH MEALS
Status: DISCONTINUED | OUTPATIENT
Start: 2022-05-17 | End: 2022-05-19 | Stop reason: HOSPADM

## 2022-05-17 RX ADMIN — ACETAMINOPHEN 650 MG: 325 TABLET ORAL at 20:12

## 2022-05-17 RX ADMIN — INSULIN HUMAN 6 UNITS: 100 INJECTION, SOLUTION PARENTERAL at 21:23

## 2022-05-17 RX ADMIN — SERTRALINE HYDROCHLORIDE 50 MG: 50 TABLET ORAL at 08:15

## 2022-05-17 RX ADMIN — ATORVASTATIN CALCIUM 40 MG: 40 TABLET, FILM COATED ORAL at 17:02

## 2022-05-17 RX ADMIN — SERTRALINE HYDROCHLORIDE 50 MG: 50 TABLET ORAL at 21:22

## 2022-05-17 RX ADMIN — GLIPIZIDE 10 MG: 5 TABLET ORAL at 08:15

## 2022-05-17 RX ADMIN — INSULIN HUMAN 6 UNITS: 100 INJECTION, SOLUTION PARENTERAL at 16:43

## 2022-05-17 RX ADMIN — METFORMIN HYDROCHLORIDE 1000 MG: 500 TABLET, EXTENDED RELEASE ORAL at 08:14

## 2022-05-17 RX ADMIN — FERROUS SULFATE TAB 325 MG (65 MG ELEMENTAL FE) 325 MG: 325 (65 FE) TAB at 08:15

## 2022-05-17 RX ADMIN — RISPERIDONE 2 MG: 2 TABLET ORAL at 08:14

## 2022-05-17 RX ADMIN — TRAZODONE HYDROCHLORIDE 50 MG: 50 TABLET ORAL at 21:22

## 2022-05-17 RX ADMIN — METFORMIN HYDROCHLORIDE 1000 MG: 500 TABLET, EXTENDED RELEASE ORAL at 16:44

## 2022-05-17 RX ADMIN — INSULIN HUMAN 6 UNITS: 100 INJECTION, SOLUTION PARENTERAL at 08:14

## 2022-05-17 RX ADMIN — RISPERIDONE 2 MG: 2 TABLET ORAL at 21:22

## 2022-05-17 RX ADMIN — INSULIN HUMAN 6 UNITS: 100 INJECTION, SOLUTION PARENTERAL at 11:47

## 2022-05-17 RX ADMIN — OXYBUTYNIN CHLORIDE 5 MG: 5 TABLET, EXTENDED RELEASE ORAL at 08:15

## 2022-05-17 RX ADMIN — ALOGLIPTIN 25 MG: 25 TABLET, FILM COATED ORAL at 08:14

## 2022-05-17 RX ADMIN — HYDROXYZINE HYDROCHLORIDE 50 MG: 50 TABLET, FILM COATED ORAL at 21:22

## 2022-05-17 RX ADMIN — BUPROPION HYDROCHLORIDE 150 MG: 150 TABLET, FILM COATED, EXTENDED RELEASE ORAL at 08:15

## 2022-05-17 NOTE — BH NOTES
DAYSHIFT NOTE:     Patient stated that she was \"doing better\" this morning. Patient continues to remain isolative to her room and only gets up for her meals and goes back to her room to lay down. Patient remains discheveled and has poor hygiene. Patient has been encouraged to take a shower but patient declines. Patient is malodorious. Patient denies having any depression and or anxiety. Denies SI/HI. Denies AH/VH. Patient states that she is tired today. Patient is medication compliant. Patient is compliant with her accuchecks. Patient has not attended groups and remains in her room. Close observations continued to ensure patient safety.

## 2022-05-17 NOTE — PROGRESS NOTES
Comprehensive Nutrition Assessment    Type and Reason for Visit: Initial,Positive nutrition screen (MST 2)    Nutrition Recommendations/Plan:   1. Continue current diet     Malnutrition Assessment:  Malnutrition Status:  No malnutrition (05/17/22 1301)    Context:  Chronic illness     Findings of the 6 clinical characteristics of malnutrition:   Energy Intake:  No significant decrease in energy intake  Weight Loss:  No significant weight loss     Body Fat Loss:  No significant body fat loss (visual),     Muscle Mass Loss:  Unable to assess,    Fluid Accumulation:  No significant fluid accumulation,        Nutrition Assessment:    Admitted for depression, SI. RD interviewed d/t MST indicating wt loss pta. At interview, pt endorses no issues with appetite or intakes pta, denies recent wt loss. Pt asking about getting a cheeseburger and fries for lunch, RD educated on paper-pass menu system for [facility]. Labs and meds reviewed, BG between 148-334mg/dL- PO DM medications and insulin on. No further nutrition interventions at this time. Nutrition Related Findings:    Appears well nourished, obese with central adiposity. Denies issues with c/s. No N/V/D/C, last BM pta. No edema. Wound Type: None    Current Nutrition Intake & Therapies:  Average Meal Intake: %  Average Supplement Intake: None ordered  ADULT DIET Regular; 3 carb choices (45 gm/meal)    Anthropometric Measures:  Height: 5' 5\" (165.1 cm)  Ideal Body Weight (IBW): 125 lbs (57 kg)     Current Body Wt:  88.5 kg (195 lb 1.7 oz) (5/14), 156.1 % IBW. Not specified  Current BMI (kg/m2): 32.5  Usual Body Weight: 104.3 kg (230 lb) (per EMR, pt stated \"Two-Something\")  % Weight Change (Calculated): -15.2  Weight Adjustment: No adjustment                 BMI Category: Obese class 1 (BMI 30.0-34. 9)  Wt Readings from Last 10 Encounters:   05/14/22 88.5 kg (195 lb)   12/30/21 104.3 kg (230 lb)   09/27/21 113.4 kg (250 lb)   09/26/21 113.7 kg (250 lb 10.6 oz) 09/24/21 113.7 kg (250 lb 10.6 oz)   09/21/21 113.4 kg (250 lb)   07/30/21 112.9 kg (248 lb 12.8 oz)   04/26/21 105.2 kg (232 lb)   03/24/19 106.6 kg (235 lb)   10/04/18 104.3 kg (230 lb)   ? Veracity of current wt, likely estimated vs stated     Nutrition Diagnosis:   No nutrition diagnosis at this time     Nutrition Interventions:   Food and/or Nutrient Delivery: Continue current diet  Nutrition Education/Counseling: No recommendations at this time  Coordination of Nutrition Care: Continue to monitor while inpatient  Plan of Care discussed with: pt    Goals:     Goals: Meet at least 75% of estimated needs,prior to [address] (specify)  Specify Other Goals:  Wt maintenance vs loss of 0.5kg /week    Nutrition Monitoring and Evaluation:   Behavioral-Environmental Outcomes: None identified  Food/Nutrient Intake Outcomes: Diet advancement/tolerance,Food and nutrient intake  Physical Signs/Symptoms Outcomes: Weight,Biochemical data    Discharge Planning:    Continue current diet,Recommend pursue outpatient diabetes education    Disha Veliz  Contact: Ext [phone], or via [employer]

## 2022-05-17 NOTE — GROUP NOTE
EMILY  GROUP DOCUMENTATION INDIVIDUAL                                                                          Group Therapy Note    Date: 5/17/2022    Group Start Time: 7554  Group End Time: 9413  Group Topic: Nursing    SRM 2  NON ACUTE    Boy Marrero LPN    [facility] GROUP DOCUMENTATION GROUP    Group Therapy Note    Attendees: 6/14         Attendance: Did not attend    Patient's Goal:  ID benefits of medications    Interventions/techniques: Follows Directions:     Interactions:     Mental Status:     Behavior/appearance:     Goals Achieved: Additional Notes:  Pt. encouraged she did not attend.   Frederick Bernardo LPN

## 2022-05-17 NOTE — BH NOTES
Behavioral Health Treatment Team Note     Patient goal(s) for today: Rest  Treatment team focus/goals: Continue group therapy, medication management, and discharge planning     Progress note: The pt was presenting with a flat affect and congruent mood. She was laying down in bed with her eyes closed when speaking to this writer. She contradicted herself, initially stating that she feels good, and then endorsed depression and anxiety. This writer asked her to identify her warning signs for when she is feeling bad, to which she replied crying spells. She denied being tearful here though. She denied SI/HI and AH/Vh's. When asked about her hallucinations though she said \"good things\", although did not elaborate further when asked. She said that the doctor started her on wellbutrin which she has taken in the past, and has worked well for her. This writer asked her if she has been out of her room much, or has been to any groups. She said that she has not felt good enough to attend groups. This writer educated her about CBT tools to try and encourage her to attend, as they might improve her mood. An inpatient level of care is necessary in order to stabilize the pt further on medications. LOS:  2  Expected LOS: 5-7 days     Insurance info/prescription coverage:  [facility]   Date of last family contact:  The pt declined to sign an JANET  Family requesting physician contact today:  no  Discharge plan:   To stabilize the pt further on medications   Guns in the home:  no   Outpatient provider(s):  [facility]     Participating treatment team members: Jazmine Barclay, * (assigned SW), Leilani Castro LMSW

## 2022-05-17 NOTE — GROUP NOTE
EMILY  GROUP DOCUMENTATION INDIVIDUAL                                                                          Group Therapy Note    Date: 5/17/2022    Group Start Time: 1030  Group End Time: 0747  Group Topic: [employer]    SRM 2 BH NON ACUTE    Boy Marrero LPN IP  GROUP DOCUMENTATION GROUP    Group Therapy Note    Attendees: 7/14         Attendance: Did not attend    Patient's Goal: ID mood & goal for today. Interventions/techniques: Follows Directions:     Interactions:     Mental Status:     Behavior/appearance:     Goals Achieved: Additional Notes:  Pt. Encouraged she did not attend.     Frederick Bernardo LPN

## 2022-05-17 NOTE — BH NOTES
Pt. Is withdrawn to her room comes out for medications and snacks. Endorsed depression but did not rate. Denied anxiety, SI/HI, hallucinations, pain. Pt. Is medication compliant received Metformin and insulin, bs at nighttime 334. Pt. Rested during the night. Pt. Is in no distress respirations regular and unlabored will continue to monitor closely Q15 mins per unit protocol.

## 2022-05-18 LAB
GLUCOSE BLD STRIP.AUTO-MCNC: 105 MG/DL (ref 65–117)
GLUCOSE BLD STRIP.AUTO-MCNC: 133 MG/DL (ref 65–117)
GLUCOSE BLD STRIP.AUTO-MCNC: 200 MG/DL (ref 65–117)
GLUCOSE BLD STRIP.AUTO-MCNC: 92 MG/DL (ref 65–117)
PERFORMED BY, TECHID: ABNORMAL
PERFORMED BY, TECHID: ABNORMAL
PERFORMED BY, TECHID: NORMAL
PERFORMED BY, TECHID: NORMAL

## 2022-05-18 PROCEDURE — 65220000003 HC RM SEMIPRIVATE PSYCH

## 2022-05-18 PROCEDURE — 82962 GLUCOSE BLOOD TEST: CPT

## 2022-05-18 PROCEDURE — 74011636637 HC RX REV CODE- 636/637: Performed by: PSYCHIATRY & NEUROLOGY

## 2022-05-18 PROCEDURE — 74011250637 HC RX REV CODE- 250/637: Performed by: PSYCHIATRY & NEUROLOGY

## 2022-05-18 RX ADMIN — OXYBUTYNIN CHLORIDE 5 MG: 5 TABLET, EXTENDED RELEASE ORAL at 08:29

## 2022-05-18 RX ADMIN — RISPERIDONE 2 MG: 2 TABLET ORAL at 21:19

## 2022-05-18 RX ADMIN — METFORMIN HYDROCHLORIDE 1000 MG: 500 TABLET, EXTENDED RELEASE ORAL at 17:01

## 2022-05-18 RX ADMIN — METFORMIN HYDROCHLORIDE 1000 MG: 500 TABLET, EXTENDED RELEASE ORAL at 08:30

## 2022-05-18 RX ADMIN — BUPROPION HYDROCHLORIDE 150 MG: 150 TABLET, FILM COATED, EXTENDED RELEASE ORAL at 08:30

## 2022-05-18 RX ADMIN — ALOGLIPTIN 25 MG: 25 TABLET, FILM COATED ORAL at 08:30

## 2022-05-18 RX ADMIN — RISPERIDONE 2 MG: 2 TABLET ORAL at 08:30

## 2022-05-18 RX ADMIN — SERTRALINE HYDROCHLORIDE 50 MG: 50 TABLET ORAL at 08:30

## 2022-05-18 RX ADMIN — ATORVASTATIN CALCIUM 40 MG: 40 TABLET, FILM COATED ORAL at 17:01

## 2022-05-18 RX ADMIN — SERTRALINE HYDROCHLORIDE 50 MG: 50 TABLET ORAL at 21:19

## 2022-05-18 RX ADMIN — GLIPIZIDE 10 MG: 5 TABLET ORAL at 08:30

## 2022-05-18 RX ADMIN — INSULIN HUMAN 6 UNITS: 100 INJECTION, SOLUTION PARENTERAL at 08:28

## 2022-05-18 RX ADMIN — FERROUS SULFATE TAB 325 MG (65 MG ELEMENTAL FE) 325 MG: 325 (65 FE) TAB at 08:30

## 2022-05-18 NOTE — GROUP NOTE
EMILY  GROUP DOCUMENTATION INDIVIDUAL                                                                          Group Therapy Note    Date: 5/18/2022    Group Start Time: 1115  Group End Time: 1200  Group Topic: Process Group - Inpatient    SRM 2 [facility] ACUTE    [place]; Stacey Ann, [address] [facility] [address] [facility] GROUP DOCUMENTATION GROUP    Group Therapy Note    Attendees: 5/12    Process: pts were asked to share a color to describe their mood. Pts were then encouraged to discuss negative thoughts they have about themselves. Pts discussed where these negative thoughts come from and what they can do to challenge them.  Pts were encouraged to provide positive feedback to one another and reflect on group          Attendance: Did not attend  Additional Notes:  Pt was invited to group but chose not to attend     [facility]

## 2022-05-18 NOTE — BH NOTES
Behavioral Health Treatment Team Note     Patient goal(s) for today: to discuss discharge plans   Treatment team focus/goals: Continue group therapy, medication management, and discharge planning     Progress note: The pt was presenting with a flat affect and congruent mood. She was laying down in bed while meeting with this writer. Her room was very malodorous. She denied SI/HI and AH/VH's. She said that she would be willing to sign a consent form so that her group home, [facility], could be contacted. She said that she would be willing to go back there tomorrow. An inpatient level of care is necessary in order to coordinate a safe discharge plan. COLLATERAL CONTACT  This writer spoke with staff at [facility]. They said that the pt has a long hx of hospitalizations. The staff explained that the pt will typically come to the hospital, stating that she's suicidal, whenever she does not want to do something at the group home, like bathe, or follow other prompts from staff. They said that they would be willing to accept her back tomorrow. LOS:  3  Expected LOS: 5-7 days     Insurance info/prescription coverage:  [facility]   Date of last family contact:  Spoke with [facility] today at [facility] requesting physician contact today:  no  Discharge plan:   To coordinate a safe discharge plan  Guns in the home:  no   Outpatient provider(s):  [facility]     Participating treatment team members: Carlean Babinski, * (assigned SW), Moni Ga LMSW

## 2022-05-18 NOTE — PROGRESS NOTES
Progress Note  Date:2022       Room:[facility]  Patient Name:Pilar Sandra     YOB: 1965     Age:56 y.o. Subjective    Subjective  Patient continues to stay mostly to herself, mostly in bed isolated and gets up only for meals. She is reported to have poor hygiene disheveled not taking care of her personal ADLs. Patient has been compliant with her medications. She has not voiced any active suicidal thoughts. No evidence of any active psychosis. mental status examination-patient is alert and oriented to name place, depressed disheveled with poor hygiene and grooming. Not much interaction with other peers isolated to herself. Lack of energy, lack of motivation. Insight judgment coping remains limited. Review of Systems  Objective         Vitals Last 24 Hours:  TEMPERATURE:  Temp  Av.7 °F (36.5 °C)  Min: 97.7 °F (36.5 °C)  Max: 97.7 °F (36.5 °C)  RESPIRATIONS RANGE: Resp  Av  Min: 18  Max: 18  PULSE OXIMETRY RANGE: SpO2  Av %  Min: 97 %  Max: 97 %  PULSE RANGE: Pulse  Av  Min: 81  Max: 81  BLOOD PRESSURE RANGE: Systolic (22DDN), YKS:714 , Min:156 , AHW:959   ; Diastolic (59HXZ), PDA:85, Min:79, Max:79    I/O (24Hr): No intake or output data in the 24 hours ending 22 8103  Objective  Labs/Imaging/Diagnostics    Labs:  CBC:No results for input(s): WBC, RBC, HGB, HCT, MCV, RDW, PLT, HGBEXT, HCTEXT, PLTEXT in the last 72 hours. CHEMISTRIES:Recent Labs     22  0612      K 4.9      CO2 30   BUN 16   CA 8.9   PT/INR:No results for input(s): INR, INREXT in the last 72 hours. No lab exists for component: PROTIME  APTT:No results for input(s): APTT in the last 72 hours. LIVER PROFILE:  Recent Labs     22  0612   AST 9*   ALT 17     Lab Results   Component Value Date/Time    ALT (SGPT) 17 2022 06:12 AM    AST (SGOT) 9 (L) 2022 06:12 AM    Alk.  phosphatase 106 2022 06:12 AM    Bilirubin, total 0.2 05/16/2022 06:12 AM       Imaging Last 24 Hours:  No results found. Assessment//Plan   Active Problems:    Major depression (5/10/2018)      Suicide ([address] (5/15/2022)      Assessment & Plan  Increased participation in group therapy in the coming out of her room. No side effects voiced  Encourage setting a routine every morning  Case management to continue to address psychosocial needs which is her underlying challenges. Patient discussed in treatment team meeting.         Current Facility-Administered Medications:     metFORMIN ER (GLUCOPHAGE XR) tablet 1,000 mg, 1,000 mg, Oral, BID WITH MEALS, Analy Sellers MD, 1,000 mg at 05/17/22 1644    atorvastatin (LIPITOR) tablet 40 mg, 40 mg, Oral, QPM, Analy Sellers MD, 40 mg at 05/17/22 1702    glipiZIDE (GLUCOTROL) tablet 10 mg, 10 mg, Oral, ACBMarlo Devi, MD, 10 mg at 05/17/22 0815    risperiDONE (RisperDAL) tablet 2 mg, 2 mg, Oral, BID, Analy Sellers MD, 2 mg at 05/17/22 2122    sertraline (ZOLOFT) tablet 50 mg, 50 mg, Oral, BID, Analy Sellers MD, 50 mg at 05/17/22 2122    polyethylene glycol (MIRALAX) packet 17 g, 17 g, Oral, DAILY PRN, Nelli Pavon MD    oxybutynin chloride XL (DITROPAN XL) tablet 5 mg, 5 mg, Oral, DAILY, Analy Sellers MD, 5 mg at 05/17/22 0815    buPROPion XL (WELLBUTRIN XL) tablet 150 mg, 150 mg, Oral, DAILY, Analy Sellers MD, 150 mg at 05/17/22 0815    ferrous sulfate tablet 325 mg, 325 mg, Oral, ACBMarlo Devi, MD, 325 mg at 05/17/22 0815    insulin regular (NOVOLIN R, HUMULIN R) injection 6 Units, 6 Units, SubCUTAneous, AC&HS, Analy Sellers MD, 6 Units at 05/17/22 2123    alogliptin (NESINA) tablet 25 mg, 25 mg, Oral, DAILY, Analy Sellers MD, 25 mg at 05/17/22 0814    hydrOXYzine HCL (ATARAX) tablet 50 mg, 50 mg, Oral, TID PRN, Nelli Pavon MD, 50 mg at 05/17/22 2122    traZODone (DESYREL) tablet 50 mg, 50 mg, Oral, QHS PRN, Nelli Pavon MD, 50 mg at 05/17/22 2122    acetaminophen (TYLENOL) tablet 650 mg, 650 mg, Oral, Q4H PRN, Marlo Dee Boone MD, 650 mg at 05/17/22 2012    albuterol (PROVENTIL HFA, VENTOLIN HFA, PROAIR HFA) inhaler 2 Puff, 2 Puff, Inhalation, Q6H PRN, Jan Sands MD  Electronically signed by Monalisa Mcmillan MD on 5/17/2022 at 11:33 PM

## 2022-05-18 NOTE — PROGRESS NOTES
Problem: Diabetes Self-Management  Goal: *Monitoring blood glucose, interpreting and using results  Description: Identify recommended blood glucose targets  and personal targets. Outcome: Progressing Towards Goal   Bld. Sugars ac/hs with standing insulin coverage. Patient also receives long-acting insulin as well.

## 2022-05-18 NOTE — BH NOTES
Pt is alert and oriented to person and place. Affect and mood are blunted and depressed. Denies SI/HI at this time. No AVH noted or reported. Medication and meal compliant. Pt is isolative and prefers to lay in bed most of the day. Encouraged to tend to her personal hygiene and attend groups.

## 2022-05-18 NOTE — PROGRESS NOTES
Problem: Suicide  Goal: *STG/LTG: Complies with medication therapy  Outcome: Progressing Towards Goal     Problem: Suicide  Goal: *STG:  Verbalizes alternative ways of dealing with maladaptive feelings/behaviors  Outcome: Progressing Towards Goal     Problem: Suicide  Goal: *STG: Attends activities and groups  Outcome: Progressing Towards Goal     Problem: Suicide  Goal: *STG: Seeks staff when feelings of self harm or harm towards others arise  Outcome: Progressing Towards Goal

## 2022-05-18 NOTE — GROUP NOTE
EMILY  GROUP DOCUMENTATION INDIVIDUAL                                                                          Group Therapy Note    Date: 5/18/2022    Group Start Time: 1320  Group End Time: 1400  Group Topic: Education Group - Inpatient    SRM 2 [facility] ACUTE    [place]    IP [address] [facility] GROUP DOCUMENTATION GROUP    Group Therapy Note    Attendees: 3/11    Safety Planning: Pt were asked a check in question, \"If you could describe yourself as a character in a movie, who would it be and why?\"  Pt were than walked through the safety plan step by step and shared their responses with one another. Pts provided positive feedback to one another. As a wrap up pts shared their thoughts on safety plan.           Attendance: Did not attend      Additional Notes:  Pt was encouraged to attend but choose not too    [facility] [facility]

## 2022-05-18 NOTE — BH NOTES
Patient in bed at the beginning of the shift. Interactive and talkative with the staff. Mood-depressed. Affect-flat and dull. Thoughts organized. Conversation brief but is pertinent. Speech-clear. Denies SI,HI AVH's. Declines attending PSR. Med-compliant. Evening bld.sugar-133. No SSI but does receive standing 6 units. Declined evening snack. Poor attention to hygiene. Minimal OOB activity. Stays lying in bed most of day. Sleeps often. Statesd she has bilateral leg pain-rates 10/10 and describes the pain as sharp. Pain management provided upon request. Attention to hygiene is poor.

## 2022-05-19 VITALS
SYSTOLIC BLOOD PRESSURE: 112 MMHG | TEMPERATURE: 98.5 F | RESPIRATION RATE: 18 BRPM | DIASTOLIC BLOOD PRESSURE: 51 MMHG | HEART RATE: 64 BPM | HEIGHT: 65 IN | OXYGEN SATURATION: 96 % | BODY MASS INDEX: 32.45 KG/M2

## 2022-05-19 LAB
GLUCOSE BLD STRIP.AUTO-MCNC: 101 MG/DL (ref 65–117)
GLUCOSE BLD STRIP.AUTO-MCNC: 128 MG/DL (ref 65–117)
GLUCOSE BLD STRIP.AUTO-MCNC: 131 MG/DL (ref 65–117)
PERFORMED BY, TECHID: ABNORMAL
PERFORMED BY, TECHID: ABNORMAL
PERFORMED BY, TECHID: NORMAL

## 2022-05-19 PROCEDURE — 74011636637 HC RX REV CODE- 636/637: Performed by: PSYCHIATRY & NEUROLOGY

## 2022-05-19 PROCEDURE — 74011250637 HC RX REV CODE- 250/637: Performed by: PSYCHIATRY & NEUROLOGY

## 2022-05-19 PROCEDURE — 82962 GLUCOSE BLOOD TEST: CPT

## 2022-05-19 RX ORDER — BUPROPION HYDROCHLORIDE 150 MG/1
150 TABLET ORAL DAILY
Qty: 30 TABLET | Refills: 0 | Status: SHIPPED | OUTPATIENT
Start: 2022-05-19

## 2022-05-19 RX ORDER — GLIPIZIDE 10 MG/1
10 TABLET ORAL
Qty: 30 TABLET | Refills: 0 | Status: SHIPPED | OUTPATIENT
Start: 2022-05-19

## 2022-05-19 RX ORDER — TRAZODONE HYDROCHLORIDE 50 MG/1
50 TABLET ORAL
Qty: 30 TABLET | Refills: 1 | Status: SHIPPED | OUTPATIENT
Start: 2022-05-19

## 2022-05-19 RX ORDER — ATORVASTATIN CALCIUM 40 MG/1
40 TABLET, FILM COATED ORAL EVERY EVENING
Qty: 30 TABLET | Refills: 0 | Status: SHIPPED | OUTPATIENT
Start: 2022-05-19

## 2022-05-19 RX ORDER — OXYBUTYNIN CHLORIDE 5 MG/1
5 TABLET, EXTENDED RELEASE ORAL DAILY
Qty: 30 TABLET | Refills: 0 | Status: SHIPPED | OUTPATIENT
Start: 2022-05-19

## 2022-05-19 RX ORDER — METFORMIN HYDROCHLORIDE 500 MG/1
1000 TABLET, EXTENDED RELEASE ORAL 2 TIMES DAILY WITH MEALS
Qty: 30 TABLET | Refills: 0 | Status: SHIPPED | OUTPATIENT
Start: 2022-05-19

## 2022-05-19 RX ORDER — ALBUTEROL SULFATE 90 UG/1
2 AEROSOL, METERED RESPIRATORY (INHALATION)
Qty: 18 G | Refills: 0 | Status: SHIPPED | OUTPATIENT
Start: 2022-05-19

## 2022-05-19 RX ORDER — SERTRALINE HYDROCHLORIDE 50 MG/1
50 TABLET, FILM COATED ORAL 2 TIMES DAILY
Qty: 30 TABLET | Refills: 0 | Status: SHIPPED | OUTPATIENT
Start: 2022-05-19

## 2022-05-19 RX ORDER — RISPERIDONE 2 MG/1
2 TABLET, FILM COATED ORAL 2 TIMES DAILY
Qty: 60 TABLET | Refills: 0 | Status: SHIPPED | OUTPATIENT
Start: 2022-05-19

## 2022-05-19 RX ADMIN — ATORVASTATIN CALCIUM 40 MG: 40 TABLET, FILM COATED ORAL at 17:05

## 2022-05-19 RX ADMIN — RISPERIDONE 2 MG: 2 TABLET ORAL at 08:05

## 2022-05-19 RX ADMIN — INSULIN HUMAN 6 UNITS: 100 INJECTION, SOLUTION PARENTERAL at 08:05

## 2022-05-19 RX ADMIN — METFORMIN HYDROCHLORIDE 1000 MG: 500 TABLET, EXTENDED RELEASE ORAL at 08:05

## 2022-05-19 RX ADMIN — INSULIN HUMAN 6 UNITS: 100 INJECTION, SOLUTION PARENTERAL at 12:11

## 2022-05-19 RX ADMIN — ALOGLIPTIN 25 MG: 25 TABLET, FILM COATED ORAL at 08:05

## 2022-05-19 RX ADMIN — BUPROPION HYDROCHLORIDE 150 MG: 150 TABLET, FILM COATED, EXTENDED RELEASE ORAL at 08:06

## 2022-05-19 RX ADMIN — SERTRALINE HYDROCHLORIDE 50 MG: 50 TABLET ORAL at 08:06

## 2022-05-19 RX ADMIN — INSULIN HUMAN 6 UNITS: 100 INJECTION, SOLUTION PARENTERAL at 17:09

## 2022-05-19 RX ADMIN — METFORMIN HYDROCHLORIDE 1000 MG: 500 TABLET, EXTENDED RELEASE ORAL at 17:00

## 2022-05-19 RX ADMIN — FERROUS SULFATE TAB 325 MG (65 MG ELEMENTAL FE) 325 MG: 325 (65 FE) TAB at 08:06

## 2022-05-19 RX ADMIN — OXYBUTYNIN CHLORIDE 5 MG: 5 TABLET, EXTENDED RELEASE ORAL at 08:06

## 2022-05-19 RX ADMIN — GLIPIZIDE 10 MG: 5 TABLET ORAL at 08:06

## 2022-05-19 NOTE — BH NOTES
Behavioral Health Transition Record to Provider    Patient Name: Ivelisse Murdock  YOB: 1965  Medical Record Number: [id number]  Date of Admission: 5/15/2022  Date of Discharge: 5/19/2022    Attending Provider: Deepa Cabrales MD  Discharging Provider: Dr. Arlene Cabrales  To contact this individual call [phone] and ask the  to page. If unavailable, ask to be transferred to [address] [place] [address] Provider on call. [facility] Provider will be available on call 24/7 and during holidays. Primary Care Provider: Nicolás Iyer MD    Allergies   Allergen Reactions    Amoxicillin Hives    Mushroom Flavor Hives    Tomato Hives       Reason for Admission: Pt was admitted to the hospital for experiencing suicidal thoughts. Admission Diagnosis: Major depression [F32.9]  Suicide ([address] 8XXA]    * No surgery found *    Results for orders placed or performed during the hospital encounter of 05/15/22   HEMOGLOBIN A1C WITH EAG   Result Value Ref Range    Hemoglobin A1c 8.1 (H) 4.0 - 5.6 %    Est. average glucose 288 mg/dL   METABOLIC PANEL, COMPREHENSIVE   Result Value Ref Range    Sodium 139 136 - 145 mmol/L    Potassium 4.9 3.5 - 5.1 mmol/L    Chloride 105 97 - 108 mmol/L    CO2 30 21 - 32 mmol/L    Anion gap 4 (L) 5 - 15 mmol/L    Glucose 154 (H) 65 - 100 mg/dL    BUN 16 6 - 20 mg/dL    Creatinine 0.68 0.55 - 1.02 mg/dL    BUN/Creatinine ratio 24 (H) 12 - 20      GFR est AA >60 >60 ml/min/1.73m2    GFR est non-AA >60 >60 ml/min/1.73m2    Calcium 8.9 8.5 - 10.1 mg/dL    Bilirubin, total 0.2 0.2 - 1.0 mg/dL    AST (SGOT) 9 (L) 15 - 37 U/L    ALT (SGPT) 17 12 - 78 U/L    Alk.  phosphatase 106 45 - 117 U/L    Protein, total 6.4 6.4 - 8.2 g/dL    Albumin 2.8 (L) 3.5 - 5.0 g/dL    Globulin 3.6 2.0 - 4.0 g/dL    A-G Ratio 0.8 (L) 1.1 - 2.2     GLUCOSE, POC   Result Value Ref Range    Glucose (POC) 218 (H) 65 - 117 mg/dL    Performed by Evelio Martinez ([facility])    GLUCOSE, POC   Result Value Ref Range Glucose (POC) 148 (H) 65 - 117 mg/dL    Performed by Pasqual Litter    GLUCOSE, POC   Result Value Ref Range    Glucose (POC) 171 (H) 65 - 117 mg/dL    Performed by Pasqual Litter    GLUCOSE, POC   Result Value Ref Range    Glucose (POC) 334 (H) 65 - 117 mg/dL    Performed by Kaila Bryan (Trvlr)    GLUCOSE, POC   Result Value Ref Range    Glucose (POC) 166 (H) 65 - 117 mg/dL    Performed by Pasqual Litter    GLUCOSE, POC   Result Value Ref Range    Glucose (POC) 127 (H) 65 - 117 mg/dL    Performed by Pasqual Litter    GLUCOSE, POC   Result Value Ref Range    Glucose (POC) 140 (H) 65 - 117 mg/dL    Performed by Pasqual Litter    GLUCOSE, POC   Result Value Ref Range    Glucose (POC) 133 (H) 65 - 117 mg/dL    Performed by Savanah Hassan    GLUCOSE, POC   Result Value Ref Range    Glucose (POC) 133 (H) 65 - 117 mg/dL    Performed by  [address], POC   Result Value Ref Range    Glucose (POC) 105 65 - 117 mg/dL    Performed by  [address], POC   Result Value Ref Range    Glucose (POC) 92 65 - 117 mg/dL    Performed by  [address], POC   Result Value Ref Range    Glucose (POC) 200 (H) 65 - 117 mg/dL    Performed by Cheryl Camacho    GLUCOSE, POC   Result Value Ref Range    Glucose (POC) 131 (H) 65 - 117 mg/dL    Performed by Juliet Andrade ([place])    GLUCOSE, POC   Result Value Ref Range    Glucose (POC) 128 (H) 65 - 117 mg/dL    Performed by Juliet Andrade [facility] [place])        Immunizations administered during this encounter:   Immunization History   Administered Date(s) Administered    Tdap 05/30/2015       Screening for Metabolic Disorders for Patients on Antipsychotic Medications  (Data obtained from the EMR)    Estimated Body Mass Index  Estimated body mass index is 32.45 kg/m² as calculated from the following:    Height as of this encounter: 5' 5\" (1.651 m). Weight as of 5/14/22: 88.5 kg (195 lb).      Vital Signs/Blood Pressure  Visit Vitals  BP (!) 112/51 Pulse 64   Temp 98.5 °F (36.9 °C)   Resp 18   Ht 5' 5\" (1.651 m)   SpO2 96%   BMI 32.45 kg/m²       Blood Glucose/Hemoglobin A1c  Lab Results   Component Value Date/Time    Glucose 154 (H) 05/16/2022 06:12 AM    Glucose 218 (H) 09/22/2021 05:32 AM    Glucose (POC) 128 (H) 05/19/2022 11:09 AM       Lab Results   Component Value Date/Time    Hemoglobin A1c 8.1 (H) 05/16/2022 06:12 AM        Lipid Panel  Lab Results   Component Value Date/Time    Cholesterol, total 130 09/22/2021 05:32 AM    HDL Cholesterol 32 09/22/2021 05:32 AM    LDL, calculated 49.8 09/22/2021 05:32 AM    Triglyceride 241 (H) 09/22/2021 05:32 AM    CHOL/HDL Ratio 4.1 09/22/2021 05:32 AM        Discharge Diagnosis:  Major depression [F32.9]  Suicide ([address] [place] 8XXA]      Discharge Plan: Pt being discharged to [facility] group home. Discharge Medication List and Instructions:   Current Discharge Medication List      START taking these medications    Details   metFORMIN ER (GLUCOPHAGE XR) 500 mg tablet Take 2 Tablets by mouth two (2) times daily (with meals). Qty: 30 Tablet, Refills: 0  Start date: 5/19/2022         CONTINUE these medications which have CHANGED    Details   albuterol (PROVENTIL HFA, VENTOLIN HFA, PROAIR HFA) 90 mcg/actuation inhaler Take 2 Puffs by inhalation every six (6) hours as needed for Wheezing or Shortness of Breath. Qty: 18 g, Refills: 0  Start date: 5/19/2022      atorvastatin (LIPITOR) 40 mg tablet Take 1 Tablet by mouth every evening. Indications: Cholesterol  Qty: 30 Tablet, Refills: 0  Start date: 5/19/2022      buPROPion XL (WELLBUTRIN XL) 150 mg tablet Take 1 Tablet by mouth daily. Indications: major depressive disorder  Qty: 30 Tablet, Refills: 0  Start date: 5/19/2022      glipiZIDE (GLUCOTROL) 10 mg tablet Take 1 Tablet by mouth Daily (before breakfast).  Indications: type 2 diabetes mellitus  Qty: 30 Tablet, Refills: 0  Start date: 5/19/2022      oxybutynin chloride XL (DITROPAN XL) 5 mg CR tablet Take 1 Tablet by mouth daily. Indications: overactive bladder  Qty: 30 Tablet, Refills: 0  Start date: 5/19/2022      risperiDONE (RisperDAL) 2 mg tablet Take 1 Tablet by mouth two (2) times a day. Indications: schizophrenia  Qty: 60 Tablet, Refills: 0  Start date: 5/19/2022      sertraline (ZOLOFT) 50 mg tablet Take 1 Tablet by mouth two (2) times a day. Indications: anxiousness associated with depression  Qty: 30 Tablet, Refills: 0  Start date: 5/19/2022      traZODone (DESYREL) 50 mg tablet Take 1 Tablet by mouth nightly as needed (Insomnia). Indications: insomnia associated with depression  Qty: 30 Tablet, Refills: 1  Start date: 5/19/2022         CONTINUE these medications which have NOT CHANGED    Details   cetirizine (ZyrTEC) 10 mg tablet Take 10 mg by mouth daily. ferrous sulfate 325 mg (65 mg iron) tablet Take 325 mg by mouth Daily (before breakfast). insulin regular (HumuLIN R Regular U-100 Insuln) 100 unit/mL injection 6 Units by SubCUTAneous route Before breakfast, lunch, dinner and at bedtime. lisinopriL (PRINIVIL, ZESTRIL) 2.5 mg tablet Take 2.5 mg by mouth daily. Indications: kidney disease from diabetes, high blood pressure      nicotine (NICODERM CQ) 21 mg/24 hr 1 Patch by TransDERmal route every twenty-four (24) hours. linaGLIPtin (Tradjenta) 5 mg tablet Take 5 mg by mouth daily. Indications: type 2 diabetes mellitus      polyethylene glycol (Miralax) 17 gram packet Take 17 g by mouth daily as needed for Constipation.  Indications: constipation         STOP taking these medications       metFORMIN (GLUCOPHAGE) 1,000 mg tablet Comments:   Reason for Stopping:         hydrOXYzine pamoate (VistariL) 50 mg capsule Comments:   Reason for Stopping:               Unresulted Labs (24h ago, onward)            None        To obtain results of studies pending at discharge, please contact [phone]    Follow-up Information     Follow up With Specialties Details Why Contact Info Anushka Mandujano MD Internal Medicine Physician   [address] [facility]  [phone]         today  [facility]  [phone]          Advanced Directive:   Does the patient have an appointed surrogate decision maker? No  Does the patient have a Medical Advance Directive? No  Does the patient have a Psychiatric Advance Directive? No  If the patient does not have a surrogate or Medical Advance Directive AND Psychiatric Advance Directive, the patient was offered information on these advance directives Patient will complete at a later time    Patient Instructions: Please continue all medications until otherwise directed by physician. Tobacco Cessation Discharge Plan:   Is the patient a smoker and needs referral for smoking cessation? Not applicable  Patient referred to the following for smoking cessation with an appointment? Not applicable     Patient was offered medication to assist with smoking cessation at discharge? Not applicable  Was education for smoking cessation added to the discharge instructions? Not applicable    Alcohol/Substance Abuse Discharge Plan:   Does the patient have a history of substance/alcohol abuse and requires a referral for treatment? No  Patient referred to the following for substance/alcohol abuse treatment with an appointment? Not applicable  Patient was offered medication to assist with alcohol cessation at discharge? Not applicable  Was education for substance/alcohol abuse added to discharge instructions?  Not applicable    Patient discharged to Inpatient facility; discussed with the receiving facility  Primary physician, other healthcare professional, or site designated for follow up care

## 2022-05-19 NOTE — GROUP NOTE
[facility] GROUP DOCUMENTATION INDIVIDUAL                                                                          Group Therapy Note    Date: 5/19/2022    Group Start Time: 1115  Group End Time: 1200  Group Topic: Process Group - Inpatient    SRM 2 [facility] ACUTE    [facility]    IP [facility] GROUP DOCUMENTATION GROUP    Group Therapy Note: Facilitator encouraged group to discuss what lead to hospitalization and process thoughts and feelings around circumstances.      Attendees: 7         Attendance: Did not attend    Feli Ridge

## 2022-05-19 NOTE — PROGRESS NOTES
Patient alert and oriented x4. Patient denies si/hi/avh. Patient denies anxiety and depression. Patient isolated to room but is out for meals. Patient has a flat affect and a labile mood. Patient is medication compliant, calm and cooperative at this time. Will continue to monitor. Will continue to monitor.

## 2022-05-19 NOTE — PROGRESS NOTES
Problem: Suicide  Goal: *STG: Remains safe in hospital  Outcome: Progressing Towards Goal  Goal: *STG/LTG: Complies with medication therapy  Outcome: Progressing Towards Goal     Problem: Suicide  Goal: *STG: Seeks staff when feelings of self harm or harm towards others arise  Outcome: Not Progressing Towards Goal  Goal: *STG: Attends activities and groups  Outcome: Not Progressing Towards Goal  Goal: *STG:  Verbalizes alternative ways of dealing with maladaptive feelings/behaviors  Outcome: Not Progressing Towards Goal     Problem: Diabetes Self-Management  Goal: *Incorporating physical activity into lifestyle  Description: State effect of exercise on blood glucose levels. Outcome: Not Progressing Towards Goal  Goal: *Using medications safely  Description: State effect of diabetes medications on diabetes; name diabetes medication taking, action and side effects. Outcome: Not Progressing Towards Goal  Goal: *Monitoring blood glucose, interpreting and using results  Description: Identify recommended blood glucose targets  and personal targets. Outcome: Not Progressing Towards Goal  Goal: *Prevention, detection and treatment of chronic complications  Description: Define the natural course of diabetes and describe the relationship of blood glucose levels to long term complications of diabetes.   Outcome: Not Progressing Towards Goal

## 2022-05-19 NOTE — PROGRESS NOTES
Patient discharged via cab to group home. Patient received all belongings and discharge instructions. Patient verbalized understanding of all discharge instructions and follow up appointment.

## 2022-05-19 NOTE — BH NOTES
DISCHARGE SUMMARY    NAME:Pilar Sandra  : 1965  MRN: [id number]    The patient Alexia Diana exhibits the ability to control behavior in a less restrictive environment. Patient's level of functioning is improving. No assaultive/destructive behavior has been observed for the past 24 hours. No suicidal/homicidal threat or behavior has been observed for the past 24 hours. There is no evidence of serious medication side effects. Patient has not been in physical or protective restraints for at least the past 24 hours. If weapons involved, how are they secured? N/A    Is patient aware of and in agreement with discharge plan? Yes    Arrangements for medication:  Prescriptions given to patient, given a weeks supply or 30 day supply. Copy of discharge instructions to provider?:  Yes    Arrangements for transportation home:  Pt is being transported back to group home by [facility] cab. Keep all follow up appointments as scheduled, continue to take prescribed medications per physician instructions.   Mental health crisis number:  565 or your local mental health crisis line number at [place] [address] Emergency WARM LINE      [phone] ([phone])      M-F: 9am to 9pm      Sat & Sun: 5pm - 9pm  National suicide prevention lines:                             3-786-FLCQCCT (2-331-558-662-450-9945)       0-565-262-TALK ([phone])    Crisis Text Line:  Text HOME to [phone]

## 2022-05-19 NOTE — GROUP NOTE
EMILY  GROUP DOCUMENTATION INDIVIDUAL                                                                          Group Therapy Note    Date: 5/19/2022    Group Start Time: 0945  Group End Time: 56  Group Topic: Education Group - Inpatient    SRM 2 [facility] ACUTE    Juan Laa    EMILY  GROUP DOCUMENTATION GROUP    This writer facilitated an education group that educated patients about goal setting. Patients were encouraged to identify a goal in a SMART format and discuss amongst the group.      Attendees: 3/10         Attendance: Did not attend      Additional Notes:  Pt was invited but did not attend    Isidoro Gunn

## 2022-05-19 NOTE — BH NOTES
Pt has a very flat affect cooperative but irritable. She stays in her bed entire shift. Pt rates her depression 8/10, anxiety 6/10, and denies SI, HI and hallucinations. Pt seems to put blames on others and is somatic. Pt was med compliant, appears to have slept well, no distress noted, respirations regular and unlabored. Will continue to monitor patient every 15 mins as per unit protocol.

## 2022-05-19 NOTE — PROGRESS NOTES
Problem: Suicide  Goal: *STG: Remains safe in hospital  Outcome: Progressing Towards Goal  Goal: *STG/LTG: Complies with medication therapy  Outcome: Progressing Towards Goal     Problem: Patient Education: Go to Patient Education Activity  Goal: Patient/Family Education  Outcome: Progressing Towards Goal     Problem: Diabetes Self-Management  Goal: *Using medications safely  Description: State effect of diabetes medications on diabetes; name diabetes medication taking, action and side effects.   Outcome: Progressing Towards Goal     Problem: Patient Education: Go to Patient Education Activity  Goal: Patient/Family Education  Outcome: Progressing Towards Goal

## 2022-05-19 NOTE — PROGRESS NOTES
Progress Note  Date:2022       Room:[facility]  Patient Name:iPlar Sandra     YOB: 1965     Age:56 y.o. Subjective    Subjective  Patient mostly in bed states she is feeling better and thinks she is ready to go home. Patient limited to minimal engagement and therapy. She has been compliant with her medications. No acute distress noted. Denies any command hallucinations denies any active SI or HI.       mental status examination-patient noted coming after her breakfast.  Mostly stays to herself denies any active SI HI denies any command hallucinations. Review of Systems  Objective         Vitals Last 24 Hours:  TEMPERATURE:  Temp  Av.2 °F (36.8 °C)  Min: 98.1 °F (36.7 °C)  Max: 98.2 °F (36.8 °C)  RESPIRATIONS RANGE: Resp  Av  Min: 18  Max: 18  PULSE OXIMETRY RANGE: SpO2  Av %  Min: 100 %  Max: 100 %  PULSE RANGE: Pulse  Av.5  Min: 63  Max: 70  BLOOD PRESSURE RANGE: Systolic (16GEQ), SUV:475 , Min:115 , AAR:022   ; Diastolic (64GBE), BPZ:46, Min:52, Max:67    I/O (24Hr): No intake or output data in the 24 hours ending 22  Objective  Labs/Imaging/Diagnostics    Labs:  CBC:No results for input(s): WBC, RBC, HGB, HCT, MCV, RDW, PLT, HGBEXT, HCTEXT, PLTEXT in the last 72 hours. CHEMISTRIES:Recent Labs     22  0612      K 4.9      CO2 30   BUN 16   CA 8.9   PT/INR:No results for input(s): INR, INREXT in the last 72 hours. No lab exists for component: PROTIME  APTT:No results for input(s): APTT in the last 72 hours. LIVER PROFILE:  Recent Labs     22  0612   AST 9*   ALT 17     Lab Results   Component Value Date/Time    ALT (SGPT) 17 2022 06:12 AM    AST (SGOT) 9 (L) 2022 06:12 AM    Alk. phosphatase 106 2022 06:12 AM    Bilirubin, total 0.2 2022 06:12 AM       Imaging Last 24 Hours:  No results found.   Assessment//Plan   Active Problems:    Major depression (5/10/2018) [facility]5/[facility]/20[facility])      Assessment & Plan  Continue current medications  Patient discussed in the treatment team meeting continue to address psychosocial needs and support around to lower level of care in 5/19/2022  Safety planning    Current Facility-Administered Medications:     metFORMIN ER (GLUCOPHAGE XR) tablet 1,000 mg, 1,000 mg, Oral, BID WITH MEALS, Analy Sellers MD, 1,000 mg at 05/18/22 1701    atorvastatin (LIPITOR) tablet 40 mg, 40 mg, Oral, QPM, Dee Sellers MD, 40 mg at 05/18/22 1701    glipiZIDE (GLUCOTROL) tablet 10 mg, 10 mg, Oral, ACB, Dee Sellers MD, 10 mg at 05/18/22 0830    risperiDONE (RisperDAL) tablet 2 mg, 2 mg, Oral, BID, Terrie Hoang MD, 2 mg at 05/18/22 2119    sertraline (ZOLOFT) tablet 50 mg, 50 mg, Oral, BID, Analy Sellers MD, 50 mg at 05/18/22 2119    polyethylene glycol (MIRALAX) packet 17 g, 17 g, Oral, DAILY PRN, Analy Sellers MD    oxybutynin chloride XL (DITROPAN XL) tablet 5 mg, 5 mg, Oral, DAILY, Terrie Hoang MD, 5 mg at 05/18/22 0829    buPROPion XL (WELLBUTRIN XL) tablet 150 mg, 150 mg, Oral, DAILY, Analy Sellers MD, 150 mg at 05/18/22 0830    ferrous sulfate tablet 325 mg, 325 mg, Oral, ACB, Analy Sellers MD, 325 mg at 05/18/22 0830    insulin regular (NOVOLIN R, HUMULIN R) injection 6 Units, 6 Units, SubCUTAneous, AC&HS, Terrie Hoang MD, 6 Units at 05/18/22 0828    alogliptin (NESINA) tablet 25 mg, 25 mg, Oral, DAILY, Analy Sellers MD, 25 mg at 05/18/22 0830    hydrOXYzine HCL (ATARAX) tablet 50 mg, 50 mg, Oral, TID PRN, Terrie Hoang MD, 50 mg at 05/17/22 2122    traZODone (DESYREL) tablet 50 mg, 50 mg, Oral, QHS PRN, Analy Sellers MD, 50 mg at 05/17/22 2122    acetaminophen (TYLENOL) tablet 650 mg, 650 mg, Oral, Q4H PRN, Terrie Hoang MD, 650 mg at 05/17/22 2012    albuterol (PROVENTIL HFA, VENTOLIN HFA, PROAIR HFA) inhaler 2 Puff, 2 Puff, Inhalation, Q6H PRN, Jan Sands MD  Electronically signed by Monalisa Mcmillan MD on 5/18/2022 at 11:01 PM

## 2022-05-19 NOTE — PROGRESS NOTES
Problem: Suicide  Goal: *STG: Remains safe in hospital  5/19/2022 1057 by Raquel Breen RN  Outcome: Resolved/Met  5/19/2022 0902 by Raquel Breen RN  Outcome: Progressing Towards Goal  Goal: *STG: Seeks staff when feelings of self harm or harm towards others arise  Outcome: Resolved/Met  Goal: *STG: Attends activities and groups  Outcome: Resolved/Met  Goal: *STG:  Verbalizes alternative ways of dealing with maladaptive feelings/behaviors  Outcome: Resolved/Met  Goal: *STG/LTG: Complies with medication therapy  5/19/2022 1057 by Raquel Breen RN  Outcome: Resolved/Met  5/19/2022 0902 by Raquel Breen RN  Outcome: Progressing Towards Goal  Goal: *STG/LTG: No longer expresses self destructive or suicidal thoughts  Outcome: Resolved/Met  Goal: *LTG:  Identifies available community resources  Outcome: Resolved/Met  Goal: *LTG:  Develops proactive suicide prevention plan  Outcome: Resolved/Met  Goal: Interventions  Outcome: Resolved/Met     Problem: Patient Education: Go to Patient Education Activity  Goal: Patient/Family Education  5/19/2022 1057 by Raquel Breen RN  Outcome: Resolved/Met  5/19/2022 0902 by Raquel Breen RN  Outcome: Progressing Towards Goal     Problem: Diabetes Self-Management  Goal: *Disease process and treatment process  Description: Define diabetes and identify own type of diabetes; list 3 options for treating diabetes. Outcome: Resolved/Met  Goal: *Incorporating nutritional management into lifestyle  Description: Describe effect of type, amount and timing of food on blood glucose; list 3 methods for planning meals. Outcome: Resolved/Met  Goal: *Incorporating physical activity into lifestyle  Description: State effect of exercise on blood glucose levels. Outcome: Resolved/Met  Goal: *Developing strategies to promote health/change behavior  Description: Define the ABC's of diabetes; identify appropriate screenings, schedule and personal plan for screenings.   Outcome: Resolved/Met  Goal: *Using medications safely  Description: State effect of diabetes medications on diabetes; name diabetes medication taking, action and side effects. 5/19/2022 1057 by Robert Colmenares RN  Outcome: Resolved/Met  5/19/2022 0902 by Robert Colmenares RN  Outcome: Progressing Towards Goal  Goal: *Monitoring blood glucose, interpreting and using results  Description: Identify recommended blood glucose targets  and personal targets. Outcome: Resolved/Met  Goal: *Prevention, detection, treatment of acute complications  Description: List symptoms of hyper- and hypoglycemia; describe how to treat low blood sugar and actions for lowering  high blood glucose level. Outcome: Resolved/Met  Goal: *Prevention, detection and treatment of chronic complications  Description: Define the natural course of diabetes and describe the relationship of blood glucose levels to long term complications of diabetes. Outcome: Resolved/Met  Goal: *Developing strategies to address psychosocial issues  Description: Describe feelings about living with diabetes; identify support needed and support network  Outcome: Resolved/Met  Goal: *Insulin pump training  Outcome: Resolved/Met  Goal: *Sick day guidelines  Outcome: Resolved/Met  Goal: *Patient Specific Goal (EDIT GOAL, INSERT TEXT)  Outcome: Resolved/Met     Problem: Patient Education: Go to Patient Education Activity  Goal: Patient/Family Education  Outcome: Resolved/Met     Problem: Falls - Risk of  Goal: *Absence of Falls  Description: Document Martin Fall Risk and appropriate interventions in the flowsheet.   Outcome: Resolved/Met  Note: Fall Risk Interventions:            Medication Interventions: Teach patient to arise slowly                   Problem: Patient Education: Go to Patient Education Activity  Goal: Patient/Family Education  5/19/2022 1057 by Robert Colmenares RN  Outcome: Resolved/Met  5/19/2022 0902 by Robert Colmenares RN  Outcome: Progressing Towards Goal

## 2022-05-19 NOTE — GROUP NOTE
EMILY  GROUP DOCUMENTATION INDIVIDUAL                                                                          Group Therapy Note    Date: 5/19/2022    Group Start Time: 9199  Group End Time: 3160  Group Topic: Nursing    SRM 2  NON ACUTE    Boy Marrero LPN    [facility] GROUP DOCUMENTATION GROUP    Group Therapy Note    Attendees: 4/10         Attendance: Did not attend    Patient's Goal:  ID stress symptoms & reducers    Interventions/techniques: Follows Directions:     Interactions:     Mental Status:     Behavior/appearance:     Goals Achieved: Additional Notes: Pt. Invited she did not attend.     Brandon Nolan LPN

## 2022-06-03 ENCOUNTER — HOSPITAL ENCOUNTER (EMERGENCY)
Age: 57
Discharge: HOME | End: 2022-06-03
Attending: STUDENT IN AN ORGANIZED HEALTH CARE EDUCATION/TRAINING PROGRAM
Payer: MEDICAID

## 2022-06-03 VITALS
HEART RATE: 89 BPM | SYSTOLIC BLOOD PRESSURE: 130 MMHG | RESPIRATION RATE: 18 BRPM | DIASTOLIC BLOOD PRESSURE: 69 MMHG | TEMPERATURE: 97.6 F | HEIGHT: 65 IN | OXYGEN SATURATION: 97 % | WEIGHT: 251 LBS | BODY MASS INDEX: 41.82 KG/M2

## 2022-06-03 DIAGNOSIS — Z86.59 HISTORY OF BIPOLAR DISORDER: Primary | ICD-10-CM

## 2022-06-03 PROCEDURE — 74011250637 HC RX REV CODE- 250/637: Performed by: STUDENT IN AN ORGANIZED HEALTH CARE EDUCATION/TRAINING PROGRAM

## 2022-06-03 PROCEDURE — 99283 EMERGENCY DEPT VISIT LOW MDM: CPT

## 2022-06-03 RX ORDER — ACETAMINOPHEN 500 MG
1000 TABLET ORAL ONCE
Status: COMPLETED | OUTPATIENT
Start: 2022-06-03 | End: 2022-06-03

## 2022-06-03 RX ADMIN — ACETAMINOPHEN 1000 MG: 500 TABLET ORAL at 18:34

## 2022-06-03 NOTE — ED PROVIDER NOTES
EMERGENCY DEPARTMENT HISTORY AND PHYSICAL EXAM      Date: 6/3/2022  Patient Name: Ramonita Conti    History of Presenting Illness     No chief complaint on file. HPI: Ramonita Conti, 60 y.o. female presents to the ED with cc of suicidal ideation. She reports a history of bipolar schizophrenia, however states she has had increasing suicidal ideation over the past 2 to 3 days. She reports of thoughts of wanting to stab herself, overdose, and walking out of traffic. She denies any attempts, however does note that she has been having increasing thoughts of the above. She states that her birthday is next month, and this has made it all worse, she feels like no one cares about her. She has not been taking her psychiatric medications in the last couple days, she cannot remember what she is supposed to be taking, also has not reached out to her mental health providers. She otherwise denies any chest pain, no fevers, no homicidal ideation, does states she has some intermittent visual hallucinations where she will see her grandmother and father. Reports some pain in her both of her legs, has been walking around a lot recently, she is homeless, otherwise denies acute complaints. There are no other complaints, changes, or physical findings at this time. PCP: Iglesia Prado MD    No current facility-administered medications on file prior to encounter. Current Outpatient Medications on File Prior to Encounter   Medication Sig Dispense Refill    albuterol (PROVENTIL HFA, VENTOLIN HFA, PROAIR HFA) 90 mcg/actuation inhaler Take 2 Puffs by inhalation every six (6) hours as needed for Wheezing or Shortness of Breath. 18 g 0    atorvastatin (LIPITOR) 40 mg tablet Take 1 Tablet by mouth every evening. Indications: Cholesterol 30 Tablet 0    buPROPion XL (WELLBUTRIN XL) 150 mg tablet Take 1 Tablet by mouth daily.  Indications: major depressive disorder 30 Tablet 0    glipiZIDE (GLUCOTROL) 10 mg tablet Take 1 Tablet by mouth Daily (before breakfast). Indications: type 2 diabetes mellitus 30 Tablet 0    metFORMIN ER (GLUCOPHAGE XR) 500 mg tablet Take 2 Tablets by mouth two (2) times daily (with meals). 30 Tablet 0    oxybutynin chloride XL (DITROPAN XL) 5 mg CR tablet Take 1 Tablet by mouth daily. Indications: overactive bladder 30 Tablet 0    risperiDONE (RisperDAL) 2 mg tablet Take 1 Tablet by mouth two (2) times a day. Indications: schizophrenia 60 Tablet 0    sertraline (ZOLOFT) 50 mg tablet Take 1 Tablet by mouth two (2) times a day. Indications: anxiousness associated with depression 30 Tablet 0    traZODone (DESYREL) 50 mg tablet Take 1 Tablet by mouth nightly as needed (Insomnia). Indications: insomnia associated with depression 30 Tablet 1    cetirizine (ZyrTEC) 10 mg tablet Take 10 mg by mouth daily.  ferrous sulfate 325 mg (65 mg iron) tablet Take 325 mg by mouth Daily (before breakfast).  insulin regular (HumuLIN R Regular U-100 Insuln) 100 unit/mL injection 6 Units by SubCUTAneous route Before breakfast, lunch, dinner and at bedtime.  lisinopriL (PRINIVIL, ZESTRIL) 2.5 mg tablet Take 2.5 mg by mouth daily. Indications: kidney disease from diabetes, high blood pressure      nicotine (NICODERM CQ) 21 mg/24 hr 1 Patch by TransDERmal route every twenty-four (24) hours.  linaGLIPtin (Tradjenta) 5 mg tablet Take 5 mg by mouth daily. Indications: type 2 diabetes mellitus      polyethylene glycol (Miralax) 17 gram packet Take 17 g by mouth daily as needed for Constipation.  Indications: constipation         Past History     Past Medical History:  Past Medical History:   Diagnosis Date    Arthritis     legs    B[facility] disorder [facility]     COPD     Depression 1/13/2012    Diabetes ([facility]     Drug abuse (HCC)     cocaine, stimulants, etoh, mj, tob    H/O suicide attempt     Hypertension     Obesity     Polydrug dependence excluding opioid type drug, abuse ([facility]     PTSD (post-traumatic stress disorder) 12/30/2021    Schizophrenia ([facility] 7/8/2016       Past Surgical History:  Past Surgical History:   Procedure Laterality Date    HX ORTHOPAEDIC      foot sx       Family History:  Family History   Problem Relation Age of Onset    Diabetes Mother     Stroke Mother     Stroke Father        Social History:  Social History     Tobacco Use    Smoking status: Current Every Day Smoker     Packs/day: 1.00     Years: 10.00     Pack years: 10.00     Types: Cigarettes    Smokeless tobacco: Current User   Substance Use Topics    Alcohol use: No     Alcohol/week: 0.0 standard drinks     Comment: Hx of ETOH abuse since age 13. no DUIs.  Drug use: Not Currently     Types: Cocaine, Marijuana, Other, Opiates       Allergies: Allergies   Allergen Reactions    Amoxicillin Hives    Mushroom Flavor Hives    Tomato Hives         Review of Systems   no fever  No ear pain  No eye pain  no shortness of breath  no chest pain  no vomiting  no dysuria  Reports bilateral leg pain  No rash  No lymphadenopathy  No weight loss    Physical Exam   Physical Exam  Constitutional:       General: She is not in acute distress. Appearance: She is not toxic-appearing. Comments: Disheveled appearing   HENT:      Head: Normocephalic. Eyes:      Extraocular Movements: Extraocular movements intact. Cardiovascular:      Rate and Rhythm: Normal rate and regular rhythm. Pulmonary:      Effort: Pulmonary effort is normal.      Breath sounds: Normal breath sounds. Abdominal:      Palpations: Abdomen is soft. Tenderness: There is no abdominal tenderness. Musculoskeletal:      Cervical back: Neck supple. Comments: Bilateral, symmetric, mostly nonpitting swelling without erythema, induration, fluctuance, or bony tenderness   Skin:     General: Skin is warm and dry. Neurological:      General: No focal deficit present. Mental Status: She is alert. Sensory: No sensory deficit. Motor: No weakness. Psychiatric:      Comments: Dysphoric mood         Diagnostic Study Results     Labs -   No results found for this or any previous visit (from the past 24 hour(s)). Radiologic Studies -   No orders to display     CT Results  (Last 48 hours)    None        CXR Results  (Last 48 hours)    None            Medical Decision Making   I am the first provider for this patient. I reviewed the vital signs, available nursing notes, past medical history, past surgical history, family history and social history. Vital Signs-Reviewed the patient's vital signs. Patient Vitals for the past 24 hrs:   Temp Pulse Resp BP SpO2   06/03/22 1649 98.3 °F (36.8 °C) 96 19 134/70 95 %         Provider Notes (Medical Decision Making):   17-year-old female presenting with suicidal ideation. Concern for possible exacerbation of psychiatric disorder, contribution of substance use, Depressed mood Due to life events. Her vitals are unremarkable, she is otherwise well-appearing, her neurologic exam is nonfocal, unlikely any acute intracranial emergency or other organic cause for her presentation here today. She is afebrile and nontoxic-appearing without any signs or symptoms of infectious process. ED Course:     Initial assessment performed. The patients presenting problems have been discussed, and they are in agreement with the care plan formulated and outlined with them. I have encouraged them to ask questions as they arise throughout their visit. She is resting comfortably in reevaluation, eating tray. She is medically cleared for psychiatric disposition. Critical Care Time:         Disposition:  Medically cleared for psychiatric disposition    PLAN:  1. Current Discharge Medication List        2.    Follow-up Information    None       Return to ED if worse     Diagnosis     Clinical Impression: Suicidal thoughts

## 2022-06-03 NOTE — ED NOTES
Patient here via EMS with c/o suicidal ideation. Patient states feeling symptoms for 3-4 days. Patient states hx of depression, states that she is homeless and has been in the hot sun pan handling, states she only made 5 dollars today. Patient states that she has chronic leg pain, states that her legs have been hurting her more today. Patient calm and very cooperative, but with almost a flat affect at times. Patient states that she's been having thoughts of suicide, states she's been thinking of just walking out into traffic, stating \"I have a birthday next month, and right now I just don't know anymore. \"  Patient also states thoughts of overdosing or stabbing herself, however these thoughts she expresses as secondary to the trying to get hit by a car. Emergency Department Nursing Plan of Care       The Nursing Plan of Care is developed from the Nursing assessment and Emergency Department Attending provider initial evaluation. The plan of care may be reviewed in the ED Provider note.     The Plan of Care was developed with the following considerations:   Patient / Family readiness to learn indicated by:verbalized understanding  Persons(s) to be included in education: patient  Barriers to Learning/Limitations:No    Signed     Sol Posadas RN    6/3/2022   5:54 PM'

## 2022-06-03 NOTE — ED NOTES
Patient here via EMS with c/o mental health problem, suicidal ideation. Patient states that she has had depression, states that she is homeless and has thoughts of walking into traffic to kill herself. Patient and EMS report hx of depression, homelessness, COPD, diabetes, bipolar, schizophrenia, and chronic leg pain. Patient states that bilateral chronic leg pain has been worse today, states she was in the heat outside panhandling for money and only got $5 today. Emergency Department Nursing Plan of Care       The Nursing Plan of Care is developed from the Nursing assessment and Emergency Department Attending provider initial evaluation. The plan of care may be reviewed in the ED Provider note.     The Plan of Care was developed with the following considerations:   Patient / Family readiness to learn indicated by:verbalized understanding  Persons(s) to be included in education: patient  Barriers to Learning/Limitations:No    Signed     Sadi Lozano RN    6/3/2022   4:48 PM
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
## 2022-11-09 ENCOUNTER — HOSPITAL ENCOUNTER (INPATIENT)
Age: 57
LOS: 25 days | Discharge: HOME | DRG: 750 | End: 2022-12-05
Attending: EMERGENCY MEDICINE | Admitting: PSYCHIATRY & NEUROLOGY
Payer: MEDICAID

## 2022-11-09 DIAGNOSIS — E11.65 TYPE 2 DIABETES MELLITUS WITH HYPERGLYCEMIA, WITH LONG-TERM CURRENT USE OF INSULIN (HCC): ICD-10-CM

## 2022-11-09 DIAGNOSIS — F25.0 SCHIZOAFFECTIVE DISORDER, BIPOLAR TYPE (HCC): ICD-10-CM

## 2022-11-09 DIAGNOSIS — Z79.4 TYPE 2 DIABETES MELLITUS WITH HYPERGLYCEMIA, WITH LONG-TERM CURRENT USE OF INSULIN (HCC): ICD-10-CM

## 2022-11-09 DIAGNOSIS — F43.21 ADJUSTMENT DISORDER WITH DEPRESSED MOOD: ICD-10-CM

## 2022-11-09 DIAGNOSIS — L30.4 INTERTRIGO: ICD-10-CM

## 2022-11-09 DIAGNOSIS — M19.90 ARTHRITIS: ICD-10-CM

## 2022-11-09 PROCEDURE — 85025 COMPLETE CBC W/AUTO DIFF WBC: CPT

## 2022-11-09 PROCEDURE — 99285 EMERGENCY DEPT VISIT HI MDM: CPT

## 2022-11-09 PROCEDURE — 90791 PSYCH DIAGNOSTIC EVALUATION: CPT

## 2022-11-09 PROCEDURE — 80053 COMPREHEN METABOLIC PANEL: CPT

## 2022-11-09 PROCEDURE — 87636 SARSCOV2 & INF A&B AMP PRB: CPT

## 2022-11-09 PROCEDURE — 82077 ASSAY SPEC XCP UR&BREATH IA: CPT

## 2022-11-10 PROBLEM — F43.20 ADJUSTMENT DISORDER: Status: ACTIVE | Noted: 2022-11-10

## 2022-11-10 PROBLEM — F43.20 ADJUSTMENT DISORDER: Status: RESOLVED | Noted: 2022-11-10 | Resolved: 2022-11-10

## 2022-11-10 LAB
ALBUMIN SERPL-MCNC: 2.8 G/DL (ref 3.5–5)
ALBUMIN/GLOB SERPL: 0.7 {RATIO} (ref 1.1–2.2)
ALP SERPL-CCNC: 139 U/L (ref 45–117)
ALT SERPL-CCNC: 33 U/L (ref 12–78)
AMPHET UR QL SCN: NEGATIVE
ANION GAP SERPL CALC-SCNC: 8 MMOL/L (ref 5–15)
APPEARANCE UR: CLEAR
AST SERPL-CCNC: 18 U/L (ref 15–37)
BACTERIA URNS QL MICRO: NEGATIVE /HPF
BARBITURATES UR QL SCN: NEGATIVE
BASOPHILS # BLD: 0 K/UL (ref 0–0.1)
BASOPHILS NFR BLD: 0 % (ref 0–1)
BENZODIAZ UR QL: NEGATIVE
BILIRUB SERPL-MCNC: 0.2 MG/DL (ref 0.2–1)
BILIRUB UR QL: NEGATIVE
BUN SERPL-MCNC: 13 MG/DL (ref 6–20)
BUN/CREAT SERPL: 13 (ref 12–20)
CALCIUM SERPL-MCNC: 8.4 MG/DL (ref 8.5–10.1)
CANNABINOIDS UR QL SCN: NEGATIVE
CHLORIDE SERPL-SCNC: 99 MMOL/L (ref 97–108)
CO2 SERPL-SCNC: 30 MMOL/L (ref 21–32)
COCAINE UR QL SCN: NEGATIVE
COLOR UR: ABNORMAL
CREAT SERPL-MCNC: 1.01 MG/DL (ref 0.55–1.02)
DIFFERENTIAL METHOD BLD: ABNORMAL
DRUG SCRN COMMENT,DRGCM: NORMAL
EOSINOPHIL # BLD: 0.2 K/UL (ref 0–0.4)
EOSINOPHIL NFR BLD: 2 % (ref 0–7)
EPITH CASTS URNS QL MICRO: NORMAL /LPF
ERYTHROCYTE [DISTWIDTH] IN BLOOD BY AUTOMATED COUNT: 14.7 % (ref 11.5–14.5)
ETHANOL SERPL-MCNC: <10 MG/DL
FLUAV RNA SPEC QL NAA+PROBE: NOT DETECTED
FLUBV RNA SPEC QL NAA+PROBE: NOT DETECTED
GLOBULIN SER CALC-MCNC: 4 G/DL (ref 2–4)
GLUCOSE BLD STRIP.AUTO-MCNC: 230 MG/DL (ref 65–117)
GLUCOSE SERPL-MCNC: 267 MG/DL (ref 65–100)
GLUCOSE UR STRIP.AUTO-MCNC: >1000 MG/DL
HCT VFR BLD AUTO: 39 % (ref 35–47)
HGB BLD-MCNC: 12.1 G/DL (ref 11.5–16)
HGB UR QL STRIP: NEGATIVE
IMM GRANULOCYTES # BLD AUTO: 0.1 K/UL (ref 0–0.04)
IMM GRANULOCYTES NFR BLD AUTO: 1 % (ref 0–0.5)
KETONES UR QL STRIP.AUTO: ABNORMAL MG/DL
LEUKOCYTE ESTERASE UR QL STRIP.AUTO: NEGATIVE
LYMPHOCYTES # BLD: 2.1 K/UL (ref 0.8–3.5)
LYMPHOCYTES NFR BLD: 22 % (ref 12–49)
MCH RBC QN AUTO: 24.8 PG (ref 26–34)
MCHC RBC AUTO-ENTMCNC: 31 G/DL (ref 30–36.5)
MCV RBC AUTO: 80.1 FL (ref 80–99)
METHADONE UR QL: NEGATIVE
MONOCYTES # BLD: 0.4 K/UL (ref 0–1)
MONOCYTES NFR BLD: 4 % (ref 5–13)
NEUTS SEG # BLD: 7.1 K/UL (ref 1.8–8)
NEUTS SEG NFR BLD: 71 % (ref 32–75)
NITRITE UR QL STRIP.AUTO: NEGATIVE
NRBC # BLD: 0 K/UL (ref 0–0.01)
NRBC BLD-RTO: 0 PER 100 WBC
OPIATES UR QL: NEGATIVE
PCP UR QL: NEGATIVE
PH UR STRIP: 5.5 [PH] (ref 5–8)
PLATELET # BLD AUTO: 473 K/UL (ref 150–400)
PMV BLD AUTO: 8.7 FL (ref 8.9–12.9)
POTASSIUM SERPL-SCNC: 3.9 MMOL/L (ref 3.5–5.1)
PROT SERPL-MCNC: 6.8 G/DL (ref 6.4–8.2)
PROT UR STRIP-MCNC: ABNORMAL MG/DL
RBC # BLD AUTO: 4.87 M/UL (ref 3.8–5.2)
RBC #/AREA URNS HPF: NORMAL /HPF (ref 0–5)
SARS-COV-2, COV2: NOT DETECTED
SERVICE CMNT-IMP: ABNORMAL
SODIUM SERPL-SCNC: 137 MMOL/L (ref 136–145)
SP GR UR REFRACTOMETRY: >1.03 (ref 1–1.03)
UROBILINOGEN UR QL STRIP.AUTO: 1 EU/DL (ref 0.2–1)
WBC # BLD AUTO: 9.9 K/UL (ref 3.6–11)
WBC URNS QL MICRO: NORMAL /HPF (ref 0–4)

## 2022-11-10 PROCEDURE — 82962 GLUCOSE BLOOD TEST: CPT

## 2022-11-10 PROCEDURE — 74011636637 HC RX REV CODE- 636/637: Performed by: EMERGENCY MEDICINE

## 2022-11-10 PROCEDURE — 80307 DRUG TEST PRSMV CHEM ANLYZR: CPT

## 2022-11-10 PROCEDURE — 65220000003 HC RM SEMIPRIVATE PSYCH

## 2022-11-10 PROCEDURE — 81001 URINALYSIS AUTO W/SCOPE: CPT

## 2022-11-10 PROCEDURE — 99223 1ST HOSP IP/OBS HIGH 75: CPT | Performed by: PSYCHIATRY & NEUROLOGY

## 2022-11-10 PROCEDURE — 74011250637 HC RX REV CODE- 250/637

## 2022-11-10 PROCEDURE — 74011250637 HC RX REV CODE- 250/637: Performed by: PSYCHIATRY & NEUROLOGY

## 2022-11-10 RX ORDER — BENZTROPINE MESYLATE 1 MG/1
1 TABLET ORAL
Status: DISCONTINUED | OUTPATIENT
Start: 2022-11-10 | End: 2022-12-05 | Stop reason: HOSPADM

## 2022-11-10 RX ORDER — DIPHENHYDRAMINE HYDROCHLORIDE 50 MG/ML
50 INJECTION, SOLUTION INTRAMUSCULAR; INTRAVENOUS
Status: DISCONTINUED | OUTPATIENT
Start: 2022-11-10 | End: 2022-12-05 | Stop reason: HOSPADM

## 2022-11-10 RX ORDER — SERTRALINE HYDROCHLORIDE 100 MG/1
100 TABLET, FILM COATED ORAL DAILY
Status: ON HOLD | COMMUNITY
End: 2022-12-02 | Stop reason: SDUPTHER

## 2022-11-10 RX ORDER — RISPERIDONE 1 MG/1
1 TABLET, FILM COATED ORAL DAILY
Status: DISCONTINUED | OUTPATIENT
Start: 2022-11-11 | End: 2022-11-14

## 2022-11-10 RX ORDER — TRAZODONE HYDROCHLORIDE 50 MG/1
50 TABLET ORAL
Status: DISCONTINUED | OUTPATIENT
Start: 2022-11-10 | End: 2022-12-05 | Stop reason: HOSPADM

## 2022-11-10 RX ORDER — GABAPENTIN 100 MG/1
100 CAPSULE ORAL 3 TIMES DAILY
Status: DISCONTINUED | OUTPATIENT
Start: 2022-11-11 | End: 2022-12-05 | Stop reason: HOSPADM

## 2022-11-10 RX ORDER — OLANZAPINE 5 MG/1
5 TABLET ORAL
Status: DISCONTINUED | OUTPATIENT
Start: 2022-11-10 | End: 2022-12-05 | Stop reason: HOSPADM

## 2022-11-10 RX ORDER — SERTRALINE HYDROCHLORIDE 50 MG/1
100 TABLET, FILM COATED ORAL DAILY
Status: DISCONTINUED | OUTPATIENT
Start: 2022-11-11 | End: 2022-12-05 | Stop reason: HOSPADM

## 2022-11-10 RX ORDER — INSULIN GLARGINE 100 [IU]/ML
20 INJECTION, SOLUTION SUBCUTANEOUS DAILY
COMMUNITY
End: 2022-12-05

## 2022-11-10 RX ORDER — RISPERIDONE 1 MG/1
2 TABLET, FILM COATED ORAL
Status: DISCONTINUED | OUTPATIENT
Start: 2022-11-10 | End: 2022-11-14

## 2022-11-10 RX ORDER — GABAPENTIN 100 MG/1
100 CAPSULE ORAL 3 TIMES DAILY
COMMUNITY
End: 2022-12-05

## 2022-11-10 RX ORDER — ACETAMINOPHEN 325 MG/1
650 TABLET ORAL
Status: DISCONTINUED | OUTPATIENT
Start: 2022-11-10 | End: 2022-12-05 | Stop reason: HOSPADM

## 2022-11-10 RX ORDER — HALOPERIDOL 5 MG/ML
5 INJECTION INTRAMUSCULAR
Status: DISCONTINUED | OUTPATIENT
Start: 2022-11-10 | End: 2022-12-05 | Stop reason: HOSPADM

## 2022-11-10 RX ORDER — INSULIN GLARGINE 100 [IU]/ML
20 INJECTION, SOLUTION SUBCUTANEOUS DAILY
Status: DISCONTINUED | OUTPATIENT
Start: 2022-11-11 | End: 2022-11-11

## 2022-11-10 RX ORDER — OXYBUTYNIN CHLORIDE 5 MG/1
5 TABLET, EXTENDED RELEASE ORAL DAILY
Status: DISCONTINUED | OUTPATIENT
Start: 2022-11-11 | End: 2022-12-05 | Stop reason: HOSPADM

## 2022-11-10 RX ORDER — METFORMIN HYDROCHLORIDE 500 MG/1
1000 TABLET, EXTENDED RELEASE ORAL 2 TIMES DAILY WITH MEALS
Status: DISCONTINUED | OUTPATIENT
Start: 2022-11-10 | End: 2022-12-05 | Stop reason: HOSPADM

## 2022-11-10 RX ORDER — HYDROXYZINE 25 MG/1
50 TABLET, FILM COATED ORAL
Status: DISCONTINUED | OUTPATIENT
Start: 2022-11-10 | End: 2022-12-05 | Stop reason: HOSPADM

## 2022-11-10 RX ORDER — ADHESIVE BANDAGE
30 BANDAGE TOPICAL DAILY PRN
Status: DISCONTINUED | OUTPATIENT
Start: 2022-11-10 | End: 2022-12-05 | Stop reason: HOSPADM

## 2022-11-10 RX ORDER — LORAZEPAM 2 MG/ML
1 INJECTION INTRAMUSCULAR
Status: DISCONTINUED | OUTPATIENT
Start: 2022-11-10 | End: 2022-12-05 | Stop reason: HOSPADM

## 2022-11-10 RX ORDER — RISPERIDONE 1 MG/1
1 TABLET, FILM COATED ORAL DAILY
COMMUNITY
End: 2022-12-05

## 2022-11-10 RX ORDER — RISPERIDONE 2 MG/1
2 TABLET, FILM COATED ORAL
COMMUNITY
End: 2022-12-05

## 2022-11-10 RX ORDER — ATORVASTATIN CALCIUM 40 MG/1
40 TABLET, FILM COATED ORAL EVERY EVENING
Status: DISCONTINUED | OUTPATIENT
Start: 2022-11-10 | End: 2022-12-05 | Stop reason: HOSPADM

## 2022-11-10 RX ADMIN — RISPERIDONE 2 MG: 1 TABLET ORAL at 21:54

## 2022-11-10 RX ADMIN — METFORMIN HYDROCHLORIDE 1000 MG: 500 TABLET, EXTENDED RELEASE ORAL at 20:16

## 2022-11-10 RX ADMIN — TRAZODONE HYDROCHLORIDE 50 MG: 50 TABLET ORAL at 21:54

## 2022-11-10 RX ADMIN — Medication 10 UNITS: at 02:45

## 2022-11-10 RX ADMIN — ATORVASTATIN CALCIUM 40 MG: 40 TABLET, FILM COATED ORAL at 20:16

## 2022-11-10 NOTE — BSMART NOTE
Access made aware that patient is medically clear.  This writer is waiting for bedplacement after access consulted with NP.

## 2022-11-10 NOTE — DISCHARGE INSTRUCTIONS
DISCHARGE SUMMARY    NAME:Pilar Sandra  : 1965  MRN: [id number]    The patient Suzie Fatima exhibits the ability to control behavior in a less restrictive environment. Patient's level of functioning is improving. No assaultive/destructive behavior has been observed for the past 24 hours. No suicidal/homicidal threat or behavior has been observed for the past 24 hours. There is no evidence of serious medication side effects. Patient has not been in physical or protective restraints for at least the past 24 hours. If weapons involved, how are they secured? Pt does not have access to any weapons. Is patient aware of and in agreement with discharge plan? Yes    Arrangements for medication:  Prescriptions sent to [address] [facility] in [place]    Copy of discharge instructions to provider?:  Yes to [facility] and [address] for transportation home:  Pt will be picked up by [address] [place] [address] all follow up appointments as scheduled, continue to take prescribed medications per physician instructions.   Mental health crisis number:  161 or your local mental health crisis line number at [address] [facility] [address] Emergency WARM LINE      [phone] ([phone])      M-F: 9am to 9pm      Sat & Sun: 5pm - 9pm  National suicide prevention lines:                             6-948-KQYYVCO ([phone])       [phone] ([phone])    Crisis Text Line:  Text HOME to [phone]
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
## 2022-11-10 NOTE — PROGRESS NOTES
Problem: Falls - Risk of  Goal: *Absence of Falls  Description: Document Natanael Ye Fall Risk and appropriate interventions in the flowsheet.   Outcome: Progressing Towards Goal  Note: Fall Risk Interventions:     Problem: Discharge Planning  Goal: *Discharge to safe environment  Outcome: Progressing Towards Goal     Problem: Suicide  Goal: *STG: Remains safe in hospital  Outcome: Progressing Towards Goal     Problem: Suicide  Goal: *STG: Seeks staff when feelings of self harm or harm towards others arise  Outcome: Progressing Towards Goal

## 2022-11-10 NOTE — ED TRIAGE NOTES
Pt presents to ED reporting suicidal ideation x a while. Pt reports the anniversary of her husbands passing is coming up so she is going to feel \"suicidal for a while. \" When asked about a specific plan, patient states \"Get hit by a car, stab myself, or overdose, I don't care. \" Pt shivering in triage and repeatedly stating that she is cold.
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
## 2022-11-10 NOTE — GROUP NOTE
EMILY  GROUP DOCUMENTATION INDIVIDUAL                                                                          Group Therapy Note    Date: 11/10/2022    Group Start Time: 1000  Group End Time: 1100  Group Topic: Topic Group    [facility] 3 ACUTE BEHAV [facility]    [place] [address] GROUP    Group Therapy Note    Attendees: 6       Attendance: Did not attend    Enzo Alfaro

## 2022-11-10 NOTE — PROGRESS NOTES
Behavioral Services  Medicare Certification Upon Admission    I certify that this patient's inpatient psychiatric hospital admission is medically necessary for:    [x] (1) Treatment which could reasonably be expected to improve this patient's condition,       [x] (2) Or for diagnostic study;     AND     [x](2) The inpatient psychiatric services are provided while the individual is under the care of a physician and are included in the individualized plan of care.     Estimated length of stay/service 3-5 days    Plan for post-hospital care home    Electronically signed by Arsh Doshi MD on 11/10/2022 at 9:14 AM

## 2022-11-10 NOTE — PROGRESS NOTES
Darren Razo has been isolative to her room all day resting in bed, coming out only for meals. She endorses passive SI without plan or intent while inpatient. Endorses VH of \"ghosts coming through the wall\". Denies HI/AH. Affect flat, mood depressed, appearance is unkempt and pt is malodorous. States the anniversary of her 's death and homelessness are her major stressors. She is unable to provide any information about her medications or where she gets the prescriptions filled. Pharmacy made aware. No scheduled meds at this time.  Will continue to monitor for safety    Problem: Altered Thought Process (Adult/Pediatric)  Goal: *STG: Attends activities and groups  Outcome: Not Progressing Towards Goal  Goal: *STG: Decreased hallucinations  Outcome: Not Progressing Towards Goal
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
## 2022-11-10 NOTE — H&P
INITIAL PSYCHIATRIC EVALUATION            IDENTIFICATION:    Patient Name  Dian Ganser   Date of Birth 1965   [facility] [id number]   Medical Record Number  [id number]      Age  59 y.o. PCP Ellan Dakins, MD   Admit date:  11/9/2022    Room Number  314/01  @ [facility]   Date of Service  11/10/2022            HISTORY         REASON FOR HOSPITALIZATION:  CC: \"suicidal ideation\". Pt admitted under a voluntary basis for suicidal ideations proving to be an imminent danger to self and an inability to care for self. HISTORY OF PRESENT ILLNESS:    The patient, Dian Ganser, is a 59 y.o. WHITE/NON- female with a past psychiatric history significant for schizoaffective disorder, who presents at this time with complaints of (and/or evidence of) the following emotional symptoms: depression and suicidal thoughts/threats. Additional symptomatology include poor self care. The above symptoms have been present for 2+ weeks. These symptoms are of moderate to high severity. These symptoms are constant in nature. The patient's condition has been precipitated by psychosocial stressors. Patient's condition made worse by treatment noncompliance. UDS: negative; BAL=0. The patient is well known to the unit; she comes into the hospital frequently in crisis typically stating her 's death several years ago has made her feel hopeless. She has been living in a group home though this admission she states that she was kicked out of her group home because of an episode of incontinence. The patient is a poor historian. The patient corroborates the above narrative. The patient contracts for safety on the unit and gives consent for the team to contact collateral. The patient is amenable to initiating treatment while on the unit. She defers much of the team's inquires to her payee.      ALLERGIES:   Allergies   Allergen Reactions    Amoxicillin Hives    Mushroom Flavor Hives    Tomato Hives MEDICATIONS PRIOR TO ADMISSION:   Medications Prior to Admission   Medication Sig    albuterol (PROVENTIL HFA, VENTOLIN HFA, PROAIR HFA) 90 mcg/actuation inhaler Take 2 Puffs by inhalation every six (6) hours as needed for Wheezing or Shortness of Breath. atorvastatin (LIPITOR) 40 mg tablet Take 1 Tablet by mouth every evening. Indications: Cholesterol    buPROPion XL (WELLBUTRIN XL) 150 mg tablet Take 1 Tablet by mouth daily. Indications: major depressive disorder    glipiZIDE (GLUCOTROL) 10 mg tablet Take 1 Tablet by mouth Daily (before breakfast). Indications: type 2 diabetes mellitus    metFORMIN ER (GLUCOPHAGE XR) 500 mg tablet Take 2 Tablets by mouth two (2) times daily (with meals). oxybutynin chloride XL (DITROPAN XL) 5 mg CR tablet Take 1 Tablet by mouth daily. Indications: overactive bladder    risperiDONE (RisperDAL) 2 mg tablet Take 1 Tablet by mouth two (2) times a day. Indications: schizophrenia    sertraline (ZOLOFT) 50 mg tablet Take 1 Tablet by mouth two (2) times a day. Indications: anxiousness associated with depression    traZODone (DESYREL) 50 mg tablet Take 1 Tablet by mouth nightly as needed (Insomnia). Indications: insomnia associated with depression    cetirizine (ZyrTEC) 10 mg tablet Take 10 mg by mouth daily. ferrous sulfate 325 mg (65 mg iron) tablet Take 325 mg by mouth Daily (before breakfast). insulin regular (HumuLIN R Regular U-100 Insuln) 100 unit/mL injection 6 Units by SubCUTAneous route Before breakfast, lunch, dinner and at bedtime. lisinopriL (PRINIVIL, ZESTRIL) 2.5 mg tablet Take 2.5 mg by mouth daily. Indications: kidney disease from diabetes, high blood pressure    nicotine (NICODERM CQ) 21 mg/24 hr 1 Patch by TransDERmal route every twenty-four (24) hours. linaGLIPtin (Tradjenta) 5 mg tablet Take 5 mg by mouth daily.  Indications: type 2 diabetes mellitus    polyethylene glycol (Miralax) 17 gram packet Take 17 g by mouth daily as needed for Constipation. Indications: constipation      PAST MEDICAL HISTORY:   Past Medical History:   Diagnosis Date    Arthritis     legs    Bipolar 1 disorder ([facility]     COPD     Depression 1/13/2012    Diabetes ([facility]     Drug abuse ([facility]     cocaine, stimulants, etoh, mj, tob    H/O suicide attempt     Hypertension     Obesity     Polydrug dependence excluding opioid type drug, abuse ([facility]     PTSD (post-traumatic stress disorder) 12/30/2021    Schizophrenia ([facility] 7/8/2016     Past Surgical History:   Procedure Laterality Date    HX ORTHOPAEDIC      foot sx      SOCIAL HISTORY:   The patient is currently on disability; the patient is not a smoker; the patient's marital status is ; the patient does not have children; the patient reports the highest level of education achieved is 12th grade (special education). FAMILY HISTORY: History reviewed, pertinent family history as below:   Family History   Problem Relation Age of Onset    Diabetes Mother     Stroke Mother     Stroke Father        REVIEW OF SYSTEMS:   Pertinent items are noted in the History of Present Illness. All other Systems reviewed and are considered negative. MENTAL STATUS EXAM & VITALS     MENTAL STATUS EXAM (MSE):    MSE FINDINGS ARE WITHIN NORMAL LIMITS (WNL) UNLESS OTHERWISE STATED BELOW. ( ALL OF THE BELOW CATEGORIES OF THE MSE HAVE BEEN REVIEWED (reviewed 11/10/2022) AND UPDATED AS DEEMED APPROPRIATE )  General Presentation age appropriate and overweight, guarded   Orientation disorganized   Vital Signs  See below (reviewed 11/10/2022); Vital Signs (BP, Pulse, & Temp) are within normal limits if not listed below.    Gait and Station Stable/steady, no ataxia   Musculoskeletal System No extrapyramidal symptoms (EPS); no abnormal muscular movements or Tardive Dyskinesia (TD); muscle strength and tone are within normal limits   Language No aphasia or dysarthria   Speech:  normal volume and non-pressured   Thought Processes concrete; slow rate of thoughts; poor abstract reasoning/computation   Thought Associations blocked    Thought Content preoccupations   Suicidal Ideations contracts for safety   Homicidal Ideations none   Mood:  depressed and anhedonia   Affect:  flat   Memory recent  fair   Memory remote:  fair   Concentration/Attention:  impaired   Fund of Knowledge below average   Insight:  limited   Reliability fair   Judgment:  limited          VITALS:     Patient Vitals for the past 24 hrs:   Temp Pulse Resp BP SpO2   11/10/22 0611 98 °F (36.7 °C) 93 16 132/74 96 %   11/09/22 2012 97.4 °F (36.3 °C) 85 15 136/71 98 %     Wt Readings from Last 3 Encounters:   11/09/22 115.7 kg (255 lb)   06/03/22 113.9 kg (251 lb)   05/14/22 88.5 kg (195 lb)     Temp Readings from Last 3 Encounters:   11/10/22 98 °F (36.7 °C)   06/03/22 97.6 °F (36.4 °C)   05/19/22 98.5 °F (36.9 °C)     BP Readings from Last 3 Encounters:   11/10/22 132/74   06/03/22 130/69   05/19/22 (!) 112/51     Pulse Readings from Last 3 Encounters:   11/10/22 93   06/03/22 89   05/19/22 64            DATA     LABORATORY DATA:  Labs Reviewed   METABOLIC PANEL, COMPREHENSIVE - Abnormal; Notable for the following components:       Result Value    Glucose 267 (*)     Calcium 8.4 (*)     Alk. phosphatase 139 (*)     Albumin 2.8 (*)     A-G Ratio 0.7 (*)     All other components within normal limits   CBC WITH AUTOMATED DIFF - Abnormal; Notable for the following components:    MCH 24.8 (*)     RDW 14.7 (*)     PLATELET 812 (*)     MPV 8.7 (*)     MONOCYTES 4 (*)     IMMATURE GRANULOCYTES 1 (*)     ABS. IMM.  GRANS. 0.1 (*)     All other components within normal limits   URINALYSIS W/ RFLX MICROSCOPIC - Abnormal; Notable for the following components:    Specific gravity >1.030 (*)     Protein TRACE (*)     Glucose >1,000 (*)     Ketone TRACE (*)     All other components within normal limits   GLUCOSE, POC - Abnormal; Notable for the following components:    Glucose (POC) 230 (*)     All other components within normal limits   COVID-19 WITH INFLUENZA A/B   ETHYL ALCOHOL   DRUG SCREEN, URINE   URINE MICROSCOPIC ONLY     Admission on 11/09/2022   Component Date Value Ref Range Status    Sodium 11/09/2022 137  136 - 145 mmol/L Final    Potassium 11/09/2022 3.9  3.5 - 5.1 mmol/L Final    Chloride 11/09/2022 99  97 - 108 mmol/L Final    CO2 11/09/2022 30  21 - 32 mmol/L Final    Anion gap 11/09/2022 8  5 - 15 mmol/L Final    Glucose 11/09/2022 267 (A)  65 - 100 mg/dL Final    BUN 11/09/2022 13  6 - 20 MG/DL Final    Creatinine 11/09/2022 1.01  0.55 - 1.02 MG/DL Final    BUN/Creatinine ratio 11/09/2022 13  12 - 20   Final    eGFR 11/09/2022 >60  >60 ml/min/1.73m2 Final    Calcium 11/09/2022 8.4 (A)  8.5 - 10.1 MG/DL Final    Bilirubin, total 11/09/2022 0.2  0.2 - 1.0 MG/DL Final    ALT (SGPT) 11/09/2022 33  12 - 78 U/L Final    AST (SGOT) 11/09/2022 18  15 - 37 U/L Final    Alk.  phosphatase 11/09/2022 139 (A)  45 - 117 U/L Final    Protein, total 11/09/2022 6.8  6.4 - 8.2 g/dL Final    Albumin 11/09/2022 2.8 (A)  3.5 - 5.0 g/dL Final    Globulin 11/09/2022 4.0  2.0 - 4.0 g/dL Final    A-G Ratio 11/09/2022 0.7 (A)  1.1 - 2.2   Final    WBC 11/09/2022 9.9  3.6 - 11.0 K/uL Final    RBC 11/09/2022 4.87  3.80 - 5.20 M/uL Final    HGB 11/09/2022 12.1  11.5 - 16.0 g/dL Final    HCT 11/09/2022 39.0  35.0 - 47.0 % Final    MCV 11/09/2022 80.1  80.0 - 99.0 FL Final    MCH 11/09/2022 24.8 (A)  26.0 - 34.0 PG Final    MCHC 11/09/2022 31.0  30.0 - 36.5 g/dL Final    RDW 11/09/2022 14.7 (A)  11.5 - 14.5 % Final    PLATELET 69/98/1622 771 (A)  150 - 400 K/uL Final    MPV 11/09/2022 8.7 (A)  8.9 - 12.9 FL Final    NRBC 11/09/2022 0.0  0  WBC Final    ABSOLUTE NRBC 11/09/2022 0.00  0.00 - 0.01 K/uL Final    NEUTROPHILS 11/09/2022 71  32 - 75 % Final    LYMPHOCYTES 11/09/2022 22  12 - 49 % Final    MONOCYTES 11/09/2022 4 (A)  5 - 13 % Final    EOSINOPHILS 11/09/2022 2  0 - 7 % Final    BASOPHILS 11/09/2022 0  0 - 1 % Final    IMMATURE GRANULOCYTES 11/09/2022 1 (A)  0.0 - 0.5 % Final    ABS. NEUTROPHILS 11/09/2022 7.1  1.8 - 8.0 K/UL Final    ABS. LYMPHOCYTES 11/09/2022 2.1  0.8 - 3.5 K/UL Final    ABS. MONOCYTES 11/09/2022 0.4  0.0 - 1.0 K/UL Final    ABS. EOSINOPHILS 11/09/2022 0.2  0.0 - 0.4 K/UL Final    ABS. BASOPHILS 11/09/2022 0.0  0.0 - 0.1 K/UL Final    ABS. IMM. GRANS.  11/09/2022 0.1 (A)  0.00 - 0.04 K/UL Final    DF 11/09/2022 AUTOMATED    Final    Color 11/10/2022 YELLOW/STRAW    Final    Appearance 11/10/2022 CLEAR  CLEAR   Final    Specific gravity 11/10/2022 >1.030 (A)  1.003 - 1.030 Final    pH (UA) 11/10/2022 5.5  5.0 - 8.0   Final    Protein 11/10/2022 TRACE (A)  NEG mg/dL Final    Glucose 11/10/2022 >1,000 (A)  NEG mg/dL Final    Ketone 11/10/2022 TRACE (A)  NEG mg/dL Final    Bilirubin 11/10/2022 Negative  NEG   Final    Blood 11/10/2022 Negative  NEG   Final    Urobilinogen 11/10/2022 1.0  0.2 - 1.0 EU/dL Final    Nitrites 11/10/2022 Negative  NEG   Final    Leukocyte Esterase 11/10/2022 Negative  NEG   Final    ALCOHOL(ETHYL),SERUM 11/09/2022 <10  <10 MG/DL Final    AMPHETAMINES 11/10/2022 Negative  NEG   Final    BARBITURATES 11/10/2022 Negative  NEG   Final    BENZODIAZEPINES 11/10/2022 Negative  NEG   Final    COCAINE 11/10/2022 Negative  NEG   Final    METHADONE 11/10/2022 Negative  NEG   Final    OPIATES 11/10/2022 Negative  NEG   Final    PCP(PHENCYCLIDINE) 11/10/2022 Negative  NEG   Final    THC (TH-CANNABINOL) 11/10/2022 Negative  NEG   Final    Drug screen comment 11/10/2022 (NOTE)   Final    SARS-CoV-2 by PCR 11/09/2022 Not detected  NOTD   Final    Influenza A by PCR 11/09/2022 Not detected    Final    Influenza B by PCR 11/09/2022 Not detected    Final    WBC 11/10/2022 0-4  0 - 4 /hpf Final    RBC 11/10/2022 0-5  0 - 5 /hpf Final    Epithelial cells 11/10/2022 FEW  FEW /lpf Final    Bacteria 11/10/2022 Negative  NEG /hpf Final    Glucose (POC) 11/10/2022 230 (A)  65 - 117 mg/dL Final Performed by 11/10/2022 Agusto Donnelly (LILIA RN)   Final        RADIOLOGY REPORTS:  Results from Hospital Encounter encounter on 07/27/21    XR CHEST PORT    Narrative  EXAM: XR CHEST PORT    HISTORY: admission. COMPARISON: None    FINDINGS: Single view(s) of the chest. The film is underpenetrated secondary to  patient body habitus. The lungs are well inflated. No focal consolidation,  pleural effusion, or pneumothorax. The cardiomediastinal silhouette is  unremarkable. The visualized osseous structures are unremarkable. Impression  No acute cardiopulmonary process. No results found.            MEDICATIONS       ALL MEDICATIONS  Current Facility-Administered Medications   Medication Dose Route Frequency    OLANZapine (ZyPREXA) tablet 5 mg  5 mg Oral Q6H PRN    haloperidol lactate (HALDOL) injection 5 mg  5 mg IntraMUSCular Q6H PRN    benztropine (COGENTIN) tablet 1 mg  1 mg Oral BID PRN    diphenhydrAMINE (BENADRYL) injection 50 mg  50 mg IntraMUSCular BID PRN    hydrOXYzine HCL (ATARAX) tablet 50 mg  50 mg Oral TID PRN    LORazepam (ATIVAN) injection 1 mg  1 mg IntraMUSCular Q4H PRN    traZODone (DESYREL) tablet 50 mg  50 mg Oral QHS PRN    acetaminophen (TYLENOL) tablet 650 mg  650 mg Oral Q4H PRN    magnesium hydroxide (MILK OF MAGNESIA) 400 mg/5 mL oral suspension 30 mL  30 mL Oral DAILY PRN      SCHEDULED MEDICATIONS  Current Facility-Administered Medications   Medication Dose Route Frequency                ASSESSMENT & PLAN        The patient, Nicholas Ellington, is a 59 y.o.  female who presents at this time for treatment of the following diagnoses:  Patient Active Hospital Problem List:       Schizoaffective disorder (11/10/2022)           Assessment: the patient presents in crisis after potentially leaving her group home; she is disorganized and a poor historian at baseline, treatment will be primarily psychosocial.         Plan:   - RESTART Risperdal 1 mg QDAY and 2 mg QHS for psychosis  - RESTART Zoloft 100 mg QDAY for MDD  - IGM therapy as tolerated  - Expand database / obtain collateral  - Dispo planning           A coordinated, multidisplinary treatment team (includes the nurse, unit pharmacist,  and writer) round was conducted for this initial evaluation with the patient present. The following regarding medications was addressed during rounds with patient: the risks and benefits of the proposed medication. The patient was given the opportunity to ask questions. Informed consent given to the use of the above medications. I will continue to adjust psychiatric and non-psychiatric medications (see above \"medication\" section and orders section for details) as deemed appropriate & based upon diagnoses and response to treatment. I have reviewed admission (and previous/old) labs and medical tests in the EHR and or transferring hospital documents. I will continue to order blood tests/labs and diagnostic tests as deemed appropriate and review results as they become available (see orders for details). I have reviewed old psychiatric and medical records available in the EHR. I Will order additional psychiatric records from other institutions to further elucidate the nature of patient's psychopathology and review once available. I will gather additional collateral information from friends, family and o/p treatment team to further elucidate the nature of patient's psychopathology and baselline level of psychiatric functioning. I certify that this patient's inpatient psychiatric hospital services are required for treatment that could reasonably be expected to improve the patient's condition, or for diagnostic study, and that the patient continues to need, on a daily basis, active treatment furnished directly by or requiring the supervision of inpatient psychiatric facility personnel.  In addition, the hospital records show that services furnished were intensive treatment services, admission or related services, or equivalent services.       ESTIMATED LENGTH OF STAY:  3-5 days       STRENGTHS:  Exercising self-direction/Resourceful and Realizes one's potential                                        SIGNED:    Vin Lennon MD  11/10/2022

## 2022-11-10 NOTE — ED NOTES
Patient changed to gown belongings obtained and to be wanded by security. Emergency Department Nursing Plan of Care       The Nursing Plan of Care is developed from the Nursing assessment and Emergency Department Attending provider initial evaluation. The plan of care may be reviewed in the ED Provider note.     The Plan of Care was developed with the following considerations:   Patient / Family readiness to learn indicated by:verbalized understanding  Persons(s) to be included in education: patient  Barriers to Learning/Limitations:No    Signed     Isabella Level, RN    11/9/2022   9:34 PM

## 2022-11-10 NOTE — GROUP NOTE
EMILY  GROUP DOCUMENTATION INDIVIDUAL                                                                          Group Therapy Note    Date: 11/10/2022    Group Start Time: 1400  Group End Time: 1500  Group Topic: Recreational/Music Therapy    [facility] [place] [facility] ACUTE BEHAV [facility]    [place] [address] GROUP    Group Therapy Note    Attendees: 4       Attendance: Did not attend      Graham Molina

## 2022-11-10 NOTE — BH NOTES
ADMISSION NOTE:  Pt is a 59 yrs old female admitted to the unit from [address] [place] [address] ER brought in on a wheel chair with a  history of suicidal ideation of killing herself by taking taking an overdose of sleeping pills or being run by traffic all this arising from her  upcoming death anniversary. The pt UDS was negative of drugs and alcohol. Pt blood sugar check was 267 mg/dl and 10 units of insulin was given at ER. Pt is a voluntary admission. Pt was a moderate suicide score on assessment but she contracted for safety while in the unit. She was positive of depression and anxiety. Pt skin checks was done by 2 RNS and she had MASD under her breasts with barrier cream from ER and a bruise on her Right popliteal region. Pt is bariatric needing some assistance to turn to hence may benefit from hospital bed. Pt was smelling urine and says she was homeless. Pt oriented on admission package , gowned and taken to bed. Admission orders were obtained. PT was calm and cooperated through admission . pt is a high fall risk pat she had a history of fall prior to admission 2 weeks ago. Continue monitoring.

## 2022-11-10 NOTE — PROGRESS NOTES
[facility] Admission Pharmacy Medication Reconciliation     Information obtained from: Clinical Psychiatric Pharmacist at [facility] 52: Yes    Comments/recommendations:  PTA medication list reflects the discharge summary from [facility], where she stayed from 8/25/22 - 10/21/22. Medication changes (since last review): Added  Gabapentin 100 mg TID  Insulin glargine (Lantus) 20 units daily (Of note, she required this dose inpatient; however, Lantus was stopped at discharge due to the unreliability of patient to self-inject)    Removed  Albuterol inhaler  Bupropion XL  Cetirizine  Ferrous sulfate  Insulin regular  Lisinopril   Nicotine 21 mg/24 hr patch  Miralax  Trazodone    Adjusted  Sertraline (from 50 mg BID -> 100 mg daily)  Risperidone (from 2 mg BID -> 1 mg every morning and 2 mg every bedtime)    The [place] Prescription Monitoring Program () was accessed to determine fill history of any controlled medications. Gabapentin 100 mg TID was dispensed on 10/22/22.   [facility] reflects medications filled and processed through the patient's insurance, however                this data does NOT capture whether the medication was picked up or is currently being taken by the patient. Patient allergies: Allergies as of 11/09/2022 - Fully Reviewed 11/09/2022   Allergen Reaction Noted    Amoxicillin Hives 06/13/2017    Mushroom flavor Hives 01/12/2012    Tomato Hives 01/12/2012         Prior to Admission Medications   Prescriptions Last Dose Informant Patient Reported? Taking?   atorvastatin (LIPITOR) 40 mg tablet  Other No No   Sig: Take 1 Tablet by mouth every evening. Indications: Cholesterol   gabapentin (NEURONTIN) 100 mg capsule  Other Yes No   Sig: Take 100 mg by mouth three (3) times daily. glipiZIDE (GLUCOTROL) 10 mg tablet  Other No No   Sig: Take 1 Tablet by mouth Daily (before breakfast).  Indications: type 2 diabetes mellitus   insulin glargine (LANTUS) 100 unit/mL injection  Other Yes No   Si Units by SubCUTAneous route daily. linaGLIPtin (Tradjenta) 5 mg tablet  Other Yes No   Sig: Take 5 mg by mouth daily. Indications: type 2 diabetes mellitus   metFORMIN ER (GLUCOPHAGE XR) 500 mg tablet  Other No No   Sig: Take 2 Tablets by mouth two (2) times daily (with meals). oxybutynin chloride XL (DITROPAN XL) 5 mg CR tablet  Other No No   Sig: Take 1 Tablet by mouth daily. Indications: overactive bladder   risperiDONE (RisperDAL) 1 mg tablet  Other Yes No   Sig: Take 1 mg by mouth daily. risperiDONE (RisperDAL) 2 mg tablet  Other Yes No   Sig: Take 2 mg by mouth nightly. sertraline (ZOLOFT) 100 mg tablet  Other Yes No   Sig: Take 100 mg by mouth daily.       Facility-Administered Medications: None          Thank you,  Mitra Good, PHARMD

## 2022-11-11 ENCOUNTER — APPOINTMENT (OUTPATIENT)
Dept: GENERAL RADIOLOGY | Age: 57
DRG: 750 | End: 2022-11-11
Attending: STUDENT IN AN ORGANIZED HEALTH CARE EDUCATION/TRAINING PROGRAM
Payer: MEDICAID

## 2022-11-11 LAB
BNP SERPL-MCNC: 206 PG/ML (ref 0–125)
GLUCOSE BLD STRIP.AUTO-MCNC: 330 MG/DL (ref 65–117)
GLUCOSE BLD STRIP.AUTO-MCNC: 397 MG/DL (ref 65–117)
SERVICE CMNT-IMP: ABNORMAL
SERVICE CMNT-IMP: ABNORMAL

## 2022-11-11 PROCEDURE — 65220000003 HC RM SEMIPRIVATE PSYCH

## 2022-11-11 PROCEDURE — 71045 X-RAY EXAM CHEST 1 VIEW: CPT

## 2022-11-11 PROCEDURE — 74011636637 HC RX REV CODE- 636/637: Performed by: STUDENT IN AN ORGANIZED HEALTH CARE EDUCATION/TRAINING PROGRAM

## 2022-11-11 PROCEDURE — 74011250637 HC RX REV CODE- 250/637: Performed by: PSYCHIATRY & NEUROLOGY

## 2022-11-11 PROCEDURE — 74011250637 HC RX REV CODE- 250/637: Performed by: STUDENT IN AN ORGANIZED HEALTH CARE EDUCATION/TRAINING PROGRAM

## 2022-11-11 PROCEDURE — 82962 GLUCOSE BLOOD TEST: CPT

## 2022-11-11 PROCEDURE — 74011636637 HC RX REV CODE- 636/637: Performed by: PSYCHIATRY & NEUROLOGY

## 2022-11-11 PROCEDURE — 36415 COLL VENOUS BLD VENIPUNCTURE: CPT

## 2022-11-11 PROCEDURE — 99232 SBSQ HOSP IP/OBS MODERATE 35: CPT | Performed by: PSYCHIATRY & NEUROLOGY

## 2022-11-11 PROCEDURE — 74011250637 HC RX REV CODE- 250/637

## 2022-11-11 PROCEDURE — 83880 ASSAY OF NATRIURETIC PEPTIDE: CPT

## 2022-11-11 RX ORDER — MAGNESIUM SULFATE 100 %
4 CRYSTALS MISCELLANEOUS AS NEEDED
Status: DISCONTINUED | OUTPATIENT
Start: 2022-11-11 | End: 2022-12-05 | Stop reason: HOSPADM

## 2022-11-11 RX ORDER — ALBUTEROL SULFATE 90 UG/1
1 AEROSOL, METERED RESPIRATORY (INHALATION)
Status: DISCONTINUED | OUTPATIENT
Start: 2022-11-11 | End: 2022-12-05 | Stop reason: HOSPADM

## 2022-11-11 RX ORDER — INSULIN GLARGINE 100 [IU]/ML
25 INJECTION, SOLUTION SUBCUTANEOUS DAILY
Status: DISCONTINUED | OUTPATIENT
Start: 2022-11-12 | End: 2022-11-21

## 2022-11-11 RX ORDER — INSULIN LISPRO 100 [IU]/ML
1-10 INJECTION, SOLUTION INTRAVENOUS; SUBCUTANEOUS
Status: DISCONTINUED | OUTPATIENT
Start: 2022-11-11 | End: 2022-12-05 | Stop reason: HOSPADM

## 2022-11-11 RX ORDER — INSULIN LISPRO 100 [IU]/ML
5 INJECTION, SOLUTION INTRAVENOUS; SUBCUTANEOUS
Status: DISCONTINUED | OUTPATIENT
Start: 2022-11-11 | End: 2022-11-22

## 2022-11-11 RX ORDER — INSULIN LISPRO 100 [IU]/ML
INJECTION, SOLUTION INTRAVENOUS; SUBCUTANEOUS
Status: DISCONTINUED | OUTPATIENT
Start: 2022-11-11 | End: 2022-11-11

## 2022-11-11 RX ORDER — DEXTROSE MONOHYDRATE 100 MG/ML
0-250 INJECTION, SOLUTION INTRAVENOUS AS NEEDED
Status: DISCONTINUED | OUTPATIENT
Start: 2022-11-11 | End: 2022-12-05 | Stop reason: HOSPADM

## 2022-11-11 RX ORDER — BUDESONIDE AND FORMOTEROL FUMARATE DIHYDRATE 80; 4.5 UG/1; UG/1
2 AEROSOL RESPIRATORY (INHALATION)
Status: DISCONTINUED | OUTPATIENT
Start: 2022-11-11 | End: 2022-12-05 | Stop reason: HOSPADM

## 2022-11-11 RX ADMIN — OXYBUTYNIN CHLORIDE 5 MG: 5 TABLET, EXTENDED RELEASE ORAL at 09:05

## 2022-11-11 RX ADMIN — RISPERIDONE 2 MG: 1 TABLET ORAL at 21:29

## 2022-11-11 RX ADMIN — METFORMIN HYDROCHLORIDE 1000 MG: 500 TABLET, EXTENDED RELEASE ORAL at 09:05

## 2022-11-11 RX ADMIN — Medication 5 UNITS: at 17:39

## 2022-11-11 RX ADMIN — GABAPENTIN 100 MG: 100 CAPSULE ORAL at 09:05

## 2022-11-11 RX ADMIN — Medication 1 LOZENGE: at 20:41

## 2022-11-11 RX ADMIN — RISPERIDONE 1 MG: 1 TABLET ORAL at 09:05

## 2022-11-11 RX ADMIN — ATORVASTATIN CALCIUM 40 MG: 40 TABLET, FILM COATED ORAL at 17:29

## 2022-11-11 RX ADMIN — Medication 4 UNITS: at 21:30

## 2022-11-11 RX ADMIN — GABAPENTIN 100 MG: 100 CAPSULE ORAL at 17:29

## 2022-11-11 RX ADMIN — Medication 20 UNITS: at 09:04

## 2022-11-11 RX ADMIN — SERTRALINE HYDROCHLORIDE 100 MG: 50 TABLET ORAL at 09:05

## 2022-11-11 RX ADMIN — METFORMIN HYDROCHLORIDE 1000 MG: 500 TABLET, EXTENDED RELEASE ORAL at 17:29

## 2022-11-11 RX ADMIN — Medication 9 UNITS: at 17:29

## 2022-11-11 RX ADMIN — BUDESONIDE AND FORMOTEROL FUMARATE DIHYDRATE 2 PUFF: 80; 4.5 AEROSOL RESPIRATORY (INHALATION) at 21:29

## 2022-11-11 RX ADMIN — GABAPENTIN 100 MG: 100 CAPSULE ORAL at 12:07

## 2022-11-11 RX ADMIN — ACETAMINOPHEN 650 MG: 325 TABLET, FILM COATED ORAL at 18:13

## 2022-11-11 RX ADMIN — Medication 1 LOZENGE: at 14:51

## 2022-11-11 NOTE — PROGRESS NOTES
Problem: Suicide  Goal: *STG: Remains safe in hospital  Outcome: Progressing Towards Goal  Goal: *STG/LTG: Complies with medication therapy  Outcome: Progressing Towards Goal  Goal: *LTG:  Develops proactive suicide prevention plan  Outcome: Progressing Towards Goal

## 2022-11-11 NOTE — BH NOTES
Behavioral Health Interdisciplinary Rounds    Patient goal(s) for today: prescribed, engage in unit activities Patient Continue taking meds as, participate in hygiene/ADLs, prepare for discharge, follow directions from staff, contact support team, attend groups     Treatment team focus/goals: will continue to maintain a calm and cooperative mood and communicate her needs with staff. Continue taking meds as prescribed, engage in unit activities, participate in hygiene/ADLs, prepare for discharge, follow directions from staff, contact support team, attend groups      Progress note: Per nursing, patient slept for 8 hours and endorses AVH, anxiety 9/10, and depression 7/10. Patient c/o pain in her knees, which has been preventing her from making it to the bathroom leading to patient incontinence. Patient is alert and oriented x 4. Patient was met in her room. Patient was laying in bed. Patient presents with dull affect and depressed mood. SW will be contacting patient's payee Cris to see if patient is able to return to group home.      LOS: 1                     Expected LOS: 5-7           Participating treatment team members: Chey Wallace MSW, Gail BARBOSA Student, Maicol Spencer MD

## 2022-11-11 NOTE — BH NOTES
COLLATERAL    Writer contacted patient's payee Isabel Godwin [phone]. Bradley Vázquez reports that patient has a hx of leaving group homes. Bradley Vázquez reports that patient does not like rules and regulations, and will find a way to be put out of the group home once she is unhappy with it. Bradley Vázquez, this year, has tried to get the patient into 6 different group homes. Bradley Vázquez reports that when the patient is tired of a group home the patient will either defecate on herself or an inappropriate place in the house, or will call 911 reporting she is about to commit suicide. The group homes will then not accept her again for liability reasons. Bradley Vázquez reports the patient enjoys being in the hospital because she is guaranteed food and \"likes the attention\". Bradley Vázquez is inquiring if there is a way the hospital can assist her in the process in becoming the patient's POA. Bradley Vázquez reports that this will be best for the patient. Bradley Vázquez also asks that the social work team look into KIM for the patient. Bradley Vázquez also informed writer that once the patient \"gets bored\" with the hospital she will be very adamant about leaving. Bradley Vázquez mentioned as well that the patient may defecate on herself once she \"gets bored\" as a way of trying to leave the hospital. But Bradley Vázquez is requesting that the team assists with her becoming POA or find an KIM placement prior to discharge.     Mindy Angelucci, MSW Student

## 2022-11-11 NOTE — PROGRESS NOTES
Problem: Falls - Risk of  Goal: *Absence of Falls  Description: Document Tay Escamillaer Fall Risk and appropriate interventions in the flowsheet.   Outcome: Progressing Towards Goal  Note: Fall Risk Interventions:                                Problem: Suicide  Goal: *STG: Remains safe in hospital  Outcome: Progressing Towards Goal  Goal: *STG: Attends activities and groups  Outcome: Progressing Towards Goal  Goal: *STG/LTG: Complies with medication therapy  Outcome: Progressing Towards Goal  Goal: *STG/LTG: No longer expresses self destructive or suicidal thoughts  Outcome: Progressing Towards Goal

## 2022-11-11 NOTE — GROUP NOTE
EMILY  GROUP DOCUMENTATION INDIVIDUAL                                                                          Group Therapy Note    Date: 11/11/2022    Group Start Time: 1400  Group End Time: 1500  Group Topic: Recreational/Music Therapy    [facility] [place] [address] ACUTE BEHAV [facility]    [place], [address] [facility] GROUP DOCUMENTATION GROUP    Group Therapy Note    Attendees: 16       Attendance: Attended    Patient's Goal:  To concentrate on selected task    Interventions/techniques: Supported-crafts,games,music    Interactions: Minimal    Mental Status: Flat    Behavior/appearance: Cooperative and Needed prompting    Goals Achieved:  Attended group session-listened to [address]

## 2022-11-11 NOTE — PROGRESS NOTES
Received patient lying on the bed. Pt is edematous,  dull affect and depressed mood. Pt assisted  to the bathroom  to shower. Bed linen and gown changed. On assessment, pt endorse anxiety  at 9/10, depression at 7/10, SI,and AVH. Pt said the voices are telling her to hurt self, but patient contracts for safety while in the unit. Pt remain calm and cooperative, med's and meals compliant. PRN trazodone administered. No acute change in condition. Vital signs entered. Rounding in progress. Pt slept for total of 8 hours.

## 2022-11-11 NOTE — BH NOTES
PSYCHIATRIC PROGRESS NOTE         Patient Name  Kelly Sapp   Date of Birth 1965   [facility] [id number]   Medical Record Number  [id number]      Age  59 y.o. PCP Annamaria Perkins MD   Admit date:  11/9/2022    Room Number  314/01  @ [facility]   Date of Service  11/11/2022         E & M PROGRESS NOTE:         HISTORY       CC:  \"suicidal ideation\"  HISTORY OF PRESENT ILLNESS/INTERVAL HISTORY:  (reviewed/updated 11/11/2022). per initial evaluation: The patient, Kelly Sapp, is a 59 y.o. WHITE/NON- female with a past psychiatric history significant for schizoaffective disorder, who presents at this time with complaints of (and/or evidence of) the following emotional symptoms: depression and suicidal thoughts/threats. Additional symptomatology include poor self care. The above symptoms have been present for 2+ weeks. These symptoms are of moderate to high severity. These symptoms are constant in nature. The patient's condition has been precipitated by psychosocial stressors. Patient's condition made worse by treatment noncompliance. UDS: negative; BAL=0. The patient is well known to the unit; she comes into the hospital frequently in crisis typically stating her 's death several years ago has made her feel hopeless. She has been living in a group home though this admission she states that she was kicked out of her group home because of an episode of incontinence. The patient is a poor historian. The patient corroborates the above narrative. The patient contracts for safety on the unit and gives consent for the team to contact collateral. The patient is amenable to initiating treatment while on the unit. She defers much of the team's inquires to her payee. 11/11 - no acute overnight events. The patient has been isolative to her room with poor ADLs. incontinent of urine but able to void when taken to the bathroom.  She denies active thoughts of self harm but is markedly internally preoccupied. Patient slept 8 hours overnight and got Trazodone. She denies active thoughts of self harm but endorses both depressed mood and anxiety. Isa Mcintosh SIDE EFFECTS: (reviewed/updated 11/11/2022)  None reported or admitted to. No noted toxicity with use of Depakote   ALLERGIES:(reviewed/updated 11/11/2022)  Allergies   Allergen Reactions    Amoxicillin Hives    Mushroom Flavor Hives    Tomato Hives      REVIEW OF SYSTEMS: (reviewed/updated 11/11/2022)  Appetite:improved   Sleep: no change   All other Review of Systems: Negative except incontinence per HPI         [facility] (MSE):    MSE FINDINGS ARE WITHIN NORMAL LIMITS (WNL) UNLESS OTHERWISE STATED BELOW. ( ALL OF THE BELOW CATEGORIES OF THE MSE HAVE BEEN REVIEWED (reviewed 11/11/2022) AND UPDATED AS DEEMED APPROPRIATE )  General Presentation age appropriate, cooperative and guarded   Orientation disorganized   Vital Signs  See below (reviewed 11/11/2022); Vital Signs (BP, Pulse, & Temp) are within normal limits if not listed below.    Gait and Station Stable/steady, no ataxia   Musculoskeletal System No extrapyramidal symptoms (EPS); no abnormal muscular movements or Tardive Dyskinesia (TD); muscle strength and tone are within normal limits   Language No aphasia or dysarthria   Speech:  normal volume and non-pressured   Thought Processes concrete; normal rate of thoughts; poor abstract reasoning/computation   Thought Associations blocked    Thought Content preoccupations   Suicidal Ideations contracts for safety   Homicidal Ideations contracts for safety   Mood:  depressed and anhedonia   Affect:  flat   Memory recent  intact   Memory remote:  intact   Concentration/Attention:  intact   Fund of Knowledge average   Insight:  limited   Reliability fair   Judgment:  limited          VITALS:     Patient Vitals for the past 24 hrs:   Temp Pulse Resp BP SpO2   11/11/22 0823 97.8 °F (36.6 °C) 69 14 (!) 110/56 95 %   11/10/22 2009 98.7 °F (37.1 °C) 82 16 102/62 95 %     Wt Readings from Last 3 Encounters:   11/09/22 115.7 kg (255 lb)   06/03/22 113.9 kg (251 lb)   05/14/22 88.5 kg (195 lb)     Temp Readings from Last 3 Encounters:   11/11/22 97.8 °F (36.6 °C)   06/03/22 97.6 °F (36.4 °C)   05/19/22 98.5 °F (36.9 °C)     BP Readings from Last 3 Encounters:   11/11/22 (!) 110/56   06/03/22 130/69   05/19/22 (!) 112/51     Pulse Readings from Last 3 Encounters:   11/11/22 69   06/03/22 89   05/19/22 64            DATA     LABORATORY DATA:(reviewed/updated 11/11/2022)  No results found for this or any previous visit (from the past 24 hour(s)). No results found for: VALF2, VALAC, VALP, VALPR, DS6, CRBAM, CRBAMP, CARB2, XCRBAM  No results found for: LITHM   RADIOLOGY REPORTS:(reviewed/updated 11/11/2022)  No results found.        MEDICATIONS     ALL MEDICATIONS:   Current Facility-Administered Medications   Medication Dose Route Frequency    benzocaine-menthoL (CHLORASEPTIC MAX) lozenge 1 Lozenge  1 Lozenge Oral Q2H PRN    OLANZapine (ZyPREXA) tablet 5 mg  5 mg Oral Q6H PRN    haloperidol lactate (HALDOL) injection 5 mg  5 mg IntraMUSCular Q6H PRN    benztropine (COGENTIN) tablet 1 mg  1 mg Oral BID PRN    diphenhydrAMINE (BENADRYL) injection 50 mg  50 mg IntraMUSCular BID PRN    hydrOXYzine HCL (ATARAX) tablet 50 mg  50 mg Oral TID PRN    LORazepam (ATIVAN) injection 1 mg  1 mg IntraMUSCular Q4H PRN    traZODone (DESYREL) tablet 50 mg  50 mg Oral QHS PRN    acetaminophen (TYLENOL) tablet 650 mg  650 mg Oral Q4H PRN    magnesium hydroxide (MILK OF MAGNESIA) 400 mg/5 mL oral suspension 30 mL  30 mL Oral DAILY PRN    atorvastatin (LIPITOR) tablet 40 mg  40 mg Oral QPM    gabapentin (NEURONTIN) capsule 100 mg  100 mg Oral TID    insulin glargine (LANTUS) injection 20 Units  20 Units SubCUTAneous DAILY    metFORMIN ER (GLUCOPHAGE XR) tablet 1,000 mg  1,000 mg Oral BID WITH MEALS    oxybutynin chloride XL (DITROPAN XL) tablet 5 mg  5 mg Oral DAILY    risperiDONE (RisperDAL) tablet 1 mg  1 mg Oral DAILY    risperiDONE (RisperDAL) tablet 2 mg  2 mg Oral QHS    sertraline (ZOLOFT) tablet 100 mg  100 mg Oral DAILY      SCHEDULED MEDICATIONS:   Current Facility-Administered Medications   Medication Dose Route Frequency    atorvastatin (LIPITOR) tablet 40 mg  40 mg Oral QPM    gabapentin (NEURONTIN) capsule 100 mg  100 mg Oral TID    insulin glargine (LANTUS) injection 20 Units  20 Units SubCUTAneous DAILY    metFORMIN ER (GLUCOPHAGE XR) tablet 1,000 mg  1,000 mg Oral BID WITH MEALS    oxybutynin chloride XL (DITROPAN XL) tablet 5 mg  5 mg Oral DAILY    risperiDONE (RisperDAL) tablet 1 mg  1 mg Oral DAILY    risperiDONE (RisperDAL) tablet 2 mg  2 mg Oral QHS    sertraline (ZOLOFT) tablet 100 mg  100 mg Oral DAILY          ASSESSMENT & PLAN     DIAGNOSES REQUIRING ACTIVE TREATMENT AND MONITORING: (reviewed/updated 11/11/2022)  Patient Active Hospital Problem List:       Schizoaffective disorder (11/10/2022)           Assessment: the patient presents in crisis after potentially leaving her group home; she is disorganized and a poor historian at baseline, treatment will be primarily psychosocial.         Plan:   - CONTINUE Risperdal 1 mg QDAY and 2 mg QHS for psychosis  - CONTINUE Zoloft 100 mg QDAY for MDD  - Consult to DM team  - IGM therapy as tolerated  - Expand database / obtain collateral  - Dispo planning    In summary, Eleuterio David, is a 59 y.o.  female who presents with a severe exacerbation of the principal diagnosis of Schizoaffective disorder ([facility]    Patient's condition is not improving. Patient requires continued inpatient hospitalization for further stabilization, safety monitoring and medication management. I will continue to coordinate the provision of individual, milieu, occupational, group, and substance abuse therapies to address target symptoms/diagnoses as deemed appropriate for the individual patient.   A coordinated, multidisplinary treatment team round was conducted with the patient (this team consists of the nurse, psychiatric unit pharmacist,  and writer). Complete current electronic health record for patient has been reviewed today including consultant notes, ancillary staff notes, nurses and psychiatric tech notes. Suicide risk assessment completed and patient deemed to be of low risk for suicide at this time. The following regarding medications was addressed during rounds with patient:   the risks and benefits of the proposed medication. The patient was given the opportunity to ask questions. Informed consent given to the use of the above medications. Will continue to adjust psychiatric and non-psychiatric medications (see above \"medication\" section and orders section for details) as deemed appropriate & based upon diagnoses and response to treatment. I will continue to order blood tests/labs and diagnostic tests as deemed appropriate and review results as they become available (see orders for details and above listed lab/test results). I will order psychiatric records from previous [facility] hospitals to further elucidate the nature of patient's psychopathology and review once available. I will gather additional collateral information from friends, family and o/p treatment team to further elucidate the nature of patient's psychopathology and baselline level of psychiatric functioning. I certify that this patient's inpatient psychiatric hospital services furnished since the previous certification were, and continue to be, required for treatment that could reasonably be expected to improve the patient's condition, or for diagnostic study, and that the patient continues to need, on a daily basis, active treatment furnished directly by or requiring the supervision of inpatient psychiatric facility personnel.  In addition, the hospital records show that services furnished were intensive treatment services, admission or related services, or equivalent services.     EXPECTED DISCHARGE DATE/DAY: TBD     DISPOSITION: Home       Signed By:   Maris Austin MD  11/11/2022

## 2022-11-11 NOTE — GROUP NOTE
[facility] GROUP DOCUMENTATION INDIVIDUAL                                                                          Group Therapy Note    Date: 11/11/2022    Group Start Time: 0900  Group End Time: 1000  Group Topic: Topic Group    [facility] [place] [address] ACUTE BEHAV [facility]    [place], [address] [facility] GROUP DOCUMENTATION GROUP    Group Therapy Note    Attendees: 6       Attendance: Did not attend      [facility]

## 2022-11-11 NOTE — BH NOTES
Radiology on site to perform chest xray. Spoke with nursing supervisor Yusef Young RN. Per August Motes routine Lower Extremity Venous Duplex and Echo will not be completed until Monday and Dr Chichi Doyle aware. BNP to be drawn shortly.

## 2022-11-11 NOTE — CONSULTS
Hospitalist Consultation Note    NAME:  Ernesto Fernandez   :   1965   MRN:   [id number]   Room Number: 282/07  @ [facility]     ATTENDING: Stefany Hernandez MD  PCP:  Sonal Walsh MD    Date/Time:  2022 5:08 PM    Please note that this dictation was completed with WISETIVI, the computer voice recognition software. Quite often unanticipated grammatical, syntax, homophones, and other interpretive errors are inadvertently transcribed by the computer software. Please disregard these errors. Please excuse any errors that have escaped final proofreading. Recommendations/Plan:       Principal Problem:    Schizoaffective disorder ([facility] (2016)         #Shortness of breath POA  #Lower extremity edema POA  #History of COPD  - Albumin 2.8   - UA w/ protein Trace amount    -Chest x-ray and BNP  -Albuterol as needed and Symbicort twice daily  -Duplex and TTE to rule out heart failure and DVT  -Low-sodium diet fluid restriction  -Ins and outs Daily weight      #Insulin-dependent diabetes mellitus  -A1c 8  -Lantus 8 units  -Lispro 5 units with meals   - Lispro correctional scale, FSG AC HS  - Consistent carb diet, hypoglycemia protocol. Subjective:   REQUESTING PHYSICIAN:  REASON FOR CONSULT:      Mayito Mejia is a 59 y.o.  female who I was asked to see for leg edema and shortness of breath. Patient states that she has history of COPD and is ex-smoker. Patient also is a diabetic. Patient says she has been endorsed extremity swelling while in the hospital along with lower extremity pain. Patient endorsed shortness of breath and history of stroke. Denied any history of heart attack.   Endorsed marijuana use and tobacco use            Past Medical History:   Diagnosis Date    Arthritis     legs    Bipolar 1 disorder ([facility]     COPD     Depression 2012    Di[facility] ([facility]     Drug abuse ([facility]     cocaine, stimulants, etoh, mj, tob    H/O suicide attempt Hypertension     Obesity     Polydrug dependence excluding opioid type drug, abuse ([facility] [address] [facility]     PTSD (post-traumatic stress disorder) 12/30/2021    Schizophrenia ([facility] 7/8/2016      Past Surgical History:   Procedure Laterality Date    HX ORTHOPAEDIC      foot sx     Social History     Tobacco Use    Smoking status: Every Day     Packs/day: 1.00     Years: 10.00     Pack years: 10.00     Types: Cigarettes    Smokeless tobacco: Current   Substance Use Topics    Alcohol use: No     Alcohol/week: 0.0 standard drinks     Comment: Hx of ETOH abuse since age 13. no DUIs. Family History   Problem Relation Age of Onset    Diabetes Mother     Stroke Mother     Stroke Father        Allergies   Allergen Reactions    Amoxicillin Hives    Mushroom Flavor Hives    Tomato Hives      Prior to Admission medications    Medication Sig Start Date End Date Taking? Authorizing Provider   risperiDONE (RisperDAL) 1 mg tablet Take 1 mg by mouth daily. Provider, Historical   risperiDONE (RisperDAL) 2 mg tablet Take 2 mg by mouth nightly. Provider, Historical   sertraline (ZOLOFT) 100 mg tablet Take 100 mg by mouth daily. Provider, Historical   gabapentin (NEURONTIN) 100 mg capsule Take 100 mg by mouth three (3) times daily. Provider, Historical   insulin glargine (LANTUS) 100 unit/mL injection 20 Units by SubCUTAneous route daily. Provider, Historical   atorvastatin (LIPITOR) 40 mg tablet Take 1 Tablet by mouth every evening. Indications: Cholesterol 5/19/22   Jennifer Church MD   glipiZIDE (GLUCOTROL) 10 mg tablet Take 1 Tablet by mouth Daily (before breakfast). Indications: type 2 diabetes mellitus 5/19/22   Jennifer Church MD   metFORMIN ER (GLUCOPHAGE XR) 500 mg tablet Take 2 Tablets by mouth two (2) times daily (with meals). 5/19/22   Jennifer Church MD   oxybutynin chloride XL (DITROPAN XL) 5 mg CR tablet Take 1 Tablet by mouth daily.  Indications: overactive bladder 5/19/22   Jennifer Church MD   linaGLIPtin (Tradjenta) 5 mg tablet Take 5 mg by mouth daily. Indications: type 2 diabetes mellitus    Provider, Historical       REVIEW OF SYSTEMS:     Total of 12 systems reviewed as follows:   I am not able to complete the review of systems because: The patient is intubated and sedated    The patient has altered mental status due to his acute medical problems    The patient has baseline aphasia from prior stroke(s)    The patient has baseline dementia and is not reliable historian                 POSITIVE= underlined text  Negative = text not underlined  General:  fever, chills, sweats, generalized weakness, weight loss/gain,      loss of appetite   Eyes:    blurred vision, eye pain, loss of vision, double vision  ENT:    rhinorrhea, pharyngitis   Respiratory:   cough, sputum production, SOB, wheezing, MILLER, pleuritic pain   Cardiology:   chest pain, palpitations, orthopnea, PND, edema, syncope   Gastrointestinal:  abdominal pain , N/V, dysphagia, diarrhea, constipation, bleeding   Genitourinary:  frequency, urgency, dysuria, hematuria, incontinence   Muskuloskeletal :  arthralgia, myalgia   Hematology:  easy bruising, nose or gum bleeding, lymphadenopathy   Dermatological: rash, ulceration, pruritis   Endocrine:   hot flashes or polydipsia   Neurological:  headache, dizziness, confusion, focal weakness, paresthesia,     Speech difficulties, memory loss, gait disturbance  Psychological: Feelings of anxiety, depression, agitation    Objective:   VITALS:    Visit Vitals  BP (!) 110/56 (BP Patient Position: Lying left side)   Pulse 69   Temp 97.8 °F (36.6 °C)   Resp 14   Ht 5' 5\" (1.651 m)   Wt 115.7 kg (255 lb)   SpO2 95%   BMI 42.43 kg/m²     Temp (24hrs), Av.3 °F (36.8 °C), Min:97.8 °F (36.6 °C), Max:98.7 °F (37.1 °C)      PHYSICAL EXAM:   General:    Alert, cooperative, no distress, appears stated age.      HEENT: Atraumatic, anicteric sclerae, pink conjunctivae     No oral ulcers, mucosa moist, throat clear  Neck:  Supple, symmetrical, thyroid: non tender  Lungs:   Clear to auscultation bilaterally. No Wheezing or Rhonchi. No rales. Chest wall:  No tenderness  No Accessory muscle use. Heart:   Regular  rhythm,  No  murmur   No edema  Abdomen:   Soft, non-tender. Not distended. Bowel sounds normal  Extremities: No cyanosis. No clubbing  Skin:     Not pale. Not Jaundiced  No rashes   Psych:  . Not anxious or agitated. Neurologic: EOMs intact. No facial asymmetry. No aphasia or slurred speech. Symmetrical strength, Alert and oriented X 4.     _______________________________________________________________________  Care Plan discussed with:    Comments   Patient x    Family      RN x    Care Manager                    Consultant:  x    ____________________________________________________________________  TOTAL TIME:     35 mins    Comments    x Reviewed previous records   >50% of visit spent in counseling and coordination of care x Discussion with patient and/or family and questions answered       Critical Care Provided     Minutes non procedure based  ________________________________________________________________________  Signed: Francisco Aceves MD      Procedures: see electronic medical records for all procedures/Xrays and details which were not copied into this note but were reviewed prior to creation of Plan.     LAB DATA REVIEWED:    Recent Results (from the past 24 hour(s))   GLUCOSE, POC    Collection Time: 11/11/22  4:30 PM   Result Value Ref Range    Glucose (POC) 397 (H) 65 - 117 mg/dL    Performed by Kerline Sanford (LILIA RN)        _____________________________  Hospitalist: Francisco Aceves MD

## 2022-11-11 NOTE — BH NOTES
Pt received in bed. Lethargic and intermittently sleeping for most of shift. Denies SI/HI/AVH, anxiety and depression. Observed in day room for short period of time. Pleasant and cooperative on approach. Sliding scale for insulin initiated this evening - blood sugar 397, received 9 units. Pt c/o SOB, VSS, O2 95%. Pt extremities visibly edematous, assessed by hospitalist. Echo, bilateral duplex, and chest x-ray ordered as well as one-time BNP level. Chest-xray completed, echo and duplex cannot be done until Monday, hospitalist aware. BNP collected and sent to lab. Pt given Tylenol for \"overall aches\". Will continue to monitor.

## 2022-11-11 NOTE — PROGRESS NOTES
Laboratory monitoring for mood stabilizer and antipsychotics:    Recommended baseline monitoring has been completed based on this patient's current medication regimen. The patient is currently taking the following medication(s):   Current Facility-Administered Medications   Medication Dose Route Frequency    atorvastatin (LIPITOR) tablet 40 mg  40 mg Oral QPM    gabapentin (NEURONTIN) capsule 100 mg  100 mg Oral TID    insulin glargine (LANTUS) injection 20 Units  20 Units SubCUTAneous DAILY    metFORMIN ER (GLUCOPHAGE XR) tablet 1,000 mg  1,000 mg Oral BID WITH MEALS    oxybutynin chloride XL (DITROPAN XL) tablet 5 mg  5 mg Oral DAILY    risperiDONE (RisperDAL) tablet 1 mg  1 mg Oral DAILY    risperiDONE (RisperDAL) tablet 2 mg  2 mg Oral QHS    sertraline (ZOLOFT) tablet 100 mg  100 mg Oral DAILY     Height, Weight, BMI Estimation  Estimated body mass index is 42.43 kg/m² as calculated from the following:    Height as of this encounter: 165.1 cm (65\"). Weight as of this encounter: 115.7 kg (255 lb). Renal Function, Hepatic Function and Chemistry  Estimated Creatinine Clearance: 76.2 mL/min (based on SCr of 1.01 mg/dL). Lab Results   Component Value Date/Time    Sodium 137 11/09/2022 11:52 PM    Potassium 3.9 11/09/2022 11:52 PM    Chloride 99 11/09/2022 11:52 PM    CO2 30 11/09/2022 11:52 PM    Anion gap 8 11/09/2022 11:52 PM    Glucose 267 (H) 11/09/2022 11:52 PM    Glucose 218 (H) 09/22/2021 05:32 AM    BUN 13 11/09/2022 11:52 PM    Creatinine 1.01 11/09/2022 11:52 PM    BUN/Creatinine ratio 13 11/09/2022 11:52 PM    GFR est AA >60 05/16/2022 06:12 AM    GFR est non-AA >60 05/16/2022 06:12 AM    Calcium 8.4 (L) 11/09/2022 11:52 PM    ALT (SGPT) 33 11/09/2022 11:52 PM    Alk.  phosphatase 139 (H) 11/09/2022 11:52 PM    Protein, total 6.8 11/09/2022 11:52 PM    Albumin 2.8 (L) 11/09/2022 11:52 PM    Globulin 4.0 11/09/2022 11:52 PM    A-G Ratio 0.7 (L) 11/09/2022 11:52 PM    Bilirubin, total 0.2 11/09/2022 11:52 PM       Lab Results   Component Value Date/Time    Glucose 267 (H) 11/09/2022 11:52 PM    Glucose 218 (H) 09/22/2021 05:32 AM    Glucose (POC) 230 (H) 11/10/2022 05:39 AM       Lab Results   Component Value Date/Time    Hemoglobin A1c 8.1 (H) 05/16/2022 06:12 AM       Hematology  Lab Results   Component Value Date/Time    WBC 9.9 11/09/2022 11:52 PM    Hemoglobin (POC) 12.2 01/12/2012 10:40 PM    HGB 12.1 11/09/2022 11:52 PM    Hematocrit (POC) 36 01/12/2012 10:40 PM    HCT 39.0 11/09/2022 11:52 PM    PLATELET 563 (H) 72/59/5450 11:52 PM    MCV 80.1 11/09/2022 11:52 PM       Lipids  Lab Results   Component Value Date/Time    Cholesterol, total 130 09/22/2021 05:32 AM    HDL Cholesterol 32 09/22/2021 05:32 AM    LDL, calculated 49.8 09/22/2021 05:32 AM    Triglyceride 241 (H) 09/22/2021 05:32 AM    CHOL/HDL Ratio 4.1 09/22/2021 05:32 AM       Thyroid Function    Lab Results   Component Value Date/Time    TSH 2.19 09/22/2021 05:32 AM     Vitals  Visit Vitals  BP (!) 110/56 (BP Patient Position: Lying left side)   Pulse 69   Temp 97.8 °F (36.6 °C)   Resp 14   Ht 165.1 cm (65\")   Wt 115.7 kg (255 lb)   SpO2 95%   BMI 42.43 kg/m²       Pregnancy Test  Lab Results   Component Value Date/Time    HCG urine, QL NEGATIVE 07/08/2016 02:10 AM    Pregnancy test,urine (POC) NEGATIVE 08/02/2017 06:09 PM    HCG, Ql. NEGATIVE 07/08/2016 12:31 AM       Ajay Roberts, [address] (pharmacy)

## 2022-11-12 LAB
GLUCOSE BLD STRIP.AUTO-MCNC: 201 MG/DL (ref 65–117)
GLUCOSE BLD STRIP.AUTO-MCNC: 243 MG/DL (ref 65–117)
GLUCOSE BLD STRIP.AUTO-MCNC: 245 MG/DL (ref 65–117)
GLUCOSE BLD STRIP.AUTO-MCNC: 265 MG/DL (ref 65–117)
SERVICE CMNT-IMP: ABNORMAL

## 2022-11-12 PROCEDURE — 65220000003 HC RM SEMIPRIVATE PSYCH

## 2022-11-12 PROCEDURE — 74011636637 HC RX REV CODE- 636/637: Performed by: STUDENT IN AN ORGANIZED HEALTH CARE EDUCATION/TRAINING PROGRAM

## 2022-11-12 PROCEDURE — 74011250637 HC RX REV CODE- 250/637

## 2022-11-12 PROCEDURE — 74011250637 HC RX REV CODE- 250/637: Performed by: PSYCHIATRY & NEUROLOGY

## 2022-11-12 PROCEDURE — 74011250637 HC RX REV CODE- 250/637: Performed by: STUDENT IN AN ORGANIZED HEALTH CARE EDUCATION/TRAINING PROGRAM

## 2022-11-12 PROCEDURE — 82962 GLUCOSE BLOOD TEST: CPT

## 2022-11-12 PROCEDURE — 74011250637 HC RX REV CODE- 250/637: Performed by: NURSE PRACTITIONER

## 2022-11-12 RX ORDER — FUROSEMIDE 40 MG/1
40 TABLET ORAL
Status: COMPLETED | OUTPATIENT
Start: 2022-11-12 | End: 2022-11-12

## 2022-11-12 RX ORDER — FUROSEMIDE 20 MG/1
20 TABLET ORAL DAILY
Status: COMPLETED | OUTPATIENT
Start: 2022-11-13 | End: 2022-11-13

## 2022-11-12 RX ORDER — FUROSEMIDE 20 MG/1
20 TABLET ORAL DAILY
Status: DISCONTINUED | OUTPATIENT
Start: 2022-11-13 | End: 2022-11-12

## 2022-11-12 RX ADMIN — ATORVASTATIN CALCIUM 40 MG: 40 TABLET, FILM COATED ORAL at 18:00

## 2022-11-12 RX ADMIN — FUROSEMIDE 40 MG: 40 TABLET ORAL at 11:07

## 2022-11-12 RX ADMIN — TRAZODONE HYDROCHLORIDE 50 MG: 50 TABLET ORAL at 21:38

## 2022-11-12 RX ADMIN — GABAPENTIN 100 MG: 100 CAPSULE ORAL at 16:13

## 2022-11-12 RX ADMIN — BUDESONIDE AND FORMOTEROL FUMARATE DIHYDRATE 2 PUFF: 80; 4.5 AEROSOL RESPIRATORY (INHALATION) at 21:38

## 2022-11-12 RX ADMIN — Medication 2 UNITS: at 21:39

## 2022-11-12 RX ADMIN — Medication 5 UNITS: at 16:12

## 2022-11-12 RX ADMIN — GABAPENTIN 100 MG: 100 CAPSULE ORAL at 07:49

## 2022-11-12 RX ADMIN — HYDROXYZINE HYDROCHLORIDE 50 MG: 25 TABLET, FILM COATED ORAL at 21:38

## 2022-11-12 RX ADMIN — OXYBUTYNIN CHLORIDE 5 MG: 5 TABLET, EXTENDED RELEASE ORAL at 07:49

## 2022-11-12 RX ADMIN — METFORMIN HYDROCHLORIDE 1000 MG: 500 TABLET, EXTENDED RELEASE ORAL at 07:49

## 2022-11-12 RX ADMIN — RISPERIDONE 2 MG: 1 TABLET ORAL at 21:38

## 2022-11-12 RX ADMIN — METFORMIN HYDROCHLORIDE 1000 MG: 500 TABLET, EXTENDED RELEASE ORAL at 16:16

## 2022-11-12 RX ADMIN — BUDESONIDE AND FORMOTEROL FUMARATE DIHYDRATE 2 PUFF: 80; 4.5 AEROSOL RESPIRATORY (INHALATION) at 08:00

## 2022-11-12 RX ADMIN — Medication 25 UNITS: at 07:50

## 2022-11-12 RX ADMIN — RISPERIDONE 1 MG: 1 TABLET ORAL at 07:49

## 2022-11-12 RX ADMIN — Medication 5 UNITS: at 11:06

## 2022-11-12 RX ADMIN — Medication 3 UNITS: at 16:12

## 2022-11-12 RX ADMIN — Medication 5 UNITS: at 11:03

## 2022-11-12 RX ADMIN — GABAPENTIN 100 MG: 100 CAPSULE ORAL at 11:05

## 2022-11-12 RX ADMIN — SERTRALINE HYDROCHLORIDE 100 MG: 50 TABLET ORAL at 07:49

## 2022-11-12 RX ADMIN — Medication 3 UNITS: at 07:50

## 2022-11-12 RX ADMIN — Medication 5 UNITS: at 07:49

## 2022-11-12 NOTE — PROGRESS NOTES
Mary Gonzalez denied SI, HI, AVH and pain. She c/o cough and recieeved lozenge which was effective. She was incontinent of urine while resting in bed and gown and linen change performed. She appears to be sleeping well. She was given evening dose of sliding scale insulin 4 units Humalog for hs accucheck of 330.

## 2022-11-12 NOTE — PROGRESS NOTES
Problem: Falls - Risk of  Goal: *Absence of Falls  Description: Document Dennie Lenin Fall Risk and appropriate interventions in the flowsheet.   Outcome: Progressing Towards Goal  Note: Fall Risk Interventions:

## 2022-11-12 NOTE — PROGRESS NOTES
Problem: Falls - Risk of  Goal: *Absence of Falls  Description: Document Eulalia Cecy Fall Risk and appropriate interventions in the flowsheet.   Outcome: Progressing Towards Goal  Note: Fall Risk Interventions:                                Problem: Patient Education: Go to Patient Education Activity  Goal: Patient/Family Education  Outcome: Progressing Towards Goal     Problem: Suicide  Goal: *STG: Remains safe in hospital  Outcome: Progressing Towards Goal  Goal: *STG/LTG: Complies with medication therapy  Outcome: Progressing Towards Goal

## 2022-11-13 LAB
GLUCOSE BLD STRIP.AUTO-MCNC: 181 MG/DL (ref 65–117)
GLUCOSE BLD STRIP.AUTO-MCNC: 221 MG/DL (ref 65–117)
GLUCOSE BLD STRIP.AUTO-MCNC: 232 MG/DL (ref 65–117)
GLUCOSE BLD STRIP.AUTO-MCNC: 321 MG/DL (ref 65–117)
SERVICE CMNT-IMP: ABNORMAL

## 2022-11-13 PROCEDURE — 74011250637 HC RX REV CODE- 250/637: Performed by: PSYCHIATRY & NEUROLOGY

## 2022-11-13 PROCEDURE — 74011636637 HC RX REV CODE- 636/637: Performed by: STUDENT IN AN ORGANIZED HEALTH CARE EDUCATION/TRAINING PROGRAM

## 2022-11-13 PROCEDURE — 74011250637 HC RX REV CODE- 250/637: Performed by: STUDENT IN AN ORGANIZED HEALTH CARE EDUCATION/TRAINING PROGRAM

## 2022-11-13 PROCEDURE — 82962 GLUCOSE BLOOD TEST: CPT

## 2022-11-13 PROCEDURE — 65220000003 HC RM SEMIPRIVATE PSYCH

## 2022-11-13 PROCEDURE — 74011250637 HC RX REV CODE- 250/637: Performed by: NURSE PRACTITIONER

## 2022-11-13 RX ADMIN — FUROSEMIDE 20 MG: 20 TABLET ORAL at 08:31

## 2022-11-13 RX ADMIN — OXYBUTYNIN CHLORIDE 5 MG: 5 TABLET, EXTENDED RELEASE ORAL at 08:30

## 2022-11-13 RX ADMIN — GABAPENTIN 100 MG: 100 CAPSULE ORAL at 17:40

## 2022-11-13 RX ADMIN — Medication 3 UNITS: at 11:48

## 2022-11-13 RX ADMIN — BUDESONIDE AND FORMOTEROL FUMARATE DIHYDRATE 2 PUFF: 80; 4.5 AEROSOL RESPIRATORY (INHALATION) at 21:20

## 2022-11-13 RX ADMIN — Medication 5 UNITS: at 08:32

## 2022-11-13 RX ADMIN — Medication 25 UNITS: at 08:31

## 2022-11-13 RX ADMIN — ATORVASTATIN CALCIUM 40 MG: 40 TABLET, FILM COATED ORAL at 17:47

## 2022-11-13 RX ADMIN — GABAPENTIN 100 MG: 100 CAPSULE ORAL at 11:48

## 2022-11-13 RX ADMIN — METFORMIN HYDROCHLORIDE 1000 MG: 500 TABLET, EXTENDED RELEASE ORAL at 08:30

## 2022-11-13 RX ADMIN — Medication 2 UNITS: at 21:25

## 2022-11-13 RX ADMIN — Medication 5 UNITS: at 11:48

## 2022-11-13 RX ADMIN — Medication 5 UNITS: at 17:40

## 2022-11-13 RX ADMIN — GABAPENTIN 100 MG: 100 CAPSULE ORAL at 08:26

## 2022-11-13 RX ADMIN — BUDESONIDE AND FORMOTEROL FUMARATE DIHYDRATE 2 PUFF: 80; 4.5 AEROSOL RESPIRATORY (INHALATION) at 08:26

## 2022-11-13 RX ADMIN — RISPERIDONE 2 MG: 1 TABLET ORAL at 21:20

## 2022-11-13 RX ADMIN — SERTRALINE HYDROCHLORIDE 100 MG: 50 TABLET ORAL at 08:26

## 2022-11-13 RX ADMIN — Medication 7 UNITS: at 17:40

## 2022-11-13 RX ADMIN — Medication 2 UNITS: at 08:32

## 2022-11-13 RX ADMIN — METFORMIN HYDROCHLORIDE 1000 MG: 500 TABLET, EXTENDED RELEASE ORAL at 17:40

## 2022-11-13 RX ADMIN — RISPERIDONE 1 MG: 1 TABLET ORAL at 08:30

## 2022-11-13 NOTE — BH NOTES
PSYCHIATRIC PROGRESS NOTE            Patient Name  Andrew Nelson   Date of Birth 1965   [facility] [id number]   Medical Record Number  [id number]       Age  59 y.o. PCP Harjeet Barragan MD   Admit date:  11/9/2022    Room Number  314/01  @ [facility]   Date of Service  11/13/2022         E & M PROGRESS NOTE:            HISTORY       CC:  \"suicidal ideation\"  HISTORY OF PRESENT ILLNESS/INTERVAL HISTORY:  (reviewed/updated 11/11/2022). per initial evaluation: The patient, Andrew Nelson, is a 59 y.o. WHITE/NON- female with a past psychiatric history significant for schizoaffective disorder, who presents at this time with complaints of (and/or evidence of) the following emotional symptoms: depression and suicidal thoughts/threats. Additional symptomatology include poor self care. The above symptoms have been present for 2+ weeks. These symptoms are of moderate to high severity. These symptoms are constant in nature. The patient's condition has been precipitated by psychosocial stressors. Patient's condition made worse by treatment noncompliance. UDS: negative; BAL=0. The patient is well known to the unit; she comes into the hospital frequently in crisis typically stating her 's death several years ago has made her feel hopeless. She has been living in a group home though this admission she states that she was kicked out of her group home because of an episode of incontinence. The patient is a poor historian. The patient corroborates the above narrative. The patient contracts for safety on the unit and gives consent for the team to contact collateral. The patient is amenable to initiating treatment while on the unit. She defers much of the team's inquires to her payee. 11/11 - no acute overnight events. The patient has been isolative to her room with poor ADLs. incontinent of urine but able to void when taken to the bathroom.  She denies active thoughts of self harm but is markedly internally preoccupied. Patient slept 8 hours overnight and got Trazodone. She denies active thoughts of self harm but endorses both depressed mood and anxiety. .     11/12- Pt endorses ongoing SI, but denies plan/means/intent. She states that she is able to CFS and will speak to staff if the thoughts get worse. She has been isolative to her room. She endorses ongoing edema in her bilateral legs. Hospitalist was consulted and recommendations given. She denies HI/AVH currently. 11/13- Pt endorses ongoing SI. She denies plan/means/intent in the hospital, but states that she would cut herself if she had a knife. She denies HI/AVH currently. She endorses eating and sleeping well. She denies any side effects from the medications. SIDE EFFECTS: (reviewed/updated 11/11/2022)  None reported or admitted to. No noted toxicity with use of Depakote   ALLERGIES:(reviewed/updated 11/11/2022)          Allergies   Allergen Reactions    Amoxicillin Hives    Mushroom Flavor Hives    Tomato Hives       REVIEW OF SYSTEMS: (reviewed/updated 11/11/2022)  Appetite:improved   Sleep: no change   All other Review of Systems: Negative except incontinence per HPI            [facility] (MSE):    MSE FINDINGS ARE WITHIN NORMAL LIMITS (WNL) UNLESS OTHERWISE STATED BELOW. ( ALL OF THE BELOW CATEGORIES OF THE MSE HAVE BEEN REVIEWED (reviewed 11/11/2022) AND UPDATED AS DEEMED APPROPRIATE )  General Presentation age appropriate, cooperative and guarded   Orientation disorganized   Vital Signs  See below (reviewed 11/11/2022); Vital Signs (BP, Pulse, & Temp) are within normal limits if not listed below.    Gait and Station Stable/steady, no ataxia   Musculoskeletal System No extrapyramidal symptoms (EPS); no abnormal muscular movements or Tardive Dyskinesia (TD); muscle strength and tone are within normal limits   Language No aphasia or dysarthria   Speech:  normal volume and non-pressured   Thought Processes concrete; normal rate of thoughts; poor abstract reasoning/computation   Thought Associations blocked    Thought Content preoccupations   Suicidal Ideations contracts for safety   Homicidal Ideations contracts for safety   Mood:  depressed and anhedonia   Affect:  flat   Memory recent  intact   Memory remote:  intact   Concentration/Attention:  intact   Fund of Knowledge average   Insight:  limited   Reliability fair   Judgment:  limited            VITALS:     Patient Vitals for the past 24 hrs:    Temp Pulse Resp BP SpO2   11/11/22 0823 97.8 °F (36.6 °C) 69 14 (!) 110/56 95 %   11/10/22 2009 98.7 °F (37.1 °C) 82 16 102/62 95 %            Wt Readings from Last 3 Encounters:   11/09/22 115.7 kg (255 lb)   06/03/22 113.9 kg (251 lb)   05/14/22 88.5 kg (195 lb)            Temp Readings from Last 3 Encounters:   11/11/22 97.8 °F (36.6 °C)   06/03/22 97.6 °F (36.4 °C)   05/19/22 98.5 °F (36.9 °C)            BP Readings from Last 3 Encounters:   11/11/22 (!) 110/56   06/03/22 130/69   05/19/22 (!) 112/51            Pulse Readings from Last 3 Encounters:   11/11/22 69   06/03/22 89   05/19/22 64                DATA      LABORATORY DATA:(reviewed/updated 11/11/2022)  Recent Results   No results found for this or any previous visit (from the past 24 hour(s)). No results found for: VALF2, VALAC, VALP, VALPR, DS6, CRBAM, CRBAMP, CARB2, XCRBAM  No results found for: LITHM   RADIOLOGY REPORTS:(reviewed/updated 11/11/2022)  No results found.          MEDICATIONS      ALL MEDICATIONS:               Current Facility-Administered Medications   Medication Dose Route Frequency    benzocaine-menthoL (CHLORASEPTIC MAX) lozenge 1 Lozenge  1 Lozenge Oral Q2H PRN    OLANZapine (ZyPREXA) tablet 5 mg  5 mg Oral Q6H PRN    haloperidol lactate (HALDOL) injection 5 mg  5 mg IntraMUSCular Q6H PRN    benztropine (COGENTIN) tablet 1 mg  1 mg Oral BID PRN    diphenhydrAMINE (BENADRYL) injection 50 mg  50 mg IntraMUSCular BID PRN    hydrOXYzine HCL (ATARAX) tablet 50 mg  50 mg Oral TID PRN    LORazepam (ATIVAN) injection 1 mg  1 mg IntraMUSCular Q4H PRN    traZODone (DESYREL) tablet 50 mg  50 mg Oral QHS PRN    acetaminophen (TYLENOL) tablet 650 mg  650 mg Oral Q4H PRN    magnesium hydroxide (MILK OF MAGNESIA) 400 mg/5 mL oral suspension 30 mL  30 mL Oral DAILY PRN    atorvastatin (LIPITOR) tablet 40 mg  40 mg Oral QPM    gabapentin (NEURONTIN) capsule 100 mg  100 mg Oral TID    insulin glargine (LANTUS) injection 20 Units  20 Units SubCUTAneous DAILY    metFORMIN ER (GLUCOPHAGE XR) tablet 1,000 mg  1,000 mg Oral BID WITH MEALS    oxybutynin chloride XL (DITROPAN XL) tablet 5 mg  5 mg Oral DAILY    risperiDONE (RisperDAL) tablet 1 mg  1 mg Oral DAILY    risperiDONE (RisperDAL) tablet 2 mg  2 mg Oral QHS    sertraline (ZOLOFT) tablet 100 mg  100 mg Oral DAILY       SCHEDULED MEDICATIONS:               Current Facility-Administered Medications   Medication Dose Route Frequency    atorvastatin (LIPITOR) tablet 40 mg  40 mg Oral QPM    gabapentin (NEURONTIN) capsule 100 mg  100 mg Oral TID    insulin glargine (LANTUS) injection 20 Units  20 Units SubCUTAneous DAILY    metFORMIN ER (GLUCOPHAGE XR) tablet 1,000 mg  1,000 mg Oral BID WITH MEALS    oxybutynin chloride XL (DITROPAN XL) tablet 5 mg  5 mg Oral DAILY    risperiDONE (RisperDAL) tablet 1 mg  1 mg Oral DAILY    risperiDONE (RisperDAL) tablet 2 mg  2 mg Oral QHS    sertraline (ZOLOFT) tablet 100 mg  100 mg Oral DAILY             ASSESSMENT & PLAN      DIAGNOSES REQUIRING ACTIVE TREATMENT AND MONITORING: (reviewed/updated 11/11/2022)  Patient Active Hospital Problem List:       Schizoaffective disorder (11/10/2022)           Assessment: the patient presents in crisis after potentially leaving her group home; she is disorganized and a poor historian at baseline, treatment will be primarily psychosocial.         Plan:   - CONTINUE Risperdal 1 mg QDAY and 2 mg QHS for psychosis  - CONTINUE Zoloft 100 mg QDAY for MDD  - Consult to DM team  - IGM therapy as tolerated  - Expand database / obtain collateral  - Dispo planning     In summary, Brian Benjamin, is a 59 y.o.  female who presents with a severe exacerbation of the principal diagnosis of Schizoaffective disorder ([facility] [address]     Patient's condition is not improving. Patient requires continued inpatient hospitalization for further stabilization, safety monitoring and medication management. I will continue to coordinate the provision of individual, milieu, occupational, group, and substance abuse therapies to address target symptoms/diagnoses as deemed appropriate for the individual patient. A coordinated, multidisplinary treatment team round was conducted with the patient (this team consists of the nurse, psychiatric unit pharmacist,  and writer). Complete current electronic health record for patient has been reviewed today including consultant notes, ancillary staff notes, nurses and psychiatric tech notes. Suicide risk assessment completed and patient deemed to be of low risk for suicide at this time. The following regarding medications was addressed during rounds with patient:   the risks and benefits of the proposed medication. The patient was given the opportunity to ask questions. Informed consent given to the use of the above medications. Will continue to adjust psychiatric and non-psychiatric medications (see above \"medication\" section and orders section for details) as deemed appropriate & based upon diagnoses and response to treatment. I will continue to order blood tests/labs and diagnostic tests as deemed appropriate and review results as they become available (see orders for details and above listed lab/test results). I will order psychiatric records from previous [facility] hospitals to further elucidate the nature of patient's psychopathology and review once available.      I will gather additional collateral information from friends, family and o/p treatment team to further elucidate the nature of patient's psychopathology and baselline level of psychiatric functioning. I certify that this patient's inpatient psychiatric hospital services furnished since the previous certification were, and continue to be, required for treatment that could reasonably be expected to improve the patient's condition, or for diagnostic study, and that the patient continues to need, on a daily basis, active treatment furnished directly by or requiring the supervision of inpatient psychiatric facility personnel. In addition, the hospital records show that services furnished were intensive treatment services, admission or related services, or equivalent services.      EXPECTED DISCHARGE DATE/DAY: TBD      DISPOSITION: Home

## 2022-11-13 NOTE — PROGRESS NOTES
Received patient alert and oriented x 4. Pt has generalized body edema,  present with  flat affect and depressed mood. On assessment, pt endorse anxiety  at 10/10, depression at 10/10, and  SI. Pt said  the voices are telling her to hurt self but contracts for safety while in the unit. Denied  HI, and AVH. Pt remains  calm and cooperative to the plan of care. PRN atarax and trazodone administered. Med's and meals compliant. No acute change in condition. Vital signs entered. Rounding in progress. Pt slept for total of 7:45 hours.                  Problem: Discharge Planning  Goal: *Knowledge of discharge instructions  Outcome: Progressing Towards Goal     Problem: Patient Education: Go to Patient Education Activity  Goal: Patient/Family Education  Outcome: Progressing Towards Goal     Problem: Suicide  Goal: *STG: Remains safe in hospital  Outcome: Progressing Towards Goal

## 2022-11-13 NOTE — BH NOTES
Pt is alert and oriented x 4. She is calm and cooperative. Noted to be isolative to her room. Pt is meal and medication compliant. She cooperates with blood glucose checks. Pt denies all SI/HI, AVH, anxiety and depression. There are no issues with pain or discomfort. Monitoring will continue for changes.

## 2022-11-13 NOTE — BH NOTES
PSYCHIATRIC PROGRESS NOTE            Patient Name  Suzie Fatima   Date of Birth 1965   [facility] [id number]   Medical Record Number  [id number]       Age  59 y.o. PCP Adalberto Hanson MD   Admit date:  11/9/2022    Room Number  314/01  @ [facility]   Date of Service  11/12/2022         E & M PROGRESS NOTE:            HISTORY       CC:  \"suicidal ideation\"  HISTORY OF PRESENT ILLNESS/INTERVAL HISTORY:  (reviewed/updated 11/11/2022). per initial evaluation: The patient, Suzie Fatima, is a 59 y.o. WHITE/NON- female with a past psychiatric history significant for schizoaffective disorder, who presents at this time with complaints of (and/or evidence of) the following emotional symptoms: depression and suicidal thoughts/threats. Additional symptomatology include poor self care. The above symptoms have been present for 2+ weeks. These symptoms are of moderate to high severity. These symptoms are constant in nature. The patient's condition has been precipitated by psychosocial stressors. Patient's condition made worse by treatment noncompliance. UDS: negative; BAL=0. The patient is well known to the unit; she comes into the hospital frequently in crisis typically stating her 's death several years ago has made her feel hopeless. She has been living in a group home though this admission she states that she was kicked out of her group home because of an episode of incontinence. The patient is a poor historian. The patient corroborates the above narrative. The patient contracts for safety on the unit and gives consent for the team to contact collateral. The patient is amenable to initiating treatment while on the unit. She defers much of the team's inquires to her payee. 11/11 - no acute overnight events. The patient has been isolative to her room with poor ADLs. incontinent of urine but able to void when taken to the bathroom.  She denies active thoughts of self harm but is markedly internally preoccupied. Patient slept 8 hours overnight and got Trazodone. She denies active thoughts of self harm but endorses both depressed mood and anxiety. .     11/12- Pt endorses ongoing SI, but denies plan/means/intent. She states that she is able to CFS and will speak to staff if the thoughts get worse. She has been isolative to her room. She endorses ongoing edema in her bilateral legs. Hospitalist was consulted and recommendations given. She denies HI/AVH currently. SIDE EFFECTS: (reviewed/updated 11/11/2022)  None reported or admitted to. No noted toxicity with use of Depakote   ALLERGIES:(reviewed/updated 11/11/2022)       Allergies   Allergen Reactions    Amoxicillin Hives    Mushroom Flavor Hives    Tomato Hives       REVIEW OF SYSTEMS: (reviewed/updated 11/11/2022)  Appetite:improved   Sleep: no change   All other Review of Systems: Negative except incontinence per HPI            [facility] (MSE):    MSE FINDINGS ARE WITHIN NORMAL LIMITS (WNL) UNLESS OTHERWISE STATED BELOW. ( ALL OF THE BELOW CATEGORIES OF THE MSE HAVE BEEN REVIEWED (reviewed 11/11/2022) AND UPDATED AS DEEMED APPROPRIATE )  General Presentation age appropriate, cooperative and guarded   Orientation disorganized   Vital Signs  See below (reviewed 11/11/2022); Vital Signs (BP, Pulse, & Temp) are within normal limits if not listed below.    Gait and Station Stable/steady, no ataxia   Musculoskeletal System No extrapyramidal symptoms (EPS); no abnormal muscular movements or Tardive Dyskinesia (TD); muscle strength and tone are within normal limits   Language No aphasia or dysarthria   Speech:  normal volume and non-pressured   Thought Processes concrete; normal rate of thoughts; poor abstract reasoning/computation   Thought Associations blocked    Thought Content preoccupations   Suicidal Ideations contracts for safety   Homicidal Ideations contracts for safety   Mood: depressed and anhedonia   Affect:  flat   Memory recent  intact   Memory remote:  intact   Concentration/Attention:  intact   Fund of Knowledge average   Insight:  limited   Reliability fair   Judgment:  limited            VITALS:     Patient Vitals for the past 24 hrs:    Temp Pulse Resp BP SpO2   11/11/22 0823 97.8 °F (36.6 °C) 69 14 (!) 110/56 95 %   11/10/22 2009 98.7 °F (37.1 °C) 82 16 102/62 95 %          Wt Readings from Last 3 Encounters:   11/09/22 115.7 kg (255 lb)   06/03/22 113.9 kg (251 lb)   05/14/22 88.5 kg (195 lb)          Temp Readings from Last 3 Encounters:   11/11/22 97.8 °F (36.6 °C)   06/03/22 97.6 °F (36.4 °C)   05/19/22 98.5 °F (36.9 °C)          BP Readings from Last 3 Encounters:   11/11/22 (!) 110/56   06/03/22 130/69   05/19/22 (!) 112/51          Pulse Readings from Last 3 Encounters:   11/11/22 69   06/03/22 89   05/19/22 64                DATA      LABORATORY DATA:(reviewed/updated 11/11/2022)  Recent Results   No results found for this or any previous visit (from the past 24 hour(s)). No results found for: VALF2, VALAC, VALP, VALPR, DS6, CRBAM, CRBAMP, CARB2, XCRBAM  No results found for: LITHM   RADIOLOGY REPORTS:(reviewed/updated 11/11/2022)  No results found.          MEDICATIONS      ALL MEDICATIONS:          Current Facility-Administered Medications   Medication Dose Route Frequency    benzocaine-menthoL (CHLORASEPTIC MAX) lozenge 1 Lozenge  1 Lozenge Oral Q2H PRN    OLANZapine (ZyPREXA) tablet 5 mg  5 mg Oral Q6H PRN    haloperidol lactate (HALDOL) injection 5 mg  5 mg IntraMUSCular Q6H PRN    benztropine (COGENTIN) tablet 1 mg  1 mg Oral BID PRN    diphenhydrAMINE (BENADRYL) injection 50 mg  50 mg IntraMUSCular BID PRN    hydrOXYzine HCL (ATARAX) tablet 50 mg  50 mg Oral TID PRN    LORazepam (ATIVAN) injection 1 mg  1 mg IntraMUSCular Q4H PRN    traZODone (DESYREL) tablet 50 mg  50 mg Oral QHS PRN    acetaminophen (TYLENOL) tablet 650 mg  650 mg Oral Q4H PRN    magnesium hydroxide (MILK OF MAGNESIA) 400 mg/5 mL oral suspension 30 mL  30 mL Oral DAILY PRN    atorvastatin (LIPITOR) tablet 40 mg  40 mg Oral QPM    gabapentin (NEURONTIN) capsule 100 mg  100 mg Oral TID    insulin glargine (LANTUS) injection 20 Units  20 Units SubCUTAneous DAILY    metFORMIN ER (GLUCOPHAGE XR) tablet 1,000 mg  1,000 mg Oral BID WITH MEALS    oxybutynin chloride XL (DITROPAN XL) tablet 5 mg  5 mg Oral DAILY    risperiDONE (RisperDAL) tablet 1 mg  1 mg Oral DAILY    risperiDONE (RisperDAL) tablet 2 mg  2 mg Oral QHS    sertraline (ZOLOFT) tablet 100 mg  100 mg Oral DAILY       SCHEDULED MEDICATIONS:          Current Facility-Administered Medications   Medication Dose Route Frequency    atorvastatin (LIPITOR) tablet 40 mg  40 mg Oral QPM    gabapentin (NEURONTIN) capsule 100 mg  100 mg Oral TID    insulin glargine (LANTUS) injection 20 Units  20 Units SubCUTAneous DAILY    metFORMIN ER (GLUCOPHAGE XR) tablet 1,000 mg  1,000 mg Oral BID WITH MEALS    oxybutynin chloride XL (DITROPAN XL) tablet 5 mg  5 mg Oral DAILY    risperiDONE (RisperDAL) tablet 1 mg  1 mg Oral DAILY    risperiDONE (RisperDAL) tablet 2 mg  2 mg Oral QHS    sertraline (ZOLOFT) tablet 100 mg  100 mg Oral DAILY             ASSESSMENT & PLAN      DIAGNOSES REQUIRING ACTIVE TREATMENT AND MONITORING: (reviewed/updated 11/11/2022)  Patient Active Hospital Problem List:       Schizoaffective disorder (11/10/2022)           Assessment: the patient presents in crisis after potentially leaving her group home; she is disorganized and a poor historian at baseline, treatment will be primarily psychosocial.         Plan:   - CONTINUE Risperdal 1 mg QDAY and 2 mg QHS for psychosis  - CONTINUE Zoloft 100 mg QDAY for MDD  - Consult to DM team  - IGM therapy as tolerated  - Expand database / obtain collateral  - Dispo planning     In summary, Christi Guzmán, is a 59 y.o.  female who presents with a severe exacerbation of the principal diagnosis of Schizoaffective disorder ([facility] [address]     Patient's condition is not improving. Patient requires continued inpatient hospitalization for further stabilization, safety monitoring and medication management. I will continue to coordinate the provision of individual, milieu, occupational, group, and substance abuse therapies to address target symptoms/diagnoses as deemed appropriate for the individual patient. A coordinated, multidisplinary treatment team round was conducted with the patient (this team consists of the nurse, psychiatric unit pharmacist,  and writer). Complete current electronic health record for patient has been reviewed today including consultant notes, ancillary staff notes, nurses and psychiatric tech notes. Suicide risk assessment completed and patient deemed to be of low risk for suicide at this time. The following regarding medications was addressed during rounds with patient:   the risks and benefits of the proposed medication. The patient was given the opportunity to ask questions. Informed consent given to the use of the above medications. Will continue to adjust psychiatric and non-psychiatric medications (see above \"medication\" section and orders section for details) as deemed appropriate & based upon diagnoses and response to treatment. I will continue to order blood tests/labs and diagnostic tests as deemed appropriate and review results as they become available (see orders for details and above listed lab/test results). I will order psychiatric records from previous [facility] hospitals to further elucidate the nature of patient's psychopathology and review once available. I will gather additional collateral information from friends, family and o/p treatment team to further elucidate the nature of patient's psychopathology and baselline level of psychiatric functioning.             I certify that this patient's inpatient psychiatric hospital services furnished since the previous certification were, and continue to be, required for treatment that could reasonably be expected to improve the patient's condition, or for diagnostic study, and that the patient continues to need, on a daily basis, active treatment furnished directly by or requiring the supervision of inpatient psychiatric facility personnel. In addition, the hospital records show that services furnished were intensive treatment services, admission or related services, or equivalent services.      EXPECTED DISCHARGE DATE/DAY: TBD      DISPOSITION: Home

## 2022-11-13 NOTE — PROGRESS NOTES
Problem: Falls - Risk of  Goal: *Absence of Falls  Description: Document Darlen [place] Fall Risk and appropriate interventions in the flowsheet.   Outcome: Progressing Towards Goal  Note: Fall Risk Interventions:                                Problem: Suicide  Goal: *STG: Remains safe in hospital  Outcome: Progressing Towards Goal

## 2022-11-14 ENCOUNTER — APPOINTMENT (OUTPATIENT)
Dept: VASCULAR SURGERY | Age: 57
DRG: 750 | End: 2022-11-14
Attending: STUDENT IN AN ORGANIZED HEALTH CARE EDUCATION/TRAINING PROGRAM
Payer: MEDICAID

## 2022-11-14 ENCOUNTER — APPOINTMENT (OUTPATIENT)
Dept: NON INVASIVE DIAGNOSTICS | Age: 57
DRG: 750 | End: 2022-11-14
Attending: STUDENT IN AN ORGANIZED HEALTH CARE EDUCATION/TRAINING PROGRAM
Payer: MEDICAID

## 2022-11-14 LAB
ECHO AO ROOT DIAM: 2 CM
ECHO AO ROOT INDEX: 0.89 CM/M2
ECHO AV AREA PEAK VELOCITY: 1.4 CM2
ECHO AV AREA/BSA PEAK VELOCITY: 0.6 CM2/M2
ECHO AV PEAK GRADIENT: 14 MMHG
ECHO AV PEAK VELOCITY: 1.9 M/S
ECHO AV VELOCITY RATIO: 0.58
ECHO LA DIAMETER INDEX: 1.56 CM/M2
ECHO LA DIAMETER: 3.5 CM
ECHO LA TO AORTIC ROOT RATIO: 1.75
ECHO LA VOL 4C: 35 ML (ref 22–52)
ECHO LA VOLUME INDEX A4C: 16 ML/M2 (ref 16–34)
ECHO LV EDV A4C: 68 ML
ECHO LV EDV INDEX A4C: 30 ML/M2
ECHO LV EJECTION FRACTION A4C: 86 %
ECHO LV ESV A4C: 10 ML
ECHO LV ESV INDEX A4C: 4 ML/M2
ECHO LV FRACTIONAL SHORTENING: 46 % (ref 28–44)
ECHO LV INTERNAL DIMENSION DIASTOLE INDEX: 2.13 CM/M2
ECHO LV INTERNAL DIMENSION DIASTOLIC: 4.8 CM (ref 3.9–5.3)
ECHO LV INTERNAL DIMENSION SYSTOLIC INDEX: 1.16 CM/M2
ECHO LV INTERNAL DIMENSION SYSTOLIC: 2.6 CM
ECHO LV IVSD: 0.8 CM (ref 0.6–0.9)
ECHO LV MASS 2D: 126.7 G (ref 67–162)
ECHO LV MASS INDEX 2D: 56.3 G/M2 (ref 43–95)
ECHO LV POSTERIOR WALL DIASTOLIC: 0.8 CM (ref 0.6–0.9)
ECHO LV RELATIVE WALL THICKNESS RATIO: 0.33
ECHO LVOT AREA: 2.3 CM2
ECHO LVOT DIAM: 1.7 CM
ECHO LVOT PEAK GRADIENT: 5 MMHG
ECHO LVOT PEAK VELOCITY: 1.1 M/S
ECHO MV A VELOCITY: 0.65 M/S
ECHO MV AREA PHT: 3.8 CM2
ECHO MV E DECELERATION TIME (DT): 198.3 MS
ECHO MV E VELOCITY: 0.4 M/S
ECHO MV E/A RATIO: 0.62
ECHO MV MAX VELOCITY: 1.1 M/S
ECHO MV MEAN GRADIENT: 2 MMHG
ECHO MV MEAN VELOCITY: 0.6 M/S
ECHO MV PEAK GRADIENT: 5 MMHG
ECHO MV PRESSURE HALF TIME (PHT): 57.5 MS
ECHO MV VTI: 32.3 CM
ECHO PV MAX VELOCITY: 0.9 M/S
ECHO PV PEAK GRADIENT: 4 MMHG
GLUCOSE BLD STRIP.AUTO-MCNC: 159 MG/DL (ref 65–117)
GLUCOSE BLD STRIP.AUTO-MCNC: 178 MG/DL (ref 65–117)
GLUCOSE BLD STRIP.AUTO-MCNC: 181 MG/DL (ref 65–117)
GLUCOSE BLD STRIP.AUTO-MCNC: 203 MG/DL (ref 65–117)
SERVICE CMNT-IMP: ABNORMAL

## 2022-11-14 PROCEDURE — 74011636637 HC RX REV CODE- 636/637: Performed by: STUDENT IN AN ORGANIZED HEALTH CARE EDUCATION/TRAINING PROGRAM

## 2022-11-14 PROCEDURE — 74011250637 HC RX REV CODE- 250/637: Performed by: PSYCHIATRY & NEUROLOGY

## 2022-11-14 PROCEDURE — 93306 TTE W/DOPPLER COMPLETE: CPT

## 2022-11-14 PROCEDURE — 74011250637 HC RX REV CODE- 250/637

## 2022-11-14 PROCEDURE — 93970 EXTREMITY STUDY: CPT | Performed by: INTERNAL MEDICINE

## 2022-11-14 PROCEDURE — 82962 GLUCOSE BLOOD TEST: CPT

## 2022-11-14 PROCEDURE — 65220000003 HC RM SEMIPRIVATE PSYCH

## 2022-11-14 PROCEDURE — 93970 EXTREMITY STUDY: CPT

## 2022-11-14 PROCEDURE — 93306 TTE W/DOPPLER COMPLETE: CPT | Performed by: INTERNAL MEDICINE

## 2022-11-14 PROCEDURE — 74011250637 HC RX REV CODE- 250/637: Performed by: STUDENT IN AN ORGANIZED HEALTH CARE EDUCATION/TRAINING PROGRAM

## 2022-11-14 PROCEDURE — 99232 SBSQ HOSP IP/OBS MODERATE 35: CPT | Performed by: PSYCHIATRY & NEUROLOGY

## 2022-11-14 RX ORDER — NYSTATIN 100000 [USP'U]/G
POWDER TOPICAL 2 TIMES DAILY
Status: DISCONTINUED | OUTPATIENT
Start: 2022-11-14 | End: 2022-12-05 | Stop reason: HOSPADM

## 2022-11-14 RX ORDER — RISPERIDONE 1 MG/1
2 TABLET, FILM COATED ORAL EVERY 12 HOURS
Status: DISCONTINUED | OUTPATIENT
Start: 2022-11-14 | End: 2022-12-05 | Stop reason: HOSPADM

## 2022-11-14 RX ORDER — IBUPROFEN 400 MG/1
400 TABLET ORAL
Status: DISCONTINUED | OUTPATIENT
Start: 2022-11-14 | End: 2022-12-05 | Stop reason: HOSPADM

## 2022-11-14 RX ADMIN — Medication 25 UNITS: at 08:48

## 2022-11-14 RX ADMIN — Medication 5 UNITS: at 08:48

## 2022-11-14 RX ADMIN — ATORVASTATIN CALCIUM 40 MG: 40 TABLET, FILM COATED ORAL at 17:07

## 2022-11-14 RX ADMIN — RISPERIDONE 1 MG: 1 TABLET ORAL at 08:48

## 2022-11-14 RX ADMIN — OXYBUTYNIN CHLORIDE 5 MG: 5 TABLET, EXTENDED RELEASE ORAL at 08:48

## 2022-11-14 RX ADMIN — Medication 5 UNITS: at 12:38

## 2022-11-14 RX ADMIN — ACETAMINOPHEN 650 MG: 325 TABLET, FILM COATED ORAL at 21:23

## 2022-11-14 RX ADMIN — GABAPENTIN 100 MG: 100 CAPSULE ORAL at 08:48

## 2022-11-14 RX ADMIN — Medication 2 UNITS: at 08:48

## 2022-11-14 RX ADMIN — SERTRALINE HYDROCHLORIDE 100 MG: 50 TABLET ORAL at 08:48

## 2022-11-14 RX ADMIN — BUDESONIDE AND FORMOTEROL FUMARATE DIHYDRATE 2 PUFF: 80; 4.5 AEROSOL RESPIRATORY (INHALATION) at 21:23

## 2022-11-14 RX ADMIN — RISPERIDONE 2 MG: 1 TABLET ORAL at 21:23

## 2022-11-14 RX ADMIN — Medication 3 UNITS: at 17:06

## 2022-11-14 RX ADMIN — GABAPENTIN 100 MG: 100 CAPSULE ORAL at 12:38

## 2022-11-14 RX ADMIN — METFORMIN HYDROCHLORIDE 1000 MG: 500 TABLET, EXTENDED RELEASE ORAL at 17:06

## 2022-11-14 RX ADMIN — Medication 5 UNITS: at 17:06

## 2022-11-14 RX ADMIN — METFORMIN HYDROCHLORIDE 1000 MG: 500 TABLET, EXTENDED RELEASE ORAL at 08:48

## 2022-11-14 RX ADMIN — BUDESONIDE AND FORMOTEROL FUMARATE DIHYDRATE 2 PUFF: 80; 4.5 AEROSOL RESPIRATORY (INHALATION) at 08:51

## 2022-11-14 RX ADMIN — Medication 2 UNITS: at 12:38

## 2022-11-14 RX ADMIN — GABAPENTIN 100 MG: 100 CAPSULE ORAL at 17:07

## 2022-11-14 RX ADMIN — IBUPROFEN 400 MG: 400 TABLET, FILM COATED ORAL at 12:38

## 2022-11-14 RX ADMIN — NYSTATIN: 100000 POWDER TOPICAL at 17:21

## 2022-11-14 NOTE — GROUP NOTE
EMILY  GROUP DOCUMENTATION INDIVIDUAL                                                                          Group Therapy Note    Date: 11/14/2022    Group Start Time: 1000  Group End Time: 1100  Group Topic: Topic Group    [address] ACUTE BEHAV [facility] GROUP    Group Therapy Note    Attendees: 9       Attendance: Did not attend    Christopher Guardado

## 2022-11-14 NOTE — PROGRESS NOTES
Problem: Falls - Risk of  Goal: *Absence of Falls  Description: Document Page [place] Fall Risk and appropriate interventions in the flowsheet.   Outcome: Progressing Towards Goal  Note: Fall Risk Interventions:  Mobility Interventions: PT Consult for mobility concerns    Medication Interventions: Teach patient to arise slowly       Problem: Suicide  Goal: *STG: Remains safe in hospital  Outcome: Progressing Towards Goal

## 2022-11-14 NOTE — GROUP NOTE
EMILY  GROUP DOCUMENTATION INDIVIDUAL                                                                          Group Therapy Note    Date: 11/14/2022    Group Start Time: 1400  Group End Time: 1500  Group Topic: Recreational/Music Therapy    [facility] [place] [address] ACUTE BEHAV [facility]    [place], [address] [facility] GROUP DOCUMENTATION GROUP    Group Therapy Note    Attendees: 10       Attendance: Attended    Patient's Goal:  To concentrate on selected task    Interventions/techniques: Supported-crafts,games,music    Interactions: Interacted appropriately    Mental Status: Calm    Behavior/appearance: Attentive, Cooperative, and Needed prompting    Goals Achieved: Able to engage in interactions and Able to listen to others-listened to music,socialized with peers and Maureen Madison

## 2022-11-14 NOTE — PROGRESS NOTES
Problem: Suicide  Goal: *STG: Remains safe in hospital  Outcome: Progressing Towards Goal     Shift change report given to Nohemy CLEMENTE (oncoming nurse) by Santosh Thurman (offgoing nurse). Report included the following information SBAR, Kardex, MAR, and Recent Results. Assumed care of patient. Met patient in room. Patient appeared uninterested with assessment. Patient presented with a flat affect. Unremarkable thought process. Patient endorsed anxiety and depression both 10/10. States that the anniversary of her husbands death is approaching. Patient denied SI, HI and AVH. Patient c/o bilateral leg pain, PRN Tylenol offered. Patient declined stating that medication is not effective. Patient requesting motrin. PRN motrin given at 1238. Patient off unit for and echo and duplex of bilateral lower extremities. Patient visible on unit. In dayroom watching TV. No issues noted throughout shift.

## 2022-11-14 NOTE — BH NOTES
Patient's care assumed with the change of shift report received from the outgoing nurse - 20 Bee Gordon RN. Patient received resting in bed, alert and oriented, calm and cooperative and isolative to room. Patient denied SI, HI, AVH, anxiety and depression. Patient assisted with ADL care. Pt is medication compliant. Safety maintained by Q 15 safety checks and nursing round. Patient observed coughing intermittently during the night. Head of bed elevated and scheduled inhaler administered. Patient assisted with a shower. Skin under bilateral breast and groin appears. red and angry. Patient appears asleep for 7 hours.

## 2022-11-14 NOTE — PROGRESS NOTES
Duplex NO DVT   TTE EF 65 - 70 % .  Normal diastolic function   Low sodium diet, elevate legs and may us compression stockings for LE edema     Jose D Flannery MD

## 2022-11-14 NOTE — BH NOTES
PSYCHIATRIC PROGRESS NOTE         Patient Name  Suki Arriola   Date of Birth 1965   [facility] [id number]   Medical Record Number  [id number]      Age  59 y.o. PCP Hari Zavala MD   Admit date:  11/9/2022    Room Number  320/02  @ [facility]   Date of Service  11/14/2022         E & M PROGRESS NOTE:         HISTORY       CC:  \"suicidal ideation\"  HISTORY OF PRESENT ILLNESS/INTERVAL HISTORY:  (reviewed/updated 11/14/2022). per initial evaluation: The patient, Suki Arriola, is a 59 y.o. WHITE/NON- female with a past psychiatric history significant for schizoaffective disorder, who presents at this time with complaints of (and/or evidence of) the following emotional symptoms: depression and suicidal thoughts/threats. Additional symptomatology include poor self care. The above symptoms have been present for 2+ weeks. These symptoms are of moderate to high severity. These symptoms are constant in nature. The patient's condition has been precipitated by psychosocial stressors. Patient's condition made worse by treatment noncompliance. UDS: negative; BAL=0. The patient is well known to the unit; she comes into the hospital frequently in crisis typically stating her 's death several years ago has made her feel hopeless. She has been living in a group home though this admission she states that she was kicked out of her group home because of an episode of incontinence. The patient is a poor historian. The patient corroborates the above narrative. The patient contracts for safety on the unit and gives consent for the team to contact collateral. The patient is amenable to initiating treatment while on the unit. She defers much of the team's inquires to her payee. 11/11 - no acute overnight events. The patient has been isolative to her room with poor ADLs. incontinent of urine but able to void when taken to the bathroom.  She denies active thoughts of self harm but is markedly internally preoccupied. Patient slept 8 hours overnight and got Trazodone. She denies active thoughts of self harm but endorses both depressed mood and anxiety. 11/14 - the patient remains in bed for much of the day. She has been inconitnent of urine and with poor ADLs requiring prompting for much activities. Patient denies active thoughts of self harm but endorses passive SI. She slept 7 hours overnight and is flat and disorganized on interview, moaning and providing little updates on her disposition plan. Per , the patient tends to not go to the group homes once assigned. Her payee is working on guardianship. SIDE EFFECTS: (reviewed/updated 11/14/2022)  None reported or admitted to. No noted toxicity with use of Depakote   ALLERGIES:(reviewed/updated 11/14/2022)  Allergies   Allergen Reactions    Amoxicillin Hives    Mushroom Flavor Hives    Tomato Hives      REVIEW OF SYSTEMS: (reviewed/updated 11/14/2022)  Appetite:improved   Sleep: no change   All other Review of Systems: Negative except incontinence per HPI         [facility] (MSE):    MSE FINDINGS ARE WITHIN NORMAL LIMITS (WNL) UNLESS OTHERWISE STATED BELOW. ( ALL OF THE BELOW CATEGORIES OF THE MSE HAVE BEEN REVIEWED (reviewed 11/14/2022) AND UPDATED AS DEEMED APPROPRIATE )  General Presentation age appropriate, cooperative and guarded   Orientation disorganized   Vital Signs  See below (reviewed 11/14/2022); Vital Signs (BP, Pulse, & Temp) are within normal limits if not listed below.    Gait and Station Stable/steady, no ataxia   Musculoskeletal System No extrapyramidal symptoms (EPS); no abnormal muscular movements or Tardive Dyskinesia (TD); muscle strength and tone are within normal limits   Language No aphasia or dysarthria   Speech:  normal volume and non-pressured   Thought Processes concrete; normal rate of thoughts; poor abstract reasoning/computation   Thought Associations blocked Thought Content preoccupations   Suicidal Ideations contracts for safety   Homicidal Ideations contracts for safety   Mood:  depressed and anhedonia   Affect:  flat   Memory recent  intact   Memory remote:  intact   Concentration/Attention:  intact   Fund of Knowledge average   Insight:  limited   Reliability fair   Judgment:  limited          VITALS:     Patient Vitals for the past 24 hrs:   Temp Pulse Resp BP SpO2   11/14/22 0846 -- 84 -- 124/66 --   11/13/22 2041 98.6 °F (37 °C) 96 20 (!) 170/85 96 %       Wt Readings from Last 3 Encounters:   11/11/22 123.2 kg (271 lb 8 oz)   06/03/22 113.9 kg (251 lb)   05/14/22 88.5 kg (195 lb)     Temp Readings from Last 3 Encounters:   11/13/22 98.6 °F (37 °C)   06/03/22 97.6 °F (36.4 °C)   05/19/22 98.5 °F (36.9 °C)     BP Readings from Last 3 Encounters:   11/14/22 124/66   06/03/22 130/69   05/19/22 (!) 112/51     Pulse Readings from Last 3 Encounters:   11/14/22 84   06/03/22 89   05/19/22 64            DATA     LABORATORY DATA:(reviewed/updated 11/14/2022)  Recent Results (from the past 24 hour(s))   GLUCOSE, POC    Collection Time: 11/13/22  8:25 PM   Result Value Ref Range    Glucose (POC) 221 (H) 65 - 117 mg/dL    Performed by Jona Washington RN    GLUCOSE, POC    Collection Time: 11/14/22  8:09 AM   Result Value Ref Range    Glucose (POC) 181 (H) 65 - 117 mg/dL    Performed by Yazmin Slater    ECHO ADULT COMPLETE    Collection Time: 11/14/22 12:02 PM   Result Value Ref Range    IVSd 0.8 0.6 - 0.9 cm    LVIDd 4.8 3.9 - 5.3 cm    LVIDs 2.6 cm    LVOT Diameter 1.7 cm    LVPWd 0.8 0.6 - 0.9 cm    LV Ejection Fraction A4C 86 %    LV EDV A4C 68 mL    LV ESV A4C 10 mL    LVOT Peak Gradient 5 mmHg    LVOT Peak Velocity 1.1 m/s    LA Diameter 3.5 cm    LA Volume 4C 35 22 - 52 mL    AV Area by Peak Velocity 1.4 cm2    AV Peak Gradient 14 mmHg    AV Peak Velocity 1.9 m/s    MV A Velocity 0.65 m/s    MV E Wave Deceleration Time 198.3 ms    MV E Velocity 0.40 m/s    MV PHT 57.5 ms    MV Peak Gradient 5 mmHg    MV Mean Gradient 2 mmHg    MV Max Velocity 1.1 m/s    MV Mean Velocity 0.6 m/s    MV VTI 32.3 cm    PV Peak Gradient 4 mmHg    PV Max Velocity 0.9 m/s    Aortic Root 2.0 cm    Fractional Shortening 2D 46 28 - 44 %    LV ESV Index A4C 4 mL/m2    LV EDV Index A4C 30 mL/m2    LVIDd Index 2.13 cm/m2    LVIDs Index 1.16 cm/m2    LV RWT Ratio 0.33     LV Mass 2D 126.7 67 - 162 g    LV Mass 2D Index 56.3 43 - 95 g/m2    MV E/A 0.62     LVOT Area 2.3 cm2    LA Volume Index 4C 16 16 - 34 mL/m2    LA Size Index 1.56 cm/m2    LA/AO Root Ratio 1.75     Ao Root Index 0.89 cm/m2    AV Velocity Ratio 0.58     CECILIA/BSA Peak Velocity 0.6 cm2/m2    MV Area by PHT 3.8 cm2   GLUCOSE, POC    Collection Time: 11/14/22 12:23 PM   Result Value Ref Range    Glucose (POC) 178 (H) 65 - 117 mg/dL    Performed by [address] [place] [address], POC    Collection Time: 11/14/22  4:40 PM   Result Value Ref Range    Glucose (POC) 203 (H) 65 - 117 mg/dL    Performed by [facility]      No results found for: VALF2, VALAC, VALP, VALPR, DS6, CRBAM, CRBAMP, CARB2, XCRBAM  No results found for: LITHM   RADIOLOGY REPORTS:(reviewed/updated 11/14/2022)  No results found.        MEDICATIONS     ALL MEDICATIONS:   Current Facility-Administered Medications   Medication Dose Route Frequency    ibuprofen (MOTRIN) tablet 400 mg  400 mg Oral Q6H PRN    nystatin (MYCOSTATIN) 100,000 unit/gram powder   Topical BID    benzocaine-menthoL (CHLORASEPTIC MAX) lozenge 1 Lozenge  1 Lozenge Oral Q2H PRN    glucose chewable tablet 16 g  4 Tablet Oral PRN    glucagon (GLUCAGEN) injection 1 mg  1 mg IntraMUSCular PRN    dextrose 10% infusion 0-250 mL  0-250 mL IntraVENous PRN    albuterol (PROVENTIL HFA, VENTOLIN HFA, PROAIR HFA) inhaler 1 Puff  1 Puff Inhalation Q4H PRN    insulin lispro (HUMALOG) injection 1-10 Units  1-10 Units SubCUTAneous AC&HS    insulin lispro (HUMALOG) injection 5 Units  5 Units SubCUTAneous TIDAC budesonide-formoterol (SYMBICORT) 80-4.5 mcg inhaler  2 Puff Inhalation BID RT    insulin glargine (LANTUS) injection 25 Units  25 Units SubCUTAneous DAILY    OLANZapine (ZyPREXA) tablet 5 mg  5 mg Oral Q6H PRN    haloperidol lactate (HALDOL) injection 5 mg  5 mg IntraMUSCular Q6H PRN    benztropine (COGENTIN) tablet 1 mg  1 mg Oral BID PRN    diphenhydrAMINE (BENADRYL) injection 50 mg  50 mg IntraMUSCular BID PRN    hydrOXYzine HCL (ATARAX) tablet 50 mg  50 mg Oral TID PRN    LORazepam (ATIVAN) injection 1 mg  1 mg IntraMUSCular Q4H PRN    traZODone (DESYREL) tablet 50 mg  50 mg Oral QHS PRN    acetaminophen (TYLENOL) tablet 650 mg  650 mg Oral Q4H PRN    magnesium hydroxide (MILK OF MAGNESIA) 400 mg/5 mL oral suspension 30 mL  30 mL Oral DAILY PRN    atorvastatin (LIPITOR) tablet 40 mg  40 mg Oral QPM    gabapentin (NEURONTIN) capsule 100 mg  100 mg Oral TID    metFORMIN ER (GLUCOPHAGE XR) tablet 1,000 mg  1,000 mg Oral BID WITH MEALS    oxybutynin chloride XL (DITROPAN XL) tablet 5 mg  5 mg Oral DAILY    risperiDONE (RisperDAL) tablet 1 mg  1 mg Oral DAILY    risperiDONE (RisperDAL) tablet 2 mg  2 mg Oral QHS    sertraline (ZOLOFT) tablet 100 mg  100 mg Oral DAILY      SCHEDULED MEDICATIONS:   Current Facility-Administered Medications   Medication Dose Route Frequency    nystatin (MYCOSTATIN) 100,000 unit/gram powder   Topical BID    insulin lispro (HUMALOG) injection 1-10 Units  1-10 Units SubCUTAneous AC&HS    insulin lispro (HUMALOG) injection 5 Units  5 Units SubCUTAneous TIDAC    budesonide-formoterol (SYMBICORT) 80-4.5 mcg inhaler  2 Puff Inhalation BID RT    insulin glargine (LANTUS) injection 25 Units  25 Units SubCUTAneous DAILY    atorvastatin (LIPITOR) tablet 40 mg  40 mg Oral QPM    gabapentin (NEURONTIN) capsule 100 mg  100 mg Oral TID    metFORMIN ER (GLUCOPHAGE XR) tablet 1,000 mg  1,000 mg Oral BID WITH MEALS    oxybutynin chloride XL (DITROPAN XL) tablet 5 mg  5 mg Oral DAILY    risperiDONE (RisperDAL) tablet 1 mg  1 mg Oral DAILY    risperiDONE (RisperDAL) tablet 2 mg  2 mg Oral QHS    sertraline (ZOLOFT) tablet 100 mg  100 mg Oral DAILY          ASSESSMENT & PLAN     DIAGNOSES REQUIRING ACTIVE TREATMENT AND MONITORING: (reviewed/updated 11/14/2022)  Patient Active Hospital Problem List:       Schizoaffective disorder (11/10/2022)           Assessment: the patient presents in crisis after potentially leaving her group home; she is disorganized and a poor historian at baseline, treatment will be primarily psychosocial.         Plan:   - INCREASE Risperdal to 2 mg Q12H for psychosis  - CONTINUE Zoloft 100 mg QDAY for MDD  - Consult to DM team  - IGM therapy as tolerated  - Expand database / obtain collateral  - Dispo planning    In summary, Alina Bolaños, is a 59 y.o.  female who presents with a severe exacerbation of the principal diagnosis of Schizoaffective disorder ([facility] [address]    Patient's condition is not improving. Patient requires continued inpatient hospitalization for further stabilization, safety monitoring and medication management. I will continue to coordinate the provision of individual, milieu, occupational, group, and substance abuse therapies to address target symptoms/diagnoses as deemed appropriate for the individual patient. A coordinated, multidisplinary treatment team round was conducted with the patient (this team consists of the nurse, psychiatric unit pharmacist,  and writer). Complete current electronic health record for patient has been reviewed today including consultant notes, ancillary staff notes, nurses and psychiatric tech notes. Suicide risk assessment completed and patient deemed to be of low risk for suicide at this time. The following regarding medications was addressed during rounds with patient:   the risks and benefits of the proposed medication. The patient was given the opportunity to ask questions.  Informed consent given to the use of the above medications. Will continue to adjust psychiatric and non-psychiatric medications (see above \"medication\" section and orders section for details) as deemed appropriate & based upon diagnoses and response to treatment. I will continue to order blood tests/labs and diagnostic tests as deemed appropriate and review results as they become available (see orders for details and above listed lab/test results). I will order psychiatric records from previous [facility] hospitals to further elucidate the nature of patient's psychopathology and review once available. I will gather additional collateral information from friends, family and o/p treatment team to further elucidate the nature of patient's psychopathology and baselline level of psychiatric functioning. I certify that this patient's inpatient psychiatric hospital services furnished since the previous certification were, and continue to be, required for treatment that could reasonably be expected to improve the patient's condition, or for diagnostic study, and that the patient continues to need, on a daily basis, active treatment furnished directly by or requiring the supervision of inpatient psychiatric facility personnel. In addition, the hospital records show that services furnished were intensive treatment services, admission or related services, or equivalent services.     EXPECTED DISCHARGE DATE/DAY: TBD     DISPOSITION: Home       Signed By:   Kirby Vaca MD  11/14/2022

## 2022-11-14 NOTE — PROGRESS NOTES
OCTAVIA faxed the Pt demographics and clinicals to [facility] to review for community stabilization services.

## 2022-11-14 NOTE — PROGRESS NOTES
Physical Therapy:    1147: Visit attempted, patient off the floor for ECHO. Case discussed with RN. Patient appears to be having difficulty with mobility tasks including ambulation and arising from chair. PT evaluation will be attempted as able.     January Burnette, PT

## 2022-11-15 LAB
GLUCOSE BLD STRIP.AUTO-MCNC: 156 MG/DL (ref 65–117)
GLUCOSE BLD STRIP.AUTO-MCNC: 182 MG/DL (ref 65–117)
GLUCOSE BLD STRIP.AUTO-MCNC: 188 MG/DL (ref 65–117)
GLUCOSE BLD STRIP.AUTO-MCNC: 206 MG/DL (ref 65–117)
SERVICE CMNT-IMP: ABNORMAL

## 2022-11-15 PROCEDURE — 99232 SBSQ HOSP IP/OBS MODERATE 35: CPT | Performed by: PSYCHIATRY & NEUROLOGY

## 2022-11-15 PROCEDURE — 65220000003 HC RM SEMIPRIVATE PSYCH

## 2022-11-15 PROCEDURE — 74011636637 HC RX REV CODE- 636/637: Performed by: STUDENT IN AN ORGANIZED HEALTH CARE EDUCATION/TRAINING PROGRAM

## 2022-11-15 PROCEDURE — 74011250637 HC RX REV CODE- 250/637: Performed by: PSYCHIATRY & NEUROLOGY

## 2022-11-15 PROCEDURE — 74011250637 HC RX REV CODE- 250/637

## 2022-11-15 PROCEDURE — 82962 GLUCOSE BLOOD TEST: CPT

## 2022-11-15 PROCEDURE — 74011250637 HC RX REV CODE- 250/637: Performed by: STUDENT IN AN ORGANIZED HEALTH CARE EDUCATION/TRAINING PROGRAM

## 2022-11-15 RX ADMIN — BUDESONIDE AND FORMOTEROL FUMARATE DIHYDRATE 2 PUFF: 80; 4.5 AEROSOL RESPIRATORY (INHALATION) at 21:30

## 2022-11-15 RX ADMIN — GABAPENTIN 100 MG: 100 CAPSULE ORAL at 11:19

## 2022-11-15 RX ADMIN — IBUPROFEN 400 MG: 400 TABLET, FILM COATED ORAL at 21:46

## 2022-11-15 RX ADMIN — Medication 5 UNITS: at 11:20

## 2022-11-15 RX ADMIN — ATORVASTATIN CALCIUM 40 MG: 40 TABLET, FILM COATED ORAL at 17:01

## 2022-11-15 RX ADMIN — GABAPENTIN 100 MG: 100 CAPSULE ORAL at 16:08

## 2022-11-15 RX ADMIN — Medication 2 UNITS: at 08:37

## 2022-11-15 RX ADMIN — Medication 25 UNITS: at 08:36

## 2022-11-15 RX ADMIN — METFORMIN HYDROCHLORIDE 1000 MG: 500 TABLET, EXTENDED RELEASE ORAL at 16:41

## 2022-11-15 RX ADMIN — HYDROXYZINE HYDROCHLORIDE 50 MG: 25 TABLET, FILM COATED ORAL at 21:29

## 2022-11-15 RX ADMIN — Medication 5 UNITS: at 08:38

## 2022-11-15 RX ADMIN — Medication 5 UNITS: at 16:09

## 2022-11-15 RX ADMIN — Medication 2 UNITS: at 11:19

## 2022-11-15 RX ADMIN — RISPERIDONE 2 MG: 1 TABLET ORAL at 08:36

## 2022-11-15 RX ADMIN — GABAPENTIN 100 MG: 100 CAPSULE ORAL at 08:36

## 2022-11-15 RX ADMIN — BUDESONIDE AND FORMOTEROL FUMARATE DIHYDRATE 2 PUFF: 80; 4.5 AEROSOL RESPIRATORY (INHALATION) at 08:00

## 2022-11-15 RX ADMIN — Medication 3 UNITS: at 16:09

## 2022-11-15 RX ADMIN — METFORMIN HYDROCHLORIDE 1000 MG: 500 TABLET, EXTENDED RELEASE ORAL at 08:35

## 2022-11-15 RX ADMIN — RISPERIDONE 2 MG: 1 TABLET ORAL at 21:29

## 2022-11-15 RX ADMIN — OXYBUTYNIN CHLORIDE 5 MG: 5 TABLET, EXTENDED RELEASE ORAL at 08:36

## 2022-11-15 RX ADMIN — SERTRALINE HYDROCHLORIDE 100 MG: 50 TABLET ORAL at 08:36

## 2022-11-15 NOTE — PROGRESS NOTES
Received care of patient resting in bed. Patient is responsive to voice commands. Pt endorses SI 10/10, No HI, and Auditory hallucinations with persecutory delusions that ghosts in the walls are telling her to harm herself. Her mood and affect are congruent to the situation. Patient is currently daya for safety. Patient is noted to be meal and medication compliant this shift, but refused her Nystatin powder this morning. Monitoring to continue for safety, support and situation.    Problem: Suicide  Goal: *STG: Remains safe in hospital  Outcome: Progressing Towards Goal

## 2022-11-15 NOTE — GROUP NOTE
EMILY  GROUP DOCUMENTATION INDIVIDUAL                                                                          Group Therapy Note    Date: 11/15/2022    Group Start Time: 1400  Group End Time: 1500  Group Topic: Recreational/Music Therapy    [facility] [place] [address] ACUTE BEHAV [facility]    [place], [address] [facility] GROUP DOCUMENTATION GROUP    Group Therapy Note    Attendees: 10       Attendance: Attended    Patient's Goal:  To concentrate on selected task    Interventions/techniques: Supported-crafts,games,music    Interactions: Interacted appropriately    Mental Status: Calm    Behavior/appearance: Attentive, Cooperative, and Needed prompting    Goals Achieved: Able to engage in interactions and Able to listen to others-socialized with peers and [facility]

## 2022-11-15 NOTE — GROUP NOTE
EMILY  GROUP DOCUMENTATION INDIVIDUAL                                                                          Group Therapy Note    Date: 11/15/2022    Group Start Time: 1000  Group End Time: 1100  Group Topic: Topic Group    [facility] [place] [facility] ACUTE [facility]    [address]    Group Therapy Note    Attendees: 12       Attendance: Did not attend    Josselyn Chow

## 2022-11-15 NOTE — PROGRESS NOTES
Patient's care assumed with the change of shift report received from the outgoing nurse - Franco Oseguera RN. Patient received resting in bed, alert and oriented, calm and cooperative and isolative to room. Patient endorsed passive SI and contracted to safety, denied HI, AVH, anxiety and depression. Patient assisted with ADL care. Pt is medication compliant. Safety maintained by Q 15 safety checks and nursing round. Patient observed coughing intermittently during the night. Head of bed elevated and scheduled inhaler administered. Prn Tylenol administered for compliant of foot pain. Patient refused offer incontinent brief to help with urine incontinence. Patient appears asleep for 8 hours. Problem: Falls - Risk of  Goal: *Absence of Falls  Description: Document [facility] Fall Risk and appropriate interventions in the flowsheet.   Outcome: Progressing Towards Goal  Note: Fall Risk Interventions:  Mobility Interventions: PT Consult for mobility concerns         Medication Interventions: Teach patient to arise slowly  Problem: Suicide  Goal: *STG: Remains safe in hospital  Outcome: Progressing Towards Goal     Problem: Suicide  Goal: *STG: Seeks staff when feelings of self harm or harm towards others arise  Outcome: Progressing Towards Goal

## 2022-11-15 NOTE — BH NOTES
PSYCHIATRIC PROGRESS NOTE         Patient Name  Suzy Pinon   Date of Birth 1965   [facility] [id number]   Medical Record Number  [id number]      Age  59 y.o. PCP Mohamud Mccullough MD   Admit date:  11/9/2022    Room Number  320/02  @ [facility] (formerly [facility])   Date of Service  11/15/2022         E & M PROGRESS NOTE:         HISTORY       CC:  \"suicidal ideation\"  HISTORY OF PRESENT ILLNESS/INTERVAL HISTORY:  (reviewed/updated 11/15/2022). per initial evaluation: The patient, Suzy Pinon, is a 59 y.o. WHITE/NON- female with a past psychiatric history significant for schizoaffective disorder, who presents at this time with complaints of (and/or evidence of) the following emotional symptoms: depression and suicidal thoughts/threats. Additional symptomatology include poor self care. The above symptoms have been present for 2+ weeks. These symptoms are of moderate to high severity. These symptoms are constant in nature. The patient's condition has been precipitated by psychosocial stressors. Patient's condition made worse by treatment noncompliance. UDS: negative; BAL=0. The patient is well known to the unit; she comes into the hospital frequently in crisis typically stating her 's death several years ago has made her feel hopeless. She has been living in a group home though this admission she states that she was kicked out of her group home because of an episode of incontinence. The patient is a poor historian. The patient corroborates the above narrative. The patient contracts for safety on the unit and gives consent for the team to contact collateral. The patient is amenable to initiating treatment while on the unit. She defers much of the team's inquires to her payee. 11/11 - no acute overnight events. The patient has been isolative to her room with poor ADLs. incontinent of urine but able to void when taken to the bathroom.  She denies active thoughts of self harm but is markedly internally preoccupied. Patient slept 8 hours overnight and got Trazodone. She denies active thoughts of self harm but endorses both depressed mood and anxiety. 11/14 - the patient remains in bed for much of the day. She has been inconitnent of urine and with poor ADLs requiring prompting for much activities. Patient denies active thoughts of self harm but endorses passive SI. She slept 7 hours overnight and is flat and disorganized on interview, moaning and providing little updates on her disposition plan. Per SW, the patient tends to not go to the group homes once assigned. Her payee is working on guardianship.    11/15 - overnight, the patient remained isolative to her bed, she continues to c/o anxiety and had an episode of urine incontinence. Patient refused nystatin stating her rash had 'cleared up.' She endorses ongoing AH and VH of 'ghosts' and states that she is doing very poorly. Patient evaluated by internist for ongoing LE swelling and pain. She is medication compliant and tolerating higher dose of Risperdal.        SIDE EFFECTS: (reviewed/updated 11/15/2022)  None reported or admitted to. No noted toxicity with use of Depakote   ALLERGIES:(reviewed/updated 11/15/2022)  Allergies   Allergen Reactions    Amoxicillin Hives    Mushroom Flavor Hives    Tomato Hives      REVIEW OF SYSTEMS: (reviewed/updated 11/15/2022)  Appetite:improved   Sleep: no change   All other Review of Systems: Negative except incontinence per HPI         [facility] (MSE):    MSE FINDINGS ARE WITHIN NORMAL LIMITS (WNL) UNLESS OTHERWISE STATED BELOW. ( ALL OF THE BELOW CATEGORIES OF THE MSE HAVE BEEN REVIEWED (reviewed 11/15/2022) AND UPDATED AS DEEMED APPROPRIATE )  General Presentation age appropriate, cooperative and guarded   Orientation disorganized   Vital Signs  See below (reviewed 11/15/2022); Vital Signs (BP, Pulse, & Temp) are within normal limits if not listed below. Gait and Station Stable/steady, no ataxia   Musculoskeletal System No extrapyramidal symptoms (EPS); no abnormal muscular movements or Tardive Dyskinesia (TD); muscle strength and tone are within normal limits   Language No aphasia or dysarthria   Speech:  normal volume and non-pressured   Thought Processes concrete; normal rate of thoughts; poor abstract reasoning/computation   Thought Associations blocked    Thought Content auditory hallucinations and visual hallucinations   Suicidal Ideations contracts for safety   Homicidal Ideations contracts for safety   Mood:  depressed and anhedonia   Affect:  flat   Memory recent  intact   Memory remote:  intact   Concentration/Attention:  intact   Fund of Knowledge average   Insight:  limited   Reliability fair   Judgment:  limited          VITALS:     Patient Vitals for the past 24 hrs:   Temp Pulse Resp BP SpO2   11/15/22 0904 98.1 °F (36.7 °C) 84 16 (!) 143/74 100 %   11/14/22 1940 97.7 °F (36.5 °C) 83 16 (!) 103/55 100 %       Wt Readings from Last 3 Encounters:   11/11/22 123.2 kg (271 lb 8 oz)   06/03/22 113.9 kg (251 lb)   05/14/22 88.5 kg (195 lb)     Temp Readings from Last 3 Encounters:   11/15/22 98.1 °F (36.7 °C)   06/03/22 97.6 °F (36.4 °C)   05/19/22 98.5 °F (36.9 °C)     BP Readings from Last 3 Encounters:   11/15/22 (!) 143/74   06/03/22 130/69   05/19/22 (!) 112/51     Pulse Readings from Last 3 Encounters:   11/15/22 84   06/03/22 89   05/19/22 64            DATA     LABORATORY DATA:(reviewed/updated 11/15/2022)  Recent Results (from the past 24 hour(s))   GLUCOSE, POC    Collection Time: 11/14/22  8:15 PM   Result Value Ref Range    Glucose (POC) 159 (H) 65 - 117 mg/dL    Performed by Ekaterina Alejandre RN    GLUCOSE, POC    Collection Time: 11/15/22  7:53 AM   Result Value Ref Range    Glucose (POC) 156 (H) 65 - 117 mg/dL    Performed by Jeni Slater    GLUCOSE, POC    Collection Time: 11/15/22 11:05 AM   Result Value Ref Range    Glucose (POC) 182 (H) 65 - 117 mg/dL    Performed by Nazanin Manriquez RN    GLUCOSE, POC    Collection Time: 11/15/22  3:52 PM   Result Value Ref Range    Glucose (POC) 206 (H) 65 - 117 mg/dL    Performed by Nazanin Manriquez RN      No results found for: VALF2, VALAC, VALP, VALPR, DS6, CRBAM, CRBAMP, CARB2, XCRBAM  No results found for: LITHM   RADIOLOGY REPORTS:(reviewed/updated 11/15/2022)  No results found.        MEDICATIONS     ALL MEDICATIONS:   Current Facility-Administered Medications   Medication Dose Route Frequency    ibuprofen (MOTRIN) tablet 400 mg  400 mg Oral Q6H PRN    nystatin (MYCOSTATIN) 100,000 unit/gram powder   Topical BID    risperiDONE (RisperDAL) tablet 2 mg  2 mg Oral Q12H    benzocaine-menthoL (CHLORASEPTIC MAX) lozenge 1 Lozenge  1 Lozenge Oral Q2H PRN    glucose chewable tablet 16 g  4 Tablet Oral PRN    glucagon (GLUCAGEN) injection 1 mg  1 mg IntraMUSCular PRN    dextrose 10% infusion 0-250 mL  0-250 mL IntraVENous PRN    albuterol (PROVENTIL HFA, VENTOLIN HFA, PROAIR HFA) inhaler 1 Puff  1 Puff Inhalation Q4H PRN    insulin lispro (HUMALOG) injection 1-10 Units  1-10 Units SubCUTAneous AC&HS    insulin lispro (HUMALOG) injection 5 Units  5 Units SubCUTAneous TIDAC    budesonide-formoterol (SYMBICORT) 80-4.5 mcg inhaler  2 Puff Inhalation BID RT    insulin glargine (LANTUS) injection 25 Units  25 Units SubCUTAneous DAILY    OLANZapine (ZyPREXA) tablet 5 mg  5 mg Oral Q6H PRN    haloperidol lactate (HALDOL) injection 5 mg  5 mg IntraMUSCular Q6H PRN    benztropine (COGENTIN) tablet 1 mg  1 mg Oral BID PRN    diphenhydrAMINE (BENADRYL) injection 50 mg  50 mg IntraMUSCular BID PRN    hydrOXYzine HCL (ATARAX) tablet 50 mg  50 mg Oral TID PRN    LORazepam (ATIVAN) injection 1 mg  1 mg IntraMUSCular Q4H PRN    traZODone (DESYREL) tablet 50 mg  50 mg Oral QHS PRN    acetaminophen (TYLENOL) tablet 650 mg  650 mg Oral Q4H PRN    magnesium hydroxide (MILK OF MAGNESIA) 400 mg/5 mL oral suspension 30 mL  30 mL Oral DAILY PRN    atorvastatin (LIPITOR) tablet 40 mg  40 mg Oral QPM    gabapentin (NEURONTIN) capsule 100 mg  100 mg Oral TID    metFORMIN ER (GLUCOPHAGE XR) tablet 1,000 mg  1,000 mg Oral BID WITH MEALS    oxybutynin chloride XL (DITROPAN XL) tablet 5 mg  5 mg Oral DAILY    sertraline (ZOLOFT) tablet 100 mg  100 mg Oral DAILY      SCHEDULED MEDICATIONS:   Current Facility-Administered Medications   Medication Dose Route Frequency    nystatin (MYCOSTATIN) 100,000 unit/gram powder   Topical BID    risperiDONE (RisperDAL) tablet 2 mg  2 mg Oral Q12H    insulin lispro (HUMALOG) injection 1-10 Units  1-10 Units SubCUTAneous AC&HS    insulin lispro (HUMALOG) injection 5 Units  5 Units SubCUTAneous TIDAC    budesonide-formoterol (SYMBICORT) 80-4.5 mcg inhaler  2 Puff Inhalation BID RT    insulin glargine (LANTUS) injection 25 Units  25 Units SubCUTAneous DAILY    atorvastatin (LIPITOR) tablet 40 mg  40 mg Oral QPM    gabapentin (NEURONTIN) capsule 100 mg  100 mg Oral TID    metFORMIN ER (GLUCOPHAGE XR) tablet 1,000 mg  1,000 mg Oral BID WITH MEALS    oxybutynin chloride XL (DITROPAN XL) tablet 5 mg  5 mg Oral DAILY    sertraline (ZOLOFT) tablet 100 mg  100 mg Oral DAILY          ASSESSMENT & PLAN     DIAGNOSES REQUIRING ACTIVE TREATMENT AND MONITORING: (reviewed/updated 11/15/2022)  Patient Active Hospital Problem List:       Schizoaffective disorder (11/10/2022)           Assessment: the patient presents in crisis after potentially leaving her group home; she is disorganized and a poor historian at baseline, treatment will be primarily psychosocial.         Plan:   - CONTINUE Risperdal 2 mg Q12H for psychosis  - CONTINUE Zoloft 100 mg QDAY for MDD  - Consult to DM team  - IGM therapy as tolerated  - Expand database / obtain collateral  - Dispo planning    In summary, Francisco Coreas, is a 59 y.o.  female who presents with a severe exacerbation of the principal diagnosis of Schizoaffective disorder ([facility] [address]    Patient's condition is not improving. Patient requires continued inpatient hospitalization for further stabilization, safety monitoring and medication management. I will continue to coordinate the provision of individual, milieu, occupational, group, and substance abuse therapies to address target symptoms/diagnoses as deemed appropriate for the individual patient. A coordinated, multidisplinary treatment team round was conducted with the patient (this team consists of the nurse, psychiatric unit pharmacist,  and writer). Complete current electronic health record for patient has been reviewed today including consultant notes, ancillary staff notes, nurses and psychiatric tech notes. Suicide risk assessment completed and patient deemed to be of low risk for suicide at this time. The following regarding medications was addressed during rounds with patient:   the risks and benefits of the proposed medication. The patient was given the opportunity to ask questions. Informed consent given to the use of the above medications. Will continue to adjust psychiatric and non-psychiatric medications (see above \"medication\" section and orders section for details) as deemed appropriate & based upon diagnoses and response to treatment. I will continue to order blood tests/labs and diagnostic tests as deemed appropriate and review results as they become available (see orders for details and above listed lab/test results). I will order psychiatric records from previous [facility] hospitals to further elucidate the nature of patient's psychopathology and review once available. I will gather additional collateral information from friends, family and o/p treatment team to further elucidate the nature of patient's psychopathology and baselline level of psychiatric functioning.          I certify that this patient's inpatient psychiatric hospital services furnished since the previous certification were, and continue to be, required for treatment that could reasonably be expected to improve the patient's condition, or for diagnostic study, and that the patient continues to need, on a daily basis, active treatment furnished directly by or requiring the supervision of inpatient psychiatric facility personnel. In addition, the hospital records show that services furnished were intensive treatment services, admission or related services, or equivalent services.     EXPECTED DISCHARGE DATE/DAY: TBD     DISPOSITION: Home       Signed By:   Melodie Libman, MD  11/15/2022

## 2022-11-15 NOTE — BH NOTES
Behavioral Health Treatment Team Note         Progress note: Treatment team met with patient in her room. Patient continues to endorse AH telling her to hurt herself. Patient remains isolative to room. Patient provided verbal consent for her payee to become her guardian today at 10:45 am. Pauline Rubio will be in contact with guardian at a later time and inform her of this in effort to help assist with disposition planning. No other concerns voiced at this time.     LOS:  5  Expected LOS: TBD    Insurance info/prescription coverage:  [facility] MEDICAID/[facility] [employer] [facility]  Date of last family contact:    Family requesting physician contact today:  no  Discharge plan:  TBD  Guns in the home:  no   Outpatient provider(s):  nina Yrn Franciscaon [phone]    Participating treatment team members: Aysha Tom MSW, Dasia Greene MD

## 2022-11-15 NOTE — PROGRESS NOTES
Physical therapy:     Chart reviewed. Patient received laying in bed. Patient declining physical therapy this date secondary to fatigue and wanting to rest despite education provided. Will attempt as able.      Lea Lynch, PT

## 2022-11-16 ENCOUNTER — APPOINTMENT (OUTPATIENT)
Dept: GENERAL RADIOLOGY | Age: 57
DRG: 750 | End: 2022-11-16
Attending: STUDENT IN AN ORGANIZED HEALTH CARE EDUCATION/TRAINING PROGRAM
Payer: MEDICAID

## 2022-11-16 LAB
ALBUMIN SERPL-MCNC: 2.7 G/DL (ref 3.5–5)
ALBUMIN/GLOB SERPL: 0.7 {RATIO} (ref 1.1–2.2)
ALP SERPL-CCNC: 133 U/L (ref 45–117)
ALT SERPL-CCNC: 24 U/L (ref 12–78)
ANION GAP SERPL CALC-SCNC: 8 MMOL/L (ref 5–15)
AST SERPL-CCNC: 13 U/L (ref 15–37)
BASOPHILS # BLD: 0 K/UL (ref 0–0.1)
BASOPHILS NFR BLD: 1 % (ref 0–1)
BILIRUB DIRECT SERPL-MCNC: 0.1 MG/DL (ref 0–0.2)
BILIRUB SERPL-MCNC: 0.2 MG/DL (ref 0.2–1)
BNP SERPL-MCNC: 124 PG/ML (ref 0–125)
BUN SERPL-MCNC: 18 MG/DL (ref 6–20)
BUN/CREAT SERPL: 17 (ref 12–20)
CALCIUM SERPL-MCNC: 8.7 MG/DL (ref 8.5–10.1)
CHLORIDE SERPL-SCNC: 99 MMOL/L (ref 97–108)
CO2 SERPL-SCNC: 30 MMOL/L (ref 21–32)
CREAT SERPL-MCNC: 1.03 MG/DL (ref 0.55–1.02)
DIFFERENTIAL METHOD BLD: ABNORMAL
EOSINOPHIL # BLD: 0.3 K/UL (ref 0–0.4)
EOSINOPHIL NFR BLD: 4 % (ref 0–7)
ERYTHROCYTE [DISTWIDTH] IN BLOOD BY AUTOMATED COUNT: 14.9 % (ref 11.5–14.5)
FLUAV RNA SPEC QL NAA+PROBE: NOT DETECTED
FLUBV RNA SPEC QL NAA+PROBE: NOT DETECTED
GLOBULIN SER CALC-MCNC: 3.9 G/DL (ref 2–4)
GLUCOSE BLD STRIP.AUTO-MCNC: 160 MG/DL (ref 65–117)
GLUCOSE BLD STRIP.AUTO-MCNC: 169 MG/DL (ref 65–117)
GLUCOSE BLD STRIP.AUTO-MCNC: 197 MG/DL (ref 65–117)
GLUCOSE BLD STRIP.AUTO-MCNC: 293 MG/DL (ref 65–117)
GLUCOSE SERPL-MCNC: 265 MG/DL (ref 65–100)
HCT VFR BLD AUTO: 35.7 % (ref 35–47)
HGB BLD-MCNC: 10.9 G/DL (ref 11.5–16)
IMM GRANULOCYTES # BLD AUTO: 0 K/UL (ref 0–0.04)
IMM GRANULOCYTES NFR BLD AUTO: 0 % (ref 0–0.5)
LYMPHOCYTES # BLD: 1.8 K/UL (ref 0.8–3.5)
LYMPHOCYTES NFR BLD: 23 % (ref 12–49)
MAGNESIUM SERPL-MCNC: 1.5 MG/DL (ref 1.6–2.4)
MCH RBC QN AUTO: 25 PG (ref 26–34)
MCHC RBC AUTO-ENTMCNC: 30.5 G/DL (ref 30–36.5)
MCV RBC AUTO: 81.9 FL (ref 80–99)
MONOCYTES # BLD: 0.4 K/UL (ref 0–1)
MONOCYTES NFR BLD: 5 % (ref 5–13)
NEUTS SEG # BLD: 5.4 K/UL (ref 1.8–8)
NEUTS SEG NFR BLD: 67 % (ref 32–75)
NRBC # BLD: 0 K/UL (ref 0–0.01)
NRBC BLD-RTO: 0 PER 100 WBC
PHOSPHATE SERPL-MCNC: 3.5 MG/DL (ref 2.6–4.7)
PLATELET # BLD AUTO: 413 K/UL (ref 150–400)
PMV BLD AUTO: 8.9 FL (ref 8.9–12.9)
POTASSIUM SERPL-SCNC: 4.8 MMOL/L (ref 3.5–5.1)
PROT SERPL-MCNC: 6.6 G/DL (ref 6.4–8.2)
RBC # BLD AUTO: 4.36 M/UL (ref 3.8–5.2)
SARS-COV-2, COV2: NOT DETECTED
SERVICE CMNT-IMP: ABNORMAL
SODIUM SERPL-SCNC: 137 MMOL/L (ref 136–145)
TROPONIN-HIGH SENSITIVITY: 9 NG/L (ref 0–51)
WBC # BLD AUTO: 7.9 K/UL (ref 3.6–11)

## 2022-11-16 PROCEDURE — 65220000003 HC RM SEMIPRIVATE PSYCH

## 2022-11-16 PROCEDURE — 87636 SARSCOV2 & INF A&B AMP PRB: CPT

## 2022-11-16 PROCEDURE — 80076 HEPATIC FUNCTION PANEL: CPT

## 2022-11-16 PROCEDURE — 83880 ASSAY OF NATRIURETIC PEPTIDE: CPT

## 2022-11-16 PROCEDURE — 83735 ASSAY OF MAGNESIUM: CPT

## 2022-11-16 PROCEDURE — 74011250637 HC RX REV CODE- 250/637: Performed by: STUDENT IN AN ORGANIZED HEALTH CARE EDUCATION/TRAINING PROGRAM

## 2022-11-16 PROCEDURE — 82962 GLUCOSE BLOOD TEST: CPT

## 2022-11-16 PROCEDURE — 71045 X-RAY EXAM CHEST 1 VIEW: CPT

## 2022-11-16 PROCEDURE — 84484 ASSAY OF TROPONIN QUANT: CPT

## 2022-11-16 PROCEDURE — 84100 ASSAY OF PHOSPHORUS: CPT

## 2022-11-16 PROCEDURE — 36415 COLL VENOUS BLD VENIPUNCTURE: CPT

## 2022-11-16 PROCEDURE — 74011636637 HC RX REV CODE- 636/637: Performed by: STUDENT IN AN ORGANIZED HEALTH CARE EDUCATION/TRAINING PROGRAM

## 2022-11-16 PROCEDURE — 80048 BASIC METABOLIC PNL TOTAL CA: CPT

## 2022-11-16 PROCEDURE — 99231 SBSQ HOSP IP/OBS SF/LOW 25: CPT | Performed by: PSYCHIATRY & NEUROLOGY

## 2022-11-16 PROCEDURE — 85025 COMPLETE CBC W/AUTO DIFF WBC: CPT

## 2022-11-16 PROCEDURE — 93005 ELECTROCARDIOGRAM TRACING: CPT

## 2022-11-16 PROCEDURE — 74011250637 HC RX REV CODE- 250/637: Performed by: PSYCHIATRY & NEUROLOGY

## 2022-11-16 RX ADMIN — Medication 2 UNITS: at 17:16

## 2022-11-16 RX ADMIN — GABAPENTIN 100 MG: 100 CAPSULE ORAL at 17:17

## 2022-11-16 RX ADMIN — Medication 2 UNITS: at 11:43

## 2022-11-16 RX ADMIN — OXYBUTYNIN CHLORIDE 5 MG: 5 TABLET, EXTENDED RELEASE ORAL at 08:30

## 2022-11-16 RX ADMIN — ATORVASTATIN CALCIUM 40 MG: 40 TABLET, FILM COATED ORAL at 17:17

## 2022-11-16 RX ADMIN — Medication 25 UNITS: at 08:31

## 2022-11-16 RX ADMIN — Medication 5 UNITS: at 17:16

## 2022-11-16 RX ADMIN — SERTRALINE HYDROCHLORIDE 100 MG: 50 TABLET ORAL at 08:31

## 2022-11-16 RX ADMIN — BUDESONIDE AND FORMOTEROL FUMARATE DIHYDRATE 2 PUFF: 80; 4.5 AEROSOL RESPIRATORY (INHALATION) at 20:30

## 2022-11-16 RX ADMIN — RISPERIDONE 2 MG: 1 TABLET ORAL at 20:30

## 2022-11-16 RX ADMIN — Medication 3 UNITS: at 20:30

## 2022-11-16 RX ADMIN — RISPERIDONE 2 MG: 1 TABLET ORAL at 08:30

## 2022-11-16 RX ADMIN — Medication 5 UNITS: at 11:41

## 2022-11-16 RX ADMIN — BUDESONIDE AND FORMOTEROL FUMARATE DIHYDRATE 2 PUFF: 80; 4.5 AEROSOL RESPIRATORY (INHALATION) at 08:00

## 2022-11-16 RX ADMIN — METFORMIN HYDROCHLORIDE 1000 MG: 500 TABLET, EXTENDED RELEASE ORAL at 17:17

## 2022-11-16 RX ADMIN — GABAPENTIN 100 MG: 100 CAPSULE ORAL at 11:41

## 2022-11-16 RX ADMIN — ALBUTEROL SULFATE 1 PUFF: 90 AEROSOL, METERED RESPIRATORY (INHALATION) at 16:43

## 2022-11-16 RX ADMIN — Medication 5 UNITS: at 08:31

## 2022-11-16 RX ADMIN — METFORMIN HYDROCHLORIDE 1000 MG: 500 TABLET, EXTENDED RELEASE ORAL at 08:31

## 2022-11-16 RX ADMIN — GABAPENTIN 100 MG: 100 CAPSULE ORAL at 08:31

## 2022-11-16 RX ADMIN — Medication 2 UNITS: at 08:32

## 2022-11-16 NOTE — BH NOTES
Behavioral Health Interdisciplinary Rounds    Patient goal(s) for today: Continue taking meds as, participate in hygiene/ADLs, prepare for discharge, follow directions from staff, contact support team, attend groups        Progress note: Per nursing, patient slept for 8 hours and reports SI/Anxiety/depression 10/10, and AVH of ghosts in the walls telling her to harm herself with a knife. Patient was met in her room by treatment team. Patient is alert and oriented x 4. Patient remains isolative and in bed, but leaves room for meals. Patient was asked today to talk about why she keeps leaving group homes. Patient reports \"They're always fussing at me\". Patient reports she prefers to stay in the hospital than a group home, but could not explain why. Patient is still agreeable to payee becoming her guardian. SW will work with payee to look into [facility] or another long term placement for the patient.      LOS: 6                     Expected LOS: TBD     Insurance info: [facility] MEDICAID - [place] [facility]  Outpatient providers: None, payee Faviola Linares [phone]  OCTAVIA attempted to contact Faviola Linares [phone] to discuss pursuing guardianship, no answer left vm     Participating treatment team members: Jacquelyn Bee MSW, Carlos Rainey MSW Student, Nilda Tabares MD

## 2022-11-16 NOTE — PROGRESS NOTES
Behavioral Services                                              Medicare Re-Certification    I certify that the inpatient psychiatric hospital services furnished since the previous certification/re-certification were, and continue to be, medically necessary for;    [x] (1) Treatment which could reasonably be expected to improve the patient's condition,    [x] (2) Or for diagnostic study. Estimated length of stay/service 3-5 days    Plan for post-hospital care home / csu    This patient continues to need, on a daily basis, active treatment furnished directly by or requiring the supervision of inpatient psychiatric personnel.     Electronically signed by Max Ruvalcaba MD on 11/16/2022 at 6:37 PM

## 2022-11-16 NOTE — PROGRESS NOTES
Comprehensive Nutrition Assessment     Type and Reason for Visit: Initial,, LOS     Nutrition Recommendations/Plan:   Continue current diet: CC 3 CHO choices per meal      Malnutrition Assessment:  Malnutrition Status:  No malnutrition (05/17/22 1301)    Context:  Chronic illness     Findings of the 6 clinical characteristics of malnutrition:   Energy Intake:  No significant decrease in energy intake  Weight Loss:  No significant weight loss     Body Fat Loss:  No significant body fat loss (visual),     Muscle Mass Loss:  Unable to assess,    Fluid Accumulation:  No significant fluid accumulation,          Nutrition Assessment:    Admitted for depression, SI. Hx notable for DM (8.1), morbid obesity. Pt reports no issues with appetite or intakes pta, denies recent wt loss. Pt wants cheeseburger and fries for lunch, BG between 154-267 mg/dL. Will wait in changing meals until glycemic control is tighter- PO DM medications and insulin on. No further nutrition interventions at this time. Nutrition Related Findings:    Appears well nourished, obese with central adiposity. Denies issues with c/s. No N/V/D/C, last BM pta. No edema. Wound Type: None. Pt has gained nearly 100# in past 8 years. Current Nutrition Intake & Therapies:  Average Meal Intake: %  Average Supplement Intake: None ordered  ADULT DIET Regular; 3 carb choices (45 gm/meal)     Anthropometric Measures:  Height: 5' 5\" (165.1 cm)  Ideal Body Weight (IBW): 125 lbs (57 kg)  Current Body Wt:  88.5 kg (271 lb 8 oz) (11/9), 217.6.1 % IBW.  Not specified  Current BMI (kg/m2): 45.18  Usual Body Weight: 104.3 kg (230 lb) (per EMR, pt stated \"Two-Something\")  % Weight Change (Calculated): -15.2  Weight Adjustment: No adjustment  BMI Category: Obese class 3 (BMI > 40.0)      Wt Readings from Last 10 Encounters:   05/14/22 88.5 kg (195 lb)   12/30/21 104.3 kg (230 lb)   09/27/21 113.4 kg (250 lb)   09/26/21 113.7 kg (250 lb 10.6 oz)   09/24/21 113.7 kg (250 lb 10.6 oz)   09/21/21 113.4 kg (250 lb)   07/30/21 112.9 kg (248 lb 12.8 oz)   04/26/21 105.2 kg (232 lb)   03/24/19 106.6 kg (235 lb)   10/04/18 104.3 kg (230 lb)         Nutrition Diagnosis:   No nutrition diagnosis at this time      Nutrition Interventions:   Food and/or Nutrient Delivery: Continue current diet  Nutrition Education/Counseling: No recommendations at this time  Coordination of Nutrition Care: Continue to monitor while inpatient  Plan of Care discussed with: pt     Goals:  Goals: Meet at least 75% of estimated needs,prior to [address] (specify)  Specify Other Goals:  Wt maintenance vs loss of 0.5kg /week     Nutrition Monitoring and Evaluation:   Behavioral-Environmental Outcomes: None identified  Food/Nutrient Intake Outcomes: Diet advancement/tolerance,Food and nutrient intake  Physical Signs/Symptoms Outcomes: Weight,Biochemical data     Discharge Planning:    Continue current diet,Recommend pursue outpatient diabetes education     Cris Virgen, [address] 11/16/22 8:07 AM  [phone]

## 2022-11-16 NOTE — PROGRESS NOTES
GROUP THERAPY PROGRESS NOTE      Khushbu Lloyd was not present for medication group.       Elijah Trinidad [facility] Specialist, [address] [place] [address]  Desk: [phone] ([id number])  Pharmacy: [phone] ([id number])

## 2022-11-16 NOTE — BH NOTES
PSYCHIATRIC PROGRESS NOTE         Patient Name  Ernesto Fernandez   Date of Birth 1965   [facility] [id number]   Medical Record Number  [id number]      Age  59 y.o. PCP Sonal Walsh MD   Admit date:  11/9/2022    Room Number  320/02  @ [facility]   Date of Service  11/16/2022         E & M PROGRESS NOTE:         HISTORY       CC:  \"suicidal ideation\"  HISTORY OF PRESENT ILLNESS/INTERVAL HISTORY:  (reviewed/updated 11/16/2022). per initial evaluation: The patient, Ernesto Fernandez, is a 59 y.o. WHITE/NON- female with a past psychiatric history significant for schizoaffective disorder, who presents at this time with complaints of (and/or evidence of) the following emotional symptoms: depression and suicidal thoughts/threats. Additional symptomatology include poor self care. The above symptoms have been present for 2+ weeks. These symptoms are of moderate to high severity. These symptoms are constant in nature. The patient's condition has been precipitated by psychosocial stressors. Patient's condition made worse by treatment noncompliance. UDS: negative; BAL=0. The patient is well known to the unit; she comes into the hospital frequently in crisis typically stating her 's death several years ago has made her feel hopeless. She has been living in a group home though this admission she states that she was kicked out of her group home because of an episode of incontinence. The patient is a poor historian. The patient corroborates the above narrative. The patient contracts for safety on the unit and gives consent for the team to contact collateral. The patient is amenable to initiating treatment while on the unit. She defers much of the team's inquires to her payee. 11/11 - no acute overnight events. The patient has been isolative to her room with poor ADLs. incontinent of urine but able to void when taken to the bathroom.  She denies active thoughts of self harm but is markedly internally preoccupied. Patient slept 8 hours overnight and got Trazodone. She denies active thoughts of self harm but endorses both depressed mood and anxiety. 11/14 - the patient remains in bed for much of the day. She has been inconitnent of urine and with poor ADLs requiring prompting for much activities. Patient denies active thoughts of self harm but endorses passive SI. She slept 7 hours overnight and is flat and disorganized on interview, moaning and providing little updates on her disposition plan. Per SW, the patient tends to not go to the group homes once assigned. Her payee is working on guardianship.    11/15 - overnight, the patient remained isolative to her bed, she continues to c/o anxiety and had an episode of urine incontinence. Patient refused nystatin stating her rash had 'cleared up.' She endorses ongoing AH and VH of 'ghosts' and states that she is doing very poorly. Patient evaluated by internist for ongoing LE swelling and pain. She is medication compliant and tolerating higher dose of Risperdal.    11/16 - the patient has remained in bed, out for meals and with little motivation to interact in the milieu. She got Motrin and Atarax and continues to c/o pain in her LE, as well as SI with a plan to cut herself if given the chance. The patient slept 8 hours and this morning spontaneously stated to the team that she wanted to be discharged -- she acknowledges that her payee and SW are working on a dispo plan and she is still symptomatic. Medicine consulted as she is now experiencing orthopnea. SIDE EFFECTS: (reviewed/updated 11/16/2022)  None reported or admitted to.   No noted toxicity with use of Depakote   ALLERGIES:(reviewed/updated 11/16/2022)  Allergies   Allergen Reactions    Amoxicillin Hives    Mushroom Flavor Hives    Tomato Hives      REVIEW OF SYSTEMS: (reviewed/updated 11/16/2022)  Appetite:improved   Sleep: no change   All other Review of Systems: Negative except incontinence per HPI         [address] (MSE):    MSE FINDINGS ARE WITHIN NORMAL LIMITS (WNL) UNLESS OTHERWISE STATED BELOW. ( ALL OF THE BELOW CATEGORIES OF THE MSE HAVE BEEN REVIEWED (reviewed 11/16/2022) AND UPDATED AS DEEMED APPROPRIATE )  General Presentation age appropriate, cooperative and guarded   Orientation disorganized   Vital Signs  See below (reviewed 11/16/2022); Vital Signs (BP, Pulse, & Temp) are within normal limits if not listed below.    Gait and Station Stable/steady, no ataxia   Musculoskeletal System No extrapyramidal symptoms (EPS); no abnormal muscular movements or Tardive Dyskinesia (TD); muscle strength and tone are within normal limits   Language No aphasia or dysarthria   Speech:  normal volume and non-pressured   Thought Processes concrete; normal rate of thoughts; poor abstract reasoning/computation   Thought Associations blocked    Thought Content auditory hallucinations and visual hallucinations   Suicidal Ideations contracts for safety   Homicidal Ideations contracts for safety   Mood:  depressed and anhedonia   Affect:  flat   Memory recent  intact   Memory remote:  intact   Concentration/Attention:  intact   Fund of Knowledge average   Insight:  limited   Reliability fair   Judgment:  limited          VITALS:     Patient Vitals for the past 24 hrs:   Temp Pulse Resp BP SpO2   11/16/22 1608 98.7 °F (37.1 °C) 83 24 (!) 148/61 98 %   11/16/22 0801 98.5 °F (36.9 °C) 84 14 (!) 141/70 93 %   11/15/22 2000 97.6 °F (36.4 °C) 65 14 (!) 128/59 95 %       Wt Readings from Last 3 Encounters:   11/11/22 123.2 kg (271 lb 8 oz)   06/03/22 113.9 kg (251 lb)   05/14/22 88.5 kg (195 lb)     Temp Readings from Last 3 Encounters:   11/16/22 98.7 °F (37.1 °C)   06/03/22 97.6 °F (36.4 °C)   05/19/22 98.5 °F (36.9 °C)     BP Readings from Last 3 Encounters:   11/16/22 (!) 148/61   06/03/22 130/69   05/19/22 (!) 112/51     Pulse Readings from Last 3 Encounters:   11/16/22 83   06/03/22 89   05/19/22 64            DATA     LABORATORY DATA:(reviewed/updated 11/16/2022)  Recent Results (from the past 24 hour(s))   GLUCOSE, POC    Collection Time: 11/15/22  8:45 PM   Result Value Ref Range    Glucose (POC) 188 (H) 65 - 117 mg/dL    Performed by Sotero Cheney RN    GLUCOSE, POC    Collection Time: 11/16/22  7:24 AM   Result Value Ref Range    Glucose (POC) 160 (H) 65 - 117 mg/dL    Performed by Raeann Ferrell RN    GLUCOSE, POC    Collection Time: 11/16/22 10:52 AM   Result Value Ref Range    Glucose (POC) 169 (H) 65 - 117 mg/dL    Performed by Raeann Ferrell RN    GLUCOSE, POC    Collection Time: 11/16/22  4:01 PM   Result Value Ref Range    Glucose (POC) 197 (H) 65 - 117 mg/dL    Performed by Raeann Ferrell RN    CBC WITH AUTOMATED DIFF    Collection Time: 11/16/22  4:35 PM   Result Value Ref Range    WBC 7.9 3.6 - 11.0 K/uL    RBC 4.36 3.80 - 5.20 M/uL    HGB 10.9 (L) 11.5 - 16.0 g/dL    HCT 35.7 35.0 - 47.0 %    MCV 81.9 80.0 - 99.0 FL    MCH 25.0 (L) 26.0 - 34.0 PG    MCHC 30.5 30.0 - 36.5 g/dL    RDW 14.9 (H) 11.5 - 14.5 %    PLATELET 079 (H) 410 - 400 K/uL    MPV 8.9 8.9 - 12.9 FL    NRBC 0.0 0  WBC    ABSOLUTE NRBC 0.00 0.00 - 0.01 K/uL    NEUTROPHILS 67 32 - 75 %    LYMPHOCYTES 23 12 - 49 %    MONOCYTES 5 5 - 13 %    EOSINOPHILS 4 0 - 7 %    BASOPHILS 1 0 - 1 %    IMMATURE GRANULOCYTES 0 0.0 - 0.5 %    ABS. NEUTROPHILS 5.4 1.8 - 8.0 K/UL    ABS. LYMPHOCYTES 1.8 0.8 - 3.5 K/UL    ABS. MONOCYTES 0.4 0.0 - 1.0 K/UL    ABS. EOSINOPHILS 0.3 0.0 - 0.4 K/UL    ABS. BASOPHILS 0.0 0.0 - 0.1 K/UL    ABS. IMM.  GRANS. 0.0 0.00 - 0.04 K/UL    DF AUTOMATED     METABOLIC PANEL, BASIC    Collection Time: 11/16/22  4:35 PM   Result Value Ref Range    Sodium 137 136 - 145 mmol/L    Potassium 4.8 3.5 - 5.1 mmol/L    Chloride 99 97 - 108 mmol/L    CO2 30 21 - 32 mmol/L    Anion gap 8 5 - 15 mmol/L    Glucose 265 (H) 65 - 100 mg/dL    BUN 18 6 - 20 MG/DL    Creatinine 1.03 (H) 0.55 - 1.02 MG/DL    BUN/Creatinine ratio 17 12 - 20      eGFR >60 >60 ml/min/1.73m2    Calcium 8.7 8.5 - 10.1 MG/DL   MAGNESIUM    Collection Time: 11/16/22  4:35 PM   Result Value Ref Range    Magnesium 1.5 (L) 1.6 - 2.4 mg/dL   PHOSPHORUS    Collection Time: 11/16/22  4:35 PM   Result Value Ref Range    Phosphorus 3.5 2.6 - 4.7 MG/DL   HEPATIC FUNCTION PANEL    Collection Time: 11/16/22  4:35 PM   Result Value Ref Range    Protein, total 6.6 6.4 - 8.2 g/dL    Albumin 2.7 (L) 3.5 - 5.0 g/dL    Globulin 3.9 2.0 - 4.0 g/dL    A-G Ratio 0.7 (L) 1.1 - 2.2      Bilirubin, total 0.2 0.2 - 1.0 MG/DL    Bilirubin, direct 0.1 0.0 - 0.2 MG/DL    Alk. phosphatase 133 (H) 45 - 117 U/L    AST (SGOT) 13 (L) 15 - 37 U/L    ALT (SGPT) 24 12 - 78 U/L   COVID-19 WITH INFLUENZA A/B    Collection Time: 11/16/22  4:35 PM   Result Value Ref Range    SARS-CoV-2 by PCR Not detected NOTD      Influenza A by PCR Not detected      Influenza B by PCR Not detected     NT-PRO BNP    Collection Time: 11/16/22  4:35 PM   Result Value Ref Range    NT pro- 0 - 125 PG/ML   TROPONIN-HIGH SENSITIVITY    Collection Time: 11/16/22  4:35 PM   Result Value Ref Range    Troponin-High Sensitivity 9 0 - 51 ng/L     No results found for: VALF2, VALAC, VALP, VALPR, DS6, CRBAM, CRBAMP, CARB2, XCRBAM  No results found for: LITHM   RADIOLOGY REPORTS:(reviewed/updated 11/16/2022)  No results found.        MEDICATIONS     ALL MEDICATIONS:   Current Facility-Administered Medications   Medication Dose Route Frequency    ibuprofen (MOTRIN) tablet 400 mg  400 mg Oral Q6H PRN    nystatin (MYCOSTATIN) 100,000 unit/gram powder   Topical BID    risperiDONE (RisperDAL) tablet 2 mg  2 mg Oral Q12H    benzocaine-menthoL (CHLORASEPTIC MAX) lozenge 1 Lozenge  1 Lozenge Oral Q2H PRN    glucose chewable tablet 16 g  4 Tablet Oral PRN    glucagon (GLUCAGEN) injection 1 mg  1 mg IntraMUSCular PRN    dextrose 10% infusion 0-250 mL  0-250 mL IntraVENous PRN albuterol (PROVENTIL HFA, VENTOLIN HFA, PROAIR HFA) inhaler 1 Puff  1 Puff Inhalation Q4H PRN    insulin lispro (HUMALOG) injection 1-10 Units  1-10 Units SubCUTAneous AC&HS    insulin lispro (HUMALOG) injection 5 Units  5 Units SubCUTAneous TIDAC    budesonide-formoterol (SYMBICORT) 80-4.5 mcg inhaler  2 Puff Inhalation BID RT    insulin glargine (LANTUS) injection 25 Units  25 Units SubCUTAneous DAILY    OLANZapine (ZyPREXA) tablet 5 mg  5 mg Oral Q6H PRN    haloperidol lactate (HALDOL) injection 5 mg  5 mg IntraMUSCular Q6H PRN    benztropine (COGENTIN) tablet 1 mg  1 mg Oral BID PRN    diphenhydrAMINE (BENADRYL) injection 50 mg  50 mg IntraMUSCular BID PRN    hydrOXYzine HCL (ATARAX) tablet 50 mg  50 mg Oral TID PRN    LORazepam (ATIVAN) injection 1 mg  1 mg IntraMUSCular Q4H PRN    traZODone (DESYREL) tablet 50 mg  50 mg Oral QHS PRN    acetaminophen (TYLENOL) tablet 650 mg  650 mg Oral Q4H PRN    magnesium hydroxide (MILK OF MAGNESIA) 400 mg/5 mL oral suspension 30 mL  30 mL Oral DAILY PRN    atorvastatin (LIPITOR) tablet 40 mg  40 mg Oral QPM    gabapentin (NEURONTIN) capsule 100 mg  100 mg Oral TID    metFORMIN ER (GLUCOPHAGE XR) tablet 1,000 mg  1,000 mg Oral BID WITH MEALS    oxybutynin chloride XL (DITROPAN XL) tablet 5 mg  5 mg Oral DAILY    sertraline (ZOLOFT) tablet 100 mg  100 mg Oral DAILY      SCHEDULED MEDICATIONS:   Current Facility-Administered Medications   Medication Dose Route Frequency    nystatin (MYCOSTATIN) 100,000 unit/gram powder   Topical BID    risperiDONE (RisperDAL) tablet 2 mg  2 mg Oral Q12H    insulin lispro (HUMALOG) injection 1-10 Units  1-10 Units SubCUTAneous AC&HS    insulin lispro (HUMALOG) injection 5 Units  5 Units SubCUTAneous TIDAC    budesonide-formoterol (SYMBICORT) 80-4.5 mcg inhaler  2 Puff Inhalation BID RT    insulin glargine (LANTUS) injection 25 Units  25 Units SubCUTAneous DAILY    atorvastatin (LIPITOR) tablet 40 mg  40 mg Oral QPM    gabapentin (NEURONTIN) capsule 100 mg  100 mg Oral TID    metFORMIN ER (GLUCOPHAGE XR) tablet 1,000 mg  1,000 mg Oral BID WITH MEALS    oxybutynin chloride XL (DITROPAN XL) tablet 5 mg  5 mg Oral DAILY    sertraline (ZOLOFT) tablet 100 mg  100 mg Oral DAILY          ASSESSMENT & PLAN     DIAGNOSES REQUIRING ACTIVE TREATMENT AND MONITORING: (reviewed/updated 11/16/2022)  Patient Active Hospital Problem List:       Schizoaffective disorder (11/10/2022)           Assessment: the patient presents in crisis after potentially leaving her group home; she is disorganized and a poor historian at baseline, treatment will be primarily psychosocial.         Plan:   - CONTINUE Risperdal 2 mg Q12H for psychosis  - CONTINUE Zoloft 100 mg QDAY for MDD  - Appreciate medicine recs  - IGM therapy as tolerated  - Expand database / obtain collateral  - Dispo planning    In summary, Estrellita Fraire, is a 59 y.o.  female who presents with a severe exacerbation of the principal diagnosis of Schizoaffective disorder ([address]    Patient's condition is not improving. Patient requires continued inpatient hospitalization for further stabilization, safety monitoring and medication management. I will continue to coordinate the provision of individual, milieu, occupational, group, and substance abuse therapies to address target symptoms/diagnoses as deemed appropriate for the individual patient. A coordinated, multidisplinary treatment team round was conducted with the patient (this team consists of the nurse, psychiatric unit pharmacist,  and writer). Complete current electronic health record for patient has been reviewed today including consultant notes, ancillary staff notes, nurses and psychiatric tech notes. Suicide risk assessment completed and patient deemed to be of low risk for suicide at this time. The following regarding medications was addressed during rounds with patient:   the risks and benefits of the proposed medication.  The patient was given the opportunity to ask questions. Informed consent given to the use of the above medications. Will continue to adjust psychiatric and non-psychiatric medications (see above \"medication\" section and orders section for details) as deemed appropriate & based upon diagnoses and response to treatment. I will continue to order blood tests/labs and diagnostic tests as deemed appropriate and review results as they become available (see orders for details and above listed lab/test results). I will order psychiatric records from previous [facility] hospitals to further elucidate the nature of patient's psychopathology and review once available. I will gather additional collateral information from friends, family and o/p treatment team to further elucidate the nature of patient's psychopathology and baselline level of psychiatric functioning. I certify that this patient's inpatient psychiatric hospital services furnished since the previous certification were, and continue to be, required for treatment that could reasonably be expected to improve the patient's condition, or for diagnostic study, and that the patient continues to need, on a daily basis, active treatment furnished directly by or requiring the supervision of inpatient psychiatric facility personnel. In addition, the hospital records show that services furnished were intensive treatment services, admission or related services, or equivalent services.     EXPECTED DISCHARGE DATE/DAY: TBD     DISPOSITION: Home       Signed By:   Heather Hassan MD  11/16/2022

## 2022-11-16 NOTE — PROGRESS NOTES
Received care of patient resting quietly in her room. Patient is alert and able to answer questions and make her needs known. Patient reports her SI/Anxiety/Depression is rated at 10/10. Patient endorses visual hallucinations of ghosts in the walls, telling her to harm herself with a knife. Patient is noted to be medication and meal compliant this shift. Monitoring to continue for safety, support and situation.    Problem: Suicide  Goal: *STG: Remains safe in hospital  Outcome: Progressing Towards Goal

## 2022-11-16 NOTE — BH NOTES
At 1600 patient noted calling out in her room. \"Help me\" Dolly DUARTE, responded to patient room and patient states that she became dizzy when trying to get up and \"almost fell. \" VS obtained and recorded. Patient attending MD consulted, orders received for medicine consult, x-ray, labs to include flu/covid and EKG. Patient has a pertinent PMH of Elevated CK, Elevated Creatinine and platelets trending upward. EKG currently reads as Normal Sinus Rhythm sent, and provider notified. Patient is focused on her dinner tray and is repetitively stating she is \"ready to eat. \" Labs drawn to left AC x 1 attempt, patient tolerated procedure without difficulty. Stat portable x-ray obtained. Patient dinner tray provided after all testing is complete. Monitoring to continue for safety, support and situation.

## 2022-11-16 NOTE — BH NOTES
Assumed care of the patient. Patient was isolative to her room. Over all affect was apathetic however she was cooperative with the assessments. No eye contact was maintained during assessments. Patient confirmed S. I. with no plan, anxiety, depression , AH (Voices telling her to hurt herself), VH(people on the wall) and denied HI. Patient denies active plan to hurt herself and is willing to contract for safety. Patient was medication and meal compliant. Blood sugar was 188, no insulin required. PRN Motrin administered for bilateral legs pain at bedtime. Patient appeared to be sleeping for about 8 hours.

## 2022-11-16 NOTE — GROUP NOTE
EMILY  GROUP DOCUMENTATION INDIVIDUAL                                                                          Group Therapy Note    Date: 11/16/2022    Group Start Time: 0900  Group End Time: 1000  Group Topic: Topic Group    [facility] 3 ACUTE BEHAV TH    [place] [address] GROUP    Group Therapy Note    Attendees: 5       Attendance: Did not attend      Yessica Rangel

## 2022-11-16 NOTE — GROUP NOTE
EMILY  GROUP DOCUMENTATION INDIVIDUAL                                                                          Group Therapy Note    Date: 11/16/2022    Group Start Time: 1500  Group End Time: 1600  Group Topic: Recreational/Music Therapy    [address] [facility] 3 ACUTE BEHAV [facility]    [place], [address] [facility] GROUP DOCUMENTATION GROUP    Group Therapy Note    Attendees: 9       Attendance: Attended    Patient's Goal:  To concentrate on selected task    Interventions/techniques: Supported-crafts,games,music    Interactions: Interacted appropriately    Mental Status: Calm    Behavior/appearance: Cooperative and Needed prompting    Goals Achieved: Able to engage in interactions and Able to listen to others-listened to music selections    Prosper Esteves

## 2022-11-17 LAB
ATRIAL RATE: 70 BPM
CALCULATED P AXIS, ECG09: 52 DEGREES
CALCULATED R AXIS, ECG10: 44 DEGREES
CALCULATED T AXIS, ECG11: 74 DEGREES
DIAGNOSIS, 93000: NORMAL
GLUCOSE BLD STRIP.AUTO-MCNC: 129 MG/DL (ref 65–117)
GLUCOSE BLD STRIP.AUTO-MCNC: 140 MG/DL (ref 65–117)
GLUCOSE BLD STRIP.AUTO-MCNC: 170 MG/DL (ref 65–117)
GLUCOSE BLD STRIP.AUTO-MCNC: 228 MG/DL (ref 65–117)
P-R INTERVAL, ECG05: 138 MS
Q-T INTERVAL, ECG07: 376 MS
QRS DURATION, ECG06: 76 MS
QTC CALCULATION (BEZET), ECG08: 406 MS
SERVICE CMNT-IMP: ABNORMAL
VENTRICULAR RATE, ECG03: 70 BPM

## 2022-11-17 PROCEDURE — 74011636637 HC RX REV CODE- 636/637: Performed by: STUDENT IN AN ORGANIZED HEALTH CARE EDUCATION/TRAINING PROGRAM

## 2022-11-17 PROCEDURE — 99231 SBSQ HOSP IP/OBS SF/LOW 25: CPT | Performed by: PSYCHIATRY & NEUROLOGY

## 2022-11-17 PROCEDURE — 65220000003 HC RM SEMIPRIVATE PSYCH

## 2022-11-17 PROCEDURE — 74011250637 HC RX REV CODE- 250/637: Performed by: STUDENT IN AN ORGANIZED HEALTH CARE EDUCATION/TRAINING PROGRAM

## 2022-11-17 PROCEDURE — 74011250637 HC RX REV CODE- 250/637: Performed by: PSYCHIATRY & NEUROLOGY

## 2022-11-17 PROCEDURE — 82962 GLUCOSE BLOOD TEST: CPT

## 2022-11-17 RX ORDER — FUROSEMIDE 40 MG/1
40 TABLET ORAL
Status: DISCONTINUED | OUTPATIENT
Start: 2022-11-17 | End: 2022-11-20

## 2022-11-17 RX ADMIN — OXYBUTYNIN CHLORIDE 5 MG: 5 TABLET, EXTENDED RELEASE ORAL at 08:25

## 2022-11-17 RX ADMIN — GABAPENTIN 100 MG: 100 CAPSULE ORAL at 16:28

## 2022-11-17 RX ADMIN — METFORMIN HYDROCHLORIDE 1000 MG: 500 TABLET, EXTENDED RELEASE ORAL at 16:28

## 2022-11-17 RX ADMIN — ATORVASTATIN CALCIUM 40 MG: 40 TABLET, FILM COATED ORAL at 17:15

## 2022-11-17 RX ADMIN — GABAPENTIN 100 MG: 100 CAPSULE ORAL at 08:25

## 2022-11-17 RX ADMIN — RISPERIDONE 2 MG: 1 TABLET ORAL at 08:35

## 2022-11-17 RX ADMIN — BUDESONIDE AND FORMOTEROL FUMARATE DIHYDRATE 2 PUFF: 80; 4.5 AEROSOL RESPIRATORY (INHALATION) at 20:26

## 2022-11-17 RX ADMIN — FUROSEMIDE 40 MG: 40 TABLET ORAL at 16:28

## 2022-11-17 RX ADMIN — IBUPROFEN 400 MG: 400 TABLET, FILM COATED ORAL at 22:00

## 2022-11-17 RX ADMIN — METFORMIN HYDROCHLORIDE 1000 MG: 500 TABLET, EXTENDED RELEASE ORAL at 08:25

## 2022-11-17 RX ADMIN — Medication 5 UNITS: at 08:25

## 2022-11-17 RX ADMIN — Medication 2 UNITS: at 11:11

## 2022-11-17 RX ADMIN — Medication 5 UNITS: at 11:10

## 2022-11-17 RX ADMIN — GABAPENTIN 100 MG: 100 CAPSULE ORAL at 11:12

## 2022-11-17 RX ADMIN — BUDESONIDE AND FORMOTEROL FUMARATE DIHYDRATE 2 PUFF: 80; 4.5 AEROSOL RESPIRATORY (INHALATION) at 08:25

## 2022-11-17 RX ADMIN — Medication 25 UNITS: at 08:25

## 2022-11-17 RX ADMIN — RISPERIDONE 2 MG: 1 TABLET ORAL at 20:25

## 2022-11-17 RX ADMIN — Medication 5 UNITS: at 16:27

## 2022-11-17 RX ADMIN — SERTRALINE HYDROCHLORIDE 100 MG: 50 TABLET ORAL at 08:25

## 2022-11-17 RX ADMIN — Medication 3 UNITS: at 16:28

## 2022-11-17 RX ADMIN — FUROSEMIDE 40 MG: 40 TABLET ORAL at 12:51

## 2022-11-17 NOTE — PROGRESS NOTES
Problem: Falls - Risk of  Goal: *Absence of Falls  Description: Document Pedro Luis French Fall Risk and appropriate interventions in the flowsheet. Outcome: Progressing Towards Goal  Note: Fall Risk Interventions:  Mobility Interventions: Patient to call before getting OOB         Medication Interventions: Teach patient to arise slowly         History of Falls Interventions: Room close to nurse's station         Problem: Suicide  Goal: *STG: Remains safe in hospital  Outcome: Progressing Towards Goal  Goal: *STG/LTG: Complies with medication therapy  Outcome: Progressing Towards Goal       PATIENT ALERT, CALM, WITHDRAWN, COOPERATIVE, DEPRESSED, ISOLATIVE. DENIES ANXIETY. DENIES AVH. DENIES PAIN. DENIES SI/HI. PATIENT ENCOURAGED TO RISE FROM THE BED SLOWLY AND TO CALL FOR HELP GETTING UP IF NEEDED. VERBALIZED UNDERSTANDING. NO COMPLAINTS AT THIS TIME. NO DISTRESS OBSERVED. MEDICATION AND MEAL COMPLIANT. PURPOSEFUL INTERACTION ONGOING.

## 2022-11-17 NOTE — CONSULTS
Hospitalist Consultation Note    NAME: Christiano Boyle   :  1965   MRN:  [id number]   Room Number: 286/06  @ [facility]     Date/Time:  2022 5:11 PM    Patient PCP: Dominic Palumbo MD  ______________________________________________________________________  Given the patient's current clinical presentation, I have a high level of concern for decompensation if discharged from the emergency department. Complex decision making was performed, which includes reviewing the patient's available past medical records, laboratory results, and x-ray films. My assessment of this patient's clinical condition and my plan of care is as follows. Assessment / Plan:       Principal Problem:    Schizoaffective disorder ([address] (2016)      Congestive heart failure, diastolic POA  Anasarca POA  HTN POA  TTE  showed suboptimal image quality. Normal left ventricular systolic function with a visually estimated EF of 65 - 70%. Left ventricle size is normal. Normal wall thickness. Normal wall motion. Normal diastolic function. CXR  interpreted independently- shows clear lungs. EKG interpreted independently - normal sinus rhythm. Duplex lower extremity  does not show DVT    - Start furosemide 40 mg oral BID. Will trial oral diuretics first, if there is no response to oral diuretic, may need IV diuresis. - 2g sodium diet, 1500 cc fluid restriction   - Hospitalist will continue to follow    Type 2 diabetes mellitus POA   Lab Results   Component Value Date/Time    Hemoglobin A1c 8.1 (H) 2022 06:12 AM     - lantus  - Lispro correctional scale, FSG AC HS  - Consistent carb diet, hypoglycemia protocol. Medical Clearance for psychiatric admission  -Psychiatric treatment and management of health issues,Defer to psychiatrist for further management.  -Continue home meds.  -No VTE prophylaxis indicated or warranted at this time.             Subjective: REQUESTING PHYSICIAN : Dr Ewelina Sofia : edema    HISTORY OF PRESENT ILLNESS:     Maricruz Chew is a 59 y.o.  female with PMH of Bipolar disorder, HTN. DM. Depression  who has been undergoing treatment on  unit for depression with suicidal ideation. Patient was noted to have lower extremity edema, medicine was consulted on 11/11, patient had a work up including TTE and Venous duplex and was placed on sodium and fluid restriction. Yesterday patient had an episode of dizziness and was noted to have generalized edema. Patient currently reports swelling in her hands as well as lower extremities. She also reports central chest pain, 10/10, sharp, worse with deep breathing. Denies palpitations. Patient denies any past medical history except as listed above. Denies fever,chills,chest pain, sob,abd pan,diarrhea,constipation,lightheadedness. No Hx DM,HTN. No current medical concerns at this time. Past Medical History:   Diagnosis Date    Arthritis     legs    Bipolar 1 disorder ([facility]     COPD     Depression 1/13/2012    Diabetes ([facility]     Drug abuse ([facility]     cocaine, stimulants, etoh, mj, tob    H/O suicide attempt     Hypertension     Obesity     Polydrug dependence excluding opioid type drug, abuse ([facility]     PTSD (post-traumatic stress disorder) 12/30/2021    Schizophrenia ([facility] 7/8/2016        Past Surgical History:   Procedure Laterality Date    HX ORTHOPAEDIC      foot sx       Social History     Tobacco Use    Smoking status: Every Day     Packs/day: 1.00     Years: 10.00     Pack years: 10.00     Types: Cigarettes    Smokeless tobacco: Current   Substance Use Topics    Alcohol use: No     Alcohol/week: 0.0 standard drinks     Comment: Hx of ETOH abuse since age 13. no DUIs.         Family History   Problem Relation Age of Onset    Diabetes Mother     Stroke Mother     Stroke Father      Allergies   Allergen Reactions    Amoxicillin Hives    Mushroom Flavor Hives    Tomato Hives Prior to Admission medications    Medication Sig Start Date End Date Taking? Authorizing Provider   risperiDONE (RisperDAL) 1 mg tablet Take 1 mg by mouth daily. Provider, Historical   risperiDONE (RisperDAL) 2 mg tablet Take 2 mg by mouth nightly. Provider, Historical   sertraline (ZOLOFT) 100 mg tablet Take 100 mg by mouth daily. Provider, Historical   gabapentin (NEURONTIN) 100 mg capsule Take 100 mg by mouth three (3) times daily. Provider, Historical   insulin glargine (LANTUS) 100 unit/mL injection 20 Units by SubCUTAneous route daily. Provider, Historical   atorvastatin (LIPITOR) 40 mg tablet Take 1 Tablet by mouth every evening. Indications: Cholesterol 5/19/22   Sushma Flynn MD   glipiZIDE (GLUCOTROL) 10 mg tablet Take 1 Tablet by mouth Daily (before breakfast). Indications: type 2 diabetes mellitus 5/19/22   Sushma Flynn MD   metFORMIN ER (GLUCOPHAGE XR) 500 mg tablet Take 2 Tablets by mouth two (2) times daily (with meals). 5/19/22   Susmha Flynn MD   oxybutynin chloride XL (DITROPAN XL) 5 mg CR tablet Take 1 Tablet by mouth daily. Indications: overactive bladder 5/19/22   Sushma Flynn MD   linaGLIPtin (Tradjenta) 5 mg tablet Take 5 mg by mouth daily. Indications: type 2 diabetes mellitus    Provider, Historical       REVIEW OF SYSTEMS:        I am not able to complete the review of systems because:    The patient is intubated and sedated    The patient has altered mental status due to his acute medical problems    The patient has baseline aphasia from prior stroke(s)    The patient has baseline dementia and is not reliable historian    The patient is in acute medical distress and unable to provide information           Total of 12 systems reviewed as follows:       POSITIVE= underlined text  Negative = text not underlined  General:  fever, chills, sweats, generalized weakness, weight loss/gain,      loss of appetite   Eyes:    blurred vision, eye pain, loss of vision, double vision  ENT: rhinorrhea, pharyngitis   Respiratory:   cough, sputum production, SOB, MILLER, wheezing, pleuritic pain   Cardiology:   chest pain, palpitations, orthopnea, PND, edema, syncope   Gastrointestinal:  abdominal pain , N/V, diarrhea, dysphagia, constipation, bleeding   Genitourinary:  frequency, urgency, dysuria, hematuria, incontinence   Muskuloskeletal :  arthralgia, myalgia, back pain  Hematology:  easy bruising, nose or gum bleeding, lymphadenopathy   Dermatological: rash, ulceration, pruritis, color change / jaundice  Endocrine:   hot flashes or polydipsia   Neurological:  headache, dizziness, confusion, focal weakness, paresthesia,     Speech difficulties, memory loss, gait difficulty  Psychological: Feelings of anxiety, depression, agitation    Objective:   VITALS:    Visit Vitals  /65   Pulse 77   Temp 97.5 °F (36.4 °C)   Resp 17   Ht 5' 5\" (1.651 m)   Wt 123.2 kg (271 lb 8 oz)   SpO2 96%   BMI 45.18 kg/m²       PHYSICAL EXAM:    General:    Alert, cooperative, no distress, appears stated age. HEENT: Atraumatic, anicteric sclerae, pink conjunctivae     No oral ulcers, mucosa moist, throat clear, dentition fair  Neck:  Supple, symmetrical,  no JVD, thyroid: non tender  Lungs:   Clear to auscultation bilaterally. No Wheezing or Rhonchi. No rales. Chest wall:  No tenderness  No Accessory muscle use. Heart:   Regular  rhythm,  No  murmur   3+ generalized  edema  Abdomen:   Soft, non-tender. Not distended. Bowel sounds normal  Extremities: No cyanosis. No clubbing,      Skin turgor normal, Capillary refill normal, Radial dial pulse 2+  Skin:     Not pale. Not Jaundiced  No rashes   Psychiatric:   Mood: depressed  Affect: flattened  Thoughts: poverty of content  Sensorium: No A/V hallucinations  Safety: no SI/HI    Neurologic: EOMs intact. No facial asymmetry. No aphasia or slurred speech. Symmetrical strength, Sensation grossly intact. Alert and oriented X 4. ______________________________________________________________________    Care Plan discussed with:  Patient/Family and Nurse    Expected  Disposition: per primary attending   ________________________________________________________________________  TOTAL TIME:  35 Minutes    Critical Care Provided     Minutes non procedure based      Comments    x Reviewed previous records   >50% of visit spent in counseling and coordination of care x Discussion with patient and/or family and questions answered       ________________________________________________________________________  Signed: Leidy Ruth MD    Procedures: see electronic medical records for all procedures/Xrays and details which were not copied into this note but were reviewed prior to creation of Plan.     LAB DATA REVIEWED:    Recent Results (from the past 24 hour(s))   GLUCOSE, POC    Collection Time: 11/16/22  8:06 PM   Result Value Ref Range    Glucose (POC) 293 (H) 65 - 117 mg/dL    Performed by Raegan Alaniz RN    GLUCOSE, POC    Collection Time: 11/17/22  7:32 AM   Result Value Ref Range    Glucose (POC) 129 (H) 65 - 117 mg/dL    Performed by Mariela Hayward RN    GLUCOSE, POC    Collection Time: 11/17/22 11:03 AM   Result Value Ref Range    Glucose (POC) 170 (H) 65 - 117 mg/dL    Performed by Mariela Hayward RN    GLUCOSE, POC    Collection Time: 11/17/22  4:14 PM   Result Value Ref Range    Glucose (POC) 228 (H) 65 - 117 mg/dL    Performed by Mariela Hayward RN

## 2022-11-17 NOTE — BH NOTES
Collaboration    Writer contacted patient's payee [place] Quiroz [phone]. Writer informed David Alcantarare that the patient wants to pursue guardianship with David Moulton as her guardian. Writer also informed David Alcantarare that SW is still looking for placement for the patient. David Moulton said that she will contact the patient's SW once she has the paperwork for guardianship together so that they can arrange a time for David Moulton to come to the unit for the patient to sign the paperwork.     Paige Gracia, MSW Student

## 2022-11-17 NOTE — PROGRESS NOTES
Problem: Falls - Risk of  Goal: *Absence of Falls  Description: Document Juan Pablo Mena Fall Risk and appropriate interventions in the flowsheet. Outcome: Progressing Towards Goal  Note: Fall Risk Interventions:  Mobility Interventions: Patient to call before getting OOB         Medication Interventions: Teach patient to arise slowly         History of Falls Interventions: Door open when patient unattended         Problem: Suicide  Goal: *STG: Remains safe in hospital  Outcome: Progressing Towards Goal  Goal: *STG: Seeks staff when feelings of self harm or harm towards others arise  Outcome: Progressing Towards Goal  Goal: *STG/LTG: Complies with medication therapy  Outcome: Progressing Towards Goal    2595-2434: Assumed care of patient after receiving shift report from outgoing nurse. Patient was withdrawn to room upon assessment. Affect is constricted, mood is depressed. Pt cooperative with vitals and assessment. A&O x 4. Partial assist with ADL's. Insight and concentration somewhat present. Gait is unsteady. Pt continues to complain of mild dizziness. Declines Vital sign recheck at this time. Pt encouraged to call before getting out of bed related to fall risk. . Pt verbalized understanding. I/O documented per order. Appetite patterns WNL. Pt blood sugar 293. 3 units of coverage administered without issue. Denies AVH/SI/HI. Pt endorses anxiety and depression at this time. Pt denies pain. Patient medication and diet compliant. Pt encouraged to continue to participate in care. 0851-6114: Pt resting quietly in bed. No further issues noted at this time. 6097-1823: Pt has been observed throughout the night. Total hours slept approximately . 7 No s/s of distress noted. Patient has remained safe. Hourly rounding performed throughout shift.

## 2022-11-17 NOTE — BH NOTES
Christy Reynoso attended process group therapy on topic Skill 1, Coping and Mindfulness, Skill 2, Ways of Thinking, and Skill 5, Problem Solving with activity process card game. Patient participated when directly confronted.     FAMILIA John

## 2022-11-17 NOTE — GROUP NOTE
EMILY  GROUP DOCUMENTATION INDIVIDUAL                                                                          Group Therapy Note    Date: 11/17/2022    Group Start Time: 1500  Group End Time: 1600  Group Topic: Recreational/Music Therapy    [address] [facility] 3 ACUTE BEHAV [facility]    [place], [address] [facility] GROUP DOCUMENTATION GROUP    Group Therapy Note    Attendees: 10       Attendance: Attended    Patient's Goal:  To concentrate on selected task    Interventions/techniques: Supported-crafts,games,music    Interactions: Interacted appropriately    Mental Status: Calm    Behavior/appearance: Cooperative    Goals Achieved: Able to engage in interactions and Able to listen to others-listened to music selections      Nakita Keenan

## 2022-11-17 NOTE — BH NOTES
PSYCHIATRIC PROGRESS NOTE         Patient Name  Kailyn Garcia   Date of Birth 1965   [facility] [id number]   Medical Record Number  [id number]      Age  59 y.o. PCP Pepe Gr MD   Admit date:  11/9/2022    Room Number  320/02  @ [facility]   Date of Service  11/17/2022         E & M PROGRESS NOTE:         HISTORY       CC:  \"suicidal ideation\"  HISTORY OF PRESENT ILLNESS/INTERVAL HISTORY:  (reviewed/updated 11/17/2022). per initial evaluation: The patient, Kailyn Garcia, is a 59 y.o. WHITE/NON- female with a past psychiatric history significant for schizoaffective disorder, who presents at this time with complaints of (and/or evidence of) the following emotional symptoms: depression and suicidal thoughts/threats. Additional symptomatology include poor self care. The above symptoms have been present for 2+ weeks. These symptoms are of moderate to high severity. These symptoms are constant in nature. The patient's condition has been precipitated by psychosocial stressors. Patient's condition made worse by treatment noncompliance. UDS: negative; BAL=0. The patient is well known to the unit; she comes into the hospital frequently in crisis typically stating her 's death several years ago has made her feel hopeless. She has been living in a group home though this admission she states that she was kicked out of her group home because of an episode of incontinence. The patient is a poor historian. The patient corroborates the above narrative. The patient contracts for safety on the unit and gives consent for the team to contact collateral. The patient is amenable to initiating treatment while on the unit. She defers much of the team's inquires to her payee. 11/11 - no acute overnight events. The patient has been isolative to her room with poor ADLs. incontinent of urine but able to void when taken to the bathroom.  She denies active thoughts of self harm but is markedly internally preoccupied. Patient slept 8 hours overnight and got Trazodone. She denies active thoughts of self harm but endorses both depressed mood and anxiety. 11/14 - the patient remains in bed for much of the day. She has been inconitnent of urine and with poor ADLs requiring prompting for much activities. Patient denies active thoughts of self harm but endorses passive SI. She slept 7 hours overnight and is flat and disorganized on interview, moaning and providing little updates on her disposition plan. Per SW, the patient tends to not go to the group homes once assigned. Her payee is working on guardianship.    11/15 - overnight, the patient remained isolative to her bed, she continues to c/o anxiety and had an episode of urine incontinence. Patient refused nystatin stating her rash had 'cleared up.' She endorses ongoing AH and VH of 'ghosts' and states that she is doing very poorly. Patient evaluated by internist for ongoing LE swelling and pain. She is medication compliant and tolerating higher dose of Risperdal.    11/16 - the patient has remained in bed, out for meals and with little motivation to interact in the milieu. She got Motrin and Atarax and continues to c/o pain in her LE, as well as SI with a plan to cut herself if given the chance. The patient slept 8 hours and this morning spontaneously stated to the team that she wanted to be discharged -- she acknowledges that her payee and SW are working on a dispo plan and she is still symptomatic. Medicine consulted as she is now experiencing orthopnea. 11/17 - the patient has been isolative to her room, out for meals and slept 7 hours overnight. She is stating she will discharge to a [facility] but acknowledges that the team is coordinating guardianship with her payee. Patient noted to have significant LE edema and c/o orthopnea. She is medication compliant and denies active thoughts of self harm. . She remains disorganized and with ongoing confusion but today she is otherwise pleasant. SIDE EFFECTS: (reviewed/updated 11/17/2022)  None reported or admitted to. No noted toxicity with use of Depakote   ALLERGIES:(reviewed/updated 11/17/2022)  Allergies   Allergen Reactions    Amoxicillin Hives    Mushroom Flavor Hives    Tomato Hives      REVIEW OF SYSTEMS: (reviewed/updated 11/17/2022)  Appetite:improved   Sleep: no change   All other Review of Systems: Negative except incontinence per HPI         [facility] (MSE):    MSE FINDINGS ARE WITHIN NORMAL LIMITS (WNL) UNLESS OTHERWISE STATED BELOW. ( ALL OF THE BELOW CATEGORIES OF THE MSE HAVE BEEN REVIEWED (reviewed 11/17/2022) AND UPDATED AS DEEMED APPROPRIATE )  General Presentation age appropriate, cooperative and guarded   Orientation disorganized   Vital Signs  See below (reviewed 11/17/2022); Vital Signs (BP, Pulse, & Temp) are within normal limits if not listed below.    Gait and Station Stable/steady, no ataxia   Musculoskeletal System No extrapyramidal symptoms (EPS); no abnormal muscular movements or Tardive Dyskinesia (TD); muscle strength and tone are within normal limits   Language No aphasia or dysarthria   Speech:  normal volume and non-pressured   Thought Processes concrete; normal rate of thoughts; poor abstract reasoning/computation   Thought Associations blocked    Thought Content auditory hallucinations and visual hallucinations   Suicidal Ideations contracts for safety   Homicidal Ideations contracts for safety   Mood:  depressed and anhedonia   Affect:  flat   Memory recent  intact   Memory remote:  intact   Concentration/Attention:  intact   Fund of Knowledge average   Insight:  limited   Reliability fair   Judgment:  limited          VITALS:     Patient Vitals for the past 24 hrs:   Temp Pulse Resp BP SpO2   11/17/22 0810 97.5 °F (36.4 °C) 76 17 133/64 96 %   11/16/22 2026 98.4 °F (36.9 °C) 69 16 (!) 109/48 95 %   11/16/22 1608 98.7 °F (37.1 °C) 83 24 (!) 148/61 98 %       Wt Readings from Last 3 Encounters:   11/11/22 123.2 kg (271 lb 8 oz)   06/03/22 113.9 kg (251 lb)   05/14/22 88.5 kg (195 lb)     Temp Readings from Last 3 Encounters:   11/17/22 97.5 °F (36.4 °C)   06/03/22 97.6 °F (36.4 °C)   05/19/22 98.5 °F (36.9 °C)     BP Readings from Last 3 Encounters:   11/17/22 133/64   06/03/22 130/69   05/19/22 (!) 112/51     Pulse Readings from Last 3 Encounters:   11/17/22 76   06/03/22 89   05/19/22 64            DATA     LABORATORY DATA:(reviewed/updated 11/17/2022)  Recent Results (from the past 24 hour(s))   GLUCOSE, POC    Collection Time: 11/16/22 10:52 AM   Result Value Ref Range    Glucose (POC) 169 (H) 65 - 117 mg/dL    Performed by Nazanin Manriquez RN    GLUCOSE, POC    Collection Time: 11/16/22  4:01 PM   Result Value Ref Range    Glucose (POC) 197 (H) 65 - 117 mg/dL    Performed by Nazanin Manriquez RN    EKG, 12 LEAD, INITIAL    Collection Time: 11/16/22  4:30 PM   Result Value Ref Range    Ventricular Rate 70 BPM    Atrial Rate 70 BPM    P-R Interval 138 ms    QRS Duration 76 ms    Q-T Interval 376 ms    QTC Calculation (Bezet) 406 ms    Calculated P Axis 52 degrees    Calculated R Axis 44 degrees    Calculated T Axis 74 degrees    Diagnosis       ** Age and gender specific ECG analysis **  Normal sinus rhythm  Low voltage QRS  When compared with ECG of 27-JUL-2021 16:26,  Vent.  rate has decreased BY  56 BPM  Nonspecific T wave abnormality no longer evident in Inferior leads  Confirmed by Danny Wells M.D., [facility] ([id number]) on 11/17/2022 8:13:47 AM     CBC WITH AUTOMATED DIFF    Collection Time: 11/16/22  4:35 PM   Result Value Ref Range    WBC 7.9 3.6 - 11.0 K/uL    RBC 4.36 3.80 - 5.20 M/uL    HGB 10.9 (L) 11.5 - 16.0 g/dL    HCT 35.7 35.0 - 47.0 %    MCV 81.9 80.0 - 99.0 FL    MCH 25.0 (L) 26.0 - 34.0 PG    MCHC 30.5 30.0 - 36.5 g/dL    RDW 14.9 (H) 11.5 - 14.5 %    PLATELET 629 (H) 814 - 400 K/uL    MPV 8.9 8.9 - 12.9 FL    NRBC 0.0 0  WBC    ABSOLUTE NRBC 0.00 0.00 - 0.01 K/uL    NEUTROPHILS 67 32 - 75 %    LYMPHOCYTES 23 12 - 49 %    MONOCYTES 5 5 - 13 %    EOSINOPHILS 4 0 - 7 %    BASOPHILS 1 0 - 1 %    IMMATURE GRANULOCYTES 0 0.0 - 0.5 %    ABS. NEUTROPHILS 5.4 1.8 - 8.0 K/UL    ABS. LYMPHOCYTES 1.8 0.8 - 3.5 K/UL    ABS. MONOCYTES 0.4 0.0 - 1.0 K/UL    ABS. EOSINOPHILS 0.3 0.0 - 0.4 K/UL    ABS. BASOPHILS 0.0 0.0 - 0.1 K/UL    ABS. IMM. GRANS. 0.0 0.00 - 0.04 K/UL    DF AUTOMATED     METABOLIC PANEL, BASIC    Collection Time: 11/16/22  4:35 PM   Result Value Ref Range    Sodium 137 136 - 145 mmol/L    Potassium 4.8 3.5 - 5.1 mmol/L    Chloride 99 97 - 108 mmol/L    CO2 30 21 - 32 mmol/L    Anion gap 8 5 - 15 mmol/L    Glucose 265 (H) 65 - 100 mg/dL    BUN 18 6 - 20 MG/DL    Creatinine 1.03 (H) 0.55 - 1.02 MG/DL    BUN/Creatinine ratio 17 12 - 20      eGFR >60 >60 ml/min/1.73m2    Calcium 8.7 8.5 - 10.1 MG/DL   MAGNESIUM    Collection Time: 11/16/22  4:35 PM   Result Value Ref Range    Magnesium 1.5 (L) 1.6 - 2.4 mg/dL   PHOSPHORUS    Collection Time: 11/16/22  4:35 PM   Result Value Ref Range    Phosphorus 3.5 2.6 - 4.7 MG/DL   HEPATIC FUNCTION PANEL    Collection Time: 11/16/22  4:35 PM   Result Value Ref Range    Protein, total 6.6 6.4 - 8.2 g/dL    Albumin 2.7 (L) 3.5 - 5.0 g/dL    Globulin 3.9 2.0 - 4.0 g/dL    A-G Ratio 0.7 (L) 1.1 - 2.2      Bilirubin, total 0.2 0.2 - 1.0 MG/DL    Bilirubin, direct 0.1 0.0 - 0.2 MG/DL    Alk.  phosphatase 133 (H) 45 - 117 U/L    AST (SGOT) 13 (L) 15 - 37 U/L    ALT (SGPT) 24 12 - 78 U/L   COVID-19 WITH INFLUENZA A/B    Collection Time: 11/16/22  4:35 PM   Result Value Ref Range    SARS-CoV-2 by PCR Not detected NOTD      Influenza A by PCR Not detected      Influenza B by PCR Not detected     NT-PRO BNP    Collection Time: 11/16/22  4:35 PM   Result Value Ref Range    NT pro- 0 - 125 PG/ML   TROPONIN-HIGH SENSITIVITY    Collection Time: 11/16/22  4:35 PM   Result Value Ref Range Troponin-High Sensitivity 9 0 - 51 ng/L   GLUCOSE, POC    Collection Time: 11/16/22  8:06 PM   Result Value Ref Range    Glucose (POC) 293 (H) 65 - 117 mg/dL    Performed by David Holloway RN    GLUCOSE, POC    Collection Time: 11/17/22  7:32 AM   Result Value Ref Range    Glucose (POC) 129 (H) 65 - 117 mg/dL    Performed by Gerardo Mendoza RN      No results found for: VALF2, VALAC, VALP, VALPR, DS6, CRBAM, CRBAMP, CARB2, XCRBAM  No results found for: LITHM   RADIOLOGY REPORTS:(reviewed/updated 11/17/2022)  No results found.        MEDICATIONS     ALL MEDICATIONS:   Current Facility-Administered Medications   Medication Dose Route Frequency    ibuprofen (MOTRIN) tablet 400 mg  400 mg Oral Q6H PRN    nystatin (MYCOSTATIN) 100,000 unit/gram powder   Topical BID    risperiDONE (RisperDAL) tablet 2 mg  2 mg Oral Q12H    benzocaine-menthoL (CHLORASEPTIC MAX) lozenge 1 Lozenge  1 Lozenge Oral Q2H PRN    glucose chewable tablet 16 g  4 Tablet Oral PRN    glucagon (GLUCAGEN) injection 1 mg  1 mg IntraMUSCular PRN    dextrose 10% infusion 0-250 mL  0-250 mL IntraVENous PRN    albuterol (PROVENTIL HFA, VENTOLIN HFA, PROAIR HFA) inhaler 1 Puff  1 Puff Inhalation Q4H PRN    insulin lispro (HUMALOG) injection 1-10 Units  1-10 Units SubCUTAneous AC&HS    insulin lispro (HUMALOG) injection 5 Units  5 Units SubCUTAneous TIDAC    budesonide-formoterol (SYMBICORT) 80-4.5 mcg inhaler  2 Puff Inhalation BID RT    insulin glargine (LANTUS) injection 25 Units  25 Units SubCUTAneous DAILY    OLANZapine (ZyPREXA) tablet 5 mg  5 mg Oral Q6H PRN    haloperidol lactate (HALDOL) injection 5 mg  5 mg IntraMUSCular Q6H PRN    benztropine (COGENTIN) tablet 1 mg  1 mg Oral BID PRN    diphenhydrAMINE (BENADRYL) injection 50 mg  50 mg IntraMUSCular BID PRN    hydrOXYzine HCL (ATARAX) tablet 50 mg  50 mg Oral TID PRN    LORazepam (ATIVAN) injection 1 mg  1 mg IntraMUSCular Q4H PRN    traZODone (DESYREL) tablet 50 mg  50 mg Oral QHS PRN acetaminophen (TYLENOL) tablet 650 mg  650 mg Oral Q4H PRN    magnesium hydroxide (MILK OF MAGNESIA) 400 mg/5 mL oral suspension 30 mL  30 mL Oral DAILY PRN    atorvastatin (LIPITOR) tablet 40 mg  40 mg Oral QPM    gabapentin (NEURONTIN) capsule 100 mg  100 mg Oral TID    metFORMIN ER (GLUCOPHAGE XR) tablet 1,000 mg  1,000 mg Oral BID WITH MEALS    oxybutynin chloride XL (DITROPAN XL) tablet 5 mg  5 mg Oral DAILY    sertraline (ZOLOFT) tablet 100 mg  100 mg Oral DAILY      SCHEDULED MEDICATIONS:   Current Facility-Administered Medications   Medication Dose Route Frequency    nystatin (MYCOSTATIN) 100,000 unit/gram powder   Topical BID    risperiDONE (RisperDAL) tablet 2 mg  2 mg Oral Q12H    insulin lispro (HUMALOG) injection 1-10 Units  1-10 Units SubCUTAneous AC&HS    insulin lispro (HUMALOG) injection 5 Units  5 Units SubCUTAneous TIDAC    budesonide-formoterol (SYMBICORT) 80-4.5 mcg inhaler  2 Puff Inhalation BID RT    insulin glargine (LANTUS) injection 25 Units  25 Units SubCUTAneous DAILY    atorvastatin (LIPITOR) tablet 40 mg  40 mg Oral QPM    gabapentin (NEURONTIN) capsule 100 mg  100 mg Oral TID    metFORMIN ER (GLUCOPHAGE XR) tablet 1,000 mg  1,000 mg Oral BID WITH MEALS    oxybutynin chloride XL (DITROPAN XL) tablet 5 mg  5 mg Oral DAILY    sertraline (ZOLOFT) tablet 100 mg  100 mg Oral DAILY          ASSESSMENT & PLAN     DIAGNOSES REQUIRING ACTIVE TREATMENT AND MONITORING: (reviewed/updated 11/17/2022)  Patient Active Hospital Problem List:       Schizoaffective disorder (11/10/2022)           Assessment: the patient presents in crisis after potentially leaving her group home; she is disorganized and a poor historian at baseline, treatment will be primarily psychosocial.         Plan:   - CONTINUE Risperdal 2 mg Q12H for psychosis  - CONTINUE Zoloft 100 mg QDAY for MDD  - Appreciate medicine recs  - IGM therapy as tolerated  - Expand database / obtain collateral  - Dispo planning    In summary, Taco Loja, is a 59 y.o.  female who presents with a severe exacerbation of the principal diagnosis of Schizoaffective disorder ([facility]    Patient's condition is not improving. Patient requires continued inpatient hospitalization for further stabilization, safety monitoring and medication management. I will continue to coordinate the provision of individual, milieu, occupational, group, and substance abuse therapies to address target symptoms/diagnoses as deemed appropriate for the individual patient. A coordinated, multidisplinary treatment team round was conducted with the patient (this team consists of the nurse, psychiatric unit pharmacist,  and writer). Complete current electronic health record for patient has been reviewed today including consultant notes, ancillary staff notes, nurses and psychiatric tech notes. Suicide risk assessment completed and patient deemed to be of low risk for suicide at this time. The following regarding medications was addressed during rounds with patient:   the risks and benefits of the proposed medication. The patient was given the opportunity to ask questions. Informed consent given to the use of the above medications. Will continue to adjust psychiatric and non-psychiatric medications (see above \"medication\" section and orders section for details) as deemed appropriate & based upon diagnoses and response to treatment. I will continue to order blood tests/labs and diagnostic tests as deemed appropriate and review results as they become available (see orders for details and above listed lab/test results). I will order psychiatric records from previous [facility] hospitals to further elucidate the nature of patient's psychopathology and review once available.     I will gather additional collateral information from friends, family and o/p treatment team to further elucidate the nature of patient's psychopathology and baselline level of psychiatric functioning. I certify that this patient's inpatient psychiatric hospital services furnished since the previous certification were, and continue to be, required for treatment that could reasonably be expected to improve the patient's condition, or for diagnostic study, and that the patient continues to need, on a daily basis, active treatment furnished directly by or requiring the supervision of inpatient psychiatric facility personnel. In addition, the hospital records show that services furnished were intensive treatment services, admission or related services, or equivalent services.     EXPECTED DISCHARGE DATE/DAY: TBD     DISPOSITION: Group home / shelter       Signed By:   Taco Vaughn MD  11/17/2022

## 2022-11-17 NOTE — PROGRESS NOTES
Called by behavior, health, nurse, Tejas Reilly to notify me of syncopal episode. Patient apparently called for help stating that she felt dizzy. Discuss with primary nurse. Vital signs for the normal limits. Patient noted to have generalized edema that was present on admission. Patient has been evaluated by medicine team a few days ago. Duplex negative. Advised nurse to obtain repeat high sensitivity, troponins, pro, BNP, Covid with inf luenza panel, chest x-ray, EKG which have all been within normal limits. No further intervention recommended at this time. If an official consult from medicine is required, please have the psychiatrist contact me to discuss the case And need for consult.

## 2022-11-17 NOTE — GROUP NOTE
EMILY  GROUP DOCUMENTATION INDIVIDUAL                                                                          Group Therapy Note    Date: 11/17/2022    Group Start Time: 0900  Group End Time: 1000  Group Topic: Topic Group    [facility] ACUTE BEHAV TH    [place] [address] GROUP    Group Therapy Note    Attendees: 7       Attendance: Did not attend    Brittani Mane

## 2022-11-18 LAB
APPEARANCE UR: CLEAR
BACTERIA URNS QL MICRO: ABNORMAL /HPF
BILIRUB UR QL: NEGATIVE
COLOR UR: ABNORMAL
EPITH CASTS URNS QL MICRO: ABNORMAL /LPF
GLUCOSE BLD STRIP.AUTO-MCNC: 156 MG/DL (ref 65–117)
GLUCOSE BLD STRIP.AUTO-MCNC: 160 MG/DL (ref 65–117)
GLUCOSE BLD STRIP.AUTO-MCNC: 191 MG/DL (ref 65–117)
GLUCOSE BLD STRIP.AUTO-MCNC: 228 MG/DL (ref 65–117)
GLUCOSE UR STRIP.AUTO-MCNC: NEGATIVE MG/DL
HGB UR QL STRIP: ABNORMAL
KETONES UR QL STRIP.AUTO: NEGATIVE MG/DL
LEUKOCYTE ESTERASE UR QL STRIP.AUTO: ABNORMAL
NITRITE UR QL STRIP.AUTO: POSITIVE
PH UR STRIP: 5 [PH] (ref 5–8)
PROT UR STRIP-MCNC: NEGATIVE MG/DL
RBC #/AREA URNS HPF: ABNORMAL /HPF (ref 0–5)
SERVICE CMNT-IMP: ABNORMAL
SP GR UR REFRACTOMETRY: 1.01
UA: UC IF INDICATED,UAUC: ABNORMAL
UROBILINOGEN UR QL STRIP.AUTO: 0.2 EU/DL (ref 0.2–1)
WBC URNS QL MICRO: ABNORMAL /HPF (ref 0–4)

## 2022-11-18 PROCEDURE — 74011250637 HC RX REV CODE- 250/637: Performed by: STUDENT IN AN ORGANIZED HEALTH CARE EDUCATION/TRAINING PROGRAM

## 2022-11-18 PROCEDURE — 82962 GLUCOSE BLOOD TEST: CPT

## 2022-11-18 PROCEDURE — 87186 SC STD MICRODIL/AGAR DIL: CPT

## 2022-11-18 PROCEDURE — 81001 URINALYSIS AUTO W/SCOPE: CPT

## 2022-11-18 PROCEDURE — 87077 CULTURE AEROBIC IDENTIFY: CPT

## 2022-11-18 PROCEDURE — 87086 URINE CULTURE/COLONY COUNT: CPT

## 2022-11-18 PROCEDURE — 65220000003 HC RM SEMIPRIVATE PSYCH

## 2022-11-18 PROCEDURE — 74011250637 HC RX REV CODE- 250/637

## 2022-11-18 PROCEDURE — 74011250637 HC RX REV CODE- 250/637: Performed by: PSYCHIATRY & NEUROLOGY

## 2022-11-18 PROCEDURE — 97161 PT EVAL LOW COMPLEX 20 MIN: CPT | Performed by: PHYSICAL THERAPIST

## 2022-11-18 PROCEDURE — 99231 SBSQ HOSP IP/OBS SF/LOW 25: CPT | Performed by: PSYCHIATRY & NEUROLOGY

## 2022-11-18 PROCEDURE — 74011636637 HC RX REV CODE- 636/637: Performed by: STUDENT IN AN ORGANIZED HEALTH CARE EDUCATION/TRAINING PROGRAM

## 2022-11-18 PROCEDURE — 97116 GAIT TRAINING THERAPY: CPT | Performed by: PHYSICAL THERAPIST

## 2022-11-18 RX ADMIN — Medication 2 UNITS: at 09:25

## 2022-11-18 RX ADMIN — RISPERIDONE 2 MG: 1 TABLET ORAL at 09:28

## 2022-11-18 RX ADMIN — BUDESONIDE AND FORMOTEROL FUMARATE DIHYDRATE 2 PUFF: 80; 4.5 AEROSOL RESPIRATORY (INHALATION) at 09:27

## 2022-11-18 RX ADMIN — Medication 25 UNITS: at 09:24

## 2022-11-18 RX ADMIN — SERTRALINE HYDROCHLORIDE 100 MG: 50 TABLET ORAL at 09:27

## 2022-11-18 RX ADMIN — BUDESONIDE AND FORMOTEROL FUMARATE DIHYDRATE 2 PUFF: 80; 4.5 AEROSOL RESPIRATORY (INHALATION) at 22:10

## 2022-11-18 RX ADMIN — GABAPENTIN 100 MG: 100 CAPSULE ORAL at 09:27

## 2022-11-18 RX ADMIN — Medication 5 UNITS: at 16:56

## 2022-11-18 RX ADMIN — FUROSEMIDE 40 MG: 40 TABLET ORAL at 16:56

## 2022-11-18 RX ADMIN — HYDROXYZINE HYDROCHLORIDE 50 MG: 25 TABLET, FILM COATED ORAL at 22:08

## 2022-11-18 RX ADMIN — Medication 3 UNITS: at 16:57

## 2022-11-18 RX ADMIN — FUROSEMIDE 40 MG: 40 TABLET ORAL at 09:27

## 2022-11-18 RX ADMIN — METFORMIN HYDROCHLORIDE 1000 MG: 500 TABLET, EXTENDED RELEASE ORAL at 16:56

## 2022-11-18 RX ADMIN — RISPERIDONE 2 MG: 1 TABLET ORAL at 22:08

## 2022-11-18 RX ADMIN — GABAPENTIN 100 MG: 100 CAPSULE ORAL at 16:56

## 2022-11-18 RX ADMIN — OXYBUTYNIN CHLORIDE 5 MG: 5 TABLET, EXTENDED RELEASE ORAL at 09:27

## 2022-11-18 RX ADMIN — TRAZODONE HYDROCHLORIDE 50 MG: 50 TABLET ORAL at 22:09

## 2022-11-18 RX ADMIN — ATORVASTATIN CALCIUM 40 MG: 40 TABLET, FILM COATED ORAL at 17:00

## 2022-11-18 RX ADMIN — GABAPENTIN 100 MG: 100 CAPSULE ORAL at 11:50

## 2022-11-18 RX ADMIN — Medication 5 UNITS: at 09:24

## 2022-11-18 RX ADMIN — Medication 5 UNITS: at 11:50

## 2022-11-18 RX ADMIN — Medication 2 UNITS: at 11:49

## 2022-11-18 RX ADMIN — METFORMIN HYDROCHLORIDE 1000 MG: 500 TABLET, EXTENDED RELEASE ORAL at 09:27

## 2022-11-18 NOTE — PROGRESS NOTES
Problem: Mobility Impaired (Adult and Pediatric)  Goal: *Acute Goals and Plan of Care (Insert Text)  Description: FUNCTIONAL STATUS PRIOR TO ADMISSION: Patient was modified independent using a walker for functional mobility. Physical Therapy Goals  Initiated 11/18/2022  1. Patient will transfer from bed to chair and chair to bed with modified independence using the least restrictive device within 7 day(s). 2.  Patient will perform sit to stand with modified independence within 7 day(s). 3.  Patient will ambulate with modified independence for 150 feet with the least restrictive device within 7 day(s). Outcome: Not Met     PHYSICAL THERAPY EVALUATION  Patient: Suzy Pinon (54 y.o. female)  Date: 11/18/2022  Primary Diagnosis: Adjustment disorder [F43.20]       Precautions:        ASSESSMENT  Based on the objective data described below, the patient presents with decreased balance and mobility. Patient received supine in bed and drowsy throughout session. Patient required maximal encouragement to participate in evaluation. Patient performed bed mobility with modified independent. Patient stood with SBA to walker. She ambulated 50 feet with walker and SBA. Patient unsteady but no overt loss of balance. Will keep on program for 2 times a week if patient agreeable to work with therapy services. Current level of assistance: Stand by assist    Patient will benefit from skilled therapy intervention to address the above noted impairments. PLAN :  Recommendations and Planned Interventions: bed mobility training, transfer training, gait training, therapeutic exercises, neuromuscular re-education, patient and family training/education, and therapeutic activities      Frequency/Duration: Patient will be followed by physical therapy:  2 times a week to address goals.     Recommendation for discharge: (in order for the patient to meet his/her long term goals)  No skilled physical therapy/ follow up rehabilitation needs identified at this time. This discharge recommendation:  Has been made in collaboration with the attending provider and/or case management    IF patient discharges home will need the following DME: patient owns DME required for discharge         SUBJECTIVE:   Patient stated I'm okay.     OBJECTIVE DATA SUMMARY:   HISTORY:    Past Medical History:   Diagnosis Date    Arthritis     legs    Bipolar 1 disorder ([facility]     COPD     Depression 1/13/2012    Diabetes ([facility]     Drug abuse ([facility]     cocaine, stimulants, etoh, mj, tob    H/O suicide attempt     Hypertension     Obesity     Polydrug dependence excluding opioid type drug, abuse ([facility]     PTSD (post-traumatic stress disorder) 12/30/2021    Schizophrenia ([facility] 7/8/2016     Past Surgical History:   Procedure Laterality Date    HX ORTHOPAEDIC      foot sx       Home Situation  Home Environment: Other (comment)  One/Two Story Residence: Other (Comment)  Living Alone: Yes  Support Systems: Child(karime)  Patient Expects to be Discharged to[de-identified] Homeless  Current DME Used/Available at Home: None    EXAMINATION/PRESENTATION/DECISION MAKING:   Critical Behavior:  Neurologic State: Alert  Orientation Level: Oriented X4  Cognition: Follows commands     Hearing: Auditory  Auditory Impairment: None    Range Of Motion:  AROM: Generally decreased, functional     PROM: Generally decreased, functional    Strength:    Strength: Generally decreased, functional    Functional Mobility:  Bed Mobility:     Supine to Sit: Modified independent  Sit to Supine: Modified independent  Scooting: Modified independent    Transfers:  Sit to Stand: Stand-by assistance  Stand to Sit: Stand-by assistance        Bed to Chair: Stand-by assistance    Balance:   Sitting: Intact  Standing: Impaired; With support  Standing - Static: Constant support;Good  Standing - Dynamic : Constant support;Good;Fair    Ambulation/Gait Training:  Distance (ft): 50 Feet (ft)  Assistive Device: Gait belt;Walker, rolling  Ambulation - Level of Assistance: Stand-by assistance     Gait Description (WDL): Exceptions to WDL  Gait Abnormalities: Decreased step clearance    Base of Support: Widened     Speed/Chelita: Pace decreased (<100 feet/min)  Step Length: Left shortened;Right shortened    Based on the above components, the patient evaluation is determined to be of the following complexity level: LOW     Pain Rating:  No pain    Activity Tolerance:   Fair    After treatment patient left in no apparent distress:   Supine in bed and RN aware    COMMUNICATION/EDUCATION:   The patients plan of care was discussed with: Registered nurse. Fall prevention education was provided and the patient/caregiver indicated understanding., Patient/family have participated as able in goal setting and plan of care. , and Patient/family agree to work toward stated goals and plan of care.     Thank you for this referral.  Kinga Wren, PT   Time Calculation: 15 mins

## 2022-11-18 NOTE — BH NOTES
PSYCHIATRIC PROGRESS NOTE         Patient Name  Patrick Aguilar   Date of Birth 1965   [facility] [id number]   Medical Record Number  [id number]      Age  59 y.o. PCP Salina Pinto MD   Admit date:  11/9/2022    Room Number  320/02  @ [facility]   Date of Service  11/18/2022         E & M PROGRESS NOTE:         HISTORY       CC:  \"suicidal ideation\"  HISTORY OF PRESENT ILLNESS/INTERVAL HISTORY:  (reviewed/updated 11/18/2022). per initial evaluation: The patient, Patrick Aguilar, is a 59 y.o. WHITE/NON- female with a past psychiatric history significant for schizoaffective disorder, who presents at this time with complaints of (and/or evidence of) the following emotional symptoms: depression and suicidal thoughts/threats. Additional symptomatology include poor self care. The above symptoms have been present for 2+ weeks. These symptoms are of moderate to high severity. These symptoms are constant in nature. The patient's condition has been precipitated by psychosocial stressors. Patient's condition made worse by treatment noncompliance. UDS: negative; BAL=0. The patient is well known to the unit; she comes into the hospital frequently in crisis typically stating her 's death several years ago has made her feel hopeless. She has been living in a group home though this admission she states that she was kicked out of her group home because of an episode of incontinence. The patient is a poor historian. The patient corroborates the above narrative. The patient contracts for safety on the unit and gives consent for the team to contact collateral. The patient is amenable to initiating treatment while on the unit. She defers much of the team's inquires to her payee. 11/11 - no acute overnight events. The patient has been isolative to her room with poor ADLs. incontinent of urine but able to void when taken to the bathroom.  She denies active thoughts of self harm but is markedly internally preoccupied. Patient slept 8 hours overnight and got Trazodone. She denies active thoughts of self harm but endorses both depressed mood and anxiety. 11/14 - the patient remains in bed for much of the day. She has been inconitnent of urine and with poor ADLs requiring prompting for much activities. Patient denies active thoughts of self harm but endorses passive SI. She slept 7 hours overnight and is flat and disorganized on interview, moaning and providing little updates on her disposition plan. Per SW, the patient tends to not go to the group homes once assigned. Her payee is working on guardianship.    11/15 - overnight, the patient remained isolative to her bed, she continues to c/o anxiety and had an episode of urine incontinence. Patient refused nystatin stating her rash had 'cleared up.' She endorses ongoing AH and VH of 'ghosts' and states that she is doing very poorly. Patient evaluated by internist for ongoing LE swelling and pain. She is medication compliant and tolerating higher dose of Risperdal.    11/16 - the patient has remained in bed, out for meals and with little motivation to interact in the milieu. She got Motrin and Atarax and continues to c/o pain in her LE, as well as SI with a plan to cut herself if given the chance. The patient slept 8 hours and this morning spontaneously stated to the team that she wanted to be discharged -- she acknowledges that her payee and SW are working on a dispo plan and she is still symptomatic. Medicine consulted as she is now experiencing orthopnea. 11/17 - the patient has been isolative to her room, out for meals and slept 7 hours overnight. She is stating she will discharge to a [facility] [place] but acknowledges that the team is coordinating guardianship with her payee. Patient noted to have significant LE edema and c/o orthopnea. She is medication compliant and denies active thoughts of self harm. . She remains disorganized and with ongoing confusion but today she is otherwise pleasant. 11/18 - overnight, the patient was noted to be desaturating to the 80's and difficult to arouse. She denies all symptoms otherwise and has been able to make her needs known. Patient medication compliant. She has been largely in bed during the day, but out for meals. SW actively working on placement for the patient. IM started Lasix to address edema. SIDE EFFECTS: (reviewed/updated 11/18/2022)  None reported or admitted to. No noted toxicity with use of Depakote   ALLERGIES:(reviewed/updated 11/18/2022)  Allergies   Allergen Reactions    Amoxicillin Hives    Mushroom Flavor Hives    Tomato Hives      REVIEW OF SYSTEMS: (reviewed/updated 11/18/2022)  Appetite:improved   Sleep: no change   All other Review of Systems: Negative except incontinence per HPI         [facility] (MSE):    MSE FINDINGS ARE WITHIN NORMAL LIMITS (WNL) UNLESS OTHERWISE STATED BELOW. ( ALL OF THE BELOW CATEGORIES OF THE MSE HAVE BEEN REVIEWED (reviewed 11/18/2022) AND UPDATED AS DEEMED APPROPRIATE )  General Presentation age appropriate, cooperative and guarded   Orientation disorganized   Vital Signs  See below (reviewed 11/18/2022); Vital Signs (BP, Pulse, & Temp) are within normal limits if not listed below.    Gait and Station Stable/steady, no ataxia   Musculoskeletal System No extrapyramidal symptoms (EPS); no abnormal muscular movements or Tardive Dyskinesia (TD); muscle strength and tone are within normal limits   Language No aphasia or dysarthria   Speech:  normal volume and non-pressured   Thought Processes concrete; normal rate of thoughts; poor abstract reasoning/computation   Thought Associations blocked    Thought Content auditory hallucinations and visual hallucinations   Suicidal Ideations contracts for safety   Homicidal Ideations contracts for safety   Mood:  depressed and anhedonia   Affect:  flat   Memory recent intact   Memory remote:  intact   Concentration/Attention:  intact   Fund of Knowledge average   Insight:  limited   Reliability fair   Judgment:  limited          VITALS:     Patient Vitals for the past 24 hrs:   Temp Pulse Resp BP SpO2   11/18/22 0845 97.9 °F (36.6 °C) 60 16 104/64 98 %   11/17/22 2110 98.1 °F (36.7 °C) 72 17 (!) 126/58 95 %   11/17/22 1628 -- 77 -- 119/65 --   11/17/22 1251 -- 68 -- 129/65 --       Wt Readings from Last 3 Encounters:   11/11/22 123.2 kg (271 lb 8 oz)   06/03/22 113.9 kg (251 lb)   05/14/22 88.5 kg (195 lb)     Temp Readings from Last 3 Encounters:   11/18/22 97.9 °F (36.6 °C)   06/03/22 97.6 °F (36.4 °C)   05/19/22 98.5 °F (36.9 °C)     BP Readings from Last 3 Encounters:   11/18/22 104/64   06/03/22 130/69   05/19/22 (!) 112/51     Pulse Readings from Last 3 Encounters:   11/18/22 60   06/03/22 89   05/19/22 64            DATA     LABORATORY DATA:(reviewed/updated 11/18/2022)  Recent Results (from the past 24 hour(s))   GLUCOSE, POC    Collection Time: 11/17/22 11:03 AM   Result Value Ref Range    Glucose (POC) 170 (H) 65 - 117 mg/dL    Performed by Seth Pop RN    GLUCOSE, POC    Collection Time: 11/17/22  4:14 PM   Result Value Ref Range    Glucose (POC) 228 (H) 65 - 117 mg/dL    Performed by Seth Pop RN    GLUCOSE, POC    Collection Time: 11/17/22  7:58 PM   Result Value Ref Range    Glucose (POC) 140 (H) 65 - 117 mg/dL    Performed by Leobardo Souza RN    GLUCOSE, POC    Collection Time: 11/18/22  7:53 AM   Result Value Ref Range    Glucose (POC) 156 (H) 65 - 117 mg/dL    Performed by Giuliana Cordova RN      No results found for: VALF2, VALAC, VALP, VALPR, DS6, CRBAM, CRBAMP, CARB2, XCRBAM  No results found for: LITHM   RADIOLOGY REPORTS:(reviewed/updated 11/18/2022)  No results found.        MEDICATIONS     ALL MEDICATIONS:   Current Facility-Administered Medications   Medication Dose Route Frequency    furosemide (LASIX) tablet 40 mg  40 mg Oral ACB&D ibuprofen (MOTRIN) tablet 400 mg  400 mg Oral Q6H PRN    nystatin (MYCOSTATIN) 100,000 unit/gram powder   Topical BID    risperiDONE (RisperDAL) tablet 2 mg  2 mg Oral Q12H    benzocaine-menthoL (CHLORASEPTIC MAX) lozenge 1 Lozenge  1 Lozenge Oral Q2H PRN    glucose chewable tablet 16 g  4 Tablet Oral PRN    glucagon (GLUCAGEN) injection 1 mg  1 mg IntraMUSCular PRN    dextrose 10% infusion 0-250 mL  0-250 mL IntraVENous PRN    albuterol (PROVENTIL HFA, VENTOLIN HFA, PROAIR HFA) inhaler 1 Puff  1 Puff Inhalation Q4H PRN    insulin lispro (HUMALOG) injection 1-10 Units  1-10 Units SubCUTAneous AC&HS    insulin lispro (HUMALOG) injection 5 Units  5 Units SubCUTAneous TIDAC    budesonide-formoterol (SYMBICORT) 80-4.5 mcg inhaler  2 Puff Inhalation BID RT    insulin glargine (LANTUS) injection 25 Units  25 Units SubCUTAneous DAILY    OLANZapine (ZyPREXA) tablet 5 mg  5 mg Oral Q6H PRN    haloperidol lactate (HALDOL) injection 5 mg  5 mg IntraMUSCular Q6H PRN    benztropine (COGENTIN) tablet 1 mg  1 mg Oral BID PRN    diphenhydrAMINE (BENADRYL) injection 50 mg  50 mg IntraMUSCular BID PRN    hydrOXYzine HCL (ATARAX) tablet 50 mg  50 mg Oral TID PRN    LORazepam (ATIVAN) injection 1 mg  1 mg IntraMUSCular Q4H PRN    traZODone (DESYREL) tablet 50 mg  50 mg Oral QHS PRN    acetaminophen (TYLENOL) tablet 650 mg  650 mg Oral Q4H PRN    magnesium hydroxide (MILK OF MAGNESIA) 400 mg/5 mL oral suspension 30 mL  30 mL Oral DAILY PRN    atorvastatin (LIPITOR) tablet 40 mg  40 mg Oral QPM    gabapentin (NEURONTIN) capsule 100 mg  100 mg Oral TID    metFORMIN ER (GLUCOPHAGE XR) tablet 1,000 mg  1,000 mg Oral BID WITH MEALS    oxybutynin chloride XL (DITROPAN XL) tablet 5 mg  5 mg Oral DAILY    sertraline (ZOLOFT) tablet 100 mg  100 mg Oral DAILY      SCHEDULED MEDICATIONS:   Current Facility-Administered Medications   Medication Dose Route Frequency    furosemide (LASIX) tablet 40 mg  40 mg Oral ACB&D    nystatin (MYCOSTATIN) 100,000 unit/gram powder   Topical BID    risperiDONE (RisperDAL) tablet 2 mg  2 mg Oral Q12H    insulin lispro (HUMALOG) injection 1-10 Units  1-10 Units SubCUTAneous AC&HS    insulin lispro (HUMALOG) injection 5 Units  5 Units SubCUTAneous TIDAC    budesonide-formoterol (SYMBICORT) 80-4.5 mcg inhaler  2 Puff Inhalation BID RT    insulin glargine (LANTUS) injection 25 Units  25 Units SubCUTAneous DAILY    atorvastatin (LIPITOR) tablet 40 mg  40 mg Oral QPM    gabapentin (NEURONTIN) capsule 100 mg  100 mg Oral TID    metFORMIN ER (GLUCOPHAGE XR) tablet 1,000 mg  1,000 mg Oral BID WITH MEALS    oxybutynin chloride XL (DITROPAN XL) tablet 5 mg  5 mg Oral DAILY    sertraline (ZOLOFT) tablet 100 mg  100 mg Oral DAILY          ASSESSMENT & PLAN     DIAGNOSES REQUIRING ACTIVE TREATMENT AND MONITORING: (reviewed/updated 11/18/2022)  Patient Active Hospital Problem List:       Schizoaffective disorder (11/10/2022)           Assessment: the patient presents in crisis after potentially leaving her group home; she is disorganized and a poor historian at baseline, treatment will be primarily psychosocial. Patient with low O2 sats, edema and orthopnea, will have to address this. Plan:   - CONTINUE Risperdal 2 mg Q12H for psychosis  - CONTINUE Zoloft 100 mg QDAY for MDD  - Appreciate medicine recs  - Consider sleep referral for obesity hypoventilation syndrome  - IGM therapy as tolerated  - Expand database / obtain collateral  - Dispo planning    In summary, Yessy Ho, is a 59 y.o.  female who presents with a severe exacerbation of the principal diagnosis of Schizoaffective disorder ([facility] [address]    Patient's condition is not improving. Patient requires continued inpatient hospitalization for further stabilization, safety monitoring and medication management.   I will continue to coordinate the provision of individual, milieu, occupational, group, and substance abuse therapies to address target symptoms/diagnoses as deemed appropriate for the individual patient. A coordinated, multidisplinary treatment team round was conducted with the patient (this team consists of the nurse, psychiatric unit pharmacist,  and writer). Complete current electronic health record for patient has been reviewed today including consultant notes, ancillary staff notes, nurses and psychiatric tech notes. Suicide risk assessment completed and patient deemed to be of low risk for suicide at this time. The following regarding medications was addressed during rounds with patient:   the risks and benefits of the proposed medication. The patient was given the opportunity to ask questions. Informed consent given to the use of the above medications. Will continue to adjust psychiatric and non-psychiatric medications (see above \"medication\" section and orders section for details) as deemed appropriate & based upon diagnoses and response to treatment. I will continue to order blood tests/labs and diagnostic tests as deemed appropriate and review results as they become available (see orders for details and above listed lab/test results). I will order psychiatric records from previous [facility] hospitals to further elucidate the nature of patient's psychopathology and review once available. I will gather additional collateral information from friends, family and o/p treatment team to further elucidate the nature of patient's psychopathology and baselline level of psychiatric functioning. I certify that this patient's inpatient psychiatric hospital services furnished since the previous certification were, and continue to be, required for treatment that could reasonably be expected to improve the patient's condition, or for diagnostic study, and that the patient continues to need, on a daily basis, active treatment furnished directly by or requiring the supervision of inpatient psychiatric facility personnel.  In addition, the hospital records show that services furnished were intensive treatment services, admission or related services, or equivalent services.     EXPECTED DISCHARGE DATE/DAY: TBD     DISPOSITION: Group home / shelter       Signed By:   Max Ruvalcaba MD  11/18/2022

## 2022-11-18 NOTE — BH NOTES
Behavioral Health Interdisciplinary Rounds    Patient goal(s) for today: Continue taking meds as, participate in hygiene/ADLs, prepare for discharge, follow directions from staff, contact support team, attend groups        Progress note: Per nursing, patient slept for 8.5 hours and endorses SI with no plan and depression, denies AVH/HI. Patient was met in room by treatment team. Patient is alert and oriented x 4. Patient is pleasant and cooperative with treatment team. Patient remains in bed most of the day but leaves room for meals. Patient was informed by the  that her payee Lamar Quiles) will be putting together the paperwork for guardianship and coming to the unit to work on the paperwork together some time next week. SW is coordinating discharge plans with patient's payee, and payee requests SW explore KIM as the discharge plan. Payee will contact SW once she acquires the necessary paperwork for guardianship to arrange a time for her to meet with patient on the unit.      LOS: 8                     Expected LOS: TBD     Insurance info: [facility] MEDICAID - [place] [facility]  Outpatient providers: Lamar Quilse [id number]     Participating treatment team members: Bonifacio Donis MSW Student, Jimenez Alegria MD

## 2022-11-18 NOTE — GROUP NOTE
EMILY  GROUP DOCUMENTATION INDIVIDUAL                                                                          Group Therapy Note    Date: 11/18/2022    Group Start Time: 1400  Group End Time: 1500  Group Topic: Recreational/Music Therapy    [facility] [place] [address] ACUTE BEHAV [facility]    [place], [address] [facility] GROUP DOCUMENTATION GROUP    Group Therapy Note    Attendees: 5       Attendance: Attended    Patient's Goal: To concentrate on selected task    Interventions/techniques: Supported-crafts,games,music    Interactions: Interacted appropriately    Behavior/appearance: Needed prompting    Goals Achieved: Able to engage in interactions and Able to listen to others-listened to music selections      Additional Notes:  Pt reported not feeling well-left session early    Nikko Perales

## 2022-11-18 NOTE — GROUP NOTE
EMILY  GROUP DOCUMENTATION INDIVIDUAL                                                                          Group Therapy Note    Date: 11/18/2022    Group Start Time: 1000  Group End Time: 1100  Group Topic: Topic Group    [facility] ACUTE BEHAV TH    [place] [address] GROUP    Group Therapy Note    Attendees: 7       Attendance: Did not attend  Torey Duncan

## 2022-11-18 NOTE — BH NOTES
Writer met patient in her room. Patient observed to be sleeping in a hospital bed, slow to arouse, and appears to be free of distress. Patient calm and cooperative with vital signs and assessment. Patient A/O x 4 with slow, soft, clear speech. Patient endorses depression (6/10), anxiety (7/10). Patient denies SI, HI, and AVH. Patient compliant with meals and medications. Patient remains isolative to self and room, with minimal interaction with staff and peers. Affect is flat, and thought process in unremarkable. Writer will continue to monitor and provide support. 1355 Patient reported vaginal bleeding to writer. Writer and GERALD Schaefer assessed. Two (2) clots consistent with blood (one red and one dark brown/black) less than dime size each visible in the bedside commode. Vital signs assessed WNL and Dr. Alma Welch notified. Patient resting quietly in bed, and appears to be free of distress.

## 2022-11-19 LAB
GLUCOSE BLD STRIP.AUTO-MCNC: 153 MG/DL (ref 65–117)
GLUCOSE BLD STRIP.AUTO-MCNC: 197 MG/DL (ref 65–117)
GLUCOSE BLD STRIP.AUTO-MCNC: 270 MG/DL (ref 65–117)
GLUCOSE BLD STRIP.AUTO-MCNC: 272 MG/DL (ref 65–117)
SERVICE CMNT-IMP: ABNORMAL

## 2022-11-19 PROCEDURE — 74011250637 HC RX REV CODE- 250/637: Performed by: STUDENT IN AN ORGANIZED HEALTH CARE EDUCATION/TRAINING PROGRAM

## 2022-11-19 PROCEDURE — 74011636637 HC RX REV CODE- 636/637: Performed by: STUDENT IN AN ORGANIZED HEALTH CARE EDUCATION/TRAINING PROGRAM

## 2022-11-19 PROCEDURE — 82962 GLUCOSE BLOOD TEST: CPT

## 2022-11-19 PROCEDURE — 74011250637 HC RX REV CODE- 250/637: Performed by: PSYCHIATRY & NEUROLOGY

## 2022-11-19 PROCEDURE — 65220000003 HC RM SEMIPRIVATE PSYCH

## 2022-11-19 PROCEDURE — 74011250637 HC RX REV CODE- 250/637

## 2022-11-19 RX ADMIN — RISPERIDONE 2 MG: 1 TABLET ORAL at 08:43

## 2022-11-19 RX ADMIN — BUDESONIDE AND FORMOTEROL FUMARATE DIHYDRATE 2 PUFF: 80; 4.5 AEROSOL RESPIRATORY (INHALATION) at 08:43

## 2022-11-19 RX ADMIN — Medication 5 UNITS: at 11:31

## 2022-11-19 RX ADMIN — GABAPENTIN 100 MG: 100 CAPSULE ORAL at 17:03

## 2022-11-19 RX ADMIN — Medication 25 UNITS: at 08:43

## 2022-11-19 RX ADMIN — Medication 5 UNITS: at 17:02

## 2022-11-19 RX ADMIN — NYSTATIN: 100000 POWDER TOPICAL at 18:34

## 2022-11-19 RX ADMIN — OXYBUTYNIN CHLORIDE 5 MG: 5 TABLET, EXTENDED RELEASE ORAL at 08:44

## 2022-11-19 RX ADMIN — GABAPENTIN 100 MG: 100 CAPSULE ORAL at 08:44

## 2022-11-19 RX ADMIN — Medication 5 UNITS: at 07:45

## 2022-11-19 RX ADMIN — Medication 2 UNITS: at 11:31

## 2022-11-19 RX ADMIN — METFORMIN HYDROCHLORIDE 1000 MG: 500 TABLET, EXTENDED RELEASE ORAL at 08:44

## 2022-11-19 RX ADMIN — RISPERIDONE 2 MG: 1 TABLET ORAL at 21:10

## 2022-11-19 RX ADMIN — Medication 3 UNITS: at 20:35

## 2022-11-19 RX ADMIN — ACETAMINOPHEN 650 MG: 325 TABLET, FILM COATED ORAL at 21:16

## 2022-11-19 RX ADMIN — Medication 2 UNITS: at 07:45

## 2022-11-19 RX ADMIN — ATORVASTATIN CALCIUM 40 MG: 40 TABLET, FILM COATED ORAL at 17:03

## 2022-11-19 RX ADMIN — FUROSEMIDE 40 MG: 40 TABLET ORAL at 08:43

## 2022-11-19 RX ADMIN — FUROSEMIDE 40 MG: 40 TABLET ORAL at 17:03

## 2022-11-19 RX ADMIN — SERTRALINE HYDROCHLORIDE 100 MG: 50 TABLET ORAL at 08:43

## 2022-11-19 RX ADMIN — BUDESONIDE AND FORMOTEROL FUMARATE DIHYDRATE 2 PUFF: 80; 4.5 AEROSOL RESPIRATORY (INHALATION) at 20:11

## 2022-11-19 RX ADMIN — GABAPENTIN 100 MG: 100 CAPSULE ORAL at 11:48

## 2022-11-19 RX ADMIN — TRAZODONE HYDROCHLORIDE 50 MG: 50 TABLET ORAL at 21:16

## 2022-11-19 RX ADMIN — METFORMIN HYDROCHLORIDE 1000 MG: 500 TABLET, EXTENDED RELEASE ORAL at 17:03

## 2022-11-19 NOTE — PROGRESS NOTES
Problem: Falls - Risk of  Goal: *Absence of Falls  Description: Document Michele Cole Fall Risk and appropriate interventions in the flowsheet. Outcome: Progressing Towards Goal  Note: Fall Risk Interventions:  Mobility Interventions: Patient to call before getting OOB         Medication Interventions: Teach patient to arise slowly         History of Falls Interventions: Room close to nurse's station         Problem: Suicide  Goal: *STG: Remains safe in hospital  Outcome: Progressing Towards Goal  Goal: *STG/LTG: Complies with medication therapy  Outcome: Progressing Towards Goal       PATIENT ALERT, CALM, GUARDED, WITHDRAWN, COOPERATIVE. DENIES SI/HI. DENIES PAIN. DENIES AVH. DENIES ANXIETY AND DEPRESSION. NO DISTRESS OBSERVED. NO COMPLAINTS AT THIS TIME. PURPOSEFUL INTERACTION ONGOING.

## 2022-11-19 NOTE — BH NOTES
PSYCHIATRIC PROGRESS NOTE         Patient Name  Christy Reynoso   Date of Birth 1965   [facility] [id number]   Medical Record Number  [id number]      Age  59 y.o. PCP Corazon Leiva MD   Admit date:  11/9/2022    Room Number  320/02  @ [facility]   Date of Service  11/19/2022         E & M PROGRESS NOTE:         HISTORY       CC:  \"suicidal ideation\"  HISTORY OF PRESENT ILLNESS/INTERVAL HISTORY:  (reviewed/updated 11/19/2022). per initial evaluation: The patient, Christy Reynoso, is a 59 y.o. WHITE/NON- female with a past psychiatric history significant for schizoaffective disorder, who presents at this time with complaints of (and/or evidence of) the following emotional symptoms: depression and suicidal thoughts/threats. Additional symptomatology include poor self care. The above symptoms have been present for 2+ weeks. These symptoms are of moderate to high severity. These symptoms are constant in nature. The patient's condition has been precipitated by psychosocial stressors. Patient's condition made worse by treatment noncompliance. UDS: negative; BAL=0. The patient is well known to the unit; she comes into the hospital frequently in crisis typically stating her 's death several years ago has made her feel hopeless. She has been living in a group home though this admission she states that she was kicked out of her group home because of an episode of incontinence. The patient is a poor historian. The patient corroborates the above narrative. The patient contracts for safety on the unit and gives consent for the team to contact collateral. The patient is amenable to initiating treatment while on the unit. She defers much of the team's inquires to her payee. 11/11 - no acute overnight events. The patient has been isolative to her room with poor ADLs. incontinent of urine but able to void when taken to the bathroom.  She denies active thoughts of self harm but is markedly internally preoccupied. Patient slept 8 hours overnight and got Trazodone. She denies active thoughts of self harm but endorses both depressed mood and anxiety. 11/14 - the patient remains in bed for much of the day. She has been inconitnent of urine and with poor ADLs requiring prompting for much activities. Patient denies active thoughts of self harm but endorses passive SI. She slept 7 hours overnight and is flat and disorganized on interview, moaning and providing little updates on her disposition plan. Per SW, the patient tends to not go to the group homes once assigned. Her payee is working on guardianship.    11/15 - overnight, the patient remained isolative to her bed, she continues to c/o anxiety and had an episode of urine incontinence. Patient refused nystatin stating her rash had 'cleared up.' She endorses ongoing AH and VH of 'ghosts' and states that she is doing very poorly. Patient evaluated by internist for ongoing LE swelling and pain. She is medication compliant and tolerating higher dose of Risperdal.    11/16 - the patient has remained in bed, out for meals and with little motivation to interact in the milieu. She got Motrin and Atarax and continues to c/o pain in her LE, as well as SI with a plan to cut herself if given the chance. The patient slept 8 hours and this morning spontaneously stated to the team that she wanted to be discharged -- she acknowledges that her payee and SW are working on a dispo plan and she is still symptomatic. Medicine consulted as she is now experiencing orthopnea. 11/17 - the patient has been isolative to her room, out for meals and slept 7 hours overnight. She is stating she will discharge to a [facility] on [employer] but acknowledges that the team is coordinating guardianship with her payee. Patient noted to have significant LE edema and c/o orthopnea. She is medication compliant and denies active thoughts of self harm. . She remains disorganized and with ongoing confusion but today she is otherwise pleasant. 11/18 - overnight, the patient was noted to be desaturating to the 80's and difficult to arouse. She denies all symptoms otherwise and has been able to make her needs known. Patient medication compliant. She has been largely in bed during the day, but out for meals. SW actively working on placement for the patient. IM started Lasix to address edema. 11/19 - Pilar Sandra received atarax and tylenol prns last evening. Reports doing good and moods are good also. Denies SI/HI/AH/VH. No aggression or violence. Appropriately interactive and aware. Tolerating medications well. Eating and sleeping fairly (6 hrs). SIDE EFFECTS: (reviewed/updated 11/19/2022)  None reported or admitted to. No noted toxicity with use of Depakote   ALLERGIES:(reviewed/updated 11/19/2022)  Allergies   Allergen Reactions    Amoxicillin Hives    Mushroom Flavor Hives    Tomato Hives      REVIEW OF SYSTEMS: (reviewed/updated 11/19/2022)  Appetite:improved   Sleep: no change   All other Review of Systems: Negative except incontinence per [facility]         [facility] (MSE):    MSE FINDINGS ARE WITHIN NORMAL LIMITS (WNL) UNLESS OTHERWISE STATED BELOW. ( ALL OF THE BELOW CATEGORIES OF THE MSE HAVE BEEN REVIEWED (reviewed 11/19/2022) AND UPDATED AS DEEMED APPROPRIATE )  General Presentation age appropriate, cooperative and guarded   Orientation disorganized   Vital Signs  See below (reviewed 11/19/2022); Vital Signs (BP, Pulse, & Temp) are within normal limits if not listed below.    Gait and Station Stable/steady, no ataxia   Musculoskeletal System No extrapyramidal symptoms (EPS); no abnormal muscular movements or Tardive Dyskinesia (TD); muscle strength and tone are within normal limits   Language No aphasia or dysarthria   Speech:  normal volume and non-pressured   Thought Processes concrete; normal rate of thoughts; poor abstract reasoning/computation   Thought Associations blocked    Thought Content auditory hallucinations and visual hallucinations   Suicidal Ideations contracts for safety   Homicidal Ideations contracts for safety   Mood:  depressed and anhedonia   Affect:  flat   Memory recent  intact   Memory remote:  intact   Concentration/Attention:  intact   Fund of Knowledge average   Insight:  limited   Reliability fair   Judgment:  limited          VITALS:     Patient Vitals for the past 24 hrs:   Temp Pulse Resp BP SpO2   11/19/22 0837 98.4 °F (36.9 °C) 73 16 113/68 96 %   11/18/22 2256 97.9 °F (36.6 °C) 87 16 100/61 98 %   11/18/22 1348 97.5 °F (36.4 °C) 85 16 128/65 98 %       Wt Readings from Last 3 Encounters:   11/18/22 119.1 kg (262 lb 9.6 oz)   06/03/22 113.9 kg (251 lb)   05/14/22 88.5 kg (195 lb)     Temp Readings from Last 3 Encounters:   11/19/22 98.4 °F (36.9 °C)   06/03/22 97.6 °F (36.4 °C)   05/19/22 98.5 °F (36.9 °C)     BP Readings from Last 3 Encounters:   11/19/22 113/68   06/03/22 130/69   05/19/22 (!) 112/51     Pulse Readings from Last 3 Encounters:   11/19/22 73   06/03/22 89   05/19/22 64            DATA     LABORATORY DATA:(reviewed/updated 11/19/2022)  Recent Results (from the past 24 hour(s))   URINALYSIS W/ REFLEX CULTURE    Collection Time: 11/18/22  4:03 PM    Specimen: Urine   Result Value Ref Range    Color YELLOW/STRAW      Appearance CLEAR CLEAR      Specific gravity 1.010      pH (UA) 5.0 5.0 - 8.0      Protein Negative NEG mg/dL    Glucose Negative NEG mg/dL    Ketone Negative NEG mg/dL    Bilirubin Negative NEG      Blood LARGE (A) NEG      Urobilinogen 0.2 0.2 - 1.0 EU/dL    Nitrites Positive (A) NEG      Leukocyte Esterase MODERATE (A) NEG      WBC 10-20 0 - 4 /hpf    RBC 20-50 0 - 5 /hpf    Epithelial cells FEW FEW /lpf    Bacteria 1+ (A) NEG /hpf    UA:UC IF INDICATED URINE CULTURE ORDERED (A) CNI     GLUCOSE, POC    Collection Time: 11/18/22  4:32 PM   Result Value Ref Range    Glucose (POC) 228 (H) 65 - 117 mg/dL    Performed by Yajaira Gutierrez RN    GLUCOSE, POC    Collection Time: 11/18/22  9:20 PM   Result Value Ref Range    Glucose (POC) 160 (H) 65 - 117 mg/dL    Performed by Lynn Romero RN    GLUCOSE, POC    Collection Time: 11/19/22  7:36 AM   Result Value Ref Range    Glucose (POC) 197 (H) 65 - 117 mg/dL    Performed by Jesús Gamez RN    GLUCOSE, POC    Collection Time: 11/19/22 11:14 AM   Result Value Ref Range    Glucose (POC) 153 (H) 65 - 117 mg/dL    Performed by Jesús Gamez RN      No results found for: VALF2, VALAC, VALP, VALPR, DS6, CRBAM, CRBAMP, CARB2, XCRBAM  No results found for: LITHM   RADIOLOGY REPORTS:(reviewed/updated 11/19/2022)  No results found.        MEDICATIONS     ALL MEDICATIONS:   Current Facility-Administered Medications   Medication Dose Route Frequency    furosemide (LASIX) tablet 40 mg  40 mg Oral ACB&D    ibuprofen (MOTRIN) tablet 400 mg  400 mg Oral Q6H PRN    nystatin (MYCOSTATIN) 100,000 unit/gram powder   Topical BID    risperiDONE (RisperDAL) tablet 2 mg  2 mg Oral Q12H    benzocaine-menthoL (CHLORASEPTIC MAX) lozenge 1 Lozenge  1 Lozenge Oral Q2H PRN    glucose chewable tablet 16 g  4 Tablet Oral PRN    glucagon (GLUCAGEN) injection 1 mg  1 mg IntraMUSCular PRN    dextrose 10% infusion 0-250 mL  0-250 mL IntraVENous PRN    albuterol (PROVENTIL HFA, VENTOLIN HFA, PROAIR HFA) inhaler 1 Puff  1 Puff Inhalation Q4H PRN    insulin lispro (HUMALOG) injection 1-10 Units  1-10 Units SubCUTAneous AC&HS    insulin lispro (HUMALOG) injection 5 Units  5 Units SubCUTAneous TIDAC    budesonide-formoterol (SYMBICORT) 80-4.5 mcg inhaler  2 Puff Inhalation BID RT    insulin glargine (LANTUS) injection 25 Units  25 Units SubCUTAneous DAILY    OLANZapine (ZyPREXA) tablet 5 mg  5 mg Oral Q6H PRN    haloperidol lactate (HALDOL) injection 5 mg  5 mg IntraMUSCular Q6H PRN    benztropine (COGENTIN) tablet 1 mg  1 mg Oral BID PRN    diphenhydrAMINE (BENADRYL) injection 50 mg  50 mg IntraMUSCular BID PRN    hydrOXYzine HCL (ATARAX) tablet 50 mg  50 mg Oral TID PRN    LORazepam (ATIVAN) injection 1 mg  1 mg IntraMUSCular Q4H PRN    traZODone (DESYREL) tablet 50 mg  50 mg Oral QHS PRN    acetaminophen (TYLENOL) tablet 650 mg  650 mg Oral Q4H PRN    magnesium hydroxide (MILK OF MAGNESIA) 400 mg/5 mL oral suspension 30 mL  30 mL Oral DAILY PRN    atorvastatin (LIPITOR) tablet 40 mg  40 mg Oral QPM    gabapentin (NEURONTIN) capsule 100 mg  100 mg Oral TID    metFORMIN ER (GLUCOPHAGE XR) tablet 1,000 mg  1,000 mg Oral BID WITH MEALS    oxybutynin chloride XL (DITROPAN XL) tablet 5 mg  5 mg Oral DAILY    sertraline (ZOLOFT) tablet 100 mg  100 mg Oral DAILY      SCHEDULED MEDICATIONS:   Current Facility-Administered Medications   Medication Dose Route Frequency    furosemide (LASIX) tablet 40 mg  40 mg Oral ACB&D    nystatin (MYCOSTATIN) 100,000 unit/gram powder   Topical BID    risperiDONE (RisperDAL) tablet 2 mg  2 mg Oral Q12H    insulin lispro (HUMALOG) injection 1-10 Units  1-10 Units SubCUTAneous AC&HS    insulin lispro (HUMALOG) injection 5 Units  5 Units SubCUTAneous TIDAC    budesonide-formoterol (SYMBICORT) 80-4.5 mcg inhaler  2 Puff Inhalation BID RT    insulin glargine (LANTUS) injection 25 Units  25 Units SubCUTAneous DAILY    atorvastatin (LIPITOR) tablet 40 mg  40 mg Oral QPM    gabapentin (NEURONTIN) capsule 100 mg  100 mg Oral TID    metFORMIN ER (GLUCOPHAGE XR) tablet 1,000 mg  1,000 mg Oral BID WITH MEALS    oxybutynin chloride XL (DITROPAN XL) tablet 5 mg  5 mg Oral DAILY    sertraline (ZOLOFT) tablet 100 mg  100 mg Oral DAILY          ASSESSMENT & PLAN     DIAGNOSES REQUIRING ACTIVE TREATMENT AND MONITORING: (reviewed/updated 11/19/2022)  Patient Active Hospital Problem List:       Schizoaffective disorder (11/10/2022)           Assessment: the patient presents in crisis after potentially leaving her group home; she is disorganized and a poor historian at baseline, treatment will be primarily psychosocial. Patient with low O2 sats, edema and orthopnea, will have to address this. Plan:   - Continue current care  - CONTINUE Risperdal 2 mg Q12H for psychosis  - CONTINUE Zoloft 100 mg QDAY for MDD  - Appreciate medicine recs  - Consider sleep referral for obesity hypoventilation syndrome  - IGM therapy as tolerated  - Expand database / obtain collateral  - Dispo planning    In summary, Kianna Carson, is a 59 y.o.  female who presents with a severe exacerbation of the principal diagnosis of Schizoaffective disorder ([facility] [address]    Patient's condition is not improving. Patient requires continued inpatient hospitalization for further stabilization, safety monitoring and medication management. I will continue to coordinate the provision of individual, milieu, occupational, group, and substance abuse therapies to address target symptoms/diagnoses as deemed appropriate for the individual patient. A coordinated, multidisplinary treatment team round was conducted with the patient (this team consists of the nurse, psychiatric unit pharmacist,  and writer). Complete current electronic health record for patient has been reviewed today including consultant notes, ancillary staff notes, nurses and psychiatric tech notes. Suicide risk assessment completed and patient deemed to be of low risk for suicide at this time. The following regarding medications was addressed during rounds with patient:   the risks and benefits of the proposed medication. The patient was given the opportunity to ask questions. Informed consent given to the use of the above medications. Will continue to adjust psychiatric and non-psychiatric medications (see above \"medication\" section and orders section for details) as deemed appropriate & based upon diagnoses and response to treatment.      I will continue to order blood tests/labs and diagnostic tests as deemed appropriate and review results as they become available (see orders for details and above listed lab/test results). I will order psychiatric records from previous [facility] hospitals to further elucidate the nature of patient's psychopathology and review once available. I will gather additional collateral information from friends, family and o/p treatment team to further elucidate the nature of patient's psychopathology and baselline level of psychiatric functioning. I certify that this patient's inpatient psychiatric hospital services furnished since the previous certification were, and continue to be, required for treatment that could reasonably be expected to improve the patient's condition, or for diagnostic study, and that the patient continues to need, on a daily basis, active treatment furnished directly by or requiring the supervision of inpatient psychiatric facility personnel. In addition, the hospital records show that services furnished were intensive treatment services, admission or related services, or equivalent services.     EXPECTED DISCHARGE DATE/DAY: TBD     DISPOSITION: Group home / shelter       Signed By:   Vin Méndez MD  11/19/2022

## 2022-11-19 NOTE — PROGRESS NOTES
Problem: Falls - Risk of  Goal: *Absence of Falls  Description: Document Brittney Embs Fall Risk and appropriate interventions in the flowsheet.   Outcome: Progressing Towards Goal  Note: Fall Risk Interventions:  Mobility Interventions: Patient to call before getting OOB         Medication Interventions: Teach patient to arise slowly         History of Falls Interventions: Room close to nurse's station, Door open when patient unattended         Problem: Suicide  Goal: *STG: Remains safe in hospital  Outcome: Progressing Towards Goal  Goal: *STG: Seeks staff when feelings of self harm or harm towards others arise  Outcome: Progressing Towards Goal

## 2022-11-19 NOTE — PROGRESS NOTES
Received patient alert and oriented x 4. Pt is overweight, present with dull affect and depressed mood. On assessment, pt endorse anxiety  at 10/10, depression at 10/10, AVH and SI with no plan. Pt is calm and cooperative to the plan of care. PRN trazodone and atarax administered. Med's and meals compliant. Pt does not appear to be in any acute distress. Intake and out put maintained. Vital signs entered. Rounding in progress. Pt slept for total of  6 hours.

## 2022-11-20 LAB
ALBUMIN SERPL-MCNC: 3.3 G/DL (ref 3.5–5)
ALBUMIN/GLOB SERPL: 0.8 {RATIO} (ref 1.1–2.2)
ALP SERPL-CCNC: 162 U/L (ref 45–117)
ALT SERPL-CCNC: 27 U/L (ref 12–78)
ANION GAP SERPL CALC-SCNC: 8 MMOL/L (ref 5–15)
AST SERPL-CCNC: 17 U/L (ref 15–37)
BASOPHILS # BLD: 0.1 K/UL (ref 0–0.1)
BASOPHILS NFR BLD: 1 % (ref 0–1)
BILIRUB DIRECT SERPL-MCNC: 0.1 MG/DL (ref 0–0.2)
BILIRUB SERPL-MCNC: 0.2 MG/DL (ref 0.2–1)
BUN SERPL-MCNC: 22 MG/DL (ref 6–20)
BUN/CREAT SERPL: 17 (ref 12–20)
CALCIUM SERPL-MCNC: 8.6 MG/DL (ref 8.5–10.1)
CHLORIDE SERPL-SCNC: 97 MMOL/L (ref 97–108)
CO2 SERPL-SCNC: 33 MMOL/L (ref 21–32)
CREAT SERPL-MCNC: 1.29 MG/DL (ref 0.55–1.02)
DIFFERENTIAL METHOD BLD: ABNORMAL
EOSINOPHIL # BLD: 0.2 K/UL (ref 0–0.4)
EOSINOPHIL NFR BLD: 2 % (ref 0–7)
ERYTHROCYTE [DISTWIDTH] IN BLOOD BY AUTOMATED COUNT: 14.7 % (ref 11.5–14.5)
GLOBULIN SER CALC-MCNC: 4.4 G/DL (ref 2–4)
GLUCOSE BLD STRIP.AUTO-MCNC: 179 MG/DL (ref 65–117)
GLUCOSE BLD STRIP.AUTO-MCNC: 194 MG/DL (ref 65–117)
GLUCOSE BLD STRIP.AUTO-MCNC: 242 MG/DL (ref 65–117)
GLUCOSE BLD STRIP.AUTO-MCNC: 243 MG/DL (ref 65–117)
GLUCOSE SERPL-MCNC: 197 MG/DL (ref 65–100)
HCT VFR BLD AUTO: 39.7 % (ref 35–47)
HGB BLD-MCNC: 12 G/DL (ref 11.5–16)
IMM GRANULOCYTES # BLD AUTO: 0 K/UL (ref 0–0.04)
IMM GRANULOCYTES NFR BLD AUTO: 0 % (ref 0–0.5)
LYMPHOCYTES # BLD: 2.6 K/UL (ref 0.8–3.5)
LYMPHOCYTES NFR BLD: 28 % (ref 12–49)
MAGNESIUM SERPL-MCNC: 1.5 MG/DL (ref 1.6–2.4)
MCH RBC QN AUTO: 24.6 PG (ref 26–34)
MCHC RBC AUTO-ENTMCNC: 30.2 G/DL (ref 30–36.5)
MCV RBC AUTO: 81.5 FL (ref 80–99)
MONOCYTES # BLD: 0.5 K/UL (ref 0–1)
MONOCYTES NFR BLD: 5 % (ref 5–13)
NEUTS SEG # BLD: 5.9 K/UL (ref 1.8–8)
NEUTS SEG NFR BLD: 64 % (ref 32–75)
NRBC # BLD: 0 K/UL (ref 0–0.01)
NRBC BLD-RTO: 0 PER 100 WBC
PHOSPHATE SERPL-MCNC: 4 MG/DL (ref 2.6–4.7)
PLATELET # BLD AUTO: 475 K/UL (ref 150–400)
PMV BLD AUTO: 9.3 FL (ref 8.9–12.9)
POTASSIUM SERPL-SCNC: 3.4 MMOL/L (ref 3.5–5.1)
PROT SERPL-MCNC: 7.7 G/DL (ref 6.4–8.2)
RBC # BLD AUTO: 4.87 M/UL (ref 3.8–5.2)
SERVICE CMNT-IMP: ABNORMAL
SODIUM SERPL-SCNC: 138 MMOL/L (ref 136–145)
WBC # BLD AUTO: 9.2 K/UL (ref 3.6–11)

## 2022-11-20 PROCEDURE — 85025 COMPLETE CBC W/AUTO DIFF WBC: CPT

## 2022-11-20 PROCEDURE — 74011636637 HC RX REV CODE- 636/637: Performed by: STUDENT IN AN ORGANIZED HEALTH CARE EDUCATION/TRAINING PROGRAM

## 2022-11-20 PROCEDURE — 83735 ASSAY OF MAGNESIUM: CPT

## 2022-11-20 PROCEDURE — 36415 COLL VENOUS BLD VENIPUNCTURE: CPT

## 2022-11-20 PROCEDURE — 84100 ASSAY OF PHOSPHORUS: CPT

## 2022-11-20 PROCEDURE — 80076 HEPATIC FUNCTION PANEL: CPT

## 2022-11-20 PROCEDURE — 65220000003 HC RM SEMIPRIVATE PSYCH

## 2022-11-20 PROCEDURE — 74011250637 HC RX REV CODE- 250/637: Performed by: STUDENT IN AN ORGANIZED HEALTH CARE EDUCATION/TRAINING PROGRAM

## 2022-11-20 PROCEDURE — 80048 BASIC METABOLIC PNL TOTAL CA: CPT

## 2022-11-20 PROCEDURE — 82962 GLUCOSE BLOOD TEST: CPT

## 2022-11-20 PROCEDURE — 74011250637 HC RX REV CODE- 250/637

## 2022-11-20 PROCEDURE — 74011250637 HC RX REV CODE- 250/637: Performed by: PSYCHIATRY & NEUROLOGY

## 2022-11-20 RX ORDER — FUROSEMIDE 40 MG/1
40 TABLET ORAL DAILY
Status: DISCONTINUED | OUTPATIENT
Start: 2022-11-21 | End: 2022-12-05 | Stop reason: HOSPADM

## 2022-11-20 RX ADMIN — OXYBUTYNIN CHLORIDE 5 MG: 5 TABLET, EXTENDED RELEASE ORAL at 08:23

## 2022-11-20 RX ADMIN — BUDESONIDE AND FORMOTEROL FUMARATE DIHYDRATE 2 PUFF: 80; 4.5 AEROSOL RESPIRATORY (INHALATION) at 19:49

## 2022-11-20 RX ADMIN — TRAZODONE HYDROCHLORIDE 50 MG: 50 TABLET ORAL at 20:36

## 2022-11-20 RX ADMIN — ATORVASTATIN CALCIUM 40 MG: 40 TABLET, FILM COATED ORAL at 17:10

## 2022-11-20 RX ADMIN — Medication 25 UNITS: at 08:20

## 2022-11-20 RX ADMIN — Medication 5 UNITS: at 08:20

## 2022-11-20 RX ADMIN — RISPERIDONE 2 MG: 1 TABLET ORAL at 08:22

## 2022-11-20 RX ADMIN — GABAPENTIN 100 MG: 100 CAPSULE ORAL at 17:10

## 2022-11-20 RX ADMIN — Medication 2 UNITS: at 08:19

## 2022-11-20 RX ADMIN — Medication 3 UNITS: at 17:10

## 2022-11-20 RX ADMIN — Medication 2 UNITS: at 20:35

## 2022-11-20 RX ADMIN — BUDESONIDE AND FORMOTEROL FUMARATE DIHYDRATE 2 PUFF: 80; 4.5 AEROSOL RESPIRATORY (INHALATION) at 08:23

## 2022-11-20 RX ADMIN — Medication 2 UNITS: at 11:54

## 2022-11-20 RX ADMIN — ACETAMINOPHEN 650 MG: 325 TABLET, FILM COATED ORAL at 12:09

## 2022-11-20 RX ADMIN — SERTRALINE HYDROCHLORIDE 100 MG: 50 TABLET ORAL at 08:22

## 2022-11-20 RX ADMIN — NYSTATIN: 100000 POWDER TOPICAL at 08:30

## 2022-11-20 RX ADMIN — ACETAMINOPHEN 650 MG: 325 TABLET, FILM COATED ORAL at 20:36

## 2022-11-20 RX ADMIN — RISPERIDONE 2 MG: 1 TABLET ORAL at 20:36

## 2022-11-20 RX ADMIN — Medication 5 UNITS: at 17:11

## 2022-11-20 RX ADMIN — HYDROXYZINE HYDROCHLORIDE 50 MG: 25 TABLET, FILM COATED ORAL at 12:09

## 2022-11-20 RX ADMIN — FUROSEMIDE 40 MG: 40 TABLET ORAL at 08:24

## 2022-11-20 RX ADMIN — METFORMIN HYDROCHLORIDE 1000 MG: 500 TABLET, EXTENDED RELEASE ORAL at 17:10

## 2022-11-20 RX ADMIN — GABAPENTIN 100 MG: 100 CAPSULE ORAL at 08:22

## 2022-11-20 RX ADMIN — Medication 5 UNITS: at 11:53

## 2022-11-20 RX ADMIN — METFORMIN HYDROCHLORIDE 1000 MG: 500 TABLET, EXTENDED RELEASE ORAL at 08:23

## 2022-11-20 RX ADMIN — GABAPENTIN 100 MG: 100 CAPSULE ORAL at 11:53

## 2022-11-20 NOTE — BH NOTES
PSYCHIATRIC PROGRESS NOTE         Patient Name  Christiano Boyle   Date of Birth 1965   [facility] [id number]   Medical Record Number  [id number]      Age  59 y.o. PCP Dominic Palumbo MD   Admit date:  11/9/2022    Room Number  320/02  @ [facility]   Date of Service  11/20/2022         E & M PROGRESS NOTE:         HISTORY       CC:  \"suicidal ideation\"  HISTORY OF PRESENT ILLNESS/INTERVAL HISTORY:  (reviewed/updated 11/20/2022). per initial evaluation: The patient, Christiano Boyle, is a 59 y.o. WHITE/NON- female with a past psychiatric history significant for schizoaffective disorder, who presents at this time with complaints of (and/or evidence of) the following emotional symptoms: depression and suicidal thoughts/threats. Additional symptomatology include poor self care. The above symptoms have been present for 2+ weeks. These symptoms are of moderate to high severity. These symptoms are constant in nature. The patient's condition has been precipitated by psychosocial stressors. Patient's condition made worse by treatment noncompliance. UDS: negative; BAL=0. The patient is well known to the unit; she comes into the hospital frequently in crisis typically stating her 's death several years ago has made her feel hopeless. She has been living in a group home though this admission she states that she was kicked out of her group home because of an episode of incontinence. The patient is a poor historian. The patient corroborates the above narrative. The patient contracts for safety on the unit and gives consent for the team to contact collateral. The patient is amenable to initiating treatment while on the unit. She defers much of the team's inquires to her payee. 11/11 - no acute overnight events. The patient has been isolative to her room with poor ADLs. incontinent of urine but able to void when taken to the bathroom.  She denies active thoughts of self harm but is markedly internally preoccupied. Patient slept 8 hours overnight and got Trazodone. She denies active thoughts of self harm but endorses both depressed mood and anxiety. 11/14 - the patient remains in bed for much of the day. She has been inconitnent of urine and with poor ADLs requiring prompting for much activities. Patient denies active thoughts of self harm but endorses passive SI. She slept 7 hours overnight and is flat and disorganized on interview, moaning and providing little updates on her disposition plan. Per SW, the patient tends to not go to the group homes once assigned. Her payee is working on guardianship.    11/15 - overnight, the patient remained isolative to her bed, she continues to c/o anxiety and had an episode of urine incontinence. Patient refused nystatin stating her rash had 'cleared up.' She endorses ongoing AH and VH of 'ghosts' and states that she is doing very poorly. Patient evaluated by internist for ongoing LE swelling and pain. She is medication compliant and tolerating higher dose of Risperdal.    11/16 - the patient has remained in bed, out for meals and with little motivation to interact in the milieu. She got Motrin and Atarax and continues to c/o pain in her LE, as well as SI with a plan to cut herself if given the chance. The patient slept 8 hours and this morning spontaneously stated to the team that she wanted to be discharged -- she acknowledges that her payee and SW are working on a dispo plan and she is still symptomatic. Medicine consulted as she is now experiencing orthopnea. 11/17 - the patient has been isolative to her room, out for meals and slept 7 hours overnight. She is stating she will discharge to a [facility] [place] but acknowledges that the team is coordinating guardianship with her payee. Patient noted to have significant LE edema and c/o orthopnea. She is medication compliant and denies active thoughts of self harm. . She remains disorganized and with ongoing confusion but today she is otherwise pleasant. 11/18 - overnight, the patient was noted to be desaturating to the 80's and difficult to arouse. She denies all symptoms otherwise and has been able to make her needs known. Patient medication compliant. She has been largely in bed during the day, but out for meals. SW actively working on placement for the patient. IM started Lasix to address edema. 11/19 - Pilar Sandra received atarax and tylenol prns last evening. Reports doing good and moods are good also. Denies SI/HI/AH/VH. No aggression or violence. Appropriately interactive and aware. Tolerating medications well. Eating and sleeping fairly (6 hrs). 11/20 - [facility] reports feeling well and moods are good. Intake was 700 ml. Denies SI/HI/AH/VH. No aggression or violence. Some dyspnea on exertion. Appropriately interactive and aware. Tolerating medications well (trazodone and tylenol prns). Eating and sleeping fairly (9 hrs). Discharge focused. SIDE EFFECTS: (reviewed/updated 11/20/2022)  None reported or admitted to. No noted toxicity with use of Depakote   ALLERGIES:(reviewed/updated 11/20/2022)  Allergies   Allergen Reactions    Amoxicillin Hives    Mushroom Flavor Hives    Tomato Hives      REVIEW OF SYSTEMS: (reviewed/updated 11/20/2022)  Appetite:improved   Sleep: no change   All other Review of Systems: Negative except incontinence per HPI         [address] [facility] (MSE):    MSE FINDINGS ARE WITHIN NORMAL LIMITS (WNL) UNLESS OTHERWISE STATED BELOW. ( ALL OF THE BELOW CATEGORIES OF THE MSE HAVE BEEN REVIEWED (reviewed 11/20/2022) AND UPDATED AS DEEMED APPROPRIATE )  General Presentation age appropriate, cooperative and guarded   Orientation disorganized   Vital Signs  See below (reviewed 11/20/2022); Vital Signs (BP, Pulse, & Temp) are within normal limits if not listed below.    Gait and Station Stable/steady, no ataxia Musculoskeletal System No extrapyramidal symptoms (EPS); no abnormal muscular movements or Tardive Dyskinesia (TD); muscle strength and tone are within normal limits   Language No aphasia or dysarthria   Speech:  normal volume and non-pressured   Thought Processes concrete; normal rate of thoughts; poor abstract reasoning/computation   Thought Associations blocked    Thought Content auditory hallucinations and visual hallucinations   Suicidal Ideations contracts for safety   Homicidal Ideations contracts for safety   Mood:  depressed and anhedonia   Affect:  flat   Memory recent  intact   Memory remote:  intact   Concentration/Attention:  intact   Fund of Knowledge average   Insight:  limited   Reliability fair   Judgment:  limited          VITALS:     Patient Vitals for the past 24 hrs:   Temp Pulse Resp BP SpO2   11/20/22 0807 98.4 °F (36.9 °C) 79 16 115/75 97 %   11/19/22 2030 98.3 °F (36.8 °C) 69 17 (!) 122/51 95 %       Wt Readings from Last 3 Encounters:   11/18/22 119.1 kg (262 lb 9.6 oz)   06/03/22 113.9 kg (251 lb)   05/14/22 88.5 kg (195 lb)     Temp Readings from Last 3 Encounters:   11/20/22 98.4 °F (36.9 °C)   06/03/22 97.6 °F (36.4 °C)   05/19/22 98.5 °F (36.9 °C)     BP Readings from Last 3 Encounters:   11/20/22 115/75   06/03/22 130/69   05/19/22 (!) 112/51     Pulse Readings from Last 3 Encounters:   11/20/22 79   06/03/22 89   05/19/22 64            DATA     LABORATORY DATA:(reviewed/updated 11/20/2022)  Recent Results (from the past 24 hour(s))   GLUCOSE, POC    Collection Time: 11/19/22  4:38 PM   Result Value Ref Range    Glucose (POC) 272 (H) 65 - 117 mg/dL    Performed by Clarissa Alcocer RN    GLUCOSE, POC    Collection Time: 11/19/22  7:38 PM   Result Value Ref Range    Glucose (POC) 270 (H) 65 - 117 mg/dL    Performed by Sandy Mcdonough RN    GLUCOSE, POC    Collection Time: 11/20/22  8:07 AM   Result Value Ref Range    Glucose (POC) 179 (H) 65 - 117 mg/dL    Performed by Vasile Polanco Nohemy    GLUCOSE, POC    Collection Time: 11/20/22 11:21 AM   Result Value Ref Range    Glucose (POC) 194 (H) 65 - 117 mg/dL    Performed by Mo Slater      No results found for: VALF2, VALAC, VALP, VALPR, DS6, CRBAM, CRBAMP, CARB2, XCRBAM  No results found for: LITHM   RADIOLOGY REPORTS:(reviewed/updated 11/20/2022)  No results found.        MEDICATIONS     ALL MEDICATIONS:   Current Facility-Administered Medications   Medication Dose Route Frequency    [START ON 11/21/2022] furosemide (LASIX) tablet 40 mg  40 mg Oral DAILY    ibuprofen (MOTRIN) tablet 400 mg  400 mg Oral Q6H PRN    nystatin (MYCOSTATIN) 100,000 unit/gram powder   Topical BID    risperiDONE (RisperDAL) tablet 2 mg  2 mg Oral Q12H    benzocaine-menthoL (CHLORASEPTIC MAX) lozenge 1 Lozenge  1 Lozenge Oral Q2H PRN    glucose chewable tablet 16 g  4 Tablet Oral PRN    glucagon (GLUCAGEN) injection 1 mg  1 mg IntraMUSCular PRN    dextrose 10% infusion 0-250 mL  0-250 mL IntraVENous PRN    albuterol (PROVENTIL HFA, VENTOLIN HFA, PROAIR HFA) inhaler 1 Puff  1 Puff Inhalation Q4H PRN    insulin lispro (HUMALOG) injection 1-10 Units  1-10 Units SubCUTAneous AC&HS    insulin lispro (HUMALOG) injection 5 Units  5 Units SubCUTAneous TIDAC    budesonide-formoterol (SYMBICORT) 80-4.5 mcg inhaler  2 Puff Inhalation BID RT    insulin glargine (LANTUS) injection 25 Units  25 Units SubCUTAneous DAILY    OLANZapine (ZyPREXA) tablet 5 mg  5 mg Oral Q6H PRN    haloperidol lactate (HALDOL) injection 5 mg  5 mg IntraMUSCular Q6H PRN    benztropine (COGENTIN) tablet 1 mg  1 mg Oral BID PRN    diphenhydrAMINE (BENADRYL) injection 50 mg  50 mg IntraMUSCular BID PRN    hydrOXYzine HCL (ATARAX) tablet 50 mg  50 mg Oral TID PRN    LORazepam (ATIVAN) injection 1 mg  1 mg IntraMUSCular Q4H PRN    traZODone (DESYREL) tablet 50 mg  50 mg Oral QHS PRN    acetaminophen (TYLENOL) tablet 650 mg  650 mg Oral Q4H PRN    magnesium hydroxide (MILK OF MAGNESIA) 400 mg/5 mL oral suspension 30 mL  30 mL Oral DAILY PRN    atorvastatin (LIPITOR) tablet 40 mg  40 mg Oral QPM    gabapentin (NEURONTIN) capsule 100 mg  100 mg Oral TID    metFORMIN ER (GLUCOPHAGE XR) tablet 1,000 mg  1,000 mg Oral BID WITH MEALS    oxybutynin chloride XL (DITROPAN XL) tablet 5 mg  5 mg Oral DAILY    sertraline (ZOLOFT) tablet 100 mg  100 mg Oral DAILY      SCHEDULED MEDICATIONS:   Current Facility-Administered Medications   Medication Dose Route Frequency    [START ON 11/21/2022] furosemide (LASIX) tablet 40 mg  40 mg Oral DAILY    nystatin (MYCOSTATIN) 100,000 unit/gram powder   Topical BID    risperiDONE (RisperDAL) tablet 2 mg  2 mg Oral Q12H    insulin lispro (HUMALOG) injection 1-10 Units  1-10 Units SubCUTAneous AC&HS    insulin lispro (HUMALOG) injection 5 Units  5 Units SubCUTAneous TIDAC    budesonide-formoterol (SYMBICORT) 80-4.5 mcg inhaler  2 Puff Inhalation BID RT    insulin glargine (LANTUS) injection 25 Units  25 Units SubCUTAneous DAILY    atorvastatin (LIPITOR) tablet 40 mg  40 mg Oral QPM    gabapentin (NEURONTIN) capsule 100 mg  100 mg Oral TID    metFORMIN ER (GLUCOPHAGE XR) tablet 1,000 mg  1,000 mg Oral BID WITH MEALS    oxybutynin chloride XL (DITROPAN XL) tablet 5 mg  5 mg Oral DAILY    sertraline (ZOLOFT) tablet 100 mg  100 mg Oral DAILY          ASSESSMENT & PLAN     DIAGNOSES REQUIRING ACTIVE TREATMENT AND MONITORING: (reviewed/updated 11/20/2022)  Patient Active Hospital Problem List:       Schizoaffective disorder (11/10/2022)           Assessment: the patient presents in crisis after potentially leaving her group home; she is disorganized and a poor historian at baseline, treatment will be primarily psychosocial. Patient with low O2 sats, edema and orthopnea, will have to address this.         Plan:   - Continue current care  - CONTINUE Risperdal 2 mg Q12H for psychosis  - CONTINUE Zoloft 100 mg QDAY for MDD  - Appreciate medicine recs  - Consider sleep referral for obesity hypoventilation syndrome  - IGM therapy as tolerated  - Expand database / obtain collateral  - Dispo planning    In summary, Christi Guzmán, is a 59 y.o.  female who presents with a severe exacerbation of the principal diagnosis of Schizoaffective disorder ([facility] [address]    Patient's condition is not improving. Patient requires continued inpatient hospitalization for further stabilization, safety monitoring and medication management. I will continue to coordinate the provision of individual, milieu, occupational, group, and substance abuse therapies to address target symptoms/diagnoses as deemed appropriate for the individual patient. A coordinated, multidisplinary treatment team round was conducted with the patient (this team consists of the nurse, psychiatric unit pharmacist,  and writer). Complete current electronic health record for patient has been reviewed today including consultant notes, ancillary staff notes, nurses and psychiatric tech notes. Suicide risk assessment completed and patient deemed to be of low risk for suicide at this time. The following regarding medications was addressed during rounds with patient:   the risks and benefits of the proposed medication. The patient was given the opportunity to ask questions. Informed consent given to the use of the above medications. Will continue to adjust psychiatric and non-psychiatric medications (see above \"medication\" section and orders section for details) as deemed appropriate & based upon diagnoses and response to treatment. I will continue to order blood tests/labs and diagnostic tests as deemed appropriate and review results as they become available (see orders for details and above listed lab/test results). I will order psychiatric records from previous [facility] hospitals to further elucidate the nature of patient's psychopathology and review once available.     I will gather additional collateral information from friends, family and o/p treatment team to further elucidate the nature of patient's psychopathology and baselline level of psychiatric functioning. I certify that this patient's inpatient psychiatric hospital services furnished since the previous certification were, and continue to be, required for treatment that could reasonably be expected to improve the patient's condition, or for diagnostic study, and that the patient continues to need, on a daily basis, active treatment furnished directly by or requiring the supervision of inpatient psychiatric facility personnel. In addition, the hospital records show that services furnished were intensive treatment services, admission or related services, or equivalent services.     EXPECTED DISCHARGE DATE/DAY: TBD     DISPOSITION: Group home / shelter       Signed By:   Jared Booth MD  11/20/2022

## 2022-11-20 NOTE — PROGRESS NOTES
Problem: Suicide  Goal: *STG: Remains safe in hospital  Outcome: Progressing Towards Goal     Shift change report given to Nohemy CLEMENTE (oncoming nurse) by Leon Andrea. (offgoing nurse). Report included the following information SBAR, Kardex, MAR, and Recent Results. Assumed care of patient. Met patient in room. Calm and cooperative during assessment. Compliant with VS. Patient denied anxiety, depression, SI, HI and AVH. No complaints of pain. Patient urine red in color. Dr. Krista Mcneil informed. Labs drawn. Patient visible on unit. To dayroom for groups. Interacting appropriately with staff and peers.

## 2022-11-20 NOTE — PROGRESS NOTES
Problem: Falls - Risk of  Goal: *Absence of Falls  Description: Document Elvira Alfaro Fall Risk and appropriate interventions in the flowsheet. Outcome: Progressing Towards Goal  Note: Fall Risk Interventions:  Mobility Interventions: Communicate number of staff needed for ambulation/transfer         Medication Interventions: Teach patient to arise slowly         History of Falls Interventions: Room close to nurse's station         Problem: Discharge Planning  Goal: *Discharge to safe environment  Outcome: Progressing Towards Goal     Problem: Suicide  Goal: *STG: Remains safe in hospital  Outcome: Progressing Towards Goal     [id number]: Assumed care of patient after receiving shift report from outgoing nurse. Patient was out and visible on unit, interacting appropriately with peers and staff. Affect is constricted, mood is anxious. Pt cooperative with vitals and assessment. A&O x 4. Independent in ADLs. Insight and concentration somewhat present. Pt was ambulating from dayroom back to bedroom when she called out for help, stating she felt dizzy. Staff guided/assisted patient back to room. Once pt sat down, caught her breath and tolerated fluids, she reported feeling much better. Dyspnea on exertion noted. No further issues noted at this time. Gait is unsteady. Appetite patterns WNL. Pt evening blood sugar 270. 3 units of coverage given in compliance with bedtime sliding scale. Denies AVH/SI/HI. Pt endorses generalized pain. PRN tylenol and trazodone given for pain and sleep. Patient medication and diet compliant. Pt encouraged to continue to participate in care. [id number]: Pt resting quietly in bed. No further issues noted at this time. 9388-1763: Pt has been observed throughout the night. Total hours slept approximately 9. No s/s of distress noted. Patient has remained safe. Hourly rounding performed throughout shift.

## 2022-11-20 NOTE — PROGRESS NOTES
Hospitalist Progress Note    NAME: Christy Reynoso   :  1965   MRN:  [id number]   Room Number:  09  @ [facility]     Please note that this dictation was completed with Lora Serrano, the computer voice recognition software. Quite often unanticipated grammatical, syntax, homophones, and other interpretive errors are inadvertently transcribed by the computer software. Please disregard these errors. Please excuse any errors that have escaped final proofreading. Interim Hospital Summary: 59 y.o. female whom presented on 2022 with      Assessment / Plan:      Congestive heart failure, diastolic POA, improving  Anasarca POA  HTN POA  TTE  showed suboptimal image quality. Normal left ventricular systolic function with a visually estimated EF of 65 - 70%. Left ventricle size is normal. Normal wall thickness. Normal wall motion. Normal diastolic function. CXR  interpreted independently- shows clear lungs. EKG interpreted independently - normal sinus rhythm. Duplex lower extremity  does not show DVT     - continue furosemide 40 mg oral BID.   - 2g sodium diet, 1500 cc fluid restriction        Type 2 diabetes mellitus POA   Lab Results  Component Value Date/Time    Hemoglobin A1c 8.1 (H) 2022 06:12 AM     - lantus  - Lispro correctional scale, FSG AC HS  - Consistent carb diet, hypoglycemia protocol. Medical Clearance for psychiatric admission  -Psychiatric treatment and management of health issues,Defer to psychiatrist for further management.  -Continue home meds.  -No VTE prophylaxis indicated or warranted at this time. Subjective:     Chief Complaint / Reason for Physician Visit  \"dizzy\". Discussed with RN events overnight.      Review of Systems:  No fevers, chills, appetite change, cough, sputum production, shortness of breath, dyspnea on exertion, nausea, vomitting, diarrhea, constipation, chest pain, abdominal pain, joint pain, rash, itching. Tolerating PT/OT. Tolerating diet. Objective:     VITALS:   Last 24hrs VS reviewed since prior progress note. Most recent are:  Patient Vitals for the past 24 hrs:   Temp Pulse Resp BP SpO2   11/20/22 0807 98.4 °F (36.9 °C) 79 16 115/75 97 %   11/19/22 2030 98.3 °F (36.8 °C) 69 17 (!) 122/51 95 %       Intake/Output Summary (Last 24 hours) at 11/20/2022 1056  Last data filed at 11/19/2022 2115  Gross per 24 hour   Intake 700 ml   Output --   Net 700 ml        PHYSICAL EXAM:  General: Alert, cooperative, no acute distress    EENT:  EOMI. Anicteric sclerae. MMM  Resp:  CTA bilaterally, no wheezing or rales. No accessory muscle use  CV:  Regular  rhythm,  normal S1/S2, no murmurs rubs gallops, 2+ edema  GI:  Soft, Non distended, Non tender. +Bowel sounds  Neurologic:  Alert and oriented X 3, normal speech,   Psych:   Good insight. Not anxious nor agitated  Skin:  No rashes. No jaundice    Reviewed most current lab test results and cultures  YES  Reviewed most current radiology test results   YES  Review and summation of old records today    NO  Reviewed patient's current orders and MAR    YES  PMH/SH reviewed - no change compared to H&P  ________________________________________________________________________  Care Plan discussed with:    Comments   Patient x    Family      RN x    Care Manager     Consultant                        Multidiciplinary team rounds were held today with , nursing, pharmacist and clinical coordinator. Patient's plan of care was discussed; medications were reviewed and discharge planning was addressed.      ________________________________________________________________________  Total NON critical care TIME:  25   Minutes    Total CRITICAL CARE TIME Spent:   Minutes non procedure based      Comments   >50% of visit spent in counseling and coordination of care x    ________________________________________________________________________  Radha Bosch MD Procedures: see electronic medical records for all procedures/Xrays and details which were not copied into this note but were reviewed prior to creation of Plan. LABS:  I reviewed today's most current labs and imaging studies. Pertinent labs include:  No results for input(s): WBC, HGB, HCT, PLT, HGBEXT, HCTEXT, PLTEXT in the last 72 hours. No results for input(s): NA, K, CL, CO2, GLU, BUN, CREA, CA, MG, PHOS, ALB, TBIL, TBILI, ALT, INR, INREXT in the last 72 hours.     No lab exists for component: SGOT    Signed: Patricia Rodriguez MD

## 2022-11-20 NOTE — PROGRESS NOTES
When asked about spotting per vagina , pt replied ' it has stopped\". On examination, no blood drop noted. Pt assisted in and out of bed. Fall precaution maintained.

## 2022-11-21 LAB
BACTERIA SPEC CULT: ABNORMAL
CC UR VC: ABNORMAL
GLUCOSE BLD STRIP.AUTO-MCNC: 187 MG/DL (ref 65–117)
GLUCOSE BLD STRIP.AUTO-MCNC: 236 MG/DL (ref 65–117)
GLUCOSE BLD STRIP.AUTO-MCNC: 256 MG/DL (ref 65–117)
GLUCOSE BLD STRIP.AUTO-MCNC: 276 MG/DL (ref 65–117)
SERVICE CMNT-IMP: ABNORMAL

## 2022-11-21 PROCEDURE — 99233 SBSQ HOSP IP/OBS HIGH 50: CPT | Performed by: CLINICAL NURSE SPECIALIST

## 2022-11-21 PROCEDURE — 74011636637 HC RX REV CODE- 636/637: Performed by: STUDENT IN AN ORGANIZED HEALTH CARE EDUCATION/TRAINING PROGRAM

## 2022-11-21 PROCEDURE — 74011250637 HC RX REV CODE- 250/637: Performed by: PSYCHIATRY & NEUROLOGY

## 2022-11-21 PROCEDURE — 99232 SBSQ HOSP IP/OBS MODERATE 35: CPT | Performed by: PSYCHIATRY & NEUROLOGY

## 2022-11-21 PROCEDURE — 65220000003 HC RM SEMIPRIVATE PSYCH

## 2022-11-21 PROCEDURE — 74011250637 HC RX REV CODE- 250/637: Performed by: STUDENT IN AN ORGANIZED HEALTH CARE EDUCATION/TRAINING PROGRAM

## 2022-11-21 PROCEDURE — 82962 GLUCOSE BLOOD TEST: CPT

## 2022-11-21 PROCEDURE — 74011250637 HC RX REV CODE- 250/637

## 2022-11-21 RX ORDER — INSULIN GLARGINE 100 [IU]/ML
30 INJECTION, SOLUTION SUBCUTANEOUS DAILY
Status: DISCONTINUED | OUTPATIENT
Start: 2022-11-22 | End: 2022-12-05 | Stop reason: HOSPADM

## 2022-11-21 RX ORDER — CEPHALEXIN 250 MG/1
500 CAPSULE ORAL 2 TIMES DAILY
Status: COMPLETED | OUTPATIENT
Start: 2022-11-21 | End: 2022-11-27

## 2022-11-21 RX ADMIN — IBUPROFEN 400 MG: 400 TABLET, FILM COATED ORAL at 02:57

## 2022-11-21 RX ADMIN — SERTRALINE HYDROCHLORIDE 100 MG: 50 TABLET ORAL at 08:24

## 2022-11-21 RX ADMIN — Medication 2 UNITS: at 08:21

## 2022-11-21 RX ADMIN — OXYBUTYNIN CHLORIDE 5 MG: 5 TABLET, EXTENDED RELEASE ORAL at 08:26

## 2022-11-21 RX ADMIN — METFORMIN HYDROCHLORIDE 1000 MG: 500 TABLET, EXTENDED RELEASE ORAL at 08:24

## 2022-11-21 RX ADMIN — POTASSIUM BICARBONATE 50 MEQ: 391 TABLET, EFFERVESCENT ORAL at 12:16

## 2022-11-21 RX ADMIN — CEPHALEXIN 500 MG: 250 CAPSULE ORAL at 10:32

## 2022-11-21 RX ADMIN — Medication 5 UNITS: at 12:06

## 2022-11-21 RX ADMIN — RISPERIDONE 2 MG: 1 TABLET ORAL at 21:03

## 2022-11-21 RX ADMIN — RISPERIDONE 2 MG: 1 TABLET ORAL at 08:25

## 2022-11-21 RX ADMIN — BUDESONIDE AND FORMOTEROL FUMARATE DIHYDRATE 2 PUFF: 80; 4.5 AEROSOL RESPIRATORY (INHALATION) at 19:34

## 2022-11-21 RX ADMIN — Medication 5 UNITS: at 08:22

## 2022-11-21 RX ADMIN — METFORMIN HYDROCHLORIDE 1000 MG: 500 TABLET, EXTENDED RELEASE ORAL at 16:45

## 2022-11-21 RX ADMIN — Medication 3 UNITS: at 21:03

## 2022-11-21 RX ADMIN — TRAZODONE HYDROCHLORIDE 50 MG: 50 TABLET ORAL at 21:03

## 2022-11-21 RX ADMIN — GABAPENTIN 100 MG: 100 CAPSULE ORAL at 08:24

## 2022-11-21 RX ADMIN — HYDROXYZINE HYDROCHLORIDE 50 MG: 25 TABLET, FILM COATED ORAL at 00:12

## 2022-11-21 RX ADMIN — CEPHALEXIN 500 MG: 250 CAPSULE ORAL at 16:45

## 2022-11-21 RX ADMIN — Medication 25 UNITS: at 08:21

## 2022-11-21 RX ADMIN — ATORVASTATIN CALCIUM 40 MG: 40 TABLET, FILM COATED ORAL at 17:06

## 2022-11-21 RX ADMIN — Medication 1 LOZENGE: at 00:47

## 2022-11-21 RX ADMIN — Medication 5 UNITS: at 16:45

## 2022-11-21 RX ADMIN — GABAPENTIN 100 MG: 100 CAPSULE ORAL at 12:06

## 2022-11-21 RX ADMIN — Medication 5 UNITS: at 16:44

## 2022-11-21 RX ADMIN — ACETAMINOPHEN 650 MG: 325 TABLET, FILM COATED ORAL at 21:07

## 2022-11-21 RX ADMIN — FUROSEMIDE 40 MG: 40 TABLET ORAL at 08:24

## 2022-11-21 RX ADMIN — BUDESONIDE AND FORMOTEROL FUMARATE DIHYDRATE 2 PUFF: 80; 4.5 AEROSOL RESPIRATORY (INHALATION) at 08:26

## 2022-11-21 RX ADMIN — Medication 3 UNITS: at 12:05

## 2022-11-21 RX ADMIN — GABAPENTIN 100 MG: 100 CAPSULE ORAL at 16:45

## 2022-11-21 NOTE — PROGRESS NOTES
Problem: Suicide  Goal: *STG: Seeks staff when feelings of self harm or harm towards others arise  Outcome: Progressing Towards Goal  Goal: *STG:  Verbalizes alternative ways of dealing with maladaptive feelings/behaviors  Outcome: Progressing Towards Goal  Goal: *STG/LTG: Complies with medication therapy  Outcome: Progressing Towards Goal     9096-5267: Assumed care of patient after receiving shift report from outgoing nurse. Patient was out and visible on unit, interacting appropriately with peers and staff. Affect is constricted, mood is euthymic. Pt cooperative with vitals and assessment. Patient reports decrease in anxiety and depression and that her overall mood \"feels better\". A&O x 4. Independent in ADLs. Insight and concentration somewhat present. Gait is unsteady. Appetite patterns WNL. Denies AVH/SI/HI. Patient medication and diet compliant. Pt encouraged to continue to participate in care. Pt received PRN tylenol and trazodone for pain and sleep per request at 2036.      [id number]- Pt restless in bed coughing. Pt received PRN atarax for anxiety and chloraseptic lozenge for sore throat/cough. No further issues noted at this time. [id number]- Pt awoke with incontinence episode, complaining of lower abdominal pain. Pt ambulated to bathroom, post incontinence, and voided more. Blood clots noted in urine, as well as bright red blood with foul smell in brief. Pt given ibuprofen, repositioned and given heating pads. Pt reported feeling better approximately an hour later. Pt resting quietly in bed quietly at this time. [id number]: Pt has been observed throughout the night. Total hours slept approximately 9. No s/s of distress noted. Patient has remained safe. Hourly rounding performed throughout shift.

## 2022-11-21 NOTE — PROGRESS NOTES
Problem: Suicide  Goal: *STG: Remains safe in hospital  Outcome: Progressing Towards Goal     Shift change report given to Nohemy CLEMENTE (oncoming nurse) by Leon Valdez (offgoing nurse). Report included the following information SBAR, Kardex, MAR, and Recent Results. Assumed care of patient. Met patient in room. Calm and cooperative with assessment, smiling. Patient denied anxiety, depression, SI, HI and AVH. No complaints of pain. Meal compliant. Patient did not use nystatin cream in the AM, refused. Patient urine reddish-orange, small blood clots noted. Malodorous urine. Patient visible on unit. To dayroom for meals and to watch TV. No issues noted during shift.

## 2022-11-21 NOTE — DIABETES MGMT
[address] [facility]    CLINICAL NURSE SPECIALIST CONSULT     Initial Presentation   Dian Ganser is a 59 y.o. female admitted 11/9/22 after experiencing \"breaking out under my breast, suicidal ideation, leg pain, diabetes, chest pain,\"  which triggered suicidal thoughts; has a plan to stab her self, overdose, or get hit by car. Afebrile. Normotensive  LAB: /AG 8. Albumin 2.8. Normal liver enzymes  CXR: Lungs clear    HX:   Past Medical History:   Diagnosis Date    Arthritis     legs    Bipolar 1 disorder ([facility] [place] [address]     COPD     Depression 1/13/2012    [facility]     Drug abuse ([facility]     cocaine, stimulants, etoh, mj, tob    H/O suicide attempt     Hypertension     Obesity     Polydrug dependence excluding opioid type drug, abuse ([facility]     PTSD (post-traumatic stress disorder) 12/30/2021    Schizophrenia ([facility] 7/8/2016      INITIAL DX:   Adjustment disorder [F43.20]     Current Treatment     TX: Pulm meds. Insulin. Lipitor. Abx. Neurontin. Risperidone/Zoloft    Consulted by Provider for advanced diabetes nursing assessment and care for:   [] Transitioning off Haydee Hawkins   [x] Inpatient management strategy  [] Home management assessment  [] Survival skill education    Hospital Course   Clinical progress has been complicated by psychiatric diagnosis. Diabetes History   Patient was not able to provide clear information related to her diabetes diagnosis. She did share there is a family history of diabetes in her mother. Diabetes-related Medical History  Neurological complications  Peripheral neuropathy  Microvascular disease  Nephropathy  Macrovascular disease  Cerebral vascular accident  Other  Depression    Diabetes Medication History  Key Antihyperglycemic Medications               insulin glargine (LANTUS) 100 unit/mL injection 20 Units by SubCUTAneous route daily. glipiZIDE (GLUCOTROL) 10 mg tablet Take 1 Tablet by mouth Daily (before breakfast).  Indications: type 2 diabetes mellitus    metFORMIN ER (GLUCOPHAGE XR) 500 mg tablet Take 2 Tablets by mouth two (2) times daily (with meals). linaGLIPtin (Tradjenta) 5 mg tablet Take 5 mg by mouth daily. Indications: type 2 diabetes mellitus           Diabetes self-management practices:   Eating pattern - Meals are prepared by group home  Physical activity pattern   [x] Not employing a physical activity program to control BG  Monitoring pattern   [x] Not testing BGs sufficiently to inform self-management adjustments  Taking medications pattern - Medications administered by group home staff (per patient)  Overall evaluation:    [x] Not achieving A1c target with drug therapy & self-care practices    Subjective   I don't know. I'm going to a group home.      Objective   Physical exam  General Morbidly obese female in no acute distress.  Cooperative  Neuro  Alert, oriented   Vital Signs Visit Vitals  /61   Pulse 69   Temp 98 °F (36.7 °C)   Resp 16   Ht 5' 5\" (1.651 m)   Wt 109.7 kg (241 lb 12.8 oz)   SpO2 98%   BMI 40.24 kg/m²      Laboratory  Recent Labs     11/20/22  1058   *   AGAP 8   WBC 9.2   CREA 1.29*   AST 17   ALT 27     Factors impacting BG management  Factor Dose Comments   Nutrition:  Standard meals   45 grams/meal    Drugs:  Other: Risperidol Q12 hrs       Pain Motron PRN    Infection Keflex twice daily    Other:   Kidney function  Liver function   SILVANA  Normal      Blood glucose pattern      Significant diabetes-related events over the past 24-72 hours  11/9/22 Admission   11/10/22 Metformin started  11/11/22 Scheduled insulin started  Total daily dose BG range  11/12/22 53   300s  11/13/22 54   200s  11/14/22 47   150s  11/15/22 47   180S  11/16/22 49   200S Eating  11/17/22 45   140S  11/18/22 47   160S  11/19/22 52   270S Eating  11/20/22 49   190-240s    Assessment and Plan   Nursing Diagnosis Risk for unstable blood glucose pattern   Nursing Intervention Domain 4301 Decision-making Support   Nursing Interventions Examined current inpatient diabetes/blood glucose control   Explored factors facilitating and impeding inpatient management  Explored corrective strategies with patient and responsible inpatient provider   Informed patient of rational for insulin strategy while hospitalized     Evaluation   This 59year old  female was admitted for suicidal ideation. As for her BG control, her Bgs have stayed in the 200s when she is eating; otherwise, they are in the target range. Recommend giving both basal & mealtime insulin    Recommendations     [x] Use of Subcutaneous Insulin Order set (9440)  Insulin Dosing Specific recommendation   Basal                                      (Based on weight, BMI & GFR) []        0.2 units/kg/D  [x] 0.3 units/kg/D  [] 0.4 units/kg/D Lantus insulin 300 units D   Nutritional                                      (Based on CHO/dextrose load) [] Normal sensitivity  [x] Insulin-resistant sensitivity Humalog insulin 10 units with each consumed meal   Corrective                                       (Useful in adjusting insulin dosing) [] Normal sensitivity  [] HIGH sensitivity  [] Insulin-resistant sensitivity      Billing Code(s)   [x] 64358 IP subsequent hospital care - 35 minutes     Before making these care recommendations, I personally reviewed the hospitalization record, including notes, laboratory & diagnostic data and current medications, and examined the patient at the bedside (circumstances permitting) before making care recommendations. More than fifty (50) percent of the time was spent in patient counseling and/or care coordination.   Total minutes: [address], CNS  Diabetes Clinical Nurse Specialist  [facility]  Access via [address] [place] [address]

## 2022-11-21 NOTE — PROGRESS NOTES
Hospitalist Progress Note    NAME: Shaina Mclean   :  1965   MRN:  [id number]   Room Number:  731/89  @ [facility]     Please note that this dictation was completed with Ashutosh Fuller, the computer voice recognition software. Quite often unanticipated grammatical, syntax, homophones, and other interpretive errors are inadvertently transcribed by the computer software. Please disregard these errors. Please excuse any errors that have escaped final proofreading. Interim Hospital Summary: 59 y.o. female whom presented on 2022 with      Assessment / Plan:       Congestive heart failure, diastolic POA, improving  Anasarca POA  HTN POA  TTE  showed suboptimal image quality. Normal left ventricular systolic function with a visually estimated EF of 65 - 70%. Left ventricle size is normal. Normal wall thickness. Normal wall motion. Normal diastolic function. CXR  interpreted independently- shows clear lungs. EKG interpreted independently - normal sinus rhythm. Duplex lower extremity  does not show DVT     - reduce furosemide to 40 mg daily  - BMP reviewed creatinine elevated. - 2g sodium diet, 1500 cc fluid restriction      Urinary tract infection  Hematuria, due to UTI  UA suggestive of infection, visible hematuria. Urine Cx growing > 100,000 GNR  - Keflex     Hypokalemia  hypomagnesemia  Replete potassium and magnesium      Type 2 diabetes mellitus POA                Hemoglobin A1c     8.1 (H)      2022 06:12 AM     - lantus  - Lispro correctional scale, FSG AC HS  - Consistent carb diet, hypoglycemia protocol. Medical Clearance for psychiatric admission  -Psychiatric treatment and management of health issues,Defer to psychiatrist for further management.  -Continue home meds.  -No VTE prophylaxis indicated or warranted at this time. Subjective:     Chief Complaint / Reason for Physician Visit  \"There is blood when I pee\".   Discussed with RN events overnight. Review of Systems:  No fevers, chills, appetite change, cough, sputum production, shortness of breath, dyspnea on exertion, nausea, vomitting, diarrhea, constipation, chest pain, leg edema, abdominal pain, joint pain, rash, itching. Tolerating PT/OT. Tolerating diet. Objective:     VITALS:   Last 24hrs VS reviewed since prior progress note. Most recent are:  Patient Vitals for the past 24 hrs:   Temp Pulse Resp BP SpO2   11/21/22 0822 -- 69 16 119/61 --   11/20/22 1950 98 °F (36.7 °C) 71 17 126/82 98 %       Intake/Output Summary (Last 24 hours) at 11/21/2022 1158  Last data filed at 11/21/2022 0348  Gross per 24 hour   Intake 1800 ml   Output --   Net 1800 ml        PHYSICAL EXAM:  General: Alert, cooperative, no acute distress    EENT:  EOMI. Anicteric sclerae. MMM  Resp:  CTA bilaterally, no wheezing or rales. No accessory muscle use  CV:  Regular  rhythm,  normal S1/S2, no murmurs rubs gallops, No edema  GI:  Soft, Non distended, Non tender. +Bowel sounds  Neurologic:  Alert and oriented X 3, normal speech,   Psych:   Good insight. Not anxious nor agitated  Skin:  No rashes. No jaundice    Reviewed most current lab test results and cultures  YES  Reviewed most current radiology test results   YES  Review and summation of old records today    NO  Reviewed patient's current orders and MAR    YES  PMH/SH reviewed - no change compared to H&P  ________________________________________________________________________  Care Plan discussed with:    Comments   Patient x    Family      RN x    Care Manager x    Consultant                       x Multidiciplinary team rounds were held today with , nursing, pharmacist and clinical coordinator. Patient's plan of care was discussed; medications were reviewed and discharge planning was addressed.      ________________________________________________________________________  Total NON critical care TIME:  25   Minutes    Total CRITICAL CARE TIME Spent:   Minutes non procedure based      Comments   >50% of visit spent in counseling and coordination of care     ________________________________________________________________________  Anais Mosley MD     Procedures: see electronic medical records for all procedures/Xrays and details which were not copied into this note but were reviewed prior to creation of Plan. LABS:  I reviewed today's most current labs and imaging studies.   Pertinent labs include:  Recent Labs     11/20/22  1058   WBC 9.2   HGB 12.0   HCT 39.7   *     Recent Labs     11/20/22  1058      K 3.4*   CL 97   CO2 33*   *   BUN 22*   CREA 1.29*   CA 8.6   MG 1.5*   PHOS 4.0   ALB 3.3*   TBILI 0.2   ALT 27       Signed: Anais Mosley MD

## 2022-11-22 LAB
GLUCOSE BLD STRIP.AUTO-MCNC: 167 MG/DL (ref 65–117)
GLUCOSE BLD STRIP.AUTO-MCNC: 177 MG/DL (ref 65–117)
GLUCOSE BLD STRIP.AUTO-MCNC: 190 MG/DL (ref 65–117)
GLUCOSE BLD STRIP.AUTO-MCNC: 235 MG/DL (ref 65–117)
SERVICE CMNT-IMP: ABNORMAL

## 2022-11-22 PROCEDURE — 74011250637 HC RX REV CODE- 250/637: Performed by: STUDENT IN AN ORGANIZED HEALTH CARE EDUCATION/TRAINING PROGRAM

## 2022-11-22 PROCEDURE — 82962 GLUCOSE BLOOD TEST: CPT

## 2022-11-22 PROCEDURE — 74011250637 HC RX REV CODE- 250/637: Performed by: PSYCHIATRY & NEUROLOGY

## 2022-11-22 PROCEDURE — 97116 GAIT TRAINING THERAPY: CPT | Performed by: PHYSICAL THERAPIST

## 2022-11-22 PROCEDURE — 65220000003 HC RM SEMIPRIVATE PSYCH

## 2022-11-22 PROCEDURE — 74011636637 HC RX REV CODE- 636/637: Performed by: PSYCHIATRY & NEUROLOGY

## 2022-11-22 PROCEDURE — 99231 SBSQ HOSP IP/OBS SF/LOW 25: CPT | Performed by: PSYCHIATRY & NEUROLOGY

## 2022-11-22 PROCEDURE — 74011636637 HC RX REV CODE- 636/637: Performed by: STUDENT IN AN ORGANIZED HEALTH CARE EDUCATION/TRAINING PROGRAM

## 2022-11-22 PROCEDURE — 74011250637 HC RX REV CODE- 250/637

## 2022-11-22 RX ORDER — INSULIN LISPRO 100 [IU]/ML
10 INJECTION, SOLUTION INTRAVENOUS; SUBCUTANEOUS
Status: DISCONTINUED | OUTPATIENT
Start: 2022-11-22 | End: 2022-12-05 | Stop reason: HOSPADM

## 2022-11-22 RX ADMIN — Medication 1 LOZENGE: at 00:58

## 2022-11-22 RX ADMIN — RISPERIDONE 2 MG: 1 TABLET ORAL at 08:25

## 2022-11-22 RX ADMIN — RISPERIDONE 2 MG: 1 TABLET ORAL at 21:54

## 2022-11-22 RX ADMIN — FUROSEMIDE 40 MG: 40 TABLET ORAL at 08:26

## 2022-11-22 RX ADMIN — CEPHALEXIN 500 MG: 250 CAPSULE ORAL at 16:57

## 2022-11-22 RX ADMIN — Medication 10 UNITS: at 16:24

## 2022-11-22 RX ADMIN — Medication 1 LOZENGE: at 21:57

## 2022-11-22 RX ADMIN — Medication 30 UNITS: at 08:25

## 2022-11-22 RX ADMIN — GABAPENTIN 100 MG: 100 CAPSULE ORAL at 11:48

## 2022-11-22 RX ADMIN — METFORMIN HYDROCHLORIDE 1000 MG: 500 TABLET, EXTENDED RELEASE ORAL at 08:26

## 2022-11-22 RX ADMIN — Medication 3 UNITS: at 16:23

## 2022-11-22 RX ADMIN — Medication 5 UNITS: at 08:25

## 2022-11-22 RX ADMIN — Medication 5 UNITS: at 11:47

## 2022-11-22 RX ADMIN — GABAPENTIN 100 MG: 100 CAPSULE ORAL at 08:25

## 2022-11-22 RX ADMIN — Medication 2 UNITS: at 11:48

## 2022-11-22 RX ADMIN — GABAPENTIN 100 MG: 100 CAPSULE ORAL at 16:57

## 2022-11-22 RX ADMIN — METFORMIN HYDROCHLORIDE 1000 MG: 500 TABLET, EXTENDED RELEASE ORAL at 16:57

## 2022-11-22 RX ADMIN — OXYBUTYNIN CHLORIDE 5 MG: 5 TABLET, EXTENDED RELEASE ORAL at 08:26

## 2022-11-22 RX ADMIN — HYDROXYZINE HYDROCHLORIDE 50 MG: 25 TABLET, FILM COATED ORAL at 00:58

## 2022-11-22 RX ADMIN — CEPHALEXIN 500 MG: 250 CAPSULE ORAL at 08:26

## 2022-11-22 RX ADMIN — SERTRALINE HYDROCHLORIDE 100 MG: 50 TABLET ORAL at 08:26

## 2022-11-22 RX ADMIN — NYSTATIN: 100000 POWDER TOPICAL at 16:57

## 2022-11-22 RX ADMIN — BUDESONIDE AND FORMOTEROL FUMARATE DIHYDRATE 2 PUFF: 80; 4.5 AEROSOL RESPIRATORY (INHALATION) at 09:17

## 2022-11-22 RX ADMIN — Medication 2 UNITS: at 08:25

## 2022-11-22 RX ADMIN — BUDESONIDE AND FORMOTEROL FUMARATE DIHYDRATE 2 PUFF: 80; 4.5 AEROSOL RESPIRATORY (INHALATION) at 21:54

## 2022-11-22 RX ADMIN — ATORVASTATIN CALCIUM 40 MG: 40 TABLET, FILM COATED ORAL at 17:06

## 2022-11-22 NOTE — BH NOTES
PSYCHIATRIC PROGRESS NOTE         Patient Name  Suki Arriola   Date of Birth 1965   [facility] [id number]   Medical Record Number  [id number]      Age  59 y.o. PCP Hari Zavala MD   Admit date:  11/9/2022    Room Number  320/02  @ [facility]   Date of Service  11/21/2022         E & M PROGRESS NOTE:         HISTORY       CC:  \"suicidal ideation\"  HISTORY OF PRESENT ILLNESS/INTERVAL HISTORY:  (reviewed/updated 11/21/2022). per initial evaluation: The patient, Suki Arriola, is a 59 y.o. WHITE/NON- female with a past psychiatric history significant for schizoaffective disorder, who presents at this time with complaints of (and/or evidence of) the following emotional symptoms: depression and suicidal thoughts/threats. Additional symptomatology include poor self care. The above symptoms have been present for 2+ weeks. These symptoms are of moderate to high severity. These symptoms are constant in nature. The patient's condition has been precipitated by psychosocial stressors. Patient's condition made worse by treatment noncompliance. UDS: negative; BAL=0. The patient is well known to the unit; she comes into the hospital frequently in crisis typically stating her 's death several years ago has made her feel hopeless. She has been living in a group home though this admission she states that she was kicked out of her group home because of an episode of incontinence. The patient is a poor historian. The patient corroborates the above narrative. The patient contracts for safety on the unit and gives consent for the team to contact collateral. The patient is amenable to initiating treatment while on the unit. She defers much of the team's inquires to her payee. 11/11 - no acute overnight events. The patient has been isolative to her room with poor ADLs. incontinent of urine but able to void when taken to the bathroom.  She denies active thoughts of self harm but is markedly internally preoccupied. Patient slept 8 hours overnight and got Trazodone. She denies active thoughts of self harm but endorses both depressed mood and anxiety. 11/14 - the patient remains in bed for much of the day. She has been inconitnent of urine and with poor ADLs requiring prompting for much activities. Patient denies active thoughts of self harm but endorses passive SI. She slept 7 hours overnight and is flat and disorganized on interview, moaning and providing little updates on her disposition plan. Per SW, the patient tends to not go to the group homes once assigned. Her payee is working on guardianship.    11/15 - overnight, the patient remained isolative to her bed, she continues to c/o anxiety and had an episode of urine incontinence. Patient refused nystatin stating her rash had 'cleared up.' She endorses ongoing AH and VH of 'ghosts' and states that she is doing very poorly. Patient evaluated by internist for ongoing LE swelling and pain. She is medication compliant and tolerating higher dose of Risperdal.    11/16 - the patient has remained in bed, out for meals and with little motivation to interact in the milieu. She got Motrin and Atarax and continues to c/o pain in her LE, as well as SI with a plan to cut herself if given the chance. The patient slept 8 hours and this morning spontaneously stated to the team that she wanted to be discharged -- she acknowledges that her payee and SW are working on a dispo plan and she is still symptomatic. Medicine consulted as she is now experiencing orthopnea. 11/17 - the patient has been isolative to her room, out for meals and slept 7 hours overnight. She is stating she will discharge to [facility] [place] [place] but acknowledges that the team is coordinating guardianship with her payee. Patient noted to have significant LE edema and c/o orthopnea. She is medication compliant and denies active thoughts of self harm. . She remains disorganized and with ongoing confusion but today she is otherwise pleasant. 11/18 - overnight, the patient was noted to be desaturating to the 80's and difficult to arouse. She denies all symptoms otherwise and has been able to make her needs known. Patient medication compliant. She has been largely in bed during the day, but out for meals. SW actively working on placement for the patient. IM started Lasix to address edema. 11/21 - the patient has been in bed for much of the day. She is internally preoccupied and with no episodes of agitation or aggression. Patient passing clots and with abdi hematuria per RN. Keflex started for UTI. Patient medication compliant and has been otherwise in fair behavioral control. SIDE EFFECTS: (reviewed/updated 11/21/2022)  None reported or admitted to. No noted toxicity with use of Depakote   ALLERGIES:(reviewed/updated 11/21/2022)  Allergies   Allergen Reactions    Amoxicillin Hives    Mushroom Flavor Hives    Tomato Hives      REVIEW OF SYSTEMS: (reviewed/updated 11/21/2022)  Appetite:improved   Sleep: no change   All other Review of Systems: Negative except incontinence per [facility]         [facility] (MSE):    MSE FINDINGS ARE WITHIN NORMAL LIMITS (WNL) UNLESS OTHERWISE STATED BELOW. ( ALL OF THE BELOW CATEGORIES OF THE MSE HAVE BEEN REVIEWED (reviewed 11/21/2022) AND UPDATED AS DEEMED APPROPRIATE )  General Presentation age appropriate, cooperative and guarded   Orientation disorganized   Vital Signs  See below (reviewed 11/21/2022); Vital Signs (BP, Pulse, & Temp) are within normal limits if not listed below.    Gait and Station Stable/steady, no ataxia   Musculoskeletal System No extrapyramidal symptoms (EPS); no abnormal muscular movements or Tardive Dyskinesia (TD); muscle strength and tone are within normal limits   Language No aphasia or dysarthria   Speech:  normal volume and non-pressured   Thought Processes concrete; normal rate of thoughts; poor abstract reasoning/computation   Thought Associations blocked    Thought Content auditory hallucinations and visual hallucinations   Suicidal Ideations contracts for safety   Homicidal Ideations contracts for safety   Mood:  depressed and anhedonia   Affect:  flat   Memory recent  intact   Memory remote:  intact   Concentration/Attention:  intact   Fund of Knowledge average   Insight:  limited   Reliability fair   Judgment:  limited          VITALS:     Patient Vitals for the past 24 hrs:   Temp Pulse Resp BP SpO2   11/21/22 0822 98.2 °F (36.8 °C) 69 16 119/61 93 %       Wt Readings from Last 3 Encounters:   11/20/22 109.7 kg (241 lb 12.8 oz)   06/03/22 113.9 kg (251 lb)   05/14/22 88.5 kg (195 lb)     Temp Readings from Last 3 Encounters:   11/21/22 98.2 °F (36.8 °C)   06/03/22 97.6 °F (36.4 °C)   05/19/22 98.5 °F (36.9 °C)     BP Readings from Last 3 Encounters:   11/21/22 119/61   06/03/22 130/69   05/19/22 (!) 112/51     Pulse Readings from Last 3 Encounters:   11/21/22 69   06/03/22 89   05/19/22 64            DATA     LABORATORY DATA:(reviewed/updated 11/21/2022)  Recent Results (from the past 24 hour(s))   GLUCOSE, POC    Collection Time: 11/21/22  8:02 AM   Result Value Ref Range    Glucose (POC) 187 (H) 65 - 117 mg/dL    Performed by Jennifer Alexis 100, POC    Collection Time: 11/21/22 11:47 AM   Result Value Ref Range    Glucose (POC) 236 (H) 65 - 117 mg/dL    Performed by Vasile Slater    GLUCOSE, POC    Collection Time: 11/21/22  4:32 PM   Result Value Ref Range    Glucose (POC) 256 (H) 65 - 117 mg/dL    Performed by Vasile Slater    GLUCOSE, POC    Collection Time: 11/21/22  7:36 PM   Result Value Ref Range    Glucose (POC) 276 (H) 65 - 117 mg/dL    Performed by Naomy Mcintosh RN      No results found for: VALF2, VALAC, VALP, VALPR, DS6, CRBAM, CRBAMP, CARB2, XCRBAM  No results found for: LITHM   RADIOLOGY REPORTS:(reviewed/updated 11/21/2022)  No results found. MEDICATIONS     ALL MEDICATIONS:   Current Facility-Administered Medications   Medication Dose Route Frequency    [START ON 11/22/2022] insulin glargine (LANTUS) injection 30 Units  30 Units SubCUTAneous DAILY    cephALEXin (KEFLEX) capsule 500 mg  500 mg Oral BID    furosemide (LASIX) tablet 40 mg  40 mg Oral DAILY    ibuprofen (MOTRIN) tablet 400 mg  400 mg Oral Q6H PRN    nystatin (MYCOSTATIN) 100,000 unit/gram powder   Topical BID    risperiDONE (RisperDAL) tablet 2 mg  2 mg Oral Q12H    benzocaine-menthoL (CHLORASEPTIC MAX) lozenge 1 Lozenge  1 Lozenge Oral Q2H PRN    glucose chewable tablet 16 g  4 Tablet Oral PRN    glucagon (GLUCAGEN) injection 1 mg  1 mg IntraMUSCular PRN    dextrose 10% infusion 0-250 mL  0-250 mL IntraVENous PRN    albuterol (PROVENTIL HFA, VENTOLIN HFA, PROAIR HFA) inhaler 1 Puff  1 Puff Inhalation Q4H PRN    insulin lispro (HUMALOG) injection 1-10 Units  1-10 Units SubCUTAneous AC&HS    insulin lispro (HUMALOG) injection 5 Units  5 Units SubCUTAneous TIDAC    budesonide-formoterol (SYMBICORT) 80-4.5 mcg inhaler  2 Puff Inhalation BID RT    OLANZapine (ZyPREXA) tablet 5 mg  5 mg Oral Q6H PRN    haloperidol lactate (HALDOL) injection 5 mg  5 mg IntraMUSCular Q6H PRN    benztropine (COGENTIN) tablet 1 mg  1 mg Oral BID PRN    diphenhydrAMINE (BENADRYL) injection 50 mg  50 mg IntraMUSCular BID PRN    hydrOXYzine HCL (ATARAX) tablet 50 mg  50 mg Oral TID PRN    LORazepam (ATIVAN) injection 1 mg  1 mg IntraMUSCular Q4H PRN    traZODone (DESYREL) tablet 50 mg  50 mg Oral QHS PRN    acetaminophen (TYLENOL) tablet 650 mg  650 mg Oral Q4H PRN    magnesium hydroxide (MILK OF MAGNESIA) 400 mg/5 mL oral suspension 30 mL  30 mL Oral DAILY PRN    atorvastatin (LIPITOR) tablet 40 mg  40 mg Oral QPM    gabapentin (NEURONTIN) capsule 100 mg  100 mg Oral TID    metFORMIN ER (GLUCOPHAGE XR) tablet 1,000 mg  1,000 mg Oral BID WITH MEALS    oxybutynin chloride XL (DITROPAN XL) tablet 5 mg  5 mg Oral DAILY    sertraline (ZOLOFT) tablet 100 mg  100 mg Oral DAILY      SCHEDULED MEDICATIONS:   Current Facility-Administered Medications   Medication Dose Route Frequency    [START ON 11/22/2022] insulin glargine (LANTUS) injection 30 Units  30 Units SubCUTAneous DAILY    cephALEXin (KEFLEX) capsule 500 mg  500 mg Oral BID    furosemide (LASIX) tablet 40 mg  40 mg Oral DAILY    nystatin (MYCOSTATIN) 100,000 unit/gram powder   Topical BID    risperiDONE (RisperDAL) tablet 2 mg  2 mg Oral Q12H    insulin lispro (HUMALOG) injection 1-10 Units  1-10 Units SubCUTAneous AC&HS    insulin lispro (HUMALOG) injection 5 Units  5 Units SubCUTAneous TIDAC    budesonide-formoterol (SYMBICORT) 80-4.5 mcg inhaler  2 Puff Inhalation BID RT    atorvastatin (LIPITOR) tablet 40 mg  40 mg Oral QPM    gabapentin (NEURONTIN) capsule 100 mg  100 mg Oral TID    metFORMIN ER (GLUCOPHAGE XR) tablet 1,000 mg  1,000 mg Oral BID WITH MEALS    oxybutynin chloride XL (DITROPAN XL) tablet 5 mg  5 mg Oral DAILY    sertraline (ZOLOFT) tablet 100 mg  100 mg Oral DAILY          ASSESSMENT & PLAN     DIAGNOSES REQUIRING ACTIVE TREATMENT AND MONITORING: (reviewed/updated 11/21/2022)  Patient Active Hospital Problem List:       Schizoaffective disorder (11/10/2022)           Assessment: the patient presents in crisis after potentially leaving her group home; she is disorganized and a poor historian at baseline, treatment will be primarily psychosocial. Patient with low O2 sats, edema and orthopnea, will have to address this.         Plan:   - CONTINUE Risperdal 2 mg Q12H for psychosis  - CONTINUE Zoloft 100 mg QDAY for MDD  - Appreciate medicine recs  - Consider sleep referral for obesity hypoventilation syndrome  - IGM therapy as tolerated  - Expand database / obtain collateral  - Dispo planning    In summary, Azra Quiles, is a 59 y.o.  female who presents with a severe exacerbation of the principal diagnosis of Schizoaffective disorder [facility])    Patient's condition is not improving. Patient requires continued inpatient hospitalization for further stabilization, safety monitoring and medication management. I will continue to coordinate the provision of individual, milieu, occupational, group, and substance abuse therapies to address target symptoms/diagnoses as deemed appropriate for the individual patient. A coordinated, multidisplinary treatment team round was conducted with the patient (this team consists of the nurse, psychiatric unit pharmacist,  and writer). Complete current electronic health record for patient has been reviewed today including consultant notes, ancillary staff notes, nurses and psychiatric tech notes. Suicide risk assessment completed and patient deemed to be of low risk for suicide at this time. The following regarding medications was addressed during rounds with patient:   the risks and benefits of the proposed medication. The patient was given the opportunity to ask questions. Informed consent given to the use of the above medications. Will continue to adjust psychiatric and non-psychiatric medications (see above \"medication\" section and orders section for details) as deemed appropriate & based upon diagnoses and response to treatment. I will continue to order blood tests/labs and diagnostic tests as deemed appropriate and review results as they become available (see orders for details and above listed lab/test results). I will order psychiatric records from previous [facility] to further elucidate the nature of patient's psychopathology and review once available. I will gather additional collateral information from friends, family and o/p treatment team to further elucidate the nature of patient's psychopathology and baselline level of psychiatric functioning.          I certify that this patient's inpatient psychiatric hospital services furnished since the previous certification were, and continue to be, required for treatment that could reasonably be expected to improve the patient's condition, or for diagnostic study, and that the patient continues to need, on a daily basis, active treatment furnished directly by or requiring the supervision of inpatient psychiatric facility personnel. In addition, the hospital records show that services furnished were intensive treatment services, admission or related services, or equivalent services.     EXPECTED DISCHARGE DATE/DAY: TBD     DISPOSITION: Group home / shelter       Signed By:   Jake Young MD  11/21/2022

## 2022-11-22 NOTE — PROGRESS NOTES
Problem: Suicide  Goal: *STG: Seeks staff when feelings of self harm or harm towards others arise  Outcome: Progressing Towards Goal  Goal: *STG/LTG: Complies with medication therapy  Outcome: Progressing Towards Goal  Goal: *STG/LTG: No longer expresses self destructive or suicidal thoughts  Outcome: Progressing Towards Goal     [id number]: Assumed care of patient after receiving shift report from outgoing nurse. Patient was out and visible on unit, interacting appropriately with peers and staff. Affect is blunted, mood is euthymic. Pt cooperative with vitals and assessment. A&O x 4. Partial assist in ADLs. Insight and concentration present. Gait is unsteady. Although pt ambulation improving overall. Appetite patterns WNL. Denies AVH/SI/HI. Pt received PRN trazodone and tylenol for sleep and pain. Patient medication and diet compliant. Pt encouraged to continue to participate in care. [id number]: Pt resting in bed. Received PRN atarax and chloraseptic lozenge for anxiety and sore throat/cough. No further issues noted at this time. [id number]: Pt has been observed throughout the night. Total hours slept approximately 8. No s/s of distress noted. Patient has remained safe. Hourly rounding performed throughout shift.

## 2022-11-22 NOTE — PROGRESS NOTES
Problem: Suicide  Goal: *STG: Remains safe in hospital  Outcome: Progressing Towards Goal     Shift change report given to Nohemy CLEMENTE (oncoming nurse) by Leon Valdez (offgoing nurse). Report included the following information SBAR, Kardex, MAR, and Recent Results. Assumed care of patient. Met patient in room. Calm and cooperative with assessment, smiling. Upon assessment, patient states that she is no longer urinated blood. Patient happy. Patient denied anxiety, depression, SI, HI and AVH. No complaints of pain. Meal compliant. Patient did not use nystatin cream in the morning and evening, refused. Patient visible on unit. To dayroom for meals and to watch TV. No issues noted throughout shift.

## 2022-11-22 NOTE — PROGRESS NOTES
Problem: Mobility Impaired (Adult and Pediatric)  Goal: *Acute Goals and Plan of Care (Insert Text)  Description: FUNCTIONAL STATUS PRIOR TO ADMISSION: Patient was modified independent using a walker for functional mobility. Physical Therapy Goals  Initiated 11/18/2022  1. Patient will transfer from bed to chair and chair to bed with modified independence using the least restrictive device within 7 day(s). 2.  Patient will perform sit to stand with modified independence within 7 day(s). 3.  Patient will ambulate with modified independence for 150 feet with the least restrictive device within 7 day(s). Outcome: Progressing Towards Goal     PHYSICAL THERAPY TREATMENT  Patient: Kailyn Garcia (50 y.o. female)  Date: 11/22/2022  Diagnosis: Adjustment disorder [F43.20] Schizoaffective disorder ([facility]      Precautions:    Chart, physical therapy assessment, plan of care and goals were reviewed. ASSESSMENT  Patient continues with skilled PT services and is progressing towards goals. Patient demonstrates improved balance and endurance. Patient performed bed mobility with modified independence. She stood to walker with SBA. Patient ambulated 150 feet with walker and SBA. Patient with steady gait when using walker. Patient holding onto external support of walls and furniture when not using walker. Current Level of Function Impacting Discharge (mobility/balance): SBA        PLAN :  Patient continues to benefit from skilled intervention to address the above impairments. Continue treatment per established plan of care. to address goals. Recommendation for discharge: (in order for the patient to meet his/her long term goals)  No skilled physical therapy/ follow up rehabilitation needs identified at this time.     This discharge recommendation:  Has been made in collaboration with the attending provider and/or case management    IF patient discharges home will need the following DME: patient owns all DME needed at discharge       SUBJECTIVE:   Patient stated I'm okay.     OBJECTIVE DATA SUMMARY:   Critical Behavior:  Neurologic State: Alert  Orientation Level: Oriented X4  Cognition: Follows commands     Functional Mobility Training:  Bed Mobility:     Supine to Sit: Modified independent  Sit to Supine: Modified independent  Scooting: Modified independent        Transfers:  Sit to Stand: Stand-by assistance  Stand to Sit: Stand-by assistance        Bed to Chair: Stand-by assistance        Balance:  Sitting: Intact  Standing: Impaired; With support  Standing - Static: Constant support;Good  Standing - Dynamic : Constant support;Good;Fair    Ambulation/Gait Training:  Distance (ft): 150 Feet (ft)  Assistive Device: Gait belt;Walker, rolling  Ambulation - Level of Assistance: Stand-by assistance    Gait Abnormalities: Decreased step clearance    Base of Support: Widened     Speed/Chelita: Pace decreased (<100 feet/min)  Step Length: Right shortened;Left shortened     Pain Rating:  No pain    Activity Tolerance:   Good    After treatment patient left in no apparent distress:   Sitting in chair and in day room with PCT    COMMUNICATION/COLLABORATION:   The patients plan of care was discussed with: Registered nurse and Certified nursing assistant/patient care technician.      Mya Oseguera PT   Time Calculation: 10 mins

## 2022-11-22 NOTE — BH NOTES
Discharge Update:       SW still has not hear back from payee, social work will start UAI for group home placement in effort for her to go to a group home at discharge. If patient refuses, (like she several times in the past), SW will look into CSU placement.          FAMILIA Martínez

## 2022-11-23 LAB
GLUCOSE BLD STRIP.AUTO-MCNC: 117 MG/DL (ref 65–117)
GLUCOSE BLD STRIP.AUTO-MCNC: 175 MG/DL (ref 65–117)
GLUCOSE BLD STRIP.AUTO-MCNC: 204 MG/DL (ref 65–117)
GLUCOSE BLD STRIP.AUTO-MCNC: 206 MG/DL (ref 65–117)
SERVICE CMNT-IMP: ABNORMAL
SERVICE CMNT-IMP: NORMAL

## 2022-11-23 PROCEDURE — 74011636637 HC RX REV CODE- 636/637: Performed by: STUDENT IN AN ORGANIZED HEALTH CARE EDUCATION/TRAINING PROGRAM

## 2022-11-23 PROCEDURE — 82962 GLUCOSE BLOOD TEST: CPT

## 2022-11-23 PROCEDURE — 74011250637 HC RX REV CODE- 250/637: Performed by: PSYCHIATRY & NEUROLOGY

## 2022-11-23 PROCEDURE — 74011250637 HC RX REV CODE- 250/637: Performed by: STUDENT IN AN ORGANIZED HEALTH CARE EDUCATION/TRAINING PROGRAM

## 2022-11-23 PROCEDURE — 65220000003 HC RM SEMIPRIVATE PSYCH

## 2022-11-23 PROCEDURE — 74011636637 HC RX REV CODE- 636/637: Performed by: PSYCHIATRY & NEUROLOGY

## 2022-11-23 PROCEDURE — 99231 SBSQ HOSP IP/OBS SF/LOW 25: CPT | Performed by: PSYCHIATRY & NEUROLOGY

## 2022-11-23 RX ADMIN — GABAPENTIN 100 MG: 100 CAPSULE ORAL at 17:00

## 2022-11-23 RX ADMIN — GABAPENTIN 100 MG: 100 CAPSULE ORAL at 09:39

## 2022-11-23 RX ADMIN — OXYBUTYNIN CHLORIDE 5 MG: 5 TABLET, EXTENDED RELEASE ORAL at 09:39

## 2022-11-23 RX ADMIN — CEPHALEXIN 500 MG: 250 CAPSULE ORAL at 09:40

## 2022-11-23 RX ADMIN — GABAPENTIN 100 MG: 100 CAPSULE ORAL at 12:27

## 2022-11-23 RX ADMIN — Medication 3 UNITS: at 09:31

## 2022-11-23 RX ADMIN — FUROSEMIDE 40 MG: 40 TABLET ORAL at 09:40

## 2022-11-23 RX ADMIN — Medication 2 UNITS: at 22:26

## 2022-11-23 RX ADMIN — RISPERIDONE 2 MG: 1 TABLET ORAL at 22:29

## 2022-11-23 RX ADMIN — SERTRALINE HYDROCHLORIDE 100 MG: 50 TABLET ORAL at 09:39

## 2022-11-23 RX ADMIN — BUDESONIDE AND FORMOTEROL FUMARATE DIHYDRATE 2 PUFF: 80; 4.5 AEROSOL RESPIRATORY (INHALATION) at 11:18

## 2022-11-23 RX ADMIN — BUDESONIDE AND FORMOTEROL FUMARATE DIHYDRATE 2 PUFF: 80; 4.5 AEROSOL RESPIRATORY (INHALATION) at 22:27

## 2022-11-23 RX ADMIN — RISPERIDONE 2 MG: 1 TABLET ORAL at 09:39

## 2022-11-23 RX ADMIN — Medication 10 UNITS: at 12:27

## 2022-11-23 RX ADMIN — METFORMIN HYDROCHLORIDE 1000 MG: 500 TABLET, EXTENDED RELEASE ORAL at 17:00

## 2022-11-23 RX ADMIN — Medication 2 UNITS: at 17:01

## 2022-11-23 RX ADMIN — Medication 10 UNITS: at 09:31

## 2022-11-23 RX ADMIN — Medication 10 UNITS: at 17:00

## 2022-11-23 RX ADMIN — CEPHALEXIN 500 MG: 250 CAPSULE ORAL at 17:00

## 2022-11-23 RX ADMIN — METFORMIN HYDROCHLORIDE 1000 MG: 500 TABLET, EXTENDED RELEASE ORAL at 09:40

## 2022-11-23 RX ADMIN — ATORVASTATIN CALCIUM 40 MG: 40 TABLET, FILM COATED ORAL at 17:00

## 2022-11-23 RX ADMIN — Medication 30 UNITS: at 09:33

## 2022-11-23 NOTE — BH NOTES
Daily Note:      Patient continues to isolate in room and only comes out for meals. She presents unkempt with poor hygiene. Patient continues to require staff assistance. Patient reports that she is ready to discharge. OCTAVIA informed her that the team is currently working on finding her a group home. SW is currently completing UAI which is extremely extensive due to patient's medical and mental decompensation. OCTAVIA will continue to work on UAI in effort to find group home placement. Patient continues to be a placement issue as she is in need of both medical and psychiatric assistance.          FAMILIA Davis

## 2022-11-23 NOTE — PROGRESS NOTES
0730 Pt received in room. She is cooperative with scheduled medications. She ambulated occasionally on unit but isolative and remained in bed for the majority of the shift. She denies si/hi/ah/vh    1130 Pt  walked down the hallway and is incontinent of stool on the floor. Problem: Falls - Risk of  Goal: *Absence of Falls  Description: Document Eulalia Sam Fall Risk and appropriate interventions in the flowsheet.   Outcome: Progressing Towards Goal  Note: Fall Risk Interventions:  Mobility Interventions: Communicate number of staff needed for ambulation/transfer         Medication Interventions: Teach patient to arise slowly    Elimination Interventions: Patient to call for help with toileting needs    History of Falls Interventions: Room close to nurse's station, Door open when patient unattended         Problem: Patient Education: Go to Patient Education Activity  Goal: Patient/Family Education  Outcome: Progressing Towards Goal

## 2022-11-23 NOTE — PROGRESS NOTES
Problem: Discharge Planning  Goal: *Knowledge of medication management  11/23/2022 0007 by Deanne Retana  Outcome: Progressing Towards Goal  11/23/2022 0007 by Deanne Retana  Outcome: Not Progressing Towards Goal     Problem: Falls - Risk of  Goal: *Absence of Falls  Description: Document Irma Arteaga Fall Risk and appropriate interventions in the flowsheet.   Outcome: Progressing Towards Goal  Note: Fall Risk Interventions:  Mobility Interventions: Communicate number of staff needed for ambulation/transfer         Medication Interventions: Teach patient to arise slowly    Elimination Interventions: Patient to call for help with toileting needs    History of Falls Interventions: Room close to nurse's station, Door open when patient unattended

## 2022-11-23 NOTE — BH NOTES
PSYCHIATRIC PROGRESS NOTE         Patient Name  Alba Dickinson   Date of Birth 1965   CSN [id number]   Medical Record Number  [id number]      Age  59 y.o. PCP Marce Grimes MD   Admit date:  11/9/2022    Room Number  320/02  @ [facility]   Date of Service  11/23/2022         E & M PROGRESS NOTE:         HISTORY       CC:  \"suicidal ideation\"  HISTORY OF PRESENT ILLNESS/INTERVAL HISTORY:  (reviewed/updated 11/23/2022). per initial evaluation: The patient, Alba Dickinson, is a 59 y.o. WHITE/NON- female with a past psychiatric history significant for schizoaffective disorder, who presents at this time with complaints of (and/or evidence of) the following emotional symptoms: depression and suicidal thoughts/threats. Additional symptomatology include poor self care. The above symptoms have been present for 2+ weeks. These symptoms are of moderate to high severity. These symptoms are constant in nature. The patient's condition has been precipitated by psychosocial stressors. Patient's condition made worse by treatment noncompliance. UDS: negative; BAL=0. The patient is well known to the unit; she comes into the hospital frequently in crisis typically stating her 's death several years ago has made her feel hopeless. She has been living in a group home though this admission she states that she was kicked out of her group home because of an episode of incontinence. The patient is a poor historian. The patient corroborates the above narrative. The patient contracts for safety on the unit and gives consent for the team to contact collateral. The patient is amenable to initiating treatment while on the unit. She defers much of the team's inquires to her payee. 11/11 - no acute overnight events. The patient has been isolative to her room with poor ADLs. incontinent of urine but able to void when taken to the bathroom.  She denies active thoughts of self harm but is markedly internally preoccupied. Patient slept 8 hours overnight and got Trazodone. She denies active thoughts of self harm but endorses both depressed mood and anxiety. 11/14 - the patient remains in bed for much of the day. She has been inconitnent of urine and with poor ADLs requiring prompting for much activities. Patient denies active thoughts of self harm but endorses passive SI. She slept 7 hours overnight and is flat and disorganized on interview, moaning and providing little updates on her disposition plan. Per SW, the patient tends to not go to the group homes once assigned. Her payee is working on guardianship.    11/15 - overnight, the patient remained isolative to her bed, she continues to c/o anxiety and had an episode of urine incontinence. Patient refused nystatin stating her rash had 'cleared up.' She endorses ongoing AH and VH of 'ghosts' and states that she is doing very poorly. Patient evaluated by internist for ongoing LE swelling and pain. She is medication compliant and tolerating higher dose of Risperdal.    11/16 - the patient has remained in bed, out for meals and with little motivation to interact in the milieu. She got Motrin and Atarax and continues to c/o pain in her LE, as well as SI with a plan to cut herself if given the chance. The patient slept 8 hours and this morning spontaneously stated to the team that she wanted to be discharged -- she acknowledges that her payee and SW are working on a dispo plan and she is still symptomatic. Medicine consulted as she is now experiencing orthopnea. 11/17 - the patient has been isolative to her room, out for meals and slept 7 hours overnight. She is stating she will discharge to a [facility] [place] but acknowledges that the team is coordinating guardianship with her payee. Patient noted to have significant LE edema and c/o orthopnea. She is medication compliant and denies active thoughts of self harm. . She remains disorganized and with ongoing confusion but today she is otherwise pleasant. 11/18 - overnight, the patient was noted to be desaturating to the 80's and difficult to arouse. She denies all symptoms otherwise and has been able to make her needs known. Patient medication compliant. She has been largely in bed during the day, but out for meals. SW actively working on placement for the patient. IM started Lasix to address edema. 11/21 - the patient has been in bed for much of the day. She is internally preoccupied and with no episodes of agitation or aggression. Patient passing clots and with abdi hematuria per RN. Keflex started for UTI. Patient medication compliant and has been otherwise in fair behavioral control. 11/22 - overnight, patient remains bed bound and disorganized. She is no longer passing blot clots and hematuria has subsided since starting Abx. Patient denies SI/HI/PI, AH appears baseline. She is discharge focused, sleeping during the day and pleasant when awake. Diabetes team provided recommendations. 11/23 - the patient slept 9 hours overnight. She is out for meals but isolative to her room for much of the day. Patient still on fluid restriction as she is being diuresed. She denies SI/HI/VH/PI, she continues to c/o AH. Patient calm and cooperative on interview. BSG 170s- 200s. SIDE EFFECTS: (reviewed/updated 11/23/2022)  None reported or admitted to.   No noted toxicity with use of Depakote   ALLERGIES:(reviewed/updated 11/23/2022)  Allergies   Allergen Reactions    Amoxicillin Hives    Mushroom Flavor Hives    Tomato Hives      REVIEW OF SYSTEMS: (reviewed/updated 11/23/2022)  Appetite:improved   Sleep: no change   All other Review of Systems: Negative except incontinence per HPI         [facility] (MSE):    MSE FINDINGS ARE WITHIN NORMAL LIMITS (WNL) UNLESS OTHERWISE STATED BELOW. ( ALL OF THE BELOW CATEGORIES OF THE MSE HAVE BEEN REVIEWED (reviewed 11/23/2022) AND UPDATED AS DEEMED APPROPRIATE )  General Presentation age appropriate, cooperative and guarded   Orientation disorganized   Vital Signs  See below (reviewed 11/23/2022); Vital Signs (BP, Pulse, & Temp) are within normal limits if not listed below.    Gait and Station Stable/steady, no ataxia   Musculoskeletal System No extrapyramidal symptoms (EPS); no abnormal muscular movements or Tardive Dyskinesia (TD); muscle strength and tone are within normal limits   Language No aphasia or dysarthria   Speech:  normal volume and non-pressured   Thought Processes concrete; normal rate of thoughts; poor abstract reasoning/computation   Thought Associations blocked    Thought Content auditory hallucinations and visual hallucinations   Suicidal Ideations contracts for safety   Homicidal Ideations contracts for safety   Mood:  depressed and anhedonia   Affect:  flat   Memory recent  intact   Memory remote:  intact   Concentration/Attention:  intact   Fund of Knowledge average   Insight:  limited   Reliability fair   Judgment:  limited          VITALS:     Patient Vitals for the past 24 hrs:   Temp Pulse Resp BP SpO2   11/23/22 0928 97.7 °F (36.5 °C) 65 16 (!) 104/54 95 %   11/22/22 2010 97.8 °F (36.6 °C) 66 16 (!) 144/64 95 %       Wt Readings from Last 3 Encounters:   11/20/22 109.7 kg (241 lb 12.8 oz)   06/03/22 113.9 kg (251 lb)   05/14/22 88.5 kg (195 lb)     Temp Readings from Last 3 Encounters:   11/23/22 97.7 °F (36.5 °C)   06/03/22 97.6 °F (36.4 °C)   05/19/22 98.5 °F (36.9 °C)     BP Readings from Last 3 Encounters:   11/23/22 (!) 104/54   06/03/22 130/69   05/19/22 (!) 112/51     Pulse Readings from Last 3 Encounters:   11/23/22 65   06/03/22 89   05/19/22 64            DATA     LABORATORY DATA:(reviewed/updated 11/23/2022)  Recent Results (from the past 24 hour(s))   GLUCOSE, POC    Collection Time: 11/22/22  4:13 PM   Result Value Ref Range    Glucose (POC) 235 (H) 65 - 117 mg/dL    Performed by Pj Dela Cruz Belén Wolf RN    GLUCOSE, POC    Collection Time: 11/22/22  8:33 PM   Result Value Ref Range    Glucose (POC) 190 (H) 65 - 117 mg/dL    Performed by Leidy Greene RN    GLUCOSE, POC    Collection Time: 11/23/22  7:55 AM   Result Value Ref Range    Glucose (POC) 204 (H) 65 - 117 mg/dL    Performed by Clary Cates \"Aristeo\" RN    GLUCOSE, POC    Collection Time: 11/23/22 11:53 AM   Result Value Ref Range    Glucose (POC) 117 65 - 117 mg/dL    Performed by Eula Snell RN      No results found for: VALF2, VALAC, VALP, VALPR, DS6, CRBAM, CRBAMP, CARB2, XCRBAM  No results found for: LITHM   RADIOLOGY REPORTS:(reviewed/updated 11/23/2022)  No results found.        MEDICATIONS     ALL MEDICATIONS:   Current Facility-Administered Medications   Medication Dose Route Frequency    insulin lispro (HUMALOG) injection 10 Units  10 Units SubCUTAneous TIDAC    insulin glargine (LANTUS) injection 30 Units  30 Units SubCUTAneous DAILY    cephALEXin (KEFLEX) capsule 500 mg  500 mg Oral BID    furosemide (LASIX) tablet 40 mg  40 mg Oral DAILY    ibuprofen (MOTRIN) tablet 400 mg  400 mg Oral Q6H PRN    nystatin (MYCOSTATIN) 100,000 unit/gram powder   Topical BID    risperiDONE (RisperDAL) tablet 2 mg  2 mg Oral Q12H    benzocaine-menthoL (CHLORASEPTIC MAX) lozenge 1 Lozenge  1 Lozenge Oral Q2H PRN    glucose chewable tablet 16 g  4 Tablet Oral PRN    glucagon (GLUCAGEN) injection 1 mg  1 mg IntraMUSCular PRN    dextrose 10% infusion 0-250 mL  0-250 mL IntraVENous PRN    albuterol (PROVENTIL HFA, VENTOLIN HFA, PROAIR HFA) inhaler 1 Puff  1 Puff Inhalation Q4H PRN    insulin lispro (HUMALOG) injection 1-10 Units  1-10 Units SubCUTAneous AC&HS    budesonide-formoterol (SYMBICORT) 80-4.5 mcg inhaler  2 Puff Inhalation BID RT    OLANZapine (ZyPREXA) tablet 5 mg  5 mg Oral Q6H PRN    haloperidol lactate (HALDOL) injection 5 mg  5 mg IntraMUSCular Q6H PRN    benztropine (COGENTIN) tablet 1 mg  1 mg Oral BID PRN    diphenhydrAMINE (BENADRYL) injection 50 mg  50 mg IntraMUSCular BID PRN    hydrOXYzine HCL (ATARAX) tablet 50 mg  50 mg Oral TID PRN    LORazepam (ATIVAN) injection 1 mg  1 mg IntraMUSCular Q4H PRN    traZODone (DESYREL) tablet 50 mg  50 mg Oral QHS PRN    acetaminophen (TYLENOL) tablet 650 mg  650 mg Oral Q4H PRN    magnesium hydroxide (MILK OF MAGNESIA) 400 mg/5 mL oral suspension 30 mL  30 mL Oral DAILY PRN    atorvastatin (LIPITOR) tablet 40 mg  40 mg Oral QPM    gabapentin (NEURONTIN) capsule 100 mg  100 mg Oral TID    metFORMIN ER (GLUCOPHAGE XR) tablet 1,000 mg  1,000 mg Oral BID WITH MEALS    oxybutynin chloride XL (DITROPAN XL) tablet 5 mg  5 mg Oral DAILY    sertraline (ZOLOFT) tablet 100 mg  100 mg Oral DAILY      SCHEDULED MEDICATIONS:   Current Facility-Administered Medications   Medication Dose Route Frequency    insulin lispro (HUMALOG) injection 10 Units  10 Units SubCUTAneous TIDAC    insulin glargine (LANTUS) injection 30 Units  30 Units SubCUTAneous DAILY    cephALEXin (KEFLEX) capsule 500 mg  500 mg Oral BID    furosemide (LASIX) tablet 40 mg  40 mg Oral DAILY    nystatin (MYCOSTATIN) 100,000 unit/gram powder   Topical BID    risperiDONE (RisperDAL) tablet 2 mg  2 mg Oral Q12H    insulin lispro (HUMALOG) injection 1-10 Units  1-10 Units SubCUTAneous AC&HS    budesonide-formoterol (SYMBICORT) 80-4.5 mcg inhaler  2 Puff Inhalation BID RT    atorvastatin (LIPITOR) tablet 40 mg  40 mg Oral QPM    gabapentin (NEURONTIN) capsule 100 mg  100 mg Oral TID    metFORMIN ER (GLUCOPHAGE XR) tablet 1,000 mg  1,000 mg Oral BID WITH MEALS    oxybutynin chloride XL (DITROPAN XL) tablet 5 mg  5 mg Oral DAILY    sertraline (ZOLOFT) tablet 100 mg  100 mg Oral DAILY          ASSESSMENT & PLAN     DIAGNOSES REQUIRING ACTIVE TREATMENT AND MONITORING: (reviewed/updated 11/23/2022)  Patient Active Hospital Problem List:       Schizoaffective disorder (11/10/2022)           Assessment: the patient presents in crisis after potentially leaving her group home; she is disorganized and a poor historian at baseline, treatment will be primarily psychosocial. Patient with low O2 sats, edema and orthopnea, will have to address this. Plan:   - CONTINUE Risperdal 2 mg Q12H for psychosis  - CONTINUE Zoloft 100 mg QDAY for MDD  - Appreciate DM team / IM recs  - Consider sleep referral for obesity hypoventilation syndrome  - IGM therapy as tolerated  - Expand database / obtain collateral  - Dispo planning    In summary, Christi Guzmán, is a 59 y.o.  female who presents with a severe exacerbation of the principal diagnosis of Schizoaffective disorder ([facility] [address]    Patient's condition is improving. Patient requires continued inpatient hospitalization for further stabilization, safety monitoring and medication management. I will continue to coordinate the provision of individual, milieu, occupational, group, and substance abuse therapies to address target symptoms/diagnoses as deemed appropriate for the individual patient. A coordinated, multidisplinary treatment team round was conducted with the patient (this team consists of the nurse, psychiatric unit pharmacist,  and writer). Complete current electronic health record for patient has been reviewed today including consultant notes, ancillary staff notes, nurses and psychiatric tech notes. Suicide risk assessment completed and patient deemed to be of low risk for suicide at this time. The following regarding medications was addressed during rounds with patient:   the risks and benefits of the proposed medication. The patient was given the opportunity to ask questions. Informed consent given to the use of the above medications. Will continue to adjust psychiatric and non-psychiatric medications (see above \"medication\" section and orders section for details) as deemed appropriate & based upon diagnoses and response to treatment.      I will continue to order blood tests/labs and diagnostic tests as deemed appropriate and review results as they become available (see orders for details and above listed lab/test results). I will order psychiatric records from previous [facility] hospitals to further elucidate the nature of patient's psychopathology and review once available. I will gather additional collateral information from friends, family and o/p treatment team to further elucidate the nature of patient's psychopathology and baselline level of psychiatric functioning. I certify that this patient's inpatient psychiatric hospital services furnished since the previous certification were, and continue to be, required for treatment that could reasonably be expected to improve the patient's condition, or for diagnostic study, and that the patient continues to need, on a daily basis, active treatment furnished directly by or requiring the supervision of inpatient psychiatric facility personnel. In addition, the hospital records show that services furnished were intensive treatment services, admission or related services, or equivalent services.     EXPECTED DISCHARGE DATE/DAY: TBD     DISPOSITION: Group home / shelter       Signed By:   Rossy Tejada MD  11/23/2022

## 2022-11-23 NOTE — PROGRESS NOTES
Spiritual Care Assessment/Progress Note  [facility] [place]      NAME: Shaina Mclean      MRN: [id number]  AGE: 59 y.o.  SEX: female  Catholic Affiliation: [place]   Language: English     11/23/2022     Total Time (in minutes): 5     Spiritual Assessment begun in [place] [id number] through conversation with:         [x]Patient        [] Family    [] Friend(s)        Reason for Consult: Initial/Spiritual assessment, patient floor     Spiritual beliefs: (Please include comment if needed)     [x] Identifies with a shanna tradition:         [] Supported by a shanna community:            [] Claims no spiritual orientation:           [] Seeking spiritual identity:                [] Adheres to an individual form of spirituality:           [] Not able to assess:                           Identified resources for coping:      [x] Prayer                               [] Music                  [] Guided Imagery     [] Family/friends                 [] Pet visits     [] Devotional reading                         [] Unknown     [] Other:                                               Interventions offered during this visit: (See comments for more details)    Patient Interventions: Affirmation of emotions/emotional suffering, Initial/Spiritual assessment, patient floor, Prayer (assurance of), Prayer (actual), Normalization of emotional/spiritual concerns, Affirmation of shanna           Plan of Care:     [x] Support spiritual and/or cultural needs    [] Support AMD and/or advance care planning process      [] Support grieving process   [] Coordinate Rites and/or Rituals    [] Coordination with community clergy   [] No spiritual needs identified at this time   [] Detailed Plan of Care below (See Comments)  [] Make referral to Music Therapy  [] Make referral to Pet Therapy     [] Make referral to Addiction services  [] Make referral to [facility]  [] Make referral to Spiritual Care Partner  [] No future visits requested        [x] Contact Spiritual Care for further referrals     Comments:  and Mr. Lisa Wilde provided group for the patient on the general side of the behavioral unit. Rev. MANGO Rodríguez  [address] [facility]   Paging Service [phone])

## 2022-11-23 NOTE — BH NOTES
PSYCHIATRIC PROGRESS NOTE         Patient Name  Azra Quiles   Date of Birth 1965   [facility] [id number]   Medical Record Number  [id number]      Age  59 y.o. PCP Yoli Kaur MD   Admit date:  11/9/2022    Room Number  320/02  @ [facility]   Date of Service  11/22/2022         E & M PROGRESS NOTE:         HISTORY       CC:  \"suicidal ideation\"  HISTORY OF PRESENT ILLNESS/INTERVAL HISTORY:  (reviewed/updated 11/22/2022). per initial evaluation: The patient, Azra Quiles, is a 59 y.o. WHITE/NON- female with a past psychiatric history significant for schizoaffective disorder, who presents at this time with complaints of (and/or evidence of) the following emotional symptoms: depression and suicidal thoughts/threats. Additional symptomatology include poor self care. The above symptoms have been present for 2+ weeks. These symptoms are of moderate to high severity. These symptoms are constant in nature. The patient's condition has been precipitated by psychosocial stressors. Patient's condition made worse by treatment noncompliance. UDS: negative; BAL=0. The patient is well known to the unit; she comes into the hospital frequently in crisis typically stating her 's death several years ago has made her feel hopeless. She has been living in a group home though this admission she states that she was kicked out of her group home because of an episode of incontinence. The patient is a poor historian. The patient corroborates the above narrative. The patient contracts for safety on the unit and gives consent for the team to contact collateral. The patient is amenable to initiating treatment while on the unit. She defers much of the team's inquires to her payee. 11/11 - no acute overnight events. The patient has been isolative to her room with poor ADLs. incontinent of urine but able to void when taken to the bathroom.  She denies active thoughts of self harm but is markedly internally preoccupied. Patient slept 8 hours overnight and got Trazodone. She denies active thoughts of self harm but endorses both depressed mood and anxiety. 11/14 - the patient remains in bed for much of the day. She has been inconitnent of urine and with poor ADLs requiring prompting for much activities. Patient denies active thoughts of self harm but endorses passive SI. She slept 7 hours overnight and is flat and disorganized on interview, moaning and providing little updates on her disposition plan. Per SW, the patient tends to not go to the group homes once assigned. Her payee is working on guardianship.    11/15 - overnight, the patient remained isolative to her bed, she continues to c/o anxiety and had an episode of urine incontinence. Patient refused nystatin stating her rash had 'cleared up.' She endorses ongoing AH and VH of 'ghosts' and states that she is doing very poorly. Patient evaluated by internist for ongoing LE swelling and pain. She is medication compliant and tolerating higher dose of Risperdal.    11/16 - the patient has remained in bed, out for meals and with little motivation to interact in the milieu. She got Motrin and Atarax and continues to c/o pain in her LE, as well as SI with a plan to cut herself if given the chance. The patient slept 8 hours and this morning spontaneously stated to the team that she wanted to be discharged -- she acknowledges that her payee and SW are working on a dispo plan and she is still symptomatic. Medicine consulted as she is now experiencing orthopnea. 11/17 - the patient has been isolative to her room, out for meals and slept 7 hours overnight. She is stating she will discharge to [facility] [address] but acknowledges that the team is coordinating guardianship with her payee. Patient noted to have significant LE edema and c/o orthopnea. She is medication compliant and denies active thoughts of self harm. . She remains disorganized and with ongoing confusion but today she is otherwise pleasant. 11/18 - overnight, the patient was noted to be desaturating to the 80's and difficult to arouse. She denies all symptoms otherwise and has been able to make her needs known. Patient medication compliant. She has been largely in bed during the day, but out for meals. SW actively working on placement for the patient. IM started Lasix to address edema. 11/21 - the patient has been in bed for much of the day. She is internally preoccupied and with no episodes of agitation or aggression. Patient passing clots and with abdi hematuria per RN. Keflex started for UTI. Patient medication compliant and has been otherwise in fair behavioral control. 11/22 - overnight, patient remains bed bound and disorganized. She is no longer passing blot clots and hematuria has subsided since starting Abx. Patient denies SI/HI/PI, AH appears baseline. She is discharge focused, sleeping during the day and pleasant when awake. Diabetes team provided recommendations. SIDE EFFECTS: (reviewed/updated 11/22/2022)  None reported or admitted to. No noted toxicity with use of Depakote   ALLERGIES:(reviewed/updated 11/22/2022)  Allergies   Allergen Reactions    Amoxicillin Hives    Mushroom Flavor Hives    Tomato Hives      REVIEW OF SYSTEMS: (reviewed/updated 11/22/2022)  Appetite:improved   Sleep: no change   All other Review of Systems: Negative except incontinence per HPI         [facility] (MSE):    MSE FINDINGS ARE WITHIN NORMAL LIMITS (WNL) UNLESS OTHERWISE STATED BELOW. ( ALL OF THE BELOW CATEGORIES OF THE MSE HAVE BEEN REVIEWED (reviewed 11/22/2022) AND UPDATED AS DEEMED APPROPRIATE )  General Presentation age appropriate, cooperative and guarded   Orientation disorganized   Vital Signs  See below (reviewed 11/22/2022); Vital Signs (BP, Pulse, & Temp) are within normal limits if not listed below.    Gait and Station Stable/steady, no ataxia   Musculoskeletal System No extrapyramidal symptoms (EPS); no abnormal muscular movements or Tardive Dyskinesia (TD); muscle strength and tone are within normal limits   Language No aphasia or dysarthria   Speech:  normal volume and non-pressured   Thought Processes concrete; normal rate of thoughts; poor abstract reasoning/computation   Thought Associations blocked    Thought Content auditory hallucinations and visual hallucinations   Suicidal Ideations contracts for safety   Homicidal Ideations contracts for safety   Mood:  depressed and anhedonia   Affect:  flat   Memory recent  intact   Memory remote:  intact   Concentration/Attention:  intact   Fund of Knowledge average   Insight:  limited   Reliability fair   Judgment:  limited          VITALS:     Patient Vitals for the past 24 hrs:   Temp Pulse Resp BP SpO2   11/22/22 0756 97.8 °F (36.6 °C) 80 18 (!) 141/63 92 %       Wt Readings from Last 3 Encounters:   11/20/22 109.7 kg (241 lb 12.8 oz)   06/03/22 113.9 kg (251 lb)   05/14/22 88.5 kg (195 lb)     Temp Readings from Last 3 Encounters:   11/22/22 97.8 °F (36.6 °C)   06/03/22 97.6 °F (36.4 °C)   05/19/22 98.5 °F (36.9 °C)     BP Readings from Last 3 Encounters:   11/22/22 (!) 141/63   06/03/22 130/69   05/19/22 (!) 112/51     Pulse Readings from Last 3 Encounters:   11/22/22 80   06/03/22 89   05/19/22 64            DATA     LABORATORY DATA:(reviewed/updated 11/22/2022)  Recent Results (from the past 24 hour(s))   GLUCOSE, POC    Collection Time: 11/22/22  7:49 AM   Result Value Ref Range    Glucose (POC) 177 (H) 65 - 117 mg/dL    Performed by [facility] [facility]    GLUCOSE, POC    Collection Time: 11/22/22 11:40 AM   Result Value Ref Range    Glucose (POC) 167 (H) 65 - 117 mg/dL    Performed by Kennethfine Boards Nohemy    GLUCOSE, POC    Collection Time: 11/22/22  4:13 PM   Result Value Ref Range    Glucose (POC) 235 (H) 65 - 117 mg/dL    Performed by Arielle Mack RN    GLUCOSE, POC Collection Time: 11/22/22  8:33 PM   Result Value Ref Range    Glucose (POC) 190 (H) 65 - 117 mg/dL    Performed by Dorothea Castaneda RN      No results found for: VALF2, VALAC, VALP, VALPR, DS6, CRBAM, CRBAMP, CARB2, XCRBAM  No results found for: LITHM   RADIOLOGY REPORTS:(reviewed/updated 11/22/2022)  No results found.        MEDICATIONS     ALL MEDICATIONS:   Current Facility-Administered Medications   Medication Dose Route Frequency    insulin lispro (HUMALOG) injection 10 Units  10 Units SubCUTAneous TIDAC    insulin glargine (LANTUS) injection 30 Units  30 Units SubCUTAneous DAILY    cephALEXin (KEFLEX) capsule 500 mg  500 mg Oral BID    furosemide (LASIX) tablet 40 mg  40 mg Oral DAILY    ibuprofen (MOTRIN) tablet 400 mg  400 mg Oral Q6H PRN    nystatin (MYCOSTATIN) 100,000 unit/gram powder   Topical BID    risperiDONE (RisperDAL) tablet 2 mg  2 mg Oral Q12H    benzocaine-menthoL (CHLORASEPTIC MAX) lozenge 1 Lozenge  1 Lozenge Oral Q2H PRN    glucose chewable tablet 16 g  4 Tablet Oral PRN    glucagon (GLUCAGEN) injection 1 mg  1 mg IntraMUSCular PRN    dextrose 10% infusion 0-250 mL  0-250 mL IntraVENous PRN    albuterol (PROVENTIL HFA, VENTOLIN HFA, PROAIR HFA) inhaler 1 Puff  1 Puff Inhalation Q4H PRN    insulin lispro (HUMALOG) injection 1-10 Units  1-10 Units SubCUTAneous AC&HS    budesonide-formoterol (SYMBICORT) 80-4.5 mcg inhaler  2 Puff Inhalation BID RT    OLANZapine (ZyPREXA) tablet 5 mg  5 mg Oral Q6H PRN    haloperidol lactate (HALDOL) injection 5 mg  5 mg IntraMUSCular Q6H PRN    benztropine (COGENTIN) tablet 1 mg  1 mg Oral BID PRN    diphenhydrAMINE (BENADRYL) injection 50 mg  50 mg IntraMUSCular BID PRN    hydrOXYzine HCL (ATARAX) tablet 50 mg  50 mg Oral TID PRN    LORazepam (ATIVAN) injection 1 mg  1 mg IntraMUSCular Q4H PRN    traZODone (DESYREL) tablet 50 mg  50 mg Oral QHS PRN    acetaminophen (TYLENOL) tablet 650 mg  650 mg Oral Q4H PRN    magnesium hydroxide (MILK OF MAGNESIA) 400 mg/5 mL oral suspension 30 mL  30 mL Oral DAILY PRN    atorvastatin (LIPITOR) tablet 40 mg  40 mg Oral QPM    gabapentin (NEURONTIN) capsule 100 mg  100 mg Oral TID    metFORMIN ER (GLUCOPHAGE XR) tablet 1,000 mg  1,000 mg Oral BID WITH MEALS    oxybutynin chloride XL (DITROPAN XL) tablet 5 mg  5 mg Oral DAILY    sertraline (ZOLOFT) tablet 100 mg  100 mg Oral DAILY      SCHEDULED MEDICATIONS:   Current Facility-Administered Medications   Medication Dose Route Frequency    insulin lispro (HUMALOG) injection 10 Units  10 Units SubCUTAneous TIDAC    insulin glargine (LANTUS) injection 30 Units  30 Units SubCUTAneous DAILY    cephALEXin (KEFLEX) capsule 500 mg  500 mg Oral BID    furosemide (LASIX) tablet 40 mg  40 mg Oral DAILY    nystatin (MYCOSTATIN) 100,000 unit/gram powder   Topical BID    risperiDONE (RisperDAL) tablet 2 mg  2 mg Oral Q12H    insulin lispro (HUMALOG) injection 1-10 Units  1-10 Units SubCUTAneous AC&HS    budesonide-formoterol (SYMBICORT) 80-4.5 mcg inhaler  2 Puff Inhalation BID RT    atorvastatin (LIPITOR) tablet 40 mg  40 mg Oral QPM    gabapentin (NEURONTIN) capsule 100 mg  100 mg Oral TID    metFORMIN ER (GLUCOPHAGE XR) tablet 1,000 mg  1,000 mg Oral BID WITH MEALS    oxybutynin chloride XL (DITROPAN XL) tablet 5 mg  5 mg Oral DAILY    sertraline (ZOLOFT) tablet 100 mg  100 mg Oral DAILY          ASSESSMENT & PLAN     DIAGNOSES REQUIRING ACTIVE TREATMENT AND MONITORING: (reviewed/updated 11/22/2022)  Patient Active Hospital Problem List:       Schizoaffective disorder (11/10/2022)           Assessment: the patient presents in crisis after potentially leaving her group home; she is disorganized and a poor historian at baseline, treatment will be primarily psychosocial. Patient with low O2 sats, edema and orthopnea, will have to address this.         Plan:   - CONTINUE Risperdal 2 mg Q12H for psychosis  - CONTINUE Zoloft 100 mg QDAY for MDD  - Appreciate DM team / IM recs  - Consider sleep referral for obesity hypoventilation syndrome  - IGM therapy as tolerated  - Expand database / obtain collateral  - Dispo planning    In summary, Christiano Boyle, is a 59 y.o.  female who presents with a severe exacerbation of the principal diagnosis of Schizoaffective disorder ([address]    Patient's condition is improving. Patient requires continued inpatient hospitalization for further stabilization, safety monitoring and medication management. I will continue to coordinate the provision of individual, milieu, occupational, group, and substance abuse therapies to address target symptoms/diagnoses as deemed appropriate for the individual patient. A coordinated, multidisplinary treatment team round was conducted with the patient (this team consists of the nurse, psychiatric unit pharmacist,  and writer). Complete current electronic health record for patient has been reviewed today including consultant notes, ancillary staff notes, nurses and psychiatric tech notes. Suicide risk assessment completed and patient deemed to be of low risk for suicide at this time. The following regarding medications was addressed during rounds with patient:   the risks and benefits of the proposed medication. The patient was given the opportunity to ask questions. Informed consent given to the use of the above medications. Will continue to adjust psychiatric and non-psychiatric medications (see above \"medication\" section and orders section for details) as deemed appropriate & based upon diagnoses and response to treatment. I will continue to order blood tests/labs and diagnostic tests as deemed appropriate and review results as they become available (see orders for details and above listed lab/test results). I will order psychiatric records from previous [facility] hospitals to further elucidate the nature of patient's psychopathology and review once available.     I will gather additional collateral information from friends, family and o/p treatment team to further elucidate the nature of patient's psychopathology and baselline level of psychiatric functioning. I certify that this patient's inpatient psychiatric hospital services furnished since the previous certification were, and continue to be, required for treatment that could reasonably be expected to improve the patient's condition, or for diagnostic study, and that the patient continues to need, on a daily basis, active treatment furnished directly by or requiring the supervision of inpatient psychiatric facility personnel. In addition, the hospital records show that services furnished were intensive treatment services, admission or related services, or equivalent services.     EXPECTED DISCHARGE DATE/DAY: TBD     DISPOSITION: Group home / shelter       Signed By:   Arley Tena MD  11/22/2022

## 2022-11-24 LAB
GLUCOSE BLD STRIP.AUTO-MCNC: 146 MG/DL (ref 65–117)
GLUCOSE BLD STRIP.AUTO-MCNC: 153 MG/DL (ref 65–117)
GLUCOSE BLD STRIP.AUTO-MCNC: 158 MG/DL (ref 65–117)
GLUCOSE BLD STRIP.AUTO-MCNC: 241 MG/DL (ref 65–117)
SERVICE CMNT-IMP: ABNORMAL

## 2022-11-24 PROCEDURE — 74011250637 HC RX REV CODE- 250/637: Performed by: PSYCHIATRY & NEUROLOGY

## 2022-11-24 PROCEDURE — 82962 GLUCOSE BLOOD TEST: CPT

## 2022-11-24 PROCEDURE — 65220000003 HC RM SEMIPRIVATE PSYCH

## 2022-11-24 PROCEDURE — 74011250637 HC RX REV CODE- 250/637

## 2022-11-24 PROCEDURE — 74011636637 HC RX REV CODE- 636/637: Performed by: STUDENT IN AN ORGANIZED HEALTH CARE EDUCATION/TRAINING PROGRAM

## 2022-11-24 PROCEDURE — 74011250637 HC RX REV CODE- 250/637: Performed by: STUDENT IN AN ORGANIZED HEALTH CARE EDUCATION/TRAINING PROGRAM

## 2022-11-24 PROCEDURE — 74011636637 HC RX REV CODE- 636/637: Performed by: PSYCHIATRY & NEUROLOGY

## 2022-11-24 RX ADMIN — BUDESONIDE AND FORMOTEROL FUMARATE DIHYDRATE 2 PUFF: 80; 4.5 AEROSOL RESPIRATORY (INHALATION) at 21:22

## 2022-11-24 RX ADMIN — Medication 3 UNITS: at 16:56

## 2022-11-24 RX ADMIN — CEPHALEXIN 500 MG: 250 CAPSULE ORAL at 16:57

## 2022-11-24 RX ADMIN — Medication 10 UNITS: at 11:50

## 2022-11-24 RX ADMIN — FUROSEMIDE 40 MG: 40 TABLET ORAL at 08:11

## 2022-11-24 RX ADMIN — METFORMIN HYDROCHLORIDE 1000 MG: 500 TABLET, EXTENDED RELEASE ORAL at 16:56

## 2022-11-24 RX ADMIN — Medication 2 UNITS: at 11:50

## 2022-11-24 RX ADMIN — Medication 10 UNITS: at 08:13

## 2022-11-24 RX ADMIN — Medication 2 UNITS: at 08:12

## 2022-11-24 RX ADMIN — SERTRALINE HYDROCHLORIDE 100 MG: 50 TABLET ORAL at 08:11

## 2022-11-24 RX ADMIN — Medication 30 UNITS: at 08:12

## 2022-11-24 RX ADMIN — RISPERIDONE 2 MG: 1 TABLET ORAL at 21:22

## 2022-11-24 RX ADMIN — OXYBUTYNIN CHLORIDE 5 MG: 5 TABLET, EXTENDED RELEASE ORAL at 08:11

## 2022-11-24 RX ADMIN — BUDESONIDE AND FORMOTEROL FUMARATE DIHYDRATE 2 PUFF: 80; 4.5 AEROSOL RESPIRATORY (INHALATION) at 08:17

## 2022-11-24 RX ADMIN — GABAPENTIN 100 MG: 100 CAPSULE ORAL at 08:12

## 2022-11-24 RX ADMIN — GABAPENTIN 100 MG: 100 CAPSULE ORAL at 11:50

## 2022-11-24 RX ADMIN — CEPHALEXIN 500 MG: 250 CAPSULE ORAL at 08:11

## 2022-11-24 RX ADMIN — Medication 10 UNITS: at 16:56

## 2022-11-24 RX ADMIN — RISPERIDONE 2 MG: 1 TABLET ORAL at 08:11

## 2022-11-24 RX ADMIN — ATORVASTATIN CALCIUM 40 MG: 40 TABLET, FILM COATED ORAL at 18:00

## 2022-11-24 RX ADMIN — METFORMIN HYDROCHLORIDE 1000 MG: 500 TABLET, EXTENDED RELEASE ORAL at 08:11

## 2022-11-24 RX ADMIN — GABAPENTIN 100 MG: 100 CAPSULE ORAL at 16:57

## 2022-11-24 RX ADMIN — TRAZODONE HYDROCHLORIDE 50 MG: 50 TABLET ORAL at 21:22

## 2022-11-24 RX ADMIN — IBUPROFEN 400 MG: 400 TABLET, FILM COATED ORAL at 21:22

## 2022-11-24 NOTE — PROGRESS NOTES
Problem: Discharge Planning  Goal: *Discharge to safe environment  Outcome: Progressing Towards Goal  Goal: *Knowledge of medication management  Outcome: Progressing Towards Goal     Problem: Suicide  Goal: *STG: Remains safe in hospital  Outcome: Progressing Towards Goal  Goal: *STG: Seeks staff when feelings of self harm or harm towards others arise  Outcome: Progressing Towards Goal  Goal: *STG/LTG: Complies with medication therapy  Outcome: Progressing Towards Goal

## 2022-11-24 NOTE — BH NOTES
PSYCHIATRIC PROGRESS NOTE         Patient Name  Azra Quiles   Date of Birth 1965   CSN [id number]   Medical Record Number  [id number]      Age  59 y.o. PCP Yoli Kaur MD   Admit date:  11/9/2022    Room Number  320/02  @ [facility]   Date of Service  11/24/2022         E & M PROGRESS NOTE:         HISTORY       CC:  \"suicidal ideation\"  HISTORY OF PRESENT ILLNESS/INTERVAL HISTORY:  (reviewed/updated 11/24/2022). per initial evaluation: The patient, Azra Quiles, is a 59 y.o. WHITE/NON- female with a past psychiatric history significant for schizoaffective disorder, who presents at this time with complaints of (and/or evidence of) the following emotional symptoms: depression and suicidal thoughts/threats. Additional symptomatology include poor self care. The above symptoms have been present for 2+ weeks. These symptoms are of moderate to high severity. These symptoms are constant in nature. The patient's condition has been precipitated by psychosocial stressors. Patient's condition made worse by treatment noncompliance. UDS: negative; BAL=0. The patient is well known to the unit; she comes into the hospital frequently in crisis typically stating her 's death several years ago has made her feel hopeless. She has been living in a group home though this admission she states that she was kicked out of her group home because of an episode of incontinence. The patient is a poor historian. The patient corroborates the above narrative. The patient contracts for safety on the unit and gives consent for the team to contact collateral. The patient is amenable to initiating treatment while on the unit. She defers much of the team's inquires to her payee. 11/11 - no acute overnight events. The patient has been isolative to her room with poor ADLs. incontinent of urine but able to void when taken to the bathroom.  She denies active thoughts of self harm but is markedly internally preoccupied. Patient slept 8 hours overnight and got Trazodone. She denies active thoughts of self harm but endorses both depressed mood and anxiety. 11/14 - the patient remains in bed for much of the day. She has been inconitnent of urine and with poor ADLs requiring prompting for much activities. Patient denies active thoughts of self harm but endorses passive SI. She slept 7 hours overnight and is flat and disorganized on interview, moaning and providing little updates on her disposition plan. Per SW, the patient tends to not go to the group homes once assigned. Her payee is working on guardianship.    11/15 - overnight, the patient remained isolative to her bed, she continues to c/o anxiety and had an episode of urine incontinence. Patient refused nystatin stating her rash had 'cleared up.' She endorses ongoing AH and VH of 'ghosts' and states that she is doing very poorly. Patient evaluated by internist for ongoing LE swelling and pain. She is medication compliant and tolerating higher dose of Risperdal.    11/16 - the patient has remained in bed, out for meals and with little motivation to interact in the milieu. She got Motrin and Atarax and continues to c/o pain in her LE, as well as SI with a plan to cut herself if given the chance. The patient slept 8 hours and this morning spontaneously stated to the team that she wanted to be discharged -- she acknowledges that her payee and SW are working on a dispo plan and she is still symptomatic. Medicine consulted as she is now experiencing orthopnea. 11/17 - the patient has been isolative to her room, out for meals and slept 7 hours overnight. She is stating she will discharge to a [facility] on [facility] but acknowledges that the team is coordinating guardianship with her payee. Patient noted to have significant LE edema and c/o orthopnea. She is medication compliant and denies active thoughts of self harm. . She remains disorganized and with ongoing confusion but today she is otherwise pleasant. 11/18 - overnight, the patient was noted to be desaturating to the 80's and difficult to arouse. She denies all symptoms otherwise and has been able to make her needs known. Patient medication compliant. She has been largely in bed during the day, but out for meals. SW actively working on placement for the patient. IM started Lasix to address edema. 11/21 - the patient has been in bed for much of the day. She is internally preoccupied and with no episodes of agitation or aggression. Patient passing clots and with abdi hematuria per RN. Keflex started for UTI. Patient medication compliant and has been otherwise in fair behavioral control. 11/22 - overnight, patient remains bed bound and disorganized. She is no longer passing blot clots and hematuria has subsided since starting Abx. Patient denies SI/HI/PI, AH appears baseline. She is discharge focused, sleeping during the day and pleasant when awake. Diabetes team provided recommendations. 11/23 - the patient slept 9 hours overnight. She is out for meals but isolative to her room for much of the day. Patient still on fluid restriction as she is being diuresed. She denies SI/HI/VH/PI, she continues to c/o AH. Patient calm and cooperative on interview. BSG 170s- 200s. 11/24 - pt remains isolative. No acute incidents. Denies SI/plan/intent, HI/plan/intent. Denied AH/VH today. No other changes or new concerns today. SIDE EFFECTS: (reviewed/updated 11/24/2022)  None reported or admitted to.   No noted toxicity with use of Depakote   ALLERGIES:(reviewed/updated 11/24/2022)  Allergies   Allergen Reactions    Amoxicillin Hives    Mushroom Flavor Hives    Tomato Hives      REVIEW OF SYSTEMS: (reviewed/updated 11/24/2022)  Appetite:improved   Sleep: no change   All other Review of Systems: Negative except incontinence per HPI         [facility] ([facility]): MSE FINDINGS ARE WITHIN NORMAL LIMITS (WNL) UNLESS OTHERWISE STATED BELOW. ( ALL OF THE BELOW CATEGORIES OF THE MSE HAVE BEEN REVIEWED (reviewed 11/24/2022) AND UPDATED AS DEEMED APPROPRIATE )  General Presentation age appropriate, cooperative and guarded   Orientation disorganized   Vital Signs  See below (reviewed 11/24/2022); Vital Signs (BP, Pulse, & Temp) are within normal limits if not listed below.    Gait and Station Stable/steady, no ataxia   Musculoskeletal System No extrapyramidal symptoms (EPS); no abnormal muscular movements or Tardive Dyskinesia (TD); muscle strength and tone are within normal limits   Language No aphasia or dysarthria   Speech:  normal volume and non-pressured   Thought Processes concrete; normal rate of thoughts; poor abstract reasoning/computation   Thought Associations blocked    Thought Content auditory hallucinations and visual hallucinations   Suicidal Ideations contracts for safety   Homicidal Ideations contracts for safety   Mood:  depressed and anhedonia   Affect:  flat   Memory recent  intact   Memory remote:  intact   Concentration/Attention:  intact   Fund of Knowledge average   Insight:  limited   Reliability fair   Judgment:  limited          VITALS:     Patient Vitals for the past 24 hrs:   Temp Pulse Resp BP SpO2   11/24/22 0819 97.6 °F (36.4 °C) 88 20 (!) 158/85 95 %   11/23/22 2044 97.8 °F (36.6 °C) 73 14 (!) 151/88 96 %       Wt Readings from Last 3 Encounters:   11/20/22 109.7 kg (241 lb 12.8 oz)   06/03/22 113.9 kg (251 lb)   05/14/22 88.5 kg (195 lb)     Temp Readings from Last 3 Encounters:   11/24/22 97.6 °F (36.4 °C)   06/03/22 97.6 °F (36.4 °C)   05/19/22 98.5 °F (36.9 °C)     BP Readings from Last 3 Encounters:   11/24/22 (!) 158/85   06/03/22 130/69   05/19/22 (!) 112/51     Pulse Readings from Last 3 Encounters:   11/24/22 88   06/03/22 89   05/19/22 64            DATA     LABORATORY DATA:(reviewed/updated 11/24/2022)  Recent Results (from the past 24 hour(s))   GLUCOSE, POC    Collection Time: 11/23/22  4:01 PM   Result Value Ref Range    Glucose (POC) 175 (H) 65 - 117 mg/dL    Performed by Lexy Angulo RN    GLUCOSE, POC    Collection Time: 11/23/22  8:16 PM   Result Value Ref Range    Glucose (POC) 206 (H) 65 - 117 mg/dL    Performed by Meghan Peterson RN    GLUCOSE, POC    Collection Time: 11/24/22  7:31 AM   Result Value Ref Range    Glucose (POC) 146 (H) 65 - 117 mg/dL    Performed by Lazarus Gaitna (LILIA RN)    GLUCOSE, POC    Collection Time: 11/24/22 11:23 AM   Result Value Ref Range    Glucose (POC) 158 (H) 65 - 117 mg/dL    Performed by Lazarus Gaitan (LILIA RN)      No results found for: VALF2, VALAC, VALP, VALPR, DS6, CRBAM, CRBAMP, CARB2, XCRBAM  No results found for: LITHM   RADIOLOGY REPORTS:(reviewed/updated 11/24/2022)  No results found.        MEDICATIONS     ALL MEDICATIONS:   Current Facility-Administered Medications   Medication Dose Route Frequency    insulin lispro (HUMALOG) injection 10 Units  10 Units SubCUTAneous TIDAC    insulin glargine (LANTUS) injection 30 Units  30 Units SubCUTAneous DAILY    cephALEXin (KEFLEX) capsule 500 mg  500 mg Oral BID    furosemide (LASIX) tablet 40 mg  40 mg Oral DAILY    ibuprofen (MOTRIN) tablet 400 mg  400 mg Oral Q6H PRN    nystatin (MYCOSTATIN) 100,000 unit/gram powder   Topical BID    risperiDONE (RisperDAL) tablet 2 mg  2 mg Oral Q12H    benzocaine-menthoL (CHLORASEPTIC MAX) lozenge 1 Lozenge  1 Lozenge Oral Q2H PRN    glucose chewable tablet 16 g  4 Tablet Oral PRN    glucagon (GLUCAGEN) injection 1 mg  1 mg IntraMUSCular PRN    dextrose 10% infusion 0-250 mL  0-250 mL IntraVENous PRN    albuterol (PROVENTIL HFA, VENTOLIN HFA, PROAIR HFA) inhaler 1 Puff  1 Puff Inhalation Q4H PRN    insulin lispro (HUMALOG) injection 1-10 Units  1-10 Units SubCUTAneous AC&HS    budesonide-formoterol (SYMBICORT) 80-4.5 mcg inhaler  2 Puff Inhalation BID RT    OLANZapine (ZyPREXA) tablet 5 mg  5 mg Oral Q6H PRN haloperidol lactate (HALDOL) injection 5 mg  5 mg IntraMUSCular Q6H PRN    benztropine (COGENTIN) tablet 1 mg  1 mg Oral BID PRN    diphenhydrAMINE (BENADRYL) injection 50 mg  50 mg IntraMUSCular BID PRN    hydrOXYzine HCL (ATARAX) tablet 50 mg  50 mg Oral TID PRN    LORazepam (ATIVAN) injection 1 mg  1 mg IntraMUSCular Q4H PRN    traZODone (DESYREL) tablet 50 mg  50 mg Oral QHS PRN    acetaminophen (TYLENOL) tablet 650 mg  650 mg Oral Q4H PRN    magnesium hydroxide (MILK OF MAGNESIA) 400 mg/5 mL oral suspension 30 mL  30 mL Oral DAILY PRN    atorvastatin (LIPITOR) tablet 40 mg  40 mg Oral QPM    gabapentin (NEURONTIN) capsule 100 mg  100 mg Oral TID    metFORMIN ER (GLUCOPHAGE XR) tablet 1,000 mg  1,000 mg Oral BID WITH MEALS    oxybutynin chloride XL (DITROPAN XL) tablet 5 mg  5 mg Oral DAILY    sertraline (ZOLOFT) tablet 100 mg  100 mg Oral DAILY      SCHEDULED MEDICATIONS:   Current Facility-Administered Medications   Medication Dose Route Frequency    insulin lispro (HUMALOG) injection 10 Units  10 Units SubCUTAneous TIDAC    insulin glargine (LANTUS) injection 30 Units  30 Units SubCUTAneous DAILY    cephALEXin (KEFLEX) capsule 500 mg  500 mg Oral BID    furosemide (LASIX) tablet 40 mg  40 mg Oral DAILY    nystatin (MYCOSTATIN) 100,000 unit/gram powder   Topical BID    risperiDONE (RisperDAL) tablet 2 mg  2 mg Oral Q12H    insulin lispro (HUMALOG) injection 1-10 Units  1-10 Units SubCUTAneous AC&HS    budesonide-formoterol (SYMBICORT) 80-4.5 mcg inhaler  2 Puff Inhalation BID RT    atorvastatin (LIPITOR) tablet 40 mg  40 mg Oral QPM    gabapentin (NEURONTIN) capsule 100 mg  100 mg Oral TID    metFORMIN ER (GLUCOPHAGE XR) tablet 1,000 mg  1,000 mg Oral BID WITH MEALS    oxybutynin chloride XL (DITROPAN XL) tablet 5 mg  5 mg Oral DAILY    sertraline (ZOLOFT) tablet 100 mg  100 mg Oral DAILY          ASSESSMENT & PLAN     DIAGNOSES REQUIRING ACTIVE TREATMENT AND MONITORING: (reviewed/updated 11/24/2022)  Patient Active Hospital Problem List:       Schizoaffective disorder (11/10/2022)           Assessment: the patient presents in crisis after potentially leaving her group home; she is disorganized and a poor historian at baseline, treatment will be primarily psychosocial. Patient with low O2 sats, edema and orthopnea, will have to address this. Plan:   - CONTINUE Risperdal 2 mg Q12H for psychosis  - CONTINUE Zoloft 100 mg QDAY for MDD  - Appreciate DM team / IM recs  - Consider sleep referral for obesity hypoventilation syndrome  - IGM therapy as tolerated  - Expand database / obtain collateral  - Dispo planning    In summary, Angi Allen, is a 59 y.o.  female who presents with a severe exacerbation of the principal diagnosis of Schizoaffective disorder ([facility] [address]    Patient's condition is improving. Patient requires continued inpatient hospitalization for further stabilization, safety monitoring and medication management. I will continue to coordinate the provision of individual, milieu, occupational, group, and substance abuse therapies to address target symptoms/diagnoses as deemed appropriate for the individual patient. A coordinated, multidisplinary treatment team round was conducted with the patient (this team consists of the nurse, psychiatric unit pharmacist,  and writer). Complete current electronic health record for patient has been reviewed today including consultant notes, ancillary staff notes, nurses and psychiatric tech notes. Suicide risk assessment completed and patient deemed to be of low risk for suicide at this time. The following regarding medications was addressed during rounds with patient:   the risks and benefits of the proposed medication. The patient was given the opportunity to ask questions. Informed consent given to the use of the above medications.  Will continue to adjust psychiatric and non-psychiatric medications (see above \"medication\" section and orders section for details) as deemed appropriate & based upon diagnoses and response to treatment. I will continue to order blood tests/labs and diagnostic tests as deemed appropriate and review results as they become available (see orders for details and above listed lab/test results). I will order psychiatric records from previous [facility] hospitals to further elucidate the nature of patient's psychopathology and review once available. I will gather additional collateral information from friends, family and o/p treatment team to further elucidate the nature of patient's psychopathology and baselline level of psychiatric functioning. I certify that this patient's inpatient psychiatric hospital services furnished since the previous certification were, and continue to be, required for treatment that could reasonably be expected to improve the patient's condition, or for diagnostic study, and that the patient continues to need, on a daily basis, active treatment furnished directly by or requiring the supervision of inpatient psychiatric facility personnel. In addition, the hospital records show that services furnished were intensive treatment services, admission or related services, or equivalent services.     EXPECTED DISCHARGE DATE/DAY: TBD     DISPOSITION: Group home / shelter       Signed By:   Sony Mckeon NP  11/24/2022

## 2022-11-24 NOTE — BH NOTES
Pt received laying in bed. Pleasant and cooperative on approach. Denies SI/HI/AVH, depression and anxiety. States \"I'm ready to go home\". Compliant with medication and diabetic care. Presents with flat affect but in happy mood. Intermittently visible in day room, quietly social with peers and staff. No acute changes to note.

## 2022-11-24 NOTE — PROGRESS NOTES
Problem: Falls - Risk of  Goal: *Absence of Falls  Description: Document Bebe Sandoval Fall Risk and appropriate interventions in the flowsheet.   Outcome: Progressing Towards Goal  Note: Fall Risk Interventions:  Mobility Interventions: Communicate number of staff needed for ambulation/transfer         Medication Interventions: Teach patient to arise slowly    Elimination Interventions: Patient to call for help with toileting needs    History of Falls Interventions: Room close to nurse's station, Door open when patient unattended         Problem: Discharge Planning  Goal: *Knowledge of medication management  Outcome: Progressing Towards Goal

## 2022-11-25 LAB
GLUCOSE BLD STRIP.AUTO-MCNC: 175 MG/DL (ref 65–117)
GLUCOSE BLD STRIP.AUTO-MCNC: 187 MG/DL (ref 65–117)
GLUCOSE BLD STRIP.AUTO-MCNC: 203 MG/DL (ref 65–117)
GLUCOSE BLD STRIP.AUTO-MCNC: 223 MG/DL (ref 65–117)
SERVICE CMNT-IMP: ABNORMAL

## 2022-11-25 PROCEDURE — 74011250637 HC RX REV CODE- 250/637: Performed by: STUDENT IN AN ORGANIZED HEALTH CARE EDUCATION/TRAINING PROGRAM

## 2022-11-25 PROCEDURE — 74011250637 HC RX REV CODE- 250/637

## 2022-11-25 PROCEDURE — 65220000003 HC RM SEMIPRIVATE PSYCH

## 2022-11-25 PROCEDURE — 82962 GLUCOSE BLOOD TEST: CPT

## 2022-11-25 PROCEDURE — 74011250637 HC RX REV CODE- 250/637: Performed by: PSYCHIATRY & NEUROLOGY

## 2022-11-25 PROCEDURE — 74011636637 HC RX REV CODE- 636/637: Performed by: PSYCHIATRY & NEUROLOGY

## 2022-11-25 PROCEDURE — 74011636637 HC RX REV CODE- 636/637: Performed by: STUDENT IN AN ORGANIZED HEALTH CARE EDUCATION/TRAINING PROGRAM

## 2022-11-25 PROCEDURE — 97116 GAIT TRAINING THERAPY: CPT | Performed by: PHYSICAL THERAPIST

## 2022-11-25 RX ADMIN — Medication 2 UNITS: at 21:14

## 2022-11-25 RX ADMIN — TRAZODONE HYDROCHLORIDE 50 MG: 50 TABLET ORAL at 21:14

## 2022-11-25 RX ADMIN — ALBUTEROL SULFATE 1 PUFF: 90 AEROSOL, METERED RESPIRATORY (INHALATION) at 00:24

## 2022-11-25 RX ADMIN — Medication 3 UNITS: at 17:11

## 2022-11-25 RX ADMIN — GABAPENTIN 100 MG: 100 CAPSULE ORAL at 17:05

## 2022-11-25 RX ADMIN — CEPHALEXIN 500 MG: 250 CAPSULE ORAL at 09:02

## 2022-11-25 RX ADMIN — IBUPROFEN 400 MG: 400 TABLET, FILM COATED ORAL at 21:14

## 2022-11-25 RX ADMIN — RISPERIDONE 2 MG: 1 TABLET ORAL at 09:03

## 2022-11-25 RX ADMIN — Medication 30 UNITS: at 09:05

## 2022-11-25 RX ADMIN — METFORMIN HYDROCHLORIDE 1000 MG: 500 TABLET, EXTENDED RELEASE ORAL at 17:06

## 2022-11-25 RX ADMIN — Medication 2 UNITS: at 12:26

## 2022-11-25 RX ADMIN — SERTRALINE HYDROCHLORIDE 100 MG: 50 TABLET ORAL at 09:02

## 2022-11-25 RX ADMIN — BUDESONIDE AND FORMOTEROL FUMARATE DIHYDRATE 2 PUFF: 80; 4.5 AEROSOL RESPIRATORY (INHALATION) at 21:15

## 2022-11-25 RX ADMIN — Medication 10 UNITS: at 09:05

## 2022-11-25 RX ADMIN — METFORMIN HYDROCHLORIDE 1000 MG: 500 TABLET, EXTENDED RELEASE ORAL at 09:02

## 2022-11-25 RX ADMIN — GABAPENTIN 100 MG: 100 CAPSULE ORAL at 09:02

## 2022-11-25 RX ADMIN — BUDESONIDE AND FORMOTEROL FUMARATE DIHYDRATE 2 PUFF: 80; 4.5 AEROSOL RESPIRATORY (INHALATION) at 10:05

## 2022-11-25 RX ADMIN — GABAPENTIN 100 MG: 100 CAPSULE ORAL at 12:24

## 2022-11-25 RX ADMIN — ATORVASTATIN CALCIUM 40 MG: 40 TABLET, FILM COATED ORAL at 17:05

## 2022-11-25 RX ADMIN — HYDROXYZINE HYDROCHLORIDE 50 MG: 25 TABLET, FILM COATED ORAL at 01:05

## 2022-11-25 RX ADMIN — OXYBUTYNIN CHLORIDE 5 MG: 5 TABLET, EXTENDED RELEASE ORAL at 09:02

## 2022-11-25 RX ADMIN — CEPHALEXIN 500 MG: 250 CAPSULE ORAL at 17:06

## 2022-11-25 RX ADMIN — RISPERIDONE 2 MG: 1 TABLET ORAL at 21:14

## 2022-11-25 RX ADMIN — FUROSEMIDE 40 MG: 40 TABLET ORAL at 09:02

## 2022-11-25 RX ADMIN — Medication 10 UNITS: at 12:24

## 2022-11-25 RX ADMIN — Medication 2 UNITS: at 09:05

## 2022-11-25 RX ADMIN — Medication 10 UNITS: at 17:11

## 2022-11-25 NOTE — BH NOTES
PSYCHIATRIC PROGRESS NOTE         Patient Name  Angi Allen   Date of Birth 1965   [facility] [id number]   Medical Record Number  [id number]      Age  59 y.o. PCP Brice Melvin MD   Admit date:  11/9/2022    Room Number  320/02  @ [facility]   Date of Service  11/25/2022         E & M PROGRESS NOTE:         HISTORY       CC:  \"suicidal ideation\"  HISTORY OF PRESENT ILLNESS/INTERVAL HISTORY:  (reviewed/updated 11/25/2022). per initial evaluation: The patient, Angi Allen, is a 59 y.o. WHITE/NON- female with a past psychiatric history significant for schizoaffective disorder, who presents at this time with complaints of (and/or evidence of) the following emotional symptoms: depression and suicidal thoughts/threats. Additional symptomatology include poor self care. The above symptoms have been present for 2+ weeks. These symptoms are of moderate to high severity. These symptoms are constant in nature. The patient's condition has been precipitated by psychosocial stressors. Patient's condition made worse by treatment noncompliance. UDS: negative; BAL=0. The patient is well known to the unit; she comes into the hospital frequently in crisis typically stating her 's death several years ago has made her feel hopeless. She has been living in a group home though this admission she states that she was kicked out of her group home because of an episode of incontinence. The patient is a poor historian. The patient corroborates the above narrative. The patient contracts for safety on the unit and gives consent for the team to contact collateral. The patient is amenable to initiating treatment while on the unit. She defers much of the team's inquires to her payee. 11/11 - no acute overnight events. The patient has been isolative to her room with poor ADLs. incontinent of urine but able to void when taken to the bathroom.  She denies active thoughts of self harm but is markedly internally preoccupied. Patient slept 8 hours overnight and got Trazodone. She denies active thoughts of self harm but endorses both depressed mood and anxiety. 11/14 - the patient remains in bed for much of the day. She has been inconitnent of urine and with poor ADLs requiring prompting for much activities. Patient denies active thoughts of self harm but endorses passive SI. She slept 7 hours overnight and is flat and disorganized on interview, moaning and providing little updates on her disposition plan. Per SW, the patient tends to not go to the group homes once assigned. Her payee is working on guardianship.    11/15 - overnight, the patient remained isolative to her bed, she continues to c/o anxiety and had an episode of urine incontinence. Patient refused nystatin stating her rash had 'cleared up.' She endorses ongoing AH and VH of 'ghosts' and states that she is doing very poorly. Patient evaluated by internist for ongoing LE swelling and pain. She is medication compliant and tolerating higher dose of Risperdal.    11/16 - the patient has remained in bed, out for meals and with little motivation to interact in the milieu. She got Motrin and Atarax and continues to c/o pain in her LE, as well as SI with a plan to cut herself if given the chance. The patient slept 8 hours and this morning spontaneously stated to the team that she wanted to be discharged -- she acknowledges that her payee and SW are working on a dispo plan and she is still symptomatic. Medicine consulted as she is now experiencing orthopnea. 11/17 - the patient has been isolative to her room, out for meals and slept 7 hours overnight. She is stating she will discharge to a [facility] [address] but acknowledges that the team is coordinating guardianship with her payee. Patient noted to have significant LE edema and c/o orthopnea. She is medication compliant and denies active thoughts of self harm. . She remains disorganized and with ongoing confusion but today she is otherwise pleasant. 11/18 - overnight, the patient was noted to be desaturating to the 80's and difficult to arouse. She denies all symptoms otherwise and has been able to make her needs known. Patient medication compliant. She has been largely in bed during the day, but out for meals. SW actively working on placement for the patient. IM started Lasix to address edema. 11/21 - the patient has been in bed for much of the day. She is internally preoccupied and with no episodes of agitation or aggression. Patient passing clots and with abdi hematuria per RN. Keflex started for UTI. Patient medication compliant and has been otherwise in fair behavioral control. 11/22 - overnight, patient remains bed bound and disorganized. She is no longer passing blot clots and hematuria has subsided since starting Abx. Patient denies SI/HI/PI, AH appears baseline. She is discharge focused, sleeping during the day and pleasant when awake. Diabetes team provided recommendations. 11/23 - the patient slept 9 hours overnight. She is out for meals but isolative to her room for much of the day. Patient still on fluid restriction as she is being diuresed. She denies SI/HI/VH/PI, she continues to c/o AH. Patient calm and cooperative on interview. BSG 170s- 200s. 11/24 - pt remains isolative. No acute incidents. Denies SI/plan/intent, HI/plan/intent. Denied AH/VH today. No other changes or new concerns today. 11/25 - Pt has mostly been isolative, but did get up and walk around some this morning. Continues to say she is ready to leave. No acute incidents. Slept 7.5 hours last night. Denies SI/plan/intent, denies HI/plan/intent, denies AVH. No new concerns today. SIDE EFFECTS: (reviewed/updated 11/25/2022)  None reported or admitted to.   No noted toxicity with use of Depakote   ALLERGIES:(reviewed/updated 11/25/2022)  Allergies   Allergen Reactions    Amoxicillin Hives Mushroom Flavor Hives    Tomato Hives      REVIEW OF SYSTEMS: (reviewed/updated 11/25/2022)  Appetite:improved   Sleep: no change   All other Review of Systems: Negative except incontinence per HPI         [facility] (MSE):    MSE FINDINGS ARE WITHIN NORMAL LIMITS (WNL) UNLESS OTHERWISE STATED BELOW. ( ALL OF THE BELOW CATEGORIES OF THE MSE HAVE BEEN REVIEWED (reviewed 11/25/2022) AND UPDATED AS DEEMED APPROPRIATE )  General Presentation age appropriate, cooperative and guarded   Orientation disorganized   Vital Signs  See below (reviewed 11/25/2022); Vital Signs (BP, Pulse, & Temp) are within normal limits if not listed below.    Gait and Station Stable/steady, no ataxia   Musculoskeletal System No extrapyramidal symptoms (EPS); no abnormal muscular movements or Tardive Dyskinesia (TD); muscle strength and tone are within normal limits   Language No aphasia or dysarthria   Speech:  normal volume and non-pressured   Thought Processes concrete; normal rate of thoughts; poor abstract reasoning/computation   Thought Associations blocked    Thought Content auditory hallucinations and visual hallucinations   Suicidal Ideations contracts for safety   Homicidal Ideations contracts for safety   Mood:  depressed and anhedonia   Affect:  flat   Memory recent  intact   Memory remote:  intact   Concentration/Attention:  intact   Fund of Knowledge average   Insight:  limited   Reliability fair   Judgment:  limited          VITALS:     Patient Vitals for the past 24 hrs:   Temp Pulse Resp BP SpO2   11/24/22 2015 97.6 °F (36.4 °C) 69 16 (!) 115/53 98 %       Wt Readings from Last 3 Encounters:   11/20/22 109.7 kg (241 lb 12.8 oz)   06/03/22 113.9 kg (251 lb)   05/14/22 88.5 kg (195 lb)     Temp Readings from Last 3 Encounters:   11/24/22 97.6 °F (36.4 °C)   06/03/22 97.6 °F (36.4 °C)   05/19/22 98.5 °F (36.9 °C)     BP Readings from Last 3 Encounters:   11/24/22 (!) 115/53   06/03/22 130/69 05/19/22 (!) 112/51     Pulse Readings from Last 3 Encounters:   11/24/22 69   06/03/22 89   05/19/22 64            DATA     LABORATORY DATA:(reviewed/updated 11/25/2022)  Recent Results (from the past 24 hour(s))   GLUCOSE, POC    Collection Time: 11/24/22 11:23 AM   Result Value Ref Range    Glucose (POC) 158 (H) 65 - 117 mg/dL    Performed by Letitia Treviño (LILIA RN)    GLUCOSE, POC    Collection Time: 11/24/22  4:29 PM   Result Value Ref Range    Glucose (POC) 241 (H) 65 - 117 mg/dL    Performed by Joseline Ambriz RN    GLUCOSE, POC    Collection Time: 11/24/22  8:44 PM   Result Value Ref Range    Glucose (POC) 153 (H) 65 - 117 mg/dL    Performed by [email or web address], POC    Collection Time: 11/25/22  7:49 AM   Result Value Ref Range    Glucose (POC) 187 (H) 65 - 117 mg/dL    Performed by Letitia Treviño (LILIA RN)      No results found for: VALF2, VALAC, VALP, VALPR, DS6, CRBAM, CRBAMP, CARB2, XCRBAM  No results found for: LITHM   RADIOLOGY REPORTS:(reviewed/updated 11/25/2022)  No results found.        MEDICATIONS     ALL MEDICATIONS:   Current Facility-Administered Medications   Medication Dose Route Frequency    insulin lispro (HUMALOG) injection 10 Units  10 Units SubCUTAneous TIDAC    insulin glargine (LANTUS) injection 30 Units  30 Units SubCUTAneous DAILY    cephALEXin (KEFLEX) capsule 500 mg  500 mg Oral BID    furosemide (LASIX) tablet 40 mg  40 mg Oral DAILY    ibuprofen (MOTRIN) tablet 400 mg  400 mg Oral Q6H PRN    nystatin (MYCOSTATIN) 100,000 unit/gram powder   Topical BID    risperiDONE (RisperDAL) tablet 2 mg  2 mg Oral Q12H    benzocaine-menthoL (CHLORASEPTIC MAX) lozenge 1 Lozenge  1 Lozenge Oral Q2H PRN    glucose chewable tablet 16 g  4 Tablet Oral PRN    glucagon (GLUCAGEN) injection 1 mg  1 mg IntraMUSCular PRN    dextrose 10% infusion 0-250 mL  0-250 mL IntraVENous PRN    albuterol (PROVENTIL HFA, VENTOLIN HFA, PROAIR HFA) inhaler 1 Puff  1 Puff Inhalation Q4H PRN    insulin lispro (HUMALOG) injection 1-10 Units  1-10 Units SubCUTAneous AC&HS    budesonide-formoterol (SYMBICORT) 80-4.5 mcg inhaler  2 Puff Inhalation BID RT    OLANZapine (ZyPREXA) tablet 5 mg  5 mg Oral Q6H PRN    haloperidol lactate (HALDOL) injection 5 mg  5 mg IntraMUSCular Q6H PRN    benztropine (COGENTIN) tablet 1 mg  1 mg Oral BID PRN    diphenhydrAMINE (BENADRYL) injection 50 mg  50 mg IntraMUSCular BID PRN    hydrOXYzine HCL (ATARAX) tablet 50 mg  50 mg Oral TID PRN    LORazepam (ATIVAN) injection 1 mg  1 mg IntraMUSCular Q4H PRN    traZODone (DESYREL) tablet 50 mg  50 mg Oral QHS PRN    acetaminophen (TYLENOL) tablet 650 mg  650 mg Oral Q4H PRN    magnesium hydroxide (MILK OF MAGNESIA) 400 mg/5 mL oral suspension 30 mL  30 mL Oral DAILY PRN    atorvastatin (LIPITOR) tablet 40 mg  40 mg Oral QPM    gabapentin (NEURONTIN) capsule 100 mg  100 mg Oral TID    metFORMIN ER (GLUCOPHAGE XR) tablet 1,000 mg  1,000 mg Oral BID WITH MEALS    oxybutynin chloride XL (DITROPAN XL) tablet 5 mg  5 mg Oral DAILY    sertraline (ZOLOFT) tablet 100 mg  100 mg Oral DAILY      SCHEDULED MEDICATIONS:   Current Facility-Administered Medications   Medication Dose Route Frequency    insulin lispro (HUMALOG) injection 10 Units  10 Units SubCUTAneous TIDAC    insulin glargine (LANTUS) injection 30 Units  30 Units SubCUTAneous DAILY    cephALEXin (KEFLEX) capsule 500 mg  500 mg Oral BID    furosemide (LASIX) tablet 40 mg  40 mg Oral DAILY    nystatin (MYCOSTATIN) 100,000 unit/gram powder   Topical BID    risperiDONE (RisperDAL) tablet 2 mg  2 mg Oral Q12H    insulin lispro (HUMALOG) injection 1-10 Units  1-10 Units SubCUTAneous AC&HS    budesonide-formoterol (SYMBICORT) 80-4.5 mcg inhaler  2 Puff Inhalation BID RT    atorvastatin (LIPITOR) tablet 40 mg  40 mg Oral QPM    gabapentin (NEURONTIN) capsule 100 mg  100 mg Oral TID    metFORMIN ER (GLUCOPHAGE XR) tablet 1,000 mg  1,000 mg Oral BID WITH MEALS    oxybutynin chloride XL (DITROPAN XL) tablet 5 mg  5 mg Oral DAILY    sertraline (ZOLOFT) tablet 100 mg  100 mg Oral DAILY          ASSESSMENT & PLAN     DIAGNOSES REQUIRING ACTIVE TREATMENT AND MONITORING: (reviewed/updated 11/25/2022)  Patient Active Hospital Problem List:       Schizoaffective disorder (11/10/2022)           Assessment: the patient presents in crisis after potentially leaving her group home; she is disorganized and a poor historian at baseline, treatment will be primarily psychosocial. Patient with low O2 sats, edema and orthopnea, will have to address this. Plan:   - CONTINUE Risperdal 2 mg Q12H for psychosis  - CONTINUE Zoloft 100 mg QDAY for MDD  - Appreciate DM team / IM recs  - Consider sleep referral for obesity hypoventilation syndrome  - IGM therapy as tolerated  - Expand database / obtain collateral  - Dispo planning    In summary, Patrick Aguilar, is a 59 y.o.  female who presents with a severe exacerbation of the principal diagnosis of Schizoaffective disorder ([address]    Patient's condition is improving. Patient requires continued inpatient hospitalization for further stabilization, safety monitoring and medication management. I will continue to coordinate the provision of individual, milieu, occupational, group, and substance abuse therapies to address target symptoms/diagnoses as deemed appropriate for the individual patient. A coordinated, multidisplinary treatment team round was conducted with the patient (this team consists of the nurse, psychiatric unit pharmacist,  and writer). Complete current electronic health record for patient has been reviewed today including consultant notes, ancillary staff notes, nurses and psychiatric tech notes. Suicide risk assessment completed and patient deemed to be of low risk for suicide at this time. The following regarding medications was addressed during rounds with patient:   the risks and benefits of the proposed medication.  The patient was given the opportunity to ask questions. Informed consent given to the use of the above medications. Will continue to adjust psychiatric and non-psychiatric medications (see above \"medication\" section and orders section for details) as deemed appropriate & based upon diagnoses and response to treatment. I will continue to order blood tests/labs and diagnostic tests as deemed appropriate and review results as they become available (see orders for details and above listed lab/test results). I will order psychiatric records from previous [facility] hospitals to further elucidate the nature of patient's psychopathology and review once available. I will gather additional collateral information from friends, family and o/p treatment team to further elucidate the nature of patient's psychopathology and baselline level of psychiatric functioning. I certify that this patient's inpatient psychiatric hospital services furnished since the previous certification were, and continue to be, required for treatment that could reasonably be expected to improve the patient's condition, or for diagnostic study, and that the patient continues to need, on a daily basis, active treatment furnished directly by or requiring the supervision of inpatient psychiatric facility personnel. In addition, the hospital records show that services furnished were intensive treatment services, admission or related services, or equivalent services.     EXPECTED DISCHARGE DATE/DAY: TBD     DISPOSITION: Group home / shelter       Signed By:   Angelina Flynn NP  11/25/2022

## 2022-11-25 NOTE — PROGRESS NOTES
Problem: Suicide  Goal: *STG: Remains safe in hospital  Outcome: Progressing Towards Goal  Goal: *STG/LTG: No longer expresses self destructive or suicidal thoughts  Outcome: Progressing Towards Goal     Problem: Altered Thought Process (Adult/Pediatric)  Goal: *STG: Participates in treatment plan  Outcome: Progressing Towards Goal  Goal: *STG: Seeks staff when feelings of anxiety and fear arise  Outcome: Progressing Towards Goal  Goal: *STG: Complies with medication therapy  Outcome: Progressing Towards Goal     Problem: Falls - Risk of  Goal: *Absence of Falls  Description: Document Martin Fall Risk and appropriate interventions in the flowsheet. Outcome: Progressing Towards Goal  Note: Fall Risk Interventions:  Mobility Interventions: Communicate number of staff needed for ambulation/transfer  Medication Interventions: Teach patient to arise slowly  Elimination Interventions: Patient to call for help with toileting needs  History of Falls Interventions: Room close to nurse's station, Door open when patient unattended      Patient received resting in her room. Denies SI/HI/AVH, depression and anxiety. Calm, cooperative, pleasant, out in the day room for snack only, otherwise isolative to her room. Taking medications as prescribed. Will continue to monitor for safety. Patient appeared to sleep 7.5 hours.

## 2022-11-25 NOTE — PROGRESS NOTES
Problem: Discharge Planning  Goal: *Discharge to safe environment  Outcome: Progressing Towards Goal  Goal: *Knowledge of medication management  Outcome: Progressing Towards Goal     Problem: Suicide  Goal: *STG: Remains safe in hospital  Outcome: Progressing Towards Goal  Goal: *STG/LTG: Complies with medication therapy  Outcome: Progressing Towards Goal  Goal: *STG/LTG: No longer expresses self destructive or suicidal thoughts  Outcome: Progressing Towards Goal

## 2022-11-25 NOTE — BH NOTES
Pt received sleeping in bed, observed to be breathing heavily and snoring. Pleasant and cooperative upon waking. Denies SI/HI/AVH, anxiety and depression. States \"I'm ready to go home\". Spent most of shift laying in bed napping, spent time in day room for meals only. No unsafe bx to note.

## 2022-11-25 NOTE — BH NOTES
Behavioral Health Interdisciplinary Rounds     Patient Name: Nagi Pickard  Age: 59 y.o. Room/Bed:  320/02  Primary Diagnosis: Schizoaffective disorder ([facility] [address]   Progress note: Treatment team met with the Pt in her room. Pt slept 7 hours last night and received Trazodone and Atarax. Pt denies SI, HI, AHVH, and self harming behaviors. Pt was slow to respond to assessment questions and appeared to be sleeping at one point. Pt is discharged focus and reported that she wanted to discharge to a shelter. SW spoke with the Pt about staying until SW is able to find the Pt a group home due to the Pt needing assistance with completing her ADLS. Pt attended a rec group and ate snacks that were provided. Pt was observed with poor grooming and hygiene.       LOS:  15  Expected LOS: TBD      Participating treatment team members: Vahid Amaro LMSW, Collette Lock NP

## 2022-11-25 NOTE — PROGRESS NOTES
Problem: Mobility Impaired (Adult and Pediatric)  Goal: *Acute Goals and Plan of Care (Insert Text)  Description: FUNCTIONAL STATUS PRIOR TO ADMISSION: Patient was modified independent using a walker for functional mobility. Physical Therapy Goals  Initiated 11/18/2022; Reviewed 11/25/22 with goals addressed below:  1. Patient will transfer from bed to chair and chair to bed with modified independence using the least restrictive device within 7 day(s). MET 11/25/22  2. Patient will perform sit to stand with modified independence within 7 day(s). Continue  3. Patient will ambulate with modified independence for 150 feet with the least restrictive device within 7 day(s). Continue  Outcome: Progressing Towards Goal     PHYSICAL THERAPY TREATMENT  Weekly reassessment  Patient: Leroy Landers (40 y.o. female)  Date: 11/25/2022  Diagnosis: Adjustment disorder [F43.20] Schizoaffective disorder ([facility]      Precautions:    Chart, physical therapy assessment, plan of care and goals were reviewed. ASSESSMENT  Patient continues with skilled PT services and is progressing towards goals. Patient continues to demonstrate improved balance and mobility. Patient performed bed mobility with modified independence. Patient stood with SBA. She ambulated 200 feet with walker and SBA. No loss of balance noted. Patient also ambulated an additional 15 feet without walker and demonstrates improved balance from initial evaluation. Current Level of Function Impacting Discharge (mobility/balance): SBA with RW         PLAN :  Patient continues to benefit from skilled intervention to address the above impairments. Continue treatment per established plan of care. to address goals. Recommendation for discharge: (in order for the patient to meet his/her long term goals)  No skilled physical therapy/ follow up rehabilitation needs identified at this time.     This discharge recommendation:  Has been made in collaboration with the attending provider and/or case management    IF patient discharges home will need the following DME: patient owns DME required for discharge       SUBJECTIVE:   Patient stated I'm alright.     OBJECTIVE DATA SUMMARY:   Critical Behavior:  Neurologic State: Alert  Orientation Level: Oriented X4  Cognition: Follows commands, Poor safety awareness     Functional Mobility Training:  Bed Mobility:     Supine to Sit: Modified independent  Sit to Supine: Modified independent  Scooting: Modified independent    Transfers:  Sit to Stand: Stand-by assistance  Stand to Sit: Stand-by assistance        Bed to Chair: Stand-by assistance    Balance:  Sitting: Intact  Standing: Impaired; With support  Standing - Static: Constant support;Good  Standing - Dynamic : Constant support;Good;Fair    Ambulation/Gait Training:  Distance (ft): 200 Feet (ft)  Assistive Device: Gait belt;Walker, rolling  Ambulation - Level of Assistance: Stand-by assistance    Gait Abnormalities: Decreased step clearance    Base of Support: Widened     Speed/Chelita: Pace decreased (<100 feet/min)  Step Length: Left shortened;Right shortened      Pain Rating:  No pain    Activity Tolerance:   Good    After treatment patient left in no apparent distress:   Sitting in chair and PCT present    COMMUNICATION/COLLABORATION:   The patients plan of care was discussed with: Registered nurse and Certified nursing assistant/patient care technician.      Sangeeta Joy, PT   Time Calculation: 10 mins

## 2022-11-26 LAB
GLUCOSE BLD STRIP.AUTO-MCNC: 149 MG/DL (ref 65–117)
GLUCOSE BLD STRIP.AUTO-MCNC: 181 MG/DL (ref 65–117)
GLUCOSE BLD STRIP.AUTO-MCNC: 191 MG/DL (ref 65–117)
GLUCOSE BLD STRIP.AUTO-MCNC: 245 MG/DL (ref 65–117)
SERVICE CMNT-IMP: ABNORMAL

## 2022-11-26 PROCEDURE — 93005 ELECTROCARDIOGRAM TRACING: CPT

## 2022-11-26 PROCEDURE — 74011250637 HC RX REV CODE- 250/637: Performed by: STUDENT IN AN ORGANIZED HEALTH CARE EDUCATION/TRAINING PROGRAM

## 2022-11-26 PROCEDURE — 74011636637 HC RX REV CODE- 636/637: Performed by: PSYCHIATRY & NEUROLOGY

## 2022-11-26 PROCEDURE — 82962 GLUCOSE BLOOD TEST: CPT

## 2022-11-26 PROCEDURE — 74011250637 HC RX REV CODE- 250/637: Performed by: PSYCHIATRY & NEUROLOGY

## 2022-11-26 PROCEDURE — 74011250637 HC RX REV CODE- 250/637

## 2022-11-26 PROCEDURE — 90853 GROUP PSYCHOTHERAPY: CPT

## 2022-11-26 PROCEDURE — 65220000003 HC RM SEMIPRIVATE PSYCH

## 2022-11-26 PROCEDURE — 74011636637 HC RX REV CODE- 636/637: Performed by: STUDENT IN AN ORGANIZED HEALTH CARE EDUCATION/TRAINING PROGRAM

## 2022-11-26 RX ADMIN — GABAPENTIN 100 MG: 100 CAPSULE ORAL at 17:06

## 2022-11-26 RX ADMIN — BUDESONIDE AND FORMOTEROL FUMARATE DIHYDRATE 2 PUFF: 80; 4.5 AEROSOL RESPIRATORY (INHALATION) at 08:14

## 2022-11-26 RX ADMIN — NYSTATIN: 100000 POWDER TOPICAL at 17:00

## 2022-11-26 RX ADMIN — CEPHALEXIN 500 MG: 250 CAPSULE ORAL at 08:12

## 2022-11-26 RX ADMIN — Medication 1 LOZENGE: at 21:03

## 2022-11-26 RX ADMIN — Medication 2 UNITS: at 08:13

## 2022-11-26 RX ADMIN — Medication 2 UNITS: at 21:03

## 2022-11-26 RX ADMIN — Medication 2 UNITS: at 11:44

## 2022-11-26 RX ADMIN — FUROSEMIDE 40 MG: 40 TABLET ORAL at 08:12

## 2022-11-26 RX ADMIN — METFORMIN HYDROCHLORIDE 1000 MG: 500 TABLET, EXTENDED RELEASE ORAL at 17:06

## 2022-11-26 RX ADMIN — Medication 10 UNITS: at 11:42

## 2022-11-26 RX ADMIN — GABAPENTIN 100 MG: 100 CAPSULE ORAL at 11:41

## 2022-11-26 RX ADMIN — SERTRALINE HYDROCHLORIDE 100 MG: 50 TABLET ORAL at 08:12

## 2022-11-26 RX ADMIN — Medication 10 UNITS: at 17:08

## 2022-11-26 RX ADMIN — CEPHALEXIN 500 MG: 250 CAPSULE ORAL at 17:06

## 2022-11-26 RX ADMIN — GABAPENTIN 100 MG: 100 CAPSULE ORAL at 08:12

## 2022-11-26 RX ADMIN — METFORMIN HYDROCHLORIDE 1000 MG: 500 TABLET, EXTENDED RELEASE ORAL at 08:11

## 2022-11-26 RX ADMIN — Medication 10 UNITS: at 08:12

## 2022-11-26 RX ADMIN — TRAZODONE HYDROCHLORIDE 50 MG: 50 TABLET ORAL at 21:03

## 2022-11-26 RX ADMIN — RISPERIDONE 2 MG: 1 TABLET ORAL at 21:03

## 2022-11-26 RX ADMIN — Medication 2 UNITS: at 17:10

## 2022-11-26 RX ADMIN — HYDROXYZINE HYDROCHLORIDE 50 MG: 25 TABLET, FILM COATED ORAL at 21:02

## 2022-11-26 RX ADMIN — IBUPROFEN 400 MG: 400 TABLET, FILM COATED ORAL at 21:03

## 2022-11-26 RX ADMIN — Medication 30 UNITS: at 08:13

## 2022-11-26 RX ADMIN — ATORVASTATIN CALCIUM 40 MG: 40 TABLET, FILM COATED ORAL at 17:06

## 2022-11-26 RX ADMIN — BUDESONIDE AND FORMOTEROL FUMARATE DIHYDRATE 2 PUFF: 80; 4.5 AEROSOL RESPIRATORY (INHALATION) at 21:02

## 2022-11-26 RX ADMIN — ACETAMINOPHEN 650 MG: 325 TABLET, FILM COATED ORAL at 09:27

## 2022-11-26 RX ADMIN — OXYBUTYNIN CHLORIDE 5 MG: 5 TABLET, EXTENDED RELEASE ORAL at 08:12

## 2022-11-26 RX ADMIN — RISPERIDONE 2 MG: 1 TABLET ORAL at 08:12

## 2022-11-26 NOTE — BH NOTES
PSYCHIATRIC PROGRESS NOTE         Patient Name  Bernardo Kruger   Date of Birth 1965   [facility] [id number]   Medical Record Number  [id number]      Age  59 y.o. PCP Kim Osborne MD   Admit date:  11/9/2022    Room Number  320/02  @ [facility]   Date of Service  11/26/2022         E & M PROGRESS NOTE:         HISTORY       CC:  \"suicidal ideation\"  HISTORY OF PRESENT ILLNESS/INTERVAL HISTORY:  (reviewed/updated 11/26/2022). per initial evaluation: The patient, Bernardo Kruger, is a 59 y.o. WHITE/NON- female with a past psychiatric history significant for schizoaffective disorder, who presents at this time with complaints of (and/or evidence of) the following emotional symptoms: depression and suicidal thoughts/threats. Additional symptomatology include poor self care. The above symptoms have been present for 2+ weeks. These symptoms are of moderate to high severity. These symptoms are constant in nature. The patient's condition has been precipitated by psychosocial stressors. Patient's condition made worse by treatment noncompliance. UDS: negative; BAL=0. The patient is well known to the unit; she comes into the hospital frequently in crisis typically stating her 's death several years ago has made her feel hopeless. She has been living in a group home though this admission she states that she was kicked out of her group home because of an episode of incontinence. The patient is a poor historian. The patient corroborates the above narrative. The patient contracts for safety on the unit and gives consent for the team to contact collateral. The patient is amenable to initiating treatment while on the unit. She defers much of the team's inquires to her payee. 11/11 - no acute overnight events. The patient has been isolative to her room with poor ADLs. incontinent of urine but able to void when taken to the bathroom.  She denies active thoughts of self harm but is markedly internally preoccupied. Patient slept 8 hours overnight and got Trazodone. She denies active thoughts of self harm but endorses both depressed mood and anxiety. 11/14 - the patient remains in bed for much of the day. She has been inconitnent of urine and with poor ADLs requiring prompting for much activities. Patient denies active thoughts of self harm but endorses passive SI. She slept 7 hours overnight and is flat and disorganized on interview, moaning and providing little updates on her disposition plan. Per SW, the patient tends to not go to the group homes once assigned. Her payee is working on guardianship.    11/15 - overnight, the patient remained isolative to her bed, she continues to c/o anxiety and had an episode of urine incontinence. Patient refused nystatin stating her rash had 'cleared up.' She endorses ongoing AH and VH of 'ghosts' and states that she is doing very poorly. Patient evaluated by internist for ongoing LE swelling and pain. She is medication compliant and tolerating higher dose of Risperdal.    11/16 - the patient has remained in bed, out for meals and with little motivation to interact in the milieu. She got Motrin and Atarax and continues to c/o pain in her LE, as well as SI with a plan to cut herself if given the chance. The patient slept 8 hours and this morning spontaneously stated to the team that she wanted to be discharged -- she acknowledges that her payee and SW are working on a dispo plan and she is still symptomatic. Medicine consulted as she is now experiencing orthopnea. 11/17 - the patient has been isolative to her room, out for meals and slept 7 hours overnight. She is stating she will [facility] to [facility] [email or web address] but acknowledges that the team is coordinating guardianship with her payee. Patient noted to have significant LE edema and c/o orthopnea. She is medication compliant and denies active thoughts of self harm. . She remains disorganized and with ongoing confusion but today she is otherwise pleasant. 11/18 - overnight, the patient was noted to be desaturating to the 80's and difficult to arouse. She denies all symptoms otherwise and has been able to make her needs known. Patient medication compliant. She has been largely in bed during the day, but out for meals. SW actively working on placement for the patient. IM started Lasix to address edema. 11/21 - the patient has been in bed for much of the day. She is internally preoccupied and with no episodes of agitation or aggression. Patient passing clots and with abdi hematuria per RN. Keflex started for UTI. Patient medication compliant and has been otherwise in fair behavioral control. 11/22 - overnight, patient remains bed bound and disorganized. She is no longer passing blot clots and hematuria has subsided since starting Abx. Patient denies SI/HI/PI, AH appears baseline. She is discharge focused, sleeping during the day and pleasant when awake. Diabetes team provided recommendations. 11/23 - the patient slept 9 hours overnight. She is out for meals but isolative to her room for much of the day. Patient still on fluid restriction as she is being diuresed. She denies SI/HI/VH/PI, she continues to c/o AH. Patient calm and cooperative on interview. BSG 170s- 200s. 11/24 - pt remains isolative. No acute incidents. Denies SI/plan/intent, HI/plan/intent. Denied AH/VH today. No other changes or new concerns today. 11/25 - Pt has mostly been isolative, but did get up and walk around some this morning. Continues to say she is ready to leave. No acute incidents. Slept 7.5 hours last night. Denies SI/plan/intent, denies HI/plan/intent, denies AVH. No new concerns today. 11/26 - Pt complained of chest pain this morning, nursing did an EKG, normal sinus rhythm, chest pain has resolved without intervention. Otherwise, no acute incidents. States her mood is lower today.  Denies SI/plan/intent, denies HI/plan/intent, denies AVH. States she just wants to get out of here. Affect flat, restricted. No other changes or new concerns at this time. SIDE EFFECTS: (reviewed/updated 11/26/2022)  None reported or admitted to. No noted toxicity with use of Depakote   ALLERGIES:(reviewed/updated 11/26/2022)  Allergies   Allergen Reactions    Amoxicillin Hives    Mushroom Flavor Hives    Tomato Hives      REVIEW OF SYSTEMS: (reviewed/updated 11/26/2022)  Appetite:improved   Sleep: no change   All other Review of Systems: Negative except incontinence per HPI         [facility] (MSE):    MSE FINDINGS ARE WITHIN NORMAL LIMITS (WNL) UNLESS OTHERWISE STATED BELOW. ( ALL OF THE BELOW CATEGORIES OF THE MSE HAVE BEEN REVIEWED (reviewed 11/26/2022) AND UPDATED AS DEEMED APPROPRIATE )  General Presentation age appropriate, cooperative and guarded   Orientation disorganized   Vital Signs  See below (reviewed 11/26/2022); Vital Signs (BP, Pulse, & Temp) are within normal limits if not listed below.    Gait and Station Stable/steady, no ataxia   Musculoskeletal System No extrapyramidal symptoms (EPS); no abnormal muscular movements or Tardive Dyskinesia (TD); muscle strength and tone are within normal limits   Language No aphasia or dysarthria   Speech:  normal volume and non-pressured   Thought Processes concrete; normal rate of thoughts; poor abstract reasoning/computation   Thought Associations blocked    Thought Content auditory hallucinations and visual hallucinations   Suicidal Ideations contracts for safety   Homicidal Ideations contracts for safety   Mood:  depressed and anhedonia   Affect:  flat   Memory recent  intact   Memory remote:  intact   Concentration/Attention:  intact   Fund of Knowledge average   Insight:  limited   Reliability fair   Judgment:  limited          VITALS:     Patient Vitals for the past 24 hrs:   Temp Pulse Resp BP SpO2   11/26/22 0900 -- -- 16 115/61 95 %   11/26/22 0755 97.6 °F (36.4 °C) 73 16 (!) 103/59 95 %   11/25/22 2000 98 °F (36.7 °C) 77 16 (!) 126/93 95 %   11/25/22 1115 98.4 °F (36.9 °C) 67 16 121/67 93 %       Wt Readings from Last 3 Encounters:   11/20/22 109.7 kg (241 lb 12.8 oz)   06/03/22 113.9 kg (251 lb)   05/14/22 88.5 kg (195 lb)     Temp Readings from Last 3 Encounters:   11/26/22 97.6 °F (36.4 °C)   06/03/22 97.6 °F (36.4 °C)   05/19/22 98.5 °F (36.9 °C)     BP Readings from Last 3 Encounters:   11/26/22 115/61   06/03/22 130/69   05/19/22 (!) 112/51     Pulse Readings from Last 3 Encounters:   11/26/22 73   06/03/22 89   05/19/22 64            DATA     LABORATORY DATA:(reviewed/updated 11/26/2022)  Recent Results (from the past 24 hour(s))   GLUCOSE, POC    Collection Time: 11/25/22 11:51 AM   Result Value Ref Range    Glucose (POC) 175 (H) 65 - 117 mg/dL    Performed by Hero Bernard (LILIA RN)    GLUCOSE, POC    Collection Time: 11/25/22  4:45 PM   Result Value Ref Range    Glucose (POC) 223 (H) 65 - 117 mg/dL    Performed by Hero Bernard (LILIA RN)    GLUCOSE, POC    Collection Time: 11/25/22  8:01 PM   Result Value Ref Range    Glucose (POC) 203 (H) 65 - 117 mg/dL    Performed by Deneen Jarvis RN    GLUCOSE, POC    Collection Time: 11/26/22  7:37 AM   Result Value Ref Range    Glucose (POC) 149 (H) 65 - 117 mg/dL    Performed by Hero Bernard (TRV RN)      No results found for: VALF2, VALAC, VALP, VALPR, DS6, CRBAM, CRBAMP, CARB2, XCRBAM  No results found for: LITHM   RADIOLOGY REPORTS:(reviewed/updated 11/26/2022)  No results found.        MEDICATIONS     ALL MEDICATIONS:   Current Facility-Administered Medications   Medication Dose Route Frequency    insulin lispro (HUMALOG) injection 10 Units  10 Units SubCUTAneous TIDAC    insulin glargine (LANTUS) injection 30 Units  30 Units SubCUTAneous DAILY    cephALEXin (KEFLEX) capsule 500 mg  500 mg Oral BID    furosemide (LASIX) tablet 40 mg  40 mg Oral DAILY    ibuprofen (MOTRIN) tablet 400 mg  400 mg Oral Q6H PRN    nystatin (MYCOSTATIN) 100,000 unit/gram powder   Topical BID    risperiDONE (RisperDAL) tablet 2 mg  2 mg Oral Q12H    benzocaine-menthoL (CHLORASEPTIC MAX) lozenge 1 Lozenge  1 Lozenge Oral Q2H PRN    glucose chewable tablet 16 g  4 Tablet Oral PRN    glucagon (GLUCAGEN) injection 1 mg  1 mg IntraMUSCular PRN    dextrose 10% infusion 0-250 mL  0-250 mL IntraVENous PRN    albuterol (PROVENTIL HFA, VENTOLIN HFA, PROAIR HFA) inhaler 1 Puff  1 Puff Inhalation Q4H PRN    insulin lispro (HUMALOG) injection 1-10 Units  1-10 Units SubCUTAneous AC&HS    budesonide-formoterol (SYMBICORT) 80-4.5 mcg inhaler  2 Puff Inhalation BID RT    OLANZapine (ZyPREXA) tablet 5 mg  5 mg Oral Q6H PRN    haloperidol lactate (HALDOL) injection 5 mg  5 mg IntraMUSCular Q6H PRN    benztropine (COGENTIN) tablet 1 mg  1 mg Oral BID PRN    diphenhydrAMINE (BENADRYL) injection 50 mg  50 mg IntraMUSCular BID PRN    hydrOXYzine HCL (ATARAX) tablet 50 mg  50 mg Oral TID PRN    LORazepam (ATIVAN) injection 1 mg  1 mg IntraMUSCular Q4H PRN    traZODone (DESYREL) tablet 50 mg  50 mg Oral QHS PRN    acetaminophen (TYLENOL) tablet 650 mg  650 mg Oral Q4H PRN    magnesium hydroxide (MILK OF MAGNESIA) 400 mg/5 mL oral suspension 30 mL  30 mL Oral DAILY PRN    atorvastatin (LIPITOR) tablet 40 mg  40 mg Oral QPM    gabapentin (NEURONTIN) capsule 100 mg  100 mg Oral TID    metFORMIN ER (GLUCOPHAGE XR) tablet 1,000 mg  1,000 mg Oral BID WITH MEALS    oxybutynin chloride XL (DITROPAN XL) tablet 5 mg  5 mg Oral DAILY    sertraline (ZOLOFT) tablet 100 mg  100 mg Oral DAILY      SCHEDULED MEDICATIONS:   Current Facility-Administered Medications   Medication Dose Route Frequency    insulin lispro (HUMALOG) injection 10 Units  10 Units SubCUTAneous TIDAC    insulin glargine (LANTUS) injection 30 Units  30 Units SubCUTAneous DAILY    cephALEXin (KEFLEX) capsule 500 mg  500 mg Oral BID    furosemide (LASIX) tablet 40 mg  40 mg Oral DAILY    nystatin (MYCOSTATIN) 100,000 unit/gram powder   Topical BID    risperiDONE (RisperDAL) tablet 2 mg  2 mg Oral Q12H    insulin lispro (HUMALOG) injection 1-10 Units  1-10 Units SubCUTAneous AC&HS    budesonide-formoterol (SYMBICORT) 80-4.5 mcg inhaler  2 Puff Inhalation BID RT    atorvastatin (LIPITOR) tablet 40 mg  40 mg Oral QPM    gabapentin (NEURONTIN) capsule 100 mg  100 mg Oral TID    metFORMIN ER (GLUCOPHAGE XR) tablet 1,000 mg  1,000 mg Oral BID WITH MEALS    oxybutynin chloride XL (DITROPAN XL) tablet 5 mg  5 mg Oral DAILY    sertraline (ZOLOFT) tablet 100 mg  100 mg Oral DAILY          ASSESSMENT & PLAN     DIAGNOSES REQUIRING ACTIVE TREATMENT AND MONITORING: (reviewed/updated 11/26/2022)  Patient Active Hospital Problem List:       Schizoaffective disorder (11/10/2022)           Assessment: the patient presents in crisis after potentially leaving her group home; she is disorganized and a poor historian at baseline, treatment will be primarily psychosocial. Patient with low O2 sats, edema and orthopnea, will have to address this. Plan:   - CONTINUE Risperdal 2 mg Q12H for psychosis  - CONTINUE Zoloft 100 mg QDAY for MDD  - Appreciate DM team / IM recs  - Consider sleep referral for obesity hypoventilation syndrome  - IGM therapy as tolerated  - Expand database / obtain collateral  - Dispo planning    In summary, Nicholas Ellington, is a 59 y.o.  female who presents with a severe exacerbation of the principal diagnosis of Schizoaffective disorder ([facility] [address]    Patient's condition is improving. Patient requires continued inpatient hospitalization for further stabilization, safety monitoring and medication management. I will continue to coordinate the provision of individual, milieu, occupational, group, and substance abuse therapies to address target symptoms/diagnoses as deemed appropriate for the individual patient.   A coordinated, multidisplinary treatment team round was conducted with the patient (this team consists of the nurse, psychiatric unit pharmacist,  and writer). Complete current electronic health record for patient has been reviewed today including consultant notes, ancillary staff notes, nurses and psychiatric tech notes. Suicide risk assessment completed and patient deemed to be of low risk for suicide at this time. The following regarding medications was addressed during rounds with patient:   the risks and benefits of the proposed medication. The patient was given the opportunity to ask questions. Informed consent given to the use of the above medications. Will continue to adjust psychiatric and non-psychiatric medications (see above \"medication\" section and orders section for details) as deemed appropriate & based upon diagnoses and response to treatment. I will continue to order blood tests/labs and diagnostic tests as deemed appropriate and review results as they become available (see orders for details and above listed lab/test results). I will order psychiatric records from previous [facility] hospitals to further elucidate the nature of patient's psychopathology and review once available. I will gather additional collateral information from friends, family and o/p treatment team to further elucidate the nature of patient's psychopathology and baselline level of psychiatric functioning. I certify that this patient's inpatient psychiatric hospital services furnished since the previous certification were, and continue to be, required for treatment that could reasonably be expected to improve the patient's condition, or for diagnostic study, and that the patient continues to need, on a daily basis, active treatment furnished directly by or requiring the supervision of inpatient psychiatric facility personnel.  In addition, the hospital records show that services furnished were intensive treatment services, admission or related services, or equivalent services.     EXPECTED DISCHARGE DATE/DAY: TBD     DISPOSITION: Group home / shelter       Signed By:   Jethro Jensen NP  11/26/2022

## 2022-11-26 NOTE — BH NOTES
Pt received in bed sleeping and audibly snoring. Pleasant and cooperative upon waking. While taking VS, pt's O2 dropped to the high 80s/low 90s intermittently and HR was elevated. Pt was encouraged to sit up and take deep breaths and VS were stable. C/o chest pain after breakfast, VSS at this time and EKG showed normal sinus rhythm - NP on call aware. Given PRN Tylenol with good effect. Cleaned up and via bed bath and changed clothes with staff assistance. Pt c/o feeling tired and being ready to discharge. No acute changes or behavioral problems to note.

## 2022-11-26 NOTE — PROGRESS NOTES
Pt complaining of Chest Pain. Vitals Taken, EKG obtained.     11/26/22 0900   Vital Signs   Resp Rate 16   O2 Sat (%) 95 %   Level of Consciousness 0   /61   MAP (Calculated) 79   BP 1 Method Automatic   BP 1 Location Left upper arm   BP Patient Position Sitting

## 2022-11-26 NOTE — PROGRESS NOTES
Patient observed resting in bed during transition of care. Patient presented as calm, cooperative and willing to meet tx needs. Patient displayed no obvious signs of medical/psych distress. Patient denied any symptoms of depression, anxiety, hallucinations, S/I or H/I. Patient reported BL leg pain 5/10. Patient's HS blood glucose level 203. Patient received all scheduled medications, along with PRN Ibuprofen and Trazodone. Patient observed resting in bed throughout the night. Patient slept for 7.45 hours.        Problem: Discharge Planning  Goal: *Knowledge of medication management  Outcome: Progressing Towards Goal     Problem: Suicide  Goal: *STG: Remains safe in hospital  Outcome: Progressing Towards Goal  Goal: *STG: Seeks staff when feelings of self harm or harm towards others arise  Outcome: Progressing Towards Goal

## 2022-11-27 LAB
GLUCOSE BLD STRIP.AUTO-MCNC: 128 MG/DL (ref 65–117)
GLUCOSE BLD STRIP.AUTO-MCNC: 153 MG/DL (ref 65–117)
GLUCOSE BLD STRIP.AUTO-MCNC: 161 MG/DL (ref 65–117)
GLUCOSE BLD STRIP.AUTO-MCNC: 185 MG/DL (ref 65–117)
SERVICE CMNT-IMP: ABNORMAL

## 2022-11-27 PROCEDURE — 74011636637 HC RX REV CODE- 636/637: Performed by: STUDENT IN AN ORGANIZED HEALTH CARE EDUCATION/TRAINING PROGRAM

## 2022-11-27 PROCEDURE — 65220000003 HC RM SEMIPRIVATE PSYCH

## 2022-11-27 PROCEDURE — 82962 GLUCOSE BLOOD TEST: CPT

## 2022-11-27 PROCEDURE — 74011250637 HC RX REV CODE- 250/637: Performed by: STUDENT IN AN ORGANIZED HEALTH CARE EDUCATION/TRAINING PROGRAM

## 2022-11-27 PROCEDURE — 74011250637 HC RX REV CODE- 250/637

## 2022-11-27 PROCEDURE — 74011636637 HC RX REV CODE- 636/637: Performed by: PSYCHIATRY & NEUROLOGY

## 2022-11-27 PROCEDURE — 74011250637 HC RX REV CODE- 250/637: Performed by: PSYCHIATRY & NEUROLOGY

## 2022-11-27 RX ADMIN — Medication 2 UNITS: at 08:13

## 2022-11-27 RX ADMIN — RISPERIDONE 2 MG: 1 TABLET ORAL at 21:45

## 2022-11-27 RX ADMIN — Medication 30 UNITS: at 08:28

## 2022-11-27 RX ADMIN — TRAZODONE HYDROCHLORIDE 50 MG: 50 TABLET ORAL at 21:45

## 2022-11-27 RX ADMIN — GABAPENTIN 100 MG: 100 CAPSULE ORAL at 08:06

## 2022-11-27 RX ADMIN — Medication 10 UNITS: at 08:07

## 2022-11-27 RX ADMIN — BUDESONIDE AND FORMOTEROL FUMARATE DIHYDRATE 2 PUFF: 80; 4.5 AEROSOL RESPIRATORY (INHALATION) at 21:45

## 2022-11-27 RX ADMIN — Medication 2 UNITS: at 12:07

## 2022-11-27 RX ADMIN — Medication 10 UNITS: at 12:04

## 2022-11-27 RX ADMIN — OXYBUTYNIN CHLORIDE 5 MG: 5 TABLET, EXTENDED RELEASE ORAL at 08:06

## 2022-11-27 RX ADMIN — NYSTATIN: 100000 POWDER TOPICAL at 08:14

## 2022-11-27 RX ADMIN — IBUPROFEN 400 MG: 400 TABLET, FILM COATED ORAL at 05:26

## 2022-11-27 RX ADMIN — GABAPENTIN 100 MG: 100 CAPSULE ORAL at 12:06

## 2022-11-27 RX ADMIN — FUROSEMIDE 40 MG: 40 TABLET ORAL at 08:06

## 2022-11-27 RX ADMIN — METFORMIN HYDROCHLORIDE 1000 MG: 500 TABLET, EXTENDED RELEASE ORAL at 16:58

## 2022-11-27 RX ADMIN — GABAPENTIN 100 MG: 100 CAPSULE ORAL at 16:58

## 2022-11-27 RX ADMIN — METFORMIN HYDROCHLORIDE 1000 MG: 500 TABLET, EXTENDED RELEASE ORAL at 08:06

## 2022-11-27 RX ADMIN — CEPHALEXIN 500 MG: 250 CAPSULE ORAL at 16:58

## 2022-11-27 RX ADMIN — Medication 10 UNITS: at 16:55

## 2022-11-27 RX ADMIN — RISPERIDONE 2 MG: 1 TABLET ORAL at 08:28

## 2022-11-27 RX ADMIN — ATORVASTATIN CALCIUM 40 MG: 40 TABLET, FILM COATED ORAL at 17:21

## 2022-11-27 RX ADMIN — CEPHALEXIN 500 MG: 250 CAPSULE ORAL at 08:06

## 2022-11-27 RX ADMIN — IBUPROFEN 400 MG: 400 TABLET, FILM COATED ORAL at 21:45

## 2022-11-27 RX ADMIN — SERTRALINE HYDROCHLORIDE 100 MG: 50 TABLET ORAL at 08:06

## 2022-11-27 RX ADMIN — BUDESONIDE AND FORMOTEROL FUMARATE DIHYDRATE 2 PUFF: 80; 4.5 AEROSOL RESPIRATORY (INHALATION) at 08:13

## 2022-11-27 NOTE — PROGRESS NOTES
Problem: Falls - Risk of  Goal: *Absence of Falls  Description: Document [place] Fall Risk and appropriate interventions in the flowsheet. Outcome: Progressing Towards Goal  Note: Fall Risk Interventions:  Mobility Interventions: Communicate number of staff needed for ambulation/transfer  Medication Interventions: Teach patient to arise slowly  Elimination Interventions: Patient to call for help with toileting needs  History of Falls Interventions: Room close to nurse's station, Door open when patient unattended  Problem: Suicide  Goal: *STG: Remains safe in hospital  Outcome: Progressing Towards Goal  Goal: *STG: Attends activities and groups  Outcome: Progressing Towards Goal  Goal: *STG:  Verbalizes alternative ways of dealing with maladaptive feelings/behaviors  Outcome: Progressing Towards Goal  Goal: *STG/LTG: Complies with medication therapy  Outcome: Progressing Towards Goal    Pt alert and oriented x 3. Pt in room taking naps. Pt calm and cooperative. Pt denies SI/HI/AVH. Pt reports a moderate amount of both depression and anxiety. Pt denies pain. Pt compliant with medication. Pt encouraged to participate in care. Q15 minute checks maintained for safety.

## 2022-11-27 NOTE — PROGRESS NOTES
Patient observed resting in bed during transition of care. Patient assessed as calm and cooperative. A/O X4. Patient reported no immediate concerns. No obvious signs of medical/psychiatric distressed observed. Patient reported symptoms of depression and anxiety 10/10. Patient stated that she is ready to go home. Patient denied hallucinations. Patient did not endorse S/I or H/I. Patient HS blood glucose level 245. Patient was given scheduled medications along with PRN Atarax, Ibuprofen for BL leg pain 5/10 and Trazodone. Patient briefly went into dayroom for evening snack before returning to her room. Patient observed resting in bed with no concerns. Staff will continue to assess and monitor. Patient slept for 7.5 hours.      Problem: Suicide  Goal: *STG: Remains safe in hospital  Outcome: Progressing Towards Goal  Goal: *STG: Seeks staff when feelings of self harm or harm towards others arise  Outcome: Progressing Towards Goal n/a

## 2022-11-27 NOTE — GROUP NOTE
IP  GROUP DOCUMENTATION INDIVIDUAL                                                                          Group Therapy Note    Date: 11/26/2022    Group Start Time: 2000  Group End Time: 2040  Group Topic: Reflection/Relaxation    [facility] [place] [facility] ACUTE [facility]    Rodrick Mae, RN    IP [facility] GROUP DOCUMENTATION GROUP    Group Therapy Note    Attendees: There are a total of eight patients that attended the group therapy of relaxation/reflection. Attendance: Attended    Patient's Goal:  The patient's goals is to get out of bed everyday. Interventions/techniques: Informed and Provide feedback    Follows Directions: Followed directions    Interactions: Interacted appropriately    Mental Status: Depressed and Flat    Behavior/appearance: Cooperative    Goals Achieved: Able to listen to others, Displayed empathy, and Identified feelings      Additional Notes: The patient was cooperative and able to listen and discuss her goals.     Tanya Acevedo RN

## 2022-11-27 NOTE — BH NOTES
PSYCHIATRIC PROGRESS NOTE         Patient Name  Shaina Mclean   Date of Birth 1965   [facility] [id number]   Medical Record Number  [id number]      Age  59 y.o. PCP Avis Walker MD   Admit date:  11/9/2022    Room Number  320/02  @ [facility]   Date of Service  11/27/2022         E & M PROGRESS NOTE:         HISTORY       CC:  \"suicidal ideation\"  HISTORY OF PRESENT ILLNESS/INTERVAL HISTORY:  (reviewed/updated 11/27/2022). per initial evaluation: The patient, Shaina Mclean, is a 59 y.o. WHITE/NON- female with a past psychiatric history significant for schizoaffective disorder, who presents at this time with complaints of (and/or evidence of) the following emotional symptoms: depression and suicidal thoughts/threats. Additional symptomatology include poor self care. The above symptoms have been present for 2+ weeks. These symptoms are of moderate to high severity. These symptoms are constant in nature. The patient's condition has been precipitated by psychosocial stressors. Patient's condition made worse by treatment noncompliance. UDS: negative; BAL=0. The patient is well known to the unit; she comes into the hospital frequently in crisis typically stating her 's death several years ago has made her feel hopeless. She has been living in a group home though this admission she states that she was kicked out of her group home because of an episode of incontinence. The patient is a poor historian. The patient corroborates the above narrative. The patient contracts for safety on the unit and gives consent for the team to contact collateral. The patient is amenable to initiating treatment while on the unit. She defers much of the team's inquires to her payee. 11/11 - no acute overnight events. The patient has been isolative to her room with poor ADLs. incontinent of urine but able to void when taken to the bathroom.  She denies active thoughts of self harm but is markedly internally preoccupied. Patient slept 8 hours overnight and got Trazodone. She denies active thoughts of self harm but endorses both depressed mood and anxiety. 11/14 - the patient remains in bed for much of the day. She has been inconitnent of urine and with poor ADLs requiring prompting for much activities. Patient denies active thoughts of self harm but endorses passive SI. She slept 7 hours overnight and is flat and disorganized on interview, moaning and providing little updates on her disposition plan. Per SW, the patient tends to not go to the group homes once assigned. Her payee is working on guardianship.    11/15 - overnight, the patient remained isolative to her bed, she continues to c/o anxiety and had an episode of urine incontinence. Patient refused nystatin stating her rash had 'cleared up.' She endorses ongoing AH and VH of 'ghosts' and states that she is doing very poorly. Patient evaluated by internist for ongoing LE swelling and pain. She is medication compliant and tolerating higher dose of Risperdal.    11/16 - the patient has remained in bed, out for meals and with little motivation to interact in the milieu. She got Motrin and Atarax and continues to c/o pain in her LE, as well as SI with a plan to cut herself if given the chance. The patient slept 8 hours and this morning spontaneously stated to the team that she wanted to be discharged -- she acknowledges that her payee and SW are working on a dispo plan and she is still symptomatic. Medicine consulted as she is now experiencing orthopnea. 11/17 - the patient has been isolative to her room, out for meals and slept 7 hours overnight. She is stating she will [facility] to a [facility] on [facility] but acknowledges that the team is coordinating guardianship with her payee. Patient noted to have significant LE edema and c/o orthopnea. She is medication compliant and denies active thoughts of self harm. . She remains disorganized and with ongoing confusion but today she is otherwise pleasant. 11/18 - overnight, the patient was noted to be desaturating to the 80's and difficult to arouse. She denies all symptoms otherwise and has been able to make her needs known. Patient medication compliant. She has been largely in bed during the day, but out for meals. SW actively working on placement for the patient. IM started Lasix to address edema. 11/21 - the patient has been in bed for much of the day. She is internally preoccupied and with no episodes of agitation or aggression. Patient passing clots and with abdi hematuria per RN. Keflex started for UTI. Patient medication compliant and has been otherwise in fair behavioral control. 11/22 - overnight, patient remains bed bound and disorganized. She is no longer passing blot clots and hematuria has subsided since starting Abx. Patient denies SI/HI/PI, AH appears baseline. She is discharge focused, sleeping during the day and pleasant when awake. Diabetes team provided recommendations. 11/23 - the patient slept 9 hours overnight. She is out for meals but isolative to her room for much of the day. Patient still on fluid restriction as she is being diuresed. She denies SI/HI/VH/PI, she continues to c/o AH. Patient calm and cooperative on interview. BSG 170s- 200s. 11/24 - pt remains isolative. No acute incidents. Denies SI/plan/intent, HI/plan/intent. Denied AH/VH today. No other changes or new concerns today. 11/25 - Pt has mostly been isolative, but did get up and walk around some this morning. Continues to say she is ready to leave. No acute incidents. Slept 7.5 hours last night. Denies SI/plan/intent, denies HI/plan/intent, denies AVH. No new concerns today. 11/26 - Pt complained of chest pain this morning, nursing did an EKG, normal sinus rhythm, chest pain has resolved without intervention. Otherwise, no acute incidents. States her mood is lower today.  Denies SI/plan/intent, denies HI/plan/intent, denies AVH. States she just wants to get out of here. Affect flat, restricted. No other changes or new concerns at this time. 11/27- Pt continues to endorse high depression and anxiety. She slept 7.5 hours last night. Otherwise, she is calm and cooperative, no acute incidents.  this morning. Med compliant. Went to the break room for breakfast, is doing more ambulating. Pt states she is \"bad\" during assessment. Denies SI/plan/intent, HI/plan/intent, AVH. States she wants to go home. Reminded pt we are working on a discharge plan. Poor insight. No other changes at this time. SIDE EFFECTS: (reviewed/updated 11/27/2022)  None reported or admitted to. No noted toxicity with use of Depakote   ALLERGIES:(reviewed/updated 11/27/2022)  Allergies   Allergen Reactions    Amoxicillin Hives    Mushroom Flavor Hives    Tomato Hives      REVIEW OF SYSTEMS: (reviewed/updated 11/27/2022)  Appetite:improved   Sleep: no change   All other Review of Systems: Negative except incontinence per HPI         [facility] (MSE):    MSE FINDINGS ARE WITHIN NORMAL LIMITS (WNL) UNLESS OTHERWISE STATED BELOW. ( ALL OF THE BELOW CATEGORIES OF THE MSE HAVE BEEN REVIEWED (reviewed 11/27/2022) AND UPDATED AS DEEMED APPROPRIATE )  General Presentation age appropriate, cooperative and guarded   Orientation disorganized   Vital Signs  See below (reviewed 11/27/2022); Vital Signs (BP, Pulse, & Temp) are within normal limits if not listed below.    Gait and Station Stable/steady, no ataxia   Musculoskeletal System No extrapyramidal symptoms (EPS); no abnormal muscular movements or Tardive Dyskinesia (TD); muscle strength and tone are within normal limits   Language No aphasia or dysarthria   Speech:  normal volume and non-pressured   Thought Processes concrete; normal rate of thoughts; poor abstract reasoning/computation   Thought Associations blocked    Thought Content auditory hallucinations and visual hallucinations   Suicidal Ideations contracts for safety   Homicidal Ideations contracts for safety   Mood:  depressed and anhedonia   Affect:  flat   Memory recent  intact   Memory remote:  intact   Concentration/Attention:  intact   Fund of Knowledge average   Insight:  limited   Reliability fair   Judgment:  limited          VITALS:     Patient Vitals for the past 24 hrs:   Temp Pulse Resp BP SpO2   11/27/22 0806 -- 61 -- (!) 105/51 --   11/26/22 2040 98.2 °F (36.8 °C) 72 16 126/70 98 %       Wt Readings from Last 3 Encounters:   06/03/22 113.9 kg (251 lb)   05/14/22 88.5 kg (195 lb)   12/30/21 104.3 kg (230 lb)     Temp Readings from Last 3 Encounters:   11/26/22 98.2 °F (36.8 °C)   06/03/22 97.6 °F (36.4 °C)   05/19/22 98.5 °F (36.9 °C)     BP Readings from Last 3 Encounters:   11/27/22 (!) 105/51   06/03/22 130/69   05/19/22 (!) 112/51     Pulse Readings from Last 3 Encounters:   11/27/22 61   06/03/22 89   05/19/22 64            DATA     LABORATORY DATA:(reviewed/updated 11/27/2022)  Recent Results (from the past 24 hour(s))   GLUCOSE, POC    Collection Time: 11/26/22 10:56 AM   Result Value Ref Range    Glucose (POC) 181 (H) 65 - 117 mg/dL    Performed by Clare Brown RN    GLUCOSE, POC    Collection Time: 11/26/22  4:36 PM   Result Value Ref Range    Glucose (POC) 191 (H) 65 - 117 mg/dL    Performed by Ivonne Mclean (LILIA RN)    GLUCOSE, POC    Collection Time: 11/26/22  7:55 PM   Result Value Ref Range    Glucose (POC) 245 (H) 65 - 117 mg/dL    Performed by Derek Zepeda RN    GLUCOSE, POC    Collection Time: 11/27/22  7:53 AM   Result Value Ref Range    Glucose (POC) 185 (H) 65 - 117 mg/dL    Performed by Clare Brown RN      No results found for: VALF2, VALAC, VALP, VALPR, DS6, CRBAM, CRBAMP, CARB2, XCRBAM  No results found for: LITHM   RADIOLOGY REPORTS:(reviewed/updated 11/27/2022)  No results found.        MEDICATIONS     ALL MEDICATIONS:   Current Facility-Administered Medications   Medication Dose Route Frequency    insulin lispro (HUMALOG) injection 10 Units  10 Units SubCUTAneous TIDAC    insulin glargine (LANTUS) injection 30 Units  30 Units SubCUTAneous DAILY    cephALEXin (KEFLEX) capsule 500 mg  500 mg Oral BID    furosemide (LASIX) tablet 40 mg  40 mg Oral DAILY    ibuprofen (MOTRIN) tablet 400 mg  400 mg Oral Q6H PRN    nystatin (MYCOSTATIN) 100,000 unit/gram powder   Topical BID    risperiDONE (RisperDAL) tablet 2 mg  2 mg Oral Q12H    benzocaine-menthoL (CHLORASEPTIC MAX) lozenge 1 Lozenge  1 Lozenge Oral Q2H PRN    glucose chewable tablet 16 g  4 Tablet Oral PRN    glucagon (GLUCAGEN) injection 1 mg  1 mg IntraMUSCular PRN    dextrose 10% infusion 0-250 mL  0-250 mL IntraVENous PRN    albuterol (PROVENTIL HFA, VENTOLIN HFA, PROAIR HFA) inhaler 1 Puff  1 Puff Inhalation Q4H PRN    insulin lispro (HUMALOG) injection 1-10 Units  1-10 Units SubCUTAneous AC&HS    budesonide-formoterol (SYMBICORT) 80-4.5 mcg inhaler  2 Puff Inhalation BID RT    OLANZapine (ZyPREXA) tablet 5 mg  5 mg Oral Q6H PRN    haloperidol lactate (HALDOL) injection 5 mg  5 mg IntraMUSCular Q6H PRN    benztropine (COGENTIN) tablet 1 mg  1 mg Oral BID PRN    diphenhydrAMINE (BENADRYL) injection 50 mg  50 mg IntraMUSCular BID PRN    hydrOXYzine HCL (ATARAX) tablet 50 mg  50 mg Oral TID PRN    LORazepam (ATIVAN) injection 1 mg  1 mg IntraMUSCular Q4H PRN    traZODone (DESYREL) tablet 50 mg  50 mg Oral QHS PRN    acetaminophen (TYLENOL) tablet 650 mg  650 mg Oral Q4H PRN    magnesium hydroxide (MILK OF MAGNESIA) 400 mg/5 mL oral suspension 30 mL  30 mL Oral DAILY PRN    atorvastatin (LIPITOR) tablet 40 mg  40 mg Oral QPM    gabapentin (NEURONTIN) capsule 100 mg  100 mg Oral TID    metFORMIN ER (GLUCOPHAGE XR) tablet 1,000 mg  1,000 mg Oral BID WITH MEALS    oxybutynin chloride XL (DITROPAN XL) tablet 5 mg  5 mg Oral DAILY    sertraline (ZOLOFT) tablet 100 mg  100 mg Oral DAILY      SCHEDULED MEDICATIONS: Current Facility-Administered Medications   Medication Dose Route Frequency    insulin lispro (HUMALOG) injection 10 Units  10 Units SubCUTAneous TIDAC    insulin glargine (LANTUS) injection 30 Units  30 Units SubCUTAneous DAILY    cephALEXin (KEFLEX) capsule 500 mg  500 mg Oral BID    furosemide (LASIX) tablet 40 mg  40 mg Oral DAILY    nystatin (MYCOSTATIN) 100,000 unit/gram powder   Topical BID    risperiDONE (RisperDAL) tablet 2 mg  2 mg Oral Q12H    insulin lispro (HUMALOG) injection 1-10 Units  1-10 Units SubCUTAneous AC&HS    budesonide-formoterol (SYMBICORT) 80-4.5 mcg inhaler  2 Puff Inhalation BID RT    atorvastatin (LIPITOR) tablet 40 mg  40 mg Oral QPM    gabapentin (NEURONTIN) capsule 100 mg  100 mg Oral TID    metFORMIN ER (GLUCOPHAGE XR) tablet 1,000 mg  1,000 mg Oral BID WITH MEALS    oxybutynin chloride XL (DITROPAN XL) tablet 5 mg  5 mg Oral DAILY    sertraline (ZOLOFT) tablet 100 mg  100 mg Oral DAILY          ASSESSMENT & PLAN     DIAGNOSES REQUIRING ACTIVE TREATMENT AND MONITORING: (reviewed/updated 11/27/2022)  Patient Active Hospital Problem List:       Schizoaffective disorder (11/10/2022)           Assessment: the patient presents in crisis after potentially leaving her group home; she is disorganized and a poor historian at baseline, treatment will be primarily psychosocial. Patient with low O2 sats, edema and orthopnea, will have to address this. Plan:   - CONTINUE Risperdal 2 mg Q12H for psychosis  - CONTINUE Zoloft 100 mg QDAY for MDD  - Appreciate DM team / IM recs  - Consider sleep referral for obesity hypoventilation syndrome  - IGM therapy as tolerated  - Expand database / obtain collateral  - Dispo planning    In summary, Suzie Fatima, is a 59 y.o.  female who presents with a severe exacerbation of the principal diagnosis of Schizoaffective disorder ([facility] [address]    Patient's condition is improving.     Patient requires continued inpatient hospitalization for further stabilization, safety monitoring and medication management. I will continue to coordinate the provision of individual, milieu, occupational, group, and substance abuse therapies to address target symptoms/diagnoses as deemed appropriate for the individual patient. A coordinated, multidisplinary treatment team round was conducted with the patient (this team consists of the nurse, psychiatric unit pharmacist,  and writer). Complete current electronic health record for patient has been reviewed today including consultant notes, ancillary staff notes, nurses and psychiatric tech notes. Suicide risk assessment completed and patient deemed to be of low risk for suicide at this time. The following regarding medications was addressed during rounds with patient:   the risks and benefits of the proposed medication. The patient was given the opportunity to ask questions. Informed consent given to the use of the above medications. Will continue to adjust psychiatric and non-psychiatric medications (see above \"medication\" section and orders section for details) as deemed appropriate & based upon diagnoses and response to treatment. I will continue to order blood tests/labs and diagnostic tests as deemed appropriate and review results as they become available (see orders for details and above listed lab/test results). I will order psychiatric records from previous [facility] hospitals to further elucidate the nature of patient's psychopathology and review once available. I will gather additional collateral information from friends, family and o/p treatment team to further elucidate the nature of patient's psychopathology and baselline level of psychiatric functioning.          I certify that this patient's inpatient psychiatric hospital services furnished since the previous certification were, and continue to be, required for treatment that could reasonably be expected to improve the patient's condition, or for diagnostic study, and that the patient continues to need, on a daily basis, active treatment furnished directly by or requiring the supervision of inpatient psychiatric facility personnel. In addition, the hospital records show that services furnished were intensive treatment services, admission or related services, or equivalent services.     EXPECTED DISCHARGE DATE/DAY: TBD     DISPOSITION: Group home / shelter       Signed By:   Cherry Michel NP  11/27/2022

## 2022-11-28 LAB
APPEARANCE UR: CLEAR
BACTERIA URNS QL MICRO: NEGATIVE /HPF
BILIRUB UR QL: NEGATIVE
COLOR UR: NORMAL
EPITH CASTS URNS QL MICRO: NORMAL /LPF
GLUCOSE BLD STRIP.AUTO-MCNC: 157 MG/DL (ref 65–117)
GLUCOSE BLD STRIP.AUTO-MCNC: 179 MG/DL (ref 65–117)
GLUCOSE BLD STRIP.AUTO-MCNC: 183 MG/DL (ref 65–117)
GLUCOSE BLD STRIP.AUTO-MCNC: 211 MG/DL (ref 65–117)
GLUCOSE UR STRIP.AUTO-MCNC: NEGATIVE MG/DL
HGB UR QL STRIP: NEGATIVE
KETONES UR QL STRIP.AUTO: NEGATIVE MG/DL
LEUKOCYTE ESTERASE UR QL STRIP.AUTO: NEGATIVE
NITRITE UR QL STRIP.AUTO: NEGATIVE
PH UR STRIP: 5.5 [PH] (ref 5–8)
PROT UR STRIP-MCNC: NEGATIVE MG/DL
RBC #/AREA URNS HPF: NORMAL /HPF (ref 0–5)
SERVICE CMNT-IMP: ABNORMAL
SP GR UR REFRACTOMETRY: <1.005
UA: UC IF INDICATED,UAUC: NORMAL
UROBILINOGEN UR QL STRIP.AUTO: 0.2 EU/DL (ref 0.2–1)
WBC URNS QL MICRO: NORMAL /HPF (ref 0–4)

## 2022-11-28 PROCEDURE — 74011250637 HC RX REV CODE- 250/637

## 2022-11-28 PROCEDURE — 74011636637 HC RX REV CODE- 636/637: Performed by: PSYCHIATRY & NEUROLOGY

## 2022-11-28 PROCEDURE — 82962 GLUCOSE BLOOD TEST: CPT

## 2022-11-28 PROCEDURE — 99231 SBSQ HOSP IP/OBS SF/LOW 25: CPT | Performed by: PSYCHIATRY & NEUROLOGY

## 2022-11-28 PROCEDURE — 74011636637 HC RX REV CODE- 636/637: Performed by: STUDENT IN AN ORGANIZED HEALTH CARE EDUCATION/TRAINING PROGRAM

## 2022-11-28 PROCEDURE — 65220000003 HC RM SEMIPRIVATE PSYCH

## 2022-11-28 PROCEDURE — 81001 URINALYSIS AUTO W/SCOPE: CPT

## 2022-11-28 PROCEDURE — 74011250637 HC RX REV CODE- 250/637: Performed by: STUDENT IN AN ORGANIZED HEALTH CARE EDUCATION/TRAINING PROGRAM

## 2022-11-28 PROCEDURE — 97116 GAIT TRAINING THERAPY: CPT | Performed by: PHYSICAL THERAPIST

## 2022-11-28 PROCEDURE — 74011250637 HC RX REV CODE- 250/637: Performed by: PSYCHIATRY & NEUROLOGY

## 2022-11-28 RX ADMIN — ATORVASTATIN CALCIUM 40 MG: 40 TABLET, FILM COATED ORAL at 17:14

## 2022-11-28 RX ADMIN — METFORMIN HYDROCHLORIDE 1000 MG: 500 TABLET, EXTENDED RELEASE ORAL at 08:34

## 2022-11-28 RX ADMIN — Medication 2 UNITS: at 07:56

## 2022-11-28 RX ADMIN — TRAZODONE HYDROCHLORIDE 50 MG: 50 TABLET ORAL at 21:04

## 2022-11-28 RX ADMIN — BUDESONIDE AND FORMOTEROL FUMARATE DIHYDRATE 2 PUFF: 80; 4.5 AEROSOL RESPIRATORY (INHALATION) at 08:35

## 2022-11-28 RX ADMIN — BUDESONIDE AND FORMOTEROL FUMARATE DIHYDRATE 2 PUFF: 80; 4.5 AEROSOL RESPIRATORY (INHALATION) at 21:05

## 2022-11-28 RX ADMIN — Medication 10 UNITS: at 11:14

## 2022-11-28 RX ADMIN — GABAPENTIN 100 MG: 100 CAPSULE ORAL at 11:15

## 2022-11-28 RX ADMIN — FUROSEMIDE 40 MG: 40 TABLET ORAL at 08:34

## 2022-11-28 RX ADMIN — GABAPENTIN 100 MG: 100 CAPSULE ORAL at 08:34

## 2022-11-28 RX ADMIN — OXYBUTYNIN CHLORIDE 5 MG: 5 TABLET, EXTENDED RELEASE ORAL at 08:34

## 2022-11-28 RX ADMIN — Medication 3 UNITS: at 17:15

## 2022-11-28 RX ADMIN — METFORMIN HYDROCHLORIDE 1000 MG: 500 TABLET, EXTENDED RELEASE ORAL at 17:14

## 2022-11-28 RX ADMIN — Medication 10 UNITS: at 07:56

## 2022-11-28 RX ADMIN — HYDROXYZINE HYDROCHLORIDE 50 MG: 25 TABLET, FILM COATED ORAL at 21:04

## 2022-11-28 RX ADMIN — Medication 10 UNITS: at 17:15

## 2022-11-28 RX ADMIN — GABAPENTIN 100 MG: 100 CAPSULE ORAL at 17:14

## 2022-11-28 RX ADMIN — SERTRALINE HYDROCHLORIDE 100 MG: 50 TABLET ORAL at 08:34

## 2022-11-28 RX ADMIN — Medication 30 UNITS: at 08:01

## 2022-11-28 RX ADMIN — Medication 2 UNITS: at 11:14

## 2022-11-28 RX ADMIN — RISPERIDONE 2 MG: 1 TABLET ORAL at 08:34

## 2022-11-28 RX ADMIN — RISPERIDONE 2 MG: 1 TABLET ORAL at 21:04

## 2022-11-28 NOTE — PROGRESS NOTES
Problem: Suicide  Goal: *STG: Remains safe in hospital  Outcome: Progressing Towards Goal  Goal: *STG/LTG: No longer expresses self destructive or suicidal thoughts  Outcome: Progressing Towards Goal     Problem: Altered Thought Process (Adult/Pediatric)  Goal: *STG: Participates in treatment plan  Outcome: Progressing Towards Goal  Goal: *STG: Seeks staff when feelings of anxiety and fear arise  Outcome: Progressing Towards Goal  Goal: *STG: Complies with medication therapy  Outcome: Progressing Towards Goal     Problem: Falls - Risk of  Goal: *Absence of Falls  Description: Document Martin Fall Risk and appropriate interventions in the flowsheet. Outcome: Progressing Towards Goal  Note: Fall Risk Interventions:  Medication Interventions: Teach patient to arise slowly  Elimination Interventions: Patient to call for help with toileting needs  History of Falls Interventions: Room close to nurse's station, Door open when patient unattended        Patient received resting in her room. Denies SI/HI/AVH, endorses depression and anxiety levels of 10. Calm, cooperative, out in the day room for a short time watching TV while waiting for snack, otherwise isolative to her room. Taking medications as prescribed. Will continue to monitor for safety. Patient appeared to sleep 8 hours.

## 2022-11-28 NOTE — PROGRESS NOTES
Problem: Mobility Impaired (Adult and Pediatric)  Goal: *Acute Goals and Plan of Care (Insert Text)  Description: FUNCTIONAL STATUS PRIOR TO ADMISSION: Patient was modified independent using a walker for functional mobility. Physical Therapy Goals  Initiated 11/18/2022; Reviewed 11/25/22 with goals addressed below:  1. Patient will transfer from bed to chair and chair to bed with modified independence using the least restrictive device within 7 day(s). MET 11/25/22  2. Patient will perform sit to stand with modified independence within 7 day(s). MET 11/28/22  3. Patient will ambulate with modified independence for 150 feet with the least restrictive device within 7 day(s). MET 11/28/22  Outcome: Resolved/Met     PHYSICAL THERAPY TREATMENT/DISCHARGE  Patient: Grisel De La Cruz (21 y.o. female)  Date: 11/28/2022  Diagnosis: Adjustment disorder [F43.20] Schizoaffective disorder ([facility] [address]      Precautions:    Chart, physical therapy assessment, plan of care and goals were reviewed. ASSESSMENT  Patient continues with skilled PT services and has progressed towards goals. Patient performed bed mobility and transfers with modified independence. Patient ambulated 200 feet with modified independence and no assistive device. Patient occasional reaching out to use handrail for additional support. Patient with overall steady gait. PLAN :  Patient will be discharged from acute skilled physical therapy at this time. Rationale for discharge:  Goals achieved    Recommendation for discharge: (in order for the patient to meet his/her long term goals)  No skilled physical therapy/ follow up rehabilitation needs identified at this time. This discharge recommendation:  Has been made in collaboration with the attending provider and/or case management    IF patient discharges home will need the following DME: none       SUBJECTIVE:   Patient stated I feel pretty good.     OBJECTIVE DATA SUMMARY:   Critical Behavior:  Neurologic State: Alert  Orientation Level: Oriented X4  Cognition: Follows commands     Functional Mobility Training:  Bed Mobility:     Supine to Sit: Modified independent  Sit to Supine: Modified independent  Scooting: Modified independent     Transfers:  Sit to Stand: Modified independent  Stand to Sit: Modified independent        Bed to Chair: Modified independent    Balance:  Sitting: Intact  Standing: Intact  Standing - Static: Good  Standing - Dynamic : Good    Ambulation/Gait Training:  Distance (ft): 200 Feet (ft)  Assistive Device: Gait belt  Ambulation - Level of Assistance: Modified independent  Gait Abnormalities: Decreased step clearance  Base of Support: Widened  Speed/Chelita: Pace decreased (<100 feet/min)  Step Length: Right shortened;Left shortened    Pain Rating:  Generalized chronic pain; RN aware    Activity Tolerance:   Good    After treatment patient left in no apparent distress:   Sitting in chair and RN aware    COMMUNICATION/COLLABORATION:   The patients plan of care was discussed with: Registered nurse and Physician.      Kamron Ball, PT   Time Calculation: 10 mins

## 2022-11-28 NOTE — BH NOTES
Behavioral Health Interdisciplinary Rounds     Patient Name: Dian Ganser  Age: 59 y.o. Room/Bed:  320/02  Primary Diagnosis: Schizoaffective disorder ([address]     Progress note: Treatment team met with the Pt in her room. Pt reported \"I am feeling better\". Pt endorsed having experiencing 10/10 of anxiety and depression. Pt presents with a depressed mood and blunted affect. Pt was observed with poor grooming and Hygiene. Pt denies SI, HI, AHVH, and self harming behaviors. Pt has been observed walking to the day room for her meals and snacks. Pt is utilizing the bathroom in her room instead of a bed pan. Pt is discharged focus and inquired about discharging. Pt reported that she has not seen any blood in her urine and the Pt  Informed the Pt that another uranalysis will be performed to make sure the Pt UTI is gone. Pt was provided with PRN Motrin and Trazodone.  SW will discuss disposition with the Pt and determine if the Pt will discharge to a group home or community stabilization program.         LOS:  18  Expected LOS: 19-22        Participating treatment team members: Dian Ganser, Mike Thornton MD

## 2022-11-28 NOTE — PROGRESS NOTES
Assumed care of pt lying awake in bed. Pt denies SI/HI/AVH and pain. Depressed mood with dull affect. Med and meal compliant. Pt has bedside commode in room but is utilizing toilet in bathroom. Pt alert and oriented x4. Pt remains on 1500 mL fluid restriction. Pt endorses depression and anxiety 10/10. Pt ambulates to dayroom for meals and snacks. Urinalysis obtained, and unremarkable. No behavioral issues observed. Q15 minute safety rounds continued by staff. Problem: Falls - Risk of  Goal: *Absence of Falls  Description: Document Lorene Summers Fall Risk and appropriate interventions in the flowsheet.   Outcome: Progressing Towards Goal  Note: Fall Risk Interventions:  Mobility Interventions: Communicate number of staff needed for ambulation/transfer         Medication Interventions: Teach patient to arise slowly    Elimination Interventions: Patient to call for help with toileting needs    History of Falls Interventions: Room close to nurse's station, Door open when patient unattended         Problem: Altered Thought Process (Adult/Pediatric)  Goal: *STG: Complies with medication therapy  Outcome: Progressing Towards Goal

## 2022-11-28 NOTE — GROUP NOTE
EMILY  GROUP DOCUMENTATION INDIVIDUAL                                                                          Group Therapy Note    Date: 11/28/2022    Group Start Time: 1400  Group End Time: 1500  Group Topic: Recreational/Music Therapy    [facility] [place] [address] ACUTE BEHAV [facility]    [place], [address] GROUP DOCUMENTATION GROUP    Group Therapy Note    Attendees: 11       Attendance: Attended    Patient's Goal:  To concentrate on selected task    Interventions/techniques: Supported-crafts,games,music    Interactions: Interacted appropriately    Mental Status: Calm    Behavior/appearance: Attentive and Cooperative    Goals Achieved: Able to engage in interactions and Able to listen to [employer] participant in [address] [place] [address]

## 2022-11-28 NOTE — GROUP NOTE
EMILY  GROUP DOCUMENTATION INDIVIDUAL                                                                          Group Therapy Note    Date: 11/28/2022    Group Start Time: 1000  Group End Time: 1100  Group Topic: Topic Group    [facility] [place] 3 ACUTE BEHAV [facility]    [place] [address] GROUP    Group Therapy Note    Attendees: 8       Attendance: Did not attend      Monet Singh

## 2022-11-29 LAB
GLUCOSE BLD STRIP.AUTO-MCNC: 105 MG/DL (ref 65–117)
GLUCOSE BLD STRIP.AUTO-MCNC: 133 MG/DL (ref 65–117)
GLUCOSE BLD STRIP.AUTO-MCNC: 165 MG/DL (ref 65–117)
GLUCOSE BLD STRIP.AUTO-MCNC: 173 MG/DL (ref 65–117)
SERVICE CMNT-IMP: ABNORMAL
SERVICE CMNT-IMP: NORMAL

## 2022-11-29 PROCEDURE — 65220000003 HC RM SEMIPRIVATE PSYCH

## 2022-11-29 PROCEDURE — 74011250637 HC RX REV CODE- 250/637

## 2022-11-29 PROCEDURE — 74011250637 HC RX REV CODE- 250/637: Performed by: PSYCHIATRY & NEUROLOGY

## 2022-11-29 PROCEDURE — 74011636637 HC RX REV CODE- 636/637: Performed by: PSYCHIATRY & NEUROLOGY

## 2022-11-29 PROCEDURE — 74011250637 HC RX REV CODE- 250/637: Performed by: STUDENT IN AN ORGANIZED HEALTH CARE EDUCATION/TRAINING PROGRAM

## 2022-11-29 PROCEDURE — 74011636637 HC RX REV CODE- 636/637: Performed by: STUDENT IN AN ORGANIZED HEALTH CARE EDUCATION/TRAINING PROGRAM

## 2022-11-29 PROCEDURE — 82962 GLUCOSE BLOOD TEST: CPT

## 2022-11-29 RX ADMIN — GABAPENTIN 100 MG: 100 CAPSULE ORAL at 12:14

## 2022-11-29 RX ADMIN — Medication 10 UNITS: at 16:27

## 2022-11-29 RX ADMIN — OXYBUTYNIN CHLORIDE 5 MG: 5 TABLET, EXTENDED RELEASE ORAL at 08:29

## 2022-11-29 RX ADMIN — METFORMIN HYDROCHLORIDE 1000 MG: 500 TABLET, EXTENDED RELEASE ORAL at 08:29

## 2022-11-29 RX ADMIN — GABAPENTIN 100 MG: 100 CAPSULE ORAL at 08:29

## 2022-11-29 RX ADMIN — SERTRALINE HYDROCHLORIDE 100 MG: 50 TABLET ORAL at 08:29

## 2022-11-29 RX ADMIN — GABAPENTIN 100 MG: 100 CAPSULE ORAL at 16:27

## 2022-11-29 RX ADMIN — RISPERIDONE 2 MG: 1 TABLET ORAL at 08:42

## 2022-11-29 RX ADMIN — METFORMIN HYDROCHLORIDE 1000 MG: 500 TABLET, EXTENDED RELEASE ORAL at 16:27

## 2022-11-29 RX ADMIN — Medication 2 UNITS: at 16:27

## 2022-11-29 RX ADMIN — BUDESONIDE AND FORMOTEROL FUMARATE DIHYDRATE 2 PUFF: 80; 4.5 AEROSOL RESPIRATORY (INHALATION) at 21:50

## 2022-11-29 RX ADMIN — ATORVASTATIN CALCIUM 40 MG: 40 TABLET, FILM COATED ORAL at 17:00

## 2022-11-29 RX ADMIN — BUDESONIDE AND FORMOTEROL FUMARATE DIHYDRATE 2 PUFF: 80; 4.5 AEROSOL RESPIRATORY (INHALATION) at 08:32

## 2022-11-29 RX ADMIN — Medication 10 UNITS: at 12:13

## 2022-11-29 RX ADMIN — Medication 30 UNITS: at 08:27

## 2022-11-29 RX ADMIN — RISPERIDONE 2 MG: 1 TABLET ORAL at 21:52

## 2022-11-29 RX ADMIN — Medication 2 UNITS: at 12:14

## 2022-11-29 RX ADMIN — Medication 10 UNITS: at 08:28

## 2022-11-29 RX ADMIN — HYDROXYZINE HYDROCHLORIDE 50 MG: 25 TABLET, FILM COATED ORAL at 08:42

## 2022-11-29 RX ADMIN — FUROSEMIDE 40 MG: 40 TABLET ORAL at 08:29

## 2022-11-29 NOTE — GROUP NOTE
EMILY  GROUP DOCUMENTATION INDIVIDUAL                                                                          Group Therapy Note    Date: 11/29/2022    Group Start Time: 1000  Group End Time: 1100  Group Topic: Topic Group    [facility] [place] 3 ACUTE BEHAV TH    [place] [address] GROUP    Group Therapy Note    Attendees: 7       Attendance: Did not attend    Christen Moses

## 2022-11-29 NOTE — BH NOTES
PSYCHIATRIC PROGRESS NOTE         Patient Name  Bernardo Kruger   Date of Birth 1965   [facility] [id number]   Medical Record Number  [id number]      Age  59 y.o. PCP Kim Osborne MD   Admit date:  11/9/2022    Room Number  320/02  @ [facility]   Date of Service  11/29/2022         E & M PROGRESS NOTE:         HISTORY       CC:  \"suicidal ideation\"  HISTORY OF PRESENT ILLNESS/INTERVAL HISTORY:  (reviewed/updated 11/29/2022). per initial evaluation: The patient, Bernardo Kruger, is a 59 y.o. WHITE/NON- female with a past psychiatric history significant for schizoaffective disorder, who presents at this time with complaints of (and/or evidence of) the following emotional symptoms: depression and suicidal thoughts/threats. Additional symptomatology include poor self care. The above symptoms have been present for 2+ weeks. These symptoms are of moderate to high severity. These symptoms are constant in nature. The patient's condition has been precipitated by psychosocial stressors. Patient's condition made worse by treatment noncompliance. UDS: negative; BAL=0. The patient is well known to the unit; she comes into the hospital frequently in crisis typically stating her 's death several years ago has made her feel hopeless. She has been living in a group home though this admission she states that she was kicked out of her group home because of an episode of incontinence. The patient is a poor historian. The patient corroborates the above narrative. The patient contracts for safety on the unit and gives consent for the team to contact collateral. The patient is amenable to initiating treatment while on the unit. She defers much of the team's inquires to her payee. 11/11 - no acute overnight events. The patient has been isolative to her room with poor ADLs. incontinent of urine but able to void when taken to the bathroom.  She denies active thoughts of self harm but is markedly internally preoccupied. Patient slept 8 hours overnight and got Trazodone. She denies active thoughts of self harm but endorses both depressed mood and anxiety. 11/14 - the patient remains in bed for much of the day. She has been inconitnent of urine and with poor ADLs requiring prompting for much activities. Patient denies active thoughts of self harm but endorses passive SI. She slept 7 hours overnight and is flat and disorganized on interview, moaning and providing little updates on her disposition plan. Per SW, the patient tends to not go to the group homes once assigned. Her payee is working on guardianship.    11/15 - overnight, the patient remained isolative to her bed, she continues to c/o anxiety and had an episode of urine incontinence. Patient refused nystatin stating her rash had 'cleared up.' She endorses ongoing AH and VH of 'ghosts' and states that she is doing very poorly. Patient evaluated by internist for ongoing LE swelling and pain. She is medication compliant and tolerating higher dose of Risperdal.    11/16 - the patient has remained in bed, out for meals and with little motivation to interact in the milieu. She got Motrin and Atarax and continues to c/o pain in her LE, as well as SI with a plan to cut herself if given the chance. The patient slept 8 hours and this morning spontaneously stated to the team that she wanted to be discharged -- she acknowledges that her payee and SW are working on a dispo plan and she is still symptomatic. Medicine consulted as she is now experiencing orthopnea. 11/17 - the patient has been isolative to her room, out for meals and slept 7 hours overnight. She is stating she will [facility] to [facility] [address] but acknowledges that the team is coordinating guardianship with her payee. Patient noted to have significant LE edema and c/o orthopnea. She is medication compliant and denies active thoughts of self harm. . She remains disorganized and with ongoing confusion but today she is otherwise pleasant. 11/18 - overnight, the patient was noted to be desaturating to the 80's and difficult to arouse. She denies all symptoms otherwise and has been able to make her needs known. Patient medication compliant. She has been largely in bed during the day, but out for meals. SW actively working on placement for the patient. IM started Lasix to address edema. 11/21 - the patient has been in bed for much of the day. She is internally preoccupied and with no episodes of agitation or aggression. Patient passing clots and with abdi hematuria per RN. Keflex started for UTI. Patient medication compliant and has been otherwise in fair behavioral control. 11/22 - overnight, patient remains bed bound and disorganized. She is no longer passing blot clots and hematuria has subsided since starting Abx. Patient denies SI/HI/PI, AH appears baseline. She is discharge focused, sleeping during the day and pleasant when awake. Diabetes team provided recommendations. 11/23 - the patient slept 9 hours overnight. She is out for meals but isolative to her room for much of the day. Patient still on fluid restriction as she is being diuresed. She denies SI/HI/VH/PI, she continues to c/o AH. Patient calm and cooperative on interview. BSG 170s- 200s. 11/24 - pt remains isolative. No acute incidents. Denies SI/plan/intent, HI/plan/intent. Denied AH/VH today. No other changes or new concerns today. 11/25 - Pt has mostly been isolative, but did get up and walk around some this morning. Continues to say she is ready to leave. No acute incidents. Slept 7.5 hours last night. Denies SI/plan/intent, denies HI/plan/intent, denies AVH. No new concerns today. 11/26 - Pt complained of chest pain this morning, nursing did an EKG, normal sinus rhythm, chest pain has resolved without intervention. Otherwise, no acute incidents. States her mood is lower today.  Denies SI/plan/intent, denies HI/plan/intent, denies AVH. States she just wants to get out of here. Affect flat, restricted. No other changes or new concerns at this time. 11/27- Pt continues to endorse high depression and anxiety. She slept 7.5 hours last night. Otherwise, she is calm and cooperative, no acute incidents.  this morning. Med compliant. Went to the break room for breakfast, is doing more ambulating. Pt states she is \"bad\" during assessment. Denies SI/plan/intent, HI/plan/intent, AVH. States she wants to go home. Reminded pt we are working on a discharge plan. Poor insight. No other changes at this time. 11/29 - Pt reports eating and sleeping well. She is medication compliant and has no side effects to report. She has no new complaints and denies SI/HI/AVH currently. No issues per staff. She did receive PRN Atarax for anxiety per staff. SIDE EFFECTS: (reviewed/updated 11/29/2022)  None reported or admitted to. No noted toxicity with use of Depakote   ALLERGIES:(reviewed/updated 11/29/2022)  Allergies   Allergen Reactions    Amoxicillin Hives    Mushroom Flavor Hives    Tomato Hives      REVIEW OF SYSTEMS: (reviewed/updated 11/29/2022)  Appetite:improved   Sleep: no change   All other Review of Systems: Negative except incontinence per HPI         [facility] (MSE):    MSE FINDINGS ARE WITHIN NORMAL LIMITS (WNL) UNLESS OTHERWISE STATED BELOW. ( ALL OF THE BELOW CATEGORIES OF THE MSE HAVE BEEN REVIEWED (reviewed 11/29/2022) AND UPDATED AS DEEMED APPROPRIATE )  General Presentation age appropriate, cooperative and guarded   Orientation disorganized   Vital Signs  See below (reviewed 11/29/2022); Vital Signs (BP, Pulse, & Temp) are within normal limits if not listed below.    Gait and Station Stable/steady, no ataxia   Musculoskeletal System No extrapyramidal symptoms (EPS); no abnormal muscular movements or Tardive Dyskinesia (TD); muscle strength and tone are within normal limits   Language No aphasia or dysarthria   Speech:  normal volume and non-pressured   Thought Processes concrete; normal rate of thoughts; poor abstract reasoning/computation   Thought Associations blocked    Thought Content auditory hallucinations and visual hallucinations   Suicidal Ideations contracts for safety   Homicidal Ideations contracts for safety   Mood:  depressed and anhedonia   Affect:  flat   Memory recent  intact   Memory remote:  intact   Concentration/Attention:  intact   Fund of Knowledge average   Insight:  limited   Reliability fair   Judgment:  limited          VITALS:     Patient Vitals for the past 24 hrs:   Temp Pulse Resp BP SpO2   11/29/22 0814 98.5 °F (36.9 °C) 80 16 (!) 151/92 100 %   11/28/22 2000 97.7 °F (36.5 °C) 70 18 135/73 97 %       Wt Readings from Last 3 Encounters:   06/03/22 113.9 kg (251 lb)   05/14/22 88.5 kg (195 lb)   12/30/21 104.3 kg (230 lb)     Temp Readings from Last 3 Encounters:   11/29/22 98.5 °F (36.9 °C)   06/03/22 97.6 °F (36.4 °C)   05/19/22 98.5 °F (36.9 °C)     BP Readings from Last 3 Encounters:   11/29/22 (!) 151/92   06/03/22 130/69   05/19/22 (!) 112/51     Pulse Readings from Last 3 Encounters:   11/29/22 80   06/03/22 89   05/19/22 64            DATA     LABORATORY DATA:(reviewed/updated 11/29/2022)  Recent Results (from the past 24 hour(s))   GLUCOSE, POC    Collection Time: 11/28/22 11:07 AM   Result Value Ref Range    Glucose (POC) 179 (H) 65 - 117 mg/dL    Performed by Raina Lockhart" RN    URINALYSIS W/ REFLEX CULTURE    Collection Time: 11/28/22  1:10 PM    Specimen: Urine   Result Value Ref Range    Color YELLOW/STRAW      Appearance CLEAR CLEAR      Specific gravity <1.005     pH (UA) 5.5 5.0 - 8.0      Protein Negative NEG mg/dL    Glucose Negative NEG mg/dL    Ketone Negative NEG mg/dL    Bilirubin Negative NEG      Blood Negative NEG      Urobilinogen 0.2 0.2 - 1.0 EU/dL    Nitrites Negative NEG      Leukocyte Esterase Negative NEG      WBC 0-4 0 - 4 /hpf    RBC 0-5 0 - 5 /hpf    Epithelial cells FEW FEW /lpf    Bacteria Negative NEG /hpf    UA:UC IF INDICATED CULTURE NOT INDICATED BY UA RESULT CNI     GLUCOSE, POC    Collection Time: 11/28/22  4:29 PM   Result Value Ref Range    Glucose (POC) 211 (H) 65 - 117 mg/dL    Performed by [address] [place] [address] Se, POC    Collection Time: 11/28/22  8:10 PM   Result Value Ref Range    Glucose (POC) 183 (H) 65 - 117 mg/dL    Performed by Lucille Everett RN    GLUCOSE, POC    Collection Time: 11/29/22  7:35 AM   Result Value Ref Range    Glucose (POC) 133 (H) 65 - 117 mg/dL    Performed by Angelo Perdue RN      No results found for: VALF2, VALAC, VALP, VALPR, DS6, CRBAM, CRBAMP, CARB2, XCRBAM  No results found for: LITHM   RADIOLOGY REPORTS:(reviewed/updated 11/29/2022)  No results found.        MEDICATIONS     ALL MEDICATIONS:   Current Facility-Administered Medications   Medication Dose Route Frequency    insulin lispro (HUMALOG) injection 10 Units  10 Units SubCUTAneous TIDAC    insulin glargine (LANTUS) injection 30 Units  30 Units SubCUTAneous DAILY    furosemide (LASIX) tablet 40 mg  40 mg Oral DAILY    ibuprofen (MOTRIN) tablet 400 mg  400 mg Oral Q6H PRN    nystatin (MYCOSTATIN) 100,000 unit/gram powder   Topical BID    risperiDONE (RisperDAL) tablet 2 mg  2 mg Oral Q12H    benzocaine-menthoL (CHLORASEPTIC MAX) lozenge 1 Lozenge  1 Lozenge Oral Q2H PRN    glucose chewable tablet 16 g  4 Tablet Oral PRN    glucagon (GLUCAGEN) injection 1 mg  1 mg IntraMUSCular PRN    dextrose 10% infusion 0-250 mL  0-250 mL IntraVENous PRN    albuterol (PROVENTIL HFA, VENTOLIN HFA, PROAIR HFA) inhaler 1 Puff  1 Puff Inhalation Q4H PRN    insulin lispro (HUMALOG) injection 1-10 Units  1-10 Units SubCUTAneous AC&HS    budesonide-formoterol (SYMBICORT) 80-4.5 mcg inhaler  2 Puff Inhalation BID RT    OLANZapine (ZyPREXA) tablet 5 mg  5 mg Oral Q6H PRN    haloperidol lactate (HALDOL) injection 5 mg  5 mg IntraMUSCular Q6H PRN    benztropine (COGENTIN) tablet 1 mg  1 mg Oral BID PRN    diphenhydrAMINE (BENADRYL) injection 50 mg  50 mg IntraMUSCular BID PRN    hydrOXYzine HCL (ATARAX) tablet 50 mg  50 mg Oral TID PRN    LORazepam (ATIVAN) injection 1 mg  1 mg IntraMUSCular Q4H PRN    traZODone (DESYREL) tablet 50 mg  50 mg Oral QHS PRN    acetaminophen (TYLENOL) tablet 650 mg  650 mg Oral Q4H PRN    magnesium hydroxide (MILK OF MAGNESIA) 400 mg/5 mL oral suspension 30 mL  30 mL Oral DAILY PRN    atorvastatin (LIPITOR) tablet 40 mg  40 mg Oral QPM    gabapentin (NEURONTIN) capsule 100 mg  100 mg Oral TID    metFORMIN ER (GLUCOPHAGE XR) tablet 1,000 mg  1,000 mg Oral BID WITH MEALS    oxybutynin chloride XL (DITROPAN XL) tablet 5 mg  5 mg Oral DAILY    sertraline (ZOLOFT) tablet 100 mg  100 mg Oral DAILY      SCHEDULED MEDICATIONS:   Current Facility-Administered Medications   Medication Dose Route Frequency    insulin lispro (HUMALOG) injection 10 Units  10 Units SubCUTAneous TIDAC    insulin glargine (LANTUS) injection 30 Units  30 Units SubCUTAneous DAILY    furosemide (LASIX) tablet 40 mg  40 mg Oral DAILY    nystatin (MYCOSTATIN) 100,000 unit/gram powder   Topical BID    risperiDONE (RisperDAL) tablet 2 mg  2 mg Oral Q12H    insulin lispro (HUMALOG) injection 1-10 Units  1-10 Units SubCUTAneous AC&HS    budesonide-formoterol (SYMBICORT) 80-4.5 mcg inhaler  2 Puff Inhalation BID RT    atorvastatin (LIPITOR) tablet 40 mg  40 mg Oral QPM    gabapentin (NEURONTIN) capsule 100 mg  100 mg Oral TID    metFORMIN ER (GLUCOPHAGE XR) tablet 1,000 mg  1,000 mg Oral BID WITH MEALS    oxybutynin chloride XL (DITROPAN XL) tablet 5 mg  5 mg Oral DAILY    sertraline (ZOLOFT) tablet 100 mg  100 mg Oral DAILY          ASSESSMENT & PLAN     DIAGNOSES REQUIRING ACTIVE TREATMENT AND MONITORING: (reviewed/updated 11/29/2022)  Patient Active Hospital Problem List:       Schizoaffective disorder (11/10/2022)           Assessment: the patient presents in crisis after potentially leaving her group home; she is disorganized and a poor historian at baseline, treatment will be primarily psychosocial. Patient with low O2 sats, edema and orthopnea, will have to address this. Plan:   - CONTINUE Risperdal 2 mg Q12H for psychosis  - CONTINUE Zoloft 100 mg QDAY for MDD  - Appreciate DM team / IM recs  - Consider sleep referral for obesity hypoventilation syndrome  - IGM therapy as tolerated  - Expand database / obtain collateral  - Dispo planning    In summary, Nagi Pickard, is a 59 y.o.  female who presents with a severe exacerbation of the principal diagnosis of Schizoaffective disorder ([facility] [address]    Patient's condition is improving. Patient requires continued inpatient hospitalization for further stabilization, safety monitoring and medication management. I will continue to coordinate the provision of individual, milieu, occupational, group, and substance abuse therapies to address target symptoms/diagnoses as deemed appropriate for the individual patient. A coordinated, multidisplinary treatment team round was conducted with the patient (this team consists of the nurse, psychiatric unit pharmacist,  and writer). Complete current electronic health record for patient has been reviewed today including consultant notes, ancillary staff notes, nurses and psychiatric tech notes. Suicide risk assessment completed and patient deemed to be of low risk for suicide at this time. The following regarding medications was addressed during rounds with patient:   the risks and benefits of the proposed medication. The patient was given the opportunity to ask questions. Informed consent given to the use of the above medications. Will continue to adjust psychiatric and non-psychiatric medications (see above \"medication\" section and orders section for details) as deemed appropriate & based upon diagnoses and response to treatment.      I will continue to order blood tests/labs and diagnostic tests as deemed appropriate and review results as they become available (see orders for details and above listed lab/test results). I will order psychiatric records from previous [facility] hospitals to further elucidate the nature of patient's psychopathology and review once available. I will gather additional collateral information from friends, family and o/p treatment team to further elucidate the nature of patient's psychopathology and baselline level of psychiatric functioning. I certify that this patient's inpatient psychiatric hospital services furnished since the previous certification were, and continue to be, required for treatment that could reasonably be expected to improve the patient's condition, or for diagnostic study, and that the patient continues to need, on a daily basis, active treatment furnished directly by or requiring the supervision of inpatient psychiatric facility personnel. In addition, the hospital records show that services furnished were intensive treatment services, admission or related services, or equivalent services.     EXPECTED DISCHARGE DATE/DAY: TBD     DISPOSITION: Group home / shelter       Signed By:   Gladis Camp NP  11/29/2022

## 2022-11-29 NOTE — BH NOTES
PATIENT FELT LIGHTHEADED EARLIER UPON GETTING UP FROM BED. SHE WAS WALKING TO THE DAY ROOM AND SHE CALLED FOR HELP. VITALS WERE TAKEN BF=907/71, HR=82, 02 SAT=98%. FINGERSTICK  MG/DL. PATIENT ASSISTED BACK TO HER BED. SHE STATED, \"I'M FEELING FINE NOW. \" ENCOURAGED HER TO CALL WHEN SHE NEEDS ASSISTANCE GETTING UP AND TO RISE SLOWLY.

## 2022-11-29 NOTE — PROGRESS NOTES
Problem: Falls - Risk of  Goal: *Absence of Falls  Description: Document Ran Villalba Fall Risk and appropriate interventions in the flowsheet. Outcome: Progressing Towards Goal  Note: Fall Risk Interventions:  Mobility Interventions: Communicate number of staff needed for ambulation/transfer         Medication Interventions: Teach patient to arise slowly    Elimination Interventions: Patient to call for help with toileting needs    History of Falls Interventions: Room close to nurse's station         Problem: Suicide  Goal: *STG: Remains safe in hospital  Outcome: Progressing Towards Goal  Goal: *STG: Seeks staff when feelings of self harm or harm towards others arise  Outcome: Progressing Towards Goal  Goal: *STG/LTG: Complies with medication therapy  Outcome: Progressing Towards Goal       PATIENT ALERT, ANXIOUS (PRN PO GIVEN), COOPERATIVE. DENIES SI/HI. DENIES DEPRESSION. DENIES PAIN. DENIES AVH. NO DISTRESS OBSERVED. MEDICATION AND MEAL COMPLIANT. FLUID RESTRICTION IN EFFECT. OUT FOR MEALS. PURPOSEFUL INTERACTION AND Q15 MINUTES MONITORING ONGOING.

## 2022-11-29 NOTE — GROUP NOTE
EMILY  GROUP DOCUMENTATION INDIVIDUAL                                                                          Group Therapy Note    Date: 11/29/2022    Group Start Time: 1500  Group End Time: 1600  Group Topic: Recreational/Music Therapy    [facility] [place] [address] ACUTE BEHAV [facility]    [place] [facility] GROUP DOCUMENTATION GROUP    Group Therapy Note    Attendees: 8       Attendance: Attended    Patient's Goal:  To concentrate on selected task    Interventions/techniques: Supported-crafts,games,music    Follows Directions:  Followed directions    Interactions: Interacted appropriately    Mental Status: Calm    Behavior/appearance: Attentive, Cooperative, and Needed prompting    Goals Achieved: Able to engage in interactions and Able to listen to others-worked on selected task      Monet Singh

## 2022-11-29 NOTE — BH NOTES
Behavioral Health Treatment Team Note         Progress note: Patient met with treatment team. Patient was alert and oriented x 4. Patient met with treatment team in treatment room, rather than he room. Patient reports that her mood is good today. Patient reports that she is still needing placement and that she is willing to go to group home. SW will continue to look for placement.      LOS:  19  Expected LOS: TBD    Insurance info/prescription coverage:  [facility] MEDICAID/[facility]  Date of last family contact:    Family requesting physician contact today:  no  Discharge plan:  TBD  Guns in the home:  no   Outpatient provider(s):  payee    Participating treatment team members: FAMILIA Clark

## 2022-11-29 NOTE — PROGRESS NOTES
1930  Assumed care of patient from day shift nurse Raeann RN. Patient observed sitting in the day room, watching TV.    2000 Patient is visible in the day room. Reports feeling depressed. Denies SI/HI/AVH. 2015  Patient's blood sugar is 183. No insulin coverage required per MD order. Will monitor for hypo/hyperglycemia. 2104  Med compliant. PRN Trazodone requested and given. [address] rounds in place. [id number]  Nursing rounds completed. No issues to note at this time. Patient slept for 7 hours this shift.

## 2022-11-29 NOTE — BH NOTES
Brief psychiatric note. Patient seen, orders placed and MSE updated. UA reordered for test of cure. Full note pending.

## 2022-11-29 NOTE — PROGRESS NOTES
Problem: Falls - Risk of  Goal: *Absence of Falls  Description: Document Brittney Embs Fall Risk and appropriate interventions in the flowsheet.   Outcome: Progressing Towards Goal  Note: Fall Risk Interventions:  Mobility Interventions: Communicate number of staff needed for ambulation/transfer         Medication Interventions: Teach patient to arise slowly    Elimination Interventions: Patient to call for help with toileting needs    History of Falls Interventions: Room close to nurse's station         Problem: Suicide  Goal: *STG: Remains safe in hospital  Outcome: Progressing Towards Goal

## 2022-11-30 LAB
GLUCOSE BLD STRIP.AUTO-MCNC: 123 MG/DL (ref 65–117)
GLUCOSE BLD STRIP.AUTO-MCNC: 140 MG/DL (ref 65–117)
GLUCOSE BLD STRIP.AUTO-MCNC: 163 MG/DL (ref 65–117)
GLUCOSE BLD STRIP.AUTO-MCNC: 332 MG/DL (ref 65–117)
SERVICE CMNT-IMP: ABNORMAL

## 2022-11-30 PROCEDURE — 74011636637 HC RX REV CODE- 636/637: Performed by: PSYCHIATRY & NEUROLOGY

## 2022-11-30 PROCEDURE — 74011250637 HC RX REV CODE- 250/637: Performed by: STUDENT IN AN ORGANIZED HEALTH CARE EDUCATION/TRAINING PROGRAM

## 2022-11-30 PROCEDURE — 74011636637 HC RX REV CODE- 636/637: Performed by: STUDENT IN AN ORGANIZED HEALTH CARE EDUCATION/TRAINING PROGRAM

## 2022-11-30 PROCEDURE — 65220000003 HC RM SEMIPRIVATE PSYCH

## 2022-11-30 PROCEDURE — 74011250637 HC RX REV CODE- 250/637: Performed by: PSYCHIATRY & NEUROLOGY

## 2022-11-30 PROCEDURE — 82962 GLUCOSE BLOOD TEST: CPT

## 2022-11-30 RX ADMIN — Medication 10 UNITS: at 16:45

## 2022-11-30 RX ADMIN — METFORMIN HYDROCHLORIDE 1000 MG: 500 TABLET, EXTENDED RELEASE ORAL at 09:03

## 2022-11-30 RX ADMIN — OXYBUTYNIN CHLORIDE 5 MG: 5 TABLET, EXTENDED RELEASE ORAL at 09:03

## 2022-11-30 RX ADMIN — METFORMIN HYDROCHLORIDE 1000 MG: 500 TABLET, EXTENDED RELEASE ORAL at 16:45

## 2022-11-30 RX ADMIN — GABAPENTIN 100 MG: 100 CAPSULE ORAL at 12:44

## 2022-11-30 RX ADMIN — GABAPENTIN 100 MG: 100 CAPSULE ORAL at 16:45

## 2022-11-30 RX ADMIN — Medication 10 UNITS: at 12:44

## 2022-11-30 RX ADMIN — GABAPENTIN 100 MG: 100 CAPSULE ORAL at 09:03

## 2022-11-30 RX ADMIN — RISPERIDONE 2 MG: 1 TABLET ORAL at 20:36

## 2022-11-30 RX ADMIN — Medication 30 UNITS: at 09:03

## 2022-11-30 RX ADMIN — Medication 4 UNITS: at 21:38

## 2022-11-30 RX ADMIN — Medication 2 UNITS: at 16:46

## 2022-11-30 RX ADMIN — FUROSEMIDE 40 MG: 40 TABLET ORAL at 09:03

## 2022-11-30 RX ADMIN — Medication 10 UNITS: at 09:04

## 2022-11-30 RX ADMIN — Medication 2 UNITS: at 09:04

## 2022-11-30 RX ADMIN — SERTRALINE HYDROCHLORIDE 100 MG: 50 TABLET ORAL at 09:03

## 2022-11-30 RX ADMIN — RISPERIDONE 2 MG: 1 TABLET ORAL at 09:03

## 2022-11-30 RX ADMIN — BUDESONIDE AND FORMOTEROL FUMARATE DIHYDRATE 2 PUFF: 80; 4.5 AEROSOL RESPIRATORY (INHALATION) at 09:03

## 2022-11-30 RX ADMIN — ATORVASTATIN CALCIUM 40 MG: 40 TABLET, FILM COATED ORAL at 17:04

## 2022-11-30 RX ADMIN — BUDESONIDE AND FORMOTEROL FUMARATE DIHYDRATE 2 PUFF: 80; 4.5 AEROSOL RESPIRATORY (INHALATION) at 20:36

## 2022-11-30 NOTE — GROUP NOTE
EMILY  GROUP DOCUMENTATION INDIVIDUAL                                                                          Group Therapy Note    Date: 11/30/2022    Group Start Time: 0900  Group End Time: 1000  Group Topic: Topic Group    [facility] [place] [facility] ACUTE BEHAV TH    [place] [address] GROUP    Group Therapy Note    Attendees: 7       Attendance: Did not attend      Yessica Rangel complains of pain/discomfort

## 2022-11-30 NOTE — GROUP NOTE
EMILY  GROUP DOCUMENTATION INDIVIDUAL                                                                          Group Therapy Note    Date: 11/30/2022    Group Start Time: 1500  Group End Time: 1600  Group Topic: Recreational/Music Therapy    [facility] [place] [address] ACUTE BEHAV [facility]    [place], [address] [facility] GROUP DOCUMENTATION GROUP    Group Therapy Note    Attendees: 12       Attendance: Attended    Patient's Goal:  To concentrate on selected task    Interventions/techniques: Supported-crafts,games,music    Interactions: Interacted appropriately    Mental Status: Calm    Behavior/appearance: Cooperative and Needed prompting    Goals Achieved: Able to engage in interactions and Able to listen to others-listened to music    Seth Soto

## 2022-11-30 NOTE — PROGRESS NOTES
Problem: Falls - Risk of  Goal: *Absence of Falls  Description: Document Sofia Fothergill Fall Risk and appropriate interventions in the flowsheet. Outcome: Progressing Towards Goal  Note: Fall Risk Interventions:  Mobility Interventions: Patient to call before getting OOB         Medication Interventions: Teach patient to arise slowly    Elimination Interventions: Patient to call for help with toileting needs    History of Falls Interventions: Room close to nurse's station         Problem: Suicide  Goal: *STG: Remains safe in hospital  Outcome: Progressing Towards Goal  Goal: *STG: Seeks staff when feelings of self harm or harm towards others arise  Outcome: Progressing Towards Goal  Goal: *STG/LTG: Complies with medication therapy  Outcome: Progressing Towards Goal         PATIENT ALERT, CALM, ISOLATIVE, WITHDRAWN, COOPERATIVE. DENIES SI/HI. DENIES PAIN. DENIES ANXIETY AND DEPRESSION. DENIES AVH. NO DISTRESS OBSERVED. MEDICATION AND MEAL COMPLIANT. NO COMPLAINTS AT THIS TIME. PURPOSEFUL INTERACTION AND Q15 MINUTES OBSERVATION ONGOING.

## 2022-11-30 NOTE — BH NOTES
Assumed care of the patient. Patient was isolative to her room. She appeared apathetic  and interacted minimally. Patient appeared unkempt . She was cooperative with the assessments. Patient denied S.I/H.I/A//VH, confirmed anxiety and depression. Patient was medication and meal compliant. Blood sugar was 105 mg/dl, no coverage required. Patient appeared to be sleeping for 7.5 hours.

## 2022-11-30 NOTE — BH NOTES
Disposition Planning    Writer called patient's payee Jaxon Gonzalez [phone]. Writer called Ms. Ellie Gregorio to inquire if she is still pursuing guardianship for the patient. Ms. Ellie Gregorio did not answer. Voicemail was left.     Shara Davidson MSLILLIAN Student

## 2022-11-30 NOTE — PROGRESS NOTES
Problem: Suicide  Goal: *STG: Remains safe in hospital  Outcome: Progressing Towards Goal  Goal: *STG/LTG: Complies with medication therapy  Outcome: Progressing Towards Goal  Goal: *STG/LTG: No longer expresses self destructive or suicidal thoughts  Outcome: Progressing Towards Goal

## 2022-12-01 LAB
ATRIAL RATE: 72 BPM
CALCULATED P AXIS, ECG09: 26 DEGREES
CALCULATED R AXIS, ECG10: 40 DEGREES
CALCULATED T AXIS, ECG11: 68 DEGREES
DIAGNOSIS, 93000: NORMAL
GLUCOSE BLD STRIP.AUTO-MCNC: 175 MG/DL (ref 65–117)
GLUCOSE BLD STRIP.AUTO-MCNC: 177 MG/DL (ref 65–117)
GLUCOSE BLD STRIP.AUTO-MCNC: 180 MG/DL (ref 65–117)
GLUCOSE BLD STRIP.AUTO-MCNC: 218 MG/DL (ref 65–117)
P-R INTERVAL, ECG05: 134 MS
Q-T INTERVAL, ECG07: 386 MS
QRS DURATION, ECG06: 76 MS
QTC CALCULATION (BEZET), ECG08: 422 MS
SERVICE CMNT-IMP: ABNORMAL
VENTRICULAR RATE, ECG03: 72 BPM

## 2022-12-01 PROCEDURE — 74011250637 HC RX REV CODE- 250/637: Performed by: STUDENT IN AN ORGANIZED HEALTH CARE EDUCATION/TRAINING PROGRAM

## 2022-12-01 PROCEDURE — 74011250637 HC RX REV CODE- 250/637

## 2022-12-01 PROCEDURE — 74011636637 HC RX REV CODE- 636/637: Performed by: PSYCHIATRY & NEUROLOGY

## 2022-12-01 PROCEDURE — 65220000003 HC RM SEMIPRIVATE PSYCH

## 2022-12-01 PROCEDURE — 82962 GLUCOSE BLOOD TEST: CPT

## 2022-12-01 PROCEDURE — 74011250637 HC RX REV CODE- 250/637: Performed by: PSYCHIATRY & NEUROLOGY

## 2022-12-01 PROCEDURE — 74011636637 HC RX REV CODE- 636/637: Performed by: STUDENT IN AN ORGANIZED HEALTH CARE EDUCATION/TRAINING PROGRAM

## 2022-12-01 RX ADMIN — SERTRALINE HYDROCHLORIDE 100 MG: 50 TABLET ORAL at 08:40

## 2022-12-01 RX ADMIN — METFORMIN HYDROCHLORIDE 1000 MG: 500 TABLET, EXTENDED RELEASE ORAL at 16:55

## 2022-12-01 RX ADMIN — IBUPROFEN 400 MG: 400 TABLET, FILM COATED ORAL at 21:51

## 2022-12-01 RX ADMIN — FUROSEMIDE 40 MG: 40 TABLET ORAL at 08:40

## 2022-12-01 RX ADMIN — GABAPENTIN 100 MG: 100 CAPSULE ORAL at 16:55

## 2022-12-01 RX ADMIN — Medication 2 UNITS: at 08:40

## 2022-12-01 RX ADMIN — Medication 30 UNITS: at 08:39

## 2022-12-01 RX ADMIN — Medication 3 UNITS: at 16:55

## 2022-12-01 RX ADMIN — BUDESONIDE AND FORMOTEROL FUMARATE DIHYDRATE 2 PUFF: 80; 4.5 AEROSOL RESPIRATORY (INHALATION) at 20:16

## 2022-12-01 RX ADMIN — RISPERIDONE 2 MG: 1 TABLET ORAL at 21:37

## 2022-12-01 RX ADMIN — GABAPENTIN 100 MG: 100 CAPSULE ORAL at 08:40

## 2022-12-01 RX ADMIN — GABAPENTIN 100 MG: 100 CAPSULE ORAL at 11:48

## 2022-12-01 RX ADMIN — Medication 10 UNITS: at 11:48

## 2022-12-01 RX ADMIN — ATORVASTATIN CALCIUM 40 MG: 40 TABLET, FILM COATED ORAL at 17:04

## 2022-12-01 RX ADMIN — RISPERIDONE 2 MG: 1 TABLET ORAL at 08:40

## 2022-12-01 RX ADMIN — OXYBUTYNIN CHLORIDE 5 MG: 5 TABLET, EXTENDED RELEASE ORAL at 08:40

## 2022-12-01 RX ADMIN — Medication 10 UNITS: at 16:56

## 2022-12-01 RX ADMIN — Medication 10 UNITS: at 08:39

## 2022-12-01 RX ADMIN — Medication 2 UNITS: at 11:48

## 2022-12-01 RX ADMIN — BUDESONIDE AND FORMOTEROL FUMARATE DIHYDRATE 2 PUFF: 80; 4.5 AEROSOL RESPIRATORY (INHALATION) at 08:40

## 2022-12-01 RX ADMIN — HYDROXYZINE HYDROCHLORIDE 50 MG: 25 TABLET, FILM COATED ORAL at 21:51

## 2022-12-01 NOTE — PROGRESS NOTES
Miley Hoff is awake and isolative to her room resting in bed. She denies SI/HI/AVH. States her mood is \"much better\" since time of admission and is discharge focused. States she would like to go back to [facility] and [employer]" as all of her belongings are there. She states that she can take care of herself and just wants to leave. Affect flat, mood congruent. Minimally participatory in assessment process. Compliant with all scheduled meds.  Will continue to monitor for safety    Problem: Suicide  Goal: *STG/LTG: Complies with medication therapy  Outcome: Progressing Towards Goal  Goal: *STG/LTG: No longer expresses self destructive or suicidal thoughts  Outcome: Progressing Towards Goal

## 2022-12-01 NOTE — BH NOTES
PSYCHIATRIC PROGRESS NOTE         Patient Name  Azra Quiles   Date of Birth 1965   [facility] [id number]   Medical Record Number  [id number]      Age  59 y.o. PCP Yoli Kaur MD   Admit date:  11/9/2022    Room Number  320/02  @ [facility]   Date of Service  12/1/2022         E & M PROGRESS NOTE:         HISTORY       CC:  \"suicidal ideation\"  HISTORY OF PRESENT ILLNESS/INTERVAL HISTORY:  (reviewed/updated 12/1/2022). per initial evaluation: The patient, Azra Quiles, is a 59 y.o. WHITE/NON- female with a past psychiatric history significant for schizoaffective disorder, who presents at this time with complaints of (and/or evidence of) the following emotional symptoms: depression and suicidal thoughts/threats. Additional symptomatology include poor self care. The above symptoms have been present for 2+ weeks. These symptoms are of moderate to high severity. These symptoms are constant in nature. The patient's condition has been precipitated by psychosocial stressors. Patient's condition made worse by treatment noncompliance. UDS: negative; BAL=0. The patient is well known to the unit; she comes into the hospital frequently in crisis typically stating her 's death several years ago has made her feel hopeless. She has been living in a group home though this admission she states that she was kicked out of her group home because of an episode of incontinence. The patient is a poor historian. The patient corroborates the above narrative. The patient contracts for safety on the unit and gives consent for the team to contact collateral. The patient is amenable to initiating treatment while on the unit. She defers much of the team's inquires to her payee. 11/11 - no acute overnight events. The patient has been isolative to her room with poor ADLs. incontinent of urine but able to void when taken to the bathroom.  She denies active thoughts of self harm but is markedly internally preoccupied. Patient slept 8 hours overnight and got Trazodone. She denies active thoughts of self harm but endorses both depressed mood and anxiety. 11/14 - the patient remains in bed for much of the day. She has been inconitnent of urine and with poor ADLs requiring prompting for much activities. Patient denies active thoughts of self harm but endorses passive SI. She slept 7 hours overnight and is flat and disorganized on interview, moaning and providing little updates on her disposition plan. Per SW, the patient tends to not go to the group homes once assigned. Her payee is working on guardianship.    11/15 - overnight, the patient remained isolative to her bed, she continues to c/o anxiety and had an episode of urine incontinence. Patient refused nystatin stating her rash had 'cleared up.' She endorses ongoing AH and VH of 'ghosts' and states that she is doing very poorly. Patient evaluated by internist for ongoing LE swelling and pain. She is medication compliant and tolerating higher dose of Risperdal.    11/16 - the patient has remained in bed, out for meals and with little motivation to interact in the milieu. She got Motrin and Atarax and continues to c/o pain in her LE, as well as SI with a plan to cut herself if given the chance. The patient slept 8 hours and this morning spontaneously stated to the team that she wanted to be discharged -- she acknowledges that her payee and SW are working on a dispo plan and she is still symptomatic. Medicine consulted as she is now experiencing orthopnea. 11/17 - the patient has been isolative to her room, out for meals and slept 7 hours overnight. She is stating she will [facility] to [facility] [place] but acknowledges that the team is coordinating guardianship with her payee. Patient noted to have significant LE edema and c/o orthopnea. She is medication compliant and denies active thoughts of self harm. . She remains disorganized and with ongoing confusion but today she is otherwise pleasant. 11/18 - overnight, the patient was noted to be desaturating to the 80's and difficult to arouse. She denies all symptoms otherwise and has been able to make her needs known. Patient medication compliant. She has been largely in bed during the day, but out for meals. SW actively working on placement for the patient. IM started Lasix to address edema. 11/21 - the patient has been in bed for much of the day. She is internally preoccupied and with no episodes of agitation or aggression. Patient passing clots and with abdi hematuria per RN. Keflex started for UTI. Patient medication compliant and has been otherwise in fair behavioral control. 11/22 - overnight, patient remains bed bound and disorganized. She is no longer passing blot clots and hematuria has subsided since starting Abx. Patient denies SI/HI/PI, AH appears baseline. She is discharge focused, sleeping during the day and pleasant when awake. Diabetes team provided recommendations. 11/23 - the patient slept 9 hours overnight. She is out for meals but isolative to her room for much of the day. Patient still on fluid restriction as she is being diuresed. She denies SI/HI/VH/PI, she continues to c/o AH. Patient calm and cooperative on interview. BSG 170s- 200s. 11/24 - pt remains isolative. No acute incidents. Denies SI/plan/intent, HI/plan/intent. Denied AH/VH today. No other changes or new concerns today. 11/25 - Pt has mostly been isolative, but did get up and walk around some this morning. Continues to say she is ready to leave. No acute incidents. Slept 7.5 hours last night. Denies SI/plan/intent, denies HI/plan/intent, denies AVH. No new concerns today. 11/26 - Pt complained of chest pain this morning, nursing did an EKG, normal sinus rhythm, chest pain has resolved without intervention. Otherwise, no acute incidents. States her mood is lower today.  Denies SI/plan/intent, denies HI/plan/intent, denies AVH. States she just wants to get out of here. Affect flat, restricted. No other changes or new concerns at this time. 11/27- Pt continues to endorse high depression and anxiety. She slept 7.5 hours last night. Otherwise, she is calm and cooperative, no acute incidents.  this morning. Med compliant. Went to the break room for breakfast, is doing more ambulating. Pt states she is \"bad\" during assessment. Denies SI/plan/intent, HI/plan/intent, AVH. States she wants to go home. Reminded pt we are working on a discharge plan. Poor insight. No other changes at this time. 11/29 - Pt reports eating and sleeping well. She is medication compliant and has no side effects to report. She has no new complaints and denies SI/HI/AVH currently. No issues per staff. She did receive PRN Atarax for anxiety per staff. 11/30 - Rashida Davis reports feeling some stomach pains to nursing but request discharge to this interviewer. Not sure where she would go however. Referred her to social work for discharge planning. Moods are good. Denies SI/HI/AH/VH. No aggression or violence. Appropriately interactive and aware. Tolerating medications well. Eating and sleeping fairly. 12/1 - Rashida Davis says she needs to go to [facility] so she could sleep there. Disorganized thought process. Denies si hi or avh. Isolative to her room. Says she is sleeping and eating ok. At the present time the patient Rashida Davis remains compliant with taking medications. Denies any adverse events from taking them and feels they have been beneficial.           SIDE EFFECTS: (reviewed/updated 12/1/2022)  None reported or admitted to.   No noted toxicity with use of Depakote   ALLERGIES:(reviewed/updated 12/1/2022)  Allergies   Allergen Reactions    Amoxicillin Hives    Mushroom Flavor Hives    Tomato Hives      REVIEW OF SYSTEMS: (reviewed/updated 12/1/2022)  Appetite:improved   Sleep: no change   All other Review of Systems: Negative except incontinence per HPI         [facility] (MSE):    MSE FINDINGS ARE WITHIN NORMAL LIMITS (WNL) UNLESS OTHERWISE STATED BELOW. ( ALL OF THE BELOW CATEGORIES OF THE MSE HAVE BEEN REVIEWED (reviewed 12/1/2022) AND UPDATED AS DEEMED APPROPRIATE )  General Presentation age appropriate, cooperative and guarded   Orientation disorganized   Vital Signs  See below (reviewed 12/1/2022); Vital Signs (BP, Pulse, & Temp) are within normal limits if not listed below.    Gait and Station Stable/steady, no ataxia   Musculoskeletal System No extrapyramidal symptoms (EPS); no abnormal muscular movements or Tardive Dyskinesia (TD); muscle strength and tone are within normal limits   Language No aphasia or dysarthria   Speech:  normal volume and non-pressured   Thought Processes concrete; normal rate of thoughts; poor abstract reasoning/computation   Thought Associations blocked    Thought Content auditory hallucinations and visual hallucinations   Suicidal Ideations contracts for safety   Homicidal Ideations contracts for safety   Mood:  depressed and anhedonia   Affect:  flat   Memory recent  intact   Memory remote:  intact   Concentration/Attention:  intact   Fund of Knowledge average   Insight:  limited   Reliability fair   Judgment:  limited          VITALS:     Patient Vitals for the past 24 hrs:   Temp Pulse Resp BP SpO2   12/01/22 1105 97.5 °F (36.4 °C) 77 16 (!) 148/74 100 %   11/30/22 2010 98.1 °F (36.7 °C) 66 16 127/64 98 %       Wt Readings from Last 3 Encounters:   12/01/22 120.1 kg (264 lb 11.2 oz)   06/03/22 113.9 kg (251 lb)   05/14/22 88.5 kg (195 lb)     Temp Readings from Last 3 Encounters:   12/01/22 97.5 °F (36.4 °C)   06/03/22 97.6 °F (36.4 °C)   05/19/22 98.5 °F (36.9 °C)     BP Readings from Last 3 Encounters:   12/01/22 (!) 148/74   06/03/22 130/69   05/19/22 (!) 112/51     Pulse Readings from Last 3 Encounters:   12/01/22 77 06/03/22 89   05/19/22 64            DATA     LABORATORY DATA:(reviewed/updated 12/1/2022)  Recent Results (from the past 24 hour(s))   GLUCOSE, POC    Collection Time: 11/30/22  4:04 PM   Result Value Ref Range    Glucose (POC) 163 (H) 65 - 117 mg/dL    Performed by Gaby Tesfaye RN    GLUCOSE, POC    Collection Time: 11/30/22  8:43 PM   Result Value Ref Range    Glucose (POC) 332 (H) 65 - 117 mg/dL    Performed by Darin Sams RN    GLUCOSE, POC    Collection Time: 12/01/22  7:55 AM   Result Value Ref Range    Glucose (POC) 177 (H) 65 - 117 mg/dL    Performed by Jean Moeller RN    GLUCOSE, POC    Collection Time: 12/01/22 11:11 AM   Result Value Ref Range    Glucose (POC) 175 (H) 65 - 117 mg/dL    Performed by Jean Moeller RN      No results found for: VALF2, VALAC, VALP, VALPR, DS6, CRBAM, CRBAMP, CARB2, XCRBAM  No results found for: LITHM   RADIOLOGY REPORTS:(reviewed/updated 12/1/2022)  No results found.        MEDICATIONS     ALL MEDICATIONS:   Current Facility-Administered Medications   Medication Dose Route Frequency    insulin lispro (HUMALOG) injection 10 Units  10 Units SubCUTAneous TIDAC    insulin glargine (LANTUS) injection 30 Units  30 Units SubCUTAneous DAILY    furosemide (LASIX) tablet 40 mg  40 mg Oral DAILY    ibuprofen (MOTRIN) tablet 400 mg  400 mg Oral Q6H PRN    nystatin (MYCOSTATIN) 100,000 unit/gram powder   Topical BID    risperiDONE (RisperDAL) tablet 2 mg  2 mg Oral Q12H    benzocaine-menthoL (CHLORASEPTIC MAX) lozenge 1 Lozenge  1 Lozenge Oral Q2H PRN    glucose chewable tablet 16 g  4 Tablet Oral PRN    glucagon (GLUCAGEN) injection 1 mg  1 mg IntraMUSCular PRN    dextrose 10% infusion 0-250 mL  0-250 mL IntraVENous PRN    albuterol (PROVENTIL HFA, VENTOLIN HFA, PROAIR HFA) inhaler 1 Puff  1 Puff Inhalation Q4H PRN    insulin lispro (HUMALOG) injection 1-10 Units  1-10 Units SubCUTAneous AC&HS    budesonide-formoterol (SYMBICORT) 80-4.5 mcg inhaler  2 Puff Inhalation BID RT OLANZapine (ZyPREXA) tablet 5 mg  5 mg Oral Q6H PRN    haloperidol lactate (HALDOL) injection 5 mg  5 mg IntraMUSCular Q6H PRN    benztropine (COGENTIN) tablet 1 mg  1 mg Oral BID PRN    diphenhydrAMINE (BENADRYL) injection 50 mg  50 mg IntraMUSCular BID PRN    hydrOXYzine HCL (ATARAX) tablet 50 mg  50 mg Oral TID PRN    LORazepam (ATIVAN) injection 1 mg  1 mg IntraMUSCular Q4H PRN    traZODone (DESYREL) tablet 50 mg  50 mg Oral QHS PRN    acetaminophen (TYLENOL) tablet 650 mg  650 mg Oral Q4H PRN    magnesium hydroxide (MILK OF MAGNESIA) 400 mg/5 mL oral suspension 30 mL  30 mL Oral DAILY PRN    atorvastatin (LIPITOR) tablet 40 mg  40 mg Oral QPM    gabapentin (NEURONTIN) capsule 100 mg  100 mg Oral TID    metFORMIN ER (GLUCOPHAGE XR) tablet 1,000 mg  1,000 mg Oral BID WITH MEALS    oxybutynin chloride XL (DITROPAN XL) tablet 5 mg  5 mg Oral DAILY    sertraline (ZOLOFT) tablet 100 mg  100 mg Oral DAILY      SCHEDULED MEDICATIONS:   Current Facility-Administered Medications   Medication Dose Route Frequency    insulin lispro (HUMALOG) injection 10 Units  10 Units SubCUTAneous TIDAC    insulin glargine (LANTUS) injection 30 Units  30 Units SubCUTAneous DAILY    furosemide (LASIX) tablet 40 mg  40 mg Oral DAILY    nystatin (MYCOSTATIN) 100,000 unit/gram powder   Topical BID    risperiDONE (RisperDAL) tablet 2 mg  2 mg Oral Q12H    insulin lispro (HUMALOG) injection 1-10 Units  1-10 Units SubCUTAneous AC&HS    budesonide-formoterol (SYMBICORT) 80-4.5 mcg inhaler  2 Puff Inhalation BID RT    atorvastatin (LIPITOR) tablet 40 mg  40 mg Oral QPM    gabapentin (NEURONTIN) capsule 100 mg  100 mg Oral TID    metFORMIN ER (GLUCOPHAGE XR) tablet 1,000 mg  1,000 mg Oral BID WITH MEALS    oxybutynin chloride XL (DITROPAN XL) tablet 5 mg  5 mg Oral DAILY    sertraline (ZOLOFT) tablet 100 mg  100 mg Oral DAILY          ASSESSMENT & PLAN     DIAGNOSES REQUIRING ACTIVE TREATMENT AND MONITORING: (reviewed/updated 12/1/2022)  Patient Active Hospital Problem List:       Schizoaffective disorder (11/10/2022)           Assessment: the patient presents in crisis after potentially leaving her group home; she is disorganized and a poor historian at baseline, treatment will be primarily psychosocial. Patient with low O2 sats, edema and orthopnea, will have to address this. Plan:   - CONTINUE Risperdal 2 mg Q12H for psychosis  - CONTINUE Zoloft 100 mg QDAY for MDD  - Appreciate DM team / IM recs  - Consider sleep referral for obesity hypoventilation syndrome  - IGM therapy as tolerated  - Expand database / obtain collateral  - Dispo planning with social work    In summary, Estrellita Fraire, is a 59 y.o.  female who presents with a severe exacerbation of the principal diagnosis of Schizoaffective disorder ([facility] [address]    Patient's condition is improving. Patient requires continued inpatient hospitalization for further stabilization, safety monitoring and medication management. I will continue to coordinate the provision of individual, milieu, occupational, group, and substance abuse therapies to address target symptoms/diagnoses as deemed appropriate for the individual patient. A coordinated, multidisplinary treatment team round was conducted with the patient (this team consists of the nurse, psychiatric unit pharmacist,  and writer). Complete current electronic health record for patient has been reviewed today including consultant notes, ancillary staff notes, nurses and psychiatric tech notes. Suicide risk assessment completed and patient deemed to be of low risk for suicide at this time. The following regarding medications was addressed during rounds with patient:   the risks and benefits of the proposed medication. The patient was given the opportunity to ask questions. Informed consent given to the use of the above medications.  Will continue to adjust psychiatric and non-psychiatric medications (see above \"medication\" section and orders section for details) as deemed appropriate & based upon diagnoses and response to treatment. I will continue to order blood tests/labs and diagnostic tests as deemed appropriate and review results as they become available (see orders for details and above listed lab/test results). I will order psychiatric records from previous [facility] to further elucidate the nature of patient's psychopathology and review once available. I will gather additional collateral information from friends, family and o/p treatment team to further elucidate the nature of patient's psychopathology and baselline level of psychiatric functioning. I certify that this patient's inpatient psychiatric hospital services furnished since the previous certification were, and continue to be, required for treatment that could reasonably be expected to improve the patient's condition, or for diagnostic study, and that the patient continues to need, on a daily basis, active treatment furnished directly by or requiring the supervision of inpatient psychiatric facility personnel. In addition, the hospital records show that services furnished were intensive treatment services, admission or related services, or equivalent services.     EXPECTED DISCHARGE DATE/DAY: TBD     DISPOSITION: Group home / shelter       Signed By:   Fernanda Childers NP  12/1/2022

## 2022-12-01 NOTE — BH NOTES
Behavioral Health Interdisciplinary Rounds    Patient goal(s) for today: Continue taking meds as, participate in hygiene/ADLs, prepare for discharge, follow directions from staff, contact support team, attend groups        Progress note: Per nursing, patient slept for 8 hours and denies AVH/SI/HI anxiety and depression. Patient is alert and oriented x 4. Patient is discharge focused and requested to be [facility]. Patient reports she is ready to return to the [facility] as she is no longer experiencing anxiety and depression (self report). [employer] has been waiting for patient's payee to bring guardianship papers for the patient. Patient's payee has not contacted  SW or returned their calls regarding this plan for almost 2 weeks.  will attempt to contact patient's payee again but if  is unable to make contact, patient may discharge back to the [facility] tomorrow.      LOS: 21                     Expected LOS: 22-25     Insurance info: [address] [employer] [facility]  Outpatient providers: Anthony Miramontes [phone]     Participating treatment team members: Nawaf Garcia MSW, Carolyne Bose MSW Student, Anne Marie Arellano MD

## 2022-12-01 NOTE — BH NOTES
Pt met in the room alert while resting in bed quietly with bilateral even breathing. Pt denied having hallucinations,pain,anxiety,depression or suicidal thoughts. Pt is on fluid restriction 1500 mls in a day and daily weight. pt reported no genitourinary issues. 15 minutes safety checks initiated and monitored. Pt BS was 332 and 4 units of insulin humalog given for coverage. Isolative pt spent most time in bed and snack was served  in her room. Pt was medication compliant. Pt orally took 500 mls of soda and water between 5840-7526. And the pt was continent of bladder x 3  Pt slept for 8 hrs ct with care.

## 2022-12-01 NOTE — PROGRESS NOTES
Behavioral Services                                              Medicare Re-Certification    I certify that the inpatient psychiatric hospital services furnished since the previous certification/re-certification were, and continue to be, medically necessary for;    [x] (1) Treatment which could reasonably be expected to improve the patient's condition,    [x] (2) Or for diagnostic study. Estimated length of stay/service 5 - 7 days    Plan for post-hospital care per social work    This patient continues to need, on a daily basis, active treatment furnished directly by or requiring the supervision of inpatient psychiatric personnel.     Electronically signed by Mary Kay Quinones MD on 12/1/2022 at 1:10 AM

## 2022-12-01 NOTE — PROGRESS NOTES
Problem: Falls - Risk of  Goal: *Absence of Falls  Description: Document Juan Pablo Mena Fall Risk and appropriate interventions in the flowsheet.   Outcome: Progressing Towards Goal  Note: Fall Risk Interventions:  Mobility Interventions: Patient to call before getting OOB         Medication Interventions: Teach patient to arise slowly    Elimination Interventions: Patient to call for help with toileting needs    History of Falls Interventions: Room close to nurse's station         Problem: Suicide  Goal: *STG: Remains safe in hospital  Outcome: Progressing Towards Goal  Goal: *STG/LTG: Complies with medication therapy  Outcome: Progressing Towards Goal     Problem: Altered Thought Process (Adult/Pediatric)  Goal: *STG: Remains safe in hospital  Outcome: Progressing Towards Goal

## 2022-12-01 NOTE — BH NOTES
PSYCHIATRIC PROGRESS NOTE         Patient Name  Kelly Sapp   Date of Birth 1965   [facility] [id number]   Medical Record Number  [id number]      Age  59 y.o. PCP Annamaria Perkins MD   Admit date:  11/9/2022    Room Number  320/02  @ [facility]   Date of Service  11/30/2022         E & M PROGRESS NOTE:         HISTORY       CC:  \"suicidal ideation\"  HISTORY OF PRESENT ILLNESS/INTERVAL HISTORY:  (reviewed/updated 11/30/2022). per initial evaluation: The patient, Kelly Sapp, is a 59 y.o. WHITE/NON- female with a past psychiatric history significant for schizoaffective disorder, who presents at this time with complaints of (and/or evidence of) the following emotional symptoms: depression and suicidal thoughts/threats. Additional symptomatology include poor self care. The above symptoms have been present for 2+ weeks. These symptoms are of moderate to high severity. These symptoms are constant in nature. The patient's condition has been precipitated by psychosocial stressors. Patient's condition made worse by treatment noncompliance. UDS: negative; BAL=0. The patient is well known to the unit; she comes into the hospital frequently in crisis typically stating her 's death several years ago has made her feel hopeless. She has been living in a group home though this admission she states that she was kicked out of her group home because of an episode of incontinence. The patient is a poor historian. The patient corroborates the above narrative. The patient contracts for safety on the unit and gives consent for the team to contact collateral. The patient is amenable to initiating treatment while on the unit. She defers much of the team's inquires to her payee. 11/11 - no acute overnight events. The patient has been isolative to her room with poor ADLs. incontinent of urine but able to void when taken to the bathroom.  She denies active thoughts of self harm but is markedly internally preoccupied. Patient slept 8 hours overnight and got Trazodone. She denies active thoughts of self harm but endorses both depressed mood and anxiety. 11/14 - the patient remains in bed for much of the day. She has been inconitnent of urine and with poor ADLs requiring prompting for much activities. Patient denies active thoughts of self harm but endorses passive SI. She slept 7 hours overnight and is flat and disorganized on interview, moaning and providing little updates on her disposition plan. Per SW, the patient tends to not go to the group homes once assigned. Her payee is working on guardianship.    11/15 - overnight, the patient remained isolative to her bed, she continues to c/o anxiety and had an episode of urine incontinence. Patient refused nystatin stating her rash had 'cleared up.' She endorses ongoing AH and VH of 'ghosts' and states that she is doing very poorly. Patient evaluated by internist for ongoing LE swelling and pain. She is medication compliant and tolerating higher dose of Risperdal.    11/16 - the patient has remained in bed, out for meals and with little motivation to interact in the milieu. She got Motrin and Atarax and continues to c/o pain in her LE, as well as SI with a plan to cut herself if given the chance. The patient slept 8 hours and this morning spontaneously stated to the team that she wanted to be discharged -- she acknowledges that her payee and SW are working on a dispo plan and she is still symptomatic. Medicine consulted as she is now experiencing orthopnea. 11/17 - the patient has been isolative to her room, out for meals and slept 7 hours overnight. She is stating she will discharge to a [facility] on [address] but acknowledges that the team is coordinating guardianship with her payee. Patient noted to have significant LE edema and c/o orthopnea. She is medication compliant and denies active thoughts of self harm. . She remains disorganized and with ongoing confusion but today she is otherwise pleasant. 11/18 - overnight, the patient was noted to be desaturating to the 80's and difficult to arouse. She denies all symptoms otherwise and has been able to make her needs known. Patient medication compliant. She has been largely in bed during the day, but out for meals. SW actively working on placement for the patient. IM started Lasix to address edema. 11/21 - the patient has been in bed for much of the day. She is internally preoccupied and with no episodes of agitation or aggression. Patient passing clots and with abdi hematuria per RN. Keflex started for UTI. Patient medication compliant and has been otherwise in fair behavioral control. 11/22 - overnight, patient remains bed bound and disorganized. She is no longer passing blot clots and hematuria has subsided since starting Abx. Patient denies SI/HI/PI, AH appears baseline. She is discharge focused, sleeping during the day and pleasant when awake. Diabetes team provided recommendations. 11/23 - the patient slept 9 hours overnight. She is out for meals but isolative to her room for much of the day. Patient still on fluid restriction as she is being diuresed. She denies SI/HI/VH/PI, she continues to c/o AH. Patient calm and cooperative on interview. BSG 170s- 200s. 11/24 - pt remains isolative. No acute incidents. Denies SI/plan/intent, HI/plan/intent. Denied AH/VH today. No other changes or new concerns today. 11/25 - Pt has mostly been isolative, but did get up and walk around some this morning. Continues to say she is ready to leave. No acute incidents. Slept 7.5 hours last night. Denies SI/plan/intent, denies HI/plan/intent, denies AVH. No new concerns today. 11/26 - Pt complained of chest pain this morning, nursing did an EKG, normal sinus rhythm, chest pain has resolved without intervention. Otherwise, no acute incidents. States her mood is lower today.  Denies SI/plan/intent, denies HI/plan/intent, denies AVH. States she just wants to get out of here. Affect flat, restricted. No other changes or new concerns at this time. 11/27- Pt continues to endorse high depression and anxiety. She slept 7.5 hours last night. Otherwise, she is calm and cooperative, no acute incidents.  this morning. Med compliant. Went to the break room for breakfast, is doing more ambulating. Pt states she is \"bad\" during assessment. Denies SI/plan/intent, HI/plan/intent, AVH. States she wants to go home. Reminded pt we are working on a discharge plan. Poor insight. No other changes at this time. 11/29 - Pt reports eating and sleeping well. She is medication compliant and has no side effects to report. She has no new complaints and denies SI/HI/AVH currently. No issues per staff. She did receive PRN Atarax for anxiety per staff. 11/30 - Nagi Pickard reports feeling some stomach pains to nursing but request discharge to this interviewer. Not sure where she would go however. Referred her to social work for discharge planning. Moods are good. Denies SI/HI/AH/VH. No aggression or violence. Appropriately interactive and aware. Tolerating medications well. Eating and sleeping fairly. SIDE EFFECTS: (reviewed/updated 11/30/2022)  None reported or admitted to.   No noted toxicity with use of Depakote   ALLERGIES:(reviewed/updated 11/30/2022)  Allergies   Allergen Reactions    Amoxicillin Hives    Mushroom Flavor Hives    Tomato Hives      REVIEW OF SYSTEMS: (reviewed/updated 11/30/2022)  Appetite:improved   Sleep: no change   All other Review of Systems: Negative except incontinence per HPI         [facility] (MSE):    MSE FINDINGS ARE WITHIN NORMAL LIMITS (WNL) UNLESS OTHERWISE STATED BELOW. ( ALL OF THE BELOW CATEGORIES OF THE MSE HAVE BEEN REVIEWED (reviewed 11/30/2022) AND UPDATED AS DEEMED APPROPRIATE )  General Presentation age appropriate, cooperative and guarded   Orientation disorganized   Vital Signs  See below (reviewed 11/30/2022); Vital Signs (BP, Pulse, & Temp) are within normal limits if not listed below.    Gait and Station Stable/steady, no ataxia   Musculoskeletal System No extrapyramidal symptoms (EPS); no abnormal muscular movements or Tardive Dyskinesia (TD); muscle strength and tone are within normal limits   Language No aphasia or dysarthria   Speech:  normal volume and non-pressured   Thought Processes concrete; normal rate of thoughts; poor abstract reasoning/computation   Thought Associations blocked    Thought Content auditory hallucinations and visual hallucinations   Suicidal Ideations contracts for safety   Homicidal Ideations contracts for safety   Mood:  depressed and anhedonia   Affect:  flat   Memory recent  intact   Memory remote:  intact   Concentration/Attention:  intact   Fund of Knowledge average   Insight:  limited   Reliability fair   Judgment:  limited          VITALS:     Patient Vitals for the past 24 hrs:   Temp Pulse Resp BP SpO2   11/30/22 2010 98.1 °F (36.7 °C) 66 16 127/64 98 %   11/30/22 0800 99.7 °F (37.6 °C) 67 18 112/73 95 %       Wt Readings from Last 3 Encounters:   06/03/22 113.9 kg (251 lb)   05/14/22 88.5 kg (195 lb)   12/30/21 104.3 kg (230 lb)     Temp Readings from Last 3 Encounters:   11/30/22 98.1 °F (36.7 °C)   06/03/22 97.6 °F (36.4 °C)   05/19/22 98.5 °F (36.9 °C)     BP Readings from Last 3 Encounters:   11/30/22 127/64   06/03/22 130/69   05/19/22 (!) 112/51     Pulse Readings from Last 3 Encounters:   11/30/22 66   06/03/22 89   05/19/22 64            DATA     LABORATORY DATA:(reviewed/updated 11/30/2022)  Recent Results (from the past 24 hour(s))   GLUCOSE, POC    Collection Time: 11/30/22  7:32 AM   Result Value Ref Range    Glucose (POC) 140 (H) 65 - 117 mg/dL    Performed by Angelo Perdue RN    GLUCOSE, POC    Collection Time: 11/30/22 11:09 AM   Result Value Ref Range    Glucose (POC) 123 (H) 65 - 117 mg/dL    Performed by Arielle Mack RN    GLUCOSE, POC    Collection Time: 11/30/22  4:04 PM   Result Value Ref Range    Glucose (POC) 163 (H) 65 - 117 mg/dL    Performed by Arielle Mack RN    GLUCOSE, POC    Collection Time: 11/30/22  8:43 PM   Result Value Ref Range    Glucose (POC) 332 (H) 65 - 117 mg/dL    Performed by Donny Burgess RN      No results found for: VALF2, VALAC, VALP, VALPR, DS6, CRBAM, CRBAMP, CARB2, XCRBAM  No results found for: LITHM   RADIOLOGY REPORTS:(reviewed/updated 11/30/2022)  No results found.        MEDICATIONS     ALL MEDICATIONS:   Current Facility-Administered Medications   Medication Dose Route Frequency    insulin lispro (HUMALOG) injection 10 Units  10 Units SubCUTAneous TIDAC    insulin glargine (LANTUS) injection 30 Units  30 Units SubCUTAneous DAILY    furosemide (LASIX) tablet 40 mg  40 mg Oral DAILY    ibuprofen (MOTRIN) tablet 400 mg  400 mg Oral Q6H PRN    nystatin (MYCOSTATIN) 100,000 unit/gram powder   Topical BID    risperiDONE (RisperDAL) tablet 2 mg  2 mg Oral Q12H    benzocaine-menthoL (CHLORASEPTIC MAX) lozenge 1 Lozenge  1 Lozenge Oral Q2H PRN    glucose chewable tablet 16 g  4 Tablet Oral PRN    glucagon (GLUCAGEN) injection 1 mg  1 mg IntraMUSCular PRN    dextrose 10% infusion 0-250 mL  0-250 mL IntraVENous PRN    albuterol (PROVENTIL HFA, VENTOLIN HFA, PROAIR HFA) inhaler 1 Puff  1 Puff Inhalation Q4H PRN    insulin lispro (HUMALOG) injection 1-10 Units  1-10 Units SubCUTAneous AC&HS    budesonide-formoterol (SYMBICORT) 80-4.5 mcg inhaler  2 Puff Inhalation BID RT    OLANZapine (ZyPREXA) tablet 5 mg  5 mg Oral Q6H PRN    haloperidol lactate (HALDOL) injection 5 mg  5 mg IntraMUSCular Q6H PRN    benztropine (COGENTIN) tablet 1 mg  1 mg Oral BID PRN    diphenhydrAMINE (BENADRYL) injection 50 mg  50 mg IntraMUSCular BID PRN    hydrOXYzine HCL (ATARAX) tablet 50 mg  50 mg Oral TID PRN    LORazepam (ATIVAN) injection 1 mg  1 mg IntraMUSCular Q4H PRN    traZODone (DESYREL) tablet 50 mg  50 mg Oral QHS PRN    acetaminophen (TYLENOL) tablet 650 mg  650 mg Oral Q4H PRN    magnesium hydroxide (MILK OF MAGNESIA) 400 mg/5 mL oral suspension 30 mL  30 mL Oral DAILY PRN    atorvastatin (LIPITOR) tablet 40 mg  40 mg Oral QPM    gabapentin (NEURONTIN) capsule 100 mg  100 mg Oral TID    metFORMIN ER (GLUCOPHAGE XR) tablet 1,000 mg  1,000 mg Oral BID WITH MEALS    oxybutynin chloride XL (DITROPAN XL) tablet 5 mg  5 mg Oral DAILY    sertraline (ZOLOFT) tablet 100 mg  100 mg Oral DAILY      SCHEDULED MEDICATIONS:   Current Facility-Administered Medications   Medication Dose Route Frequency    insulin lispro (HUMALOG) injection 10 Units  10 Units SubCUTAneous TIDAC    insulin glargine (LANTUS) injection 30 Units  30 Units SubCUTAneous DAILY    furosemide (LASIX) tablet 40 mg  40 mg Oral DAILY    nystatin (MYCOSTATIN) 100,000 unit/gram powder   Topical BID    risperiDONE (RisperDAL) tablet 2 mg  2 mg Oral Q12H    insulin lispro (HUMALOG) injection 1-10 Units  1-10 Units SubCUTAneous AC&HS    budesonide-formoterol (SYMBICORT) 80-4.5 mcg inhaler  2 Puff Inhalation BID RT    atorvastatin (LIPITOR) tablet 40 mg  40 mg Oral QPM    gabapentin (NEURONTIN) capsule 100 mg  100 mg Oral TID    metFORMIN ER (GLUCOPHAGE XR) tablet 1,000 mg  1,000 mg Oral BID WITH MEALS    oxybutynin chloride XL (DITROPAN XL) tablet 5 mg  5 mg Oral DAILY    sertraline (ZOLOFT) tablet 100 mg  100 mg Oral DAILY          ASSESSMENT & PLAN     DIAGNOSES REQUIRING ACTIVE TREATMENT AND MONITORING: (reviewed/updated 11/30/2022)  Patient Active Hospital Problem List:       Schizoaffective disorder (11/10/2022)           Assessment: the patient presents in crisis after potentially leaving her group home; she is disorganized and a poor historian at baseline, treatment will be primarily psychosocial. Patient with low O2 sats, edema and orthopnea, will have to address this.         Plan:   - CONTINUE Risperdal 2 mg Q12H for psychosis  - CONTINUE Zoloft 100 mg QDAY for MDD  - Appreciate DM team / IM recs  - Consider sleep referral for obesity hypoventilation syndrome  - IGM therapy as tolerated  - Expand database / obtain collateral  - Dispo planning with social work    In summary, Christi Guzmán, is a 59 y.o.  female who presents with a severe exacerbation of the principal diagnosis of Schizoaffective disorder ([facility] [address]    Patient's condition is improving. Patient requires continued inpatient hospitalization for further stabilization, safety monitoring and medication management. I will continue to coordinate the provision of individual, milieu, occupational, group, and substance abuse therapies to address target symptoms/diagnoses as deemed appropriate for the individual patient. A coordinated, multidisplinary treatment team round was conducted with the patient (this team consists of the nurse, psychiatric unit pharmacist,  and writer). Complete current electronic health record for patient has been reviewed today including consultant notes, ancillary staff notes, nurses and psychiatric tech notes. Suicide risk assessment completed and patient deemed to be of low risk for suicide at this time. The following regarding medications was addressed during rounds with patient:   the risks and benefits of the proposed medication. The patient was given the opportunity to ask questions. Informed consent given to the use of the above medications. Will continue to adjust psychiatric and non-psychiatric medications (see above \"medication\" section and orders section for details) as deemed appropriate & based upon diagnoses and response to treatment. I will continue to order blood tests/labs and diagnostic tests as deemed appropriate and review results as they become available (see orders for details and above listed lab/test results).     I will order psychiatric records from previous [facility] to further elucidate the nature of patient's psychopathology and review once available. I will gather additional collateral information from friends, family and o/p treatment team to further elucidate the nature of patient's psychopathology and baselline level of psychiatric functioning. I certify that this patient's inpatient psychiatric hospital services furnished since the previous certification were, and continue to be, required for treatment that could reasonably be expected to improve the patient's condition, or for diagnostic study, and that the patient continues to need, on a daily basis, active treatment furnished directly by or requiring the supervision of inpatient psychiatric facility personnel. In addition, the hospital records show that services furnished were intensive treatment services, admission or related services, or equivalent services.     EXPECTED DISCHARGE DATE/DAY: TBD     DISPOSITION: Group home / shelter       Signed By:   Abisai Romero MD  11/30/2022

## 2022-12-01 NOTE — GROUP NOTE
EMILY  GROUP DOCUMENTATION INDIVIDUAL                                                                          Group Therapy Note    Date: 12/1/2022    Group Start Time: 0900  Group End Time: 1000  Group Topic: Topic Group    [facility] [place] 3 ACUTE BEHAV TH    [place] [address] GROUP    Group Therapy Note    Attendees: 8       Attendance: Did not attend      Tom Roberts

## 2022-12-01 NOTE — GROUP NOTE
EMLIY  GROUP DOCUMENTATION INDIVIDUAL                                                                          Group Therapy Note    Date: 12/1/2022    Group Start Time: 1500  Group End Time: 1600  Group Topic: Recreational/Music Therapy    [facility] [place] [address] ACUTE BEHAV [facility]    [place] GROUP DOCUMENTATION GROUP    Group Therapy Note    Attendees: 15       Attendance: Attended    Patient's Goal:  To concentrate on selected task    Interventions/techniques: Supported-crafts,games,music    Interactions: Interacted appropriately    Mental Status: Calm    Behavior/appearance: Cooperative    Goals Achieved: Able to engage in interactions and Able to listen to others-listened to music      Alida Jorgensen

## 2022-12-02 LAB
GLUCOSE BLD STRIP.AUTO-MCNC: 166 MG/DL (ref 65–117)
GLUCOSE BLD STRIP.AUTO-MCNC: 197 MG/DL (ref 65–117)
GLUCOSE BLD STRIP.AUTO-MCNC: 233 MG/DL (ref 65–117)
GLUCOSE BLD STRIP.AUTO-MCNC: 237 MG/DL (ref 65–117)
SERVICE CMNT-IMP: ABNORMAL

## 2022-12-02 PROCEDURE — 82962 GLUCOSE BLOOD TEST: CPT

## 2022-12-02 PROCEDURE — 74011250637 HC RX REV CODE- 250/637

## 2022-12-02 PROCEDURE — 74011636637 HC RX REV CODE- 636/637: Performed by: STUDENT IN AN ORGANIZED HEALTH CARE EDUCATION/TRAINING PROGRAM

## 2022-12-02 PROCEDURE — 65220000003 HC RM SEMIPRIVATE PSYCH

## 2022-12-02 PROCEDURE — 74011250637 HC RX REV CODE- 250/637: Performed by: PSYCHIATRY & NEUROLOGY

## 2022-12-02 PROCEDURE — 74011250637 HC RX REV CODE- 250/637: Performed by: STUDENT IN AN ORGANIZED HEALTH CARE EDUCATION/TRAINING PROGRAM

## 2022-12-02 PROCEDURE — 74011636637 HC RX REV CODE- 636/637: Performed by: PSYCHIATRY & NEUROLOGY

## 2022-12-02 RX ORDER — ALBUTEROL SULFATE 90 UG/1
1 AEROSOL, METERED RESPIRATORY (INHALATION)
Qty: 18 G | Refills: 0 | Status: SHIPPED | OUTPATIENT
Start: 2022-12-02 | End: 2022-12-05 | Stop reason: SDUPTHER

## 2022-12-02 RX ORDER — NYSTATIN 100000 [USP'U]/G
POWDER TOPICAL 2 TIMES DAILY
Qty: 60 G | Refills: 0 | Status: SHIPPED | OUTPATIENT
Start: 2022-12-02 | End: 2022-12-05 | Stop reason: SDUPTHER

## 2022-12-02 RX ORDER — GABAPENTIN 100 MG/1
100 CAPSULE ORAL 3 TIMES DAILY
Qty: 90 CAPSULE | Refills: 0 | Status: SHIPPED | OUTPATIENT
Start: 2022-12-02 | End: 2022-12-05 | Stop reason: SDUPTHER

## 2022-12-02 RX ORDER — RISPERIDONE 2 MG/1
2 TABLET, FILM COATED ORAL EVERY 12 HOURS
Qty: 60 TABLET | Refills: 0 | Status: SHIPPED | OUTPATIENT
Start: 2022-12-02 | End: 2022-12-05 | Stop reason: SDUPTHER

## 2022-12-02 RX ORDER — ATORVASTATIN CALCIUM 40 MG/1
40 TABLET, FILM COATED ORAL EVERY EVENING
Qty: 30 TABLET | Refills: 0 | Status: SHIPPED | OUTPATIENT
Start: 2022-12-02 | End: 2022-12-05 | Stop reason: SDUPTHER

## 2022-12-02 RX ORDER — GLIPIZIDE 10 MG/1
10 TABLET ORAL
Qty: 30 TABLET | Refills: 0 | Status: SHIPPED | OUTPATIENT
Start: 2022-12-02 | End: 2022-12-05 | Stop reason: SDUPTHER

## 2022-12-02 RX ORDER — METFORMIN HYDROCHLORIDE 500 MG/1
1000 TABLET, EXTENDED RELEASE ORAL 2 TIMES DAILY WITH MEALS
Qty: 120 TABLET | Refills: 0 | Status: SHIPPED | OUTPATIENT
Start: 2022-12-02 | End: 2022-12-05 | Stop reason: SDUPTHER

## 2022-12-02 RX ORDER — OXYBUTYNIN CHLORIDE 5 MG/1
5 TABLET, EXTENDED RELEASE ORAL DAILY
Qty: 30 TABLET | Refills: 0 | Status: SHIPPED | OUTPATIENT
Start: 2022-12-03 | End: 2022-12-05 | Stop reason: SDUPTHER

## 2022-12-02 RX ORDER — LINAGLIPTIN 5 MG/1
5 TABLET, FILM COATED ORAL DAILY
Qty: 30 TABLET | Refills: 0 | Status: SHIPPED | OUTPATIENT
Start: 2022-12-02 | End: 2022-12-05 | Stop reason: SDUPTHER

## 2022-12-02 RX ORDER — FUROSEMIDE 40 MG/1
40 TABLET ORAL DAILY
Qty: 30 TABLET | Refills: 0 | Status: SHIPPED | OUTPATIENT
Start: 2022-12-02 | End: 2022-12-05 | Stop reason: SDUPTHER

## 2022-12-02 RX ORDER — SERTRALINE HYDROCHLORIDE 100 MG/1
100 TABLET, FILM COATED ORAL DAILY
Qty: 30 TABLET | Refills: 0 | Status: SHIPPED | OUTPATIENT
Start: 2022-12-02

## 2022-12-02 RX ORDER — BUDESONIDE AND FORMOTEROL FUMARATE DIHYDRATE 80; 4.5 UG/1; UG/1
2 AEROSOL RESPIRATORY (INHALATION) 2 TIMES DAILY
Qty: 10.2 G | Refills: 0 | Status: SHIPPED | OUTPATIENT
Start: 2022-12-02 | End: 2022-12-05 | Stop reason: SDUPTHER

## 2022-12-02 RX ADMIN — RISPERIDONE 2 MG: 1 TABLET ORAL at 08:25

## 2022-12-02 RX ADMIN — HYDROXYZINE HYDROCHLORIDE 50 MG: 25 TABLET, FILM COATED ORAL at 21:15

## 2022-12-02 RX ADMIN — Medication 10 UNITS: at 11:42

## 2022-12-02 RX ADMIN — NYSTATIN: 100000 POWDER TOPICAL at 08:00

## 2022-12-02 RX ADMIN — RISPERIDONE 2 MG: 1 TABLET ORAL at 21:15

## 2022-12-02 RX ADMIN — Medication 2 UNITS: at 08:19

## 2022-12-02 RX ADMIN — ATORVASTATIN CALCIUM 40 MG: 40 TABLET, FILM COATED ORAL at 17:01

## 2022-12-02 RX ADMIN — SERTRALINE HYDROCHLORIDE 100 MG: 50 TABLET ORAL at 07:40

## 2022-12-02 RX ADMIN — BUDESONIDE AND FORMOTEROL FUMARATE DIHYDRATE 2 PUFF: 80; 4.5 AEROSOL RESPIRATORY (INHALATION) at 07:40

## 2022-12-02 RX ADMIN — GABAPENTIN 100 MG: 100 CAPSULE ORAL at 07:40

## 2022-12-02 RX ADMIN — Medication 30 UNITS: at 08:19

## 2022-12-02 RX ADMIN — Medication 10 UNITS: at 08:19

## 2022-12-02 RX ADMIN — GABAPENTIN 100 MG: 100 CAPSULE ORAL at 17:01

## 2022-12-02 RX ADMIN — METFORMIN HYDROCHLORIDE 1000 MG: 500 TABLET, EXTENDED RELEASE ORAL at 07:39

## 2022-12-02 RX ADMIN — BUDESONIDE AND FORMOTEROL FUMARATE DIHYDRATE 2 PUFF: 80; 4.5 AEROSOL RESPIRATORY (INHALATION) at 21:13

## 2022-12-02 RX ADMIN — FUROSEMIDE 40 MG: 40 TABLET ORAL at 07:40

## 2022-12-02 RX ADMIN — OLANZAPINE 5 MG: 5 TABLET, FILM COATED ORAL at 08:05

## 2022-12-02 RX ADMIN — TRAZODONE HYDROCHLORIDE 50 MG: 50 TABLET ORAL at 21:14

## 2022-12-02 RX ADMIN — Medication 5 UNITS: at 11:41

## 2022-12-02 RX ADMIN — METFORMIN HYDROCHLORIDE 1000 MG: 500 TABLET, EXTENDED RELEASE ORAL at 17:01

## 2022-12-02 RX ADMIN — Medication 3 UNITS: at 17:01

## 2022-12-02 RX ADMIN — Medication 1 LOZENGE: at 21:14

## 2022-12-02 RX ADMIN — HYDROXYZINE HYDROCHLORIDE 50 MG: 25 TABLET, FILM COATED ORAL at 08:05

## 2022-12-02 RX ADMIN — IBUPROFEN 400 MG: 400 TABLET, FILM COATED ORAL at 21:15

## 2022-12-02 RX ADMIN — OXYBUTYNIN CHLORIDE 5 MG: 5 TABLET, EXTENDED RELEASE ORAL at 07:40

## 2022-12-02 RX ADMIN — Medication 10 UNITS: at 17:01

## 2022-12-02 RX ADMIN — GABAPENTIN 100 MG: 100 CAPSULE ORAL at 11:41

## 2022-12-02 NOTE — GROUP NOTE
EMILY  GROUP DOCUMENTATION INDIVIDUAL                                                                          Group Therapy Note    Date: 12/2/2022    Group Start Time: 0900  Group End Time: 1000  Group Topic: Topic Group    [address] [facility] 3 ACUTE BEHAV [facility], [address] [facility] GROUP DOCUMENTATION GROUP    Group Therapy Note    Attendees: 10       Attendance: Did not attend    Katelyn Degroot

## 2022-12-02 NOTE — GROUP NOTE
EMILY  GROUP DOCUMENTATION INDIVIDUAL                                                                          Group Therapy Note    Date: 12/2/2022    Group Start Time: 1500  Group End Time: 1600  Group Topic: Recreational/Music Therapy    [address] [place] [address] 3 ACUTE BEHAV [facility]    [place] [facility] GROUP    Group Therapy Note    Attendees: 9       Attendance: Did not attend    Christen Moses

## 2022-12-02 NOTE — BH NOTES
Pt is alert and oriented x 4, noted flat affect. She is still isolative to her room, emerging for meals. PT is medication and meal compliant. Pt denies SI/HI, AVH. Pt confirms depression. There are no other issues to note at this time.

## 2022-12-02 NOTE — DISCHARGE SUMMARY
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 Tablet by mouth daily. Indications: visible water retention  Qty: 30 Tablet, Refills: 0  Start date: 12/2/2022      budesonide-formoteroL (SYMBICORT) 80-4.5 mcg/actuation HFAA Take 2 Puffs by inhalation two (2) times a day. Indications: bronchospasm prevention with COPD  Qty: 10.2 g, Refills: 0  Start date: 12/2/2022      nystatin (MYCOSTATIN) powder Apply  to affected area two (2) times a day. Indications: a skin infection due to the fungus Candida  Qty: 60 g, Refills: 0  Start date: 12/2/2022           CONTINUE these medications which have CHANGED    Details   sertraline (ZOLOFT) 100 mg tablet Take 1 Tablet by mouth daily. Indications: anxiousness associated with depression  Qty: 30 Tablet, Refills: 0  Start date: 12/2/2022      risperiDONE (RisperDAL) 2 mg tablet Take 1 Tablet by mouth every twelve (12) hours. Indications: psychosis  Qty: 60 Tablet, Refills: 0  Start date: 12/2/2022      oxybutynin chloride XL (DITROPAN XL) 5 mg CR tablet Take 1 Tablet by mouth daily.  Indications: overactive bladder  Qty: 30 Tablet, Refills: 0  Start date: 12/3/2022 metFORMIN ER (GLUCOPHAGE XR) 500 mg tablet Take 2 Tablets by mouth two (2) times daily (with meals). Indications: type 2 diabetes mellitus  Qty: 120 Tablet, Refills: 0  Start date: 12/2/2022      gabapentin (NEURONTIN) 100 mg capsule Take 1 Capsule by mouth three (3) times daily. Max Daily Amount: 300 mg. Indications: neuropathic pain  Qty: 90 Capsule, Refills: 0  Start date: 12/2/2022    Associated Diagnoses: Type 2 diabetes mellitus with hyperglycemia, with long-term current use of insulin (HCC)      atorvastatin (LIPITOR) 40 mg tablet Take 1 Tablet by mouth every evening. Indications: Cholesterol  Qty: 30 Tablet, Refills: 0  Start date: 12/2/2022      linaGLIPtin (Tradjenta) 5 mg tablet Take 1 Tablet by mouth daily. Indications: type 2 diabetes mellitus  Qty: 30 Tablet, Refills: 0  Start date: 12/2/2022      glipiZIDE (GLUCOTROL) 10 mg tablet Take 1 Tablet by mouth Daily (before breakfast). Indications: type 2 diabetes mellitus  Qty: 30 Tablet, Refills: 0  Start date: 12/2/2022           STOP taking these medications       insulin glargine (LANTUS) 100 unit/mL injection Comments:   Reason for Stopping:                      A coordinated, multidisplinary treatment team round was conducted with Dakota Sandra---this is done daily here at [facility]. This team consists of the nurse, psychiatric unit pharmacist,  and writer. I have spent greater than 35 minutes on discharge work.     Signed:  Alfie Jerez MD  12/2/2022

## 2022-12-02 NOTE — BH NOTES
Behavioral Health Interdisciplinary Rounds    Patient goal(s) for today: Continue taking meds as, participate in hygiene/ADLs, prepare for discharge, follow directions from staff, contact support team, attend groups        Progress note: Per nursing, patient is still endorsing depression and anxiety but denies AH/SI/HI/VH. The patient was met in the hallway by the writer. Patient was expected to discharge to [address] [facility] today but will be discharging Monday. Patient reports she is feeling \"much better\" and is very discharge focused. Patient's payee was provided the information for the CSU, and has asked should there be any changes to contact her so that she may follow up with the CSU the patient discharges to.      LOS: 22                     Expected LOS: 25     Insurance info: Wasserjennifer Joy [facility]  Outpatient providers: Natividad Whaley payee [phone]     Participating treatment team members: Víctor Patton MSW Student

## 2022-12-02 NOTE — PROGRESS NOTES
Problem: Suicide  Goal: *STG/LTG: Complies with medication therapy  Outcome: Progressing Towards Goal     Problem: Discharge Planning  Goal: *Discharge to safe environment  Outcome: Progressing Towards Goal     Problem: Falls - Risk of  Goal: *Absence of Falls  Description: Document Radha Skill Fall Risk and appropriate interventions in the flowsheet. Outcome: Progressing Towards Goal  Note: Fall Risk Interventions:  Mobility Interventions: Patient to call before getting OOB    Medication Interventions: Teach patient to arise slowly    Elimination Interventions: Patient to call for help with toileting needs    History of Falls Interventions: Room close to nurse's station    1900 to 0700:      Report received from outgoing day shift RN. Assumed care of patient. On initial round Patient is up, alert, oriented, resting in bed. Patient denies S/I, H/I, A/V/H, pain, depression, anxiety. Patient is compliant  with HS medications. Patient finger stick blood sugar 180 mg no  HS coverage needs. Patient received  Ibuprofen 400 mg P.O and hydroxyzine 50 mg P. O. PRN. Patient observed resting in bed during shift rounds. Continuously hourly rounds and q 15 minute checks maintained during shift for care and safety. Daily WT: 266 lbs. Patient slept for  8 hrs.

## 2022-12-02 NOTE — PROGRESS NOTES
Problem: Falls - Risk of  Goal: *Absence of Falls  Description: Document Waylon John Fall Risk and appropriate interventions in the flowsheet. Outcome: Progressing Towards Goal  Note: Fall Risk Interventions:  Mobility Interventions: Patient to call before getting OOB         Medication Interventions: Teach patient to arise slowly    Elimination Interventions: Patient to call for help with toileting needs    History of Falls Interventions: Room close to nurse's station         Problem: Suicide  Goal: *STG: Remains safe in hospital  Outcome: Progressing Towards Goal   Pt encountered in bed sleeping, she is aroused by voice. Noted to be alert and oriented x 4, she denies SI/HI, AVH but confirms depression 10/10 and anxiety 7/10. There are no reports of pain or discomfort at this time. Monitoring for condition changes will continue.

## 2022-12-02 NOTE — PROGRESS NOTES
OCTAVIA contacted [facility] to inquire if they can provide the Pt with community stabilization services today. SW was informed that they are able to provide the Pt with community stabilization services upon the Pt discharge today. OCTAVIA faxed the Pt clinicals to [address] [place] [address].

## 2022-12-03 LAB
GLUCOSE BLD STRIP.AUTO-MCNC: 119 MG/DL (ref 65–117)
GLUCOSE BLD STRIP.AUTO-MCNC: 155 MG/DL (ref 65–117)
GLUCOSE BLD STRIP.AUTO-MCNC: 197 MG/DL (ref 65–117)
GLUCOSE BLD STRIP.AUTO-MCNC: 200 MG/DL (ref 65–117)
SERVICE CMNT-IMP: ABNORMAL

## 2022-12-03 PROCEDURE — 74011250637 HC RX REV CODE- 250/637: Performed by: STUDENT IN AN ORGANIZED HEALTH CARE EDUCATION/TRAINING PROGRAM

## 2022-12-03 PROCEDURE — 74011250637 HC RX REV CODE- 250/637: Performed by: PSYCHIATRY & NEUROLOGY

## 2022-12-03 PROCEDURE — 82962 GLUCOSE BLOOD TEST: CPT

## 2022-12-03 PROCEDURE — 74011636637 HC RX REV CODE- 636/637: Performed by: PSYCHIATRY & NEUROLOGY

## 2022-12-03 PROCEDURE — 74011636637 HC RX REV CODE- 636/637: Performed by: STUDENT IN AN ORGANIZED HEALTH CARE EDUCATION/TRAINING PROGRAM

## 2022-12-03 PROCEDURE — 74011250637 HC RX REV CODE- 250/637

## 2022-12-03 PROCEDURE — 65220000003 HC RM SEMIPRIVATE PSYCH

## 2022-12-03 RX ADMIN — GABAPENTIN 100 MG: 100 CAPSULE ORAL at 08:20

## 2022-12-03 RX ADMIN — Medication 10 UNITS: at 11:57

## 2022-12-03 RX ADMIN — RISPERIDONE 2 MG: 1 TABLET ORAL at 21:07

## 2022-12-03 RX ADMIN — GABAPENTIN 100 MG: 100 CAPSULE ORAL at 16:15

## 2022-12-03 RX ADMIN — IBUPROFEN 400 MG: 400 TABLET, FILM COATED ORAL at 21:06

## 2022-12-03 RX ADMIN — RISPERIDONE 2 MG: 1 TABLET ORAL at 08:20

## 2022-12-03 RX ADMIN — Medication 3 UNITS: at 08:16

## 2022-12-03 RX ADMIN — BUDESONIDE AND FORMOTEROL FUMARATE DIHYDRATE 2 PUFF: 80; 4.5 AEROSOL RESPIRATORY (INHALATION) at 21:06

## 2022-12-03 RX ADMIN — Medication 10 UNITS: at 16:24

## 2022-12-03 RX ADMIN — METFORMIN HYDROCHLORIDE 1000 MG: 500 TABLET, EXTENDED RELEASE ORAL at 08:20

## 2022-12-03 RX ADMIN — FUROSEMIDE 40 MG: 40 TABLET ORAL at 08:20

## 2022-12-03 RX ADMIN — Medication 1 LOZENGE: at 21:06

## 2022-12-03 RX ADMIN — OXYBUTYNIN CHLORIDE 5 MG: 5 TABLET, EXTENDED RELEASE ORAL at 08:20

## 2022-12-03 RX ADMIN — Medication 2 UNITS: at 16:22

## 2022-12-03 RX ADMIN — Medication 2 UNITS: at 11:58

## 2022-12-03 RX ADMIN — METFORMIN HYDROCHLORIDE 1000 MG: 500 TABLET, EXTENDED RELEASE ORAL at 16:15

## 2022-12-03 RX ADMIN — Medication 10 UNITS: at 08:17

## 2022-12-03 RX ADMIN — Medication 30 UNITS: at 08:16

## 2022-12-03 RX ADMIN — HYDROXYZINE HYDROCHLORIDE 50 MG: 25 TABLET, FILM COATED ORAL at 21:06

## 2022-12-03 RX ADMIN — ATORVASTATIN CALCIUM 40 MG: 40 TABLET, FILM COATED ORAL at 18:00

## 2022-12-03 RX ADMIN — TRAZODONE HYDROCHLORIDE 50 MG: 50 TABLET ORAL at 21:07

## 2022-12-03 RX ADMIN — SERTRALINE HYDROCHLORIDE 100 MG: 50 TABLET ORAL at 08:20

## 2022-12-03 RX ADMIN — NYSTATIN: 100000 POWDER TOPICAL at 16:25

## 2022-12-03 RX ADMIN — GABAPENTIN 100 MG: 100 CAPSULE ORAL at 11:58

## 2022-12-03 NOTE — PROGRESS NOTES
Problem: Falls - Risk of  Goal: *Absence of Falls  Description: Document Radha Skill Fall Risk and appropriate interventions in the flowsheet.   Outcome: Progressing Towards Goal  Note: Fall Risk Interventions:  Mobility Interventions: Patient to call before getting OOB         Medication Interventions: Teach patient to arise slowly    Elimination Interventions: Patient to call for help with toileting needs    History of Falls Interventions: Room close to nurse's station, Door open when patient unattended         Problem: Altered Thought Process (Adult/Pediatric)  Goal: *STG: Decreased delusional thinking  Outcome: Progressing Towards Goal

## 2022-12-03 NOTE — BH NOTES
Pt is alert and oriented x 4, noted flat affect. She is still isolative to her room, emerging for meals. Pt is medication and meal compliant. Pt denies SI/HI, AVH. Pt confirms depression. Pt showered. There are no other issues to note at this time.

## 2022-12-03 NOTE — PROGRESS NOTES
Problem: Falls - Risk of  Goal: *Absence of Falls  Description: Document Shaina Andrade Fall Risk and appropriate interventions in the flowsheet.   Outcome: Progressing Towards Goal  Note: Fall Risk Interventions:  Mobility Interventions: Patient to call before getting OOB         Medication Interventions: Teach patient to arise slowly    Elimination Interventions: Patient to call for help with toileting needs    History of Falls Interventions: Room close to nurse's station, Door open when patient unattended         Problem: Suicide  Goal: *STG: Remains safe in hospital  Outcome: Progressing Towards Goal

## 2022-12-03 NOTE — BH NOTES
Patient resting in bed. Patient was calm and is discharge focused stating \"I go home on Monday\". Patient denies depression, anxiety, SI/HI, and AVH. Patient received PRN Ibuprofen for pain in legs as well as Atarax and Trazodone. Patient's blood sugar was 166 upon checking; 0 coverage needed. Patient slept for 5.5 hours.

## 2022-12-04 LAB
GLUCOSE BLD STRIP.AUTO-MCNC: 162 MG/DL (ref 65–117)
GLUCOSE BLD STRIP.AUTO-MCNC: 167 MG/DL (ref 65–117)
GLUCOSE BLD STRIP.AUTO-MCNC: 174 MG/DL (ref 65–117)
GLUCOSE BLD STRIP.AUTO-MCNC: 196 MG/DL (ref 65–117)
SERVICE CMNT-IMP: ABNORMAL

## 2022-12-04 PROCEDURE — 82962 GLUCOSE BLOOD TEST: CPT

## 2022-12-04 PROCEDURE — 74011250637 HC RX REV CODE- 250/637: Performed by: STUDENT IN AN ORGANIZED HEALTH CARE EDUCATION/TRAINING PROGRAM

## 2022-12-04 PROCEDURE — 65220000003 HC RM SEMIPRIVATE PSYCH

## 2022-12-04 PROCEDURE — 74011636637 HC RX REV CODE- 636/637: Performed by: PSYCHIATRY & NEUROLOGY

## 2022-12-04 PROCEDURE — 74011636637 HC RX REV CODE- 636/637: Performed by: STUDENT IN AN ORGANIZED HEALTH CARE EDUCATION/TRAINING PROGRAM

## 2022-12-04 PROCEDURE — 74011250637 HC RX REV CODE- 250/637: Performed by: PSYCHIATRY & NEUROLOGY

## 2022-12-04 PROCEDURE — 74011250637 HC RX REV CODE- 250/637

## 2022-12-04 RX ADMIN — Medication 1 LOZENGE: at 21:05

## 2022-12-04 RX ADMIN — Medication 2 UNITS: at 11:12

## 2022-12-04 RX ADMIN — GABAPENTIN 100 MG: 100 CAPSULE ORAL at 08:50

## 2022-12-04 RX ADMIN — Medication 2 UNITS: at 08:51

## 2022-12-04 RX ADMIN — OXYBUTYNIN CHLORIDE 5 MG: 5 TABLET, EXTENDED RELEASE ORAL at 08:50

## 2022-12-04 RX ADMIN — Medication 10 UNITS: at 08:50

## 2022-12-04 RX ADMIN — IBUPROFEN 400 MG: 400 TABLET, FILM COATED ORAL at 21:05

## 2022-12-04 RX ADMIN — RISPERIDONE 2 MG: 1 TABLET ORAL at 08:50

## 2022-12-04 RX ADMIN — Medication 2 UNITS: at 16:52

## 2022-12-04 RX ADMIN — RISPERIDONE 2 MG: 1 TABLET ORAL at 21:05

## 2022-12-04 RX ADMIN — HYDROXYZINE HYDROCHLORIDE 50 MG: 25 TABLET, FILM COATED ORAL at 21:05

## 2022-12-04 RX ADMIN — ALBUTEROL SULFATE 1 PUFF: 90 AEROSOL, METERED RESPIRATORY (INHALATION) at 23:03

## 2022-12-04 RX ADMIN — Medication 30 UNITS: at 08:52

## 2022-12-04 RX ADMIN — BUDESONIDE AND FORMOTEROL FUMARATE DIHYDRATE 2 PUFF: 80; 4.5 AEROSOL RESPIRATORY (INHALATION) at 08:52

## 2022-12-04 RX ADMIN — ATORVASTATIN CALCIUM 40 MG: 40 TABLET, FILM COATED ORAL at 18:00

## 2022-12-04 RX ADMIN — Medication 10 UNITS: at 16:51

## 2022-12-04 RX ADMIN — Medication 10 UNITS: at 11:11

## 2022-12-04 RX ADMIN — TRAZODONE HYDROCHLORIDE 50 MG: 50 TABLET ORAL at 21:05

## 2022-12-04 RX ADMIN — SERTRALINE HYDROCHLORIDE 100 MG: 50 TABLET ORAL at 08:52

## 2022-12-04 RX ADMIN — GABAPENTIN 100 MG: 100 CAPSULE ORAL at 11:13

## 2022-12-04 RX ADMIN — GABAPENTIN 100 MG: 100 CAPSULE ORAL at 16:51

## 2022-12-04 RX ADMIN — METFORMIN HYDROCHLORIDE 1000 MG: 500 TABLET, EXTENDED RELEASE ORAL at 08:50

## 2022-12-04 RX ADMIN — FUROSEMIDE 40 MG: 40 TABLET ORAL at 08:50

## 2022-12-04 RX ADMIN — ATORVASTATIN CALCIUM 40 MG: 40 TABLET, FILM COATED ORAL at 16:51

## 2022-12-04 RX ADMIN — METFORMIN HYDROCHLORIDE 1000 MG: 500 TABLET, EXTENDED RELEASE ORAL at 16:51

## 2022-12-04 NOTE — PROGRESS NOTES
Patient observed sitting in dayroom during transition of care. Patient calm, cooperative, A/O X4. No immediate concerns reported. No obvious signs of medical or psychiatric distress observed. Patient denied no symptoms of depression. Patient reported symptoms of anxiety 5/10. Patient denied hallucinations. Patient did not endorse S/I or H/I. Patient's HS blood glucose level 119. Patient reported BL leg pain 5/10. Patient received scheduled medications along with PRN Trazodone, Ibuprofen, Atarax and cough lozenge. Patient observed resting/sleeping in bed with no concerns. Staff will continue to monitor. Patient slept for 7.5 hours.      Problem: Suicide  Goal: *STG/LTG: Complies with medication therapy  Outcome: Progressing Towards Goal     Problem: Altered Thought Process (Adult/Pediatric)  Goal: *STG: Remains safe in hospital  Outcome: Progressing Towards Goal  Goal: *STG: Seeks staff when feelings of anxiety and fear arise  Outcome: Progressing Towards Goal

## 2022-12-04 NOTE — BH NOTES
PSYCHIATRIC PROGRESS NOTE         Patient Name  Gabriella Toussaint   Date of Birth 1965   [facility] [id number]   Medical Record Number  [id number]      Age  59 y.o. PCP Judit Trinh MD   Admit date:  11/9/2022    Room Number  320/02  @ [facility]   Date of Service  12/4/2022         E & M PROGRESS NOTE:         HISTORY       CC:  \"suicidal ideation\"  HISTORY OF PRESENT ILLNESS/INTERVAL HISTORY:  (reviewed/updated 12/4/2022). per initial evaluation: The patient, Gabriella Toussaint, is a 59 y.o. WHITE/NON- female with a past psychiatric history significant for schizoaffective disorder, who presents at this time with complaints of (and/or evidence of) the following emotional symptoms: depression and suicidal thoughts/threats. Additional symptomatology include poor self care. The above symptoms have been present for 2+ weeks. These symptoms are of moderate to high severity. These symptoms are constant in nature. The patient's condition has been precipitated by psychosocial stressors. Patient's condition made worse by treatment noncompliance. UDS: negative; BAL=0. The patient is well known to the unit; she comes into the hospital frequently in crisis typically stating her 's death several years ago has made her feel hopeless. She has been living in a group home though this admission she states that she was kicked out of her group home because of an episode of incontinence. The patient is a poor historian. The patient corroborates the above narrative. The patient contracts for safety on the unit and gives consent for the team to contact collateral. The patient is amenable to initiating treatment while on the unit. She defers much of the team's inquires to her payee. 11/11 - no acute overnight events. The patient has been isolative to her room with poor ADLs. incontinent of urine but able to void when taken to the bathroom.  She denies active thoughts of self harm but is markedly internally preoccupied. Patient slept 8 hours overnight and got Trazodone. She denies active thoughts of self harm but endorses both depressed mood and anxiety. 11/14 - the patient remains in bed for much of the day. She has been inconitnent of urine and with poor ADLs requiring prompting for much activities. Patient denies active thoughts of self harm but endorses passive SI. She slept 7 hours overnight and is flat and disorganized on interview, moaning and providing little updates on her disposition plan. Per SW, the patient tends to not go to the group homes once assigned. Her payee is working on guardianship.    11/15 - overnight, the patient remained isolative to her bed, she continues to c/o anxiety and had an episode of urine incontinence. Patient refused nystatin stating her rash had 'cleared up.' She endorses ongoing AH and VH of 'ghosts' and states that she is doing very poorly. Patient evaluated by internist for ongoing LE swelling and pain. She is medication compliant and tolerating higher dose of Risperdal.    11/16 - the patient has remained in bed, out for meals and with little motivation to interact in the milieu. She got Motrin and Atarax and continues to c/o pain in her LE, as well as SI with a plan to cut herself if given the chance. The patient slept 8 hours and this morning spontaneously stated to the team that she wanted to be discharged -- she acknowledges that her payee and SW are working on a dispo plan and she is still symptomatic. Medicine consulted as she is now experiencing orthopnea. 11/17 - the patient has been isolative to her room, out for meals and slept 7 hours overnight. She is stating she will discharge to a [facility] on [place] but acknowledges that the team is coordinating guardianship with her payee. Patient noted to have significant LE edema and c/o orthopnea. She is medication compliant and denies active thoughts of self harm. . She remains disorganized and with ongoing confusion but today she is otherwise pleasant. 11/18 - overnight, the patient was noted to be desaturating to the 80's and difficult to arouse. She denies all symptoms otherwise and has been able to make her needs known. Patient medication compliant. She has been largely in bed during the day, but out for meals. SW actively working on placement for the patient. IM started Lasix to address edema. 11/21 - the patient has been in bed for much of the day. She is internally preoccupied and with no episodes of agitation or aggression. Patient passing clots and with abdi hematuria per RN. Keflex started for UTI. Patient medication compliant and has been otherwise in fair behavioral control. 11/22 - overnight, patient remains bed bound and disorganized. She is no longer passing blot clots and hematuria has subsided since starting Abx. Patient denies SI/HI/PI, AH appears baseline. She is discharge focused, sleeping during the day and pleasant when awake. Diabetes team provided recommendations. 11/23 - the patient slept 9 hours overnight. She is out for meals but isolative to her room for much of the day. Patient still on fluid restriction as she is being diuresed. She denies SI/HI/VH/PI, she continues to c/o AH. Patient calm and cooperative on interview. BSG 170s- 200s. 11/24 - pt remains isolative. No acute incidents. Denies SI/plan/intent, HI/plan/intent. Denied AH/VH today. No other changes or new concerns today. 11/25 - Pt has mostly been isolative, but did get up and walk around some this morning. Continues to say she is ready to leave. No acute incidents. Slept 7.5 hours last night. Denies SI/plan/intent, denies HI/plan/intent, denies AVH. No new concerns today. 11/26 - Pt complained of chest pain this morning, nursing did an EKG, normal sinus rhythm, chest pain has resolved without intervention. Otherwise, no acute incidents. States her mood is lower today.  Denies SI/plan/intent, denies HI/plan/intent, denies AVH. States she just wants to get out of here. Affect flat, restricted. No other changes or new concerns at this time. 11/27- Pt continues to endorse high depression and anxiety. She slept 7.5 hours last night. Otherwise, she is calm and cooperative, no acute incidents.  this morning. Med compliant. Went to the break room for breakfast, is doing more ambulating. Pt states she is \"bad\" during assessment. Denies SI/plan/intent, HI/plan/intent, AVH. States she wants to go home. Reminded pt we are working on a discharge plan. Poor insight. No other changes at this time. 11/29 - Pt reports eating and sleeping well. She is medication compliant and has no side effects to report. She has no new complaints and denies SI/HI/AVH currently. No issues per staff. She did receive PRN Atarax for anxiety per staff. 11/30 - Brian Benjamin reports feeling some stomach pains to nursing but request discharge to this interviewer. Not sure where she would go however. Referred her to social work for discharge planning. Moods are good. Denies SI/HI/AH/VH. No aggression or violence. Appropriately interactive and aware. Tolerating medications well. Eating and sleeping fairly. 12/1 - Brian Benjamin says she needs to go to [facility] so she could sleep there. Disorganized thought process. Denies si hi or avh. Isolative to her room. Says she is sleeping and eating ok. At the present time the patient Brian Benjamin remains compliant with taking medications. Denies any adverse events from taking them and feels they have been beneficial.     12/3 - Brian Benjamin reports feeling well and moods are good. However she was unable to be discharged due to a problem with her receiving location. Denies SI/HI/AH/VH. No aggression or violence. Appropriately interactive and aware. Tolerating medications well. Eating and sleeping fairly.     12/4 - Brian Benjamin reports doing much better and moods are good. Denies SI/HI/AH/VH. No aggression or violence. Appropriately interactive and aware. Tolerating medications well. Eating and sleeping fairly. SIDE EFFECTS: (reviewed/updated 12/4/2022)  None reported or admitted to. No noted toxicity with use of Depakote   ALLERGIES:(reviewed/updated 12/4/2022)  Allergies   Allergen Reactions    Amoxicillin Hives    Mushroom Flavor Hives    Tomato Hives      REVIEW OF SYSTEMS: (reviewed/updated 12/4/2022)  Appetite:improved   Sleep: no change   All other Review of Systems: Negative except incontinence per HPI         [facility] (MSE):    MSE FINDINGS ARE WITHIN NORMAL LIMITS (WNL) UNLESS OTHERWISE STATED BELOW. ( ALL OF THE BELOW CATEGORIES OF THE MSE HAVE BEEN REVIEWED (reviewed 12/4/2022) AND UPDATED AS DEEMED APPROPRIATE )  General Presentation age appropriate, cooperative and guarded   Orientation disorganized   Vital Signs  See below (reviewed 12/4/2022); Vital Signs (BP, Pulse, & Temp) are within normal limits if not listed below.    Gait and Station Stable/steady, no ataxia   Musculoskeletal System No extrapyramidal symptoms (EPS); no abnormal muscular movements or Tardive Dyskinesia (TD); muscle strength and tone are within normal limits   Language No aphasia or dysarthria   Speech:  normal volume and non-pressured   Thought Processes concrete; normal rate of thoughts; poor abstract reasoning/computation   Thought Associations blocked    Thought Content auditory hallucinations and visual hallucinations   Suicidal Ideations contracts for safety   Homicidal Ideations contracts for safety   Mood:  depressed and anhedonia   Affect:  flat   Memory recent  intact   Memory remote:  intact   Concentration/Attention:  intact   Fund of Knowledge average   Insight:  limited   Reliability fair   Judgment:  limited          VITALS:     Patient Vitals for the past 24 hrs:   Temp Pulse Resp BP SpO2   12/04/22 0921 97.9 °F (36.6 °C) 80 14 122/63 97 %   12/03/22 2000 98 °F (36.7 °C) 73 17 (!) 135/95 95 %       Wt Readings from Last 3 Encounters:   12/02/22 120.7 kg (266 lb)   06/03/22 113.9 kg (251 lb)   05/14/22 88.5 kg (195 lb)     Temp Readings from Last 3 Encounters:   12/04/22 97.9 °F (36.6 °C)   06/03/22 97.6 °F (36.4 °C)   05/19/22 98.5 °F (36.9 °C)     BP Readings from Last 3 Encounters:   12/04/22 122/63   06/03/22 130/69   05/19/22 (!) 112/51     Pulse Readings from Last 3 Encounters:   12/04/22 80   06/03/22 89   05/19/22 64            DATA     LABORATORY DATA:(reviewed/updated 12/4/2022)  Recent Results (from the past 24 hour(s))   GLUCOSE, POC    Collection Time: 12/03/22  4:20 PM   Result Value Ref Range    Glucose (POC) 155 (H) 65 - 117 mg/dL    Performed by [address] [place] [address], POC    Collection Time: 12/03/22  8:21 PM   Result Value Ref Range    Glucose (POC) 119 (H) 65 - 117 mg/dL    Performed by Sana Mercado RN    GLUCOSE, POC    Collection Time: 12/04/22  7:48 AM   Result Value Ref Range    Glucose (POC) 174 (H) 65 - 117 mg/dL    Performed by Dana Valdes RN    GLUCOSE, POC    Collection Time: 12/04/22 11:06 AM   Result Value Ref Range    Glucose (POC) 162 (H) 65 - 117 mg/dL    Performed by Dana Valdes RN      No results found for: VALF2, VALAC, VALP, VALPR, DS6, CRBAM, CRBAMP, CARB2, XCRBAM  No results found for: LITHM   RADIOLOGY REPORTS:(reviewed/updated 12/4/2022)  No results found.        MEDICATIONS     ALL MEDICATIONS:   Current Facility-Administered Medications   Medication Dose Route Frequency    insulin lispro (HUMALOG) injection 10 Units  10 Units SubCUTAneous TIDAC    insulin glargine (LANTUS) injection 30 Units  30 Units SubCUTAneous DAILY    furosemide (LASIX) tablet 40 mg  40 mg Oral DAILY    ibuprofen (MOTRIN) tablet 400 mg  400 mg Oral Q6H PRN    nystatin (MYCOSTATIN) 100,000 unit/gram powder   Topical BID    risperiDONE (RisperDAL) tablet 2 mg  2 mg Oral Q12H    benzocaine-menthoL (CHLORASEPTIC MAX) lozenge 1 Lozenge  1 Lozenge Oral Q2H PRN    glucose chewable tablet 16 g  4 Tablet Oral PRN    glucagon (GLUCAGEN) injection 1 mg  1 mg IntraMUSCular PRN    dextrose 10% infusion 0-250 mL  0-250 mL IntraVENous PRN    albuterol (PROVENTIL HFA, VENTOLIN HFA, PROAIR HFA) inhaler 1 Puff  1 Puff Inhalation Q4H PRN    insulin lispro (HUMALOG) injection 1-10 Units  1-10 Units SubCUTAneous AC&HS    budesonide-formoterol (SYMBICORT) 80-4.5 mcg inhaler  2 Puff Inhalation BID RT    OLANZapine (ZyPREXA) tablet 5 mg  5 mg Oral Q6H PRN    haloperidol lactate (HALDOL) injection 5 mg  5 mg IntraMUSCular Q6H PRN    benztropine (COGENTIN) tablet 1 mg  1 mg Oral BID PRN    diphenhydrAMINE (BENADRYL) injection 50 mg  50 mg IntraMUSCular BID PRN    hydrOXYzine HCL (ATARAX) tablet 50 mg  50 mg Oral TID PRN    LORazepam (ATIVAN) injection 1 mg  1 mg IntraMUSCular Q4H PRN    traZODone (DESYREL) tablet 50 mg  50 mg Oral QHS PRN    acetaminophen (TYLENOL) tablet 650 mg  650 mg Oral Q4H PRN    magnesium hydroxide (MILK OF MAGNESIA) 400 mg/5 mL oral suspension 30 mL  30 mL Oral DAILY PRN    atorvastatin (LIPITOR) tablet 40 mg  40 mg Oral QPM    gabapentin (NEURONTIN) capsule 100 mg  100 mg Oral TID    metFORMIN ER (GLUCOPHAGE XR) tablet 1,000 mg  1,000 mg Oral BID WITH MEALS    oxybutynin chloride XL (DITROPAN XL) tablet 5 mg  5 mg Oral DAILY    sertraline (ZOLOFT) tablet 100 mg  100 mg Oral DAILY      SCHEDULED MEDICATIONS:   Current Facility-Administered Medications   Medication Dose Route Frequency    insulin lispro (HUMALOG) injection 10 Units  10 Units SubCUTAneous TIDAC    insulin glargine (LANTUS) injection 30 Units  30 Units SubCUTAneous DAILY    furosemide (LASIX) tablet 40 mg  40 mg Oral DAILY    nystatin (MYCOSTATIN) 100,000 unit/gram powder   Topical BID    risperiDONE (RisperDAL) tablet 2 mg  2 mg Oral Q12H    insulin lispro (HUMALOG) injection 1-10 Units  1-10 Units SubCUTAneous AC&HS    budesonide-formoterol (SYMBICORT) 80-4.5 mcg inhaler  2 Puff Inhalation BID RT    atorvastatin (LIPITOR) tablet 40 mg  40 mg Oral QPM    gabapentin (NEURONTIN) capsule 100 mg  100 mg Oral TID    metFORMIN ER (GLUCOPHAGE XR) tablet 1,000 mg  1,000 mg Oral BID WITH MEALS    oxybutynin chloride XL (DITROPAN XL) tablet 5 mg  5 mg Oral DAILY    sertraline (ZOLOFT) tablet 100 mg  100 mg Oral DAILY          ASSESSMENT & PLAN     DIAGNOSES REQUIRING ACTIVE TREATMENT AND MONITORING: (reviewed/updated 12/4/2022)  Patient Active Hospital Problem List:       Schizoaffective disorder (11/10/2022)           Assessment: the patient presents in crisis after potentially leaving her group home; she is disorganized and a poor historian at baseline, treatment will be primarily psychosocial. Patient with low O2 sats, edema and orthopnea, will have to address this. Plan:   - CONTINUE Risperdal 2 mg Q12H for psychosis  - CONTINUE Zoloft 100 mg QDAY for MDD  - Appreciate DM team / IM recs  - Consider sleep referral for obesity hypoventilation syndrome  - IGM therapy as tolerated  - Expand database / obtain collateral  - Dispo planning with social work for Monday    In summary, Patrick Aguilar, is a 59 y.o.  female who presents with a severe exacerbation of the principal diagnosis of Schizoaffective disorder ([facility] [address]    Patient's condition is improving. Patient requires continued inpatient hospitalization for further stabilization, safety monitoring and medication management. I will continue to coordinate the provision of individual, milieu, occupational, group, and substance abuse therapies to address target symptoms/diagnoses as deemed appropriate for the individual patient. A coordinated, multidisplinary treatment team round was conducted with the patient (this team consists of the nurse, psychiatric unit pharmacist,  and writer).      Complete current electronic health record for patient has been reviewed today including consultant notes, ancillary staff notes, nurses and psychiatric tech notes. Suicide risk assessment completed and patient deemed to be of low risk for suicide at this time. The following regarding medications was addressed during rounds with patient:   the risks and benefits of the proposed medication. The patient was given the opportunity to ask questions. Informed consent given to the use of the above medications. Will continue to adjust psychiatric and non-psychiatric medications (see above \"medication\" section and orders section for details) as deemed appropriate & based upon diagnoses and response to treatment. I will continue to order blood tests/labs and diagnostic tests as deemed appropriate and review results as they become available (see orders for details and above listed lab/test results). I will order psychiatric records from previous [facility] hospitals to further elucidate the nature of patient's psychopathology and review once available. I will gather additional collateral information from friends, family and o/p treatment team to further elucidate the nature of patient's psychopathology and baselline level of psychiatric functioning. I certify that this patient's inpatient psychiatric hospital services furnished since the previous certification were, and continue to be, required for treatment that could reasonably be expected to improve the patient's condition, or for diagnostic study, and that the patient continues to need, on a daily basis, active treatment furnished directly by or requiring the supervision of inpatient psychiatric facility personnel. In addition, the hospital records show that services furnished were intensive treatment services, admission or related services, or equivalent services.     EXPECTED DISCHARGE DATE/DAY: TBD     DISPOSITION: Group home / shelter       Signed By:   Racheal Hoover MD  12/4/2022 Yes...

## 2022-12-04 NOTE — PROGRESS NOTES
Received care of patient resting in bed. Patient is alert and able to make her needs known. Patient endorses anxiety rated 5/10, which she attributes to her discharge plan. Patient advises that she is supposed to discharge tomorrow. Patient currently denies SI/HI/AVH or depression. Patient is ambulatory and noted to be medication and meal compliant this shift. She is noted to be isolative to her room for much of the shift. Monitoring to continue for safety, support and situation.    Problem: Suicide  Goal: *STG/LTG: Complies with medication therapy  Outcome: Progressing Towards Goal

## 2022-12-04 NOTE — BH NOTES
PSYCHIATRIC PROGRESS NOTE         Patient Name  Suzy Pinon   Date of Birth 1965   [facility] [id number]   Medical Record Number  [id number]      Age  59 y.o. PCP Mohamud Mccullough MD   Admit date:  11/9/2022    Room Number  320/02  @ [facility]   Date of Service  12/3/2022         E & M PROGRESS NOTE:         HISTORY       CC:  \"suicidal ideation\"  HISTORY OF PRESENT ILLNESS/INTERVAL HISTORY:  (reviewed/updated 12/3/2022). per initial evaluation: The patient, Suzy Pinon, is a 59 y.o. WHITE/NON- female with a past psychiatric history significant for schizoaffective disorder, who presents at this time with complaints of (and/or evidence of) the following emotional symptoms: depression and suicidal thoughts/threats. Additional symptomatology include poor self care. The above symptoms have been present for 2+ weeks. These symptoms are of moderate to high severity. These symptoms are constant in nature. The patient's condition has been precipitated by psychosocial stressors. Patient's condition made worse by treatment noncompliance. UDS: negative; BAL=0. The patient is well known to the unit; she comes into the hospital frequently in crisis typically stating her 's death several years ago has made her feel hopeless. She has been living in a group home though this admission she states that she was kicked out of her group home because of an episode of incontinence. The patient is a poor historian. The patient corroborates the above narrative. The patient contracts for safety on the unit and gives consent for the team to contact collateral. The patient is amenable to initiating treatment while on the unit. She defers much of the team's inquires to her payee. 11/11 - no acute overnight events. The patient has been isolative to her room with poor ADLs. incontinent of urine but able to void when taken to the bathroom.  She denies active thoughts of self harm but is markedly internally preoccupied. Patient slept 8 hours overnight and got Trazodone. She denies active thoughts of self harm but endorses both depressed mood and anxiety. 11/14 - the patient remains in bed for much of the day. She has been inconitnent of urine and with poor ADLs requiring prompting for much activities. Patient denies active thoughts of self harm but endorses passive SI. She slept 7 hours overnight and is flat and disorganized on interview, moaning and providing little updates on her disposition plan. Per SW, the patient tends to not go to the group homes once assigned. Her payee is working on guardianship.    11/15 - overnight, the patient remained isolative to her bed, she continues to c/o anxiety and had an episode of urine incontinence. Patient refused nystatin stating her rash had 'cleared up.' She endorses ongoing AH and VH of 'ghosts' and states that she is doing very poorly. Patient evaluated by internist for ongoing LE swelling and pain. She is medication compliant and tolerating higher dose of Risperdal.    11/16 - the patient has remained in bed, out for meals and with little motivation to interact in the milieu. She got Motrin and Atarax and continues to c/o pain in her LE, as well as SI with a plan to cut herself if given the chance. The patient slept 8 hours and this morning spontaneously stated to the team that she wanted to be discharged -- she acknowledges that her payee and SW are working on a dispo plan and she is still symptomatic. Medicine consulted as she is now experiencing orthopnea. 11/17 - the patient has been isolative to her room, out for meals and slept 7 hours overnight. She is stating she will discharge to a [facility] [employer] but acknowledges that the team is coordinating guardianship with her payee. Patient noted to have significant LE edema and c/o orthopnea. She is medication compliant and denies active thoughts of self harm. . She remains disorganized and with ongoing confusion but today she is otherwise pleasant. 11/18 - overnight, the patient was noted to be desaturating to the 80's and difficult to arouse. She denies all symptoms otherwise and has been able to make her needs known. Patient medication compliant. She has been largely in bed during the day, but out for meals. SW actively working on placement for the patient. IM started Lasix to address edema. 11/21 - the patient has been in bed for much of the day. She is internally preoccupied and with no episodes of agitation or aggression. Patient passing clots and with abdi hematuria per RN. Keflex started for UTI. Patient medication compliant and has been otherwise in fair behavioral control. 11/22 - overnight, patient remains bed bound and disorganized. She is no longer passing blot clots and hematuria has subsided since starting Abx. Patient denies SI/HI/PI, AH appears baseline. She is discharge focused, sleeping during the day and pleasant when awake. Diabetes team provided recommendations. 11/23 - the patient slept 9 hours overnight. She is out for meals but isolative to her room for much of the day. Patient still on fluid restriction as she is being diuresed. She denies SI/HI/VH/PI, she continues to c/o AH. Patient calm and cooperative on interview. BSG 170s- 200s. 11/24 - pt remains isolative. No acute incidents. Denies SI/plan/intent, HI/plan/intent. Denied AH/VH today. No other changes or new concerns today. 11/25 - Pt has mostly been isolative, but did get up and walk around some this morning. Continues to say she is ready to leave. No acute incidents. Slept 7.5 hours last night. Denies SI/plan/intent, denies HI/plan/intent, denies AVH. No new concerns today. 11/26 - Pt complained of chest pain this morning, nursing did an EKG, normal sinus rhythm, chest pain has resolved without intervention. Otherwise, no acute incidents. States her mood is lower today.  Denies SI/plan/intent, denies HI/plan/intent, denies AVH. States she just wants to get out of here. Affect flat, restricted. No other changes or new concerns at this time. 11/27- Pt continues to endorse high depression and anxiety. She slept 7.5 hours last night. Otherwise, she is calm and cooperative, no acute incidents.  this morning. Med compliant. Went to the break room for breakfast, is doing more ambulating. Pt states she is \"bad\" during assessment. Denies SI/plan/intent, HI/plan/intent, AVH. States she wants to go home. Reminded pt we are working on a discharge plan. Poor insight. No other changes at this time. 11/29 - Pt reports eating and sleeping well. She is medication compliant and has no side effects to report. She has no new complaints and denies SI/HI/AVH currently. No issues per staff. She did receive PRN Atarax for anxiety per staff. 11/30 - Suzy Pinon reports feeling some stomach pains to nursing but request discharge to this interviewer. Not sure where she would go however. Referred her to social work for discharge planning. Moods are good. Denies SI/HI/AH/VH. No aggression or violence. Appropriately interactive and aware. Tolerating medications well. Eating and sleeping fairly. 12/1 - Suzy Pinon says she needs to go to [facility] so she could sleep there. Disorganized thought process. Denies si hi or avh. Isolative to her room. Says she is sleeping and eating ok. At the present time the patient Suzy Pinon remains compliant with taking medications. Denies any adverse events from taking them and feels they have been beneficial.     12/3 - Suzy Pinon reports feeling well and moods are good. However she was unable to be discharged due to a problem with her receiving location. Denies SI/HI/AH/VH. No aggression or violence. Appropriately interactive and aware. Tolerating medications well. Eating and sleeping fairly.         SIDE EFFECTS: (reviewed/updated 12/3/2022)  None reported or admitted to. No noted toxicity with use of Depakote   ALLERGIES:(reviewed/updated 12/3/2022)  Allergies   Allergen Reactions    Amoxicillin Hives    Mushroom Flavor Hives    Tomato Hives      REVIEW OF SYSTEMS: (reviewed/updated 12/3/2022)  Appetite:improved   Sleep: no change   All other Review of Systems: Negative except incontinence per HPI         [facility] (MSE):    MSE FINDINGS ARE WITHIN NORMAL LIMITS (WNL) UNLESS OTHERWISE STATED BELOW. ( ALL OF THE BELOW CATEGORIES OF THE MSE HAVE BEEN REVIEWED (reviewed 12/3/2022) AND UPDATED AS DEEMED APPROPRIATE )  General Presentation age appropriate, cooperative and guarded   Orientation disorganized   Vital Signs  See below (reviewed 12/3/2022); Vital Signs (BP, Pulse, & Temp) are within normal limits if not listed below.    Gait and Station Stable/steady, no ataxia   Musculoskeletal System No extrapyramidal symptoms (EPS); no abnormal muscular movements or Tardive Dyskinesia (TD); muscle strength and tone are within normal limits   Language No aphasia or dysarthria   Speech:  normal volume and non-pressured   Thought Processes concrete; normal rate of thoughts; poor abstract reasoning/computation   Thought Associations blocked    Thought Content auditory hallucinations and visual hallucinations   Suicidal Ideations contracts for safety   Homicidal Ideations contracts for safety   Mood:  depressed and anhedonia   Affect:  flat   Memory recent  intact   Memory remote:  intact   Concentration/Attention:  intact   Fund of Knowledge average   Insight:  limited   Reliability fair   Judgment:  limited          VITALS:     Patient Vitals for the past 24 hrs:   Temp Pulse Resp BP SpO2   12/03/22 0819 97.3 °F (36.3 °C) 85 18 115/77 96 %   12/02/22 2217 97.4 °F (36.3 °C) 72 16 (!) 107/59 97 %       Wt Readings from Last 3 Encounters:   12/02/22 120.7 kg (266 lb)   06/03/22 113.9 kg (251 lb)   05/14/22 88.5 kg (195 lb)     Temp Readings from Last 3 Encounters:   12/03/22 97.3 °F (36.3 °C)   06/03/22 97.6 °F (36.4 °C)   05/19/22 98.5 °F (36.9 °C)     BP Readings from Last 3 Encounters:   12/03/22 115/77   06/03/22 130/69   05/19/22 (!) 112/51     Pulse Readings from Last 3 Encounters:   12/03/22 85   06/03/22 89   05/19/22 64            DATA     LABORATORY DATA:(reviewed/updated 12/3/2022)  Recent Results (from the past 24 hour(s))   GLUCOSE, POC    Collection Time: 12/03/22  8:03 AM   Result Value Ref Range    Glucose (POC) 200 (H) 65 - 117 mg/dL    Performed by [address] [place] [address], POC    Collection Time: 12/03/22 11:44 AM   Result Value Ref Range    Glucose (POC) 197 (H) 65 - 117 mg/dL    Performed by [address] [place] [address], POC    Collection Time: 12/03/22  4:20 PM   Result Value Ref Range    Glucose (POC) 155 (H) 65 - 117 mg/dL    Performed by [address] [place] [address], POC    Collection Time: 12/03/22  8:21 PM   Result Value Ref Range    Glucose (POC) 119 (H) 65 - 117 mg/dL    Performed by Janet Martinez RN      No results found for: VALF2, VALAC, VALP, VALPR, DS6, CRBAM, CRBAMP, CARB2, XCRBAM  No results found for: LITHM   RADIOLOGY REPORTS:(reviewed/updated 12/3/2022)  No results found.        MEDICATIONS     ALL MEDICATIONS:   Current Facility-Administered Medications   Medication Dose Route Frequency    insulin lispro (HUMALOG) injection 10 Units  10 Units SubCUTAneous TIDAC    insulin glargine (LANTUS) injection 30 Units  30 Units SubCUTAneous DAILY    furosemide (LASIX) tablet 40 mg  40 mg Oral DAILY    ibuprofen (MOTRIN) tablet 400 mg  400 mg Oral Q6H PRN    nystatin (MYCOSTATIN) 100,000 unit/gram powder   Topical BID    risperiDONE (RisperDAL) tablet 2 mg  2 mg Oral Q12H    benzocaine-menthoL (CHLORASEPTIC MAX) lozenge 1 Lozenge  1 Lozenge Oral Q2H PRN    glucose chewable tablet 16 g  4 Tablet Oral PRN    glucagon (GLUCAGEN) injection 1 mg  1 mg IntraMUSCular PRN    dextrose 10% infusion 0-250 mL  0-250 mL IntraVENous PRN    albuterol (PROVENTIL HFA, VENTOLIN HFA, PROAIR HFA) inhaler 1 Puff  1 Puff Inhalation Q4H PRN    insulin lispro (HUMALOG) injection 1-10 Units  1-10 Units SubCUTAneous AC&HS    budesonide-formoterol (SYMBICORT) 80-4.5 mcg inhaler  2 Puff Inhalation BID RT    OLANZapine (ZyPREXA) tablet 5 mg  5 mg Oral Q6H PRN    haloperidol lactate (HALDOL) injection 5 mg  5 mg IntraMUSCular Q6H PRN    benztropine (COGENTIN) tablet 1 mg  1 mg Oral BID PRN    diphenhydrAMINE (BENADRYL) injection 50 mg  50 mg IntraMUSCular BID PRN    hydrOXYzine HCL (ATARAX) tablet 50 mg  50 mg Oral TID PRN    LORazepam (ATIVAN) injection 1 mg  1 mg IntraMUSCular Q4H PRN    traZODone (DESYREL) tablet 50 mg  50 mg Oral QHS PRN    acetaminophen (TYLENOL) tablet 650 mg  650 mg Oral Q4H PRN    magnesium hydroxide (MILK OF MAGNESIA) 400 mg/5 mL oral suspension 30 mL  30 mL Oral DAILY PRN    atorvastatin (LIPITOR) tablet 40 mg  40 mg Oral QPM    gabapentin (NEURONTIN) capsule 100 mg  100 mg Oral TID    metFORMIN ER (GLUCOPHAGE XR) tablet 1,000 mg  1,000 mg Oral BID WITH MEALS    oxybutynin chloride XL (DITROPAN XL) tablet 5 mg  5 mg Oral DAILY    sertraline (ZOLOFT) tablet 100 mg  100 mg Oral DAILY      SCHEDULED MEDICATIONS:   Current Facility-Administered Medications   Medication Dose Route Frequency    insulin lispro (HUMALOG) injection 10 Units  10 Units SubCUTAneous TIDAC    insulin glargine (LANTUS) injection 30 Units  30 Units SubCUTAneous DAILY    furosemide (LASIX) tablet 40 mg  40 mg Oral DAILY    nystatin (MYCOSTATIN) 100,000 unit/gram powder   Topical BID    risperiDONE (RisperDAL) tablet 2 mg  2 mg Oral Q12H    insulin lispro (HUMALOG) injection 1-10 Units  1-10 Units SubCUTAneous AC&HS    budesonide-formoterol (SYMBICORT) 80-4.5 mcg inhaler  2 Puff Inhalation BID RT    atorvastatin (LIPITOR) tablet 40 mg  40 mg Oral QPM    gabapentin (NEURONTIN) capsule 100 mg  100 mg Oral TID    metFORMIN ER (GLUCOPHAGE XR) tablet 1,000 mg 1,000 mg Oral BID WITH MEALS    oxybutynin chloride XL (DITROPAN XL) tablet 5 mg  5 mg Oral DAILY    sertraline (ZOLOFT) tablet 100 mg  100 mg Oral DAILY          ASSESSMENT & PLAN     DIAGNOSES REQUIRING ACTIVE TREATMENT AND MONITORING: (reviewed/updated 12/3/2022)  Patient Active Hospital Problem List:       Schizoaffective disorder (11/10/2022)           Assessment: the patient presents in crisis after potentially leaving her group home; she is disorganized and a poor historian at baseline, treatment will be primarily psychosocial. Patient with low O2 sats, edema and orthopnea, will have to address this. Plan:   - CONTINUE Risperdal 2 mg Q12H for psychosis  - CONTINUE Zoloft 100 mg QDAY for MDD  - Appreciate DM team / IM recs  - Consider sleep referral for obesity hypoventilation syndrome  - IGM therapy as tolerated  - Expand database / obtain collateral  - Dispo planning with social work    In summary, Estrellita Fraire, is a 59 y.o.  female who presents with a severe exacerbation of the principal diagnosis of Schizoaffective disorder ([facility] [address]    Patient's condition is improving. Patient requires continued inpatient hospitalization for further stabilization, safety monitoring and medication management. I will continue to coordinate the provision of individual, milieu, occupational, group, and substance abuse therapies to address target symptoms/diagnoses as deemed appropriate for the individual patient. A coordinated, multidisplinary treatment team round was conducted with the patient (this team consists of the nurse, psychiatric unit pharmacist,  and writer). Complete current electronic health record for patient has been reviewed today including consultant notes, ancillary staff notes, nurses and psychiatric tech notes. Suicide risk assessment completed and patient deemed to be of low risk for suicide at this time.      The following regarding medications was addressed during rounds with patient:   the risks and benefits of the proposed medication. The patient was given the opportunity to ask questions. Informed consent given to the use of the above medications. Will continue to adjust psychiatric and non-psychiatric medications (see above \"medication\" section and orders section for details) as deemed appropriate & based upon diagnoses and response to treatment. I will continue to order blood tests/labs and diagnostic tests as deemed appropriate and review results as they become available (see orders for details and above listed lab/test results). I will order psychiatric records from previous [facility] hospitals to further elucidate the nature of patient's psychopathology and review once available. I will gather additional collateral information from friends, family and o/p treatment team to further elucidate the nature of patient's psychopathology and baselline level of psychiatric functioning. I certify that this patient's inpatient psychiatric hospital services furnished since the previous certification were, and continue to be, required for treatment that could reasonably be expected to improve the patient's condition, or for diagnostic study, and that the patient continues to need, on a daily basis, active treatment furnished directly by or requiring the supervision of inpatient psychiatric facility personnel. In addition, the hospital records show that services furnished were intensive treatment services, admission or related services, or equivalent services.     EXPECTED DISCHARGE DATE/DAY: TBD     DISPOSITION: Group home / shelter       Signed By:   Lorelei Rangel MD  12/3/2022

## 2022-12-05 VITALS
SYSTOLIC BLOOD PRESSURE: 113 MMHG | TEMPERATURE: 97.4 F | RESPIRATION RATE: 16 BRPM | WEIGHT: 266 LBS | HEIGHT: 65 IN | BODY MASS INDEX: 44.32 KG/M2 | DIASTOLIC BLOOD PRESSURE: 51 MMHG | OXYGEN SATURATION: 96 % | HEART RATE: 62 BPM

## 2022-12-05 LAB
FLUAV RNA SPEC QL NAA+PROBE: NOT DETECTED
FLUBV RNA SPEC QL NAA+PROBE: NOT DETECTED
GLUCOSE BLD STRIP.AUTO-MCNC: 150 MG/DL (ref 65–117)
GLUCOSE BLD STRIP.AUTO-MCNC: 169 MG/DL (ref 65–117)
SARS-COV-2, COV2: NOT DETECTED
SERVICE CMNT-IMP: ABNORMAL
SERVICE CMNT-IMP: ABNORMAL

## 2022-12-05 PROCEDURE — 74011636637 HC RX REV CODE- 636/637: Performed by: STUDENT IN AN ORGANIZED HEALTH CARE EDUCATION/TRAINING PROGRAM

## 2022-12-05 PROCEDURE — 87636 SARSCOV2 & INF A&B AMP PRB: CPT

## 2022-12-05 PROCEDURE — 82962 GLUCOSE BLOOD TEST: CPT

## 2022-12-05 PROCEDURE — 74011636637 HC RX REV CODE- 636/637: Performed by: PSYCHIATRY & NEUROLOGY

## 2022-12-05 PROCEDURE — 74011250637 HC RX REV CODE- 250/637: Performed by: STUDENT IN AN ORGANIZED HEALTH CARE EDUCATION/TRAINING PROGRAM

## 2022-12-05 PROCEDURE — 74011250637 HC RX REV CODE- 250/637: Performed by: PSYCHIATRY & NEUROLOGY

## 2022-12-05 RX ORDER — OXYBUTYNIN CHLORIDE 5 MG/1
5 TABLET, EXTENDED RELEASE ORAL DAILY
Qty: 30 TABLET | Refills: 0 | Status: SHIPPED | OUTPATIENT
Start: 2022-12-05

## 2022-12-05 RX ORDER — ATORVASTATIN CALCIUM 40 MG/1
40 TABLET, FILM COATED ORAL EVERY EVENING
Qty: 30 TABLET | Refills: 0 | Status: SHIPPED | OUTPATIENT
Start: 2022-12-05

## 2022-12-05 RX ORDER — LINAGLIPTIN 5 MG/1
5 TABLET, FILM COATED ORAL DAILY
Qty: 30 TABLET | Refills: 0 | Status: SHIPPED | OUTPATIENT
Start: 2022-12-05

## 2022-12-05 RX ORDER — METFORMIN HYDROCHLORIDE 500 MG/1
1000 TABLET, EXTENDED RELEASE ORAL 2 TIMES DAILY WITH MEALS
Qty: 120 TABLET | Refills: 0 | Status: SHIPPED | OUTPATIENT
Start: 2022-12-05

## 2022-12-05 RX ORDER — RISPERIDONE 2 MG/1
2 TABLET, FILM COATED ORAL EVERY 12 HOURS
Qty: 60 TABLET | Refills: 0 | Status: SHIPPED | OUTPATIENT
Start: 2022-12-05

## 2022-12-05 RX ORDER — ALBUTEROL SULFATE 90 UG/1
1 AEROSOL, METERED RESPIRATORY (INHALATION)
Qty: 18 G | Refills: 0 | Status: SHIPPED | OUTPATIENT
Start: 2022-12-05

## 2022-12-05 RX ORDER — NYSTATIN 100000 [USP'U]/G
POWDER TOPICAL 2 TIMES DAILY
Qty: 60 G | Refills: 0 | Status: SHIPPED | OUTPATIENT
Start: 2022-12-05

## 2022-12-05 RX ORDER — FUROSEMIDE 40 MG/1
40 TABLET ORAL DAILY
Qty: 30 TABLET | Refills: 0 | Status: SHIPPED | OUTPATIENT
Start: 2022-12-05

## 2022-12-05 RX ORDER — GABAPENTIN 100 MG/1
100 CAPSULE ORAL 3 TIMES DAILY
Qty: 90 CAPSULE | Refills: 0 | Status: SHIPPED | OUTPATIENT
Start: 2022-12-05

## 2022-12-05 RX ORDER — GLIPIZIDE 10 MG/1
10 TABLET ORAL
Qty: 30 TABLET | Refills: 0 | Status: SHIPPED | OUTPATIENT
Start: 2022-12-05

## 2022-12-05 RX ORDER — BUDESONIDE AND FORMOTEROL FUMARATE DIHYDRATE 80; 4.5 UG/1; UG/1
2 AEROSOL RESPIRATORY (INHALATION) 2 TIMES DAILY
Qty: 10.2 G | Refills: 0 | Status: SHIPPED | OUTPATIENT
Start: 2022-12-05

## 2022-12-05 RX ADMIN — Medication 2 UNITS: at 08:23

## 2022-12-05 RX ADMIN — Medication 30 UNITS: at 08:22

## 2022-12-05 RX ADMIN — GABAPENTIN 100 MG: 100 CAPSULE ORAL at 12:16

## 2022-12-05 RX ADMIN — METFORMIN HYDROCHLORIDE 1000 MG: 500 TABLET, EXTENDED RELEASE ORAL at 08:21

## 2022-12-05 RX ADMIN — Medication 10 UNITS: at 08:22

## 2022-12-05 RX ADMIN — Medication 10 UNITS: at 12:14

## 2022-12-05 RX ADMIN — RISPERIDONE 2 MG: 1 TABLET ORAL at 08:22

## 2022-12-05 RX ADMIN — BUDESONIDE AND FORMOTEROL FUMARATE DIHYDRATE 2 PUFF: 80; 4.5 AEROSOL RESPIRATORY (INHALATION) at 08:00

## 2022-12-05 RX ADMIN — Medication 2 UNITS: at 12:15

## 2022-12-05 RX ADMIN — SERTRALINE HYDROCHLORIDE 100 MG: 50 TABLET ORAL at 08:21

## 2022-12-05 RX ADMIN — FUROSEMIDE 40 MG: 40 TABLET ORAL at 08:21

## 2022-12-05 RX ADMIN — GABAPENTIN 100 MG: 100 CAPSULE ORAL at 08:21

## 2022-12-05 RX ADMIN — OXYBUTYNIN CHLORIDE 5 MG: 5 TABLET, EXTENDED RELEASE ORAL at 08:21

## 2022-12-05 NOTE — PROGRESS NOTES
Patient observed resting in bed during transition of care. Patient lethargic, however easy to arouse. Patient oriented X4. Patient expressed no concerns and stated that she is ready for d/c. No obvious signs of medical or psychiatric distress noted. Patient denied any symptoms of depression and reported anxiety 5/10. Patient denied hallucinations of any type. Patient did not endorse S/I or H/I. Patient's HS blood glucose level 196. Patient received scheduled medications along with PRN cough lozenge, Atarax, Trazodone and Ibuprofen for 5/10 bL leg pain. Patient observed resting/sleeping in bed with no concerns. Staff will continue to monitor.       Problem: Suicide  Goal: *STG: Remains safe in hospital  Outcome: Progressing Towards Goal  Goal: *STG: Seeks staff when feelings of self harm or harm towards others arise  Outcome: Progressing Towards Goal  Goal: *STG/LTG: Complies with medication therapy  Outcome: Progressing Towards Goal

## 2022-12-05 NOTE — BH NOTES
59  female whom is alert and verbal. Patient is ambulatory with an unsteady gait. Patient is discharged to [place] [id number] to continue her recommended plan of care. Discharge instructions reviewed with patient. Patient verbalized understanding. Patients valuables returned to patient. Patient transported to destination via [employer] group. none

## 2022-12-05 NOTE — BH NOTES
PSYCHIATRIC PROGRESS NOTE         Patient Name  Alina Bolaños   Date of Birth 1965   [facility] [id number]   Medical Record Number  [id number]      Age  59 y.o. PCP Steven Ortiz MD   Admit date:  11/9/2022    Room Number  320/02  @ [facility]   Date of Service  12/5/2022         E & M PROGRESS NOTE:         HISTORY       CC:  \"suicidal ideation\"  HISTORY OF PRESENT ILLNESS/INTERVAL HISTORY:  (reviewed/updated 12/5/2022). per initial evaluation: The patient, Alina Bolaños, is a 59 y.o. WHITE/NON- female with a past psychiatric history significant for schizoaffective disorder, who presents at this time with complaints of (and/or evidence of) the following emotional symptoms: depression and suicidal thoughts/threats. Additional symptomatology include poor self care. The above symptoms have been present for 2+ weeks. These symptoms are of moderate to high severity. These symptoms are constant in nature. The patient's condition has been precipitated by psychosocial stressors. Patient's condition made worse by treatment noncompliance. UDS: negative; BAL=0. The patient is well known to the unit; she comes into the hospital frequently in crisis typically stating her 's death several years ago has made her feel hopeless. She has been living in a group home though this admission she states that she was kicked out of her group home because of an episode of incontinence. The patient is a poor historian. The patient corroborates the above narrative. The patient contracts for safety on the unit and gives consent for the team to contact collateral. The patient is amenable to initiating treatment while on the unit. She defers much of the team's inquires to her payee. 11/11 - no acute overnight events. The patient has been isolative to her room with poor ADLs. incontinent of urine but able to void when taken to the bathroom.  She denies active thoughts of self harm but is markedly internally preoccupied. Patient slept 8 hours overnight and got Trazodone. She denies active thoughts of self harm but endorses both depressed mood and anxiety. 11/14 - the patient remains in bed for much of the day. She has been inconitnent of urine and with poor ADLs requiring prompting for much activities. Patient denies active thoughts of self harm but endorses passive SI. She slept 7 hours overnight and is flat and disorganized on interview, moaning and providing little updates on her disposition plan. Per SW, the patient tends to not go to the group homes once assigned. Her payee is working on guardianship.    11/15 - overnight, the patient remained isolative to her bed, she continues to c/o anxiety and had an episode of urine incontinence. Patient refused nystatin stating her rash had 'cleared up.' She endorses ongoing AH and VH of 'ghosts' and states that she is doing very poorly. Patient evaluated by internist for ongoing LE swelling and pain. She is medication compliant and tolerating higher dose of Risperdal.    11/16 - the patient has remained in bed, out for meals and with little motivation to interact in the milieu. She got Motrin and Atarax and continues to c/o pain in her LE, as well as SI with a plan to cut herself if given the chance. The patient slept 8 hours and this morning spontaneously stated to the team that she wanted to be discharged -- she acknowledges that her payee and SW are working on a dispo plan and she is still symptomatic. Medicine consulted as she is now experiencing orthopnea. 11/17 - the patient has been isolative to her room, out for meals and slept 7 hours overnight. She is stating she will discharge to a [facility] [place] but acknowledges that the team is coordinating guardianship with her payee. Patient noted to have significant LE edema and c/o orthopnea. She is medication compliant and denies active thoughts of self harm. . She remains disorganized and with ongoing confusion but today she is otherwise pleasant. 11/18 - overnight, the patient was noted to be desaturating to the 80's and difficult to arouse. She denies all symptoms otherwise and has been able to make her needs known. Patient medication compliant. She has been largely in bed during the day, but out for meals. SW actively working on placement for the patient. IM started Lasix to address edema. 11/21 - the patient has been in bed for much of the day. She is internally preoccupied and with no episodes of agitation or aggression. Patient passing clots and with abdi hematuria per RN. Keflex started for UTI. Patient medication compliant and has been otherwise in fair behavioral control. 11/22 - overnight, patient remains bed bound and disorganized. She is no longer passing blot clots and hematuria has subsided since starting Abx. Patient denies SI/HI/PI, AH appears baseline. She is discharge focused, sleeping during the day and pleasant when awake. Diabetes team provided recommendations. 11/23 - the patient slept 9 hours overnight. She is out for meals but isolative to her room for much of the day. Patient still on fluid restriction as she is being diuresed. She denies SI/HI/VH/PI, she continues to c/o AH. Patient calm and cooperative on interview. BSG 170s- 200s. 11/24 - pt remains isolative. No acute incidents. Denies SI/plan/intent, HI/plan/intent. Denied AH/VH today. No other changes or new concerns today. 11/25 - Pt has mostly been isolative, but did get up and walk around some this morning. Continues to say she is ready to leave. No acute incidents. Slept 7.5 hours last night. Denies SI/plan/intent, denies HI/plan/intent, denies AVH. No new concerns today. 11/26 - Pt complained of chest pain this morning, nursing did an EKG, normal sinus rhythm, chest pain has resolved without intervention. Otherwise, no acute incidents. States her mood is lower today.  Denies SI/plan/intent, denies HI/plan/intent, denies AVH. States she just wants to get out of here. Affect flat, restricted. No other changes or new concerns at this time. 11/27- Pt continues to endorse high depression and anxiety. She slept 7.5 hours last night. Otherwise, she is calm and cooperative, no acute incidents.  this morning. Med compliant. Went to the break room for breakfast, is doing more ambulating. Pt states she is \"bad\" during assessment. Denies SI/plan/intent, HI/plan/intent, AVH. States she wants to go home. Reminded pt we are working on a discharge plan. Poor insight. No other changes at this time. 11/29 - Pt reports eating and sleeping well. She is medication compliant and has no side effects to report. She has no new complaints and denies SI/HI/AVH currently. No issues per staff. She did receive PRN Atarax for anxiety per staff. 11/30 - Alina Bolaños reports feeling some stomach pains to nursing but request discharge to this interviewer. Not sure where she would go however. Referred her to social work for discharge planning. Moods are good. Denies SI/HI/AH/VH. No aggression or violence. Appropriately interactive and aware. Tolerating medications well. Eating and sleeping fairly. 12/1 - Alina Bolaños says she needs to go to [facility] so she could sleep there. Disorganized thought process. Denies si hi or avh. Isolative to her room. Says she is sleeping and eating ok. At the present time the patient Alina Bolaños remains compliant with taking medications. Denies any adverse events from taking them and feels they have been beneficial.     12/2 - Alina Bolaños reports feeling well and moods are good. Denies SI/HI/AH/VH. No aggression or violence. Appropriately interactive and aware. Tolerating medications well. Eating and sleeping fairly. SIDE EFFECTS: (reviewed/updated 12/5/2022)  None reported or admitted to.   No noted toxicity with use of Depakote ALLERGIES:(reviewed/updated 12/5/2022)  Allergies   Allergen Reactions    Amoxicillin Hives    Mushroom Flavor Hives    Tomato Hives      REVIEW OF SYSTEMS: (reviewed/updated 12/5/2022)  Appetite:improved   Sleep: no change   All other Review of Systems: Negative except incontinence per HPI         [facility] (MSE):    MSE FINDINGS ARE WITHIN NORMAL LIMITS (WNL) UNLESS OTHERWISE STATED BELOW. ( ALL OF THE BELOW CATEGORIES OF THE MSE HAVE BEEN REVIEWED (reviewed 12/5/2022) AND UPDATED AS DEEMED APPROPRIATE )  General Presentation age appropriate, cooperative and guarded   Orientation disorganized   Vital Signs  See below (reviewed 12/5/2022); Vital Signs (BP, Pulse, & Temp) are within normal limits if not listed below.    Gait and Station Stable/steady, no ataxia   Musculoskeletal System No extrapyramidal symptoms (EPS); no abnormal muscular movements or Tardive Dyskinesia (TD); muscle strength and tone are within normal limits   Language No aphasia or dysarthria   Speech:  normal volume and non-pressured   Thought Processes concrete; normal rate of thoughts; poor abstract reasoning/computation   Thought Associations blocked    Thought Content auditory hallucinations and visual hallucinations   Suicidal Ideations contracts for safety   Homicidal Ideations contracts for safety   Mood:  depressed and anhedonia   Affect:  flat   Memory recent  intact   Memory remote:  intact   Concentration/Attention:  intact   Fund of Knowledge average   Insight:  limited   Reliability fair   Judgment:  limited          VITALS:     Patient Vitals for the past 24 hrs:   Temp Pulse Resp BP SpO2   12/04/22 2000 98.1 °F (36.7 °C) 77 16 124/73 98 %       Wt Readings from Last 3 Encounters:   12/02/22 120.7 kg (266 lb)   06/03/22 113.9 kg (251 lb)   05/14/22 88.5 kg (195 lb)     Temp Readings from Last 3 Encounters:   12/04/22 98.1 °F (36.7 °C)   06/03/22 97.6 °F (36.4 °C)   05/19/22 98.5 °F (36.9 °C) BP Readings from Last 3 Encounters:   12/04/22 124/73   06/03/22 130/69   05/19/22 (!) 112/51     Pulse Readings from Last 3 Encounters:   12/04/22 77   06/03/22 89   05/19/22 64            DATA     LABORATORY DATA:(reviewed/updated 12/5/2022)  Recent Results (from the past 24 hour(s))   GLUCOSE, POC    Collection Time: 12/04/22  4:08 PM   Result Value Ref Range    Glucose (POC) 167 (H) 65 - 117 mg/dL    Performed by Karolynn Nyhan RN    GLUCOSE, POC    Collection Time: 12/04/22  7:39 PM   Result Value Ref Range    Glucose (POC) 196 (H) 65 - 117 mg/dL    Performed by Bryce Maldonado RN    GLUCOSE, POC    Collection Time: 12/05/22  7:22 AM   Result Value Ref Range    Glucose (POC) 169 (H) 65 - 117 mg/dL    Performed by Karolynn Nyhan RN    GLUCOSE, POC    Collection Time: 12/05/22 11:16 AM   Result Value Ref Range    Glucose (POC) 150 (H) 65 - 117 mg/dL    Performed by Karolynn Nyhan RN      No results found for: VALF2, VALAC, VALP, VALPR, DS6, CRBAM, CRBAMP, CARB2, XCRBAM  No results found for: LITHM   RADIOLOGY REPORTS:(reviewed/updated 12/5/2022)  No results found.        MEDICATIONS     ALL MEDICATIONS:   Current Facility-Administered Medications   Medication Dose Route Frequency    insulin lispro (HUMALOG) injection 10 Units  10 Units SubCUTAneous TIDAC    insulin glargine (LANTUS) injection 30 Units  30 Units SubCUTAneous DAILY    furosemide (LASIX) tablet 40 mg  40 mg Oral DAILY    ibuprofen (MOTRIN) tablet 400 mg  400 mg Oral Q6H PRN    nystatin (MYCOSTATIN) 100,000 unit/gram powder   Topical BID    risperiDONE (RisperDAL) tablet 2 mg  2 mg Oral Q12H    benzocaine-menthoL (CHLORASEPTIC MAX) lozenge 1 Lozenge  1 Lozenge Oral Q2H PRN    glucose chewable tablet 16 g  4 Tablet Oral PRN    glucagon (GLUCAGEN) injection 1 mg  1 mg IntraMUSCular PRN    dextrose 10% infusion 0-250 mL  0-250 mL IntraVENous PRN    albuterol (PROVENTIL HFA, VENTOLIN HFA, PROAIR HFA) inhaler 1 Puff  1 Puff Inhalation Q4H PRN    insulin lispro (HUMALOG) injection 1-10 Units  1-10 Units SubCUTAneous AC&HS    budesonide-formoterol (SYMBICORT) 80-4.5 mcg inhaler  2 Puff Inhalation BID RT    OLANZapine (ZyPREXA) tablet 5 mg  5 mg Oral Q6H PRN    haloperidol lactate (HALDOL) injection 5 mg  5 mg IntraMUSCular Q6H PRN    benztropine (COGENTIN) tablet 1 mg  1 mg Oral BID PRN    diphenhydrAMINE (BENADRYL) injection 50 mg  50 mg IntraMUSCular BID PRN    hydrOXYzine HCL (ATARAX) tablet 50 mg  50 mg Oral TID PRN    LORazepam (ATIVAN) injection 1 mg  1 mg IntraMUSCular Q4H PRN    traZODone (DESYREL) tablet 50 mg  50 mg Oral QHS PRN    acetaminophen (TYLENOL) tablet 650 mg  650 mg Oral Q4H PRN    magnesium hydroxide (MILK OF MAGNESIA) 400 mg/5 mL oral suspension 30 mL  30 mL Oral DAILY PRN    atorvastatin (LIPITOR) tablet 40 mg  40 mg Oral QPM    gabapentin (NEURONTIN) capsule 100 mg  100 mg Oral TID    metFORMIN ER (GLUCOPHAGE XR) tablet 1,000 mg  1,000 mg Oral BID WITH MEALS    oxybutynin chloride XL (DITROPAN XL) tablet 5 mg  5 mg Oral DAILY    sertraline (ZOLOFT) tablet 100 mg  100 mg Oral DAILY      SCHEDULED MEDICATIONS:   Current Facility-Administered Medications   Medication Dose Route Frequency    insulin lispro (HUMALOG) injection 10 Units  10 Units SubCUTAneous TIDAC    insulin glargine (LANTUS) injection 30 Units  30 Units SubCUTAneous DAILY    furosemide (LASIX) tablet 40 mg  40 mg Oral DAILY    nystatin (MYCOSTATIN) 100,000 unit/gram powder   Topical BID    risperiDONE (RisperDAL) tablet 2 mg  2 mg Oral Q12H    insulin lispro (HUMALOG) injection 1-10 Units  1-10 Units SubCUTAneous AC&HS    budesonide-formoterol (SYMBICORT) 80-4.5 mcg inhaler  2 Puff Inhalation BID RT    atorvastatin (LIPITOR) tablet 40 mg  40 mg Oral QPM    gabapentin (NEURONTIN) capsule 100 mg  100 mg Oral TID    metFORMIN ER (GLUCOPHAGE XR) tablet 1,000 mg  1,000 mg Oral BID WITH MEALS    oxybutynin chloride XL (DITROPAN XL) tablet 5 mg  5 mg Oral DAILY    sertraline (ZOLOFT) tablet 100 mg  100 mg Oral DAILY          ASSESSMENT & PLAN     DIAGNOSES REQUIRING ACTIVE TREATMENT AND MONITORING: (reviewed/updated 12/5/2022)  Patient Active Hospital Problem List:       Schizoaffective disorder (11/10/2022)           Assessment: the patient presents in crisis after potentially leaving her group home; she is disorganized and a poor historian at baseline, treatment will be primarily psychosocial. Patient with low O2 sats, edema and orthopnea, will have to address this. Plan:   - CONTINUE Risperdal 2 mg Q12H for psychosis  - CONTINUE Zoloft 100 mg QDAY for MDD  - Appreciate DM team / IM recs  - Consider sleep referral for obesity hypoventilation syndrome  - IGM therapy as tolerated  - Expand database / obtain collateral  - Dispo planning with social work    In summary, Christiano Boyle, is a 59 y.o.  female who presents with a severe exacerbation of the principal diagnosis of Schizoaffective disorder ([facility] [address]    Patient's condition is improving. Patient requires continued inpatient hospitalization for further stabilization, safety monitoring and medication management. I will continue to coordinate the provision of individual, milieu, occupational, group, and substance abuse therapies to address target symptoms/diagnoses as deemed appropriate for the individual patient. A coordinated, multidisplinary treatment team round was conducted with the patient (this team consists of the nurse, psychiatric unit pharmacist,  and writer). Complete current electronic health record for patient has been reviewed today including consultant notes, ancillary staff notes, nurses and psychiatric tech notes. Suicide risk assessment completed and patient deemed to be of low risk for suicide at this time. The following regarding medications was addressed during rounds with patient:   the risks and benefits of the proposed medication. The patient was given the opportunity to ask questions. Informed consent given to the use of the above medications. Will continue to adjust psychiatric and non-psychiatric medications (see above \"medication\" section and orders section for details) as deemed appropriate & based upon diagnoses and response to treatment. I will continue to order blood tests/labs and diagnostic tests as deemed appropriate and review results as they become available (see orders for details and above listed lab/test results). I will order psychiatric records from previous [facility] hospitals to further elucidate the nature of patient's psychopathology and review once available. I will gather additional collateral information from friends, family and o/p treatment team to further elucidate the nature of patient's psychopathology and baselline level of psychiatric functioning. I certify that this patient's inpatient psychiatric hospital services furnished since the previous certification were, and continue to be, required for treatment that could reasonably be expected to improve the patient's condition, or for diagnostic study, and that the patient continues to need, on a daily basis, active treatment furnished directly by or requiring the supervision of inpatient psychiatric facility personnel. In addition, the hospital records show that services furnished were intensive treatment services, admission or related services, or equivalent services.     EXPECTED DISCHARGE DATE/DAY: TBD     DISPOSITION: Group home / shelter       Signed By:   Steve Aviles MD  12/5/2022

## 2022-12-05 NOTE — GROUP NOTE
EMILY  GROUP DOCUMENTATION INDIVIDUAL                                                                          Group Therapy Note    Date: 12/5/2022    Group Start Time: 1000  Group End Time: 1100  Group Topic: Topic Group    [facility] 3 ACUTE BEHAV [facility], [facility] GROUP DOCUMENTATION GROUP    Group Therapy Note    Attendees: 10       Attendance: Attended    Patient's Goal:  To identify positive coping strategies a-z    Interventions/techniques: Supported-game    Interactions: Interacted appropriately    Mental Status: Calm    Behavior/appearance: Cooperative and Needed prompting    Goals Achieved: Able to engage in interactions and Able to listen to others      Additional Notes:  Pt arrived late.     Gregory Narvaez

## 2022-12-06 NOTE — BH NOTES
Behavioral Health Transition Record to Provider    Patient Name: Kailyn Garcia  YOB: 1965  Medical Record Number: [id number]  Date of Admission: 11/9/2022  Date of Discharge: 12/5/2022    Attending Staci Cleveland MD  Discharging Provider: Ashley Zaragoza MD  To contact this individual call [phone] and ask the  to page. If unavailable, ask to be transferred to [facility] Provider on call. [facility] Provider will be available on call 24/7 and during holidays. Primary Care Provider: Pepe Gr MD    Allergies   Allergen Reactions    Amoxicillin Hives    Mushroom Flavor Hives    Tomato Hives       Reason for Admission: 59 y.o. WHITE/NON- female with a past psychiatric history significant for schizoaffective disorder, who presents at this time with complaints of (and/or evidence of) the following emotional symptoms: depression and suicidal thoughts/threats. Additional symptomatology include poor self care. The above symptoms have been present for 2+ weeks. These symptoms are of moderate to high severity. These symptoms are constant in nature. The patient's condition has been precipitated by psychosocial stressors. Patient's condition made worse by treatment noncompliance. UDS: negative; BAL=0. The patient is well known to the unit; she comes into the hospital frequently in crisis typically stating her 's death several years ago has made her feel hopeless. She has been living in a group home though this admission she states that she was kicked out of her group home because of an episode of incontinence. The patient is a poor historian. The patient corroborates the above narrative. The patient contracts for safety on the unit and gives consent for the team to contact collateral. The patient is amenable to initiating treatment while on the unit. She defers much of the team's inquires to her payee.     Admission Diagnosis: Adjustment disorder [F43.20]    * No surgery found *    Results for orders placed or performed during the hospital encounter of 11/09/22   COVID-19 WITH INFLUENZA A/B   Result Value Ref Range    SARS-CoV-2 by PCR Not detected NOTD      Influenza A by PCR Not detected      Influenza B by PCR Not detected     COVID-19 WITH INFLUENZA A/B   Result Value Ref Range    SARS-CoV-2 by PCR Not detected NOTD      Influenza A by PCR Not detected      Influenza B by PCR Not detected     CULTURE, URINE    Specimen: Urine   Result Value Ref Range    Special Requests: NO SPECIAL REQUESTS  Reflexed from [id number]        [place] Count >100,000  COLONIES/mL        Culture result: ESCHERICHIA COLI (A)         Susceptibility    Escherichia coli - TARIQ     Amikacin ($)  Susceptible ug/mL     Ampicillin ($)  Resistant ug/mL     Ampicillin/sulbactam ($)  Resistant ug/mL     Cefazolin ($)  Susceptible ug/mL     Cefepime ($$)  Susceptible ug/mL     Cefoxitin  Susceptible ug/mL     Ceftazidime ($)  Susceptible ug/mL     Ceftriaxone ($)  Susceptible ug/mL     Ciprofloxacin ($)  Susceptible ug/mL     Gentamicin ($)  Resistant ug/mL     Levofloxacin ($)  Susceptible ug/mL     Meropenem ($$)  Susceptible ug/mL     Nitrofurantoin  Susceptible ug/mL     Piperacillin/Tazobac ($)  Susceptible ug/mL     Tobramycin ($)  Intermediate ug/mL     Trimeth/Sulfa  Resistant ug/mL   COVID-19 WITH INFLUENZA A/B   Result Value Ref Range    SARS-CoV-2 by PCR Not detected NOTD      Influenza A by PCR Not detected      Influenza B by PCR Not detected     METABOLIC PANEL, COMPREHENSIVE   Result Value Ref Range    Sodium 137 136 - 145 mmol/L    Potassium 3.9 3.5 - 5.1 mmol/L    Chloride 99 97 - 108 mmol/L    CO2 30 21 - 32 mmol/L    Anion gap 8 5 - 15 mmol/L    Glucose 267 (H) 65 - 100 mg/dL    BUN 13 6 - 20 MG/DL    Creatinine 1.01 0.55 - 1.02 MG/DL    BUN/Creatinine ratio 13 12 - 20      eGFR >60 >60 ml/min/1.73m2    Calcium 8.4 (L) 8.5 - 10.1 MG/DL Bilirubin, total 0.2 0.2 - 1.0 MG/DL    ALT (SGPT) 33 12 - 78 U/L    AST (SGOT) 18 15 - 37 U/L    Alk. phosphatase 139 (H) 45 - 117 U/L    Protein, total 6.8 6.4 - 8.2 g/dL    Albumin 2.8 (L) 3.5 - 5.0 g/dL    Globulin 4.0 2.0 - 4.0 g/dL    A-G Ratio 0.7 (L) 1.1 - 2.2     CBC WITH AUTOMATED DIFF   Result Value Ref Range    WBC 9.9 3.6 - 11.0 K/uL    RBC 4.87 3.80 - 5.20 M/uL    HGB 12.1 11.5 - 16.0 g/dL    HCT 39.0 35.0 - 47.0 %    MCV 80.1 80.0 - 99.0 FL    MCH 24.8 (L) 26.0 - 34.0 PG    MCHC 31.0 30.0 - 36.5 g/dL    RDW 14.7 (H) 11.5 - 14.5 %    PLATELET 240 (H) 122 - 400 K/uL    MPV 8.7 (L) 8.9 - 12.9 FL    NRBC 0.0 0  WBC    ABSOLUTE NRBC 0.00 0.00 - 0.01 K/uL    NEUTROPHILS 71 32 - 75 %    LYMPHOCYTES 22 12 - 49 %    MONOCYTES 4 (L) 5 - 13 %    EOSINOPHILS 2 0 - 7 %    BASOPHILS 0 0 - 1 %    IMMATURE GRANULOCYTES 1 (H) 0.0 - 0.5 %    ABS. NEUTROPHILS 7.1 1.8 - 8.0 K/UL    ABS. LYMPHOCYTES 2.1 0.8 - 3.5 K/UL    ABS. MONOCYTES 0.4 0.0 - 1.0 K/UL    ABS. EOSINOPHILS 0.2 0.0 - 0.4 K/UL    ABS. BASOPHILS 0.0 0.0 - 0.1 K/UL    ABS. IMM.  GRANS. 0.1 (H) 0.00 - 0.04 K/UL    DF AUTOMATED     URINALYSIS W/ RFLX MICROSCOPIC   Result Value Ref Range    Color YELLOW/STRAW      Appearance CLEAR CLEAR      Specific gravity >1.030 (H) 1.003 - 1.030    pH (UA) 5.5 5.0 - 8.0      Protein TRACE (A) NEG mg/dL    Glucose >1,000 (A) NEG mg/dL    Ketone TRACE (A) NEG mg/dL    Bilirubin Negative NEG      Blood Negative NEG      Urobilinogen 1.0 0.2 - 1.0 EU/dL    Nitrites Negative NEG      Leukocyte Esterase Negative NEG     ETHYL ALCOHOL   Result Value Ref Range    ALCOHOL(ETHYL),SERUM <10 <10 MG/DL   DRUG SCREEN, URINE   Result Value Ref Range    AMPHETAMINES Negative NEG      BARBITURATES Negative NEG      BENZODIAZEPINES Negative NEG      COCAINE Negative NEG      METHADONE Negative NEG      OPIATES Negative NEG      PCP(PHENCYCLIDINE) Negative NEG      THC (TH-CANNABINOL) Negative NEG      Drug screen comment (NOTE) URINE MICROSCOPIC ONLY   Result Value Ref Range    WBC 0-4 0 - 4 /hpf    RBC 0-5 0 - 5 /hpf    Epithelial cells FEW FEW /lpf    Bacteria Negative NEG /hpf   NT-PRO BNP   Result Value Ref Range    NT pro- (H) 0 - 125 PG/ML   CBC WITH AUTOMATED DIFF   Result Value Ref Range    WBC 7.9 3.6 - 11.0 K/uL    RBC 4.36 3.80 - 5.20 M/uL    HGB 10.9 (L) 11.5 - 16.0 g/dL    HCT 35.7 35.0 - 47.0 %    MCV 81.9 80.0 - 99.0 FL    MCH 25.0 (L) 26.0 - 34.0 PG    MCHC 30.5 30.0 - 36.5 g/dL    RDW 14.9 (H) 11.5 - 14.5 %    PLATELET 649 (H) 254 - 400 K/uL    MPV 8.9 8.9 - 12.9 FL    NRBC 0.0 0  WBC    ABSOLUTE NRBC 0.00 0.00 - 0.01 K/uL    NEUTROPHILS 67 32 - 75 %    LYMPHOCYTES 23 12 - 49 %    MONOCYTES 5 5 - 13 %    EOSINOPHILS 4 0 - 7 %    BASOPHILS 1 0 - 1 %    IMMATURE GRANULOCYTES 0 0.0 - 0.5 %    ABS. NEUTROPHILS 5.4 1.8 - 8.0 K/UL    ABS. LYMPHOCYTES 1.8 0.8 - 3.5 K/UL    ABS. MONOCYTES 0.4 0.0 - 1.0 K/UL    ABS. EOSINOPHILS 0.3 0.0 - 0.4 K/UL    ABS. BASOPHILS 0.0 0.0 - 0.1 K/UL    ABS. IMM. GRANS. 0.0 0.00 - 0.04 K/UL    DF AUTOMATED     METABOLIC PANEL, BASIC   Result Value Ref Range    Sodium 137 136 - 145 mmol/L    Potassium 4.8 3.5 - 5.1 mmol/L    Chloride 99 97 - 108 mmol/L    CO2 30 21 - 32 mmol/L    Anion gap 8 5 - 15 mmol/L    Glucose 265 (H) 65 - 100 mg/dL    BUN 18 6 - 20 MG/DL    Creatinine 1.03 (H) 0.55 - 1.02 MG/DL    BUN/Creatinine ratio 17 12 - 20      eGFR >60 >60 ml/min/1.73m2    Calcium 8.7 8.5 - 10.1 MG/DL   MAGNESIUM   Result Value Ref Range    Magnesium 1.5 (L) 1.6 - 2.4 mg/dL   PHOSPHORUS   Result Value Ref Range    Phosphorus 3.5 2.6 - 4.7 MG/DL   HEPATIC FUNCTION PANEL   Result Value Ref Range    Protein, total 6.6 6.4 - 8.2 g/dL    Albumin 2.7 (L) 3.5 - 5.0 g/dL    Globulin 3.9 2.0 - 4.0 g/dL    A-G Ratio 0.7 (L) 1.1 - 2.2      Bilirubin, total 0.2 0.2 - 1.0 MG/DL    Bilirubin, direct 0.1 0.0 - 0.2 MG/DL    Alk.  phosphatase 133 (H) 45 - 117 U/L    AST (SGOT) 13 (L) 15 - 37 U/L    ALT (SGPT) 24 12 - 78 U/L   NT-PRO BNP   Result Value Ref Range    NT pro- 0 - 125 PG/ML   TROPONIN-HIGH SENSITIVITY   Result Value Ref Range    Troponin-High Sensitivity 9 0 - 51 ng/L   URINALYSIS W/ REFLEX CULTURE    Specimen: Urine   Result Value Ref Range    Color YELLOW/STRAW      Appearance CLEAR CLEAR      Specific gravity 1.010      pH (UA) 5.0 5.0 - 8.0      Protein Negative NEG mg/dL    Glucose Negative NEG mg/dL    Ketone Negative NEG mg/dL    Bilirubin Negative NEG      Blood LARGE (A) NEG      Urobilinogen 0.2 0.2 - 1.0 EU/dL    Nitrites Positive (A) NEG      Leukocyte Esterase MODERATE (A) NEG      WBC 10-20 0 - 4 /hpf    RBC 20-50 0 - 5 /hpf    Epithelial cells FEW FEW /lpf    Bacteria 1+ (A) NEG /hpf    UA:UC IF INDICATED URINE CULTURE ORDERED (A) CNI     CBC WITH AUTOMATED DIFF   Result Value Ref Range    WBC 9.2 3.6 - 11.0 K/uL    RBC 4.87 3.80 - 5.20 M/uL    HGB 12.0 11.5 - 16.0 g/dL    HCT 39.7 35.0 - 47.0 %    MCV 81.5 80.0 - 99.0 FL    MCH 24.6 (L) 26.0 - 34.0 PG    MCHC 30.2 30.0 - 36.5 g/dL    RDW 14.7 (H) 11.5 - 14.5 %    PLATELET 946 (H) 195 - 400 K/uL    MPV 9.3 8.9 - 12.9 FL    NRBC 0.0 0  WBC    ABSOLUTE NRBC 0.00 0.00 - 0.01 K/uL    NEUTROPHILS 64 32 - 75 %    LYMPHOCYTES 28 12 - 49 %    MONOCYTES 5 5 - 13 %    EOSINOPHILS 2 0 - 7 %    BASOPHILS 1 0 - 1 %    IMMATURE GRANULOCYTES 0 0.0 - 0.5 %    ABS. NEUTROPHILS 5.9 1.8 - 8.0 K/UL    ABS. LYMPHOCYTES 2.6 0.8 - 3.5 K/UL    ABS. MONOCYTES 0.5 0.0 - 1.0 K/UL    ABS. EOSINOPHILS 0.2 0.0 - 0.4 K/UL    ABS. BASOPHILS 0.1 0.0 - 0.1 K/UL    ABS. IMM.  GRANS. 0.0 0.00 - 0.04 K/UL    DF AUTOMATED     METABOLIC PANEL, BASIC   Result Value Ref Range    Sodium 138 136 - 145 mmol/L    Potassium 3.4 (L) 3.5 - 5.1 mmol/L    Chloride 97 97 - 108 mmol/L    CO2 33 (H) 21 - 32 mmol/L    Anion gap 8 5 - 15 mmol/L    Glucose 197 (H) 65 - 100 mg/dL    BUN 22 (H) 6 - 20 MG/DL    Creatinine 1.29 (H) 0.55 - 1.02 MG/DL    BUN/Creatinine ratio 17 12 - 20 eGFR 48 (L) >60 ml/min/1.73m2    Calcium 8.6 8.5 - 10.1 MG/DL   MAGNESIUM   Result Value Ref Range    Magnesium 1.5 (L) 1.6 - 2.4 mg/dL   PHOSPHORUS   Result Value Ref Range    Phosphorus 4.0 2.6 - 4.7 MG/DL   HEPATIC FUNCTION PANEL   Result Value Ref Range    Protein, total 7.7 6.4 - 8.2 g/dL    Albumin 3.3 (L) 3.5 - 5.0 g/dL    Globulin 4.4 (H) 2.0 - 4.0 g/dL    A-G Ratio 0.8 (L) 1.1 - 2.2      Bilirubin, total 0.2 0.2 - 1.0 MG/DL    Bilirubin, direct 0.1 0.0 - 0.2 MG/DL    Alk.  phosphatase 162 (H) 45 - 117 U/L    AST (SGOT) 17 15 - 37 U/L    ALT (SGPT) 27 12 - 78 U/L   URINALYSIS W/ REFLEX CULTURE    Specimen: Urine   Result Value Ref Range    Color YELLOW/STRAW      Appearance CLEAR CLEAR      Specific gravity <1.005     pH (UA) 5.5 5.0 - 8.0      Protein Negative NEG mg/dL    Glucose Negative NEG mg/dL    Ketone Negative NEG mg/dL    Bilirubin Negative NEG      Blood Negative NEG      Urobilinogen 0.2 0.2 - 1.0 EU/dL    Nitrites Negative NEG      Leukocyte Esterase Negative NEG      WBC 0-4 0 - 4 /hpf    RBC 0-5 0 - 5 /hpf    Epithelial cells FEW FEW /lpf    Bacteria Negative NEG /hpf    UA:UC IF INDICATED CULTURE NOT INDICATED BY UA RESULT CNI     GLUCOSE, POC   Result Value Ref Range    Glucose (POC) 230 (H) 65 - 117 mg/dL    Performed by Mary Quiñonez (TRV RN)    GLUCOSE, POC   Result Value Ref Range    Glucose (POC) 397 (H) 65 - 117 mg/dL    Performed by Prateek Allison (TRV RN)    GLUCOSE, POC   Result Value Ref Range    Glucose (POC) 330 (H) 65 - 117 mg/dL    Performed by Martha Kebede RN    GLUCOSE, POC   Result Value Ref Range    Glucose (POC) 201 (H) 65 - 117 mg/dL    Performed by [address] [place] [address], POC   Result Value Ref Range    Glucose (POC) 265 (H) 65 - 117 mg/dL    Performed by [address] [place] [address], POC   Result Value Ref Range    Glucose (POC) 245 (H) 65 - 117 mg/dL    Performed by Shauna Leung    GLUCOSE, POC   Result Value Ref Range    Glucose (POC) 243 (H) 65 - 117 mg/dL Performed by Aidee Martínez RN    GLUCOSE, POC   Result Value Ref Range    Glucose (POC) 181 (H) 65 - 117 mg/dL    Performed by [address] [place] [address], POC   Result Value Ref Range    Glucose (POC) 232 (H) 65 - 117 mg/dL    Performed by [address] [place] [address], POC   Result Value Ref Range    Glucose (POC) 321 (H) 65 - 117 mg/dL    Performed by [address] [place] [address], POC   Result Value Ref Range    Glucose (POC) 221 (H) 65 - 117 mg/dL    Performed by Freedom Soliman RN    GLUCOSE, POC   Result Value Ref Range    Glucose (POC) 181 (H) 65 - 117 mg/dL    Performed by [facility] Zofia Nohemy    GLUCOSE, POC   Result Value Ref Range    Glucose (POC) 178 (H) 65 - 117 mg/dL    Performed by [facility]    GLUCOSE, POC   Result Value Ref Range    Glucose (POC) 203 (H) 65 - 117 mg/dL    Performed by [facility] Zofia Nohemy    GLUCOSE, POC   Result Value Ref Range    Glucose (POC) 159 (H) 65 - 117 mg/dL    Performed by Freedom Soliman RN    GLUCOSE, POC   Result Value Ref Range    Glucose (POC) 156 (H) 65 - 117 mg/dL    Performed by [facility] Zofia Nohemy    GLUCOSE, POC   Result Value Ref Range    Glucose (POC) 182 (H) 65 - 117 mg/dL    Performed by Asim Guerrero RN    GLUCOSE, POC   Result Value Ref Range    Glucose (POC) 206 (H) 65 - 117 mg/dL    Performed by Asim Guerrero RN    GLUCOSE, POC   Result Value Ref Range    Glucose (POC) 188 (H) 65 - 117 mg/dL    Performed by Emilia Saba RN    GLUCOSE, POC   Result Value Ref Range    Glucose (POC) 160 (H) 65 - 117 mg/dL    Performed by Asim Guerrero RN    GLUCOSE, POC   Result Value Ref Range    Glucose (POC) 169 (H) 65 - 117 mg/dL    Performed by Asim Guerrero RN    GLUCOSE, POC   Result Value Ref Range    Glucose (POC) 197 (H) 65 - 117 mg/dL    Performed by Asim Guerrero RN    GLUCOSE, POC   Result Value Ref Range    Glucose (POC) 293 (H) 65 - 117 mg/dL    Performed by Freedom Soliman RN    GLUCOSE, POC   Result Value Ref Range    Glucose (POC) 129 (H) 65 - 117 mg/dL    Performed by Karol Chaudhry RN    GLUCOSE, POC   Result Value Ref Range    Glucose (POC) 170 (H) 65 - 117 mg/dL    Performed by Karol Chaudhry RN    GLUCOSE, POC   Result Value Ref Range    Glucose (POC) 228 (H) 65 - 117 mg/dL    Performed by Karol Chaudhry RN    GLUCOSE, POC   Result Value Ref Range    Glucose (POC) 140 (H) 65 - 117 mg/dL    Performed by John Mills RN    GLUCOSE, POC   Result Value Ref Range    Glucose (POC) 156 (H) 65 - 117 mg/dL    Performed by Beto Espoisto RN    GLUCOSE, POC   Result Value Ref Range    Glucose (POC) 191 (H) 65 - 117 mg/dL    Performed by Beto Esposito RN    GLUCOSE, POC   Result Value Ref Range    Glucose (POC) 228 (H) 65 - 117 mg/dL    Performed by Beto Esposito RN    GLUCOSE, POC   Result Value Ref Range    Glucose (POC) 160 (H) 65 - 117 mg/dL    Performed by [facility] GERALD    GLUCOSE, POC   Result Value Ref Range    Glucose (POC) 197 (H) 65 - 117 mg/dL    Performed by Karol Chaudhry RN    GLUCOSE, POC   Result Value Ref Range    Glucose (POC) 153 (H) 65 - 117 mg/dL    Performed by Karol Chaudhry RN    GLUCOSE, POC   Result Value Ref Range    Glucose (POC) 272 (H) 65 - 117 mg/dL    Performed by Karol Chaudhry RN    GLUCOSE, POC   Result Value Ref Range    Glucose (POC) 270 (H) 65 - 117 mg/dL    Performed by Mindy Phan RN    GLUCOSE, POC   Result Value Ref Range    Glucose (POC) 179 (H) 65 - 117 mg/dL    Performed by Kristin Slater    GLUCOSE, POC   Result Value Ref Range    Glucose (POC) 194 (H) 65 - 117 mg/dL    Performed by Kristin Javier Nohemy    GLUCOSE, POC   Result Value Ref Range    Glucose (POC) 242 (H) 65 - 117 mg/dL    Performed by Kristin Slater    GLUCOSE, POC   Result Value Ref Range    Glucose (POC) 243 (H) 65 - 117 mg/dL    Performed by Annette Miller RN    GLUCOSE, POC   Result Value Ref Range    Glucose (POC) 187 (H) 65 - 117 mg/dL    Performed by Kristin Javier Nohemy    GLUCOSE, POC   Result Value Ref Range    Glucose (POC) 236 (H) 65 - 117 mg/dL    Performed by Mraciosukhdeep Tang Nohemy    GLUCOSE, POC   Result Value Ref Range    Glucose (POC) 256 (H) 65 - 117 mg/dL    Performed by Marcio Tang Nohemy    GLUCOSE, POC   Result Value Ref Range    Glucose (POC) 276 (H) 65 - 117 mg/dL    Performed by Morris Geronimo RN    GLUCOSE, POC   Result Value Ref Range    Glucose (POC) 177 (H) 65 - 117 mg/dL    Performed by Marcio Rogpaulette Nohemy    GLUCOSE, POC   Result Value Ref Range    Glucose (POC) 167 (H) 65 - 117 mg/dL    Performed by Marcio Tang Nohemy    GLUCOSE, POC   Result Value Ref Range    Glucose (POC) 235 (H) 65 - 117 mg/dL    Performed by Alfred Magaña RN    GLUCOSE, POC   Result Value Ref Range    Glucose (POC) 190 (H) 65 - 117 mg/dL    Performed by Leidy Greene RN    GLUCOSE, POC   Result Value Ref Range    Glucose (POC) 204 (H) 65 - 117 mg/dL    Performed by Clary Cates \"Aristeo\" RN    GLUCOSE, POC   Result Value Ref Range    Glucose (POC) 117 65 - 117 mg/dL    Performed by Eula Snell RN    GLUCOSE, POC   Result Value Ref Range    Glucose (POC) 175 (H) 65 - 117 mg/dL    Performed by Alfred Magaña RN    GLUCOSE, POC   Result Value Ref Range    Glucose (POC) 206 (H) 65 - 117 mg/dL    Performed by Leidy Greene RN    GLUCOSE, POC   Result Value Ref Range    Glucose (POC) 146 (H) 65 - 117 mg/dL    Performed by Tu Donovan (LILIA RN)    GLUCOSE, POC   Result Value Ref Range    Glucose (POC) 158 (H) 65 - 117 mg/dL    Performed by Tu Donovan (LILIA RN)    GLUCOSE, POC   Result Value Ref Range    Glucose (POC) 241 (H) 65 - 117 mg/dL    Performed by Eula Model RN    GLUCOSE, POC   Result Value Ref Range    Glucose (POC) 153 (H) 65 - 117 mg/dL    Performed by [employer] [address], POC   Result Value Ref Range    Glucose (POC) 187 (H) 65 - 117 mg/dL    Performed by Tu Donovan (LILIA RN)    GLUCOSE, POC   Result Value Ref Range    Glucose (POC) 175 (H) 65 - 117 mg/dL    Performed by Tu Donovan (LILIA RN)    GLUCOSE, POC   Result Value Ref Range    Glucose (POC) 223 (H) 65 - 117 mg/dL    Performed by Javier Chua (LILIA RN)    GLUCOSE, POC   Result Value Ref Range    Glucose (POC) 203 (H) 65 - 117 mg/dL    Performed by Nubia Nelson RN    GLUCOSE, POC   Result Value Ref Range    Glucose (POC) 149 (H) 65 - 117 mg/dL    Performed by Javier Chua (LILIA RN)    GLUCOSE, POC   Result Value Ref Range    Glucose (POC) 181 (H) 65 - 117 mg/dL    Performed by Lieutenant Adeline DUARTE    GLUCOSE, POC   Result Value Ref Range    Glucose (POC) 191 (H) 65 - 117 mg/dL    Performed by Javier Chua (LILIA RN)    GLUCOSE, POC   Result Value Ref Range    Glucose (POC) 245 (H) 65 - 117 mg/dL    Performed by Nubia Nelson RN    GLUCOSE, POC   Result Value Ref Range    Glucose (POC) 185 (H) 65 - 117 mg/dL    Performed by Lieutenant Adeline DUARTE    GLUCOSE, POC   Result Value Ref Range    Glucose (POC) 153 (H) 65 - 117 mg/dL    Performed by Lieutenant Adeline DUARTE    GLUCOSE, POC   Result Value Ref Range    Glucose (POC) 128 (H) 65 - 117 mg/dL    Performed by [id number] [place] [address], POC   Result Value Ref Range    Glucose (POC) 161 (H) 65 - 117 mg/dL    Performed by [facility], POC   Result Value Ref Range    Glucose (POC) 157 (H) 65 - 117 mg/dL    Performed by Kenny Eduardo    GLUCOSE, POC   Result Value Ref Range    Glucose (POC) 179 (H) 65 - 117 mg/dL    Performed by Adeola Sandhu \"Aristeo\" GERLAD    GLUCOSE, POC   Result Value Ref Range    Glucose (POC) 211 (H) 65 - 117 mg/dL    Performed by Kenny Eduardo    GLUCOSE, POC   Result Value Ref Range    Glucose (POC) 183 (H) 65 - 117 mg/dL    Performed by Perry Rojo RN    GLUCOSE, POC   Result Value Ref Range    Glucose (POC) 133 (H) 65 - 117 mg/dL    Performed by Wendy Varghese RN    GLUCOSE, POC   Result Value Ref Range    Glucose (POC) 173 (H) 65 - 117 mg/dL    Performed by Wendy Varghese RN    GLUCOSE, POC   Result Value Ref Range    Glucose (POC) 165 (H) 65 - 117 mg/dL    Performed by Doreen Hendricks, POC   Result Value Ref Range    Glucose (POC) 105 65 - 117 mg/dL    Performed by Deisy Keating RN    GLUCOSE, POC   Result Value Ref Range    Glucose (POC) 140 (H) 65 - 117 mg/dL    Performed by Jonas Davis RN    GLUCOSE, POC   Result Value Ref Range    Glucose (POC) 123 (H) 65 - 117 mg/dL    Performed by Jonas Davis RN    GLUCOSE, POC   Result Value Ref Range    Glucose (POC) 163 (H) 65 - 117 mg/dL    Performed by Jonas Davis RN    GLUCOSE, POC   Result Value Ref Range    Glucose (POC) 332 (H) 65 - 117 mg/dL    Performed by Christina Medina RN    GLUCOSE, POC   Result Value Ref Range    Glucose (POC) 177 (H) 65 - 117 mg/dL    Performed by Jasen Nelson RN    GLUCOSE, POC   Result Value Ref Range    Glucose (POC) 175 (H) 65 - 117 mg/dL    Performed by Jasen Nelson RN    GLUCOSE, POC   Result Value Ref Range    Glucose (POC) 218 (H) 65 - 117 mg/dL    Performed by Jasen Nelson RN    GLUCOSE, POC   Result Value Ref Range    Glucose (POC) 180 (H) 65 - 117 mg/dL    Performed by JOSE KRUGER    GLUCOSE, POC   Result Value Ref Range    Glucose (POC) 197 (H) 65 - 117 mg/dL    Performed by [address] [place] [address], POC   Result Value Ref Range    Glucose (POC) 237 (H) 65 - 117 mg/dL    Performed by [address] [place] [address], POC   Result Value Ref Range    Glucose (POC) 233 (H) 65 - 117 mg/dL    Performed by [address] [place] [address], POC   Result Value Ref Range    Glucose (POC) 166 (H) 65 - 117 mg/dL    Performed by Christi Moy RN    GLUCOSE, POC   Result Value Ref Range    Glucose (POC) 200 (H) 65 - 117 mg/dL    Performed by [address] [place] [address], POC   Result Value Ref Range    Glucose (POC) 197 (H) 65 - 117 mg/dL    Performed by [address] [place] [address], POC   Result Value Ref Range    Glucose (POC) 155 (H) 65 - 117 mg/dL    Performed by [address] [place] [address], POC   Result Value Ref Range    Glucose (POC) 119 (H) 65 - 117 mg/dL    Performed by Rimma Andersen RN    GLUCOSE, POC   Result Value Ref Range    Glucose (POC) 174 (H) 65 - 117 mg/dL    Performed by Kitty Lerma RN    GLUCOSE, POC   Result Value Ref Range    Glucose (POC) 162 (H) 65 - 117 mg/dL    Performed by Kitty Lerma RN    GLUCOSE, POC   Result Value Ref Range    Glucose (POC) 167 (H) 65 - 117 mg/dL    Performed by Kitty Lerma RN    GLUCOSE, POC   Result Value Ref Range    Glucose (POC) 196 (H) 65 - 117 mg/dL    Performed by Marisol Jolley RN    GLUCOSE, POC   Result Value Ref Range    Glucose (POC) 169 (H) 65 - 117 mg/dL    Performed by Kitty Lerma RN    GLUCOSE, POC   Result Value Ref Range    Glucose (POC) 150 (H) 65 - 117 mg/dL    Performed by Kitty Lerma RN    EKG, 12 LEAD, INITIAL   Result Value Ref Range    Ventricular Rate 70 BPM    Atrial Rate 70 BPM    P-R Interval 138 ms    QRS Duration 76 ms    Q-T Interval 376 ms    QTC Calculation (Bezet) 406 ms    Calculated P Axis 52 degrees    Calculated R Axis 44 degrees    Calculated T Axis 74 degrees    Diagnosis       ** Age and gender specific ECG analysis **  Normal sinus rhythm  Low voltage QRS  When compared with ECG of 27-JUL-2021 16:26,  Vent.  rate has decreased BY  56 BPM  Nonspecific T wave abnormality no longer evident in Inferior leads  Confirmed by Eleuterio Levine M.D., [employer] ([id number]) on 11/17/2022 8:13:47 AM     EKG, 12 LEAD, SUBSEQUENT   Result Value Ref Range    Ventricular Rate 72 BPM    Atrial Rate 72 BPM    P-R Interval 134 ms    QRS Duration 76 ms    Q-T Interval 386 ms    QTC Calculation (Bezet) 422 ms    Calculated P Axis 26 degrees    Calculated R Axis 40 degrees    Calculated T Axis 68 degrees    Diagnosis       Normal sinus rhythm  Low voltage QRS  Nonspecific T wave abnormality  When compared with ECG of 16-NOV-2022 16:30,  No significant change was found  Confirmed by Eleuterio Levine M.D., [employer] ([id number]) on 12/1/2022 7:55:30 AM     ECHO ADULT COMPLETE   Result Value Ref Range    IVSd 0.8 0.6 - 0.9 cm    LVIDd 4.8 3.9 - 5.3 cm    LVIDs 2.6 cm    LVOT Diameter 1.7 cm    LVPWd 0.8 0.6 - 0.9 cm    LV Ejection Fraction A4C 86 %    LV EDV A4C 68 mL    LV ESV A4C 10 mL    LVOT Peak Gradient 5 mmHg    LVOT Peak Velocity 1.1 m/s    LA Diameter 3.5 cm    LA Volume 4C 35 22 - 52 mL    AV Area by Peak Velocity 1.4 cm2    AV Peak Gradient 14 mmHg    AV Peak Velocity 1.9 m/s    MV A Velocity 0.65 m/s    MV E Wave Deceleration Time 198.3 ms    MV E Velocity 0.40 m/s    MV PHT 57.5 ms    MV Peak Gradient 5 mmHg    MV Mean Gradient 2 mmHg    MV Max Velocity 1.1 m/s    MV Mean Velocity 0.6 m/s    MV VTI 32.3 cm    PV Peak Gradient 4 mmHg    PV Max Velocity 0.9 m/s    Aortic Root 2.0 cm    Fractional Shortening 2D 46 28 - 44 %    LV ESV Index A4C 4 mL/m2    LV EDV Index A4C 30 mL/m2    LVIDd Index 2.13 cm/m2    LVIDs Index 1.16 cm/m2    LV RWT Ratio 0.33     LV Mass 2D 126.7 67 - 162 g    LV Mass 2D Index 56.3 43 - 95 g/m2    MV E/A 0.62     LVOT Area 2.3 cm2    LA Volume Index 4C 16 16 - 34 mL/m2    LA Size Index 1.56 cm/m2    LA/AO Root Ratio 1.75     Ao Root Index 0.89 cm/m2    AV Velocity Ratio 0.58     CECILIA/BSA Peak Velocity 0.6 cm2/m2    MV Area by PHT 3.8 cm2       Immunizations administered during this encounter:   Immunization History   Administered Date(s) Administered    Tdap 05/30/2015       Screening for Metabolic Disorders for Patients on Antipsychotic Medications  (Data obtained from the EMR)    Estimated Body Mass Index  Estimated body mass index is 44.26 kg/m² as calculated from the following:    Height as of this encounter: 5' 5\" (1.651 m). Weight as of this encounter: 120.7 kg (266 lb).      Vital Signs/Blood Pressure  Visit Vitals  BP (!) 113/51 (BP 1 Location: Right upper arm, BP Patient Position: Lying)   Pulse 62   Temp 97.4 °F (36.3 °C)   Resp 16   Ht 5' 5\" (1.651 m)   Wt 120.7 kg (266 lb) Comment: 266lbs   SpO2 96%   BMI 44.26 kg/m²       Blood Glucose/Hemoglobin A1c  Lab Results   Component Value Date/Time    Glucose 197 (H) 11/20/2022 10:58 AM    Glucose 218 (H) 09/22/2021 05:32 AM    Glucose (POC) 150 (H) 12/05/2022 11:16 AM       Lab Results   Component Value Date/Time    Hemoglobin A1c 8.1 (H) 05/16/2022 06:12 AM        Lipid Panel  Lab Results   Component Value Date/Time    Cholesterol, total 130 09/22/2021 05:32 AM    HDL Cholesterol 32 09/22/2021 05:32 AM    LDL, calculated 49.8 09/22/2021 05:32 AM    Triglyceride 241 (H) 09/22/2021 05:32 AM    CHOL/HDL Ratio 4.1 09/22/2021 05:32 AM        Discharge Diagnosis: Adjustment disorder [F43.20]    Discharge Plan: The patient Kameron Panda exhibits the ability to control behavior in a less restrictive environment. Patient's level of functioning is improving. No assaultive/destructive behavior has been observed for the past 24 hours. No suicidal/homicidal threat or behavior has been observed for the past 24 hours. There is no evidence of serious medication side effects. Patient has not been in physical or protective restraints for at least the past 24 hours. If weapons involved, how are they secured? Pt does not have access to any weapons. Is patient aware of and in agreement with discharge plan? Yes         Arrangements for medication:  Prescriptions sent to the Pt pharmacy         Copy of discharge instructions to provider?:  Yes to [address] [facility] [place] [address] for transportation home:  Pt was picked up by Annmarie Nevarez all follow up appointments as scheduled, continue to take prescribed medications per physician instructions.     Mental health crisis number:  [phone] or your local mental health crisis line number at [address] [facility] Emergency WARM LINE        1-815-785-MHAV (0274)        M-F: 9am to 9pm        Sat & Sun: 5pm - 9pm    National suicide prevention lines:                              3-054-WTHAOVX ([phone])        9-210-737-TALK ([phone])    24/7 Crisis Text Line:  Text HOME to 570830    Discharge Medication List and Instructions:   Discharge Medication List as of 12/5/2022 12:39 PM        START taking these medications    Details   albuterol (PROVENTIL HFA, VENTOLIN HFA, PROAIR HFA) 90 mcg/actuation inhaler Take 1 Puff by inhalation every four (4) hours as needed for Wheezing or Shortness of Breath. Indications: bronchospasm prevention, Normal, Disp-18 g, R-0      furosemide (LASIX) 40 mg tablet Take 1 Tablet by mouth daily. Indications: visible water retention, Normal, Disp-30 Tablet, R-0      budesonide-formoteroL (SYMBICORT) 80-4.5 mcg/actuation HFAA Take 2 Puffs by inhalation two (2) times a day. Indications: bronchospasm prevention with COPD, Normal, Disp-10.2 g, R-0      nystatin (MYCOSTATIN) powder Apply  to affected area two (2) times a day. Indications: a skin infection due to the fungus Candida, Normal, Disp-60 g, R-0           CONTINUE these medications which have CHANGED    Details   sertraline (ZOLOFT) 100 mg tablet Take 1 Tablet by mouth daily. Indications: anxiousness associated with depression, Normal, Disp-30 Tablet, R-0      risperiDONE (RisperDAL) 2 mg tablet Take 1 Tablet by mouth every twelve (12) hours. Indications: psychosis, Normal, Disp-60 Tablet, R-0      oxybutynin chloride XL (DITROPAN XL) 5 mg CR tablet Take 1 Tablet by mouth daily. Indications: overactive bladder, Normal, Disp-30 Tablet, R-0      metFORMIN ER (GLUCOPHAGE XR) 500 mg tablet Take 2 Tablets by mouth two (2) times daily (with meals). Indications: type 2 diabetes mellitus, Normal, Disp-120 Tablet, R-0      gabapentin (NEURONTIN) 100 mg capsule Take 1 Capsule by mouth three (3) times daily. Max Daily Amount: 300 mg. Indications: neuropathic pain, Normal, Disp-90 Capsule, R-0      atorvastatin (LIPITOR) 40 mg tablet Take 1 Tablet by mouth every evening. Indications: Cholesterol, Normal, Disp-30 Tablet, R-0      linaGLIPtin (Tradjenta) 5 mg tablet Take 1 Tablet by mouth daily.  Indications: type 2 diabetes mellitus, Normal, Disp-30 Tablet, R-0      glipiZIDE (GLUCOTROL) 10 mg tablet Take 1 Tablet by mouth Daily (before breakfast). Indications: type 2 diabetes mellitus, Normal, Disp-30 Tablet, R-0           STOP taking these medications       insulin glargine (LANTUS) 100 unit/mL injection Comments:   Reason for Stopping:               Unresulted Labs (24h ago, onward)      None          To obtain results of studies pending at discharge, please contact [phone]    Follow-up Information       Follow up With Specialties Details Why Contact Info    [facility]  Follow up on 12/5/2022 Pt will receive community stabalization services from [address] [place] [address] upon discharge from the hospital. [facility] [place], [address] [place] [address]  [phone]   [phone]    Judit Trinh MD Internal Medicine Physician   [address]  [facility] Anselmo Alphonse [address]  [facility]  Go to Please go to [address] [place] [address], Monday - Friday, 8 am - 2 pm, and complete a rapid access intake for psychistric and outpatient therapy services. Please inquire about obtainning a  for housing. [facility], [address] [place] [address].  [phone]            Advanced Directive:   Does the patient have an appointed surrogate decision maker? Yes  Does the patient have a Medical Advance Directive? No  Does the patient have a Psychiatric Advance Directive? NO  If the patient does not have a surrogate or Medical Advance Directive AND Psychiatric Advance Directive, the patient was offered information on these advance directives Yes, Pt declined    Patient Instructions: Please continue all medications until otherwise directed by physician. Tobacco Cessation Discharge Plan:   Is the patient a smoker and needs referral for smoking cessation? No  Patient referred to the following for smoking cessation with an appointment?  NO  Patient was offered medication to assist with smoking cessation at discharge? No  Was education for smoking cessation added to the discharge instructions? No    Alcohol/Substance Abuse Discharge Plan:   Does the patient have a history of substance/alcohol abuse and requires a referral for treatment? NO  Patient referred to the following for substance/alcohol abuse treatment with an appointment? NO  Patient was offered medication to assist with alcohol cessation at discharge? No  Was education for substance/alcohol abuse added to discharge instructions?  Yes    Patient discharged to [address] [place] [address]

## 2022-12-07 NOTE — BH NOTES
PSYCHIATRIC PROGRESS NOTE         Patient Name  Zach Bender   Date of Birth 1965   [facility] [id number]   Medical Record Number  [id number]      Age  59 y.o. PCP Traci Cueto MD   Admit date:  11/9/2022    Room Number  320/02  @ [facility]   Date of Service  12/6/2022         E & M PROGRESS NOTE:         HISTORY       CC:  \"suicidal ideation\"  HISTORY OF PRESENT ILLNESS/INTERVAL HISTORY:  (reviewed/updated 12/6/2022). per initial evaluation: The patient, Zach Bender, is a 59 y.o. WHITE/NON- female with a past psychiatric history significant for schizoaffective disorder, who presents at this time with complaints of (and/or evidence of) the following emotional symptoms: depression and suicidal thoughts/threats. Additional symptomatology include poor self care. The above symptoms have been present for 2+ weeks. These symptoms are of moderate to high severity. These symptoms are constant in nature. The patient's condition has been precipitated by psychosocial stressors. Patient's condition made worse by treatment noncompliance. UDS: negative; BAL=0. The patient is well known to the unit; she comes into the hospital frequently in crisis typically stating her 's death several years ago has made her feel hopeless. She has been living in a group home though this admission she states that she was kicked out of her group home because of an episode of incontinence. The patient is a poor historian. The patient corroborates the above narrative. The patient contracts for safety on the unit and gives consent for the team to contact collateral. The patient is amenable to initiating treatment while on the unit. She defers much of the team's inquires to her payee. 11/11 - no acute overnight events. The patient has been isolative to her room with poor ADLs. incontinent of urine but able to void when taken to the bathroom.  She denies active thoughts of self harm but is markedly internally preoccupied. Patient slept 8 hours overnight and got Trazodone. She denies active thoughts of self harm but endorses both depressed mood and anxiety. 11/14 - the patient remains in bed for much of the day. She has been inconitnent of urine and with poor ADLs requiring prompting for much activities. Patient denies active thoughts of self harm but endorses passive SI. She slept 7 hours overnight and is flat and disorganized on interview, moaning and providing little updates on her disposition plan. Per SW, the patient tends to not go to the group homes once assigned. Her payee is working on guardianship.    11/15 - overnight, the patient remained isolative to her bed, she continues to c/o anxiety and had an episode of urine incontinence. Patient refused nystatin stating her rash had 'cleared up.' She endorses ongoing AH and VH of 'ghosts' and states that she is doing very poorly. Patient evaluated by internist for ongoing LE swelling and pain. She is medication compliant and tolerating higher dose of Risperdal.    11/16 - the patient has remained in bed, out for meals and with little motivation to interact in the milieu. She got Motrin and Atarax and continues to c/o pain in her LE, as well as SI with a plan to cut herself if given the chance. The patient slept 8 hours and this morning spontaneously stated to the team that she wanted to be discharged -- she acknowledges that her payee and SW are working on a dispo plan and she is still symptomatic. Medicine consulted as she is now experiencing orthopnea. 11/17 - the patient has been isolative to her room, out for meals and slept 7 hours overnight. She is stating she will discharge to a [facility] but acknowledges that the team is coordinating guardianship with her payee. Patient noted to have significant LE edema and c/o orthopnea. She is medication compliant and denies active thoughts of self harm. . She remains disorganized and with ongoing confusion but today she is otherwise pleasant. 11/18 - overnight, the patient was noted to be desaturating to the 80's and difficult to arouse. She denies all symptoms otherwise and has been able to make her needs known. Patient medication compliant. She has been largely in bed during the day, but out for meals. SW actively working on placement for the patient. IM started Lasix to address edema. 11/21 - the patient has been in bed for much of the day. She is internally preoccupied and with no episodes of agitation or aggression. Patient passing clots and with abdi hematuria per RN. Keflex started for UTI. Patient medication compliant and has been otherwise in fair behavioral control. 11/22 - overnight, patient remains bed bound and disorganized. She is no longer passing blot clots and hematuria has subsided since starting Abx. Patient denies SI/HI/PI, AH appears baseline. She is discharge focused, sleeping during the day and pleasant when awake. Diabetes team provided recommendations. 11/23 - the patient slept 9 hours overnight. She is out for meals but isolative to her room for much of the day. Patient still on fluid restriction as she is being diuresed. She denies SI/HI/VH/PI, she continues to c/o AH. Patient calm and cooperative on interview. BSG 170s- 200s. 11/28 - no acute overnight events. Patient isolative, oddly related with ongoing disorganized thought process. She is ambulating to day room for sneaks but otherwise in her bed for much of the day. Patient denies active thoughts of self harm but rates her depression as '10/10.' She is medication compliant and able to make her needs known. SIDE EFFECTS: (reviewed/updated 12/6/2022)  None reported or admitted to.   No noted toxicity with use of Depakote   ALLERGIES:(reviewed/updated 12/6/2022)  Allergies   Allergen Reactions    Amoxicillin Hives    Mushroom Flavor Hives    Tomato Hives      REVIEW OF SYSTEMS: (reviewed/updated 12/6/2022)  Appetite:improved   Sleep: no change   All other Review of Systems: Negative except incontinence per HPI         [facility] (MSE):    MSE FINDINGS ARE WITHIN NORMAL LIMITS (WNL) UNLESS OTHERWISE STATED BELOW. ( ALL OF THE BELOW CATEGORIES OF THE MSE HAVE BEEN REVIEWED (reviewed 12/6/2022) AND UPDATED AS DEEMED APPROPRIATE )  General Presentation age appropriate, cooperative and guarded   Orientation disorganized   Vital Signs  See below (reviewed 12/6/2022); Vital Signs (BP, Pulse, & Temp) are within normal limits if not listed below. Gait and Station Stable/steady, no ataxia   Musculoskeletal System No extrapyramidal symptoms (EPS); no abnormal muscular movements or Tardive Dyskinesia (TD); muscle strength and tone are within normal limits   Language No aphasia or dysarthria   Speech:  normal volume and non-pressured   Thought Processes concrete; normal rate of thoughts; poor abstract reasoning/computation   Thought Associations blocked    Thought Content auditory hallucinations and visual hallucinations   Suicidal Ideations contracts for safety   Homicidal Ideations contracts for safety   Mood:  depressed and anhedonia   Affect:  flat   Memory recent  intact   Memory remote:  intact   Concentration/Attention:  intact   Fund of Knowledge average   Insight:  limited   Reliability fair   Judgment:  limited          VITALS:     No data found.     Wt Readings from Last 3 Encounters:   12/02/22 120.7 kg (266 lb)   06/03/22 113.9 kg (251 lb)   05/14/22 88.5 kg (195 lb)     Temp Readings from Last 3 Encounters:   12/05/22 97.4 °F (36.3 °C)   06/03/22 97.6 °F (36.4 °C)   05/19/22 98.5 °F (36.9 °C)     BP Readings from Last 3 Encounters:   12/05/22 (!) 113/51   06/03/22 130/69   05/19/22 (!) 112/51     Pulse Readings from Last 3 Encounters:   12/05/22 62   06/03/22 89   05/19/22 64            DATA     LABORATORY DATA:(reviewed/updated 12/6/2022)  No results found for this or any previous visit (from the past 24 hour(s)). No results found for: VALF2, VALAC, VALP, VALPR, DS6, CRBAM, CRBAMP, CARB2, XCRBAM  No results found for: LITHM   RADIOLOGY REPORTS:(reviewed/updated 12/6/2022)  No results found. MEDICATIONS     ALL MEDICATIONS:   No current facility-administered medications for this encounter. Current Outpatient Medications   Medication Sig    glipiZIDE (GLUCOTROL) 10 mg tablet Take 1 Tablet by mouth Daily (before breakfast). Indications: type 2 diabetes mellitus    linaGLIPtin (Tradjenta) 5 mg tablet Take 1 Tablet by mouth daily. Indications: type 2 diabetes mellitus    atorvastatin (LIPITOR) 40 mg tablet Take 1 Tablet by mouth every evening. Indications: Cholesterol    gabapentin (NEURONTIN) 100 mg capsule Take 1 Capsule by mouth three (3) times daily. Max Daily Amount: 300 mg. Indications: neuropathic pain    metFORMIN ER (GLUCOPHAGE XR) 500 mg tablet Take 2 Tablets by mouth two (2) times daily (with meals). Indications: type 2 diabetes mellitus    oxybutynin chloride XL (DITROPAN XL) 5 mg CR tablet Take 1 Tablet by mouth daily. Indications: overactive bladder    risperiDONE (RisperDAL) 2 mg tablet Take 1 Tablet by mouth every twelve (12) hours. Indications: psychosis    albuterol (PROVENTIL HFA, VENTOLIN HFA, PROAIR HFA) 90 mcg/actuation inhaler Take 1 Puff by inhalation every four (4) hours as needed for Wheezing or Shortness of Breath. Indications: bronchospasm prevention    budesonide-formoteroL (SYMBICORT) 80-4.5 mcg/actuation HFAA Take 2 Puffs by inhalation two (2) times a day. Indications: bronchospasm prevention with COPD    furosemide (LASIX) 40 mg tablet Take 1 Tablet by mouth daily. Indications: visible water retention    nystatin (MYCOSTATIN) powder Apply  to affected area two (2) times a day. Indications: a skin infection due to the fungus Candida    sertraline (ZOLOFT) 100 mg tablet Take 1 Tablet by mouth daily.  Indications: anxiousness associated with depression      SCHEDULED MEDICATIONS:   No current facility-administered medications for this encounter. ASSESSMENT & PLAN     DIAGNOSES REQUIRING ACTIVE TREATMENT AND MONITORING: (reviewed/updated 12/6/2022)  Patient Active Hospital Problem List:       Schizoaffective disorder (11/10/2022)           Assessment: the patient presents in crisis after potentially leaving her group home; she is disorganized and a poor historian at baseline, treatment will be primarily psychosocial. Patient with low O2 sats, edema and orthopnea, will have to address this. Plan:   - CONTINUE Risperdal 2 mg Q12H for psychosis  - CONTINUE Zoloft 100 mg QDAY for MDD  - Retest UA for cure of UTI  - Consider sleep referral for obesity hypoventilation syndrome  - IGM therapy as tolerated  - Expand database / obtain collateral  - Dispo planning    In summary, Nagi Pickard, is a 59 y.o.  female who presents with a severe exacerbation of the principal diagnosis of Schizoaffective disorder ([facility] [address]    Patient's condition is improving. Patient requires continued inpatient hospitalization for further stabilization, safety monitoring and medication management. I will continue to coordinate the provision of individual, milieu, occupational, group, and substance abuse therapies to address target symptoms/diagnoses as deemed appropriate for the individual patient. A coordinated, multidisplinary treatment team round was conducted with the patient (this team consists of the nurse, psychiatric unit pharmacist,  and writer). Complete current electronic health record for patient has been reviewed today including consultant notes, ancillary staff notes, nurses and psychiatric tech notes. Suicide risk assessment completed and patient deemed to be of low risk for suicide at this time. The following regarding medications was addressed during rounds with patient:   the risks and benefits of the proposed medication. The patient was given the opportunity to ask questions. Informed consent given to the use of the above medications. Will continue to adjust psychiatric and non-psychiatric medications (see above \"medication\" section and orders section for details) as deemed appropriate & based upon diagnoses and response to treatment. I will continue to order blood tests/labs and diagnostic tests as deemed appropriate and review results as they become available (see orders for details and above listed lab/test results). I will order psychiatric records from previous [facility] hospitals to further elucidate the nature of patient's psychopathology and review once available. I will gather additional collateral information from friends, family and o/p treatment team to further elucidate the nature of patient's psychopathology and baselline level of psychiatric functioning. I certify that this patient's inpatient psychiatric hospital services furnished since the previous certification were, and continue to be, required for treatment that could reasonably be expected to improve the patient's condition, or for diagnostic study, and that the patient continues to need, on a daily basis, active treatment furnished directly by or requiring the supervision of inpatient psychiatric facility personnel. In addition, the hospital records show that services furnished were intensive treatment services, admission or related services, or equivalent services.     EXPECTED DISCHARGE DATE/DAY: TBD     DISPOSITION: Group home / shelter       Signed By:   Ciara Jay MD  12/6/2022

## 2022-12-10 ENCOUNTER — HOSPITAL ENCOUNTER (EMERGENCY)
Age: 57
Discharge: HOME | End: 2022-12-11
Attending: EMERGENCY MEDICINE
Payer: MEDICAID

## 2022-12-10 DIAGNOSIS — R73.9 HYPERGLYCEMIA: ICD-10-CM

## 2022-12-10 DIAGNOSIS — F33.9 EPISODE OF RECURRENT MAJOR DEPRESSIVE DISORDER, UNSPECIFIED DEPRESSION EPISODE SEVERITY (HCC): ICD-10-CM

## 2022-12-10 DIAGNOSIS — Z76.5 MALINGERING: Primary | ICD-10-CM

## 2022-12-10 DIAGNOSIS — Z59.00 HOMELESSNESS: ICD-10-CM

## 2022-12-10 LAB
ALBUMIN SERPL-MCNC: 3.1 G/DL (ref 3.5–5)
ALBUMIN/GLOB SERPL: 0.7 {RATIO} (ref 1.1–2.2)
ALP SERPL-CCNC: 149 U/L (ref 45–117)
ALT SERPL-CCNC: 35 U/L (ref 12–78)
AMPHET UR QL SCN: NEGATIVE
ANION GAP SERPL CALC-SCNC: 13 MMOL/L (ref 5–15)
APPEARANCE UR: CLEAR
AST SERPL-CCNC: 19 U/L (ref 15–37)
BACTERIA URNS QL MICRO: NEGATIVE /HPF
BARBITURATES UR QL SCN: NEGATIVE
BASOPHILS # BLD: 0.1 K/UL (ref 0–0.1)
BASOPHILS NFR BLD: 1 % (ref 0–1)
BENZODIAZ UR QL: NEGATIVE
BILIRUB SERPL-MCNC: 0.2 MG/DL (ref 0.2–1)
BILIRUB UR QL: NEGATIVE
BUN SERPL-MCNC: 15 MG/DL (ref 6–20)
BUN/CREAT SERPL: 11 (ref 12–20)
CALCIUM SERPL-MCNC: 9.3 MG/DL (ref 8.5–10.1)
CANNABINOIDS UR QL SCN: NEGATIVE
CHLORIDE SERPL-SCNC: 96 MMOL/L (ref 97–108)
CO2 SERPL-SCNC: 26 MMOL/L (ref 21–32)
COCAINE UR QL SCN: NEGATIVE
COLOR UR: ABNORMAL
CREAT SERPL-MCNC: 1.39 MG/DL (ref 0.55–1.02)
DIFFERENTIAL METHOD BLD: ABNORMAL
DRUG SCRN COMMENT,DRGCM: NORMAL
EOSINOPHIL # BLD: 0.1 K/UL (ref 0–0.4)
EOSINOPHIL NFR BLD: 2 % (ref 0–7)
EPITH CASTS URNS QL MICRO: ABNORMAL /LPF
ERYTHROCYTE [DISTWIDTH] IN BLOOD BY AUTOMATED COUNT: 14.6 % (ref 11.5–14.5)
ETHANOL SERPL-MCNC: <10 MG/DL
FLUAV RNA SPEC QL NAA+PROBE: NOT DETECTED
FLUBV RNA SPEC QL NAA+PROBE: NOT DETECTED
GLOBULIN SER CALC-MCNC: 4.3 G/DL (ref 2–4)
GLUCOSE BLD STRIP.AUTO-MCNC: 485 MG/DL (ref 65–117)
GLUCOSE BLD STRIP.AUTO-MCNC: 574 MG/DL (ref 65–117)
GLUCOSE SERPL-MCNC: 569 MG/DL (ref 65–100)
GLUCOSE UR STRIP.AUTO-MCNC: >1000 MG/DL
HCT VFR BLD AUTO: 39 % (ref 35–47)
HGB BLD-MCNC: 12.3 G/DL (ref 11.5–16)
HGB UR QL STRIP: NEGATIVE
IMM GRANULOCYTES # BLD AUTO: 0 K/UL (ref 0–0.04)
IMM GRANULOCYTES NFR BLD AUTO: 0 % (ref 0–0.5)
KETONES UR QL STRIP.AUTO: NEGATIVE MG/DL
LEUKOCYTE ESTERASE UR QL STRIP.AUTO: NEGATIVE
LYMPHOCYTES # BLD: 1.8 K/UL (ref 0.8–3.5)
LYMPHOCYTES NFR BLD: 20 % (ref 12–49)
MCH RBC QN AUTO: 25.7 PG (ref 26–34)
MCHC RBC AUTO-ENTMCNC: 31.5 G/DL (ref 30–36.5)
MCV RBC AUTO: 81.4 FL (ref 80–99)
METHADONE UR QL: NEGATIVE
MONOCYTES # BLD: 0.4 K/UL (ref 0–1)
MONOCYTES NFR BLD: 4 % (ref 5–13)
NEUTS SEG # BLD: 6.9 K/UL (ref 1.8–8)
NEUTS SEG NFR BLD: 73 % (ref 32–75)
NITRITE UR QL STRIP.AUTO: NEGATIVE
NRBC # BLD: 0 K/UL (ref 0–0.01)
NRBC BLD-RTO: 0 PER 100 WBC
OPIATES UR QL: NEGATIVE
PCP UR QL: NEGATIVE
PH UR STRIP: 5.5 [PH] (ref 5–8)
PLATELET # BLD AUTO: 485 K/UL (ref 150–400)
PMV BLD AUTO: 9.4 FL (ref 8.9–12.9)
POTASSIUM SERPL-SCNC: 3.7 MMOL/L (ref 3.5–5.1)
PROT SERPL-MCNC: 7.4 G/DL (ref 6.4–8.2)
PROT UR STRIP-MCNC: NEGATIVE MG/DL
RBC # BLD AUTO: 4.79 M/UL (ref 3.8–5.2)
RBC #/AREA URNS HPF: ABNORMAL /HPF (ref 0–5)
SARS-COV-2, COV2: NOT DETECTED
SERVICE CMNT-IMP: ABNORMAL
SERVICE CMNT-IMP: ABNORMAL
SODIUM SERPL-SCNC: 135 MMOL/L (ref 136–145)
SP GR UR REFRACTOMETRY: 1.01 (ref 1–1.03)
TROPONIN-HIGH SENSITIVITY: 11 NG/L (ref 0–51)
UA: UC IF INDICATED,UAUC: ABNORMAL
UROBILINOGEN UR QL STRIP.AUTO: 0.2 EU/DL (ref 0.2–1)
WBC # BLD AUTO: 9.3 K/UL (ref 3.6–11)
WBC URNS QL MICRO: ABNORMAL /HPF (ref 0–4)

## 2022-12-10 PROCEDURE — 81001 URINALYSIS AUTO W/SCOPE: CPT

## 2022-12-10 PROCEDURE — 82077 ASSAY SPEC XCP UR&BREATH IA: CPT

## 2022-12-10 PROCEDURE — 87636 SARSCOV2 & INF A&B AMP PRB: CPT

## 2022-12-10 PROCEDURE — 93005 ELECTROCARDIOGRAM TRACING: CPT

## 2022-12-10 PROCEDURE — 80053 COMPREHEN METABOLIC PANEL: CPT

## 2022-12-10 PROCEDURE — 74011636637 HC RX REV CODE- 636/637: Performed by: PHYSICIAN ASSISTANT

## 2022-12-10 PROCEDURE — 74011250636 HC RX REV CODE- 250/636: Performed by: PHYSICIAN ASSISTANT

## 2022-12-10 PROCEDURE — 96374 THER/PROPH/DIAG INJ IV PUSH: CPT

## 2022-12-10 PROCEDURE — 36415 COLL VENOUS BLD VENIPUNCTURE: CPT

## 2022-12-10 PROCEDURE — 84484 ASSAY OF TROPONIN QUANT: CPT

## 2022-12-10 PROCEDURE — 85025 COMPLETE CBC W/AUTO DIFF WBC: CPT

## 2022-12-10 PROCEDURE — 80307 DRUG TEST PRSMV CHEM ANLYZR: CPT

## 2022-12-10 PROCEDURE — 96376 TX/PRO/DX INJ SAME DRUG ADON: CPT

## 2022-12-10 PROCEDURE — 90791 PSYCH DIAGNOSTIC EVALUATION: CPT

## 2022-12-10 PROCEDURE — 82962 GLUCOSE BLOOD TEST: CPT

## 2022-12-10 PROCEDURE — 99284 EMERGENCY DEPT VISIT MOD MDM: CPT

## 2022-12-10 RX ADMIN — SODIUM CHLORIDE 1000 ML: 9 INJECTION, SOLUTION INTRAVENOUS at 22:28

## 2022-12-10 RX ADMIN — Medication 10 UNITS: at 23:50

## 2022-12-10 RX ADMIN — SODIUM CHLORIDE 1000 ML: 9 INJECTION, SOLUTION INTRAVENOUS at 21:42

## 2022-12-10 RX ADMIN — Medication 10 UNITS: at 22:27

## 2022-12-10 SDOH — ECONOMIC STABILITY - HOUSING INSECURITY: HOMELESSNESS UNSPECIFIED: Z59.00

## 2022-12-11 VITALS
RESPIRATION RATE: 26 BRPM | SYSTOLIC BLOOD PRESSURE: 132 MMHG | DIASTOLIC BLOOD PRESSURE: 77 MMHG | HEART RATE: 73 BPM | OXYGEN SATURATION: 95 % | TEMPERATURE: 97.9 F

## 2022-12-11 VITALS
OXYGEN SATURATION: 97 % | HEART RATE: 73 BPM | TEMPERATURE: 97.5 F | RESPIRATION RATE: 16 BRPM | DIASTOLIC BLOOD PRESSURE: 76 MMHG | SYSTOLIC BLOOD PRESSURE: 160 MMHG

## 2022-12-11 DIAGNOSIS — F20.0 PARANOID SCHIZOPHRENIA (HCC): ICD-10-CM

## 2022-12-11 DIAGNOSIS — Z59.00 HOMELESS: Primary | ICD-10-CM

## 2022-12-11 LAB
GLUCOSE BLD STRIP.AUTO-MCNC: 215 MG/DL (ref 65–117)
GLUCOSE BLD STRIP.AUTO-MCNC: 280 MG/DL (ref 65–117)
SERVICE CMNT-IMP: ABNORMAL
SERVICE CMNT-IMP: ABNORMAL

## 2022-12-11 PROCEDURE — 74011250636 HC RX REV CODE- 250/636: Performed by: EMERGENCY MEDICINE

## 2022-12-11 PROCEDURE — 99283 EMERGENCY DEPT VISIT LOW MDM: CPT

## 2022-12-11 PROCEDURE — 82962 GLUCOSE BLOOD TEST: CPT

## 2022-12-11 PROCEDURE — 99285 EMERGENCY DEPT VISIT HI MDM: CPT

## 2022-12-11 PROCEDURE — 90791 PSYCH DIAGNOSTIC EVALUATION: CPT

## 2022-12-11 PROCEDURE — 74011636637 HC RX REV CODE- 636/637: Performed by: EMERGENCY MEDICINE

## 2022-12-11 RX ADMIN — Medication 5 UNITS: at 03:42

## 2022-12-11 RX ADMIN — SODIUM CHLORIDE 1000 ML: 9 INJECTION, SOLUTION INTRAVENOUS at 03:42

## 2022-12-11 SDOH — ECONOMIC STABILITY - HOUSING INSECURITY: HOMELESSNESS UNSPECIFIED: Z59.00

## 2022-12-11 NOTE — ED NOTES
Pt presents to the ed w/ SI d2 winter season reminder Pt of   12/10. Emergency Department Nursing Plan of Care       The Nursing Plan of Care is developed from the Nursing assessment and Emergency Department Attending provider initial evaluation. The plan of care may be reviewed in the ED Provider note.     The Plan of Care was developed with the following considerations:   Patient / Family readiness to learn indicated by:verbalized understanding  Persons(s) to be included in education: patient  Barriers to Learning/Limitations:No    Signed     Constance Middleton RN    12/10/2022   9:03 PM

## 2022-12-11 NOTE — BSMART NOTE
Inpatient admission cancelled due to patient electing to discharge after being informed of no available bed through [facility]. Per [facility] who consulted with NP Makayla Naranjo who denied the patient due to hx of malingering and noncompliance with treatment as well as follow up care. The patient's risk assessment scored low and this writer's attempt to provide [facility] resources as well as a Safety Plan was denied by the patient.

## 2022-12-11 NOTE — BSMART NOTE
Writer received [facility] from [facility] Pt's glucose is stable.   Writer to notify [facility]

## 2022-12-11 NOTE — ED NOTES
Bedside and Verbal shift change report given to GERALD Gibson (oncoming nurse) by Anna Parra (offgoing nurse). Report included the following information SBAR.

## 2022-12-11 NOTE — ED TRIAGE NOTES
Triage: Pt arrives ambulatory from out side of [facility] with CC of suicidal ideation. Pt reports she wants jump into traffic. Pt seen at [facility] yesterday for same and discharged. [facility]. Pt reports she is schizoaffective, bipolar, and homeless.

## 2022-12-11 NOTE — ED PROVIDER NOTES
EMERGENCY DEPARTMENT HISTORY AND PHYSICAL EXAM          Date: 12/10/2022  Patient Name: Quiroz Shock    History of Presenting Illness     Chief Complaint   Patient presents with    Suicidal         History Provided By: Patient    HPI: Quiroz Shock is a 59 y.o. female with a PMH of diabetes, arthritis, COPD, depression, drug use, bipolar, schizophrenia, PTSD, hypertension who presents with CC of \"diabetes, arthritis, chest pain and I want to get hit by a car. \"  Patient states she was seen at [facility] and they discharged her because they had \"no beds. \"  She states she left her medication at the hospital and has been out of it for the last 2 to 3 days. Patient is currently homeless and rates pain 10 out of 10 and aching in nature. PCP: None    Current Outpatient Medications   Medication Sig Dispense Refill    glipiZIDE (GLUCOTROL) 10 mg tablet Take 1 Tablet by mouth Daily (before breakfast). Indications: type 2 diabetes mellitus 30 Tablet 0    linaGLIPtin (Tradjenta) 5 mg tablet Take 1 Tablet by mouth daily. Indications: type 2 diabetes mellitus 30 Tablet 0    atorvastatin (LIPITOR) 40 mg tablet Take 1 Tablet by mouth every evening. Indications: Cholesterol 30 Tablet 0    gabapentin (NEURONTIN) 100 mg capsule Take 1 Capsule by mouth three (3) times daily. Max Daily Amount: 300 mg. Indications: neuropathic pain 90 Capsule 0    metFORMIN ER (GLUCOPHAGE XR) 500 mg tablet Take 2 Tablets by mouth two (2) times daily (with meals). Indications: type 2 diabetes mellitus 120 Tablet 0    oxybutynin chloride XL (DITROPAN XL) 5 mg CR tablet Take 1 Tablet by mouth daily. Indications: overactive bladder 30 Tablet 0    risperiDONE (RisperDAL) 2 mg tablet Take 1 Tablet by mouth every twelve (12) hours. Indications: psychosis 60 Tablet 0    albuterol (PROVENTIL HFA, VENTOLIN HFA, PROAIR HFA) 90 mcg/actuation inhaler Take 1 Puff by inhalation every four (4) hours as needed for Wheezing or Shortness of Breath.  Indications: bronchospasm prevention 18 g 0    budesonide-formoteroL (SYMBICORT) 80-4.5 mcg/actuation HFAA Take 2 Puffs by inhalation two (2) times a day. Indications: bronchospasm prevention with COPD 10.2 g 0    furosemide (LASIX) 40 mg tablet Take 1 Tablet by mouth daily. Indications: visible water retention 30 Tablet 0    nystatin (MYCOSTATIN) powder Apply  to affected area two (2) times a day. Indications: a skin infection due to the fungus Candida 60 g 0    sertraline (ZOLOFT) 100 mg tablet Take 1 Tablet by mouth daily. Indications: anxiousness associated with depression 30 Tablet 0       Past History     Past Medical History:  Past Medical History:   Diagnosis Date    Arthritis     legs    Bipolar 1 disorder ([facility]     COPD     Depression 1/13/2012    Diabetes [facility]     Drug abuse ([facility]     cocaine, stimulants, etoh, mj, tob    H/O suicide attempt     Hypertension     Obesity     Polydrug dependence excluding opioid type drug, abuse ([facility]     PTSD (post-traumatic stress disorder) 12/30/2021    Schizophrenia ([facility] 7/8/2016       Past Surgical History:  Past Surgical History:   Procedure Laterality Date    HX ORTHOPAEDIC      foot sx       Family History:  Family History   Problem Relation Age of Onset    Diabetes Mother     Stroke Mother     Stroke Father        Social History:  Social History     Tobacco Use    Smoking status: Every Day     Packs/day: 1.00     Years: 10.00     Pack years: 10.00     Types: Cigarettes    Smokeless tobacco: Current   Substance Use Topics    Alcohol use: No     Alcohol/week: 0.0 standard drinks     Comment: Hx of ETOH abuse since age 13. no DUIs. Drug use: Not Currently     Types: Cocaine, Marijuana, Other, Opiates       Allergies: Allergies   Allergen Reactions    Amoxicillin Hives    Mushroom Flavor Hives    Tomato Hives         Review of Systems   Review of Systems   Constitutional:  Negative for fever. Cardiovascular:  Positive for chest pain.    Musculoskeletal:  Positive for arthralgias. Allergic/Immunologic: Positive for food allergies. Neurological:  Negative for speech difficulty. Psychiatric/Behavioral:  Positive for suicidal ideas. Physical Exam     Vitals:    12/11/22 0403 12/11/22 0433 12/11/22 0506 12/11/22 0715   BP: (!) 118/47  132/77    Pulse:  73     Resp:       Temp:       SpO2: 96%  95% 95%     Physical Exam  Vitals and nursing note reviewed. Constitutional:       General: She is not in acute distress. Appearance: She is well-developed. HENT:      Head: Normocephalic and atraumatic. Eyes:      Conjunctiva/sclera: Conjunctivae normal.   Cardiovascular:      Rate and Rhythm: Normal rate and regular rhythm. Heart sounds: Normal heart sounds. Pulmonary:      Effort: Pulmonary effort is normal. No respiratory distress. Breath sounds: Normal breath sounds. No wheezing or rales. Abdominal:      Palpations: Abdomen is soft. Tenderness: There is no abdominal tenderness. There is no guarding or rebound. Skin:     General: Skin is warm and dry. Neurological:      Mental Status: She is alert and oriented to person, place, and time. Psychiatric:         Behavior: Behavior normal.         Thought Content: Thought content normal.         Judgment: Judgment normal.             Medical Decision Making   I am the first provider for this patient. I reviewed the vital signs, available nursing notes, past medical history, past surgical history, family history and social history. Vital Signs-Reviewed the patient's vital signs. Records Reviewed: Nursing Notes and Old Medical Records - pt was just discharged from behavioral health floor 12/5/22    Provider Notes (Medical Decision Making):   Patient presents to the ED complaining of SI, chest pain and being out of her medications for the last 2 to 3 days. Per EMS report patient was at [facility] for the same thing a couple days ago.   Patient does have a history of diabetes and per EMS her blood sugar was 534 in route. We will need to get basic labs and recheck blood sugar and give appropriate meds but will also consult [facility] for mental health evaluation. Since patient is homeless and recently d/c from behavioral health 5 days ago suspect there is a malingering aspect however patient did also report that her wedding anniversary was on the first and her  passed away about 3 years ago so she likely has a depression aspect    ED Course as of 12/12/22 0915   Sat Dec 10, 2022   2305 Per [facility], they will admit pt when medically clear [AH]   2344 Pt is not medically cleared yet as BS is still 485.  2nd dose of insulin ordered. Care turned over to ED attending, Dr Sydney Haney to follow up on BS for medical clearance and await [facility] to find pt a bed. [AH]   Sun Dec 11, 2022   8322 Patient signed out to me. She is now saying that she wants to leave, has no suicidal intentions. [facility] evaluated her. Apparently she is very well known to multiple [facility] treatment centers who will not accept her as a patient due to concerns for malingering. She walked out the door of the ED without waiting for a discharge conversation or paperwork.  [DB]      ED Course User Index  [AH] Edmond Vivas PA-C  [DB] Bi Hill MD           Procedures:  Procedures    Diagnostic Study Results     Labs -  Recent Results (from the past 48 hour(s))   GLUCOSE, POC    Collection Time: 12/10/22  9:34 PM   Result Value Ref Range    Glucose (POC) 574 (H) 65 - 117 mg/dL    Performed by Kami Ackerman \"Ingris\" GERALD    EKG, 12 LEAD, INITIAL    Collection Time: 12/10/22  9:35 PM   Result Value Ref Range    Ventricular Rate 98 BPM    Atrial Rate 98 BPM    P-R Interval 140 ms    QRS Duration 80 ms    Q-T Interval 326 ms    QTC Calculation (Bezet) 416 ms    Calculated P Axis 49 degrees    Calculated R Axis 36 degrees    Calculated T Axis 87 degrees    Diagnosis       Normal sinus rhythm  Low voltage QRS  Abnormal ECG  When compared with ECG of 26-NOV-2022 09:02,  Nonspecific T wave abnormality now evident in Inferior leads  Confirmed by Lashawn Bower M.D., Ty Hernández ([id number]) on 12/12/2022 8:57:02 AM     URINALYSIS W/ REFLEX CULTURE    Collection Time: 12/10/22  9:40 PM    Specimen: Urine   Result Value Ref Range    Color YELLOW/STRAW      Appearance CLEAR CLEAR      Specific gravity 1.010 1.003 - 1.030      pH (UA) 5.5 5.0 - 8.0      Protein Negative NEG mg/dL    Glucose >1,000 (A) NEG mg/dL    Ketone Negative NEG mg/dL    Bilirubin Negative NEG      Blood Negative NEG      Urobilinogen 0.2 0.2 - 1.0 EU/dL    Nitrites Negative NEG      Leukocyte Esterase Negative NEG      WBC 0-4 0 - 4 /hpf    RBC 0-5 0 - 5 /hpf    Epithelial cells FEW FEW /lpf    Bacteria Negative NEG /hpf    UA:UC IF INDICATED CULTURE NOT INDICATED BY UA RESULT CNI     DRUG SCREEN, URINE    Collection Time: 12/10/22  9:40 PM   Result Value Ref Range    AMPHETAMINES Negative NEG      BARBITURATES Negative NEG      BENZODIAZEPINES Negative NEG      COCAINE Negative NEG      METHADONE Negative NEG      OPIATES Negative NEG      PCP(PHENCYCLIDINE) Negative NEG      THC (TH-CANNABINOL) Negative NEG      Drug screen comment (NOTE)    CBC WITH AUTOMATED DIFF    Collection Time: 12/10/22  9:40 PM   Result Value Ref Range    WBC 9.3 3.6 - 11.0 K/uL    RBC 4.79 3.80 - 5.20 M/uL    HGB 12.3 11.5 - 16.0 g/dL    HCT 39.0 35.0 - 47.0 %    MCV 81.4 80.0 - 99.0 FL    MCH 25.7 (L) 26.0 - 34.0 PG    MCHC 31.5 30.0 - 36.5 g/dL    RDW 14.6 (H) 11.5 - 14.5 %    PLATELET 310 (H) 060 - 400 K/uL    MPV 9.4 8.9 - 12.9 FL    NRBC 0.0 0  WBC    ABSOLUTE NRBC 0.00 0.00 - 0.01 K/uL    NEUTROPHILS 73 32 - 75 %    LYMPHOCYTES 20 12 - 49 %    MONOCYTES 4 (L) 5 - 13 %    EOSINOPHILS 2 0 - 7 %    BASOPHILS 1 0 - 1 %    IMMATURE GRANULOCYTES 0 0.0 - 0.5 %    ABS. NEUTROPHILS 6.9 1.8 - 8.0 K/UL    ABS. LYMPHOCYTES 1.8 0.8 - 3.5 K/UL    ABS. MONOCYTES 0.4 0.0 - 1.0 K/UL    ABS.  EOSINOPHILS 0.1 0.0 - 0.4 K/UL ABS. BASOPHILS 0.1 0.0 - 0.1 K/UL    ABS. IMM. GRANS. 0.0 0.00 - 0.04 K/UL    DF AUTOMATED     METABOLIC PANEL, COMPREHENSIVE    Collection Time: 12/10/22  9:40 PM   Result Value Ref Range    Sodium 135 (L) 136 - 145 mmol/L    Potassium 3.7 3.5 - 5.1 mmol/L    Chloride 96 (L) 97 - 108 mmol/L    CO2 26 21 - 32 mmol/L    Anion gap 13 5 - 15 mmol/L    Glucose 569 (HH) 65 - 100 mg/dL    BUN 15 6 - 20 MG/DL    Creatinine 1.39 (H) 0.55 - 1.02 MG/DL    BUN/Creatinine ratio 11 (L) 12 - 20      eGFR 44 (L) >60 ml/min/1.73m2    Calcium 9.3 8.5 - 10.1 MG/DL    Bilirubin, total 0.2 0.2 - 1.0 MG/DL    ALT (SGPT) 35 12 - 78 U/L    AST (SGOT) 19 15 - 37 U/L    Alk.  phosphatase 149 (H) 45 - 117 U/L    Protein, total 7.4 6.4 - 8.2 g/dL    Albumin 3.1 (L) 3.5 - 5.0 g/dL    Globulin 4.3 (H) 2.0 - 4.0 g/dL    A-G Ratio 0.7 (L) 1.1 - 2.2     ETHYL ALCOHOL    Collection Time: 12/10/22  9:40 PM   Result Value Ref Range    ALCOHOL(ETHYL),SERUM <10 <10 MG/DL   TROPONIN-HIGH SENSITIVITY    Collection Time: 12/10/22  9:40 PM   Result Value Ref Range    Troponin-High Sensitivity 11 0 - 51 ng/L   COVID-19 WITH INFLUENZA A/B    Collection Time: 12/10/22 10:14 PM   Result Value Ref Range    SARS-CoV-2 by PCR Not detected NOTD      Influenza A by PCR Not detected      Influenza B by PCR Not detected     GLUCOSE, POC    Collection Time: 12/10/22 11:35 PM   Result Value Ref Range    Glucose (POC) 485 (H) 65 - 117 mg/dL    Performed by Eduardo Helton RN    GLUCOSE, POC    Collection Time: 12/11/22  1:22 AM   Result Value Ref Range    Glucose (POC) 280 (H) 65 - 117 mg/dL    Performed by Veneda Mackintosh EDT    GLUCOSE, POC    Collection Time: 12/11/22  5:05 AM   Result Value Ref Range    Glucose (POC) 215 (H) 65 - 117 mg/dL    Performed by Eduardo Helton RN       Radiologic Studies -   No orders to display     CT Results  (Last 48 hours)      None          CXR Results  (Last 48 hours)      None Disposition:  Eloped    Please note that this dictation was completed with Dragon, computer voice recognition software. Quite often unanticipated grammatical, syntax, homophones, and other interpretive errors are inadvertently transcribed by the computer software. Please disregard these errors. Additionally, please excuse any errors that have escaped final proofreading. Diagnosis     Clinical Impression:   1. Malingering    2. Episode of recurrent major depressive disorder, unspecified depression episode severity ([address]    3. Hyperglycemia    4.  Homelessness

## 2022-12-11 NOTE — ED PROVIDER NOTES
Patient medically cleared at this time, blood sugar down to 215. Vital signs reassuring. No acute medical concerns at this time. No further treatment of the blood sugar indicated at the moment. Blood sugar can be more tightly controlled going forward with regular dosing of her home medications.   She is medically cleared for inpatient psychiatric admission

## 2022-12-11 NOTE — ED NOTES
Hourly rounding completed on this pt. Offered assistance for toileting or hygiene at this time. Provided opportunity for snack nourishment or PO fluid hydration. Pt is up-to-date on plan of care. No pain interventions required at this time. Warm blanket offered, safety precautions in place, bed locked and in the lowest position.
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
## 2022-12-11 NOTE — ED NOTES
Pt came to the hospital in a green gown. Pt has been waneded by security. Pt's belongings secured in the patient belonging closet, tote 2. (x 2bags.) Sitter is now at bedside.

## 2022-12-11 NOTE — ED TRIAGE NOTES
Patient reports her  passed away 12/12 3 years ago and feels SI d/t her wedding anniversary being 12/01. Patient states her plans are to get hit by a car, overdose on pills or stab herself. Patient states she doesn't have pills to overdose on. Patient also c/o SOB.
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
## 2022-12-11 NOTE — ED NOTES
Patient also c/o chest pain, and chronic bilateral lower leg pain. Patient reports hx DM, peripheral neuropathy.

## 2022-12-11 NOTE — BSMART NOTE
BSMART informed of consult by RN, Raudel Traore   Writer met with patient at bedside to complete [facility] assessment  Access contacted to request acute bed

## 2022-12-12 LAB
ATRIAL RATE: 98 BPM
CALCULATED P AXIS, ECG09: 49 DEGREES
CALCULATED R AXIS, ECG10: 36 DEGREES
CALCULATED T AXIS, ECG11: 87 DEGREES
DIAGNOSIS, 93000: NORMAL
P-R INTERVAL, ECG05: 140 MS
Q-T INTERVAL, ECG07: 326 MS
QRS DURATION, ECG06: 80 MS
QTC CALCULATION (BEZET), ECG08: 416 MS
VENTRICULAR RATE, ECG03: 98 BPM

## 2022-12-12 NOTE — DISCHARGE INSTRUCTIONS
Please follow-up with [facility] behavioral health tomorrow, you can do a walk-in appointment. We have also provided a list of local clinics to continue to get primary care services. Please call tomorrow to set up an appointment. Return to the emergency department for any worsening or worrisome symptoms. [facility] Departments     For adult and child immunizations, family planning, TB screening, STD testing and women's health services. [facility]: [place] [phone]      [facility] 25   657 [facility]   [address]   170 [facility]: [place] 200 [facility] [address] [phone]      2400 [facility]          [address] [address]     For primary care services, woman and child wellness, and some clinics providing specialty care. [facility] -- 1011 [facility]. [address] [place] [address] [phone]   411 [facility] 200 [facility] [address] [place] [address] [phone]   [address] [place] [address] [address] [place] [address] [phone]   [id number] [place] [address] [employer] [address] [place] [address] 5850  Community  [phone]   [address] [place] [address] [phone]   [facility] 81 [facility] [phone]   [facility] [address] [place] [address] [phone]   [facility]: [facility] 700 diane Rodrigues [address] [place] [address], [address] [phone]     [facility] [address] [facility] [address] [phone]   [facility] [address]  [phone]   [employer]  [address] [place] [address] ([email or web address]) [phone]         Sexual Health/Woman Wellness Clinics    For STD/HIV testing and treatment, pregnancy testing and services, men's health, birth control services, LGBT services, and hepatitis/HPV vaccine services. [place] [employer] for [place] [employer] [address]  [facility] 117 [facility] 73 [facility] 1579 [address] [phone]   [facility] [address], 5th floor [phone]   Pregnancy [address] for Women [address]  [place] [phone]         [place] [address]67 [facility] [address] [place] [address]   [phone]   ZINA [place]   [phone]   Women, Infant and Children's Services: [address] [phone]       600 [facility]   [phone]   [facility] [facility] [address]   [phone]   [facility]     [phone]   [address] Crisis   1212 [facility] [phone]       Local Primary Care Physicians  [facility] [phone]  MD Christi Mccullough MD Gustav Lennert, MD [facility] Community Doctors [phone]  [facility]  Jamey Davenport, MD Yoel Parry MD [facility] [address] [phone]  Ana Matheus, MD Andrei Bean MD [address] [facility] [phone]  MD Kirill Chris MD Mai Pigeon, MD Devora Browns, MD   [facility] [phone]  AVWT BGRGKY MILES, MD Marianela Torres, NP [address] [phone]  MD Heriberto Gardner MD Twana American, MD Marvin Bogus, MD Jeanella Hicks, MD Emmit Meo, MD Lexie Better, MD   5301 [facility] 1280 Suhail Pacheco MD [facility] [phone]  MD Noah Rasmussen, NP  Frederick Bohr, MD Purvis Crigler, MD Gershon Luster, MD Errol Sow, MD Edison Headings, MD   7596 [facility] [phone]  MD Mamie Royal, FNP  Keila Darby, NP  Prabha Kramer, MD Carolina Miramontes, MD Akilah Haywood, MD [address] [place], [address] [facility] [address] [phone]  Js Gama, MD Morenita Hu, MD Wendi Wray Eye, MD Jenny Posada MD   [facility] [phone]  MD Steven Clark, MD Sharp [phone]  Warner Burch, MD Radha Arauz, MD   [facility] [phone]  Hilda Chaudhari, MD Ellie Fofana, MD Alexandra Regan, MD Félix Rivera, MD Melecio Carey, MD Reyna Yeager, NP  Kemar Ortiz MD 1619 [facility]   [phone]  Papa Mcclain, MD Johnnie Johns, MD Jonatan Wright MD   2102 [facility] [phone]  [facility] 150, MD Jose Vargas, FNP  Risa Isaac, PA-C  Risa Isaac, FNP  Gianni Garcia, PA-C  Juanjose Addison, MD Chirag Brown, NP   Kirby Tristan, DO Miscellaneous:  Eve Mauro [phone]
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
## 2022-12-12 NOTE — ED NOTES
1952 - Bedside and Verbal shift change report given to [facility] (oncoming nurse) by Deanne Adan (offgoing nurse). Report included the following information SBAR, Kardex, ED Summary, Intake/Output, and MAR.     2020 - BSMART associate at bedside for eval;;     2115 - cleared by [facility] associate - Patient discharged by Kay Daily NP - pt sent to the front lobby, with strong and steady gait, no acute distress noted at time of discharge -  Discharge information / home RX / and reasons to return to the ED were reviewed by the ED provider.        2125 - confirmation # for cab ride to [address] [place] [address] Drive is the following - [phone]    Pt will wait in the front lobby for the ride to [facility] shelter;;    Pt left to the front lobby without taking her discharge paperwork with her;;

## 2022-12-12 NOTE — BSMART NOTE
BSMART assessment completed, and suicide risk level noted to be low. Primary Nurse Abigail Solis and [facility] and NP [place] notified. Concerns not observed. Security/Off- has not been notified.

## 2022-12-12 NOTE — ED NOTES
Bedside and Verbal shift change report given to Saima Bolden RN (oncoming nurse) by Taran Block RN (offgoing nurse). Report included the following information SBAR and ED Summary.

## 2023-02-11 ENCOUNTER — APPOINTMENT (OUTPATIENT)
Dept: GENERAL RADIOLOGY | Age: 58
End: 2023-02-11
Attending: PHYSICIAN ASSISTANT
Payer: MEDICAID

## 2023-02-11 ENCOUNTER — HOSPITAL ENCOUNTER (EMERGENCY)
Age: 58
Discharge: HOME | End: 2023-02-11
Attending: EMERGENCY MEDICINE
Payer: MEDICAID

## 2023-02-11 VITALS
DIASTOLIC BLOOD PRESSURE: 84 MMHG | OXYGEN SATURATION: 97 % | HEIGHT: 65 IN | BODY MASS INDEX: 44.26 KG/M2 | HEART RATE: 63 BPM | RESPIRATION RATE: 20 BRPM | SYSTOLIC BLOOD PRESSURE: 100 MMHG | TEMPERATURE: 97.8 F

## 2023-02-11 DIAGNOSIS — E11.65 TYPE 2 DIABETES MELLITUS WITH HYPERGLYCEMIA, WITHOUT LONG-TERM CURRENT USE OF INSULIN (HCC): ICD-10-CM

## 2023-02-11 DIAGNOSIS — B34.9 VIRAL SYNDROME: ICD-10-CM

## 2023-02-11 DIAGNOSIS — J44.9 CHRONIC OBSTRUCTIVE PULMONARY DISEASE, UNSPECIFIED COPD TYPE (HCC): ICD-10-CM

## 2023-02-11 DIAGNOSIS — E83.42 HYPOMAGNESEMIA: Primary | ICD-10-CM

## 2023-02-11 DIAGNOSIS — F20.9 SCHIZOPHRENIA, UNSPECIFIED TYPE (HCC): ICD-10-CM

## 2023-02-11 DIAGNOSIS — R19.7 DIARRHEA, UNSPECIFIED TYPE: ICD-10-CM

## 2023-02-11 DIAGNOSIS — F31.9 BIPOLAR AFFECTIVE DISORDER, REMISSION STATUS UNSPECIFIED (HCC): ICD-10-CM

## 2023-02-11 DIAGNOSIS — R45.851 VERBALIZES SUICIDAL THOUGHTS: ICD-10-CM

## 2023-02-11 LAB
ALBUMIN SERPL-MCNC: 3.1 G/DL (ref 3.5–5)
ALBUMIN/GLOB SERPL: 0.8 (ref 1.1–2.2)
ALP SERPL-CCNC: 132 U/L (ref 45–117)
ALT SERPL-CCNC: 20 U/L (ref 12–78)
AMPHET UR QL SCN: NEGATIVE
ANION GAP SERPL CALC-SCNC: 5 MMOL/L (ref 5–15)
APAP SERPL-MCNC: <2 UG/ML (ref 10–30)
APPEARANCE UR: CLEAR
AST SERPL-CCNC: 14 U/L (ref 15–37)
ATRIAL RATE: 81 BPM
BACTERIA URNS QL MICRO: NEGATIVE /HPF
BARBITURATES UR QL SCN: NEGATIVE
BASOPHILS # BLD: 0 K/UL (ref 0–0.1)
BASOPHILS NFR BLD: 1 % (ref 0–1)
BENZODIAZ UR QL: NEGATIVE
BILIRUB SERPL-MCNC: 0.1 MG/DL (ref 0.2–1)
BILIRUB UR QL: NEGATIVE
BNP SERPL-MCNC: 191 PG/ML (ref 0–125)
BUN SERPL-MCNC: 21 MG/DL (ref 6–20)
BUN/CREAT SERPL: 21 (ref 12–20)
CALCIUM SERPL-MCNC: 8.6 MG/DL (ref 8.5–10.1)
CALCULATED P AXIS, ECG09: 84 DEGREES
CALCULATED R AXIS, ECG10: 78 DEGREES
CALCULATED T AXIS, ECG11: 94 DEGREES
CANNABINOIDS UR QL SCN: NEGATIVE
CHLORIDE SERPL-SCNC: 101 MMOL/L (ref 97–108)
CO2 SERPL-SCNC: 31 MMOL/L (ref 21–32)
COCAINE UR QL SCN: NEGATIVE
COLOR UR: ABNORMAL
CREAT SERPL-MCNC: 1.02 MG/DL (ref 0.55–1.02)
DIAGNOSIS, 93000: NORMAL
DIFFERENTIAL METHOD BLD: ABNORMAL
DRUG SCRN COMMENT,DRGCM: NORMAL
EOSINOPHIL # BLD: 0.1 K/UL (ref 0–0.4)
EOSINOPHIL NFR BLD: 2 % (ref 0–7)
EPITH CASTS URNS QL MICRO: ABNORMAL /LPF
ERYTHROCYTE [DISTWIDTH] IN BLOOD BY AUTOMATED COUNT: 16.8 % (ref 11.5–14.5)
ETHANOL SERPL-MCNC: <10 MG/DL
GLOBULIN SER CALC-MCNC: 3.9 G/DL (ref 2–4)
GLUCOSE SERPL-MCNC: 287 MG/DL (ref 65–100)
GLUCOSE UR STRIP.AUTO-MCNC: >1000 MG/DL
HCT VFR BLD AUTO: 38.3 % (ref 35–47)
HGB BLD-MCNC: 11.6 G/DL (ref 11.5–16)
HGB UR QL STRIP: NEGATIVE
IMM GRANULOCYTES # BLD AUTO: 0 K/UL (ref 0–0.04)
IMM GRANULOCYTES NFR BLD AUTO: 0 % (ref 0–0.5)
KETONES UR QL STRIP.AUTO: 15 MG/DL
LEUKOCYTE ESTERASE UR QL STRIP.AUTO: NEGATIVE
LIPASE SERPL-CCNC: 112 U/L (ref 73–393)
LYMPHOCYTES # BLD: 1.6 K/UL (ref 0.8–3.5)
LYMPHOCYTES NFR BLD: 23 % (ref 12–49)
MAGNESIUM SERPL-MCNC: 1.4 MG/DL (ref 1.6–2.4)
MCH RBC QN AUTO: 25.2 PG (ref 26–34)
MCHC RBC AUTO-ENTMCNC: 30.3 G/DL (ref 30–36.5)
MCV RBC AUTO: 83.3 FL (ref 80–99)
METHADONE UR QL: NEGATIVE
MONOCYTES # BLD: 0.4 K/UL (ref 0–1)
MONOCYTES NFR BLD: 5 % (ref 5–13)
NEUTS SEG # BLD: 4.8 K/UL (ref 1.8–8)
NEUTS SEG NFR BLD: 69 % (ref 32–75)
NITRITE UR QL STRIP.AUTO: NEGATIVE
NRBC # BLD: 0 K/UL (ref 0–0.01)
NRBC BLD-RTO: 0 PER 100 WBC
OPIATES UR QL: NEGATIVE
P-R INTERVAL, ECG05: 144 MS
PCP UR QL: NEGATIVE
PH UR STRIP: 5 (ref 5–8)
PLATELET # BLD AUTO: 441 K/UL (ref 150–400)
PMV BLD AUTO: 10 FL (ref 8.9–12.9)
POTASSIUM SERPL-SCNC: 4 MMOL/L (ref 3.5–5.1)
PROT SERPL-MCNC: 7 G/DL (ref 6.4–8.2)
PROT UR STRIP-MCNC: NEGATIVE MG/DL
Q-T INTERVAL, ECG07: 362 MS
QRS DURATION, ECG06: 68 MS
QTC CALCULATION (BEZET), ECG08: 420 MS
RBC # BLD AUTO: 4.6 M/UL (ref 3.8–5.2)
RBC #/AREA URNS HPF: ABNORMAL /HPF (ref 0–5)
SALICYLATES SERPL-MCNC: 2 MG/DL (ref 2.8–20)
SODIUM SERPL-SCNC: 137 MMOL/L (ref 136–145)
SP GR UR REFRACTOMETRY: >1.03 (ref 1–1.03)
TROPONIN I SERPL HS-MCNC: 9 NG/L (ref 0–51)
UA: UC IF INDICATED,UAUC: ABNORMAL
UROBILINOGEN UR QL STRIP.AUTO: 0.2 EU/DL (ref 0.2–1)
VENTRICULAR RATE, ECG03: 81 BPM
WBC # BLD AUTO: 6.9 K/UL (ref 3.6–11)
WBC URNS QL MICRO: ABNORMAL /HPF (ref 0–4)

## 2023-02-11 PROCEDURE — 36415 COLL VENOUS BLD VENIPUNCTURE: CPT

## 2023-02-11 PROCEDURE — 80179 DRUG ASSAY SALICYLATE: CPT

## 2023-02-11 PROCEDURE — 71045 X-RAY EXAM CHEST 1 VIEW: CPT

## 2023-02-11 PROCEDURE — 80307 DRUG TEST PRSMV CHEM ANLYZR: CPT

## 2023-02-11 PROCEDURE — 96365 THER/PROPH/DIAG IV INF INIT: CPT

## 2023-02-11 PROCEDURE — 74011250636 HC RX REV CODE- 250/636: Performed by: PHYSICIAN ASSISTANT

## 2023-02-11 PROCEDURE — 99285 EMERGENCY DEPT VISIT HI MDM: CPT

## 2023-02-11 PROCEDURE — 80053 COMPREHEN METABOLIC PANEL: CPT

## 2023-02-11 PROCEDURE — 85025 COMPLETE CBC W/AUTO DIFF WBC: CPT

## 2023-02-11 PROCEDURE — 83735 ASSAY OF MAGNESIUM: CPT

## 2023-02-11 PROCEDURE — 83880 ASSAY OF NATRIURETIC PEPTIDE: CPT

## 2023-02-11 PROCEDURE — 84484 ASSAY OF TROPONIN QUANT: CPT

## 2023-02-11 PROCEDURE — 80143 DRUG ASSAY ACETAMINOPHEN: CPT

## 2023-02-11 PROCEDURE — 81001 URINALYSIS AUTO W/SCOPE: CPT

## 2023-02-11 PROCEDURE — 83690 ASSAY OF LIPASE: CPT

## 2023-02-11 PROCEDURE — 82077 ASSAY SPEC XCP UR&BREATH IA: CPT

## 2023-02-11 RX ORDER — MAGNESIUM SULFATE HEPTAHYDRATE 40 MG/ML
2 INJECTION, SOLUTION INTRAVENOUS
Status: COMPLETED | OUTPATIENT
Start: 2023-02-11 | End: 2023-02-11

## 2023-02-11 RX ADMIN — MAGNESIUM SULFATE HEPTAHYDRATE 2 G: 40 INJECTION, SOLUTION INTRAVENOUS at 19:39

## 2023-02-11 NOTE — ED NOTES
Provider reported patient stated she wanted to shoot herself and stab herself. BSmart to be contacted.

## 2023-02-11 NOTE — ED PROVIDER NOTES
[facility] EMERGENCY DEPT  EMERGENCY DEPARTMENT ENCOUNTER       Pt Name: Víctor Anderson  MRN: [id number]  [place] 1965  Date of evaluation: 2/11/2023  Provider: Yovani Yañez PA-C   PCP: None  Note Started: 6:36 PM 2/11/23     CHIEF COMPLAINT       Chief Complaint   Patient presents with    Diarrhea     Leg      Leg Swelling        HISTORY OF PRESENT ILLNESS: 1 or more elements      History From: Patient  HPI Limitations : None     Víctor Anderson is a 59 y.o. female with medical history significant for diabetes, arthritis, COPD, substance abuse, bipolar disorder, obesity, schizophrenia, hypertension, tobacco abuse who presents via private vehicle with group home caregiver with complaints of acute moderate diarrhea with bowel incontinence X 3 days as well as increasing falls, fatigue, bilateral upper and lower extremity swelling, urinary frequency and incontinence. Patient also endorses to this provider that she wants to stop her self and treat her self. No HI or hallucinations. Denies any substance abuse or alcohol use. No known fever, chills, nausea, vomiting, chest pain, new shortness of breath, lightheadedness, dizziness, syncope, seizure, abdominal pain. No modifying factors today. No recent hospitalizations or antibiotics. Nursing Notes were all reviewed and agreed with or any disagreements were addressed in the HPI. REVIEW OF SYSTEMS      Review of Systems   Constitutional:  Positive for fatigue. Negative for activity change, appetite change, chills, diaphoresis, fever and unexpected weight change. HENT: Negative. Negative for congestion, postnasal drip, rhinorrhea, sore throat, trouble swallowing and voice change. Eyes:  Negative for visual disturbance. Respiratory:  Positive for shortness of breath (chronic/ unchanged). Negative for cough, chest tightness and wheezing. Cardiovascular:  Positive for leg swelling. Negative for chest pain and palpitations.    Gastrointestinal: Positive for diarrhea. Negative for abdominal distention, abdominal pain, anal bleeding, blood in stool, constipation, nausea, rectal pain and vomiting. Genitourinary:  Positive for frequency. Negative for decreased urine volume, difficulty urinating, dysuria, flank pain, hematuria, pelvic pain and urgency. Musculoskeletal: Negative. Negative for arthralgias, back pain, myalgias, neck pain and neck stiffness. Skin: Negative. Negative for rash and wound. Neurological:  Negative for tremors, seizures, syncope, weakness, light-headedness, numbness and headaches. Psychiatric/Behavioral: Negative. Positives and Pertinent negatives as per HPI. PAST HISTORY     Past Medical History:  Past Medical History:   Diagnosis Date    Arthritis     legs    B[facility] 1 disorder ([facility]     COPD     Depression 1/13/2012    Diabetes ([facility]     Drug abuse ([facility]     cocaine, stimulants, etoh, mj, tob    H/O suicide attempt     Hypertension     Obesity     Polydrug dependence excluding opioid type drug, abuse ([facility]     PTSD (post-traumatic stress disorder) 12/30/2021    Schizophrenia ([facility] 7/8/2016       Past Surgical History:  Past Surgical History:   Procedure Laterality Date    HX ORTHOPAEDIC      foot sx       Family History:  Family History   Problem Relation Age of Onset    Diabetes Mother     Stroke Mother     Stroke Father        Social History:  Social History     Tobacco Use    Smoking status: Every Day     Packs/day: 1.00     Years: 10.00     Pack years: 10.00     Types: Cigarettes    Smokeless tobacco: Current   Substance Use Topics    Alcohol use: No     Alcohol/week: 0.0 standard drinks     Comment: Hx of ETOH abuse since age 13. no DUIs. Drug use: Not Currently     Types: Cocaine, Marijuana, Other, Opiates       Allergies:   Allergies   Allergen Reactions    Amoxicillin Hives    Mushroom Flavor Hives    Tomato Hives       CURRENT MEDICATIONS      Previous Medications    ALBUTEROL (PROVENTIL HFA, VENTOLIN HFA, PROAIR HFA) 90 MCG/ACTUATION INHALER    Take 1 Puff by inhalation every four (4) hours as needed for Wheezing or Shortness of Breath. Indications: bronchospasm prevention    ATORVASTATIN (LIPITOR) 40 MG TABLET    Take 1 Tablet by mouth every evening. Indications: Cholesterol    BUDESONIDE-FORMOTEROL (SYMBICORT) 80-4.5 MCG/ACTUATION HFAA    Take 2 Puffs by inhalation two (2) times a day. Indications: bronchospasm prevention with COPD    FUROSEMIDE (LASIX) 40 MG TABLET    Take 1 Tablet by mouth daily. Indications: visible water retention    GABAPENTIN (NEURONTIN) 100 MG CAPSULE    Take 1 Capsule by mouth three (3) times daily. Max Daily Amount: 300 mg. Indications: neuropathic pain    GLIPIZIDE (GLUCOTROL) 10 MG TABLET    Take 1 Tablet by mouth Daily (before breakfast). Indications: type 2 diabetes mellitus    LINAGLIPTIN (TRADJENTA) 5 MG TABLET    Take 1 Tablet by mouth daily. Indications: type 2 diabetes mellitus    METFORMIN ER (GLUCOPHAGE XR) 500 MG TABLET    Take 2 Tablets by mouth two (2) times daily (with meals). Indications: type 2 diabetes mellitus    NYSTATIN (MYCOSTATIN) POWDER    Apply  to affected area two (2) times a day. Indications: a skin infection due to the fungus Candida    OXYBUTYNIN CHLORIDE XL (DITROPAN XL) 5 MG CR TABLET    Take 1 Tablet by mouth daily. Indications: overactive bladder    RISPERIDONE (RISPERDAL) 2 MG TABLET    Take 1 Tablet by mouth every twelve (12) hours. Indications: psychosis    SERTRALINE (ZOLOFT) 100 MG TABLET    Take 1 Tablet by mouth daily. Indications: anxiousness associated with depression       SCREENINGS               No data recorded         PHYSICAL EXAM      ED Triage Vitals [02/11/23 1724]   ED Encounter Vitals Group      BP (!) 142/73      Pulse (Heart Rate) 94      Resp Rate 20      Temp 97.8 °F (36.6 °C)      Temp src       O2 Sat (%) 93 %      Weight       Height 5' 5\"        Physical Exam  Vitals and nursing note reviewed.    Constitutional: General: She is not in acute distress. Appearance: Normal appearance. She is well-developed. She is obese. She is not toxic-appearing or diaphoretic. Comments: Chronically ill appearing female lying semisupine in no apparent distress. Flat affect. Speaking in clear complete sentences. No apparent distress. HENT:      Head: Normocephalic and atraumatic. Right Ear: Hearing and external ear normal.      Left Ear: Hearing and external ear normal.      Nose: Nose normal.   Eyes:      Extraocular Movements: Extraocular movements intact. Conjunctiva/sclera: Conjunctivae normal.   Cardiovascular:      Rate and Rhythm: Normal rate and regular rhythm. Pulses: Normal pulses. Heart sounds: Normal heart sounds. Pulmonary:      Effort: Pulmonary effort is normal. No respiratory distress. Breath sounds: Normal breath sounds. No stridor. No wheezing, rhonchi or rales. Chest:      Chest wall: No tenderness. Abdominal:      General: There is no distension. Palpations: Abdomen is soft. There is no mass. Tenderness: There is no abdominal tenderness. There is no right CVA tenderness, left CVA tenderness, guarding or rebound. Musculoskeletal:         General: No swelling, tenderness, deformity or signs of injury. Normal range of motion. Cervical back: Normal range of motion. Right lower leg: No swelling, deformity, lacerations, tenderness or bony tenderness. No edema. Left lower leg: No swelling, deformity, lacerations, tenderness or bony tenderness. No edema. Comments: Diffuse tenderness to bilateral upper and lower extremities with no pitting edema, erythema, streaking, warmth, drainage, fluctuance, induration, superficial collateral veins. NVI. Skin:     General: Skin is warm and dry. Capillary Refill: Capillary refill takes less than 2 seconds. Findings: No abscess, erythema, rash or wound. Neurological:      General: No focal deficit present. Mental Status: She is alert and oriented to person, place, and time. Psychiatric:         Attention and Perception: Attention normal.         Mood and Affect: Affect is flat. Speech: Speech normal.         Behavior: Behavior normal.         Thought Content: Thought content is not paranoid or delusional. Thought content includes suicidal ideation. Thought content does not include homicidal ideation. Thought content includes suicidal plan. Thought content does not include homicidal plan. Pulse Oximetry Analysis - Normal, baseline w/ COPD, 93% on RA        DIAGNOSTIC RESULTS   LABS:     Recent Results (from the past 12 hour(s))   EKG, 12 LEAD, INITIAL    Collection Time: 02/11/23  6:07 PM   Result Value Ref Range    Ventricular Rate 81 BPM    Atrial Rate 81 BPM    P-R Interval 144 ms    QRS Duration 68 ms    Q-T Interval 362 ms    QTC Calculation (Bezet) 420 ms    Calculated P Axis 84 degrees    Calculated R Axis 78 degrees    Calculated T Axis 94 degrees    Diagnosis       Normal sinus rhythm  Low voltage QRS  Septal infarct (cited on or before 11-FEB-2023)  Abnormal ECG  When compared with ECG of 10-DEC-2022 21:35,  No significant change was found     CBC WITH AUTOMATED DIFF    Collection Time: 02/11/23  6:15 PM   Result Value Ref Range    WBC 6.9 3.6 - 11.0 K/uL    RBC 4.60 3.80 - 5.20 M/uL    HGB 11.6 11.5 - 16.0 g/dL    HCT 38.3 35.0 - 47.0 %    MCV 83.3 80.0 - 99.0 FL    MCH 25.2 (L) 26.0 - 34.0 PG    MCHC 30.3 30.0 - 36.5 g/dL    RDW 16.8 (H) 11.5 - 14.5 %    PLATELET 092 (H) 874 - 400 K/uL    MPV 10.0 8.9 - 12.9 FL    NRBC 0.0 0  WBC    ABSOLUTE NRBC 0.00 0.00 - 0.01 K/uL    NEUTROPHILS 69 32 - 75 %    LYMPHOCYTES 23 12 - 49 %    MONOCYTES 5 5 - 13 %    EOSINOPHILS 2 0 - 7 %    BASOPHILS 1 0 - 1 %    IMMATURE GRANULOCYTES 0 0.0 - 0.5 %    ABS. NEUTROPHILS 4.8 1.8 - 8.0 K/UL    ABS. LYMPHOCYTES 1.6 0.8 - 3.5 K/UL    ABS. MONOCYTES 0.4 0.0 - 1.0 K/UL    ABS.  EOSINOPHILS 0.1 0.0 - 0.4 K/UL    ABS. BASOPHILS 0.0 0.0 - 0.1 K/UL    ABS. IMM. GRANS. 0.0 0.00 - 0.04 K/UL    DF AUTOMATED     METABOLIC PANEL, COMPREHENSIVE    Collection Time: 02/11/23  6:15 PM   Result Value Ref Range    Sodium 137 136 - 145 mmol/L    Potassium 4.0 3.5 - 5.1 mmol/L    Chloride 101 97 - 108 mmol/L    CO2 31 21 - 32 mmol/L    Anion gap 5 5 - 15 mmol/L    Glucose 287 (H) 65 - 100 mg/dL    BUN 21 (H) 6 - 20 MG/DL    Creatinine 1.02 0.55 - 1.02 MG/DL    BUN/Creatinine ratio 21 (H) 12 - 20      eGFR >60 >60 ml/min/1.73m2    Calcium 8.6 8.5 - 10.1 MG/DL    Bilirubin, total 0.1 (L) 0.2 - 1.0 MG/DL    ALT (SGPT) 20 12 - 78 U/L    AST (SGOT) 14 (L) 15 - 37 U/L    Alk.  phosphatase 132 (H) 45 - 117 U/L    Protein, total 7.0 6.4 - 8.2 g/dL    Albumin 3.1 (L) 3.5 - 5.0 g/dL    Globulin 3.9 2.0 - 4.0 g/dL    A-G Ratio 0.8 (L) 1.1 - 2.2     NT-PRO BNP    Collection Time: 02/11/23  6:15 PM   Result Value Ref Range    NT pro- (H) 0 - 125 PG/ML   LIPASE    Collection Time: 02/11/23  6:15 PM   Result Value Ref Range    Lipase 112 73 - 393 U/L   MAGNESIUM    Collection Time: 02/11/23  6:15 PM   Result Value Ref Range    Magnesium 1.4 (L) 1.6 - 2.4 mg/dL   TROPONIN-HIGH SENSITIVITY    Collection Time: 02/11/23  6:15 PM   Result Value Ref Range    Troponin-High Sensitivity 9 0 - 51 ng/L   ETHYL ALCOHOL    Collection Time: 02/11/23  6:33 PM   Result Value Ref Range    ALCOHOL(ETHYL),SERUM <10 <10 MG/DL   DRUG SCREEN, URINE    Collection Time: 02/11/23  6:33 PM   Result Value Ref Range    AMPHETAMINES Negative NEG      BARBITURATES Negative NEG      BENZODIAZEPINES Negative NEG      COCAINE Negative NEG      METHADONE Negative NEG      OPIATES Negative NEG      PCP(PHENCYCLIDINE) Negative NEG      THC (TH-CANNABINOL) Negative NEG      Drug screen comment (NOTE)    SALICYLATE    Collection Time: 02/11/23  6:33 PM   Result Value Ref Range    Salicylate level 2.0 (L) 2.8 - 20.0 MG/DL   ACETAMINOPHEN    Collection Time: 02/11/23  6:33 PM Result Value Ref Range    Acetaminophen level <2 (L) 10 - 30 ug/mL   URINALYSIS W/ REFLEX CULTURE    Collection Time: 02/11/23  6:33 PM    Specimen: Urine   Result Value Ref Range    Color YELLOW/STRAW      Appearance CLEAR CLEAR      Specific gravity >1.030 (H) 1.003 - 1.030    pH (UA) 5.0 5.0 - 8.0      Protein Negative NEG mg/dL    Glucose >1,000 (A) NEG mg/dL    Ketone 15 (A) NEG mg/dL    Bilirubin Negative NEG      Blood Negative NEG      Urobilinogen 0.2 0.2 - 1.0 EU/dL    Nitrites Negative NEG      Leukocyte Esterase Negative NEG      WBC 5-10 0 - 4 /hpf    RBC 0-5 0 - 5 /hpf    Epithelial cells FEW FEW /lpf    Bacteria Negative NEG /hpf    UA:UC IF INDICATED CULTURE NOT INDICATED BY UA RESULT CNI          EKG: When ordered, EKG's are interpreted by the Emergency Department Physician in the absence of a cardiologist.  Please see their note for interpretation of EKG. RADIOLOGY:  Non-plain film images such as CT, Ultrasound and MRI are read by the radiologist. Plain radiographic images are visualized and preliminarily interpreted by the ED Provider with the below findings:          Interpretation per the Radiologist below, if available at the time of this note:     XR CHEST PORT    Result Date: 2/11/2023  EXAM: Portable CXR. 1809 hours. INDICATION: Leg swelling FINDINGS: The lungs appear clear. Heart is normal in size. There is no pulmonary edema. There is no evident pneumothorax or pleural effusion. No Acute Disease. PROCEDURES   Unless otherwise noted below, none  Procedures     CRITICAL CARE TIME   none    EMERGENCY DEPARTMENT COURSE and DIFFERENTIAL DIAGNOSIS/MDM   Initial assessment performed. The patients presenting problems have been discussed, and they are in agreement with the care plan formulated and outlined with them. I have encouraged them to ask questions as they arise throughout their visit.     Vitals:    Vitals:    02/11/23 1930 02/11/23 1956 02/11/23 1956 02/11/23 1957 BP: 126/80      Pulse:    63   Resp:       Temp:       SpO2:  97% 98%    Height:            Patient was given the following medications:  Medications   magnesium sulfate 2 g/50 ml IVPB (premix or compounded) (2 g IntraVENous New Bag 2/11/23 1939)       CONSULTS: (Who and What was discussed)  [facility]/ mental health professional (see progress note)    Chronic Conditions: diabetes, arthritis, COPD, substance abuse, bipolar disorder, obesity, schizophrenia, hypertension, tobacco abuse    Social Determinants affecting Dx or Tx: None    Records Reviewed (source and summary): Prior medical records, Previous Radiology studies, Previous Laboratory studies, Previous EKGs, and Nursing notes    CC/HPI Summary, DDx, ED Course, and Reassessment: Chronically ill-appearing afebrile and hemodynamically stable 64-year-old female presents with diarrhea, urinary frequency/incontinence, fatigue, bilateral lower extremity swelling X 3 days. Chronic shortness of breath. Patient also endorses SI with plan to stab herself or shoot herself. No HI or hallucinations. On exam, again patient appears chronically ill. There is no significant pitting edema to extremities. No erythema, rash, wound, superficial collateral veins. Stable heart and lung sounds. Will obtain labs as well as chest x-ray, EKG, cardiac enzymes, BNP, electrolytes. DDx: Gastroenteritis, diverticulitis, colitis, C dificile, bacterial infection, UTI, pyelonephritis, ACS. Possibly CT to further evaluate as needed. DDx:  2/2 MDD, schizoaffective d/o, bipolar, drug induced, organic cause such as electrolyte anomoly or infection. Will get psych labs and speak with mental health professional about possible admission. ED Course as of 02/11/23 2031   Sat Feb 11, 2023 1910 [facility] has evaluated the patient and endorses the patient does not meet criteria for behavioral health admission.   She has a caregiver that monitors her 24/7 and she is in a safe location at residential facility. She has close follow-up with behavioral health whom she has an upcoming appointment with on 2/14/2023 as well as PCP follow-up. She has a history of malingering and similar complaints in the past of wanting to be stabbed. Again, [facility] does not feel this patient meets inpatient criteria and would be a good candidate for outpatient follow-up with safety plan. [SM]      ED Course User Index  [SM] Rufina Bone PA-C         Disposition Considerations (Tests not done, Shared Decision Making, Pt Expectation of Test or Tx.):      Progress Note:   Updated pt on all returned results and findings. Discussed the importance of proper follow up as referred below along with return precautions. Pt in agreement with the care plan and expresses agreement with and understanding of all items discussed. FINAL IMPRESSION     1. Hypomagnesemia    2. Diarrhea, unspecified type    3. Viral syndrome    4. Verbalizes suicidal thoughts    5. Type 2 diabetes mellitus with hyperglycemia, without long-term current use of insulin (HCC)    6. Chronic obstructive pulmonary disease, unspecified COPD type ([facility]    7. Schizophrenia, unspecified type ([facility]    8. Bipolar affective disorder, remission status unspecified ([facility]          DISPOSITION/PLAN   Pilar Sandra's  results have been reviewed with her. She has been counseled regarding her diagnosis, treatment, and plan. She verbally conveys understanding and agreement of the signs, symptoms, diagnosis, treatment and prognosis and additionally agrees to follow up as discussed. She also agrees with the care-plan and conveys that all of her questions have been answered. I have also provided discharge instructions for her that include: educational information regarding their diagnosis and treatment, and list of reasons why they would want to return to the ED prior to their follow-up appointment, should her condition change.      Discharged    8:31 PM  I have discussed with patient their diagnosis, treatment, and follow up plan. The patient agrees to follow up as outlined in discharge paperwork and also to return to the ED with any worsening. Ronalee Lennox, PA-C         PATIENT REFERRED TO:  Follow-up Information       Follow up With Specialties Details Why [address] [facility] EMERGENCY DEPT Emergency Medicine Go to  As needed, If symptoms worsen [address] [facility]  Schedule an appointment as soon as possible for a visit in 2 days As needed [id number]:  Current Discharge Medication List            DISCONTINUED MEDICATIONS:  Current Discharge Medication List          Shared Not Shared REBECA: I have seen and evaluated the patient. My supervision physician was available for consultation. I am the Primary Clinician of Record. Ronalee Lennox, PA-C (electronically signed)    (Please note that parts of this dictation were completed with voice recognition software. Quite often unanticipated grammatical, syntax, homophones, and other interpretive errors are inadvertently transcribed by the computer software. Please disregards these errors.  Please excuse any errors that have escaped final proofreading.)

## 2023-02-11 NOTE — ED NOTES
Emergency Department Nursing Plan of Care       The Nursing Plan of Care is developed from the Nursing assessment and Emergency Department Attending provider initial evaluation. The plan of care may be reviewed in the ED Provider note. The Plan of Care was developed with the following considerations:   Patient / Family readiness to learn indicated by:verbalized understanding  Persons(s) to be included in education: patient  Barriers to Learning/Limitations:No     Patient brought to ED by caregiver for diarrhea for three days and increased swelling in her ankles and hands.     Signed     Shahla Santillan, GERALD    2/11/2023   6:38 PM

## 2023-02-12 NOTE — ED NOTES
Discharge instructions were given to the patient by Isabel Baker RN     The patient left the Emergency Department via wheelchair with [facility], alert and oriented and in no acute distress with 0 prescriptions. The patient was encouraged to call or return to the ED for worsening issues or problems and was encouraged to schedule a follow up appointment for continuing care. The patient verbalized understanding of discharge instructions and prescriptions, all questions were answered. The patient has no further concerns at this time.
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
## 2023-02-12 NOTE — SUICIDE SAFETY PLAN
SAFETY PLAN    A suicide Safety Plan is a document that supports someone when they are having thoughts of suicide. Warning Signs that indicate a suicidal crisis may be developing: What (situations, thoughts, feelings, body sensations, behaviors, etc.) do you experience that lets you know you are beginning to think about suicide? 1. SI  2. Increase in depressive sxs / not caring for self  3. Increase sleep    Internal Coping Strategies:  What things can I do (relaxation techniques, hobbies, physical activities, etc.) to take my mind off my problems without contacting another person? 1. Participate in day support groups  2. Watch TV    People and social settings that provide distraction: Who can I call or where can I go to distract me? 1. Name: Oskar Ng  Phone: [phone]  2. Name: Day Support staff  Phone: unknown  3. Place: Day support program                People whom I can ask for help: Who can I call when I need help - for example, friends, family, clergy, someone else? 1. Name: Oskar Ng  Phone: [phone]  2. Name: Day support staff  Phone: unkown    Professionals or [facility] agencies I can contact during a crisis: Who can I call for help - for example, my doctor, my psychiatrist, my psychologist, a mental health provider, a suicide hotline? 1. [facility]     2. [facility]     3. Suicide Prevention Lifeline: 7-745-081-TALK (1990)    4. [address] [place] [address] Emergency Services -  for example, [facility], local county suicide [facility]line[facility]: [place] [phone]      Emergency Services Address: [address] [place] [address]      Emergency Services Phone: [phone]    Making the environment safe: How can I make my environment (house/apartment/living space) safer? For example, can I remove guns, medications, and other items? 1.  Talk to care giver

## 2023-02-12 NOTE — BSMART NOTE
BSmart met with pt and is not recommending inpatient treatment at this time. Writer spoke with care giver, Adam Rashid, who is agreeable to pt returning to her home if ED provider is not willing to consider medical admission. KELBY Daniels notified and in agreement.

## 2023-02-15 LAB
ATRIAL RATE: 81 BPM
CALCULATED P AXIS, ECG09: 84 DEGREES
CALCULATED R AXIS, ECG10: 78 DEGREES
CALCULATED T AXIS, ECG11: 94 DEGREES
DIAGNOSIS, 93000: NORMAL
P-R INTERVAL, ECG05: 144 MS
Q-T INTERVAL, ECG07: 362 MS
QRS DURATION, ECG06: 68 MS
QTC CALCULATION (BEZET), ECG08: 420 MS
VENTRICULAR RATE, ECG03: 81 BPM

## 2023-08-18 ENCOUNTER — APPOINTMENT (OUTPATIENT)
Facility: HOSPITAL | Age: 58
End: 2023-08-18
Payer: MEDICAID

## 2023-08-18 ENCOUNTER — HOSPITAL ENCOUNTER (EMERGENCY)
Facility: HOSPITAL | Age: 58
Discharge: HOME | End: 2023-08-18
Attending: STUDENT IN AN ORGANIZED HEALTH CARE EDUCATION/TRAINING PROGRAM
Payer: MEDICAID

## 2023-08-18 VITALS
WEIGHT: 240 LBS | DIASTOLIC BLOOD PRESSURE: 63 MMHG | HEART RATE: 89 BPM | TEMPERATURE: 98 F | HEIGHT: 65 IN | SYSTOLIC BLOOD PRESSURE: 132 MMHG | RESPIRATION RATE: 22 BRPM | OXYGEN SATURATION: 95 % | BODY MASS INDEX: 39.99 KG/M2

## 2023-08-18 DIAGNOSIS — N30.00 ACUTE CYSTITIS WITHOUT HEMATURIA: ICD-10-CM

## 2023-08-18 DIAGNOSIS — K29.00 ACUTE GASTRITIS WITHOUT HEMORRHAGE, UNSPECIFIED GASTRITIS TYPE: ICD-10-CM

## 2023-08-18 DIAGNOSIS — K59.00 CONSTIPATION, UNSPECIFIED CONSTIPATION TYPE: Primary | ICD-10-CM

## 2023-08-18 LAB
ALBUMIN SERPL-MCNC: 3.1 G/DL (ref 3.5–5)
ALBUMIN/GLOB SERPL: 0.8 (ref 1.1–2.2)
ALP SERPL-CCNC: 110 U/L (ref 45–117)
ALT SERPL-CCNC: 21 U/L (ref 12–78)
ANION GAP SERPL CALC-SCNC: 6 MMOL/L (ref 5–15)
APPEARANCE UR: CLEAR
AST SERPL-CCNC: 19 U/L (ref 15–37)
BACTERIA URNS QL MICRO: ABNORMAL /HPF
BASOPHILS # BLD: 0.1 K/UL (ref 0–0.1)
BASOPHILS NFR BLD: 1 % (ref 0–1)
BILIRUB SERPL-MCNC: 0.3 MG/DL (ref 0.2–1)
BILIRUB UR QL: NEGATIVE
BUN SERPL-MCNC: 11 MG/DL (ref 6–20)
BUN/CREAT SERPL: 11 (ref 12–20)
CALCIUM SERPL-MCNC: 8.9 MG/DL (ref 8.5–10.1)
CHLORIDE SERPL-SCNC: 97 MMOL/L (ref 97–108)
CO2 SERPL-SCNC: 32 MMOL/L (ref 21–32)
COLOR UR: ABNORMAL
CREAT SERPL-MCNC: 0.99 MG/DL (ref 0.55–1.02)
DIFFERENTIAL METHOD BLD: ABNORMAL
EOSINOPHIL # BLD: 0.2 K/UL (ref 0–0.4)
EOSINOPHIL NFR BLD: 2 % (ref 0–7)
EPITH CASTS URNS QL MICRO: ABNORMAL /LPF
ERYTHROCYTE [DISTWIDTH] IN BLOOD BY AUTOMATED COUNT: 15.4 % (ref 11.5–14.5)
GLOBULIN SER CALC-MCNC: 4 G/DL (ref 2–4)
GLUCOSE SERPL-MCNC: 139 MG/DL (ref 65–100)
GLUCOSE UR STRIP.AUTO-MCNC: NEGATIVE MG/DL
HCT VFR BLD AUTO: 38.9 % (ref 35–47)
HGB BLD-MCNC: 12 G/DL (ref 11.5–16)
HGB UR QL STRIP: NEGATIVE
IMM GRANULOCYTES # BLD AUTO: 0 K/UL (ref 0–0.04)
IMM GRANULOCYTES NFR BLD AUTO: 0 % (ref 0–0.5)
KETONES UR QL STRIP.AUTO: NEGATIVE MG/DL
LEUKOCYTE ESTERASE UR QL STRIP.AUTO: ABNORMAL
LYMPHOCYTES # BLD: 2.6 K/UL (ref 0.8–3.5)
LYMPHOCYTES NFR BLD: 26 % (ref 12–49)
MAGNESIUM SERPL-MCNC: 1.5 MG/DL (ref 1.6–2.4)
MCH RBC QN AUTO: 24.8 PG (ref 26–34)
MCHC RBC AUTO-ENTMCNC: 30.8 G/DL (ref 30–36.5)
MCV RBC AUTO: 80.4 FL (ref 80–99)
MONOCYTES # BLD: 0.5 K/UL (ref 0–1)
MONOCYTES NFR BLD: 5 % (ref 5–13)
NEUTS SEG # BLD: 6.6 K/UL (ref 1.8–8)
NEUTS SEG NFR BLD: 66 % (ref 32–75)
NITRITE UR QL STRIP.AUTO: NEGATIVE
NRBC # BLD: 0 K/UL (ref 0–0.01)
NRBC BLD-RTO: 0 PER 100 WBC
PH UR STRIP: 5.5 (ref 5–8)
PLATELET # BLD AUTO: 407 K/UL (ref 150–400)
PMV BLD AUTO: 9.4 FL (ref 8.9–12.9)
POTASSIUM SERPL-SCNC: 4.3 MMOL/L (ref 3.5–5.1)
PROT SERPL-MCNC: 7.1 G/DL (ref 6.4–8.2)
PROT UR STRIP-MCNC: NEGATIVE MG/DL
RBC # BLD AUTO: 4.84 M/UL (ref 3.8–5.2)
RBC #/AREA URNS HPF: ABNORMAL /HPF (ref 0–5)
SODIUM SERPL-SCNC: 135 MMOL/L (ref 136–145)
SP GR UR REFRACTOMETRY: <1.005
URINE CULTURE IF INDICATED: ABNORMAL
UROBILINOGEN UR QL STRIP.AUTO: 0.2 EU/DL (ref 0.2–1)
WBC # BLD AUTO: 10 K/UL (ref 3.6–11)
WBC URNS QL MICRO: ABNORMAL /HPF (ref 0–4)

## 2023-08-18 PROCEDURE — 6360000002 HC RX W HCPCS: Performed by: NURSE PRACTITIONER

## 2023-08-18 PROCEDURE — 85025 COMPLETE CBC W/AUTO DIFF WBC: CPT

## 2023-08-18 PROCEDURE — 90791 PSYCH DIAGNOSTIC EVALUATION: CPT

## 2023-08-18 PROCEDURE — 36415 COLL VENOUS BLD VENIPUNCTURE: CPT

## 2023-08-18 PROCEDURE — 80053 COMPREHEN METABOLIC PANEL: CPT

## 2023-08-18 PROCEDURE — 6370000000 HC RX 637 (ALT 250 FOR IP): Performed by: NURSE PRACTITIONER

## 2023-08-18 PROCEDURE — 81001 URINALYSIS AUTO W/SCOPE: CPT

## 2023-08-18 PROCEDURE — 96374 THER/PROPH/DIAG INJ IV PUSH: CPT

## 2023-08-18 PROCEDURE — 83735 ASSAY OF MAGNESIUM: CPT

## 2023-08-18 PROCEDURE — 74176 CT ABD & PELVIS W/O CONTRAST: CPT

## 2023-08-18 PROCEDURE — 87086 URINE CULTURE/COLONY COUNT: CPT

## 2023-08-18 PROCEDURE — 99284 EMERGENCY DEPT VISIT MOD MDM: CPT

## 2023-08-18 PROCEDURE — 74018 RADEX ABDOMEN 1 VIEW: CPT

## 2023-08-18 PROCEDURE — 87077 CULTURE AEROBIC IDENTIFY: CPT

## 2023-08-18 PROCEDURE — 87186 SC STD MICRODIL/AGAR DIL: CPT

## 2023-08-18 RX ORDER — ACETAMINOPHEN 500 MG
1000 TABLET ORAL
Status: COMPLETED | OUTPATIENT
Start: 2023-08-18 | End: 2023-08-18

## 2023-08-18 RX ORDER — ONDANSETRON 2 MG/ML
4 INJECTION INTRAMUSCULAR; INTRAVENOUS
Status: COMPLETED | OUTPATIENT
Start: 2023-08-18 | End: 2023-08-18

## 2023-08-18 RX ORDER — NITROFURANTOIN 25; 75 MG/1; MG/1
100 CAPSULE ORAL 2 TIMES DAILY
Qty: 10 CAPSULE | Refills: 0 | Status: SHIPPED | OUTPATIENT
Start: 2023-08-18 | End: 2023-08-23

## 2023-08-18 RX ORDER — POLYETHYLENE GLYCOL 3350 17 G/17G
17 POWDER, FOR SOLUTION ORAL DAILY
Qty: 116 G | Refills: 0 | Status: SHIPPED | OUTPATIENT
Start: 2023-08-18 | End: 2023-09-17

## 2023-08-18 RX ADMIN — ONDANSETRON 4 MG: 2 INJECTION INTRAMUSCULAR; INTRAVENOUS at 23:19

## 2023-08-18 RX ADMIN — ACETAMINOPHEN 1000 MG: 500 TABLET ORAL at 23:19

## 2023-08-18 ASSESSMENT — PAIN - FUNCTIONAL ASSESSMENT: PAIN_FUNCTIONAL_ASSESSMENT: NONE - DENIES PAIN

## 2023-08-18 ASSESSMENT — ENCOUNTER SYMPTOMS
BACK PAIN: 0
CONSTIPATION: 1
SHORTNESS OF BREATH: 0
ABDOMINAL PAIN: 0

## 2023-08-19 RX ORDER — FAMOTIDINE 20 MG/1
20 TABLET, FILM COATED ORAL 2 TIMES DAILY
Qty: 30 TABLET | Refills: 0 | Status: SHIPPED | OUTPATIENT
Start: 2023-08-19

## 2023-08-19 NOTE — BSMART NOTE
BSMART assessment completed, and suicide risk level noted to be low. Primary Nurse Nadeem Roche and NP Marjorie notified. Concerns not observed. Security/Off- has not been notified.

## 2023-08-19 NOTE — ED PROVIDER NOTES
[facility] EMERGENCY DEPT  EMERGENCY DEPARTMENT ENCOUNTER       Pt Name: Zana Arnold  MRN: [id number]  [address] [facility] [address] 1965  Date of evaluation: 8/18/2023  Provider: SANDRA Levy - FRANKLIN   PCP: Miladys Cisse MD  Note Started: 12:48 PM 8/18/23     CHIEF COMPLAINT       Chief Complaint   Patient presents with    Mental Health Problem        HISTORY OF PRESENT ILLNESS: 1 or more elements      History Provided by: Patient   History is limited by: Nothing     Zana Arnold is a 59 y.o. female who presents by ambulance to the emergency department. Patient states that her right arm hurts. States she has had pain in her right arm for the past 4 years because she had a stroke. Patient also states that she thinks she has a urine infection. She also states that she has not had a bowel movement in a few days and her abdomen is swollen. She then states that she is suicidal but she does not have a plan. Nursing Notes were all reviewed and agreed with or any disagreements were addressed in the HPI. She then states that she is suicidal but she does not have a plan. Patient lives in a group home. REVIEW OF SYSTEMS      Review of Systems   Constitutional:  Negative for fever. HENT:  Negative for congestion. Eyes:  Negative for visual disturbance. Respiratory:  Negative for shortness of breath. Cardiovascular:  Negative for chest pain. Gastrointestinal:  Positive for constipation. Negative for abdominal pain. Genitourinary:  Positive for dysuria. Negative for difficulty urinating. Musculoskeletal:  Negative for back pain and neck pain. Arm pain   Skin:  Negative for rash. Neurological:  Negative for dizziness, weakness and headaches. Psychiatric/Behavioral:  Negative for behavioral problems. Suicidal thoughts   All other systems reviewed and are negative. Positives and Pertinent negatives as per HPI.     PAST HISTORY     Past Medical History:  Past Medical History:   Diagnosis completed with voice recognition software. Quite often unanticipated grammatical, syntax, homophones, and other interpretive errors are inadvertently transcribed by the computer software. Please disregards these errors.  Please excuse any errors that have escaped final proofreading.)         SANDRA Seth - FRANKLIN  08/19/23 1824

## 2023-08-19 NOTE — BSMART NOTE
Comprehensive Assessment Form Part 1    Section I - Disposition    Primary Diagnosis: Malingering  Secondary Diagnosis: Schizophrenia (by history)    The Medical Doctor to Psychiatrist conference was not completed. The Medical Doctor is in agreement with Psychiatrist disposition because of (reason) patient is not currently reporting psychiatric admission criteria. The plan is finish medical clearance and discharge back to caretaker if medically cleared. The on-call Psychiatrist consulted was Dr. Miladys Palafox. The admitting Psychiatrist will be Dr. Miladys Palafox. The admitting Diagnosis is NA. The Payor source is [facility]. This writer reviewed the [facility] in nursing flowsheet and the risk level assigned is no risk. Based on this assessment, the risk of suicide is low and the plan is discharge if medically cleared. Section II - Integrated Summary  Summary:  Patient is a 59year old female seen face to face in the ER. She is known to this clinician from past ER visits. Patient was brought to the ER by her caretaker whom she lives with, [place]. She has been living with her since January 2023. Ms. Cammie Borges reported that she brought patient to the ER because she has been throwing up all week and hasn't been able to keep much food down. She reported she has been belching a lot and it \"smells terrible,\" which is also new. She has not had a bowel movement in 4-5 days, and the caretaker has given her several different over the counter medications to assist her with having a bowel movement that have not been successful. She has also been urinating on herself and she is concerned that she has a UTI. She reported she is active with the [facility] and just received her monthly injection yesterday. Patient has not reported any mental health complaints until she arrived at the ER with Ms. Cammie Borges today.     When patient initially arrived in the ER she did not report any mental health concerns, including suicidal ideation. However once brought back to a room she reported suicidal ideation with no plan, as well as auditory hallucinations. Patient has a long history of malingering to be admitted psychiatrically. When seen by this clinician she denied suicidal and homicidal ideation, and reported she didn't feel well and was brought to the ER by her caretaker for medical concerns. She reported she does hear voices, but this has been going on her whole life and denied that they are worse than usual.  Patient does not have access to her medications or the ability to ambulate quickly. There are no safety concerns at this time. She can be discharged if she clears medically. The patient has demonstrated mental capacity to provide informed consent. The information is given by the patient, past medical records, and caregiver . The Chief Complaint was initially abdominal pain, then was changed to mental health problem. The Precipitant Factors are medical stressors, attention seeking. Previous Hospitalizations: yes  The patient has not previously been in restraints. Current Psychiatrist and/or  is [facility]. Lethality Assessment:    The potential for suicide is noted by the following: recent ideation. The potential for homicide is not noted. The patient has not been a perpetrator of sexual or physical abuse. There are not pending charges. The patient is not felt to be at risk for self harm or harm to others. The attending nurse was advised that security has not been notified. Section III - Psychosocial  The patient's overall mood and attitude is withdrawn. Feelings of helplessness and hopelessness are not observed. Generalized anxiety is not observed. Panic is not observed. Phobias are not observed. Obsessive compulsive tendencies are not observed. Section IV - Mental Status Exam  The patient's appearance shows no evidence of impairment.   The patient's Malu Holly, MA

## 2023-08-19 NOTE — ED NOTES
DISCHARGE INSTRUCTIONS  Pt discharged home. A total of 0 written and 2 electronic prescriptions were discussed with pt. Pt educated on follow up appointments, diagnosis print outs, and medication list.  Pt verbalized understanding. All questions answered. Pt stable to go home. Pt was offered wheelchair, pt refused wheelchair.        Dave Vu RN  08/18/23 [id number]

## 2023-08-19 NOTE — DISCHARGE INSTRUCTIONS
It was a pleasure taking care of you at [facility] Emergency Department today. We know that when you come to [facility], you are entrusting us with your health, comfort, and safety. Our physicians and nurses honor that trust, and we truly appreciate the opportunity to care for you and your loved ones. We also value our feedback. If you receive a survey about your Emergency Department experience today, please fill it out. We care about our patients' feedback, and we listen to what you have to say. Thank you!

## 2023-08-20 LAB
BACTERIA SPEC CULT: ABNORMAL
CC UR VC: ABNORMAL
SERVICE CMNT-IMP: ABNORMAL

## 2024-10-24 NOTE — ED NOTES
Pt presents ambulatory to ED complaining of SI. Pt reports feeling depressed, reports her plan is to \"jump in front of a car. \" Pt has previous hospitalizations for mental health. Pt reports she is supposed to be taking antidepressants but has not taken them recently. Pt noted to have poor hygiene and no underwear on upon arrival. Pt states \"I have not slept in a week. \" Pt noted to be shifting frequently in bed and slow to respond to commands. Pt reports hx of ETOH, cocaine, and marijuana use. Pt is poor historian regarding medications and dosages. Assesment completed and pt updated on plan of care. Emergency Department Nursing Plan of Care       The Nursing Plan of Care is developed from the Nursing assessment and Emergency Department Attending provider initial evaluation. The plan of care may be reviewed in the ED Provider note.     The Plan of Care was developed with the following considerations:   Patient / Family readiness to learn indicated by:verbalized understanding  Persons(s) to be included in education: patient  Barriers to Learning/Limitations:No    Signed     Deepak Bonilla RN    6/13/2017   9:28 PM [Negative] : Heme/Lymph